# Patient Record
Sex: FEMALE | Race: WHITE | NOT HISPANIC OR LATINO | Employment: OTHER | URBAN - NONMETROPOLITAN AREA
[De-identification: names, ages, dates, MRNs, and addresses within clinical notes are randomized per-mention and may not be internally consistent; named-entity substitution may affect disease eponyms.]

---

## 2017-01-17 DIAGNOSIS — I10 ESSENTIAL HYPERTENSION: ICD-10-CM

## 2017-01-18 RX ORDER — HYDROCHLOROTHIAZIDE 12.5 MG/1
TABLET ORAL
Qty: 30 TAB | Refills: 6 | Status: SHIPPED | OUTPATIENT
Start: 2017-01-18 | End: 2017-07-21 | Stop reason: SDUPTHER

## 2017-01-25 ENCOUNTER — OFFICE VISIT (OUTPATIENT)
Dept: MEDICAL GROUP | Facility: PHYSICIAN GROUP | Age: 62
End: 2017-01-25
Payer: COMMERCIAL

## 2017-01-25 VITALS
SYSTOLIC BLOOD PRESSURE: 116 MMHG | HEIGHT: 67 IN | WEIGHT: 168 LBS | OXYGEN SATURATION: 94 % | BODY MASS INDEX: 26.37 KG/M2 | TEMPERATURE: 97.9 F | HEART RATE: 64 BPM | RESPIRATION RATE: 16 BRPM | DIASTOLIC BLOOD PRESSURE: 64 MMHG

## 2017-01-25 DIAGNOSIS — M54.9 BACK PAIN WITH SCIATICA: ICD-10-CM

## 2017-01-25 DIAGNOSIS — M54.30 BACK PAIN WITH SCIATICA: ICD-10-CM

## 2017-01-25 DIAGNOSIS — M51.36 DEGENERATIVE DISC DISEASE, LUMBAR: ICD-10-CM

## 2017-01-25 PROBLEM — M51.369 DEGENERATIVE DISC DISEASE, LUMBAR: Status: ACTIVE | Noted: 2017-01-25

## 2017-01-25 PROCEDURE — 99214 OFFICE O/P EST MOD 30 MIN: CPT | Performed by: INTERNAL MEDICINE

## 2017-01-25 RX ORDER — DICLOFENAC SODIUM 75 MG/1
75 TABLET, DELAYED RELEASE ORAL
Qty: 60 TAB | Refills: 11 | Status: ON HOLD | OUTPATIENT
Start: 2017-01-25 | End: 2017-05-26

## 2017-01-25 NOTE — MR AVS SNAPSHOT
"        Chinyere Priest   2017 3:20 PM   Office Visit   MRN: [id number]    Department:  [facility]   Dept Phone:  [phone]    Description:  Female : 1955   Provider:  Jessie Wood M.D.           Reason for Visit     Back Pain LBP-discuss pain management       Allergies as of 2017     Allergen Noted Reactions    Erythromycin 2014       Abdominal pain    Latex 2009       Tape 2009       Cloth tape      You were diagnosed with     Degenerative disc disease, lumbar   [607350]       Back pain with sciatica   [8712599]         Vital Signs     Blood Pressure Pulse Temperature Respirations Height Weight    116/64 mmHg 64 36.6 °C (97.9 °F) 16 1.702 m (5' 7.01\") 76.204 kg (168 lb)    Body Mass Index Oxygen Saturation Smoking Status             26.31 kg/m2 94% Former Smoker         Basic Information     Date Of Birth Sex Race Ethnicity Preferred Language    1955 Female White Non- English      Your appointments     2017  9:40 AM   FOLLOW UP with SAL Cortes   [facility]    [address] [address]   [phone]            Mar 06, 2017  8:30 AM   Established Lung Cancer Screening 30 min with DANIELLE Kraus   [facility] (--)    [address]  [facility] [place] [facility] [address]   [phone]            Mar 06, 2017  1:10 PM   MA SCRN10 with [facility] MG 3   [facility] ([address])    [address] [place] [address] [address]  [facility] [address]   [phone]           No deodorant, powder, perfume or lotion under the arm or breast area.              Problem List              ICD-10-CM Priority Class Noted - Resolved    Inflamed seborrheic keratosis L82.0   6/3/2010 - Present    Chronic back pain (Chronic) M54.9, G89.29   6/3/2010 - Present    Pure hypercholesterolemia E78.00 High  6/3/2010 - Present    Plantar fascia syndrome (Chronic) M72.2   2010 - " Present    Vitamin D deficiency (Chronic) E55.9   7/1/2010 - Present    DDD (degenerative disc disease), cervical M50.30   1/19/2012 - Present    Trigger point of thoracic region M54.6   1/19/2012 - Present    Abnormal MRI, cervical spine R93.7   1/19/2012 - Present    Pruritic dermatitis L29.9   6/21/2013 - Present    Spider veins of limb I78.1   6/21/2013 - Present    Cystocele N81.10   9/13/2013 - Present    Asthma in adult without complication J45.909   1/13/2014 - Present    Enlarged ovary N83.8   1/13/2014 - Present    Palpitations R00.2 Medium  12/10/2014 - Present    Osteoarthritis of both hips M16.0   12/10/2014 - Present    Skin lesion of right arm L98.9   4/28/2015 - Present    Undiagnosed cardiac murmurs R01.1 Medium  5/15/2015 - Present    Abnormal CT scan, kidney R93.429   5/15/2015 - Present    Aortic calcification (CMS-HCC) I70.0 Medium  5/15/2015 - Present    Diverticulosis K57.90   5/15/2015 - Present    Pulmonary hypertension (CMS-[facility]) I27.2 High  8/28/2015 - Present    Essential hypertension I10 High  8/28/2015 - Present    Need for pneumococcal vaccination Z23   10/16/2015 - Present    Epigastric abdominal pain R10.13   10/17/2015 - Present    Decreased right ventricular systolic function I51.9   1/14/2016 - Present    Asthma exacerbation J45.901   4/11/2016 - Present    Family history of ovarian cancer Z80.41   11/21/2016 - Present    Family history of breast cancer Z80.3   11/21/2016 - Present    Tobacco use disorder, mild, in sustained remission, abuse Z72.0   11/21/2016 - Present    Degenerative disc disease, lumbar M51.36   1/25/2017 - Present    Back pain with sciatica M54.30, M54.9   1/25/2017 - Present      Health Maintenance        Date Due Completion Dates    IMM DTaP/Tdap/Td Vaccine (1 - Tdap) 7/30/1973 ---    IMM ZOSTER VACCINE 7/30/2014 ---    MAMMOGRAM 5/21/2016 5/21/2015, 1/20/2014, 11/16/2012    COLONOSCOPY 12/8/2021 12/8/2011 (Prv Comp)    Override on 12/8/2011: Previously  completed (GIC, diverticulosis, repeat in 10 years)            Current Immunizations     13-VALENT PCV PREVNAR 10/16/2015 10:10 AM    Influenza TIV (IM) 10/1/2016    Pneumococcal polysaccharide vaccine (PPSV-23) 11/21/2016      Below and/or attached are the medications your provider expects you to take. Review all of your home medications and newly ordered medications with your provider and/or pharmacist. Follow medication instructions as directed by your provider and/or pharmacist. Please keep your medication list with you and share with your provider. Update the information when medications are discontinued, doses are changed, or new medications (including over-the-counter products) are added; and carry medication information at all times in the event of emergency situations     Allergies:  ERYTHROMYCIN - (reactions not documented)     LATEX - (reactions not documented)     TAPE - (reactions not documented)               Medications  Valid as of: January 25, 2017 -  3:49 PM    Generic Name Brand Name Tablet Size Instructions for use    Albuterol Sulfate (Aero Soln) albuterol 108 (90 BASE) MCG/ACT Inhale 2 Puffs by mouth every four hours as needed.        AmLODIPine Besylate (Tab) NORVASC 5 MG Take 1 Tab by mouth every day.        Aspirin (Tab) aspirin 81 MG Take 81 mg by mouth every day.        Cholecalciferol (Tab) cholecalciferol 1000 UNIT Take 2 Tabs by mouth every day.        Diclofenac Sodium (Gel) Diclofenac Sodium 1 % Apply 2gm up to 4 times per day if needed for knee pain        Diclofenac Sodium (Tablet Delayed Response) VOLTAREN 75 MG Take 1 Tab by mouth 2 times daily with meals as needed.        Doxycycline Hyclate (Tab) VIBRAMYCIN 100 MG Take 1 Tab by mouth 2 times a day.        HydroCHLOROthiazide (Tab) HYDRODIURIL 12.5 MG TAKE 1 TAB BY MOUTH EVERY DAY.        Ibuprofen (Tab) MOTRIN 800 MG Take 1 Tab by mouth every 8 hours as needed for Moderate Pain.        MethylPREDNISolone (Tablet Therapy Pack)  MEDROL DOSEPAK 4 MG Take 1 Tab by mouth See Admin Instructions.        .                 Medicines prescribed today were sent to:     [id number] - [place], [place] - [address]    [address] [place] [address]    Phone: [phone] Fax: [phone]    Open 24 Hours?: No      Medication refill instructions:       If your prescription bottle indicates you have medication refills left, it is not necessary to call your provider’s office. Please contact your pharmacy and they will refill your medication.    If your prescription bottle indicates you do not have any refills left, you may request refills at any time through one of the following ways: The online [employer] system (except Urgent Care), by calling your provider’s office, or by asking your pharmacy to contact your provider’s office with a refill request. Medication refills are processed only during regular business hours and may not be available until the next business day. Your provider may request additional information or to have a follow-up visit with you prior to refilling your medication.   *Please Note: Medication refills are assigned a new Rx number when refilled electronically. Your pharmacy may indicate that no refills were authorized even though a new prescription for the same medication is available at the pharmacy. Please request the medicine by name with the pharmacy before contacting your provider for a refill.        Referral     A referral request has been sent to our patient care coordination department. Please allow 3-5 business days for us to process this request and contact you either by phone or mail. If you do not hear from us by the 5th business day, please call us at [phone].        Instructions    1. Referred to Dr. Clay.    2. Try diclofenac gel and/or pills.       Other Notes About Your Plan     CARDS:add on HCTZ 12.5 and Norvasc 5 mg. Recheck lipids. Recheck 6 months 1/2017    CARDIOLOGY NOTES: 8/2015  Trial of  amlodipine for her systemic hypertension with followup in the nurse practice clinic in 2-3 weeks and up titration of her amlodipine dose as tolerated with plans to repeat an echocardiogram in the future to remeasure her right ventricular systolic pressure. She may require a CT and a right heart catheter before we are done with this workup. She is well versed in the use of the emergency medical system for any symptoms. Continue primary follow up with Afia Currie.           [facility] Status: Patient Declined

## 2017-01-26 NOTE — PROGRESS NOTES
Chief Complaint   Patient presents with   • Back Pain     LBP-discuss pain management        HISTORY OF PRESENT ILLNESS: Patient is a 61 y.o. female established patient who presents today to be seen for acute on chronic issue.    Degenerative disc disease, lumbar  Patient is a 61-year-old female with a long history of chronic back pain. She had surgery by Dr. Clay in 2009. Since then she has followed at [facility] and has had a physiatry's there. She notes that in the past few years she's had worsening back pain with extension of nerve pain down into her legs bilaterally. She is now undergone 5 different sets of epidurals injections but has not had improvement in her pain. She uses anti-inflammatories and does not take narcotics. She recently address this with her current physiatrist and he offered injections again. Patient is frustrated with continued option. She would like to seek out a second opinion. She had an MRI in December 2015. We discussed today her options. I think it is certainly reasonable to do second opinion when she is not having any improvement and continues to receive injections from the same place. We discussed sending her back to Dr. Clay as he did her previous surgery. She's not had weakness in her legs. No problems with bowel or bladder incontinence. Pain does occur on a daily basis. She uses diclofenac for symptoms orally and also in gel form.      Patient Active Problem List    Diagnosis Date Noted   • Pulmonary hypertension (CMS-[facility]) 08/28/2015     Priority: High   • Essential hypertension 08/28/2015     Priority: High   • Pure hypercholesterolemia 06/03/2010     Priority: High   • Undiagnosed cardiac murmurs 05/15/2015     Priority: Medium   • Aortic calcification (CMS-[facility]) 05/15/2015     Priority: Medium   • Palpitations 12/10/2014     Priority: Medium   • Degenerative disc disease, lumbar 01/25/2017   • Back pain with sciatica 01/25/2017   • Family history of ovarian cancer  11/21/2016   • Family history of breast cancer 11/21/2016   • Tobacco use disorder, mild, in sustained remission, abuse 11/21/2016   • Asthma exacerbation 04/11/2016   • Decreased right ventricular systolic function 01/14/2016   • Epigastric abdominal pain 10/17/2015   • Need for pneumococcal vaccination 10/16/2015   • Abnormal CT scan, kidney 05/15/2015   • Diverticulosis 05/15/2015   • Skin lesion of right arm 04/28/2015   • Osteoarthritis of both hips 12/10/2014   • Asthma in adult without complication 01/13/2014   • Enlarged ovary 01/13/2014   • Cystocele 09/13/2013   • Pruritic dermatitis 06/21/2013   • Spider veins of limb 06/21/2013   • DDD (degenerative disc disease), cervical 01/19/2012   • Trigger point of thoracic region 01/19/2012   • Abnormal MRI, cervical spine 01/19/2012   • Plantar fascia syndrome 07/01/2010   • Vitamin D deficiency 07/01/2010   • Inflamed seborrheic keratosis 06/03/2010   • Chronic back pain 06/03/2010       Allergies:Erythromycin; Latex; and Tape    Current Outpatient Prescriptions Ordered in [facility]   Medication Sig Dispense Refill   • Diclofenac Sodium 1 % Gel Apply 2gm up to 4 times per day if needed for knee pain 1 Tube 3   • diclofenac EC (VOLTAREN) 75 MG Tablet Delayed Response Take 1 Tab by mouth 2 times daily with meals as needed. 60 Tab 11   • hydrochlorothiazide (HYDRODIURIL) 12.5 MG tablet TAKE 1 TAB BY MOUTH EVERY DAY. 30 Tab 6   • ibuprofen (MOTRIN) 800 MG Tab Take 1 Tab by mouth every 8 hours as needed for Moderate Pain. 90 Tab 3   • albuterol 108 (90 BASE) MCG/ACT Aero Soln inhalation aerosol Inhale 2 Puffs by mouth every four hours as needed. 1 Inhaler 0   • MethylPREDNISolone (MEDROL DOSEPAK) 4 MG Tablet Therapy Pack Take 1 Tab by mouth See Admin Instructions. 21 Tab 0   • amlodipine (NORVASC) 5 MG Tab Take 1 Tab by mouth every day. 90 Tab 3   • vitamin D (CHOLECALCIFEROL) 1000 UNIT TABS Take 2 Tabs by mouth every day. 60 Each 11   • aspirin 81 MG tablet Take 81 mg by  "mouth every day.     • doxycycline (VIBRAMYCIN) 100 MG Tab Take 1 Tab by mouth 2 times a day. 20 Tab 0     No current Epic-ordered facility-administered medications on file.       Past Medical History   Diagnosis Date   • Pain    • Asthma in adult          • Essential hypertension, malignant     • Tobacco use disorder     • Inflamed seborrheic keratosis     • Chronic back pain     • Pure hypercholesterolemia     • Fatigue     • Plantar fascia syndrome     • Unspecified vitamin D deficiency     • Pruritic dermatitis       Ongoing for several months but worse at night on her lower extremities   • Spider veins of limb     • Plantar wart of right foot     • Palpitations     • Osteoarthritis of both hips     • Abnormal CT scan, kidney       \"Incidentally noted retroaortic left renal vein is identified. Some calcified plaque is noted in the aorta and its branch vessels.\"  CT scan done for diverticulosis     • Aortic calcification (CMS-HCC)       Noted incidentally on abdominal CT scan.   • Diverticulosis     • Pulmonary hypertension (CMS-HCC) 2016     Echocardiogram with normal LV size, mild concentric LVH, LVEF 65%. Grade I diastolic dysfunction. Trace MR, trace TR, RVSP 40mmHg.       Social History   Substance Use Topics   • Smoking status: Former Smoker -- 1.00 packs/day for 40 years     Types: Cigarettes     Quit date: 2010   • Smokeless tobacco: Never Used   • Alcohol Use: No       Family Status   Relation Status Death Age   • Mother       CAD   • Maternal Aunt     • Daughter Alive    • Maternal Grandmother     • Maternal Grandfather     • Maternal Aunt  56   • Father Other      history unknown     Family History   Problem Relation Age of Onset   • Heart Disease Mother      1st MI @ 59 yo   • Hypertension Mother    • Heart Disease Maternal Aunt    • Cancer Daughter 35     breast   • Heart Disease Maternal Grandmother    • Heart Disease Maternal Grandfather    • " "Heart Disease Maternal Aunt 56       ROS:  Review of Systems   Constitutional: Negative for fever and malaise/fatigue.   HENT: Negative for congestion  Respiratory: Negative for cough  Cardiovascular: Negative for chest pain  Gastrointestinal: Negative for nausea, vomiting and abdominal pain.  Musculoskeletal positive for back pain  All other systems reviewed and are negative except as in HPI.      Exam:  Blood pressure 116/64, pulse 64, temperature 36.6 °C (97.9 °F), resp. rate 16, height 1.702 m (5' 7.01\"), weight 76.204 kg (168 lb), SpO2 94 %.  General:  Well nourished, well developed female in NAD  Head is grossly normal.  Neck: Supple without JVD   Pulmonary: Clear to ausculation and percussion.  Normal effort. No rales, ronchi, or wheezing.  Cardiovascular: Regular rate and rhythm without murmur. Carotid and radial pulses are intact and equal bilaterally.  Extremities: no clubbing, cyanosis, or edema.  MSK: spasm of the lateral lumbar spinal muscles bilaterally  Neuro: strength 5/5 in lower legs bilaterally        Assessment/Plan:  1. Degenerative disc disease, lumbar  REFERRAL TO NEUROSURGERY    Diclofenac Sodium 1 % Gel    diclofenac EC (VOLTAREN) 75 MG Tablet Delayed Response    Uncontrolled, referred to spine [place]   2. Back pain with sciatica  Diclofenac Sodium 1 % Gel    diclofenac EC (VOLTAREN) 75 MG Tablet Delayed Response     Please note that this dictation was created using voice recognition software. I have made every reasonable attempt to correct obvious errors, but I expect that there are errors of grammar and possibly content that I did not discover before finalizing the note.         "

## 2017-01-26 NOTE — ASSESSMENT & PLAN NOTE
Patient is a 61-year-old female with a long history of chronic back pain. She had surgery by Dr. Clay in 2009. Since then she has followed at [facility] and has had a physiatry's there. She notes that in the past few years she's had worsening back pain with extension of nerve pain down into her legs bilaterally. She is now undergone 5 different sets of epidurals injections but has not had improvement in her pain. She uses anti-inflammatories and does not take narcotics. She recently address this with her current physiatrist and he offered injections again. Patient is frustrated with continued option. She would like to seek out a second opinion. She had an MRI in December 2015. We discussed today her options. I think it is certainly reasonable to do second opinion when she is not having any improvement and continues to receive injections from the same place. We discussed sending her back to Dr. Clay as he did her previous surgery. She's not had weakness in her legs. No problems with bowel or bladder incontinence. Pain does occur on a daily basis. She uses diclofenac for symptoms orally and also in gel form.

## 2017-02-03 ENCOUNTER — HOSPITAL ENCOUNTER (OUTPATIENT)
Dept: RADIOLOGY | Facility: MEDICAL CENTER | Age: 62
End: 2017-02-03

## 2017-03-06 ENCOUNTER — APPOINTMENT (OUTPATIENT)
Dept: HEMATOLOGY ONCOLOGY | Facility: MEDICAL CENTER | Age: 62
End: 2017-03-06
Payer: COMMERCIAL

## 2017-03-09 ENCOUNTER — OFFICE VISIT (OUTPATIENT)
Dept: CARDIOLOGY | Facility: PHYSICIAN GROUP | Age: 62
End: 2017-03-09
Payer: COMMERCIAL

## 2017-03-09 VITALS
DIASTOLIC BLOOD PRESSURE: 70 MMHG | HEART RATE: 86 BPM | WEIGHT: 175 LBS | HEIGHT: 67 IN | SYSTOLIC BLOOD PRESSURE: 124 MMHG | BODY MASS INDEX: 27.47 KG/M2 | OXYGEN SATURATION: 97 %

## 2017-03-09 DIAGNOSIS — I27.20 PULMONARY HYPERTENSION (HCC): ICD-10-CM

## 2017-03-09 DIAGNOSIS — M54.50 CHRONIC BILATERAL LOW BACK PAIN WITHOUT SCIATICA: Chronic | ICD-10-CM

## 2017-03-09 DIAGNOSIS — I10 ESSENTIAL HYPERTENSION: ICD-10-CM

## 2017-03-09 DIAGNOSIS — E78.00 PURE HYPERCHOLESTEROLEMIA: ICD-10-CM

## 2017-03-09 DIAGNOSIS — G89.29 CHRONIC BILATERAL LOW BACK PAIN WITHOUT SCIATICA: Chronic | ICD-10-CM

## 2017-03-09 PROCEDURE — 99214 OFFICE O/P EST MOD 30 MIN: CPT | Performed by: NURSE PRACTITIONER

## 2017-03-09 ASSESSMENT — ENCOUNTER SYMPTOMS
PALPITATIONS: 0
MYALGIAS: 0
ORTHOPNEA: 0
DIZZINESS: 0
BRUISES/BLEEDS EASILY: 0
HEADACHES: 0
FEVER: 0
PND: 0
ABDOMINAL PAIN: 0
CHILLS: 0
LOSS OF CONSCIOUSNESS: 0
SHORTNESS OF BREATH: 0

## 2017-03-09 NOTE — Clinical Note
"     [facility] [address]  Phone: [phone]  Fax: [phone]              Chinyere Priest  1955    Encounter Date: 3/9/2017    SAL Cortes          PROGRESS NOTE:  Subjective:   Chinyere Priest is a 61 y.o. female who presents today for six month FU for hypertension and hyperlipidemia.    Chinyere is a 61 year old female with history of hypertension, hyperlipidemia, palpitations and mild-moderate pulmonary hypertension on echo in January 2016 .    She is here today for follow-up. BP is running 110-130 systolic at home. She feels good. Some mild lower extremity edema, but she stands a lot during the day. No chest pain, pressure or discomfort; no symptomatic palpitations. No shortness of breath, orthopnea or PND. No dizziness, lightheadedness or syncope.    Past Medical History   Diagnosis Date   • Pain    • Asthma in adult          • Essential hypertension, malignant     • Tobacco use disorder     • Inflamed seborrheic keratosis     • Chronic back pain     • Pure hypercholesterolemia     • Fatigue     • Plantar fascia syndrome     • Unspecified vitamin D deficiency     • Pruritic dermatitis       Ongoing for several months but worse at night on her lower extremities   • Spider veins of limb     • Plantar wart of right foot     • Palpitations     • Osteoarthritis of both hips     • Abnormal CT scan, kidney       \"Incidentally noted retroaortic left renal vein is identified. Some calcified plaque is noted in the aorta and its branch vessels.\"  CT scan done for diverticulosis     • Aortic calcification (CMS-HCC)       Noted incidentally on abdominal CT scan.   • Diverticulosis     • Pulmonary hypertension (CMS-HCC) January 2016     Echocardiogram with normal LV size, mild concentric LVH, LVEF 65%. Grade I diastolic dysfunction. Trace MR, trace TR, RVSP 40mmHg.     Past Surgical History   Procedure Laterality Date  "   • Gyn surgery        x2   • Hysterectomy laparoscopy     • Lumbar laminectomy diskectomy  2009     Performed by SUDHIR VERMA at SURGERY [facility] ORS     Family History   Problem Relation Age of Onset   • Heart Disease Mother      1st MI @ 59 yo   • Hypertension Mother    • Heart Disease Maternal Aunt    • Cancer Daughter 35     breast   • Heart Disease Maternal Grandmother    • Heart Disease Maternal Grandfather    • Heart Disease Maternal Aunt 56     History   Smoking status   • Former Smoker -- 1.00 packs/day for 40 years   • Types: Cigarettes   • Quit date: 2010   Smokeless tobacco   • Never Used     Allergies   Allergen Reactions   • Erythromycin      Abdominal pain   • Latex    • Tape      Cloth tape     Outpatient Encounter Prescriptions as of 3/9/2017   Medication Sig Dispense Refill   • Diclofenac Sodium 1 % Gel Apply 2gm up to 4 times per day if needed for knee pain 1 Tube 3   • diclofenac EC (VOLTAREN) 75 MG Tablet Delayed Response Take 1 Tab by mouth 2 times daily with meals as needed. 60 Tab 11   • hydrochlorothiazide (HYDRODIURIL) 12.5 MG tablet TAKE 1 TAB BY MOUTH EVERY DAY. 30 Tab 6   • ibuprofen (MOTRIN) 800 MG Tab Take 1 Tab by mouth every 8 hours as needed for Moderate Pain. 90 Tab 3   • doxycycline (VIBRAMYCIN) 100 MG Tab Take 1 Tab by mouth 2 times a day. 20 Tab 0   • albuterol 108 (90 BASE) MCG/ACT Aero Soln inhalation aerosol Inhale 2 Puffs by mouth every four hours as needed. 1 Inhaler 0   • MethylPREDNISolone (MEDROL DOSEPAK) 4 MG Tablet Therapy Pack Take 1 Tab by mouth See Admin Instructions. 21 Tab 0   • amlodipine (NORVASC) 5 MG Tab Take 1 Tab by mouth every day. 90 Tab 3   • vitamin D (CHOLECALCIFEROL) 1000 UNIT TABS Take 2 Tabs by mouth every day. 60 Each 11   • aspirin 81 MG tablet Take 81 mg by mouth every day.       No facility-administered encounter medications on file as of 3/9/2017.     Review of Systems   Constitutional: Negative for fever and chills.  "  Respiratory: Negative for shortness of breath.    Cardiovascular: Positive for leg swelling. Negative for chest pain, palpitations, orthopnea and PND.        Very mild.   Gastrointestinal: Negative for abdominal pain.   Musculoskeletal: Negative for myalgias.   Neurological: Negative for dizziness, loss of consciousness and headaches.   Endo/Heme/Allergies: Does not bruise/bleed easily.        Objective:   /70 mmHg  Pulse 86  Ht 1.689 m (5' 6.5\")  Wt 79.379 kg (175 lb)  BMI 27.83 kg/m2  SpO2 97%    Physical Exam   Constitutional: She is oriented to person, place, and time. She appears well-developed and well-nourished. No distress.   HENT:   Head: Normocephalic.   Eyes: Conjunctivae and EOM are normal.   Neck: Normal range of motion. Neck supple. No JVD present.   Cardiovascular: Normal rate, regular rhythm, normal heart sounds and intact distal pulses.  Exam reveals no gallop and no friction rub.    No murmur heard.  Pulmonary/Chest: Effort normal and breath sounds normal.   Abdominal: Soft. Bowel sounds are normal.   Musculoskeletal: Normal range of motion. She exhibits edema.   Trace edema to the ankles bilaterally.   Neurological: She is alert and oriented to person, place, and time.   Skin: Skin is warm and dry. No rash noted. No erythema.   Psychiatric: She has a normal mood and affect. Her behavior is normal.     Lab Results   Component Value Date/Time    CHOLESTEROL, 11/15/2016 09:26 AM    * 11/15/2016 09:26 AM    HDL 63 11/15/2016 09:26 AM    TRIGLYCERIDES 74 11/15/2016 09:26 AM       Lab Results   Component Value Date/Time    SODIUM 141 11/15/2016 09:26 AM    POTASSIUM 4.2 11/15/2016 09:26 AM    CHLORIDE 107 11/15/2016 09:26 AM    CO2 28 11/15/2016 09:26 AM    GLUCOSE 87 11/15/2016 09:26 AM    BUN 17 11/15/2016 09:26 AM    CREATININE 0.67 11/15/2016 09:26 AM     Lab Results   Component Value Date/Time    ALKALINE PHOSPHATASE 56 11/15/2016 09:26 AM    AST(SGOT) 18 11/15/2016 09:26 " AM    ALT(SGPT) 17 11/15/2016 09:26 AM    TOTAL BILIRUBIN 0.6 11/15/2016 09:26 AM          Assessment:     1. Essential hypertension     2. Pure hypercholesterolemia     3. Pulmonary hypertension (CMS-HCC)     4. Chronic bilateral low back pain without sciatica         Medical Decision Making:  Today's Assessment / Status / Plan:     1. Hypertension, treated with combination therapy, stable. BP is very good today.    2. Hyperlipidemia, not currently on any therapy. Recent lipid panel was good. HDL is very good.    3. Pulmonary hypertension, mild-moderate on echo in January 2016, stable.    4. Chronic low back pain, followed by PCP.    Same medications for now. FU in 1 year; FU sooner if clinical condition changes.    Collaborating MD: EUSEBIO Moreno      No Recipients

## 2017-03-09 NOTE — MR AVS SNAPSHOT
"        Chinyere Cem   3/9/2017 1:20 PM   Office Visit   MRN: [id number]    Department:  [facility]   Dept Phone:  [phone]    Description:  Female : 1955   Provider:  SAL Cortes           Reason for Visit     Follow-Up           Allergies as of 3/9/2017     Allergen Noted Reactions    Erythromycin 2014       Abdominal pain    Latex 2009       Tape 2009       Cloth tape      You were diagnosed with     Essential hypertension   [6080344]       Pure hypercholesterolemia   [272.0.ICD-9-CM]       Pulmonary hypertension (CMS-HCC)   [809939]       Chronic bilateral low back pain without sciatica   [2802910]         Vital Signs     Blood Pressure Pulse Height Weight Body Mass Index Oxygen Saturation    124/70 mmHg 86 1.689 m (5' 6.5\") 79.379 kg (175 lb) 27.83 kg/m2 97%    Smoking Status                   Former Smoker           Basic Information     Date Of Birth Sex Race Ethnicity Preferred Language    1955 Female White Non- English      Your appointments     2017  1:20 PM   MA SCRN10 with RBHC MG 3   [facility] ([address])    [address]  [facility] [address]   [phone]           No deodorant, powder, perfume or lotion under the arm or breast area.              Problem List              ICD-10-CM Priority Class Noted - Resolved    Chronic back pain (Chronic) M54.9, G89.29   6/3/2010 - Present    Pure hypercholesterolemia E78.00 High  6/3/2010 - Present    Plantar fascia syndrome (Chronic) M72.2   2010 - Present    Vitamin D deficiency (Chronic) E55.9   2010 - Present    DDD (degenerative disc disease), cervical M50.30   2012 - Present    Trigger point of thoracic region M54.6   2012 - Present    Spider veins of limb I78.1   2013 - Present    Cystocele N81.10   2013 - Present    Asthma in adult without complication J45.909   2014 - Present    Enlarged ovary N83.8   2014 - " Present    Palpitations R00.2 Medium  12/10/2014 - Present    Osteoarthritis of both hips M16.0   12/10/2014 - Present    Undiagnosed cardiac murmurs R01.1 Medium  5/15/2015 - Present    Abnormal CT scan, kidney R93.429   5/15/2015 - Present    Aortic calcification (CMS-HCC) I70.0 Medium  5/15/2015 - Present    Diverticulosis K57.90   5/15/2015 - Present    Pulmonary hypertension (CMS-[facility]) I27.2 High  8/28/2015 - Present    Essential hypertension I10 High  8/28/2015 - Present    Need for pneumococcal vaccination Z23   10/16/2015 - Present    Decreased right ventricular systolic function I51.9   1/14/2016 - Present    Family history of ovarian cancer Z80.41   11/21/2016 - Present    Family history of breast cancer Z80.3   11/21/2016 - Present    Tobacco use disorder, mild, in sustained remission, abuse Z72.0   11/21/2016 - Present    Degenerative disc disease, lumbar M51.36   1/25/2017 - Present    Back pain with sciatica M54.30, M54.9   1/25/2017 - Present      Health Maintenance        Date Due Completion Dates    IMM DTaP/Tdap/Td Vaccine (1 - Tdap) 7/30/1973 ---    IMM ZOSTER VACCINE 7/30/2014 ---    MAMMOGRAM 5/21/2016 5/21/2015, 1/20/2014, 11/16/2012    COLONOSCOPY 12/8/2021 12/8/2011 (Prv Comp)    Override on 12/8/2011: Previously completed (GIC, diverticulosis, repeat in 10 years)            Current Immunizations     13-VALENT PCV PREVNAR 10/16/2015 10:10 AM    Influenza TIV (IM) 10/1/2016    Pneumococcal polysaccharide vaccine (PPSV-23) 11/21/2016      Below and/or attached are the medications your provider expects you to take. Review all of your home medications and newly ordered medications with your provider and/or pharmacist. Follow medication instructions as directed by your provider and/or pharmacist. Please keep your medication list with you and share with your provider. Update the information when medications are discontinued, doses are changed, or new medications (including over-the-counter products)  are added; and carry medication information at all times in the event of emergency situations     Allergies:  ERYTHROMYCIN - (reactions not documented)     LATEX - (reactions not documented)     TAPE - (reactions not documented)               Medications  Valid as of: March 13, 2017 -  9:18 AM    Generic Name Brand Name Tablet Size Instructions for use    Albuterol Sulfate (Aero Soln) albuterol 108 (90 BASE) MCG/ACT Inhale 2 Puffs by mouth every four hours as needed.        AmLODIPine Besylate (Tab) NORVASC 5 MG Take 1 Tab by mouth every day.        Aspirin (Tab) aspirin 81 MG Take 81 mg by mouth every day.        Cholecalciferol (Tab) cholecalciferol 1000 UNIT Take 2 Tabs by mouth every day.        Diclofenac Sodium (Gel) Diclofenac Sodium 1 % Apply 2gm up to 4 times per day if needed for knee pain        Diclofenac Sodium (Tablet Delayed Response) VOLTAREN 75 MG Take 1 Tab by mouth 2 times daily with meals as needed.        Doxycycline Hyclate (Tab) VIBRAMYCIN 100 MG Take 1 Tab by mouth 2 times a day.        HydroCHLOROthiazide (Tab) HYDRODIURIL 12.5 MG TAKE 1 TAB BY MOUTH EVERY DAY.        Ibuprofen (Tab) MOTRIN 800 MG Take 1 Tab by mouth every 8 hours as needed for Moderate Pain.        MethylPREDNISolone (Tablet Therapy Pack) MEDROL DOSEPAK 4 MG Take 1 Tab by mouth See Admin Instructions.        .                 Medicines prescribed today were sent to:     [id number] - [place], [place] - [address]    [facility] [address] [facility] [address]    Phone: [phone] Fax: [phone]    Open 24 Hours?: No      Medication refill instructions:       If your prescription bottle indicates you have medication refills left, it is not necessary to call your provider’s office. Please contact your pharmacy and they will refill your medication.    If your prescription bottle indicates you do not have any refills left, you may request refills at any time through one of the following ways: The online [employer] system  (except Urgent Care), by calling your provider’s office, or by asking your pharmacy to contact your provider’s office with a refill request. Medication refills are processed only during regular business hours and may not be available until the next business day. Your provider may request additional information or to have a follow-up visit with you prior to refilling your medication.   *Please Note: Medication refills are assigned a new Rx number when refilled electronically. Your pharmacy may indicate that no refills were authorized even though a new prescription for the same medication is available at the pharmacy. Please request the medicine by name with the pharmacy before contacting your provider for a refill.        Other Notes About Your Plan     .CARDS NOTE 2017  Medical Decision Making:  Today's Assessment / Status / Plan:      1. Hypertension, treated with combination therapy, stable. BP is very good today.    2. Hyperlipidemia, not currently on any therapy. Recent lipid panel was good. HDL is very good.    3. Pulmonary hypertension, mild-moderate on echo in January 2016, stable.    4. Chronic low back pain, followed by PCP.    Same medications for now. FU in 1 year; FU sooner if clinical condition changes.                   MyChart Status: Patient Declined

## 2017-03-09 NOTE — PROGRESS NOTES
"Subjective:   Chinyere Priest is a 61 y.o. female who presents today for six month FU for hypertension and hyperlipidemia.    Chinyere is a 61 year old female with history of hypertension, hyperlipidemia, palpitations and mild-moderate pulmonary hypertension on echo in 2016 .    She is here today for follow-up. BP is running 110-130 systolic at home. She feels good. Some mild lower extremity edema, but she stands a lot during the day. No chest pain, pressure or discomfort; no symptomatic palpitations. No shortness of breath, orthopnea or PND. No dizziness, lightheadedness or syncope.    Past Medical History   Diagnosis Date   • Pain    • Asthma in adult          • Essential hypertension, malignant     • Tobacco use disorder     • Inflamed seborrheic keratosis     • Chronic back pain     • Pure hypercholesterolemia     • Fatigue     • Plantar fascia syndrome     • Unspecified vitamin D deficiency     • Pruritic dermatitis       Ongoing for several months but worse at night on her lower extremities   • Spider veins of limb     • Plantar wart of right foot     • Palpitations     • Osteoarthritis of both hips     • Abnormal CT scan, kidney       \"Incidentally noted retroaortic left renal vein is identified. Some calcified plaque is noted in the aorta and its branch vessels.\"  CT scan done for diverticulosis     • Aortic calcification (CMS-HCC)       Noted incidentally on abdominal CT scan.   • Diverticulosis     • Pulmonary hypertension (CMS-HCC) 2016     Echocardiogram with normal LV size, mild concentric LVH, LVEF 65%. Grade I diastolic dysfunction. Trace MR, trace TR, RVSP 40mmHg.     Past Surgical History   Procedure Laterality Date   • Gyn surgery        x2   • Hysterectomy laparoscopy     • Lumbar laminectomy diskectomy  2009     Performed by SUDHIR VERMA at [facility]     Family History   Problem Relation Age of Onset   • Heart Disease Mother      1st MI @ 61 yo   • " Hypertension Mother    • Heart Disease Maternal Aunt    • Cancer Daughter 35     breast   • Heart Disease Maternal Grandmother    • Heart Disease Maternal Grandfather    • Heart Disease Maternal Aunt 56     History   Smoking status   • Former Smoker -- 1.00 packs/day for 40 years   • Types: Cigarettes   • Quit date: 03/01/2010   Smokeless tobacco   • Never Used     Allergies   Allergen Reactions   • Erythromycin      Abdominal pain   • Latex    • Tape      Cloth tape     Outpatient Encounter Prescriptions as of 3/9/2017   Medication Sig Dispense Refill   • Diclofenac Sodium 1 % Gel Apply 2gm up to 4 times per day if needed for knee pain 1 Tube 3   • diclofenac EC (VOLTAREN) 75 MG Tablet Delayed Response Take 1 Tab by mouth 2 times daily with meals as needed. 60 Tab 11   • hydrochlorothiazide (HYDRODIURIL) 12.5 MG tablet TAKE 1 TAB BY MOUTH EVERY DAY. 30 Tab 6   • ibuprofen (MOTRIN) 800 MG Tab Take 1 Tab by mouth every 8 hours as needed for Moderate Pain. 90 Tab 3   • doxycycline (VIBRAMYCIN) 100 MG Tab Take 1 Tab by mouth 2 times a day. 20 Tab 0   • albuterol 108 (90 BASE) MCG/ACT Aero Soln inhalation aerosol Inhale 2 Puffs by mouth every four hours as needed. 1 Inhaler 0   • MethylPREDNISolone (MEDROL DOSEPAK) 4 MG Tablet Therapy Pack Take 1 Tab by mouth See Admin Instructions. 21 Tab 0   • amlodipine (NORVASC) 5 MG Tab Take 1 Tab by mouth every day. 90 Tab 3   • vitamin D (CHOLECALCIFEROL) 1000 UNIT TABS Take 2 Tabs by mouth every day. 60 Each 11   • aspirin 81 MG tablet Take 81 mg by mouth every day.       No facility-administered encounter medications on file as of 3/9/2017.     Review of Systems   Constitutional: Negative for fever and chills.   Respiratory: Negative for shortness of breath.    Cardiovascular: Positive for leg swelling. Negative for chest pain, palpitations, orthopnea and PND.        Very mild.   Gastrointestinal: Negative for abdominal pain.   Musculoskeletal: Negative for myalgias.  "  Neurological: Negative for dizziness, loss of consciousness and headaches.   Endo/Heme/Allergies: Does not bruise/bleed easily.        Objective:   /70 mmHg  Pulse 86  Ht 1.689 m (5' 6.5\")  Wt 79.379 kg (175 lb)  BMI 27.83 kg/m2  SpO2 97%    Physical Exam   Constitutional: She is oriented to person, place, and time. She appears well-developed and well-nourished. No distress.   HENT:   Head: Normocephalic.   Eyes: Conjunctivae and EOM are normal.   Neck: Normal range of motion. Neck supple. No JVD present.   Cardiovascular: Normal rate, regular rhythm, normal heart sounds and intact distal pulses.  Exam reveals no gallop and no friction rub.    No murmur heard.  Pulmonary/Chest: Effort normal and breath sounds normal.   Abdominal: Soft. Bowel sounds are normal.   Musculoskeletal: Normal range of motion. She exhibits edema.   Trace edema to the ankles bilaterally.   Neurological: She is alert and oriented to person, place, and time.   Skin: Skin is warm and dry. No rash noted. No erythema.   Psychiatric: She has a normal mood and affect. Her behavior is normal.     Lab Results   Component Value Date/Time    CHOLESTEROL, 11/15/2016 09:26 AM    * 11/15/2016 09:26 AM    HDL 63 11/15/2016 09:26 AM    TRIGLYCERIDES 74 11/15/2016 09:26 AM       Lab Results   Component Value Date/Time    SODIUM 141 11/15/2016 09:26 AM    POTASSIUM 4.2 11/15/2016 09:26 AM    CHLORIDE 107 11/15/2016 09:26 AM    CO2 28 11/15/2016 09:26 AM    GLUCOSE 87 11/15/2016 09:26 AM    BUN 17 11/15/2016 09:26 AM    CREATININE 0.67 11/15/2016 09:26 AM     Lab Results   Component Value Date/Time    ALKALINE PHOSPHATASE 56 11/15/2016 09:26 AM    AST(SGOT) 18 11/15/2016 09:26 AM    ALT(SGPT) 17 11/15/2016 09:26 AM    TOTAL BILIRUBIN 0.6 11/15/2016 09:26 AM          Assessment:     1. Essential hypertension     2. Pure hypercholesterolemia     3. Pulmonary hypertension (CMS-HCC)     4. Chronic bilateral low back pain without sciatica "         Medical Decision Making:  Today's Assessment / Status / Plan:     1. Hypertension, treated with combination therapy, stable. BP is very good today.    2. Hyperlipidemia, not currently on any therapy. Recent lipid panel was good. HDL is very good.    3. Pulmonary hypertension, mild-moderate on echo in January 2016, stable.    4. Chronic low back pain, followed by PCP.    Same medications for now. FU in 1 year; FU sooner if clinical condition changes.    Collaborating MD: EUSEBIO Moreno

## 2017-03-16 ENCOUNTER — OFFICE VISIT (OUTPATIENT)
Dept: URGENT CARE | Facility: CLINIC | Age: 62
End: 2017-03-16
Payer: COMMERCIAL

## 2017-03-16 VITALS
DIASTOLIC BLOOD PRESSURE: 84 MMHG | TEMPERATURE: 96.7 F | SYSTOLIC BLOOD PRESSURE: 128 MMHG | OXYGEN SATURATION: 97 % | BODY MASS INDEX: 26.87 KG/M2 | RESPIRATION RATE: 16 BRPM | HEART RATE: 96 BPM | WEIGHT: 169 LBS

## 2017-03-16 DIAGNOSIS — K52.9 ACUTE GASTROENTERITIS: ICD-10-CM

## 2017-03-16 PROCEDURE — 99214 OFFICE O/P EST MOD 30 MIN: CPT | Performed by: FAMILY MEDICINE

## 2017-03-16 RX ORDER — CIPROFLOXACIN 500 MG/1
TABLET, FILM COATED ORAL
Qty: 10 TAB | Refills: 0 | Status: ON HOLD | OUTPATIENT
Start: 2017-03-16 | End: 2017-05-26

## 2017-03-16 RX ORDER — ONDANSETRON 4 MG/1
TABLET, ORALLY DISINTEGRATING ORAL
Qty: 15 TAB | Refills: 0 | Status: ON HOLD | OUTPATIENT
Start: 2017-03-16 | End: 2017-05-26

## 2017-03-16 NOTE — Clinical Note
March 16, 2017         Patient: Chinyere Priest   YOB: 1955   Date of Visit: 3/16/2017           To Whom it May Concern:    Chinyere Priest was seen in my clinic on 3/16/2017.     Please excuse from work for 3/17 and 3/18/17 due to medical condition.    Will return to work on 3/19/17.    If you have any questions or concerns, please don't hesitate to call.        Sincerely,           Dilip Houston M.D.  Electronically Signed

## 2017-03-16 NOTE — MR AVS SNAPSHOT
Chinyere Priest   3/16/2017 4:45 PM   Office Visit   MRN: [id number]    Department:  [facility]   Dept Phone:  [phone]    Description:  Female : 1955   Provider:  Dilip Houston M.D.           Reason for Visit     Emesis           Allergies as of 3/16/2017     Allergen Noted Reactions    Erythromycin 2014       Abdominal pain    Latex 2009       Tape 2009       Cloth tape      You were diagnosed with     Acute gastroenteritis   [755030]         Vital Signs     Blood Pressure Pulse Temperature Respirations Weight Oxygen Saturation    128/84 mmHg 96 35.9 °C (96.7 °F) 16 76.658 kg (169 lb) 97%    Smoking Status                   Former Smoker           Basic Information     Date Of Birth Sex Race Ethnicity Preferred Language    1955 Female White Non- English      Your appointments     2017  1:20 PM   MA SCRN10 with RBHC MG 3   [facility] ([address])    [address] [place] [address]  [facility] [id number] [phone]           No deodorant, powder, perfume or lotion under the arm or breast area.              Problem List              ICD-10-CM Priority Class Noted - Resolved    Chronic back pain (Chronic) M54.9, G89.29   6/3/2010 - Present    Pure hypercholesterolemia E78.00 High  6/3/2010 - Present    Plantar fascia syndrome (Chronic) M72.2   2010 - Present    Vitamin D deficiency (Chronic) E55.9   2010 - Present    DDD (degenerative disc disease), cervical M50.30   2012 - Present    Trigger point of thoracic region M54.6   2012 - Present    Spider veins of limb I78.1   2013 - Present    Cystocele N81.10   2013 - Present    Asthma in adult without complication J45.909   2014 - Present    Enlarged ovary N83.8   2014 - Present    Palpitations R00.2 Medium  12/10/2014 - Present    Osteoarthritis of both hips M16.0   12/10/2014 - Present    Undiagnosed cardiac murmurs R01.1 Medium  5/15/2015 -  Present    Abnormal CT scan, kidney R93.429   5/15/2015 - Present    Aortic calcification (CMS-HCC) I70.0 Medium  5/15/2015 - Present    Diverticulosis K57.90   5/15/2015 - Present    Pulmonary hypertension (CMS-HCC) I27.2 High  8/28/2015 - Present    Essential hypertension I10 High  8/28/2015 - Present    Need for pneumococcal vaccination Z23   10/16/2015 - Present    Decreased right ventricular systolic function I51.9   1/14/2016 - Present    Family history of ovarian cancer Z80.41   11/21/2016 - Present    Family history of breast cancer Z80.3   11/21/2016 - Present    Tobacco use disorder, mild, in sustained remission, abuse Z72.0   11/21/2016 - Present    Degenerative disc disease, lumbar M51.36   1/25/2017 - Present    Back pain with sciatica M54.30, M54.9   1/25/2017 - Present      Health Maintenance        Date Due Completion Dates    IMM DTaP/Tdap/Td Vaccine (1 - Tdap) 7/30/1973 ---    IMM ZOSTER VACCINE 7/30/2014 ---    MAMMOGRAM 5/21/2016 5/21/2015, 1/20/2014, 11/16/2012    COLONOSCOPY 12/8/2021 12/8/2011 (Prv Comp)    Override on 12/8/2011: Previously completed (GIC, diverticulosis, repeat in 10 years)            Current Immunizations     13-VALENT PCV PREVNAR 10/16/2015 10:10 AM    Influenza TIV (IM) 10/1/2016    Pneumococcal polysaccharide vaccine (PPSV-23) 11/21/2016      Below and/or attached are the medications your provider expects you to take. Review all of your home medications and newly ordered medications with your provider and/or pharmacist. Follow medication instructions as directed by your provider and/or pharmacist. Please keep your medication list with you and share with your provider. Update the information when medications are discontinued, doses are changed, or new medications (including over-the-counter products) are added; and carry medication information at all times in the event of emergency situations     Allergies:  ERYTHROMYCIN - (reactions not documented)     LATEX - (reactions  not documented)     TAPE - (reactions not documented)               Medications  Valid as of: March 16, 2017 -  4:52 PM    Generic Name Brand Name Tablet Size Instructions for use    Albuterol Sulfate (Aero Soln) albuterol 108 (90 BASE) MCG/ACT Inhale 2 Puffs by mouth every four hours as needed.        AmLODIPine Besylate (Tab) NORVASC 5 MG Take 1 Tab by mouth every day.        Aspirin (Tab) aspirin 81 MG Take 81 mg by mouth every day.        Cholecalciferol (Tab) cholecalciferol 1000 UNIT Take 2 Tabs by mouth every day.        Ciprofloxacin HCl (Tab) CIPRO 500 MG 1 TAB TWICE A DAY X 5 DAYS.        Diclofenac Sodium (Gel) Diclofenac Sodium 1 % Apply 2gm up to 4 times per day if needed for knee pain        Diclofenac Sodium (Tablet Delayed Response) VOLTAREN 75 MG Take 1 Tab by mouth 2 times daily with meals as needed.        HydroCHLOROthiazide (Tab) HYDRODIURIL 12.5 MG TAKE 1 TAB BY MOUTH EVERY DAY.        Ibuprofen (Tab) MOTRIN 800 MG Take 1 Tab by mouth every 8 hours as needed for Moderate Pain.        Ondansetron (TABLET DISPERSIBLE) ZOFRAN ODT 4 MG 1 TAB, ALLOW TO DISINTEGRATE IN MOUTH, EVERY 8 HOURS ONLY IF NEEDED FOR NAUSEA OR VOMITING.        .                 Medicines prescribed today were sent to:     [facility]/PHARMACY #[id number] - [place], [place] - [address]    [facility] [address] [place] [address]    Phone: [phone] Fax: [phone]    Open 24 Hours?: No      Medication refill instructions:       If your prescription bottle indicates you have medication refills left, it is not necessary to call your provider’s office. Please contact your pharmacy and they will refill your medication.    If your prescription bottle indicates you do not have any refills left, you may request refills at any time through one of the following ways: The online [employer] system (except Urgent Care), by calling your provider’s office, or by asking your pharmacy to contact your provider’s office with a refill request. Medication  refills are processed only during regular business hours and may not be available until the next business day. Your provider may request additional information or to have a follow-up visit with you prior to refilling your medication.   *Please Note: Medication refills are assigned a new Rx number when refilled electronically. Your pharmacy may indicate that no refills were authorized even though a new prescription for the same medication is available at the pharmacy. Please request the medicine by name with the pharmacy before contacting your provider for a refill.        Other Notes About Your Plan     .CARDS NOTE 2017  Medical Decision Making:  Today's Assessment / Status / Plan:      1. Hypertension, treated with combination therapy, stable. BP is very good today.    2. Hyperlipidemia, not currently on any therapy. Recent lipid panel was good. HDL is very good.    3. Pulmonary hypertension, mild-moderate on echo in January 2016, stable.    4. Chronic low back pain, followed by PCP.    Same medications for now. FU in 1 year; FU sooner if clinical condition changes.                   Leatt Access Code: [email or web address]  Expires: 4/15/2017  9:27 AM    Leatt  A secure, online tool to manage your health information     [employer]’s Leatt® is a secure, online tool that connects you to your personalized health information from the privacy of your home -- day or night - making it very easy for you to manage your healthcare. Once the activation process is completed, you can even access your medical information using the Leatt leah, which is available for free in the Apple Leah store or Google Play store.     Leatt provides the following levels of access (as shown below):   My Chart Features   [facility] Doctor [facility]  Specialists [facility]  Urgent  Care Non-[facility]  Primary Care  Doctor   Email your healthcare team securely and privately 24/7 X X X    Manage appointments: schedule your next appointment;  view details of past/upcoming appointments X      Request prescription refills. X      View recent personal medical records, including lab and immunizations X X X X   View health record, including health history, allergies, medications X X X X   Read reports about your outpatient visits, procedures, consult and ER notes X X X X   See your discharge summary, which is a recap of your hospital and/or ER visit that includes your diagnosis, lab results, and care plan. X X       How to register for [employer]:  1. Go to  [email or web address].  2. Click on the Sign Up Now box, which takes you to the New Member Sign Up page. You will need to provide the following information:  a. Enter your [employer] Access Code exactly as it appears at the top of this page. (You will not need to use this code after you’ve completed the sign-up process. If you do not sign up before the expiration date, you must request a new code.)   b. Enter your date of birth.   c. Enter your home email address.   d. Click Submit, and follow the next screen’s instructions.  3. Create a [employer] ID. This will be your [employer] login ID and cannot be changed, so think of one that is secure and easy to remember.  4. Create a [employer] password. You can change your password at any time.  5. Enter your Password Reset Question and Answer. This can be used at a later time if you forget your password.   6. Enter your e-mail address. This allows you to receive e-mail notifications when new information is available in [employer].  7. Click Sign Up. You can now view your health information.    For assistance activating your [employer] account, call [phone]

## 2017-03-20 RX ORDER — IBUPROFEN 800 MG/1
TABLET ORAL
Qty: 90 TAB | Refills: 0 | Status: SHIPPED | OUTPATIENT
Start: 2017-03-20 | End: 2017-04-18 | Stop reason: SDUPTHER

## 2017-03-27 ENCOUNTER — TELEPHONE (OUTPATIENT)
Dept: HEMATOLOGY ONCOLOGY | Facility: MEDICAL CENTER | Age: 62
End: 2017-03-27

## 2017-03-27 NOTE — TELEPHONE ENCOUNTER
called the patient regarding a appointment that we had to rescheduled for her but she had to cancelled again so I recalled her to get rescheduled but she doesn't want to counties with appointment for the lung cancer screening program . See enc. Elizabeth BALLARD MA

## 2017-03-27 NOTE — TELEPHONE ENCOUNTER
I called the patient regarding a appointment that we had to rescheduled for but she had to cancelled again so I recalled her to get rescheduled but she doesntwant to counties with appointment for the lung cancer screening program

## 2017-04-18 RX ORDER — IBUPROFEN 800 MG/1
TABLET ORAL
Qty: 90 TAB | Refills: 0 | Status: SHIPPED | OUTPATIENT
Start: 2017-04-18 | End: 2017-06-19

## 2017-05-23 ENCOUNTER — TELEPHONE (OUTPATIENT)
Dept: MEDICAL GROUP | Facility: PHYSICIAN GROUP | Age: 62
End: 2017-05-23

## 2017-05-23 NOTE — TELEPHONE ENCOUNTER
Pt informed, she is having surgery at the [facility] with Dr. Chad Clay, will fax to office.

## 2017-05-23 NOTE — TELEPHONE ENCOUNTER
Chest xray from [facility] is in chart. Stable findings. No acute process.  This is for preoperative clearance. Inform patient. Not sure who to send the results too.    Afia Currie

## 2017-05-24 ENCOUNTER — TELEPHONE (OUTPATIENT)
Dept: CARDIOLOGY | Facility: MEDICAL CENTER | Age: 62
End: 2017-05-24

## 2017-05-24 DIAGNOSIS — I27.20 PULMONARY HYPERTENSION (HCC): ICD-10-CM

## 2017-05-24 NOTE — TELEPHONE ENCOUNTER
Per Dr. Hernandez:  Patient should come to the office in [place] right after her echo.    I called her and she will comply.

## 2017-05-24 NOTE — TELEPHONE ENCOUNTER
----- Message from Kavya Briscoe sent at 5/24/2017  2:30 PM PDT -----    Patient called to let you know she is scheduled for her echo tomorrow at [facility] at 8:30 am. She can be reached at [phone]

## 2017-05-25 ENCOUNTER — OFFICE VISIT (OUTPATIENT)
Dept: CARDIOLOGY | Facility: PHYSICIAN GROUP | Age: 62
End: 2017-05-25
Payer: COMMERCIAL

## 2017-05-25 ENCOUNTER — TELEPHONE (OUTPATIENT)
Dept: CARDIOLOGY | Facility: MEDICAL CENTER | Age: 62
End: 2017-05-25

## 2017-05-25 VITALS
HEART RATE: 81 BPM | HEIGHT: 67 IN | OXYGEN SATURATION: 95 % | DIASTOLIC BLOOD PRESSURE: 80 MMHG | WEIGHT: 169 LBS | BODY MASS INDEX: 26.53 KG/M2 | SYSTOLIC BLOOD PRESSURE: 120 MMHG

## 2017-05-25 DIAGNOSIS — I48.19 PERSISTENT ATRIAL FIBRILLATION (HCC): ICD-10-CM

## 2017-05-25 DIAGNOSIS — I10 ESSENTIAL HYPERTENSION: ICD-10-CM

## 2017-05-25 DIAGNOSIS — R07.89 OTHER CHEST PAIN: ICD-10-CM

## 2017-05-25 DIAGNOSIS — I10 ESSENTIAL HYPERTENSION, BENIGN: ICD-10-CM

## 2017-05-25 DIAGNOSIS — I51.89 DECREASED RIGHT VENTRICULAR SYSTOLIC FUNCTION: ICD-10-CM

## 2017-05-25 DIAGNOSIS — I27.20 PULMONARY HYPERTENSION (HCC): ICD-10-CM

## 2017-05-25 DIAGNOSIS — E78.00 PURE HYPERCHOLESTEROLEMIA: ICD-10-CM

## 2017-05-25 DIAGNOSIS — I34.0 NON-RHEUMATIC MITRAL REGURGITATION: ICD-10-CM

## 2017-05-25 PROBLEM — R07.9 CHEST PAIN: Status: ACTIVE | Noted: 2017-05-25

## 2017-05-25 PROCEDURE — 99214 OFFICE O/P EST MOD 30 MIN: CPT | Performed by: INTERNAL MEDICINE

## 2017-05-25 ASSESSMENT — ENCOUNTER SYMPTOMS
SEIZURES: 0
FLANK PAIN: 0
BLOOD IN STOOL: 0
PND: 0
NERVOUS/ANXIOUS: 0
PALPITATIONS: 0
LOSS OF CONSCIOUSNESS: 0
DEPRESSION: 0
ABDOMINAL PAIN: 0
FEVER: 0
FALLS: 0
POLYDIPSIA: 0
CHILLS: 0
BRUISES/BLEEDS EASILY: 0
HEADACHES: 0
MYALGIAS: 0
ORTHOPNEA: 0
CLAUDICATION: 0

## 2017-05-25 ASSESSMENT — LIFESTYLE VARIABLES: SUBSTANCE_ABUSE: 0

## 2017-05-25 NOTE — Clinical Note
"     [facility] [address]  Phone: [phone]  Fax: [phone]              Chinyere Priest  1955    Encounter Date: 5/25/2017    Miki Hernandez M.D.          PROGRESS NOTE:  Subjective:   Chinyere Priest is a 61 y.o. female who presents today for preoperative assessment. I had not seen her in a year and a half and she has had several months of increasing back symptoms for which surgical correction was planned. For that reason she was referred for surgical clearance. A preoperative electrocardiogram was read as atrial fibrillation but a copy is difficult to view and therefore that was repeated today and is described below. The striking thing about all this is that she has been asymptomatic from the arrhythmia and has not had any effort dyspnea nor edema or syncope nor near syncope but on close questioning she does say that about a month ago or possibly as recently as 2 weeks ago she had one episode of retrosternal chest discomfort which lasted several minutes in which she thought was indigestion. It has not recurred.    Past Medical History   Diagnosis Date   • Pain    • Asthma in adult          • Essential hypertension, malignant     • Tobacco use disorder     • Inflamed seborrheic keratosis     • Chronic back pain     • Pure hypercholesterolemia     • Fatigue     • Plantar fascia syndrome     • Unspecified vitamin D deficiency     • Pruritic dermatitis       Ongoing for several months but worse at night on her lower extremities   • Spider veins of limb     • Plantar wart of right foot     • Palpitations     • Osteoarthritis of both hips     • Abnormal CT scan, kidney       \"Incidentally noted retroaortic left renal vein is identified. Some calcified plaque is noted in the aorta and its branch vessels.\"  CT scan done for diverticulosis     • Aortic calcification (CMS-HCC)       Noted incidentally on abdominal CT " scan.   • Diverticulosis     • Pulmonary hypertension (CMS-HCC) 2016     Echocardiogram with normal LV size, mild concentric LVH, LVEF 65%. Grade I diastolic dysfunction. Trace MR, trace TR, RVSP 40mmHg.     Past Surgical History   Procedure Laterality Date   • Gyn surgery        x2   • Hysterectomy laparoscopy     • Lumbar laminectomy diskectomy  2009     Performed by SUDHIR VERMA at [facility]     Family History   Problem Relation Age of Onset   • Heart Disease Mother      1st MI @ 61 yo   • Hypertension Mother    • Heart Disease Maternal Aunt    • Cancer Daughter 35     breast   • Heart Disease Maternal Grandmother    • Heart Disease Maternal Grandfather    • Heart Disease Maternal Aunt 56     History   Smoking status   • Former Smoker -- 1.00 packs/day for 40 years   • Types: Cigarettes   • Quit date: 2010   Smokeless tobacco   • Never Used     Allergies   Allergen Reactions   • Erythromycin      Abdominal pain   • Latex    • Tape      Cloth tape     Outpatient Encounter Prescriptions as of 2017   Medication Sig Dispense Refill   • ibuprofen (MOTRIN) 800 MG Tab TAKE 1 TAB BY MOUTH EVERY 8 HOURS AS NEEDED FOR MODERATE PAIN. 90 Tab 0   • ciprofloxacin (CIPRO) 500 MG Tab 1 TAB TWICE A DAY X 5 DAYS. 10 Tab 0   • ondansetron (ZOFRAN ODT) 4 MG TABLET DISPERSIBLE 1 TAB, ALLOW TO DISINTEGRATE IN MOUTH, EVERY 8 HOURS ONLY IF NEEDED FOR NAUSEA OR VOMITING. 15 Tab 0   • Diclofenac Sodium 1 % Gel Apply 2gm up to 4 times per day if needed for knee pain 1 Tube 3   • diclofenac EC (VOLTAREN) 75 MG Tablet Delayed Response Take 1 Tab by mouth 2 times daily with meals as needed. 60 Tab 11   • hydrochlorothiazide (HYDRODIURIL) 12.5 MG tablet TAKE 1 TAB BY MOUTH EVERY DAY. 30 Tab 6   • albuterol 108 (90 BASE) MCG/ACT Aero Soln inhalation aerosol Inhale 2 Puffs by mouth every four hours as needed. 1 Inhaler 0   • amlodipine (NORVASC) 5 MG Tab Take 1 Tab by mouth every day. 90 Tab 3   •  vitamin D (CHOLECALCIFEROL) 1000 UNIT TABS Take 2 Tabs by mouth every day. 60 Each 11   • aspirin 81 MG tablet Take 81 mg by mouth every day.       No facility-administered encounter medications on file as of 5/25/2017.     Review of Systems   Constitutional: Negative for fever and chills.   HENT: Negative for nosebleeds.    Cardiovascular: Negative for palpitations, orthopnea, claudication, leg swelling and PND.   Gastrointestinal: Negative for abdominal pain, blood in stool and melena.   Genitourinary: Negative for hematuria and flank pain.   Musculoskeletal: Negative for myalgias and falls.   Skin: Negative for rash.   Neurological: Negative for seizures, loss of consciousness and headaches.   Endo/Heme/Allergies: Negative for polydipsia. Does not bruise/bleed easily.   Psychiatric/Behavioral: Negative for depression and substance abuse. The patient is not nervous/anxious.         Objective:   There were no vitals taken for this visit.    Physical Exam   Constitutional: She is oriented to person, place, and time. She appears well-developed and well-nourished. No distress.   HENT:   Mouth/Throat: Oropharynx is clear and moist.   Eyes: Conjunctivae are normal. No scleral icterus.   Neck: No JVD present.   Cardiovascular: Normal rate, regular rhythm, S1 normal and S2 normal.  Exam reveals no gallop and no friction rub.    Murmur (2/6 holosystolic murmur at the apex) heard.  Pulmonary/Chest: Effort normal and breath sounds normal.   Musculoskeletal: She exhibits no edema.   Neurological: She is alert and oriented to person, place, and time.   Skin: Skin is warm and dry. She is not diaphoretic.   Psychiatric: She has a normal mood and affect. Thought content normal.       Assessment:     1. Non-rheumatic mitral regurgitation  [facility] EKG (Clinic Performed)   2. Pure hypercholesterolemia  LIPID PROFILE    COMP METABOLIC PANEL    TSH   3. Essential hypertension  CBC WITH DIFFERENTIAL   4. Pulmonary hypertension (CMS-HCC)       5. Decreased right ventricular systolic function     6. Essential hypertension, benign     7. Persistent atrial fibrillation (CMS-HCC)       The echo shows that her mitral regurgitation is unequivocally worse. Pulmonary hypertension persists. The other finding is the new presence of atrial fibrillation.  She needs to be anticoagulated that we need to get a cardiac catheter first to assess the severity of her pulmonary hypertension and potentially pulmonary venous hypertension because of the worsening mitral regurgitation. She will probably require a transesophageal echocardiogram after that. This is going to require postponement of her back surgery.    Medical Decision Making:  Today's Assessment / Status / Plan:     I reviewed with her the procedure of right and left cardiac catheterization and angiography and conscious sedation as well as the indications, alternatives, relative risks and complications and consent was obtained.  This will be scheduled as soon as possible to facilitate anticoagulation after that.  And then a decision regarding further management or proceeding to back surgery before further medical therapy.  The situation is much more complex than initially anticipated. She has had the above lab and I am going to request it.      No Recipients

## 2017-05-25 NOTE — TELEPHONE ENCOUNTER
----- Message from Griselda Kasper Med Ass't sent at 5/25/2017 10:20 AM PDT -----  Regarding: RE: R/L heart cath asap  Yes. Also, pts number is [phone]. He would like this done tomorrow   ----- Message -----     From: Choco Zaidi     Sent: 5/25/2017  10:17 AM       To: Griselda Kasper Med Ass't  Subject: RE: R/L heart cath asap                          Can you have him sign angio orders and fax them to me at [phone]  ----- Message -----     From: Griselda Kasper Med Ass't     Sent: 5/25/2017  10:07 AM       To: Choco Zaidi  Subject: R/L heart cath asap                              JW would like a R and L heart cath on this pt asap. We are at [phone] if you need us

## 2017-05-25 NOTE — TELEPHONE ENCOUNTER
Patient is scheduled on 5-26-17 for a R&L Hrt Cath with Dr. Reyes at [facility]. No meds to stop. Patient was told to check in at 7:00 am for a 9:00 procedure.

## 2017-05-25 NOTE — PROGRESS NOTES
"Subjective:   Chinyere Priest is a 61 y.o. female who presents today for preoperative assessment. I had not seen her in a year and a half and she has had several months of increasing back symptoms for which surgical correction was planned. For that reason she was referred for surgical clearance. A preoperative electrocardiogram was read as atrial fibrillation but a copy is difficult to view and therefore that was repeated today and is described below. The striking thing about all this is that she has been asymptomatic from the arrhythmia and has not had any effort dyspnea nor edema or syncope nor near syncope but on close questioning she does say that about a month ago or possibly as recently as 2 weeks ago she had one episode of retrosternal chest discomfort which lasted several minutes in which she thought was indigestion. It has not recurred.    Past Medical History   Diagnosis Date   • Pain    • Asthma in adult          • Essential hypertension, malignant     • Tobacco use disorder     • Inflamed seborrheic keratosis     • Chronic back pain     • Pure hypercholesterolemia     • Fatigue     • Plantar fascia syndrome     • Unspecified vitamin D deficiency     • Pruritic dermatitis       Ongoing for several months but worse at night on her lower extremities   • Spider veins of limb     • Plantar wart of right foot     • Palpitations     • Osteoarthritis of both hips     • Abnormal CT scan, kidney       \"Incidentally noted retroaortic left renal vein is identified. Some calcified plaque is noted in the aorta and its branch vessels.\"  CT scan done for diverticulosis     • Aortic calcification (CMS-HCC)       Noted incidentally on abdominal CT scan.   • Diverticulosis     • Pulmonary hypertension (CMS-HCC) January 2016     Echocardiogram with normal LV size, mild concentric LVH, LVEF 65%. Grade I diastolic dysfunction. Trace MR, trace TR, RVSP 40mmHg.   • Non-rheumatic mitral regurgitation 5/25/2017     May 2017, " MR is now moderately severe.  2016: Echocardiogram with normal LV size, mild concentric LVH, LVEF 65%. Grade I diastolic dysfunction. Trace MR, trace TR, RVSP 40mmHg.  May 2015: Echocardiogram with normal LV size, mild concentric LVH, LVEF 60%. Mild RVH, RVSP 55mmHg. Mild MR, trace TR.    • S/P cardiac catheterization 2017     1.  Normal right heart pressures. 3.  Essentially normal coronary arteries. 2.  Left ventricular ejection fraction 65%.      Past Surgical History   Procedure Laterality Date   • Gyn surgery        x2   • Hysterectomy laparoscopy     • Lumbar laminectomy diskectomy  2009     Performed by SUDHIR VERMA at [facility]     Family History   Problem Relation Age of Onset   • Heart Disease Mother      1st MI @ 59 yo   • Hypertension Mother    • Heart Disease Maternal Aunt    • Cancer Daughter 35     breast   • Heart Disease Maternal Grandmother    • Heart Disease Maternal Grandfather    • Heart Disease Maternal Aunt 56     History   Smoking status   • Former Smoker -- 1.00 packs/day for 40 years   • Types: Cigarettes   • Quit date: 2010   Smokeless tobacco   • Never Used     Allergies   Allergen Reactions   • Erythromycin      Abdominal pain   • Latex    • Tape      Cloth tape     Outpatient Encounter Prescriptions as of 2017   Medication Sig Dispense Refill   • apixaban (ELIQUIS) 5mg Tab Take 1 Tab by mouth 2 Times a Day. 60 Tab 3   • ibuprofen (MOTRIN) 800 MG Tab TAKE 1 TAB BY MOUTH EVERY 8 HOURS AS NEEDED FOR MODERATE PAIN. 90 Tab 0   • hydrochlorothiazide (HYDRODIURIL) 12.5 MG tablet TAKE 1 TAB BY MOUTH EVERY DAY. 30 Tab 6   • albuterol 108 (90 BASE) MCG/ACT Aero Soln inhalation aerosol Inhale 2 Puffs by mouth every four hours as needed. 1 Inhaler 0   • amlodipine (NORVASC) 5 MG Tab Take 1 Tab by mouth every day. 90 Tab 3   • vitamin D (CHOLECALCIFEROL) 1000 UNIT TABS Take 2 Tabs by mouth every day. 60 Each 11   • aspirin 81 MG tablet Take 81 mg  "by mouth every day.     • [DISCONTINUED] ciprofloxacin (CIPRO) 500 MG Tab 1 TAB TWICE A DAY X 5 DAYS. 10 Tab 0   • [DISCONTINUED] ondansetron (ZOFRAN ODT) 4 MG TABLET DISPERSIBLE 1 TAB, ALLOW TO DISINTEGRATE IN MOUTH, EVERY 8 HOURS ONLY IF NEEDED FOR NAUSEA OR VOMITING. 15 Tab 0   • [DISCONTINUED] Diclofenac Sodium 1 % Gel Apply 2gm up to 4 times per day if needed for knee pain 1 Tube 3   • [DISCONTINUED] diclofenac EC (VOLTAREN) 75 MG Tablet Delayed Response Take 1 Tab by mouth 2 times daily with meals as needed. 60 Tab 11     No facility-administered encounter medications on file as of 5/25/2017.     Review of Systems   Constitutional: Negative for fever and chills.   HENT: Negative for nosebleeds.    Cardiovascular: Negative for palpitations, orthopnea, claudication, leg swelling and PND.   Gastrointestinal: Negative for abdominal pain, blood in stool and melena.   Genitourinary: Negative for hematuria and flank pain.   Musculoskeletal: Negative for myalgias and falls.   Skin: Negative for rash.   Neurological: Negative for seizures, loss of consciousness and headaches.   Endo/Heme/Allergies: Negative for polydipsia. Does not bruise/bleed easily.   Psychiatric/Behavioral: Negative for depression and substance abuse. The patient is not nervous/anxious.         Objective:   /80 mmHg  Pulse 81  Ht 1.702 m (5' 7\")  Wt 76.658 kg (169 lb)  BMI 26.46 kg/m2  SpO2 95%    Physical Exam   Constitutional: She is oriented to person, place, and time. She appears well-developed and well-nourished. No distress.   HENT:   Mouth/Throat: Oropharynx is clear and moist.   Eyes: Conjunctivae are normal. No scleral icterus.   Neck: No JVD present.   Cardiovascular: Normal rate, S1 normal, S2 normal and intact distal pulses.  An irregularly irregular rhythm present. Exam reveals no gallop and no friction rub.    Murmur (2/6 holosystolic murmur at the apex) heard.  Pulmonary/Chest: Effort normal and breath sounds normal. "   Musculoskeletal: She exhibits no edema.   Neurological: She is alert and oriented to person, place, and time.   Skin: Skin is warm and dry. She is not diaphoretic.   Psychiatric: She has a normal mood and affect. Thought content normal.       Assessment:     1. Non-rheumatic mitral regurgitation  [facility] EKG (Clinic Performed)   2. Pure hypercholesterolemia  LIPID PROFILE    COMP METABOLIC PANEL    TSH   3. Essential hypertension  CBC WITH DIFFERENTIAL   4. Pulmonary hypertension (CMS-HCC)     5. Decreased right ventricular systolic function     6. Essential hypertension, benign     7. Persistent atrial fibrillation (CMS-HCC)  apixaban (ELIQUIS) 5mg Tab   8. Other chest pain       The echo shows that her mitral regurgitation is unequivocally worse. Pulmonary hypertension persists. The other findings are the new presence of atrial fibrillation and the history of chest pain.  She needs to be anticoagulated but we need to get a cardiac cath first to assess the severity of her pulmonary hypertension and potentially pulmonary venous hypertension because of the worsening mitral regurgitation as well as possible CAD. She will probably require a transesophageal echocardiogram after that with consideration for LEEANNA guided cardioversion as well. This is going to require postponement of her back surgery.    Medical Decision Making:  Today's Assessment / Status / Plan:     I reviewed with her the procedure of right and left cardiac catheterization and angiography and conscious sedation as well as the indications, alternatives, relative risks and complications and consent was obtained.  This will be scheduled as soon as possible to facilitate anticoagulation after that.  And then a decision regarding further management or proceeding to back surgery before further medical therapy.  The situation is much more complex than initially anticipated. She has had the above lab and I am going to request it.    Addendum: Cath 5/27/2017:    Normal right heart pressures! Essentially normal coronary arteries.  Left ventricular ejection fraction 65%. No significant MR!  She was in AF during cath.  Back procedure cannot be done until July.  Therefore we need to anticoagulate and see in f/u in clinic.  This is hard to reconcile with the echo which I reviewed and attended and was of good technical quality.  In any case, no further intervention is justified except attention to the new AF of uncertain duration.

## 2017-05-26 ENCOUNTER — HOSPITAL ENCOUNTER (OUTPATIENT)
Facility: MEDICAL CENTER | Age: 62
End: 2017-05-26
Attending: INTERNAL MEDICINE | Admitting: INTERNAL MEDICINE
Payer: COMMERCIAL

## 2017-05-26 VITALS
HEIGHT: 67 IN | TEMPERATURE: 98 F | OXYGEN SATURATION: 97 % | HEART RATE: 71 BPM | SYSTOLIC BLOOD PRESSURE: 128 MMHG | WEIGHT: 169.53 LBS | RESPIRATION RATE: 16 BRPM | DIASTOLIC BLOOD PRESSURE: 63 MMHG | BODY MASS INDEX: 26.61 KG/M2

## 2017-05-26 DIAGNOSIS — I27.20 PULMONARY HYPERTENSION (HCC): ICD-10-CM

## 2017-05-26 LAB
ANION GAP SERPL CALC-SCNC: 8 MMOL/L (ref 0–11.9)
BUN SERPL-MCNC: 16 MG/DL (ref 8–22)
CALCIUM SERPL-MCNC: 9.3 MG/DL (ref 8.5–10.5)
CHLORIDE SERPL-SCNC: 107 MMOL/L (ref 96–112)
CO2 SERPL-SCNC: 26 MMOL/L (ref 20–33)
CREAT SERPL-MCNC: 0.76 MG/DL (ref 0.5–1.4)
EKG IMPRESSION: NORMAL
ERYTHROCYTE [DISTWIDTH] IN BLOOD BY AUTOMATED COUNT: 44.8 FL (ref 35.9–50)
GFR SERPL CREATININE-BSD FRML MDRD: >60 ML/MIN/1.73 M 2
GLUCOSE SERPL-MCNC: 87 MG/DL (ref 65–99)
HCT VFR BLD AUTO: 39.5 % (ref 37–47)
HGB BLD-MCNC: 13.4 G/DL (ref 12–16)
INR PPP: 0.93 (ref 0.87–1.13)
MCH RBC QN AUTO: 30.5 PG (ref 27–33)
MCHC RBC AUTO-ENTMCNC: 33.9 G/DL (ref 33.6–35)
MCV RBC AUTO: 90 FL (ref 81.4–97.8)
MORPHOLOGY BLD-IMP: NORMAL
PLATELET # BLD AUTO: 219 K/UL (ref 164–446)
PMV BLD AUTO: NORMAL FL (ref 9–12.9)
POTASSIUM SERPL-SCNC: 3.5 MMOL/L (ref 3.6–5.5)
PROTHROMBIN TIME: 12.8 SEC (ref 12–14.6)
RBC # BLD AUTO: 4.39 M/UL (ref 4.2–5.4)
SAO2 % BLD: 78 %
SAO2 % BLD: 97.1 %
SODIUM SERPL-SCNC: 141 MMOL/L (ref 135–145)
SPECIMEN DRAWN FROM PATIENT: NORMAL SOURCE
SPECIMEN DRAWN FROM PATIENT: NORMAL SOURCE
WBC # BLD AUTO: 6.5 K/UL (ref 4.8–10.8)

## 2017-05-26 PROCEDURE — 99153 MOD SED SAME PHYS/QHP EA: CPT

## 2017-05-26 PROCEDURE — 700101 HCHG RX REV CODE 250

## 2017-05-26 PROCEDURE — 93010 ELECTROCARDIOGRAM REPORT: CPT | Performed by: INTERNAL MEDICINE

## 2017-05-26 PROCEDURE — C1769 GUIDE WIRE: HCPCS

## 2017-05-26 PROCEDURE — 99152 MOD SED SAME PHYS/QHP 5/>YRS: CPT

## 2017-05-26 PROCEDURE — 700111 HCHG RX REV CODE 636 W/ 250 OVERRIDE (IP)

## 2017-05-26 PROCEDURE — 82810 BLOOD GASES O2 SAT ONLY: CPT | Mod: 91

## 2017-05-26 PROCEDURE — 93005 ELECTROCARDIOGRAM TRACING: CPT | Performed by: INTERNAL MEDICINE

## 2017-05-26 PROCEDURE — 93460 R&L HRT ART/VENTRICLE ANGIO: CPT

## 2017-05-26 PROCEDURE — 80048 BASIC METABOLIC PNL TOTAL CA: CPT

## 2017-05-26 PROCEDURE — C1892 INTRO/SHEATH,FIXED,PEEL-AWAY: HCPCS

## 2017-05-26 PROCEDURE — 85610 PROTHROMBIN TIME: CPT

## 2017-05-26 PROCEDURE — 307093 HCHG TR BAND RADIAL

## 2017-05-26 PROCEDURE — 361014 HCHG 6FR ARROW SWAN/THERMO

## 2017-05-26 PROCEDURE — C1894 INTRO/SHEATH, NON-LASER: HCPCS

## 2017-05-26 PROCEDURE — 360979 HCHG DIAGNOSTIC CATH

## 2017-05-26 PROCEDURE — 85027 COMPLETE CBC AUTOMATED: CPT

## 2017-05-26 RX ORDER — HEPARIN SODIUM,PORCINE 1000/ML
VIAL (ML) INJECTION
Status: COMPLETED
Start: 2017-05-26 | End: 2017-05-26

## 2017-05-26 RX ORDER — LIDOCAINE HYDROCHLORIDE 20 MG/ML
INJECTION, SOLUTION INFILTRATION; PERINEURAL
Status: COMPLETED
Start: 2017-05-26 | End: 2017-05-26

## 2017-05-26 RX ORDER — MIDAZOLAM HYDROCHLORIDE 1 MG/ML
INJECTION INTRAMUSCULAR; INTRAVENOUS
Status: COMPLETED
Start: 2017-05-26 | End: 2017-05-26

## 2017-05-26 RX ORDER — VERAPAMIL HYDROCHLORIDE 2.5 MG/ML
INJECTION, SOLUTION INTRAVENOUS
Status: COMPLETED
Start: 2017-05-26 | End: 2017-05-26

## 2017-05-26 RX ORDER — SODIUM CHLORIDE 9 MG/ML
INJECTION, SOLUTION INTRAVENOUS CONTINUOUS
Status: DISCONTINUED | OUTPATIENT
Start: 2017-05-26 | End: 2017-05-26 | Stop reason: HOSPADM

## 2017-05-26 RX ORDER — SODIUM CHLORIDE 9 MG/ML
INJECTION, SOLUTION INTRAVENOUS
Status: DISCONTINUED | OUTPATIENT
Start: 2017-05-26 | End: 2017-05-26

## 2017-05-26 RX ADMIN — LIDOCAINE HYDROCHLORIDE: 20 INJECTION, SOLUTION INFILTRATION; PERINEURAL at 09:12

## 2017-05-26 RX ADMIN — HEPARIN SODIUM 2000 UNITS: 200 INJECTION, SOLUTION INTRAVENOUS at 09:12

## 2017-05-26 RX ADMIN — FENTANYL CITRATE 100 MCG: 50 INJECTION, SOLUTION INTRAMUSCULAR; INTRAVENOUS at 10:01

## 2017-05-26 RX ADMIN — SODIUM CHLORIDE: 9 INJECTION, SOLUTION INTRAVENOUS at 07:30

## 2017-05-26 RX ADMIN — HEPARIN SODIUM: 1000 INJECTION, SOLUTION INTRAVENOUS; SUBCUTANEOUS at 09:12

## 2017-05-26 RX ADMIN — VERAPAMIL HYDROCHLORIDE 5 MG: 2.5 INJECTION, SOLUTION INTRAVENOUS at 09:12

## 2017-05-26 RX ADMIN — MIDAZOLAM 2 MG: 1 INJECTION INTRAMUSCULAR; INTRAVENOUS at 10:01

## 2017-05-26 RX ADMIN — NITROGLYCERIN 10 ML: 20 INJECTION INTRAVENOUS at 09:12

## 2017-05-26 ASSESSMENT — PAIN SCALES - GENERAL
PAINLEVEL_OUTOF10: 0
PAINLEVEL_OUTOF10: 0

## 2017-05-26 NOTE — IP AVS SNAPSHOT
5/26/2017    Chinyere Priest  [address] [place] [id number]    Dear Chinyere:    [facility] wants to ensure your discharge home is safe and you or your loved ones have had all of your questions answered regarding your care after you leave the hospital.    Below is a list of resources and contact information should you have any questions regarding your hospital stay, follow-up instructions, or active medical symptoms.    Questions or Concerns Regarding… Contact   Medical Questions Related to Your Discharge  (7 days a week, 8am-5pm) Contact a Nurse Care Coordinator   [phone]   Medical Questions Not Related to Your Discharge  (24 hours a day / 7 days a week)  Contact the Nurse Health Line   [phone]    Medications or Discharge Instructions Refer to your discharge packet   or contact your [facility] Primary Care Provider   [phone]   Follow-up Appointment(s) Schedule your appointment via [employer]   or contact Scheduling [phone]   Billing Review your statement via [employer]  or contact Billing [phone]   Medical Records Review your records via [employer]   or contact Medical Records [phone]     You may receive a telephone call within two days of discharge. This call is to make certain you understand your discharge instructions and have the opportunity to have any questions answered. You can also easily access your medical information, test results and upcoming appointments via the [employer] free online health management tool. You can learn more and sign up at [email or web address]. For assistance setting up your [employer] account, please call [phone].    Once again, we want to ensure your discharge home is safe and that you have a clear understanding of any next steps in your care. If you have any questions or concerns, please do not hesitate to contact us, we are here for you. Thank you for choosing [facility] for your healthcare needs.    Sincerely,    Your [facility] Healthcare Team

## 2017-05-26 NOTE — DISCHARGE INSTRUCTIONS
ACTIVITY: Rest and take it easy for the first 24 hours.  A responsible adult is recommended to remain with you during that time.  It is normal to feel sleepy.  We encourage you to not do anything that requires balance, judgment or coordination.    MILD FLU-LIKE SYMPTOMS ARE NORMAL. YOU MAY EXPERIENCE GENERALIZED MUSCLE ACHES, THROAT IRRITATION, HEADACHE AND/OR SOME NAUSEA.    FOR 24 HOURS DO NOT:  Drive, operate machinery or run household appliances.  Drink beer or alcoholic beverages.   Make important decisions or sign legal documents.    SPECIAL INSTRUCTIONS:     DIET: To avoid nausea, slowly advance diet as tolerated, avoiding spicy or greasy foods for the first day.  Add more substantial food to your diet according to your physician's instructions.  Babies can be fed formula or breast milk as soon as they are hungry.  INCREASE FLUIDS AND FIBER TO AVOID CONSTIPATION.    SURGICAL DRESSING/BATHING: May remove dressing in 24 hours. May shower in 24 hours. Do not submerge in water for 7 days.     FOLLOW-UP APPOINTMENT:  A follow-up appointment should be arranged with your doctor; call to schedule.    You should CALL YOUR PHYSICIAN if you develop:  Fever greater than 101 degrees F.  Pain not relieved by medication, or persistent nausea or vomiting.  Excessive bleeding (blood soaking through dressing) or unexpected drainage from the wound.  Extreme redness or swelling around the incision site, drainage of pus or foul smelling drainage.  Inability to urinate or empty your bladder within 8 hours.  Problems with breathing or chest pain.    You should call 911 if you develop problems with breathing or chest pain.  If you are unable to contact your doctor or surgical center, you should go to the nearest emergency room or urgent care center.  Physician's telephone #: Dr. Roy [phone]    If any questions arise, call your doctor.  If your doctor is not available, please feel free to call the Surgical Center at  [phone].  The Center is open Monday through Friday from 7AM to 7PM.  You can also call the HEALTH HOTLINE open 24 hours/day, 7 days/week and speak to a nurse at [phone], or toll free at [phone].    A registered nurse may call you a few days after your surgery to see how you are doing after your procedure.    MEDICATIONS: Resume taking daily medication.  Take prescribed pain medication with food.  If no medication is prescribed, you may take non-aspirin pain medication if needed.  PAIN MEDICATION CAN BE VERY CONSTIPATING.  Take a stool softener or laxative such as senokot, pericolace, or milk of magnesia if needed.      If your physician has prescribed pain medication that includes Acetaminophen (Tylenol), do not take additional Acetaminophen (Tylenol) while taking the prescribed medication.    Depression / Suicide Risk    As you are discharged from this [facility] facility, it is important to learn how to keep safe from harming yourself.    Recognize the warning signs:  · Abrupt changes in personality, positive or negative- including increase in energy   · Giving away possessions  · Change in eating patterns- significant weight changes-  positive or negative  · Change in sleeping patterns- unable to sleep or sleeping all the time   · Unwillingness or inability to communicate  · Depression  · Unusual sadness, discouragement and loneliness  · Talk of wanting to die  · Neglect of personal appearance   · Rebelliousness- reckless behavior  · Withdrawal from people/activities they love  · Confusion- inability to concentrate     If you or a loved one observes any of these behaviors or has concerns about self-harm, here's what you can do:  · Talk about it- your feelings and reasons for harming yourself  · Remove any means that you might use to hurt yourself (examples: pills, rope, extension cords, firearm)  · Get professional help from the community (Mental Health, Substance Abuse, psychological  counseling)  · Do not be alone:Call your Safe Contact- someone whom you trust who will be there for you.  · Call your local CRISIS HOTLINE 210-5579 or 243-031-3775  · Call your local Children's Mobile Crisis Response Team [place] (411) 458-2227 or www.[employer]  · Call the toll free National Suicide Prevention Hotlines   · National Suicide Prevention Lifeline 141-266-BZFK (0138)  [place] [employer] Line Network 800-SUICIDE (081-3736)    Radial Catherization Discharge Instructions      · Do not subject hand/arm to any forceful movements for 24 hours    i.e. supporting weight when rising from the chair or bed.   · Do not drive a car for 24 hours  · You may remove the dressing tomorrow  · You may shower on the day following your procedure.  Do not take a tub bath or submerge the puncture site in water for 3 days following the procedure.  · No Lifting more than 3-5 pounds with affected wrist for 5 days  · Follow up with Dr. Roy  2-4 weeks.  · Increase fluids for 2 days post procedure.  · Continue all previous medications unless otherwise instructed.    If bleeding should occur following discharge:  · Sit down and apply firm pressure to site with your fingers for 10 minutes  · If the bleeding stops, continue to sit quietly, keeping your wrist straight for 2 hours.  Notify physician as soon as possible ( [phone])  · If bleeding does not stop after 10 minutes, or if there is a large amount of bleeding or spurting, call 911 immediately.  Do not drive yourself to the hospital.

## 2017-05-26 NOTE — OR NURSING
Pt transferred to PPU 4 at this time; denies any pain or discomfort. Updated on POC; has no questions or concerns. Family at bedside. R radial TR band in place; no bleeding or hematoma noted. R IJ dressing CDI.     R radial TR band removed; no bleeding or hematoma noted. Gauze and tegaderm placed over site.     D/C instructions rev'd with pt and . Denies any questions or concerns. IV d/c.   Pt taken out via wheelchair and RN

## 2017-05-26 NOTE — CATH LAB
Immediate Post-Operative Note      PreOp Diagnosis: exertional SOB. Atrial fibrillation.    PostOp Diagnosis: see below    Procedure(s) :  Coronary Angiography, Left Heart Catheterization, Left Ventriculography, RHC    Surgeon(s):  Jonas Reyes M.D.    Type of Anesthesia: Moderate Sedation    Specimen: None    Findings:   EF 65%  Widely patent coronary arteries.  Normal right heart pressures    Complications: none.      Jonas Reyes M.D.  5/26/2017 10:03 AM

## 2017-05-26 NOTE — IP AVS SNAPSHOT
" Home Care Instructions                                                                                                                Name:Chinyere Priest  Medical Record Number:[id number]  CSN: [id number]    YOB: 1955   Age: 61 y.o.  Sex: female  HT:1.702 m (5' 7\") WT: 76.9 kg (169 lb 8.5 oz)          Admit Date: 5/26/2017     Discharge Date:   Today's Date: 5/26/2017  Attending Doctor:  Jonas Reyes M.D.                  Allergies:  Erythromycin; Latex; and Tape              Follow-up Information     1. Follow up with Jonas Reyes M.D. In 1 week.    Specialty:  Cardiology    Contact information    [address]  [address]  [facility] [address] [phone]          Discharge Instructions         ACTIVITY: Rest and take it easy for the first 24 hours.  A responsible adult is recommended to remain with you during that time.  It is normal to feel sleepy.  We encourage you to not do anything that requires balance, judgment or coordination.    MILD FLU-LIKE SYMPTOMS ARE NORMAL. YOU MAY EXPERIENCE GENERALIZED MUSCLE ACHES, THROAT IRRITATION, HEADACHE AND/OR SOME NAUSEA.    FOR 24 HOURS DO NOT:  Drive, operate machinery or run household appliances.  Drink beer or alcoholic beverages.   Make important decisions or sign legal documents.    SPECIAL INSTRUCTIONS:     DIET: To avoid nausea, slowly advance diet as tolerated, avoiding spicy or greasy foods for the first day.  Add more substantial food to your diet according to your physician's instructions.  Babies can be fed formula or breast milk as soon as they are hungry.  INCREASE FLUIDS AND FIBER TO AVOID CONSTIPATION.    SURGICAL DRESSING/BATHING: May remove dressing in 24 hours. May shower in 24 hours. Do not submerge in water for 7 days.     FOLLOW-UP APPOINTMENT:  A follow-up appointment should be arranged with your doctor; call to schedule.    You should CALL YOUR PHYSICIAN if you develop:  Fever greater than 101 degrees F.  Pain not " relieved by medication, or persistent nausea or vomiting.  Excessive bleeding (blood soaking through dressing) or unexpected drainage from the wound.  Extreme redness or swelling around the incision site, drainage of pus or foul smelling drainage.  Inability to urinate or empty your bladder within 8 hours.  Problems with breathing or chest pain.    You should call 911 if you develop problems with breathing or chest pain.  If you are unable to contact your doctor or surgical center, you should go to the nearest emergency room or urgent care center.  Physician's telephone #: Dr. Roy [phone]    If any questions arise, call your doctor.  If your doctor is not available, please feel free to call the Surgical Center at [phone].  The Center is open Monday through Friday from 7AM to 7PM.  You can also call the [employer] HOTLINE open 24 hours/day, 7 days/week and speak to a nurse at [phone], or toll free at [phone].    A registered nurse may call you a few days after your surgery to see how you are doing after your procedure.    MEDICATIONS: Resume taking daily medication.  Take prescribed pain medication with food.  If no medication is prescribed, you may take non-aspirin pain medication if needed.  PAIN MEDICATION CAN BE VERY CONSTIPATING.  Take a stool softener or laxative such as senokot, pericolace, or milk of magnesia if needed.      If your physician has prescribed pain medication that includes Acetaminophen (Tylenol), do not take additional Acetaminophen (Tylenol) while taking the prescribed medication.    Depression / Suicide Risk    As you are discharged from this [facility] facility, it is important to learn how to keep safe from harming yourself.    Recognize the warning signs:  · Abrupt changes in personality, positive or negative- including increase in energy   · Giving away possessions  · Change in eating patterns- significant weight changes-  positive or negative  · Change in  sleeping patterns- unable to sleep or sleeping all the time   · Unwillingness or inability to communicate  · Depression  · Unusual sadness, discouragement and loneliness  · Talk of wanting to die  · Neglect of personal appearance   · Rebelliousness- reckless behavior  · Withdrawal from people/activities they love  · Confusion- inability to concentrate     If you or a loved one observes any of these behaviors or has concerns about self-harm, here's what you can do:  · Talk about it- your feelings and reasons for harming yourself  · Remove any means that you might use to hurt yourself (examples: pills, rope, extension cords, firearm)  · Get professional help from the community (Mental Health, Substance Abuse, psychological counseling)  · Do not be alone:Call your Safe Contact- someone whom you trust who will be there for you.  · Call your local CRISIS HOTLINE 383-7135 or 698-127-1724  · Call your local Children's Mobile Crisis Response Team [place] (479) 918-6739 or www.[employer]  · Call the toll free National Suicide Prevention Hotlines   · National Suicide Prevention Lifeline 170-419-RBOJ (5272)  [facility] Line Network 800-SUICIDE (389-9810)    Radial Catherization Discharge Instructions      · Do not subject hand/arm to any forceful movements for 24 hours    i.e. supporting weight when rising from the chair or bed.   · Do not drive a car for 24 hours  · You may remove the dressing tomorrow  · You may shower on the day following your procedure.  Do not take a tub bath or submerge the puncture site in water for 3 days following the procedure.  · No Lifting more than 3-5 pounds with affected wrist for 5 days  · Follow up with Dr. Roy  2-4 weeks.  · Increase fluids for 2 days post procedure.  · Continue all previous medications unless otherwise instructed.    If bleeding should occur following discharge:  · Sit down and apply firm pressure to site with your fingers for 10 minutes  · If the  bleeding stops, continue to sit quietly, keeping your wrist straight for 2 hours.  Notify physician as soon as possible ( [phone])  · If bleeding does not stop after 10 minutes, or if there is a large amount of bleeding or spurting, call 911 immediately.  Do not drive yourself to the hospital.       Medication List      ASK your doctor about these medications        Instructions    Morning Afternoon Evening Bedtime    albuterol 108 (90 BASE) MCG/ACT Aers inhalation aerosol        Doctor's comments:  With a spacer device   Inhale 2 Puffs by mouth every four hours as needed.   Dose:  2 Puff                        amlodipine 5 MG Tabs   Commonly known as:  NORVASC        Take 1 Tab by mouth every day.   Dose:  5 mg                        aspirin 81 MG tablet        Take 81 mg by mouth every day.   Dose:  81 mg                        hydrochlorothiazide 12.5 MG tablet   Commonly known as:  HYDRODIURIL        TAKE 1 TAB BY MOUTH EVERY DAY.                        ibuprofen 800 MG Tabs   Commonly known as:  MOTRIN        TAKE 1 TAB BY MOUTH EVERY 8 HOURS AS NEEDED FOR MODERATE PAIN.                        vitamin D 1000 UNIT Tabs   Commonly known as:  cholecalciferol        Take 2 Tabs by mouth every day.   Dose:  2000 Units                                Medication Information     Above and/or attached are the medications your physician expects you to take upon discharge. Review all of your home medications and newly ordered medications with your doctor and/or pharmacist. Follow medication instructions as directed by your doctor and/or pharmacist. Please keep your medication list with you and share with your physician. Update the information when medications are discontinued, doses are changed, or new medications (including over-the-counter products) are added; and carry medication information at all times in the event of emergency situations.        Resources     Quit Smoking / Tobacco Use:    I understand the use  of any tobacco products increases my chance of suffering from future heart disease or stroke and could cause other illnesses which may shorten my life. Quitting the use of tobacco products is the single most important thing I can do to improve my health. For further information on smoking / tobacco cessation call a Toll Free Quit Line at 1-424.123.8318 (*National Cancer [place]) or 1-930.932.7251 (American Lung Association) or you can access the web based program at www.lungusa.org.    [place] Tobacco Users Help Line:  (543) 467-2963       Toll Free: 1-599.139.6671  Quit Tobacco Program [facility] (021)207-2375    Crisis Hotline:    [place] Crisis Hotline:  4-269-WCKQOSN or 1-184.401.9584    [place] Crisis Hotline:    1-500.517.3193 or 452-225-0539    Discharge Survey:   Thank you for choosing [facility]. We hope we did everything we could to make your hospital stay a pleasant one. You may be receiving a survey and we would appreciate your time and participation in answering the questions. Your input is very valuable to us in our efforts to improve our service to our patients and their families.            Signatures     My signature on this form indicates that:    1. I acknowledge receipt and understanding of these Home Care Instruction.  2. My questions regarding this information have been answered to my satisfaction.  3. I have formulated a plan with my discharge nurse to obtain my prescribed medications for home.    __________________________________      __________________________________                   Patient Signature                                 Guardian/Responsible Adult Signature      __________________________________                 __________       ________                       Nurse Signature                                               Date                 Time

## 2017-05-26 NOTE — PROCEDURES
DATE OF PROCEDURE:  05/26/2017    PROCEDURE:  Cardiac catheterization.  A.  Left heart catheterization.  B.  Left ventriculography.  C.  Selective coronary angiography.  D.  Right heart catheterization.  E.  Right jugular vein approach.  F.  Right radial artery approach.    PREPROCEDURE DIAGNOSES:  1.  Retrosternal chest discomfort.  2.  Exertional shortness of breath.  3.  Mitral regurgitation.  4.  Pulmonary hypertension with decreased right ventricular systolic function.  4.  Hypertension.  5.  Hyperlipidemia.    POSTPROCEDURE DIAGNOSES:  1.  Normal right heart pressures.  3.  Essentially normal coronary arteries.  2.  Left ventricular ejection fraction 65%.    PHYSICIAN:  Jonas Reyes MD    REFERRING PHYSICIAN:  Miki Hernandez MD    COMPLICATIONS:  None.    MEDICATIONS:  1.  Versed 2 mg IV.  2.  Fentanyl 100 mcg IV.  3.  Lidocaine 2% subcutaneous.  4.  Heparin 3000 units IA.  5.  Nitroglycerin 100 mcg IA.  6.  Verapamil 2.5 mg IA.    INDICATIONS: The patient is a 61-year-old female recently evaluated for preoperative   cardiac evaluation prior to undergoing back surgery who gave a history of   retrosternal chest discomfort and reported worsening mitral regurgitation    by an echocardiogram on 05/25/2017 showing moderately severe mitral   regurgitation, ejection fraction of 65% and right ventricular systolic pressure of  50 mmHg. A previous echocardiogram on 01/5/2016 showed an ejection   fraction of 50-65%, mild right ventricular hypertrophy with moderately   decreased right ventricular systolic function and right ventricular pressure   of 40 mmHg, and at that time, trace mitral regurgitation.  The patient is   undergoing a right and left heart cardiac catheterization.    DESCRIPTION OF PROCEDURE:  After informed consent was obtained, the patient   was brought to the cardiac catheterization laboratory.    The patient was prepped and draped and anesthetized in usual manner.    Using ultrasound  guidance and a modified Seldinger technique, a 6-Zimbabwean x10   cm introducer sheath was inserted in the right internal jugular vein.    Next, using ultrasound guidance and a modified Seldinger technique, a 6-Zimbabwean x   10 cm introducer sheath was inserted in the right radial artery.  Heparin,   verapamil, and nitroglycerin were given via the side port.    Next, a 6-Zimbabwean balloon tip Staples-Diana catheter was inserted into the   pulmonary artery and wedge position.  Right heart pressures and thermodilution   cardiac outputs were obtained.  Systemic arterial and pulmonary artery   oximetry measurements were obtained.    Next, Staples-Diana catheter was removed.    Next, a 4-Zimbabwean JL 3.5 left coronary catheter was inserted in the ostium of   the left coronary artery and left coronary angiograms were obtained in various   projections.    Next, a 4-Zimbabwean JR 4.0 right coronary catheter was inserted in the ostium of   the right coronary artery and coronary angiograms were obtained in various   projections.    Next, a 4-Zimbabwean pigtail catheter was inserted in the left ventricle under   fluoroscopic guidance.  A single plane BURR left ventricular angiogram was   performed.  Pre and post angiogram LVEDP, LV and aortic pressures were   obtained.    At the end the procedure, catheters were removed, hemostasis was achieved with   manual compression of the right internal jugular vein site and a wrist band   device at the right radial artery site.  The patient tolerated the procedure   well.    FINDINGS:  HEMODYNAMICS:    RIGHT HEART PRESSURES:  1.  Mean right atrial pressure 5 mmHg.  2.  Right ventricular pressure 22/5.  3.  Pulmonary artery pressure 25/11, mean of 17 mmHg.  4.  Pulmonary capillary wedge pressure 7 mmHg.    LEFT HEART PRESSURES:  1.  LVEDP of 15 mmHg.  2.  Left ventricular systolic pressure 126 mmHg.  3.  Central aortic pressure systolic 121, diastolic 66, mean of 89 mmHg.    OXIMETRY MEASUREMENTS:  1.  Systemic  arterial 97.1%.  2.  Pulmonary artery 78 mmHg.    CARDIAC OUTPUT:  1.  Thermodilution method 3.0 liters per minute, cardiac index 1.6 liters per   minute per meter squared.  2.  Basim method cardiac output 5.2 liters per minute, cardiac index 2.7 liters   per minute per meter squared.    Pulmonary vascular resistance of 1.9 Wood units.    LEFT VENTRICULOGRAPHY:  Left ventricular chamber size, wall motion and   systolic function are normal.  Calculated ejection fraction is 65%.  No   identifiable mitral regurgitation or LV thrombus present.    Rhythm, atrial fibrillation.    CORONARY ANGIOGRAPHY:  1.  LEFT MAIN ARTERY:  Left main vessel is a large caliber vessel,   angiographically normal, bifurcates into the left anterior descending artery and   circumflex artery.  2.  LEFT ANTERIOR DESCENDING ARTERY:  The LAD gives rise to a first diagonal   branch and a normal complement of septal branches and extends around the apex.    The LAD is widely patent with the exception of a very mild eccentric smooth   focal proximal atheroma otherwise the LAD appears angiographically normal.  3.  CIRCUMFLEX ARTERY:  The circumflex gives rise to 2 major long marginal   branches and a small caliber AV groove branch.  Angiographically, the   circumflex artery appears normal.  4.  RIGHT CORONARY ARTERY:  The RCA is moderate size caliber vessel and gives   rise to an acute marginal branch, posterior descending artery and a   posterolateral branch.  Angiographically, the right coronary artery is normal.    PLAN:  Medical therapy.       ____________________________________     MD JENARO DUKES / MISSY    DD:  05/26/2017 12:51:46  DT:  05/26/2017 13:27:27    D#:  [id number]  Job#:  [id number]

## 2017-05-30 ENCOUNTER — TELEPHONE (OUTPATIENT)
Dept: CARDIOLOGY | Facility: MEDICAL CENTER | Age: 62
End: 2017-05-30

## 2017-05-30 NOTE — TELEPHONE ENCOUNTER
L/M for pt. to call back. We need name of surgeon & what she's having done. Dr. Hernandez said he'll just see her after her surgery. Cleared, moderate risk.

## 2017-05-30 NOTE — TELEPHONE ENCOUNTER
----- Message from Miki Hernandez M.D. sent at 5/30/2017  2:42 PM PDT -----  Regarding: RE: IS SHE CLEARED FOR SURGERY?  Yes.  She did everything.  ----- Message -----     From: Gail Fuentes R.N.     Sent: 5/30/2017   2:15 PM       To: Gail Fuentes R.N., Miki Hernandez M.D.  Subject: IS SHE CLEARED FOR SURGERY?                      forwarded to   ----- Message -----     From: Kavya Briscoe     Sent: 5/30/2017  12:32 PM       To: aGil Fuentes R.N.  Subject: patient calling about surgical clearance         MIGUEL/Clover      Patient had cath done last week and Dr. Reyes said she was cleared for surgery. She wants to know if she can schedule her surgery or if Dr. Hernandez needs to see her first. She can be reached at [phone].

## 2017-05-30 NOTE — Clinical Note
PROCEDURE/SURGERY CLEARANCE FORM      Encounter Date: 5/30/2017    Patient: Chinyere Priest  YOB: 1955    CARDIOLOGIST:  Dr. Hernandez   REFERRING DOCTOR:  Dr. Clay/ Julieta Meehan            Fax: [phone] Attn: Yuko        The above patient is cleared to have the following procedure/surgery:     Back surgery                                           Additional comments:     Considered moderate risk from a cardiovascular standpoint.  Recent office note attached.  Pt. has another appt. with Dr. Hernandez on 6/19 to discuss holding Eliquis prior to surgery. Eliquis was just started 5/31.                   MD Signature   Miki Hernandez MD

## 2017-05-30 NOTE — TELEPHONE ENCOUNTER
Dr. Hernandez, I s/w Yuko, surgery scheduler for Dr. Clay at [facility] [place] (fax [phone]) They will not be scheduling her until July. Let me know how you want to proceed re: anticoagulation. I did not send the clearance letter yet.

## 2017-05-30 NOTE — TELEPHONE ENCOUNTER
----- Message from Maggie Foley sent at 5/30/2017  3:13 PM PDT -----  Regarding: Patient returning call  MIGUEL/Clover    Patient is returning your call from today, 5/30 and said that the name of her surgeon is Dr Chad Clay at [facility]. She does have some questions and wants a call back at [phone].

## 2017-05-31 PROBLEM — Z98.890 S/P CARDIAC CATHETERIZATION: Status: ACTIVE | Noted: 2017-05-26

## 2017-05-31 NOTE — TELEPHONE ENCOUNTER
Per Dr. Hernandez: need to start her on Eliquis 5 mg BID since it's going to be 4-5 weeks before surgery. Pt. notified Rx was sent to [facility] Dr. Hernandez wants to see her in June, prior to spinal surgery to discuss holding med, etc. [facility] scheduled for 6/19. Clearance letter faxed to Dr. Clay's office.

## 2017-06-19 ENCOUNTER — OFFICE VISIT (OUTPATIENT)
Dept: CARDIOLOGY | Facility: PHYSICIAN GROUP | Age: 62
End: 2017-06-19
Payer: COMMERCIAL

## 2017-06-19 VITALS
SYSTOLIC BLOOD PRESSURE: 110 MMHG | DIASTOLIC BLOOD PRESSURE: 60 MMHG | HEIGHT: 67 IN | BODY MASS INDEX: 26.53 KG/M2 | HEART RATE: 80 BPM | WEIGHT: 169 LBS

## 2017-06-19 DIAGNOSIS — I10 ESSENTIAL HYPERTENSION, BENIGN: ICD-10-CM

## 2017-06-19 DIAGNOSIS — I48.19 PERSISTENT ATRIAL FIBRILLATION (HCC): ICD-10-CM

## 2017-06-19 DIAGNOSIS — E78.00 HYPERCHOLESTEROLEMIA: ICD-10-CM

## 2017-06-19 DIAGNOSIS — I27.20 PULMONARY HYPERTENSION (HCC): ICD-10-CM

## 2017-06-19 DIAGNOSIS — I34.0 NON-RHEUMATIC MITRAL REGURGITATION: ICD-10-CM

## 2017-06-19 PROCEDURE — 99213 OFFICE O/P EST LOW 20 MIN: CPT | Performed by: INTERNAL MEDICINE

## 2017-06-19 RX ORDER — DICLOFENAC SODIUM 75 MG/1
TABLET, DELAYED RELEASE ORAL
COMMUNITY
Start: 2017-04-18 | End: 2017-08-23

## 2017-06-19 ASSESSMENT — ENCOUNTER SYMPTOMS
FALLS: 0
PND: 0
BRUISES/BLEEDS EASILY: 0
MYALGIAS: 0
COUGH: 0
ORTHOPNEA: 0
WHEEZING: 0

## 2017-06-19 ASSESSMENT — LIFESTYLE VARIABLES: SUBSTANCE_ABUSE: 0

## 2017-06-19 NOTE — PROGRESS NOTES
"Subjective:   Chinyere Priest is a 61 y.o. female who presents today for follow-up of the cardiac catheterization which remarkably did not show any of the concerns suggested by her echocardiogram. Therefore she is now set up for her back surgery. Please see the previous note. She has had no cardiac symptoms at all and no complications of anticoagulation.    Past Medical History   Diagnosis Date   • Pain    • Asthma in adult          • Essential hypertension, malignant     • Tobacco use disorder     • Inflamed seborrheic keratosis     • Chronic back pain     • Pure hypercholesterolemia     • Fatigue     • Plantar fascia syndrome     • Unspecified vitamin D deficiency     • Pruritic dermatitis       Ongoing for several months but worse at night on her lower extremities   • Spider veins of limb     • Plantar wart of right foot     • Palpitations     • Osteoarthritis of both hips     • Abnormal CT scan, kidney       \"Incidentally noted retroaortic left renal vein is identified. Some calcified plaque is noted in the aorta and its branch vessels.\"  CT scan done for diverticulosis     • Aortic calcification (CMS-HCC)       Noted incidentally on abdominal CT scan.   • Diverticulosis     • Pulmonary hypertension (CMS-HCC) January 2016     Echocardiogram with normal LV size, mild concentric LVH, LVEF 65%. Grade I diastolic dysfunction. Trace MR, trace TR, RVSP 40mmHg.   • Non-rheumatic mitral regurgitation 5/25/2017     May 2017, MR is now moderately severe.  January 2016: Echocardiogram with normal LV size, mild concentric LVH, LVEF 65%. Grade I diastolic dysfunction. Trace MR, trace TR, RVSP 40mmHg.  May 2015: Echocardiogram with normal LV size, mild concentric LVH, LVEF 60%. Mild RVH, RVSP 55mmHg. Mild MR, trace TR.    • S/P cardiac catheterization 5/26/2017     1.  Normal right heart pressures. 3.  Essentially normal coronary arteries. 2.  Left ventricular ejection fraction 65%.      Past Surgical History "   Procedure Laterality Date   • Gyn surgery        x2   • Hysterectomy laparoscopy     • Lumbar laminectomy diskectomy  2009     Performed by SUDHIR VERMA at SURGERY [facility]S     Family History   Problem Relation Age of Onset   • Heart Disease Mother      1st MI @ 59 yo   • Hypertension Mother    • Heart Disease Maternal Aunt    • Cancer Daughter 35     breast   • Heart Disease Maternal Grandmother    • Heart Disease Maternal Grandfather    • Heart Disease Maternal Aunt 56     History   Smoking status   • Former Smoker -- 1.00 packs/day for 40 years   • Types: Cigarettes   • Quit date: 2010   Smokeless tobacco   • Never Used     Allergies   Allergen Reactions   • Erythromycin      Abdominal pain   • Latex    • Tape      Cloth tape     Outpatient Encounter Prescriptions as of 2017   Medication Sig Dispense Refill   • apixaban (ELIQUIS) 5mg Tab Take 1 Tab by mouth 2 Times a Day. 60 Tab 3   • hydrochlorothiazide (HYDRODIURIL) 12.5 MG tablet TAKE 1 TAB BY MOUTH EVERY DAY. 30 Tab 6   • albuterol 108 (90 BASE) MCG/ACT Aero Soln inhalation aerosol Inhale 2 Puffs by mouth every four hours as needed. 1 Inhaler 0   • amlodipine (NORVASC) 5 MG Tab Take 1 Tab by mouth every day. 90 Tab 3   • diclofenac EC (VOLTAREN) 75 MG Tablet Delayed Response      • [DISCONTINUED] ibuprofen (MOTRIN) 800 MG Tab TAKE 1 TAB BY MOUTH EVERY 8 HOURS AS NEEDED FOR MODERATE PAIN. 90 Tab 0   • vitamin D (CHOLECALCIFEROL) 1000 UNIT TABS Take 2 Tabs by mouth every day. 60 Each 11   • aspirin 81 MG tablet Take 81 mg by mouth every day.       No facility-administered encounter medications on file as of 2017.     Review of Systems   HENT: Negative for nosebleeds.    Respiratory: Negative for cough and wheezing.    Cardiovascular: Negative for orthopnea and PND.   Musculoskeletal: Negative for myalgias and falls.   Endo/Heme/Allergies: Does not bruise/bleed easily.   Psychiatric/Behavioral: Negative for substance  "abuse.        Objective:   /60 mmHg  Pulse 80  Ht 1.702 m (5' 7\")  Wt 76.658 kg (169 lb)  BMI 26.46 kg/m2    Physical Exam   Constitutional: She is oriented to person, place, and time. She appears well-developed and well-nourished. No distress.   HENT:   Mouth/Throat: Oropharynx is clear and moist.   Eyes: Conjunctivae are normal. No scleral icterus.   Neck: No JVD present.   Cardiovascular: Normal rate, S1 normal, S2 normal and intact distal pulses.  An irregularly irregular rhythm present. Exam reveals no gallop and no friction rub.    Murmur (2/6 holosystolic murmur at the apex) heard.  Pulmonary/Chest: Effort normal and breath sounds normal.   Musculoskeletal: She exhibits no edema.   Neurological: She is alert and oriented to person, place, and time.   Skin: Skin is warm and dry. She is not diaphoretic.   Psychiatric: She has a normal mood and affect. Thought content normal.       Assessment:     1. Non-rheumatic mitral regurgitation     2. Essential hypertension, benign     3. Persistent atrial fibrillation (CMS-HCC)     4. Pulmonary hypertension (CMS-HCC)     5. Hypercholesterolemia       She is remarkably stable. The striking discrepancy between the findings predicted by her echo and the cardiac catheterization remain unexplained but she remains asymptomatic except for being disabled by her back.    Medical Decision Making:  Today's Assessment / Status / Plan:     There are no cardiac contraindications to the back surgery at this point I want to see her soon after it. She will stop the apixaban 72 hours before surgery.   "

## 2017-06-19 NOTE — Clinical Note
"     [facility] [address]  Phone: [phone]  Fax: [phone]              Chinyere Priest  1955    Encounter Date: 6/19/2017    Miki Hernandez M.D.          PROGRESS NOTE:  Subjective:   Chinyere Priest is a 61 y.o. female who presents today for follow-up of the cardiac catheterization which remarkably did not show any of the concerns suggested by her echocardiogram. Therefore she is now set up for her back surgery. Please see the previous note. She has had no cardiac symptoms at all and no complications of anticoagulation.    Past Medical History   Diagnosis Date   • Pain    • Asthma in adult          • Essential hypertension, malignant     • Tobacco use disorder     • Inflamed seborrheic keratosis     • Chronic back pain     • Pure hypercholesterolemia     • Fatigue     • Plantar fascia syndrome     • Unspecified vitamin D deficiency     • Pruritic dermatitis       Ongoing for several months but worse at night on her lower extremities   • Spider veins of limb     • Plantar wart of right foot     • Palpitations     • Osteoarthritis of both hips     • Abnormal CT scan, kidney       \"Incidentally noted retroaortic left renal vein is identified. Some calcified plaque is noted in the aorta and its branch vessels.\"  CT scan done for diverticulosis     • Aortic calcification (CMS-HCC)       Noted incidentally on abdominal CT scan.   • Diverticulosis     • Pulmonary hypertension (CMS-HCC) January 2016     Echocardiogram with normal LV size, mild concentric LVH, LVEF 65%. Grade I diastolic dysfunction. Trace MR, trace TR, RVSP 40mmHg.   • Non-rheumatic mitral regurgitation 5/25/2017     May 2017, MR is now moderately severe.  January 2016: Echocardiogram with normal LV size, mild concentric LVH, LVEF 65%. Grade I diastolic dysfunction. Trace MR, trace TR, RVSP 40mmHg.  May 2015: Echocardiogram with normal LV " size, mild concentric LVH, LVEF 60%. Mild RVH, RVSP 55mmHg. Mild MR, trace TR.    • S/P cardiac catheterization 2017     1.  Normal right heart pressures. 3.  Essentially normal coronary arteries. 2.  Left ventricular ejection fraction 65%.      Past Surgical History   Procedure Laterality Date   • Gyn surgery        x2   • Hysterectomy laparoscopy     • Lumbar laminectomy diskectomy  2009     Performed by SUDHIR VERMA at [facility]ERY [facility]     Family History   Problem Relation Age of Onset   • Heart Disease Mother      1st MI @ 61 yo   • Hypertension Mother    • Heart Disease Maternal Aunt    • Cancer Daughter 35     breast   • Heart Disease Maternal Grandmother    • Heart Disease Maternal Grandfather    • Heart Disease Maternal Aunt 56     History   Smoking status   • Former Smoker -- 1.00 packs/day for 40 years   • Types: Cigarettes   • Quit date: 2010   Smokeless tobacco   • Never Used     Allergies   Allergen Reactions   • Erythromycin      Abdominal pain   • Latex    • Tape      Cloth tape     Outpatient Encounter Prescriptions as of 2017   Medication Sig Dispense Refill   • apixaban (ELIQUIS) 5mg Tab Take 1 Tab by mouth 2 Times a Day. 60 Tab 3   • hydrochlorothiazide (HYDRODIURIL) 12.5 MG tablet TAKE 1 TAB BY MOUTH EVERY DAY. 30 Tab 6   • albuterol 108 (90 BASE) MCG/ACT Aero Soln inhalation aerosol Inhale 2 Puffs by mouth every four hours as needed. 1 Inhaler 0   • amlodipine (NORVASC) 5 MG Tab Take 1 Tab by mouth every day. 90 Tab 3   • diclofenac EC (VOLTAREN) 75 MG Tablet Delayed Response      • [DISCONTINUED] ibuprofen (MOTRIN) 800 MG Tab TAKE 1 TAB BY MOUTH EVERY 8 HOURS AS NEEDED FOR MODERATE PAIN. 90 Tab 0   • vitamin D (CHOLECALCIFEROL) 1000 UNIT TABS Take 2 Tabs by mouth every day. 60 Each 11   • aspirin 81 MG tablet Take 81 mg by mouth every day.       No facility-administered encounter medications on file as of 2017.     Review of Systems   HENT: Negative  "for nosebleeds.    Respiratory: Negative for cough and wheezing.    Cardiovascular: Negative for orthopnea and PND.   Musculoskeletal: Negative for myalgias and falls.   Endo/Heme/Allergies: Does not bruise/bleed easily.   Psychiatric/Behavioral: Negative for substance abuse.        Objective:   /60 mmHg  Pulse 80  Ht 1.702 m (5' 7\")  Wt 76.658 kg (169 lb)  BMI 26.46 kg/m2    Physical Exam   Constitutional: She is oriented to person, place, and time. She appears well-developed and well-nourished. No distress.   HENT:   Mouth/Throat: Oropharynx is clear and moist.   Eyes: Conjunctivae are normal. No scleral icterus.   Neck: No JVD present.   Cardiovascular: Normal rate, S1 normal, S2 normal and intact distal pulses.  An irregularly irregular rhythm present. Exam reveals no gallop and no friction rub.    Murmur (2/6 holosystolic murmur at the apex) heard.  Pulmonary/Chest: Effort normal and breath sounds normal.   Musculoskeletal: She exhibits no edema.   Neurological: She is alert and oriented to person, place, and time.   Skin: Skin is warm and dry. She is not diaphoretic.   Psychiatric: She has a normal mood and affect. Thought content normal.       Assessment:     1. Non-rheumatic mitral regurgitation     2. Essential hypertension, benign     3. Persistent atrial fibrillation (CMS-HCC)     4. Pulmonary hypertension (CMS-HCC)     5. Hypercholesterolemia       She is remarkably stable. The striking discrepancy between the findings predicted by her echo and the cardiac catheterization remain unexplained but she remains asymptomatic except for being disabled by her back.    Medical Decision Making:  Today's Assessment / Status / Plan:     There are no cardiac contraindications to the back surgery at this point I want to see her soon after it. She will stop the apixaban 72 hours before surgery.       No Recipients                "

## 2017-06-19 NOTE — Clinical Note
"     [facility] [address]  Phone: [phone]  Fax: [phone]              Chinyere Priest  1955    Encounter Date: 6/19/2017    Miki Hernandez M.D.          PROGRESS NOTE:  Subjective:   Chinyere Priest is a 61 y.o. female who presents today for follow-up of the cardiac catheterization which remarkably did not show any of the concerns suggested by her echocardiogram. Therefore she is now set up for her back surgery. Please see the previous note. She has had no cardiac symptoms at all and no complications of anticoagulation.    Past Medical History   Diagnosis Date   • Pain    • Asthma in adult          • Essential hypertension, malignant     • Tobacco use disorder     • Inflamed seborrheic keratosis     • Chronic back pain     • Pure hypercholesterolemia     • Fatigue     • Plantar fascia syndrome     • Unspecified vitamin D deficiency     • Pruritic dermatitis       Ongoing for several months but worse at night on her lower extremities   • Spider veins of limb     • Plantar wart of right foot     • Palpitations     • Osteoarthritis of both hips     • Abnormal CT scan, kidney       \"Incidentally noted retroaortic left renal vein is identified. Some calcified plaque is noted in the aorta and its branch vessels.\"  CT scan done for diverticulosis     • Aortic calcification (CMS-HCC)       Noted incidentally on abdominal CT scan.   • Diverticulosis     • Pulmonary hypertension (CMS-HCC) January 2016     Echocardiogram with normal LV size, mild concentric LVH, LVEF 65%. Grade I diastolic dysfunction. Trace MR, trace TR, RVSP 40mmHg.   • Non-rheumatic mitral regurgitation 5/25/2017     May 2017, MR is now moderately severe.  January 2016: Echocardiogram with normal LV size, mild concentric LVH, LVEF 65%. Grade I diastolic dysfunction. Trace MR, trace TR, RVSP 40mmHg.  May 2015: Echocardiogram with normal LV " size, mild concentric LVH, LVEF 60%. Mild RVH, RVSP 55mmHg. Mild MR, trace TR.    • S/P cardiac catheterization 2017     1.  Normal right heart pressures. 3.  Essentially normal coronary arteries. 2.  Left ventricular ejection fraction 65%.      Past Surgical History   Procedure Laterality Date   • Gyn surgery        x2   • Hysterectomy laparoscopy     • Lumbar laminectomy diskectomy  2009     Performed by SUDHIR VERMA at [facility]     Family History   Problem Relation Age of Onset   • Heart Disease Mother      1st MI @ 59 yo   • Hypertension Mother    • Heart Disease Maternal Aunt    • Cancer Daughter 35     breast   • Heart Disease Maternal Grandmother    • Heart Disease Maternal Grandfather    • Heart Disease Maternal Aunt 56     History   Smoking status   • Former Smoker -- 1.00 packs/day for 40 years   • Types: Cigarettes   • Quit date: 2010   Smokeless tobacco   • Never Used     Allergies   Allergen Reactions   • Erythromycin      Abdominal pain   • Latex    • Tape      Cloth tape     Outpatient Encounter Prescriptions as of 2017   Medication Sig Dispense Refill   • apixaban (ELIQUIS) 5mg Tab Take 1 Tab by mouth 2 Times a Day. 60 Tab 3   • hydrochlorothiazide (HYDRODIURIL) 12.5 MG tablet TAKE 1 TAB BY MOUTH EVERY DAY. 30 Tab 6   • albuterol 108 (90 BASE) MCG/ACT Aero Soln inhalation aerosol Inhale 2 Puffs by mouth every four hours as needed. 1 Inhaler 0   • amlodipine (NORVASC) 5 MG Tab Take 1 Tab by mouth every day. 90 Tab 3   • diclofenac EC (VOLTAREN) 75 MG Tablet Delayed Response      • [DISCONTINUED] ibuprofen (MOTRIN) 800 MG Tab TAKE 1 TAB BY MOUTH EVERY 8 HOURS AS NEEDED FOR MODERATE PAIN. 90 Tab 0   • vitamin D (CHOLECALCIFEROL) 1000 UNIT TABS Take 2 Tabs by mouth every day. 60 Each 11   • aspirin 81 MG tablet Take 81 mg by mouth every day.       No facility-administered encounter medications on file as of 2017.     Review of Systems   HENT: Negative  "for nosebleeds.    Respiratory: Negative for cough and wheezing.    Cardiovascular: Negative for orthopnea and PND.   Musculoskeletal: Negative for myalgias and falls.   Endo/Heme/Allergies: Does not bruise/bleed easily.   Psychiatric/Behavioral: Negative for substance abuse.        Objective:   /60 mmHg  Pulse 80  Ht 1.702 m (5' 7\")  Wt 76.658 kg (169 lb)  BMI 26.46 kg/m2    Physical Exam   Constitutional: She is oriented to person, place, and time. She appears well-developed and well-nourished. No distress.   HENT:   Mouth/Throat: Oropharynx is clear and moist.   Eyes: Conjunctivae are normal. No scleral icterus.   Neck: No JVD present.   Cardiovascular: Normal rate, S1 normal, S2 normal and intact distal pulses.  An irregularly irregular rhythm present. Exam reveals no gallop and no friction rub.    Murmur (2/6 holosystolic murmur at the apex) heard.  Pulmonary/Chest: Effort normal and breath sounds normal.   Musculoskeletal: She exhibits no edema.   Neurological: She is alert and oriented to person, place, and time.   Skin: Skin is warm and dry. She is not diaphoretic.   Psychiatric: She has a normal mood and affect. Thought content normal.       Assessment:     1. Non-rheumatic mitral regurgitation     2. Essential hypertension, benign     3. Persistent atrial fibrillation (CMS-HCC)     4. Pulmonary hypertension (CMS-HCC)     5. Hypercholesterolemia       She is remarkably stable. The striking discrepancy between the findings predicted by her echo and the cardiac catheterization remain unexplained but she remains asymptomatic except for being disabled by her back.    Medical Decision Making:  Today's Assessment / Status / Plan:     There are no cardiac contraindications to the back surgery at this point I want to see her soon after it. She will stop the apixaban 72 hours before surgery.       No Recipients                "

## 2017-06-19 NOTE — MR AVS SNAPSHOT
"        Chinyere Priest   2017 11:40 AM   Office Visit   MRN: [id number]    Department:  [facility]   Dept Phone:  [phone]    Description:  Female : 1955   Provider:  Miki Hernandez M.D.           Reason for Visit     Follow-Up           Allergies as of 2017     Allergen Noted Reactions    Erythromycin 2014       Abdominal pain    Latex 2009       Tape 2009       Cloth tape      You were diagnosed with     Non-rheumatic mitral regurgitation   [400795]       Essential hypertension, benign   [401.1.ICD-9-CM]       Persistent atrial fibrillation (CMS-HCC)   [512600]       Pulmonary hypertension (CMS-HCC)   [477844]       Hypercholesterolemia   [233406]         Vital Signs     Blood Pressure Pulse Height Weight Body Mass Index Smoking Status    110/60 mmHg 80 1.702 m (5' 7\") 76.658 kg (169 lb) 26.46 kg/m2 Former Smoker      Basic Information     Date Of Birth Sex Race Ethnicity Preferred Language    1955 Female White Non- English      Your appointments     Aug 23, 2017 10:20 AM   FOLLOW UP with Miki Hernandez M.D.   [facility] ([facility]    [address] [place] [id number] [phone]              Problem List              ICD-10-CM Priority Class Noted - Resolved    Chronic back pain (Chronic) M54.9, G89.29   6/3/2010 - Present    Plantar fascia syndrome (Chronic) M72.2   2010 - Present    Vitamin D deficiency (Chronic) E55.9   2010 - Present    DDD (degenerative disc disease), cervical M50.30   2012 - Present    Trigger point of thoracic region M54.6   2012 - Present    Spider veins of limb I78.1   2013 - Present    Cystocele N81.10   2013 - Present    Asthma in adult without complication J45.909   2014 - Present    Enlarged ovary N83.8   2014 - Present    Palpitations R00.2 Medium  12/10/2014 - Present    Osteoarthritis of both hips M16.0   12/10/2014 - " Present    Abnormal CT scan, kidney R93.429   5/15/2015 - Present    Aortic calcification (CMS-HCC) I70.0 Medium  5/15/2015 - Present    Diverticulosis K57.90   5/15/2015 - Present    Pulmonary hypertension (CMS-HCC) I27.2 High  8/28/2015 - Present    Need for pneumococcal vaccination Z23   10/16/2015 - Present    Decreased right ventricular systolic function I51.9   1/14/2016 - Present    Family history of ovarian cancer Z80.41   11/21/2016 - Present    Family history of breast cancer Z80.3   11/21/2016 - Present    Tobacco use disorder, mild, in sustained remission, abuse Z72.0   11/21/2016 - Present    Degenerative disc disease, lumbar M51.36   1/25/2017 - Present    Back pain with sciatica M54.30, M54.9   1/25/2017 - Present    Non-rheumatic mitral regurgitation I34.0   5/25/2017 - Present    Essential hypertension, benign I10   5/25/2017 - Present    Persistent atrial fibrillation (CMS-HCC) I48.1   5/25/2017 - Present    Chest pain R07.9   5/25/2017 - Present    S/P cardiac catheterization Z98.890   5/26/2017 - Present    Hypercholesterolemia E78.00   6/19/2017 - Present      Health Maintenance        Date Due Completion Dates    IMM DTaP/Tdap/Td Vaccine (1 - Tdap) 7/30/1973 ---    IMM ZOSTER VACCINE 7/30/2014 ---    MAMMOGRAM 5/21/2016 5/21/2015, 1/20/2014, 11/16/2012    COLONOSCOPY 12/8/2021 12/8/2011 (Prv Comp)    Override on 12/8/2011: Previously completed (GIC, diverticulosis, repeat in 10 years)            Current Immunizations     13-VALENT PCV PREVNAR 10/16/2015 10:10 AM    Influenza TIV (IM) 10/1/2016    Pneumococcal polysaccharide vaccine (PPSV-23) 11/21/2016      Below and/or attached are the medications your provider expects you to take. Review all of your home medications and newly ordered medications with your provider and/or pharmacist. Follow medication instructions as directed by your provider and/or pharmacist. Please keep your medication list with you and share with your provider. Update the  information when medications are discontinued, doses are changed, or new medications (including over-the-counter products) are added; and carry medication information at all times in the event of emergency situations     Allergies:  ERYTHROMYCIN - (reactions not documented)     LATEX - (reactions not documented)     TAPE - (reactions not documented)               Medications  Valid as of: June 20, 2017 -  7:46 AM    Generic Name Brand Name Tablet Size Instructions for use    Albuterol Sulfate (Aero Soln) albuterol 108 (90 BASE) MCG/ACT Inhale 2 Puffs by mouth every four hours as needed.        AmLODIPine Besylate (Tab) NORVASC 5 MG Take 1 Tab by mouth every day.        Apixaban (Tab) ELIQUIS 5mg Take 1 Tab by mouth 2 Times a Day.        Aspirin (Tab) aspirin 81 MG Take 81 mg by mouth every day.        Cholecalciferol (Tab) cholecalciferol 1000 UNIT Take 2 Tabs by mouth every day.        Diclofenac Sodium (Tablet Delayed Response) VOLTAREN 75 MG         HydroCHLOROthiazide (Tab) HYDRODIURIL 12.5 MG TAKE 1 TAB BY MOUTH EVERY DAY.        .                 Medicines prescribed today were sent to:     [facility] #[id number] - [place], [place] - [address]    [facility] [address] [facility] [address]    Phone: [phone] Fax: [phone]    Open 24 Hours?: No      Medication refill instructions:       If your prescription bottle indicates you have medication refills left, it is not necessary to call your provider’s office. Please contact your pharmacy and they will refill your medication.    If your prescription bottle indicates you do not have any refills left, you may request refills at any time through one of the following ways: The online [employer] system (except Urgent Care), by calling your provider’s office, or by asking your pharmacy to contact your provider’s office with a refill request. Medication refills are processed only during regular business hours and may not be available until the next business day. Your  provider may request additional information or to have a follow-up visit with you prior to refilling your medication.   *Please Note: Medication refills are assigned a new Rx number when refilled electronically. Your pharmacy may indicate that no refills were authorized even though a new prescription for the same medication is available at the pharmacy. Please request the medicine by name with the pharmacy before contacting your provider for a refill.        Other Notes About Your Plan     Patient cleared for back surgery via [employer].               [employer] Access Code: [email or web address]  Expires: 7/7/2017  4:21 AM    [employer]pring  A secure, online tool to manage your health information     [employer]’s Everspring® is a secure, online tool that connects you to your personalized health information from the privacy of your home -- day or night - making it very easy for you to manage your healthcare. Once the activation process is completed, you can even access your medical information using the Everspring leah, which is available for free in the Apple Leah store or Google Play store.     [employer]ring provides the following levels of access (as shown below):   My Chart Features   [facility] Doctor [facility]  Specialists [facility]  Urgent  Care Non-[facility]  Primary Care  Doctor   Email your healthcare team securely and privately 24/7 X X X    Manage appointments: schedule your next appointment; view details of past/upcoming appointments X      Request prescription refills. X      View recent personal medical records, including lab and immunizations X X X X   View health record, including health history, allergies, medications X X X X   Read reports about your outpatient visits, procedures, consult and ER notes X X X X   See your discharge summary, which is a recap of your hospital and/or ER visit that includes your diagnosis, lab results, and care plan. X X       How to register for [employer]pring:  1. Go to   [email or web address].  2. Click on the Sign Up Now box, which takes you to the New Member Sign Up page. You will need to provide the following information:  a. Enter your [employer] Access Code exactly as it appears at the top of this page. (You will not need to use this code after you’ve completed the sign-up process. If you do not sign up before the expiration date, you must request a new code.)   b. Enter your date of birth.   c. Enter your home email address.   d. Click Submit, and follow the next screen’s instructions.  3. Create a Ayannah ID. This will be your Ayannah login ID and cannot be changed, so think of one that is secure and easy to remember.  4. Create a [place] password. You can change your password at any time.  5. Enter your Password Reset Question and Answer. This can be used at a later time if you forget your password.   6. Enter your e-mail address. This allows you to receive e-mail notifications when new information is available in Ayannah.  7. Click Sign Up. You can now view your health information.    For assistance activating your [employer]annah account, call [phone]

## 2017-07-21 DIAGNOSIS — I10 ESSENTIAL HYPERTENSION: ICD-10-CM

## 2017-07-21 RX ORDER — HYDROCHLOROTHIAZIDE 12.5 MG/1
12.5 TABLET ORAL
Qty: 90 TAB | Refills: 3 | Status: CANCELLED | OUTPATIENT
Start: 2017-07-21

## 2017-07-21 RX ORDER — HYDROCHLOROTHIAZIDE 12.5 MG/1
12.5 TABLET ORAL
Qty: 90 TAB | Refills: 2 | OUTPATIENT
Start: 2017-07-21 | End: 2018-03-19

## 2017-07-31 ENCOUNTER — OFFICE VISIT (OUTPATIENT)
Dept: URGENT CARE | Facility: CLINIC | Age: 62
End: 2017-07-31
Payer: COMMERCIAL

## 2017-07-31 ENCOUNTER — APPOINTMENT (OUTPATIENT)
Dept: URGENT CARE | Facility: PHYSICIAN GROUP | Age: 62
End: 2017-07-31
Payer: COMMERCIAL

## 2017-07-31 VITALS
RESPIRATION RATE: 16 BRPM | TEMPERATURE: 98.1 F | HEIGHT: 67 IN | SYSTOLIC BLOOD PRESSURE: 106 MMHG | BODY MASS INDEX: 26.71 KG/M2 | OXYGEN SATURATION: 98 % | WEIGHT: 170.2 LBS | DIASTOLIC BLOOD PRESSURE: 78 MMHG | HEART RATE: 91 BPM

## 2017-07-31 DIAGNOSIS — J40 BRONCHITIS: ICD-10-CM

## 2017-07-31 PROCEDURE — 99214 OFFICE O/P EST MOD 30 MIN: CPT | Performed by: NURSE PRACTITIONER

## 2017-07-31 RX ORDER — DOXYCYCLINE HYCLATE 100 MG
100 TABLET ORAL 2 TIMES DAILY
Qty: 14 TAB | Refills: 0 | Status: SHIPPED | OUTPATIENT
Start: 2017-07-31 | End: 2017-08-07

## 2017-07-31 RX ORDER — PREDNISONE 20 MG/1
TABLET ORAL
Qty: 10 TAB | Refills: 0 | Status: SHIPPED | OUTPATIENT
Start: 2017-07-31 | End: 2017-08-23

## 2017-07-31 RX ORDER — OXYCODONE HYDROCHLORIDE 5 MG/1
TABLET ORAL
Refills: 0 | COMMUNITY
Start: 2017-07-25 | End: 2017-08-23

## 2017-07-31 RX ORDER — METHOCARBAMOL 750 MG/1
TABLET, FILM COATED ORAL
COMMUNITY
Start: 2017-07-20 | End: 2017-08-23

## 2017-07-31 ASSESSMENT — ENCOUNTER SYMPTOMS
SORE THROAT: 0
COUGH: 1
SHORTNESS OF BREATH: 0
SPUTUM PRODUCTION: 0
WHEEZING: 0
CHILLS: 0
FEVER: 0

## 2017-07-31 NOTE — PROGRESS NOTES
"Subjective:      Chinyere Priest is a 62 y.o. female who presents with Cough            Cough  This is a new problem. Episode onset: Pt reports that her cough started 3 days ago. She also has some associated sinus congestion and drainage. Denies a fever. The problem has been unchanged. The cough is non-productive. Associated symptoms include nasal congestion and postnasal drip. Pertinent negatives include no chills, fever, sore throat, shortness of breath or wheezing. Associated symptoms comments: Reports that she had back surgery one month ago. It is very painful to have to cough this hard and she worries the cough may get even worse. Nothing aggravates the symptoms. She has tried a beta-agonist inhaler and rest for the symptoms. The treatment provided no relief. Her past medical history is significant for bronchitis. There is no history of pneumonia.       Review of Systems   Constitutional: Positive for malaise/fatigue. Negative for fever and chills.   HENT: Positive for congestion and postnasal drip. Negative for sore throat.    Respiratory: Positive for cough. Negative for sputum production, shortness of breath and wheezing.    All other systems reviewed and are negative.    Past Medical History   Diagnosis Date   • Pain    • Asthma in adult          • Essential hypertension, malignant     • Tobacco use disorder     • Inflamed seborrheic keratosis     • Chronic back pain     • Pure hypercholesterolemia     • Fatigue     • Plantar fascia syndrome     • Unspecified vitamin D deficiency     • Pruritic dermatitis       Ongoing for several months but worse at night on her lower extremities   • Spider veins of limb     • Plantar wart of right foot     • Palpitations     • Osteoarthritis of both hips     • Abnormal CT scan, kidney       \"Incidentally noted retroaortic left renal vein is identified. Some calcified plaque is noted in the aorta and its branch vessels.\"  CT scan done for diverticulosis     • " "Aortic calcification (CMS-HCC)       Noted incidentally on abdominal CT scan.   • Diverticulosis     • Pulmonary hypertension (CMS-HCC) 2016     Echocardiogram with normal LV size, mild concentric LVH, LVEF 65%. Grade I diastolic dysfunction. Trace MR, trace TR, RVSP 40mmHg.   • Non-rheumatic mitral regurgitation 2017     May 2017, MR is now moderately severe.  2016: Echocardiogram with normal LV size, mild concentric LVH, LVEF 65%. Grade I diastolic dysfunction. Trace MR, trace TR, RVSP 40mmHg.  May 2015: Echocardiogram with normal LV size, mild concentric LVH, LVEF 60%. Mild RVH, RVSP 55mmHg. Mild MR, trace TR.    • S/P cardiac catheterization 2017     1.  Normal right heart pressures. 3.  Essentially normal coronary arteries. 2.  Left ventricular ejection fraction 65%.       Past Surgical History   Procedure Laterality Date   • Gyn surgery        x2   • Hysterectomy laparoscopy     • Lumbar laminectomy diskectomy  2009     Performed by SUDHIR VERMA at [facility]      Social History     Social History   • Marital Status:      Spouse Name: N/A   • Number of Children: N/A   • Years of Education: N/A     Occupational History   • Not on file.     Social History Main Topics   • Smoking status: Former Smoker -- 1.00 packs/day for 40 years     Types: Cigarettes     Quit date: 2010   • Smokeless tobacco: Never Used   • Alcohol Use: No   • Drug Use: No   • Sexual Activity:     Partners: Male     Birth Control/ Protection: Post-Menopausal      Comment: , works at [employer]     Other Topics Concern   • Exercise No     Social History Narrative          Objective:     /78 mmHg  Pulse 91  Temp(Src) 36.7 °C (98.1 °F)  Resp 16  Ht 1.702 m (5' 7\")  Wt 77.202 kg (170 lb 3.2 oz)  BMI 26.65 kg/m2  SpO2 98%     Physical Exam   Constitutional: She is oriented to person, place, and time. Vital signs are normal. She appears well-developed and well-nourished. "   HENT:   Head: Normocephalic and atraumatic.   Right Ear: Tympanic membrane and external ear normal.   Left Ear: Tympanic membrane and external ear normal.   Mouth/Throat: Oropharynx is clear and moist.   Eyes: EOM are normal. Pupils are equal, round, and reactive to light.   Neck: Normal range of motion.   Cardiovascular: Normal rate and regular rhythm.    Pulmonary/Chest: Effort normal. She has wheezes (slight, clears with cough) in the right lower field and the left lower field. She has rhonchi in the right upper field and the left upper field.   Musculoskeletal: Normal range of motion.   Lymphadenopathy:        Head (right side): Submandibular adenopathy present.        Head (left side): Submandibular adenopathy present.   Neurological: She is alert and oriented to person, place, and time.   Skin: Skin is warm.   Psychiatric: She has a normal mood and affect. Her speech is normal and behavior is normal. Thought content normal.   Vitals reviewed.              Assessment/Plan:     1. Bronchitis  - predniSONE (DELTASONE) 20 MG Tab; Take 2 tabs PO every day for five days  Dispense: 10 Tab; Refill: 0  - doxycycline (VIBRAMYCIN) 100 MG Tab; Take 1 Tab by mouth 2 times a day for 7 days.  Dispense: 14 Tab; Refill: 0    She still has an albuterol inhaler at home that she has been using  OTC cough syrup  Increase water intake  Contingent antibiotic prescription given to patient to fill upon meeting criteria of guidelines discussed.   Splinting techniques discussed for pain control with coughing  Supportive care, differential diagnoses, and indications for immediate follow-up discussed with patient.    Pathogenesis of diagnosis discussed including typical length and natural progression.      Instructed to return to  or nearest emergency department if symptoms fail to improve, for any change in condition, further concerns, or new concerning symptoms.  Patient states understanding of the plan of care and discharge  instructions.

## 2017-07-31 NOTE — MR AVS SNAPSHOT
"        Chinyere Priest   2017 10:30 AM   Office Visit   MRN: [id number]    Department:  [facility]   Dept Phone:  [phone]    Description:  Female : 1955   Provider:  DANIELLE Etienne           Reason for Visit     Cough cough/ chest congestion since Friday       Allergies as of 2017     Allergen Noted Reactions    Erythromycin 2014       Abdominal pain    Latex 2009       Tape 2009       Cloth tape      You were diagnosed with     Bronchitis   [[id number]]         Vital Signs     Blood Pressure Pulse Temperature Respirations Height Weight    106/78 mmHg 91 36.7 °C (98.1 °F) 16 1.702 m (5' 7\") 77.202 kg (170 lb 3.2 oz)    Body Mass Index Oxygen Saturation Smoking Status             26.65 kg/m2 98% Former Smoker         Basic Information     Date Of Birth Sex Race Ethnicity Preferred Language    1955 Female White Non- English      Your appointments     Aug 23, 2017 10:20 AM   FOLLOW UP with Miki Hernandez M.D.   [facility] (--[facility]    [address] [place] [id number] [phone]              Problem List              ICD-10-CM Priority Class Noted - Resolved    Chronic back pain (Chronic) M54.9, G89.29   6/3/2010 - Present    Plantar fascia syndrome (Chronic) M72.2   2010 - Present    Vitamin D deficiency (Chronic) E55.9   2010 - Present    DDD (degenerative disc disease), cervical M50.30   2012 - Present    Trigger point of thoracic region M54.6   2012 - Present    Spider veins of limb I78.1   2013 - Present    Cystocele N81.10   2013 - Present    Asthma in adult without complication J45.909   2014 - Present    Enlarged ovary N83.8   2014 - Present    Palpitations R00.2 Medium  12/10/2014 - Present    Osteoarthritis of both hips M16.0   12/10/2014 - Present    Abnormal CT scan, kidney R93.429   5/15/2015 - Present    Aortic calcification " (CMS-HCC) I70.0 Medium  5/15/2015 - Present    Diverticulosis K57.90   5/15/2015 - Present    Pulmonary hypertension (CMS-HCC) I27.2 High  8/28/2015 - Present    Need for pneumococcal vaccination Z23   10/16/2015 - Present    Decreased right ventricular systolic function I51.9   1/14/2016 - Present    Family history of ovarian cancer Z80.41   11/21/2016 - Present    Family history of breast cancer Z80.3   11/21/2016 - Present    Tobacco use disorder, mild, in sustained remission, abuse Z72.0   11/21/2016 - Present    Degenerative disc disease, lumbar M51.36   1/25/2017 - Present    Back pain with sciatica M54.30, M54.9   1/25/2017 - Present    Non-rheumatic mitral regurgitation I34.0   5/25/2017 - Present    Essential hypertension, benign I10   5/25/2017 - Present    Persistent atrial fibrillation (CMS-HCC) I48.1   5/25/2017 - Present    Chest pain R07.9   5/25/2017 - Present    S/P cardiac catheterization Z98.890   5/26/2017 - Present    Hypercholesterolemia E78.00   6/19/2017 - Present      Health Maintenance        Date Due Completion Dates    IMM DTaP/Tdap/Td Vaccine (1 - Tdap) 7/30/1973 ---    IMM ZOSTER VACCINE 7/30/2014 ---    MAMMOGRAM 5/21/2016 5/21/2015, 1/20/2014, 11/16/2012    IMM INFLUENZA (1) 9/1/2017 10/1/2016    COLONOSCOPY 12/8/2021 12/8/2011 (Prv Comp)    Override on 12/8/2011: Previously completed (GIC, diverticulosis, repeat in 10 years)            Current Immunizations     13-VALENT PCV PREVNAR 10/16/2015 10:10 AM    Influenza TIV (IM) 10/1/2016    Pneumococcal polysaccharide vaccine (PPSV-23) 11/21/2016      Below and/or attached are the medications your provider expects you to take. Review all of your home medications and newly ordered medications with your provider and/or pharmacist. Follow medication instructions as directed by your provider and/or pharmacist. Please keep your medication list with you and share with your provider. Update the information when medications are discontinued,  doses are changed, or new medications (including over-the-counter products) are added; and carry medication information at all times in the event of emergency situations     Allergies:  ERYTHROMYCIN - (reactions not documented)     LATEX - (reactions not documented)     TAPE - (reactions not documented)               Medications  Valid as of: July 31, 2017 - 10:44 AM    Generic Name Brand Name Tablet Size Instructions for use    Albuterol Sulfate (Aero Soln) albuterol 108 (90 BASE) MCG/ACT Inhale 2 Puffs by mouth every four hours as needed.        AmLODIPine Besylate (Tab) NORVASC 5 MG Take 1 Tab by mouth every day.        Apixaban (Tab) ELIQUIS 5mg Take 1 Tab by mouth 2 Times a Day.        Aspirin (Tab) aspirin 81 MG Take 81 mg by mouth every day.        Cholecalciferol (Tab) cholecalciferol 1000 UNIT Take 2 Tabs by mouth every day.        Diclofenac Sodium (Tablet Delayed Response) VOLTAREN 75 MG         Doxycycline Hyclate (Tab) VIBRAMYCIN 100 MG Take 1 Tab by mouth 2 times a day for 7 days.        HydroCHLOROthiazide (Tab) HYDRODIURIL 12.5 MG Take 1 Tab by mouth every day.        Methocarbamol (Tab) ROBAXIN 750 MG         OxyCODONE HCl (Tab) ROXICODONE 5 MG TAKE 1-2 TABS BY MOUTH EVERY 4-6 HOURS AS NEEDED FOR PAIN        PredniSONE (Tab) DELTASONE 20 MG Take 2 tabs PO every day for five days        .                 Medicines prescribed today were sent to:     [facility]/PHARMACY #[id number] - [place], [place] - [address] [address]    Phone: [phone] Fax: [phone]    Open 24 Hours?: No      Medication refill instructions:       If your prescription bottle indicates you have medication refills left, it is not necessary to call your provider’s office. Please contact your pharmacy and they will refill your medication.    If your prescription bottle indicates you do not have any refills left, you may request refills at any time through one of the following ways: The online [employer] system  (except Urgent Care), by calling your provider’s office, or by asking your pharmacy to contact your provider’s office with a refill request. Medication refills are processed only during regular business hours and may not be available until the next business day. Your provider may request additional information or to have a follow-up visit with you prior to refilling your medication.   *Please Note: Medication refills are assigned a new Rx number when refilled electronically. Your pharmacy may indicate that no refills were authorized even though a new prescription for the same medication is available at the pharmacy. Please request the medicine by name with the pharmacy before contacting your provider for a refill.        Other Notes About Your Plan     Patient cleared for back surgery via Cars.               [employer] Access Code: [email or web address]  Expires: 8/5/2017  4:09 AM    [employer]  A secure, online tool to manage your health information     [employer]® is a secure, online tool that connects you to your personalized health information from the privacy of your home -- day or night - making it very easy for you to manage your healthcare. Once the activation process is completed, you can even access your medical information using the [employer] leah, which is available for free in the Apple Leah store or Google Play store.     [employer] provides the following levels of access (as shown below):   My Chart Features   [facility] Primary [facility] Doctor [facility]  Specialists [facility]  Urgent  Care Non-[facility]  Primary Care  Doctor   Email your healthcare team securely and privately 24/7 X X X    Manage appointments: schedule your next appointment; view details of past/upcoming appointments X      Request prescription refills. X      View recent personal medical records, including lab and immunizations X X X X   View health record, including health history, allergies, medications X X X X   Read reports about your outpatient  visits, procedures, consult and ER notes X X X X   See your discharge summary, which is a recap of your hospital and/or ER visit that includes your diagnosis, lab results, and care plan. X X       How to register for [employer]:  1. Go to  [email or web address].  2. Click on the Sign Up Now box, which takes you to the New Member Sign Up page. You will need to provide the following information:  a. Enter your [employer] Access Code exactly as it appears at the top of this page. (You will not need to use this code after you’ve completed the sign-up process. If you do not sign up before the expiration date, you must request a new code.)   b. Enter your date of birth.   c. Enter your home email address.   d. Click Submit, and follow the next screen’s instructions.  3. Create a [employer] ID. This will be your [employer] login ID and cannot be changed, so think of one that is secure and easy to remember.  4. Create a [employer] password. You can change your password at any time.  5. Enter your Password Reset Question and Answer. This can be used at a later time if you forget your password.   6. Enter your e-mail address. This allows you to receive e-mail notifications when new information is available in [employer].  7. Click Sign Up. You can now view your health information.    For assistance activating your [employer] account, call [phone]

## 2017-08-22 ENCOUNTER — TELEPHONE (OUTPATIENT)
Dept: MEDICAL GROUP | Facility: CLINIC | Age: 62
End: 2017-08-22

## 2017-08-23 ENCOUNTER — OFFICE VISIT (OUTPATIENT)
Dept: CARDIOLOGY | Facility: PHYSICIAN GROUP | Age: 62
End: 2017-08-23
Payer: COMMERCIAL

## 2017-08-23 VITALS
SYSTOLIC BLOOD PRESSURE: 104 MMHG | DIASTOLIC BLOOD PRESSURE: 60 MMHG | BODY MASS INDEX: 26.37 KG/M2 | WEIGHT: 168 LBS | HEART RATE: 82 BPM | HEIGHT: 67 IN | OXYGEN SATURATION: 97 %

## 2017-08-23 DIAGNOSIS — I48.19 PERSISTENT ATRIAL FIBRILLATION (HCC): ICD-10-CM

## 2017-08-23 DIAGNOSIS — I27.20 PULMONARY HYPERTENSION (HCC): ICD-10-CM

## 2017-08-23 DIAGNOSIS — I34.0 NON-RHEUMATIC MITRAL REGURGITATION: ICD-10-CM

## 2017-08-23 DIAGNOSIS — E78.00 HYPERCHOLESTEROLEMIA: ICD-10-CM

## 2017-08-23 PROCEDURE — 99213 OFFICE O/P EST LOW 20 MIN: CPT | Performed by: INTERNAL MEDICINE

## 2017-08-23 ASSESSMENT — ENCOUNTER SYMPTOMS
WHEEZING: 0
COUGH: 0
MYALGIAS: 0
FALLS: 0
ORTHOPNEA: 0
PND: 0

## 2017-08-23 ASSESSMENT — LIFESTYLE VARIABLES: SUBSTANCE_ABUSE: 0

## 2017-08-23 NOTE — PROGRESS NOTES
"Subjective:   Chinyere Priest is a 62 y.o. female who presents today for follow-up of her perplexing disparity between the echocardiographic findings and her cath. Back surgery was accomplished without any difficulty whatsoever and she is doing very well and has had no cardiac symptoms at all.     Past Medical History   Diagnosis Date   • Asthma in adult          • Essential hypertension, malignant     • Inflamed seborrheic keratosis     • Chronic back pain     • Pure hypercholesterolemia     • Plantar fascia syndrome     • Unspecified vitamin D deficiency     • Pruritic dermatitis       Ongoing for several months but worse at night on her lower extremities   • Spider veins of limb     • Plantar wart of right foot     • Palpitations     • Osteoarthritis of both hips     • Abnormal CT scan, kidney       \"Incidentally noted retroaortic left renal vein is identified. Some calcified plaque is noted in the aorta and its branch vessels.\"  CT scan done for diverticulosis     • Aortic calcification (CMS-HCC)       Noted incidentally on abdominal CT scan.   • Diverticulosis     • Pulmonary hypertension (CMS-HCC) 2016     Echocardiogram with normal LV size, mild concentric LVH, LVEF 65%. Grade I diastolic dysfunction. Trace MR, trace TR, RVSP 40mmHg.   • Non-rheumatic mitral regurgitation 2017     May 2017, MR moderately severe, PAH worse but cath showed normal pressures and no significant MR.  2016: Echo normal LV size, mild concentric LVH, LVEF 65%. Grade I diastolic dysfunction. Trace MR, trace TR, RVSP 40mmHg.  May 2015: Echo normal LV size, mild concentric LVH, LVEF 60%. Mild RVH, RVSP 55mmHg. Mild MR, trace TR.   • S/P cardiac catheterization 2017     1.  Normal right heart pressures. 3.  Essentially normal coronary arteries. 2.  Left ventricular ejection fraction 65%.      Past Surgical History   Procedure Laterality Date   • Gyn surgery        x2   • Hysterectomy laparoscopy "     • Lumbar laminectomy diskectomy  9/2/2009     [facility]     Family History   Problem Relation Age of Onset   • Heart Disease Mother      1st MI @ 59 yo   • Hypertension Mother    • Heart Disease Maternal Aunt    • Cancer Daughter 35     breast   • Heart Disease Maternal Grandmother    • Heart Disease Maternal Grandfather    • Heart Disease Maternal Aunt 56     History   Smoking status   • Former Smoker -- 1.00 packs/day for 40 years   • Types: Cigarettes   • Quit date: 03/01/2010   Smokeless tobacco   • Never Used     Allergies   Allergen Reactions   • Erythromycin      Abdominal pain   • Latex    • Tape      Cloth tape     Outpatient Encounter Prescriptions as of 8/23/2017   Medication Sig Dispense Refill   • hydrochlorothiazide (HYDRODIURIL) 12.5 MG tablet Take 1 Tab by mouth every day. 90 Tab 2   • apixaban (ELIQUIS) 5mg Tab Take 1 Tab by mouth 2 Times a Day. 60 Tab 3   • albuterol 108 (90 BASE) MCG/ACT Aero Soln inhalation aerosol Inhale 2 Puffs by mouth every four hours as needed. 1 Inhaler 0   • amlodipine (NORVASC) 5 MG Tab Take 1 Tab by mouth every day. 90 Tab 3   • vitamin D (CHOLECALCIFEROL) 1000 UNIT TABS Take 2 Tabs by mouth every day. 60 Each 11   • aspirin 81 MG tablet Take 81 mg by mouth every day.     • [DISCONTINUED] oxycodone immediate-release (ROXICODONE) 5 MG Tab TAKE 1-2 TABS BY MOUTH EVERY 4-6 HOURS AS NEEDED FOR PAIN  0   • [DISCONTINUED] methocarbamol (ROBAXIN) 750 MG Tab      • [DISCONTINUED] predniSONE (DELTASONE) 20 MG Tab Take 2 tabs PO every day for five days 10 Tab 0   • [DISCONTINUED] diclofenac EC (VOLTAREN) 75 MG Tablet Delayed Response        No facility-administered encounter medications on file as of 8/23/2017.     Review of Systems   Respiratory: Negative for cough and wheezing.    Cardiovascular: Negative for orthopnea and PND.   Musculoskeletal: Negative for myalgias and falls.   Psychiatric/Behavioral: Negative for substance  "abuse.        Objective:   /60 mmHg  Pulse 82  Ht 1.689 m (5' 6.5\")  Wt 76.204 kg (168 lb)  BMI 26.71 kg/m2  SpO2 97%    Physical Exam   Constitutional: She is oriented to person, place, and time. She appears well-developed and well-nourished. No distress.   Eyes: Conjunctivae are normal. No scleral icterus.   Neck: No JVD present.   Cardiovascular: Normal rate, S1 normal, S2 normal and intact distal pulses.  An irregularly irregular rhythm present. Exam reveals no gallop and no friction rub.    Murmur (2/6 holosystolic murmur at the apex) heard.  Pulmonary/Chest: Effort normal and breath sounds normal.   Musculoskeletal: She exhibits no edema.   Neurological: She is alert and oriented to person, place, and time.   Skin: Skin is warm and dry. She is not diaphoretic.   Psychiatric: She has a normal mood and affect. Thought content normal.       Assessment:     1. Non-rheumatic mitral regurgitation     2. Persistent atrial fibrillation (CMS-HCC)     3. Hypercholesterolemia     4. Pulmonary hypertension (CMS-HCC)       The above assessed cardiovascular problems are clinically stable.  The question is whether or not her blood pressure is over controlled.  He has been aggressively managed because of her mitral regurgitation and pulmonary hypertension both of which were minimal on the cardiac catheterization.  Medical Decision Making:  Today's Assessment / Status / Plan:     Continue the current cardiovascular regimen.  Continue primary follow up with  Afia Currie.   Cardiology follow up in 3 months and  sooner if needed for any change.   Lab before next visit  (see scanned requisition).  Use of the emergency medical system reviewed.     "

## 2017-08-23 NOTE — MR AVS SNAPSHOT
"        Chinyere Priest   2017 10:20 AM   Office Visit   MRN: [id number]    Department:  [facility]   Dept Phone:  [phone]    Description:  Female : 1955   Provider:  Miki Hernandez M.D.           Reason for Visit     Follow-Up           Allergies as of 2017     Allergen Noted Reactions    Erythromycin 2014       Abdominal pain    Latex 2009       Tape 2009       Cloth tape      You were diagnosed with     Non-rheumatic mitral regurgitation   [818604]       Persistent atrial fibrillation (CMS-[facility])   [332577]       Hypercholesterolemia   [662297]       Pulmonary hypertension (CMS-[facility])   [351816]         Vital Signs     Blood Pressure Pulse Height Weight Body Mass Index Oxygen Saturation    104/60 mmHg 82 1.689 m (5' 6.5\") 76.204 kg (168 lb) 26.71 kg/m2 97%    Smoking Status                   Former Smoker           Basic Information     Date Of Birth Sex Race Ethnicity Preferred Language    1955 Female White Non- English      Your appointments     Oct 30, 2017  9:20 AM   FOLLOW UP with Miki Hernandez M.D.   [facility] ([facility]    [address] [place] [id number] [phone]              Problem List              ICD-10-CM Priority Class Noted - Resolved    Chronic back pain (Chronic) M54.9, G89.29   6/3/2010 - Present    Plantar fascia syndrome (Chronic) M72.2   2010 - Present    Vitamin D deficiency (Chronic) E55.9   2010 - Present    DDD (degenerative disc disease), cervical M50.30   2012 - Present    Trigger point of thoracic region M54.6   2012 - Present    Spider veins of limb I78.1   2013 - Present    Cystocele N81.10   2013 - Present    Asthma in adult without complication J45.909   2014 - Present    Enlarged ovary N83.8   2014 - Present    Palpitations R00.2 Medium  12/10/2014 - Present    Osteoarthritis of both hips M16.0   12/10/2014 - " Present    Abnormal CT scan, kidney R93.429   5/15/2015 - Present    Aortic calcification (CMS-HCC) I70.0 Medium  5/15/2015 - Present    Diverticulosis K57.90   5/15/2015 - Present    Pulmonary hypertension (CMS-HCC) I27.2 High  8/28/2015 - Present    Need for pneumococcal vaccination Z23   10/16/2015 - Present    Decreased right ventricular systolic function I51.9   1/14/2016 - Present    Family history of ovarian cancer Z80.41   11/21/2016 - Present    Family history of breast cancer Z80.3   11/21/2016 - Present    Tobacco use disorder, mild, in sustained remission, abuse Z72.0   11/21/2016 - Present    Degenerative disc disease, lumbar M51.36   1/25/2017 - Present    Back pain with sciatica M54.30, M54.9   1/25/2017 - Present    Non-rheumatic mitral regurgitation I34.0   5/25/2017 - Present    Essential hypertension, benign I10   5/25/2017 - Present    Persistent atrial fibrillation (CMS-HCC) I48.1   5/25/2017 - Present    Chest pain R07.9   5/25/2017 - Present    S/P cardiac catheterization Z98.890   5/26/2017 - Present    Hypercholesterolemia E78.00   6/19/2017 - Present      Health Maintenance        Date Due Completion Dates    IMM DTaP/Tdap/Td Vaccine (1 - Tdap) 7/30/1973 ---    IMM ZOSTER VACCINE 7/30/2014 ---    MAMMOGRAM 5/21/2016 5/21/2015, 1/20/2014, 11/16/2012    IMM INFLUENZA (1) 9/1/2017 10/1/2016    COLONOSCOPY 12/8/2021 12/8/2011 (Prv Comp)    Override on 12/8/2011: Previously completed (GIC, diverticulosis, repeat in 10 years)            Current Immunizations     13-VALENT PCV PREVNAR 10/16/2015 10:10 AM    Influenza TIV (IM) 10/1/2016    Pneumococcal polysaccharide vaccine (PPSV-23) 11/21/2016      Below and/or attached are the medications your provider expects you to take. Review all of your home medications and newly ordered medications with your provider and/or pharmacist. Follow medication instructions as directed by your provider and/or pharmacist. Please keep your medication list with you  and share with your provider. Update the information when medications are discontinued, doses are changed, or new medications (including over-the-counter products) are added; and carry medication information at all times in the event of emergency situations     Allergies:  ERYTHROMYCIN - (reactions not documented)     LATEX - (reactions not documented)     TAPE - (reactions not documented)               Medications  Valid as of: August 24, 2017 -  7:37 AM    Generic Name Brand Name Tablet Size Instructions for use    Albuterol Sulfate (Aero Soln) albuterol 108 (90 Base) MCG/ACT Inhale 2 Puffs by mouth every four hours as needed.        AmLODIPine Besylate (Tab) NORVASC 5 MG Take 1 Tab by mouth every day.        Apixaban (Tab) ELIQUIS 5mg Take 1 Tab by mouth 2 Times a Day.        Aspirin (Tab) aspirin 81 MG Take 81 mg by mouth every day.        Cholecalciferol (Tab) cholecalciferol 1000 UNIT Take 2 Tabs by mouth every day.        HydroCHLOROthiazide (Tab) HYDRODIURIL 12.5 MG Take 1 Tab by mouth every day.        .                 Medicines prescribed today were sent to:     [facility]/PHARMACY #[id number] - [place], [place] - [address] [address]    Phone: [phone] Fax: [phone]    Open 24 Hours?: No      Medication refill instructions:       If your prescription bottle indicates you have medication refills left, it is not necessary to call your provider’s office. Please contact your pharmacy and they will refill your medication.    If your prescription bottle indicates you do not have any refills left, you may request refills at any time through one of the following ways: The online [employer] system (except Urgent Care), by calling your provider’s office, or by asking your pharmacy to contact your provider’s office with a refill request. Medication refills are processed only during regular business hours and may not be available until the next business day. Your provider may request additional  information or to have a follow-up visit with you prior to refilling your medication.   *Please Note: Medication refills are assigned a new Rx number when refilled electronically. Your pharmacy may indicate that no refills were authorized even though a new prescription for the same medication is available at the pharmacy. Please request the medicine by name with the pharmacy before contacting your provider for a refill.        Other Notes About Your Plan     Minimally invasive surgery on back spine [place]               MyChart Access Code: Activation code not generated  Current MyChart Status: Active

## 2017-10-05 ENCOUNTER — HOSPITAL ENCOUNTER (OUTPATIENT)
Dept: LAB | Facility: MEDICAL CENTER | Age: 62
End: 2017-10-05
Attending: INTERNAL MEDICINE
Payer: COMMERCIAL

## 2017-10-05 DIAGNOSIS — I10 ESSENTIAL HYPERTENSION: ICD-10-CM

## 2017-10-05 DIAGNOSIS — E78.00 PURE HYPERCHOLESTEROLEMIA: ICD-10-CM

## 2017-10-05 LAB
ALBUMIN SERPL BCP-MCNC: 4.2 G/DL (ref 3.2–4.9)
ALBUMIN/GLOB SERPL: 1.4 G/DL
ALP SERPL-CCNC: 57 U/L (ref 30–99)
ALT SERPL-CCNC: 19 U/L (ref 2–50)
ANION GAP SERPL CALC-SCNC: 6 MMOL/L (ref 0–11.9)
AST SERPL-CCNC: 24 U/L (ref 12–45)
BASOPHILS # BLD AUTO: 0.7 % (ref 0–1.8)
BASOPHILS # BLD: 0.05 K/UL (ref 0–0.12)
BILIRUB SERPL-MCNC: 0.6 MG/DL (ref 0.1–1.5)
BUN SERPL-MCNC: 17 MG/DL (ref 8–22)
CALCIUM SERPL-MCNC: 9.6 MG/DL (ref 8.5–10.5)
CHLORIDE SERPL-SCNC: 107 MMOL/L (ref 96–112)
CHOLEST SERPL-MCNC: 182 MG/DL (ref 100–199)
CO2 SERPL-SCNC: 28 MMOL/L (ref 20–33)
CREAT SERPL-MCNC: 0.87 MG/DL (ref 0.5–1.4)
EOSINOPHIL # BLD AUTO: 0.18 K/UL (ref 0–0.51)
EOSINOPHIL NFR BLD: 2.5 % (ref 0–6.9)
ERYTHROCYTE [DISTWIDTH] IN BLOOD BY AUTOMATED COUNT: 47.9 FL (ref 35.9–50)
GFR SERPL CREATININE-BSD FRML MDRD: >60 ML/MIN/1.73 M 2
GLOBULIN SER CALC-MCNC: 2.9 G/DL (ref 1.9–3.5)
GLUCOSE SERPL-MCNC: 90 MG/DL (ref 65–99)
HCT VFR BLD AUTO: 40.5 % (ref 37–47)
HDLC SERPL-MCNC: 60 MG/DL
HGB BLD-MCNC: 13.4 G/DL (ref 12–16)
IMM GRANULOCYTES # BLD AUTO: 0.03 K/UL (ref 0–0.11)
IMM GRANULOCYTES NFR BLD AUTO: 0.4 % (ref 0–0.9)
LDLC SERPL CALC-MCNC: 109 MG/DL
LYMPHOCYTES # BLD AUTO: 1.73 K/UL (ref 1–4.8)
LYMPHOCYTES NFR BLD: 23.6 % (ref 22–41)
MCH RBC QN AUTO: 31.7 PG (ref 27–33)
MCHC RBC AUTO-ENTMCNC: 33.1 G/DL (ref 33.6–35)
MCV RBC AUTO: 95.7 FL (ref 81.4–97.8)
MONOCYTES # BLD AUTO: 0.58 K/UL (ref 0–0.85)
MONOCYTES NFR BLD AUTO: 7.9 % (ref 0–13.4)
NEUTROPHILS # BLD AUTO: 4.76 K/UL (ref 2–7.15)
NEUTROPHILS NFR BLD: 64.9 % (ref 44–72)
NRBC # BLD AUTO: 0 K/UL
NRBC BLD AUTO-RTO: 0 /100 WBC
PLATELET # BLD AUTO: 238 K/UL (ref 164–446)
PMV BLD AUTO: 11.4 FL (ref 9–12.9)
POTASSIUM SERPL-SCNC: 4.5 MMOL/L (ref 3.6–5.5)
PROT SERPL-MCNC: 7.1 G/DL (ref 6–8.2)
RBC # BLD AUTO: 4.23 M/UL (ref 4.2–5.4)
SODIUM SERPL-SCNC: 141 MMOL/L (ref 135–145)
TRIGL SERPL-MCNC: 63 MG/DL (ref 0–149)
TSH SERPL DL<=0.005 MIU/L-ACNC: 1.01 UIU/ML (ref 0.3–3.7)
WBC # BLD AUTO: 7.3 K/UL (ref 4.8–10.8)

## 2017-10-05 PROCEDURE — 36415 COLL VENOUS BLD VENIPUNCTURE: CPT

## 2017-10-05 PROCEDURE — 84443 ASSAY THYROID STIM HORMONE: CPT

## 2017-10-05 PROCEDURE — 80053 COMPREHEN METABOLIC PANEL: CPT

## 2017-10-05 PROCEDURE — 85025 COMPLETE CBC W/AUTO DIFF WBC: CPT

## 2017-10-05 PROCEDURE — 80061 LIPID PANEL: CPT

## 2017-10-30 ENCOUNTER — OFFICE VISIT (OUTPATIENT)
Dept: CARDIOLOGY | Facility: PHYSICIAN GROUP | Age: 62
End: 2017-10-30
Payer: COMMERCIAL

## 2017-10-30 VITALS
WEIGHT: 171 LBS | HEIGHT: 66 IN | SYSTOLIC BLOOD PRESSURE: 130 MMHG | HEART RATE: 60 BPM | BODY MASS INDEX: 27.48 KG/M2 | OXYGEN SATURATION: 100 % | DIASTOLIC BLOOD PRESSURE: 70 MMHG

## 2017-10-30 DIAGNOSIS — I51.89 DECREASED RIGHT VENTRICULAR SYSTOLIC FUNCTION: ICD-10-CM

## 2017-10-30 DIAGNOSIS — I27.20 PULMONARY HYPERTENSION (HCC): ICD-10-CM

## 2017-10-30 DIAGNOSIS — J40 BRONCHITIS: ICD-10-CM

## 2017-10-30 DIAGNOSIS — I48.19 PERSISTENT ATRIAL FIBRILLATION (HCC): ICD-10-CM

## 2017-10-30 DIAGNOSIS — I10 ESSENTIAL HYPERTENSION, BENIGN: ICD-10-CM

## 2017-10-30 DIAGNOSIS — Z79.01 CHRONIC ANTICOAGULATION: ICD-10-CM

## 2017-10-30 PROCEDURE — 99213 OFFICE O/P EST LOW 20 MIN: CPT | Performed by: INTERNAL MEDICINE

## 2017-10-30 RX ORDER — INFLUENZA VIRUS VACCINE 15; 15; 15; 15 UG/.5ML; UG/.5ML; UG/.5ML; UG/.5ML
SUSPENSION INTRAMUSCULAR
Refills: 0 | COMMUNITY
Start: 2017-09-20 | End: 2017-12-11

## 2017-10-30 RX ORDER — DOXYCYCLINE HYCLATE 100 MG/1
100 CAPSULE ORAL 2 TIMES DAILY
Qty: 20 CAP | Refills: 0 | Status: SHIPPED | OUTPATIENT
Start: 2017-10-30 | End: 2017-12-11

## 2017-10-30 ASSESSMENT — ENCOUNTER SYMPTOMS
PND: 0
HEMOPTYSIS: 0
CHILLS: 0
COUGH: 1
FEVER: 0
ORTHOPNEA: 0
WHEEZING: 0
SPUTUM PRODUCTION: 1

## 2017-10-30 NOTE — LETTER
"     [facility] [address]  Phone: [phone]  Fax: [phone]              Chinyere Priest  1955    Encounter Date: 10/30/2017    Miki Hernandez M.D.          PROGRESS NOTE:  Subjective:   Chinyere Priest is a 62 y.o. female who presents today for BP follow up.  Cardiac status has been clinically stable since last clinic visit.  No chest pain, palpitations, orthopnea, edema, syncope, or near-syncope.  Exertional capacity has been stable.     Past Medical History:   Diagnosis Date   • Abnormal CT scan, kidney      \"Incidentally noted retroaortic left renal vein is identified. Some calcified plaque is noted in the aorta and its branch vessels.\"  CT scan done for diverticulosis     • Aortic calcification (CMS-HCC)      Noted incidentally on abdominal CT scan.   • Asthma in adult         • Chronic back pain     • Diverticulosis     • Essential hypertension, malignant     • Inflamed seborrheic keratosis     • Non-rheumatic mitral regurgitation 5/25/2017    May 2017, MR moderately severe, PAH worse but cath showed normal pressures and no significant MR.  January 2016: Echo normal LV size, mild concentric LVH, LVEF 65%. Grade I diastolic dysfunction. Trace MR, trace TR, RVSP 40mmHg.  May 2015: Echo normal LV size, mild concentric LVH, LVEF 60%. Mild RVH, RVSP 55mmHg. Mild MR, trace TR.   • Osteoarthritis of both hips     • Palpitations     • Plantar fascia syndrome     • Plantar wart of right foot     • Pruritic dermatitis      Ongoing for several months but worse at night on her lower extremities   • Pulmonary hypertension January 2016    Echocardiogram with normal LV size, mild concentric LVH, LVEF 65%. Grade I diastolic dysfunction. Trace MR, trace TR, RVSP 40mmHg.   • Pure hypercholesterolemia     • S/P cardiac catheterization 5/26/2017    1.  Normal right heart pressures. 3.  Essentially normal coronary " arteries. 2.  Left ventricular ejection fraction 65%.    • Spider veins of limb     • Unspecified vitamin D deficiency       Past Surgical History:   Procedure Laterality Date   • LUMBAR LAMINECTOMY DISKECTOMY  2009    Performed by SUDHIR VERMA at SURGERY [facility] ORS   • GYN SURGERY       x2   • HYSTERECTOMY LAPAROSCOPY       Family History   Problem Relation Age of Onset   • Heart Disease Mother      1st MI @ 59 yo   • Hypertension Mother    • Heart Disease Maternal Aunt    • Cancer Daughter 35     breast   • Heart Disease Maternal Grandmother    • Heart Disease Maternal Grandfather    • Heart Disease Maternal Aunt 56     History   Smoking Status   • Former Smoker   • Packs/day: 1.00   • Years: 40.00   • Types: Cigarettes   • Quit date: 3/1/2010   Smokeless Tobacco   • Never Used     Allergies   Allergen Reactions   • Erythromycin      Abdominal pain   • Latex    • Tape      Cloth tape     Outpatient Encounter Prescriptions as of 10/30/2017   Medication Sig Dispense Refill   • doxycycline (VIBRAMYCIN) 100 MG Cap Take 1 Cap by mouth 2 times a day. 20 Cap 0   • amlodipine (NORVASC) 5 MG Tab TAKE 1 TAB BY MOUTH EVERY DAY. 90 Tab 3   • hydrochlorothiazide (HYDRODIURIL) 12.5 MG tablet Take 1 Tab by mouth every day. 90 Tab 2   • apixaban (ELIQUIS) 5mg Tab Take 1 Tab by mouth 2 Times a Day. 60 Tab 3   • albuterol 108 (90 BASE) MCG/ACT Aero Soln inhalation aerosol Inhale 2 Puffs by mouth every four hours as needed. 1 Inhaler 0   • vitamin D (CHOLECALCIFEROL) 1000 UNIT TABS Take 2 Tabs by mouth every day. 60 Each 11   • aspirin 81 MG tablet Take 81 mg by mouth every day.     • FLUARIX QUADRIVALENT 0.5 ML Suspension Prefilled Syringe injection TO BE ADMINISTERED BY PHARMACIST FOR IMMUNIZATION  0     No facility-administered encounter medications on file as of 10/30/2017.      Review of Systems   Constitutional: Negative for chills and fever.   Respiratory: Positive for cough (She gets bronchitis every year  "at this time and Dr. Wood has always given her doxycycline as soon as it breaks out which has been successful) and sputum production. Negative for hemoptysis and wheezing.    Cardiovascular: Negative for orthopnea and PND.        Objective:   /70   Pulse 60   Ht 1.676 m (5' 6\")   Wt 77.6 kg (171 lb)   SpO2 100%   BMI 27.60 kg/m²      Physical Exam   Constitutional: She is oriented to person, place, and time. She appears well-developed and well-nourished. No distress.   Eyes: Conjunctivae are normal. No scleral icterus.   Neck: No JVD present.   Cardiovascular: Normal rate, S1 normal, S2 normal and intact distal pulses.  An irregularly irregular rhythm present. Exam reveals no gallop and no friction rub.    Murmur (2/6 holosystolic murmur at the apex) heard.  20bpm pulse deficit   Pulmonary/Chest: Effort normal and breath sounds normal.   Musculoskeletal: She exhibits no edema.   Neurological: She is alert and oriented to person, place, and time.   Skin: Skin is warm and dry. She is not diaphoretic.   Psychiatric: She has a normal mood and affect. Thought content normal.       Assessment:     1. Essential hypertension, benign     2. Pulmonary hypertension     3. Decreased right ventricular systolic function     4. Persistent atrial fibrillation (CMS-HCC)     5. Chronic anticoagulation     6. Bronchitis  doxycycline (VIBRAMYCIN) 100 MG Cap     I will prescribe the doxycycline so there is no delay and she will follow this up with Afia Currie.  Blood pressure and rate control are currently satisfactory. Other issues are as previously described.  Medical Decision Making:  Today's Assessment / Status / Plan:   Problem follow-up with Afia Currie. I made no cardiac changes today and her next clinic visit will be in 4 months but immediately if she has any cardiovascular symptoms.      No Recipients              "

## 2017-10-30 NOTE — PROGRESS NOTES
"Subjective:   Chinyere Priest is a 62 y.o. female who presents today for BP follow up.  Cardiac status has been clinically stable since last clinic visit.  No chest pain, palpitations, orthopnea, edema, syncope, or near-syncope.  Exertional capacity has been stable.     Past Medical History:   Diagnosis Date   • Abnormal CT scan, kidney      \"Incidentally noted retroaortic left renal vein is identified. Some calcified plaque is noted in the aorta and its branch vessels.\"  CT scan done for diverticulosis     • Aortic calcification (CMS-HCC)      Noted incidentally on abdominal CT scan.   • Asthma in adult         • Chronic back pain     • Diverticulosis     • Essential hypertension, malignant     • Inflamed seborrheic keratosis     • Non-rheumatic mitral regurgitation 5/25/2017    May 2017, MR moderately severe, PAH worse but cath showed normal pressures and no significant MR.  January 2016: Echo normal LV size, mild concentric LVH, LVEF 65%. Grade I diastolic dysfunction. Trace MR, trace TR, RVSP 40mmHg.  May 2015: Echo normal LV size, mild concentric LVH, LVEF 60%. Mild RVH, RVSP 55mmHg. Mild MR, trace TR.   • Osteoarthritis of both hips     • Palpitations     • Plantar fascia syndrome     • Plantar wart of right foot     • Pruritic dermatitis      Ongoing for several months but worse at night on her lower extremities   • Pulmonary hypertension January 2016    Echocardiogram with normal LV size, mild concentric LVH, LVEF 65%. Grade I diastolic dysfunction. Trace MR, trace TR, RVSP 40mmHg.   • Pure hypercholesterolemia     • S/P cardiac catheterization 5/26/2017    1.  Normal right heart pressures. 3.  Essentially normal coronary arteries. 2.  Left ventricular ejection fraction 65%.    • Spider veins of limb     • Unspecified vitamin D deficiency       Past Surgical History:   Procedure Laterality Date   • LUMBAR LAMINECTOMY DISKECTOMY  9/2/2009    Performed by SUDHIR VERMA at SURGERY [facility] ORS   • " GYN SURGERY       x2   • HYSTERECTOMY LAPAROSCOPY       Family History   Problem Relation Age of Onset   • Heart Disease Mother      1st MI @ 61 yo   • Hypertension Mother    • Heart Disease Maternal Aunt    • Cancer Daughter 35     breast   • Heart Disease Maternal Grandmother    • Heart Disease Maternal Grandfather    • Heart Disease Maternal Aunt 56     History   Smoking Status   • Former Smoker   • Packs/day: 1.00   • Years: 40.00   • Types: Cigarettes   • Quit date: 3/1/2010   Smokeless Tobacco   • Never Used     Allergies   Allergen Reactions   • Erythromycin      Abdominal pain   • Latex    • Tape      Cloth tape     Outpatient Encounter Prescriptions as of 10/30/2017   Medication Sig Dispense Refill   • doxycycline (VIBRAMYCIN) 100 MG Cap Take 1 Cap by mouth 2 times a day. 20 Cap 0   • amlodipine (NORVASC) 5 MG Tab TAKE 1 TAB BY MOUTH EVERY DAY. 90 Tab 3   • hydrochlorothiazide (HYDRODIURIL) 12.5 MG tablet Take 1 Tab by mouth every day. 90 Tab 2   • apixaban (ELIQUIS) 5mg Tab Take 1 Tab by mouth 2 Times a Day. 60 Tab 3   • albuterol 108 (90 BASE) MCG/ACT Aero Soln inhalation aerosol Inhale 2 Puffs by mouth every four hours as needed. 1 Inhaler 0   • vitamin D (CHOLECALCIFEROL) 1000 UNIT TABS Take 2 Tabs by mouth every day. 60 Each 11   • aspirin 81 MG tablet Take 81 mg by mouth every day.     • FLUARIX QUADRIVALENT 0.5 ML Suspension Prefilled Syringe injection TO BE ADMINISTERED BY PHARMACIST FOR IMMUNIZATION  0     No facility-administered encounter medications on file as of 10/30/2017.      Review of Systems   Constitutional: Negative for chills and fever.   Respiratory: Positive for cough (She gets bronchitis every year at this time and Dr. Wood has always given her doxycycline as soon as it breaks out which has been successful) and sputum production. Negative for hemoptysis and wheezing.    Cardiovascular: Negative for orthopnea and PND.        Objective:   /70   Pulse 60   Ht 1.676 m  "(5' 6\")   Wt 77.6 kg (171 lb)   SpO2 100%   BMI 27.60 kg/m²     Physical Exam   Constitutional: She is oriented to person, place, and time. She appears well-developed and well-nourished. No distress.   Eyes: Conjunctivae are normal. No scleral icterus.   Neck: No JVD present.   Cardiovascular: Normal rate, S1 normal, S2 normal and intact distal pulses.  An irregularly irregular rhythm present. Exam reveals no gallop and no friction rub.    Murmur (2/6 holosystolic murmur at the apex) heard.  20bpm pulse deficit   Pulmonary/Chest: Effort normal and breath sounds normal.   Musculoskeletal: She exhibits no edema.   Neurological: She is alert and oriented to person, place, and time.   Skin: Skin is warm and dry. She is not diaphoretic.   Psychiatric: She has a normal mood and affect. Thought content normal.       Assessment:     1. Essential hypertension, benign     2. Pulmonary hypertension     3. Decreased right ventricular systolic function     4. Persistent atrial fibrillation (CMS-HCC)     5. Chronic anticoagulation     6. Bronchitis  doxycycline (VIBRAMYCIN) 100 MG Cap     I will prescribe the doxycycline so there is no delay and she will follow this up with Afia Currie.  Blood pressure and rate control are currently satisfactory. Other issues are as previously described.  Medical Decision Making:  Today's Assessment / Status / Plan:   Problem follow-up with Afia Currie. I made no cardiac changes today and her next clinic visit will be in 4 months but immediately if she has any cardiovascular symptoms.  "

## 2017-12-11 ENCOUNTER — OFFICE VISIT (OUTPATIENT)
Dept: MEDICAL GROUP | Facility: MEDICAL CENTER | Age: 62
End: 2017-12-11
Payer: COMMERCIAL

## 2017-12-11 VITALS
BODY MASS INDEX: 27.6 KG/M2 | SYSTOLIC BLOOD PRESSURE: 130 MMHG | OXYGEN SATURATION: 99 % | TEMPERATURE: 96.9 F | RESPIRATION RATE: 12 BRPM | HEIGHT: 66 IN | HEART RATE: 92 BPM | WEIGHT: 171.74 LBS | DIASTOLIC BLOOD PRESSURE: 78 MMHG

## 2017-12-11 DIAGNOSIS — J45.20 MILD INTERMITTENT ASTHMA IN ADULT WITHOUT COMPLICATION: ICD-10-CM

## 2017-12-11 DIAGNOSIS — I48.19 PERSISTENT ATRIAL FIBRILLATION (HCC): ICD-10-CM

## 2017-12-11 DIAGNOSIS — I10 ESSENTIAL HYPERTENSION, BENIGN: ICD-10-CM

## 2017-12-11 DIAGNOSIS — I27.20 PULMONARY HYPERTENSION (HCC): ICD-10-CM

## 2017-12-11 DIAGNOSIS — Z12.31 SCREENING MAMMOGRAM, ENCOUNTER FOR: ICD-10-CM

## 2017-12-11 DIAGNOSIS — Z79.01 CHRONIC ANTICOAGULATION: ICD-10-CM

## 2017-12-11 DIAGNOSIS — E78.00 HYPERCHOLESTEROLEMIA: ICD-10-CM

## 2017-12-11 DIAGNOSIS — F17.201 TOBACCO USE DISORDER, MILD, IN SUSTAINED REMISSION, ABUSE: ICD-10-CM

## 2017-12-11 DIAGNOSIS — I70.0 AORTIC CALCIFICATION (HCC): ICD-10-CM

## 2017-12-11 PROBLEM — R07.9 CHEST PAIN: Status: RESOLVED | Noted: 2017-05-25 | Resolved: 2017-12-11

## 2017-12-11 PROCEDURE — 99396 PREV VISIT EST AGE 40-64: CPT | Performed by: FAMILY MEDICINE

## 2017-12-11 NOTE — ASSESSMENT & PLAN NOTE
This is a chronic health problem that is well controlled with current medications and lifestyle measures.  Her rate is controlled with current meds

## 2017-12-11 NOTE — ASSESSMENT & PLAN NOTE
This is a chronic health problem that followed by cardiology. Patient is well aware that Dr. Hernandez will be retiring this year and she will follow with Dr. Landa when he starts going to [place].

## 2017-12-11 NOTE — ASSESSMENT & PLAN NOTE
This is a chronic health condition that's well-controlled with current medications. We will continue those long-term.

## 2017-12-11 NOTE — ASSESSMENT & PLAN NOTE
This is a chronic health problems for this patient because she has persistent atrial fib. She is on Eliquis and is doing well

## 2017-12-11 NOTE — ASSESSMENT & PLAN NOTE
This is a chronic health problem for this patient with her most recent blood work being done in October. At that time her HDL was excellent at 60 but her LDL was slightly elevated at 109. Her goal is to get it less than 100. We will continue to follow.

## 2017-12-11 NOTE — ASSESSMENT & PLAN NOTE
This is a chronic health problem for this patient if adequately controlled with current medications. Her blood pressure is 130/78. She continues to follow with cardiology regularly.

## 2017-12-11 NOTE — PROGRESS NOTES
CC: Transfer care from [facility] in [place] and discuss chronic health problems    HISTORY OF THE PRESENT ILLNESS: Patient is a 62 y.o. female. This pleasant patient is here today to transfer care to my practice. Her previous primary care employed by [employer] is getting ready to retire. I was able to review her records in [facility].    Health Maintenance: Completed      Aortic calcification  This is a chronic health problem that followed by cardiology.    Asthma in adult without complication  This is a chronic health condition that's well-controlled with current medications. We will continue those long-term.    Chronic anticoagulation  This is a chronic health problems for this patient because she has persistent atrial fib. She is on Eliquis and is doing well    Essential hypertension, benign  This is a chronic health problem for this patient if adequately controlled with current medications. Her blood pressure is 130/78. She continues to follow with cardiology regularly.    Hypercholesterolemia  This is a chronic health problem for this patient with her most recent blood work being done in October. At that time her HDL was excellent at 60 but her LDL was slightly elevated at 109. Her goal is to get it less than 100. We will continue to follow.    Persistent atrial fibrillation (CMS-HCC)  This is a chronic health problem that is well controlled with current medications and lifestyle measures.  Her rate is controlled with current meds    Tobacco use disorder, mild, in sustained remission, abuse  Quit in 2008 and not planning to restart.    Pulmonary hypertension  This is a chronic health problem that followed by cardiology. Patient is well aware that Dr. Hernandez will be retiring this year and she will follow with Dr. Landa when he starts going to [place].      Allergies: Erythromycin; Latex; and Tape    Current Outpatient Prescriptions Ordered in [facility]   Medication Sig Dispense Refill   • amlodipine (NORVASC) 5 MG  "Tab TAKE 1 TAB BY MOUTH EVERY DAY. 90 Tab 3   • hydrochlorothiazide (HYDRODIURIL) 12.5 MG tablet Take 1 Tab by mouth every day. 90 Tab 2   • albuterol 108 (90 BASE) MCG/ACT Aero Soln inhalation aerosol Inhale 2 Puffs by mouth every four hours as needed. 1 Inhaler 0   • vitamin D (CHOLECALCIFEROL) 1000 UNIT TABS Take 2 Tabs by mouth every day. 60 Each 11   • aspirin 81 MG tablet Take 81 mg by mouth every day.     • apixaban (ELIQUIS) 5mg Tab Take 1 Tab by mouth 2 Times a Day. 60 Tab 3     No current Epic-ordered facility-administered medications on file.        Past Medical History:   Diagnosis Date   • Abnormal CT scan, kidney      \"Incidentally noted retroaortic left renal vein is identified. Some calcified plaque is noted in the aorta and its branch vessels.\"  CT scan done for diverticulosis     • Aortic calcification (CMS-HCC)      Noted incidentally on abdominal CT scan.   • Asthma in adult         • Chronic back pain     • Diverticulosis     • Essential hypertension, malignant     • Inflamed seborrheic keratosis     • Non-rheumatic mitral regurgitation 5/25/2017    May 2017, MR moderately severe, PAH worse but cath showed normal pressures and no significant MR.  January 2016: Echo normal LV size, mild concentric LVH, LVEF 65%. Grade I diastolic dysfunction. Trace MR, trace TR, RVSP 40mmHg.  May 2015: Echo normal LV size, mild concentric LVH, LVEF 60%. Mild RVH, RVSP 55mmHg. Mild MR, trace TR.   • Osteoarthritis of both hips     • Palpitations     • Plantar fascia syndrome     • Plantar wart of right foot     • Pruritic dermatitis      Ongoing for several months but worse at night on her lower extremities   • Pulmonary hypertension January 2016    Echocardiogram with normal LV size, mild concentric LVH, LVEF 65%. Grade I diastolic dysfunction. Trace MR, trace TR, RVSP 40mmHg.   • Pure hypercholesterolemia     • S/P cardiac catheterization 5/26/2017    1.  Normal right heart pressures. 3.  Essentially normal " coronary arteries. 2.  Left ventricular ejection fraction 65%.    • Spider veins of limb     • Unspecified vitamin D deficiency         Past Surgical History:   Procedure Laterality Date   • LUMBAR LAMINECTOMY DISKECTOMY  2009    Performed by SUDHIR VERMA at SURGERY [facility] ORS   • GYN SURGERY       x2   • HYSTERECTOMY LAPAROSCOPY         Social History   Substance Use Topics   • Smoking status: Former Smoker     Packs/day: 1.00     Years: 40.00     Types: Cigarettes     Quit date: 3/1/2010   • Smokeless tobacco: Never Used   • Alcohol use No       Social History     Social History Narrative   • No narrative on file       Family History   Problem Relation Age of Onset   • Heart Disease Mother      1st MI @ 59 yo   • Hypertension Mother    • Heart Disease Maternal Aunt    • Cancer Daughter 35     breast   • Heart Disease Maternal Grandmother    • Heart Disease Maternal Grandfather    • Heart Disease Maternal Aunt 56       ROS:     - Constitutional: Negative for fever, chills, unexpected weight change, and fatigue/generalized weakness.     - HEENT: Negative for headaches, vision changes, hearing changes, ear pain, ear discharge, rhinorrhea, sinus congestion, sore throat, and neck pain.      - Respiratory: Negative for cough, sputum production, chest congestion, dyspnea, wheezing, and crackles.      - Cardiovascular: Negative for chest pain, palpitations, orthopnea, and bilateral lower extremity edema.     - Gastrointestinal: Negative for heartburn, nausea, vomiting, abdominal pain, hematochezia, melena, diarrhea, constipation, and greasy/foul-smelling stools.     - Genitourinary: Negative for dysuria, polyuria, hematuria, pyuria, urinary urgency, and urinary incontinence.    - Musculoskeletal: Negative for myalgias, back pain, and joint pain.     - Skin: Negative for rash, itching, cyanotic skin color change.     - Neurological: Negative for dizziness, tingling, tremors, focal sensory deficit, focal  "weakness and headaches.     - Endo/Heme/Allergies: Does not bruise/bleed easily.     - Psychiatric/Behavioral: Negative for depression, suicidal/homicidal ideation and memory loss.        - NOTE: All other systems reviewed and are negative, except as in HPI.      .      Exam: Blood pressure 130/78, pulse 92, temperature 36.1 °C (96.9 °F), resp. rate 12, height 1.676 m (5' 6\"), weight 77.9 kg (171 lb 11.8 oz), SpO2 99 %. Body mass index is 27.72 kg/m².    General: Normal appearing. No distress.  HEENT: Normocephalic. Eyes conjunctiva clear lids without ptosis, pupils equal and reactive to light accommodation, ears normal shape and contour, canals are clear bilaterally, tympanic membranes are benign, nasal mucosa benign, oropharynx is without erythema, edema or exudates.   Neck: Supple without JVD or bruit. Thyroid is not enlarged.  Pulmonary: Clear to ausculation.  Normal effort. No rales, ronchi, or wheezing.  Cardiovascular: Irregularly irregular rate and rhythm with 2/6 holosystolic murmur. Carotid and radial pulses are intact and equal bilaterally.  Abdomen: Soft, nontender, nondistended. Normal bowel sounds. Liver and spleen are not palpable  Neurologic: Grossly nonfocal  Lymph: No cervical, supraclavicular or axillary lymph nodes are palpable  Skin: Warm and dry.  No obvious lesions.  Musculoskeletal: Normal gait. No extremity cyanosis, clubbing, or edema.  Psych: Normal mood and affect. Alert and oriented x3. Judgment and insight is normal.    Please note that this dictation was created using voice recognition software. I have made every reasonable attempt to correct obvious errors, but I expect that there are errors of grammar and possibly content that I did not discover before finalizing the note.      Assessment/Plan  1. Screening mammogram, encounter for  Patient will get her mammogram done, put in the order and gave her a copy so she could call for the appointment  - MA-SCREEN MAMMO W/CAD-BILAT; " Future    2. Pulmonary hypertension  Controlled, continue with current meds and lifestyle.      3. Aortic calcification (CMS-HCC)  Stable, followed by cardiology    4. Hypercholesterolemia  Controlled, continue with current meds and lifestyle.      5. Essential hypertension, benign  Controlled, continue with current meds and lifestyle.      6. Mild intermittent asthma in adult without complication  Controlled, continue with current meds and lifestyle.      7. Chronic anticoagulation  Controlled, continue with current meds and lifestyle.      8. Persistent atrial fibrillation (CMS-HCC)  Controlled, continue with current meds and lifestyle.      9. Tobacco use disorder, mild, in sustained remission, abuse  Patient continues to be off of tobacco

## 2018-02-16 ENCOUNTER — TELEPHONE (OUTPATIENT)
Dept: MEDICAL GROUP | Facility: MEDICAL CENTER | Age: 63
End: 2018-02-16

## 2018-02-16 NOTE — TELEPHONE ENCOUNTER
----- Message from Shelley Villarreal sent at 2/16/2018 11:31 AM PST -----  Pt came in with bill for office visit 12/11. Patient states that office visit for this date was just an annual but got coded as something else; therefor she is being charged $30.00 and would like  to re code it to an annual. Thank you

## 2018-03-12 DIAGNOSIS — I48.19 PERSISTENT ATRIAL FIBRILLATION (HCC): ICD-10-CM

## 2018-03-19 ENCOUNTER — OFFICE VISIT (OUTPATIENT)
Dept: CARDIOLOGY | Facility: MEDICAL CENTER | Age: 63
End: 2018-03-19
Payer: COMMERCIAL

## 2018-03-19 VITALS
HEIGHT: 66 IN | OXYGEN SATURATION: 94 % | HEART RATE: 96 BPM | SYSTOLIC BLOOD PRESSURE: 140 MMHG | BODY MASS INDEX: 28.25 KG/M2 | WEIGHT: 175.8 LBS | DIASTOLIC BLOOD PRESSURE: 88 MMHG

## 2018-03-19 DIAGNOSIS — I34.0 NON-RHEUMATIC MITRAL REGURGITATION: ICD-10-CM

## 2018-03-19 DIAGNOSIS — I48.19 PERSISTENT ATRIAL FIBRILLATION (HCC): ICD-10-CM

## 2018-03-19 DIAGNOSIS — I27.20 PULMONARY HYPERTENSION (HCC): ICD-10-CM

## 2018-03-19 DIAGNOSIS — I10 ESSENTIAL HYPERTENSION, BENIGN: ICD-10-CM

## 2018-03-19 DIAGNOSIS — I10 ESSENTIAL HYPERTENSION: ICD-10-CM

## 2018-03-19 DIAGNOSIS — I70.0 AORTIC CALCIFICATION (HCC): ICD-10-CM

## 2018-03-19 LAB — EKG IMPRESSION: NORMAL

## 2018-03-19 PROCEDURE — 93000 ELECTROCARDIOGRAM COMPLETE: CPT | Performed by: INTERNAL MEDICINE

## 2018-03-19 PROCEDURE — 99214 OFFICE O/P EST MOD 30 MIN: CPT | Performed by: INTERNAL MEDICINE

## 2018-03-19 RX ORDER — HYDROCHLOROTHIAZIDE 25 MG/1
25 TABLET ORAL DAILY
Qty: 90 TAB | Refills: 3 | Status: SHIPPED | OUTPATIENT
Start: 2018-03-19 | End: 2018-04-16 | Stop reason: SDUPTHER

## 2018-03-19 RX ORDER — ATORVASTATIN CALCIUM 10 MG/1
10 TABLET, FILM COATED ORAL DAILY
Qty: 90 TAB | Refills: 3 | Status: SHIPPED | OUTPATIENT
Start: 2018-03-19 | End: 2019-03-08 | Stop reason: SDUPTHER

## 2018-03-19 NOTE — PROGRESS NOTES
"    Cardiology Follow-up Consultation Note    Date of note:    3/19/2018    Primary Care Provider: Keila Mccrary M.D.  Referring Provider: Mary    Patient Name: Chinyere Priest   YOB: 1955  MRN:              [id number]    Chief Complaint: atrial fibrillation    History of Present Illness: Chinyere Priest is a 62 y.o. female whose current medical problems include hypertension, atrial fibrillation, aortic calcification, moderate mitral regurgitation, mild pulmonary hypertension by echo, and dyslipidemia who is here for follow-up.    Last seen by Dr. Hernandez on 10/30/2017.    Interim Events:  Does have occasional fatigue and leg swelling after work.     No bleeding on the eliquis.  Stopped aspirin.     BP at work in the 140s to 110s.    Patient denies chest pain, palpitations, dyspnea on exertion, pre-syncope, syncope, lower extremity swelling, orthopnea, PND or recent weight gain.     ROS  Constitution: Negative for chills, fever and night sweats.   HENT: Negative for nosebleeds.    Eyes: Negative for vision loss in left eye and vision loss in right eye.   Respiratory: Negative for hemoptysis.    Gastrointestinal: Negative for hematemesis, hematochezia and melena.   Genitourinary: Negative for hematuria.   Neurological: Negative for focal weakness, numbness and paresthesias.         Past Medical History:   Diagnosis Date   • Abnormal CT scan, kidney      \"Incidentally noted retroaortic left renal vein is identified. Some calcified plaque is noted in the aorta and its branch vessels.\"  CT scan done for diverticulosis     • Aortic calcification (CMS-HCC)      Noted incidentally on abdominal CT scan.   • Asthma in adult         • Chronic back pain     • Diverticulosis     • Essential hypertension, malignant     • Inflamed seborrheic keratosis     • Non-rheumatic mitral regurgitation 5/25/2017    May 2017, MR moderately severe, PAH worse but cath showed normal pressures and no " significant MR.  2016: Echo normal LV size, mild concentric LVH, LVEF 65%. Grade I diastolic dysfunction. Trace MR, trace TR, RVSP 40mmHg.  May 2015: Echo normal LV size, mild concentric LVH, LVEF 60%. Mild RVH, RVSP 55mmHg. Mild MR, trace TR.   • Osteoarthritis of both hips     • Palpitations     • Plantar fascia syndrome     • Plantar wart of right foot     • Pruritic dermatitis      Ongoing for several months but worse at night on her lower extremities   • Pulmonary hypertension 2016    Echocardiogram with normal LV size, mild concentric LVH, LVEF 65%. Grade I diastolic dysfunction. Trace MR, trace TR, RVSP 40mmHg.   • Pure hypercholesterolemia     • S/P cardiac catheterization 2017    1.  Normal right heart pressures. 3.  Essentially normal coronary arteries. 2.  Left ventricular ejection fraction 65%.    • Spider veins of limb     • Unspecified vitamin D deficiency           Past Surgical History:   Procedure Laterality Date   • LUMBAR LAMINECTOMY DISKECTOMY  2009    Performed by SUDHIR VERMA at SURGERY [facility] ORS   • GYN SURGERY       x2   • HYSTERECTOMY LAPAROSCOPY           Current Outpatient Prescriptions   Medication Sig Dispense Refill   • apixaban (ELIQUIS) 5mg Tab Take 1 Tab by mouth 2 Times a Day. 60 Tab 3   • amlodipine (NORVASC) 5 MG Tab TAKE 1 TAB BY MOUTH EVERY DAY. 90 Tab 3   • hydrochlorothiazide (HYDRODIURIL) 12.5 MG tablet Take 1 Tab by mouth every day. 90 Tab 2   • vitamin D (CHOLECALCIFEROL) 1000 UNIT TABS Take 2 Tabs by mouth every day. 60 Each 11   • albuterol 108 (90 BASE) MCG/ACT Aero Soln inhalation aerosol Inhale 2 Puffs by mouth every four hours as needed. 1 Inhaler 0   • aspirin 81 MG tablet Take 81 mg by mouth every day.       No current facility-administered medications for this visit.          Allergies   Allergen Reactions   • Erythromycin      Abdominal pain   • Latex    • Tape      Cloth tape         Family History   Problem Relation Age  "of Onset   • Heart Disease Mother      1st MI @ 61 yo   • Hypertension Mother    • Heart Disease Maternal Aunt    • Cancer Daughter 35     breast   • Heart Disease Maternal Grandmother    • Heart Disease Maternal Grandfather    • Heart Disease Maternal Aunt 56         Social History     Social History   • Marital status:      Spouse name: N/A   • Number of children: N/A   • Years of education: N/A     Occupational History   • Not on file.     Social History Main Topics   • Smoking status: Former Smoker     Packs/day: 1.00     Years: 40.00     Types: Cigarettes     Quit date: 3/1/2010   • Smokeless tobacco: Never Used   • Alcohol use No   • Drug use: No   • Sexual activity: Yes     Partners: Male     Birth control/ protection: Post-Menopausal      Comment: , works at [employer]     Other Topics Concern   • Exercise No     Social History Narrative   • No narrative on file         Physical Exam:  Ambulatory Vitals  Blood pressure 140/88, pulse 96, height 1.676 m (5' 6\"), weight 79.7 kg (175 lb 12.8 oz), SpO2 94 %.   Oxygen Therapy:  Pulse Oximetry: 94 %  BP Readings from Last 4 Encounters:   03/19/18 140/88   12/11/17 130/78   10/30/17 130/70   08/23/17 104/60       Weight/BMI: Body mass index is 28.37 kg/m².  Wt Readings from Last 4 Encounters:   03/19/18 79.7 kg (175 lb 12.8 oz)   12/11/17 77.9 kg (171 lb 11.8 oz)   10/30/17 77.6 kg (171 lb)   08/23/17 76.2 kg (168 lb)       General: Well appearing and in no apparent distress  Eyes: nl conjunctiva  ENT: OP clear, normal external appearance of nose and ears  Neck: JVP 6-7 cm H2O, no carotid bruits  Lungs: normal respiratory effort, CTAB  Heart: irregular, no murmurs, no rubs or gallops, trace edema bilateral lower extremities. No LV/RV heave on cardiac palpatation. 2+ bilateral radial pulses.  2+ bilateral dp pulses.   Abdomen: soft, non tender, non distended, no masses, normal bowel sounds.  No HSM.  Extremities/MSK: no clubbing, no cyanosis  Neurological: No " focal sensory deficits  Psychiatric: Appropriate affect, A/O x 3, intact judgement and insight  Skin: Warm extremities    Lab Data Review:  Lab Results   Component Value Date/Time    CHOLSTRLTOT 182 10/05/2017 10:22 AM     (H) 10/05/2017 10:22 AM    HDL 60 10/05/2017 10:22 AM    TRIGLYCERIDE 63 10/05/2017 10:22 AM       Lab Results   Component Value Date/Time    SODIUM 141 10/05/2017 10:22 AM    POTASSIUM 4.5 10/05/2017 10:22 AM    CHLORIDE 107 10/05/2017 10:22 AM    CO2 28 10/05/2017 10:22 AM    GLUCOSE 90 10/05/2017 10:22 AM    BUN 17 10/05/2017 10:22 AM    CREATININE 0.87 10/05/2017 10:22 AM     Lab Results   Component Value Date/Time    ALKPHOSPHAT 57 10/05/2017 10:22 AM    ASTSGOT 24 10/05/2017 10:22 AM    ALTSGPT 19 10/05/2017 10:22 AM    TBILIRUBIN 0.6 10/05/2017 10:22 AM      Lab Results   Component Value Date/Time    WBC 7.3 10/05/2017 10:22 AM     No components found for: HBGA1C  No components found for: TROPONIN  No components found for: BNP      Cardiac Imaging and Procedures Review:    EKG dated 3/19/2018 : My personal interpretation is atrial fibrillation, borderline t wave abnormalities.      Echo dated 5/2017:   Moderate Mr.  PASP 50, EF 65%, borderline enlarged Rv, ? Rv dyskinesis, mild LAURO, estimated RAP 8mmHg.      LHC/RHC (5/2017):   RIGHT HEART PRESSURES:  1.  Mean right atrial pressure 5 mmHg.  2.  Right ventricular pressure 22/5.  3.  Pulmonary artery pressure 25/11, mean of 70 mmHg.  4.  Pulmonary capillary wedge pressure 7 mmHg.     LEFT HEART PRESSURES:  1.  LVEDP of 15 mmHg.  2.  Left ventricular systolic pressure 126 mmHg.  3.  Central aortic pressure systolic 121, diastolic 66, mean of 89 mmHg.     OXIMETRY MEASUREMENTS:  1.  Systemic arterial 97.1%.  2.  Pulmonary artery 78 mmHg.     CARDIAC OUTPUT:  1.  Thermodilution method 3.0 liters per minute, cardiac index 1.6 liters per   minute per meter squared.  2.  Basim method cardiac output 5.2 liters per minute, cardiac index 2.7  liters   per minute per meter squared.     Pulmonary vascular resistance of 1.9 Wood units.     LEFT VENTRICULOGRAPHY:  Left ventricular chamber size, wall motion and   systolic function are normal.  Calculated ejection fraction is 65%.  No   identifiable mitral regurgitation or LV thrombus present.     Rhythm, atrial fibrillation.     CORONARY ANGIOGRAPHY:  1.  LEFT MAIN ARTERY:  Left main vessel is a large caliber vessel, angiographically normal, bifurcates into the left anterior descending artery and circumflex artery.  2.  LEFT ANTERIOR DESCENDING ARTERY:  The LAD gives rise to a first diagonal branch and a normal complement of septal branches and extends around the apex.    The LAD is widely patent with the exception of a very mild eccentric smooth focal proximal atheroma otherwise the LAD appears angiographically normal.  3.  CIRCUMFLEX ARTERY:  The circumflex gives rise to 2 major long marginal branches and a small caliber AV groove branch.  Angiographically, the circumflex artery appears normal.  4.  RIGHT CORONARY ARTERY:  The RCA is moderate size caliber vessel and gives rise to an acute marginal branch, posterior descending artery and a posterolateral branch.  Angiographically, the right coronary artery is normal.      Medical Decision Makin. Pulmonary hypertension  Mild, no elevated pulmonary pressures by RHC, only by previous echocardiogram.  -CTM    2. Aortic calcification (CMS-HCC)  -on apixaban, will not use aspirin  -start lipitor 10mg PO daily    3. Non-rheumatic mitral regurgitation  Moderate 2017, will recheck 2019 or earlier if symptoms.    4. Essential hypertension, benign  Poorly controlled  -increase hctz to 25mg PO daily, continue norvasc 5mg PO daily    5. Persistent atrial fibrillation (CMS-HCC)  Continue apixaban      Return in about 6 months (around 2018).      Moshe Landa MD  [facility]  [place] [facility], [facility]  [address] [place] [address]  [place] [address]  Phone: [phone]  Fax: [phone]

## 2018-03-19 NOTE — PATIENT INSTRUCTIONS
Please walk 30 minutes 4-5 times a week.    Please start lipitor (also called atorvastatin) 10mg daily for cholesterol.    Please increase your hydrochlorothiazide to 25mg daily.

## 2018-04-16 DIAGNOSIS — I10 ESSENTIAL HYPERTENSION: ICD-10-CM

## 2018-04-19 RX ORDER — HYDROCHLOROTHIAZIDE 25 MG/1
25 TABLET ORAL DAILY
Qty: 90 TAB | Refills: 3 | Status: SHIPPED | OUTPATIENT
Start: 2018-04-19 | End: 2019-06-12 | Stop reason: SDUPTHER

## 2018-07-10 DIAGNOSIS — I48.19 PERSISTENT ATRIAL FIBRILLATION (HCC): ICD-10-CM

## 2018-07-10 RX ORDER — APIXABAN 5 MG/1
TABLET, FILM COATED ORAL
Qty: 60 TAB | Refills: 6 | Status: SHIPPED | OUTPATIENT
Start: 2018-07-10 | End: 2019-02-14 | Stop reason: SDUPTHER

## 2018-09-10 ENCOUNTER — APPOINTMENT (OUTPATIENT)
Dept: RADIOLOGY | Facility: MEDICAL CENTER | Age: 63
End: 2018-09-10
Attending: FAMILY MEDICINE
Payer: COMMERCIAL

## 2018-09-11 ENCOUNTER — HOSPITAL ENCOUNTER (OUTPATIENT)
Dept: RADIOLOGY | Facility: MEDICAL CENTER | Age: 63
End: 2018-09-11
Attending: FAMILY MEDICINE
Payer: COMMERCIAL

## 2018-09-11 DIAGNOSIS — Z12.39 SCREENING BREAST EXAMINATION: ICD-10-CM

## 2018-09-11 PROCEDURE — 77067 SCR MAMMO BI INCL CAD: CPT

## 2018-09-28 ENCOUNTER — OFFICE VISIT (OUTPATIENT)
Dept: CARDIOLOGY | Facility: PHYSICIAN GROUP | Age: 63
End: 2018-09-28
Payer: COMMERCIAL

## 2018-09-28 VITALS
HEART RATE: 106 BPM | BODY MASS INDEX: 26.37 KG/M2 | OXYGEN SATURATION: 97 % | DIASTOLIC BLOOD PRESSURE: 96 MMHG | WEIGHT: 168 LBS | HEIGHT: 67 IN | SYSTOLIC BLOOD PRESSURE: 152 MMHG

## 2018-09-28 DIAGNOSIS — I70.0 AORTIC CALCIFICATION (HCC): ICD-10-CM

## 2018-09-28 DIAGNOSIS — E78.00 HYPERCHOLESTEROLEMIA: ICD-10-CM

## 2018-09-28 DIAGNOSIS — I10 ESSENTIAL HYPERTENSION, BENIGN: ICD-10-CM

## 2018-09-28 DIAGNOSIS — I48.19 PERSISTENT ATRIAL FIBRILLATION (HCC): ICD-10-CM

## 2018-09-28 DIAGNOSIS — Z79.01 CHRONIC ANTICOAGULATION: ICD-10-CM

## 2018-09-28 PROCEDURE — 99215 OFFICE O/P EST HI 40 MIN: CPT | Performed by: INTERNAL MEDICINE

## 2018-09-28 RX ORDER — POTASSIUM CHLORIDE 750 MG/1
10 TABLET, FILM COATED, EXTENDED RELEASE ORAL DAILY
Qty: 90 TAB | Refills: 3 | Status: SHIPPED | OUTPATIENT
Start: 2018-09-28 | End: 2019-09-09 | Stop reason: SDUPTHER

## 2018-09-28 RX ORDER — CARVEDILOL 6.25 MG/1
6.25 TABLET ORAL 2 TIMES DAILY WITH MEALS
Qty: 180 TAB | Refills: 3 | Status: ON HOLD | OUTPATIENT
Start: 2018-09-28 | End: 2019-06-20

## 2018-09-28 NOTE — PATIENT INSTRUCTIONS
Please stop norvasc.    Please start coreg (also called carvedilol) 6.25mg twice a day for blood pressure and to control your heart rate.    Please get non-fasting blood tests in 2 weeks.    Please start potassium 10mEq daily.

## 2018-09-28 NOTE — PROGRESS NOTES
"    Cardiology Follow-up Consultation Note    Date of note:    9/28/2018    Primary Care Provider: Keila Mccrary M.D.  Referring Provider: Mary    Patient Name: Chinyere Priest   YOB: 1955  MRN:              [id number]    Chief Complaint: atrial fibrillation    History of Present Illness: Chinyere Priest is a 63 y.o. female whose current medical problems include hypertension, atrial fibrillation, aortic calcification, moderate mitral regurgitation, mild pulmonary hypertension by echo, and dyslipidemia who is here for follow-up.    At our visit, 3/19/2018:  Does have occasional fatigue and leg swelling after work.     No bleeding on the eliquis.  Stopped aspirin.     BP at work in the 140s to 110s.    Patient denies chest pain, palpitations, dyspnea on exertion, pre-syncope, syncope, lower extremity swelling, orthopnea, PND or recent weight gain.       Interim Events:  In terms of hypertension, at home 110s/70s.  Did have symptoms when she was down to the 90s systolics.      2-3 weeks ago, she was at work and had acute onset of sharp LUQ pain which occurred after running around at work, + palpitations and SOB. Last exercised 1 week ago and did have more SOB than usual, but no chest pain.     In terms of atrial fibrillation, does have occasional palpitations.       ROS   + headaches, occasional sharp back pain. + leg cramps.   Constitution: Negative for chills, fever and night sweats.   HENT: Negative for nosebleeds.    Eyes: Negative for vision loss in left eye and vision loss in right eye.   Respiratory: Negative for hemoptysis.    Gastrointestinal: Negative for hematemesis, hematochezia and melena.   Genitourinary: Negative for hematuria.   Neurological: Negative for focal weakness, numbness and paresthesias.         Past Medical History:   Diagnosis Date   • Abnormal CT scan, kidney      \"Incidentally noted retroaortic left renal vein is identified. Some calcified plaque " "is noted in the aorta and its branch vessels.\"  CT scan done for diverticulosis     • Aortic calcification (HCC)      Noted incidentally on abdominal CT scan.   • Asthma in adult         • Chronic back pain     • Diverticulosis     • Essential hypertension, malignant     • Inflamed seborrheic keratosis     • Non-rheumatic mitral regurgitation 2017    May 2017, MR moderately, PAH worse but cath showed normal pressures and no significant MR.  2016: Echo normal LV size, mild concentric LVH, LVEF 65%. Grade I diastolic dysfunction. Trace MR, trace TR, RVSP 40mmHg.  May 2015: Echo normal LV size, mild concentric LVH, LVEF 60%. Mild RVH, RVSP 55mmHg. Mild MR, trace TR.   • Osteoarthritis of both hips     • Plantar fascia syndrome     • Plantar wart of right foot     • Pruritic dermatitis      Ongoing for several months but worse at night on her lower extremities   • Pulmonary hypertension (HCC) 2016    Echocardiogram with normal LV size, mild concentric LVH, LVEF 65%. Grade I diastolic dysfunction. Trace MR, trace TR, RVSP 40mmHg.   • Pure hypercholesterolemia     • S/P cardiac catheterization 2017    1.  Normal right heart pressures. 3.  Essentially normal coronary arteries. 2.  Left ventricular ejection fraction 65%.    • Spider veins of limb     • Unspecified vitamin D deficiency           Past Surgical History:   Procedure Laterality Date   • LUMBAR LAMINECTOMY DISKECTOMY  2009    Performed by SUDHIR VERMA at SURGERY [facility] ORS   • GYN SURGERY       x2   • HYSTERECTOMY LAPAROSCOPY           Current Outpatient Prescriptions   Medication Sig Dispense Refill   • ELIQUIS 5 MG Tab TAKE 1 TABLET BY MOUTH TWICE A DAY 60 Tab 6   • hydroCHLOROthiazide (HYDRODIURIL) 25 MG Tab Take 1 Tab by mouth every day. 90 Tab 3   • atorvastatin (LIPITOR) 10 MG Tab Take 1 Tab by mouth every day. 90 Tab 3   • amlodipine (NORVASC) 5 MG Tab TAKE 1 TAB BY MOUTH EVERY DAY. 90 Tab 3   • vitamin D " "(CHOLECALCIFEROL) 1000 UNIT TABS Take 2 Tabs by mouth every day. 60 Each 11   • albuterol 108 (90 BASE) MCG/ACT Aero Soln inhalation aerosol Inhale 2 Puffs by mouth every four hours as needed. 1 Inhaler 0     No current facility-administered medications for this visit.          Allergies   Allergen Reactions   • Erythromycin      Abdominal pain   • Latex    • Tape      Cloth tape         Family History   Problem Relation Age of Onset   • Heart Disease Mother         1st MI @ 59 yo   • Hypertension Mother    • Heart Attack Mother 60   • Heart Disease Maternal Aunt    • Cancer Daughter 35        breast   • Heart Disease Maternal Grandmother    • Heart Disease Maternal Grandfather    • Heart Disease Maternal Aunt 56         Social History     Social History   • Marital status:      Spouse name: N/A   • Number of children: N/A   • Years of education: N/A     Occupational History   • Not on file.     Social History Main Topics   • Smoking status: Former Smoker     Packs/day: 1.00     Years: 40.00     Types: Cigarettes     Quit date: 3/1/2010   • Smokeless tobacco: Never Used   • Alcohol use No   • Drug use: No      Comment: tried marijuana once.    • Sexual activity: Yes     Partners: Male     Birth control/ protection: Post-Menopausal      Comment: , works at [employer]     Other Topics Concern   • Exercise No     Social History Narrative    Works at [employer]         Physical Exam:  Ambulatory Vitals  Blood pressure 152/96, pulse (!) 106, height 1.689 m (5' 6.5\"), weight 76.2 kg (168 lb), SpO2 97 %.   Oxygen Therapy:  Pulse Oximetry: 97 %  BP Readings from Last 4 Encounters:   09/28/18 152/96   03/19/18 140/88   12/11/17 130/78   10/30/17 130/70       Weight/BMI: Body mass index is 26.71 kg/m².  Wt Readings from Last 4 Encounters:   09/28/18 76.2 kg (168 lb)   03/19/18 79.7 kg (175 lb 12.8 oz)   12/11/17 77.9 kg (171 lb 11.8 oz)   10/30/17 77.6 kg (171 lb)       General: Well appearing and in no apparent " distress  Eyes: nl conjunctiva  ENT: OP clear, normal external appearance of nose and ears  Neck: JVP 4-5 cm H2O, no carotid bruits  Lungs: normal respiratory effort, CTAB  Heart: irregular, tachycardic, no murmurs, no rubs or gallops, trace edema bilateral lower extremities. No LV/RV heave on cardiac palpatation. 2+ bilateral radial pulses.  2+ bilateral dp pulses.   Abdomen: soft, non tender, non distended, no masses, normal bowel sounds.  No HSM.  Extremities/MSK: no clubbing, no cyanosis. Prominent veins in lower extremities.   Neurological: No focal sensory deficits  Psychiatric: Appropriate affect, A/O x 3, intact judgement and insight  Skin: Warm extremities    Exam repeated in full and unchanged except for as noted above.      Lab Data Review:  Lab Results   Component Value Date/Time    CHOLSTRLTOT 182 10/05/2017 10:22 AM     (H) 10/05/2017 10:22 AM    HDL 60 10/05/2017 10:22 AM    TRIGLYCERIDE 63 10/05/2017 10:22 AM       Lab Results   Component Value Date/Time    SODIUM 141 10/05/2017 10:22 AM    POTASSIUM 4.5 10/05/2017 10:22 AM    CHLORIDE 107 10/05/2017 10:22 AM    CO2 28 10/05/2017 10:22 AM    GLUCOSE 90 10/05/2017 10:22 AM    BUN 17 10/05/2017 10:22 AM    CREATININE 0.87 10/05/2017 10:22 AM     Lab Results   Component Value Date/Time    ALKPHOSPHAT 57 10/05/2017 10:22 AM    ASTSGOT 24 10/05/2017 10:22 AM    ALTSGPT 19 10/05/2017 10:22 AM    TBILIRUBIN 0.6 10/05/2017 10:22 AM      Lab Results   Component Value Date/Time    WBC 7.3 10/05/2017 10:22 AM     No components found for: HBGA1C  No components found for: TROPONIN  No components found for: BNP      Cardiac Imaging and Procedures Review:    EKG dated 3/19/2018 : My personal interpretation is atrial fibrillation, borderline t wave abnormalities.      EKG dated 9/28/2018:  Atrial  fibrillation, , likely lvh, diffuse borderline TWA    Echo dated 5/2017:   Moderate Mr.  PASP 50, EF 65%, borderline enlarged Rv, ? Rv dyskinesis, mild LAURO,  estimated RAP 8mmHg.      C/RHC (5/2017):   RIGHT HEART PRESSURES:  1.  Mean right atrial pressure 5 mmHg.  2.  Right ventricular pressure 22/5.  3.  Pulmonary artery pressure 25/11, mean of 70 mmHg.  4.  Pulmonary capillary wedge pressure 7 mmHg.     LEFT HEART PRESSURES:  1.  LVEDP of 15 mmHg.  2.  Left ventricular systolic pressure 126 mmHg.  3.  Central aortic pressure systolic 121, diastolic 66, mean of 89 mmHg.     OXIMETRY MEASUREMENTS:  1.  Systemic arterial 97.1%.  2.  Pulmonary artery 78 mmHg.     CARDIAC OUTPUT:  1.  Thermodilution method 3.0 liters per minute, cardiac index 1.6 liters per   minute per meter squared.  2.  Basim method cardiac output 5.2 liters per minute, cardiac index 2.7 liters   per minute per meter squared.     Pulmonary vascular resistance of 1.9 Wood units.     LEFT VENTRICULOGRAPHY:  Left ventricular chamber size, wall motion and   systolic function are normal.  Calculated ejection fraction is 65%.  No   identifiable mitral regurgitation or LV thrombus present.     Rhythm, atrial fibrillation.     CORONARY ANGIOGRAPHY:  1.  LEFT MAIN ARTERY:  Left main vessel is a large caliber vessel, angiographically normal, bifurcates into the left anterior descending artery and circumflex artery.  2.  LEFT ANTERIOR DESCENDING ARTERY:  The LAD gives rise to a first diagonal branch and a normal complement of septal branches and extends around the apex.    The LAD is widely patent with the exception of a very mild eccentric smooth focal proximal atheroma otherwise the LAD appears angiographically normal.  3.  CIRCUMFLEX ARTERY:  The circumflex gives rise to 2 major long marginal branches and a small caliber AV groove branch.  Angiographically, the circumflex artery appears normal.  4.  RIGHT CORONARY ARTERY:  The RCA is moderate size caliber vessel and gives rise to an acute marginal branch, posterior descending artery and a posterolateral branch.  Angiographically, the right coronary artery is  normal.      Medical Decision Makin. Pulmonary hypertension  Mild, no elevated pulmonary pressures by RHC, only by previous echocardiogram.  -CTM    2. Aortic calcification (CMS-HCC)  -on apixaban, will not use aspirin  -continue lipitor 10mg PO daily    3. Non-rheumatic mitral regurgitation  Moderate 2017, will recheck 2019 or earlier if symptoms.    4. Essential hypertension, benign  Poorly controlled in the office, but well controlled at home.   -continue hctz 25mg PO daily  -start potassium 10mEq daily, likely leg cramps due to low K.  -check labs in 2 weeks  -coreg    5. Persistent atrial fibrillation (CMS-HCC)  Continue apixaban  -switch norvasc to coreg 6.25mg PO bid for rate control    6. Atypical chest pain - likely gastric.  Had recent LHC with no significant disease.  -CTM    Return in about 6 months (around 3/28/2019).      Moshe Landa MD  [facility]  [place] [facility], [address]  [address] [address]  Phone: [phone]  Fax: [phone]

## 2018-12-03 ENCOUNTER — HOSPITAL ENCOUNTER (OUTPATIENT)
Dept: LAB | Facility: MEDICAL CENTER | Age: 63
End: 2018-12-03
Attending: INTERNAL MEDICINE
Payer: COMMERCIAL

## 2018-12-03 DIAGNOSIS — Z79.01 CHRONIC ANTICOAGULATION: ICD-10-CM

## 2018-12-03 DIAGNOSIS — I48.19 PERSISTENT ATRIAL FIBRILLATION (HCC): ICD-10-CM

## 2018-12-03 DIAGNOSIS — I10 ESSENTIAL HYPERTENSION, BENIGN: ICD-10-CM

## 2018-12-03 PROCEDURE — 36415 COLL VENOUS BLD VENIPUNCTURE: CPT

## 2018-12-03 PROCEDURE — 83735 ASSAY OF MAGNESIUM: CPT

## 2018-12-03 PROCEDURE — 80053 COMPREHEN METABOLIC PANEL: CPT

## 2018-12-04 LAB
ALBUMIN SERPL BCP-MCNC: 4.1 G/DL (ref 3.2–4.9)
ALBUMIN/GLOB SERPL: 1.5 G/DL
ALP SERPL-CCNC: 60 U/L (ref 30–99)
ALT SERPL-CCNC: 22 U/L (ref 2–50)
ANION GAP SERPL CALC-SCNC: 8 MMOL/L (ref 0–11.9)
AST SERPL-CCNC: 25 U/L (ref 12–45)
BILIRUB SERPL-MCNC: 0.7 MG/DL (ref 0.1–1.5)
BUN SERPL-MCNC: 18 MG/DL (ref 8–22)
CALCIUM SERPL-MCNC: 9.2 MG/DL (ref 8.5–10.5)
CHLORIDE SERPL-SCNC: 106 MMOL/L (ref 96–112)
CO2 SERPL-SCNC: 29 MMOL/L (ref 20–33)
CREAT SERPL-MCNC: 0.83 MG/DL (ref 0.5–1.4)
FASTING STATUS PATIENT QL REPORTED: NORMAL
GLOBULIN SER CALC-MCNC: 2.7 G/DL (ref 1.9–3.5)
GLUCOSE SERPL-MCNC: 90 MG/DL (ref 65–99)
MAGNESIUM SERPL-MCNC: 1.9 MG/DL (ref 1.5–2.5)
POTASSIUM SERPL-SCNC: 3.9 MMOL/L (ref 3.6–5.5)
PROT SERPL-MCNC: 6.8 G/DL (ref 6–8.2)
SODIUM SERPL-SCNC: 143 MMOL/L (ref 135–145)

## 2018-12-15 ENCOUNTER — OFFICE VISIT (OUTPATIENT)
Dept: URGENT CARE | Facility: PHYSICIAN GROUP | Age: 63
End: 2018-12-15
Payer: COMMERCIAL

## 2018-12-15 VITALS
RESPIRATION RATE: 14 BRPM | DIASTOLIC BLOOD PRESSURE: 74 MMHG | HEART RATE: 104 BPM | TEMPERATURE: 97.3 F | BODY MASS INDEX: 26.68 KG/M2 | OXYGEN SATURATION: 99 % | WEIGHT: 170 LBS | SYSTOLIC BLOOD PRESSURE: 118 MMHG | HEIGHT: 67 IN

## 2018-12-15 DIAGNOSIS — J40 BRONCHITIS: Primary | ICD-10-CM

## 2018-12-15 PROCEDURE — 99214 OFFICE O/P EST MOD 30 MIN: CPT | Performed by: PHYSICIAN ASSISTANT

## 2018-12-15 RX ORDER — DOXYCYCLINE HYCLATE 100 MG
100 TABLET ORAL 2 TIMES DAILY
Qty: 20 TAB | Refills: 0 | Status: SHIPPED | OUTPATIENT
Start: 2018-12-15 | End: 2018-12-25

## 2018-12-15 RX ORDER — METHYLPREDNISOLONE 4 MG/1
4 TABLET ORAL SEE ADMIN INSTRUCTIONS
Qty: 21 TAB | Refills: 0 | Status: SHIPPED | OUTPATIENT
Start: 2018-12-15 | End: 2019-04-22

## 2018-12-15 NOTE — PROGRESS NOTES
Chief Complaint   Patient presents with   • Cough       HISTORY OF PRESENT ILLNESS: Patient is a 63 y.o. female who presents today because she has a 3-day history of worsening cough, green phlegm production.  She has been using some leftover doxycycline that she had from a previous episode of this, but she only had 3 doses.  She has not taken anything else    Patient Active Problem List    Diagnosis Date Noted   • Pulmonary hypertension (HCC) 08/28/2015     Priority: High   • Aortic calcification (HCC) 05/15/2015     Priority: Medium   • Palpitations 12/10/2014     Priority: Medium   • Chronic anticoagulation 10/30/2017   • Hypercholesterolemia 06/19/2017   • S/P cardiac catheterization 05/26/2017   • Non-rheumatic mitral regurgitation 05/25/2017   • Essential hypertension, benign 05/25/2017   • Persistent atrial fibrillation (HCC) 05/25/2017   • Degenerative disc disease, lumbar 01/25/2017   • Back pain with sciatica 01/25/2017   • Family history of ovarian cancer 11/21/2016   • Family history of breast cancer 11/21/2016   • Tobacco use disorder, mild, in sustained remission, abuse 11/21/2016   • Decreased right ventricular systolic function 01/14/2016   • Abnormal CT scan, kidney 05/15/2015   • Diverticulosis 05/15/2015   • Osteoarthritis of both hips 12/10/2014   • Asthma in adult without complication 01/13/2014   • Enlarged ovary 01/13/2014   • Cystocele 09/13/2013   • Spider veins of limb 06/21/2013   • DDD (degenerative disc disease), cervical 01/19/2012   • Trigger point of thoracic region 01/19/2012   • Plantar fascia syndrome 07/01/2010   • Vitamin D deficiency 07/01/2010   • Chronic back pain 06/03/2010       Allergies:Erythromycin; Latex; and Tape    Current Outpatient Prescriptions Ordered in [facility]   Medication Sig Dispense Refill   • doxycycline (VIBRAMYCIN) 100 MG Tab Take 1 Tab by mouth 2 times a day for 10 days. 20 Tab 0   • MethylPREDNISolone (MEDROL DOSEPAK) 4 MG Tablet Therapy Pack Take 1 Tab by  "mouth See Admin Instructions. 21 Tab 0   • carvedilol (COREG) 6.25 MG Tab Take 1 Tab by mouth 2 times a day, with meals. 180 Tab 3   • potassium chloride ER (KLOR-CON) 10 MEQ tablet Take 1 Tab by mouth every day. 90 Tab 3   • ELIQUIS 5 MG Tab TAKE 1 TABLET BY MOUTH TWICE A DAY 60 Tab 6   • hydroCHLOROthiazide (HYDRODIURIL) 25 MG Tab Take 1 Tab by mouth every day. 90 Tab 3   • atorvastatin (LIPITOR) 10 MG Tab Take 1 Tab by mouth every day. 90 Tab 3   • albuterol 108 (90 BASE) MCG/ACT Aero Soln inhalation aerosol Inhale 2 Puffs by mouth every four hours as needed. 1 Inhaler 0   • vitamin D (CHOLECALCIFEROL) 1000 UNIT TABS Take 2 Tabs by mouth every day. 60 Each 11     No current [facility]-ordered facility-administered medications on file.        Past Medical History:   Diagnosis Date   • Abnormal CT scan, kidney      \"Incidentally noted retroaortic left renal vein is identified. Some calcified plaque is noted in the aorta and its branch vessels.\"  CT scan done for diverticulosis     • Aortic calcification (HCC)      Noted incidentally on abdominal CT scan.   • Asthma in adult         • Chronic back pain     • Diverticulosis     • Essential hypertension, malignant     • Inflamed seborrheic keratosis     • Non-rheumatic mitral regurgitation 05/25/2017    May 2017, MR moderately, PAH worse but cath showed normal pressures and no significant MR.  January 2016: Echo normal LV size, mild concentric LVH, LVEF 65%. Grade I diastolic dysfunction. Trace MR, trace TR, RVSP 40mmHg.  May 2015: Echo normal LV size, mild concentric LVH, LVEF 60%. Mild RVH, RVSP 55mmHg. Mild MR, trace TR.   • Osteoarthritis of both hips     • Plantar fascia syndrome     • Plantar wart of right foot     • Pruritic dermatitis      Ongoing for several months but worse at night on her lower extremities   • Pulmonary hypertension (HCC) 01/2016    Echocardiogram with normal LV size, mild concentric LVH, LVEF 65%. Grade I diastolic dysfunction. Trace MR, trace " "TR, RVSP 40mmHg.   • Pure hypercholesterolemia     • S/P cardiac catheterization 2017    1.  Normal right heart pressures. 3.  Essentially normal coronary arteries. 2.  Left ventricular ejection fraction 65%.    • Spider veins of limb     • Unspecified vitamin D deficiency         Social History   Substance Use Topics   • Smoking status: Former Smoker     Packs/day: 1.00     Years: 40.00     Types: Cigarettes     Quit date: 3/1/2010   • Smokeless tobacco: Never Used   • Alcohol use No       Family Status   Relation Status   • Mo         CAD   • MAunt    • Riley Alive   • MGMo    • MGFa    • MAunt  at age 56   • Fa Other        history unknown     Family History   Problem Relation Age of Onset   • Heart Disease Mother         1st MI @ 61 yo   • Hypertension Mother    • Heart Attack Mother 60   • Heart Disease Maternal Aunt    • Cancer Daughter 35        breast   • Heart Disease Maternal Grandmother    • Heart Disease Maternal Grandfather    • Heart Disease Maternal Aunt 56       ROS:  Review of Systems   Constitutional: Negative for fever, chills, weight loss and malaise/fatigue.   HENT: Negative for ear pain, nosebleeds, congestion, sore throat and neck pain.    Eyes: Negative for blurred vision.   Respiratory: Positive for worsening cough, sputum production, shortness of breath and has not noticed wheezing.    Cardiovascular: Negative for chest pain, palpitations, orthopnea and leg swelling.   Gastrointestinal: Negative for heartburn, nausea, vomiting and abdominal pain.   Genitourinary: Negative for dysuria, urgency and frequency.     Exam:  Blood pressure 118/74, pulse (!) 104, temperature 36.3 °C (97.3 °F), temperature source Temporal, resp. rate 14, height 1.689 m (5' 6.5\"), weight 77.1 kg (170 lb), SpO2 99 %.  General:  Well nourished, well developed female in NAD, frequent harsh deep cough  Head:Normocephalic, atraumatic  Eyes: PERRLA, EOM within normal limits, no " conjunctival injection, no scleral icterus, visual fields and acuity grossly intact.  Ears: Normal shape and symmetry, no tenderness, no discharge. External canals are without any significant edema or erythema. Tympanic membranes are without any inflammation, no effusion. Gross auditory acuity is intact  Nose: Symmetrical without tenderness, no discharge.  Mouth: reasonable hygiene, no erythema exudates or tonsillar enlargement.  Neck: no masses, range of motion within normal limits, no tracheal deviation. No obvious thyroid enlargement.  Pulmonary: chest is symmetrical with respiration, no wheezes or rales, rhonchi bilaterally, not clearing with cough   cardiovascular: regular rate and rhythm without murmurs, rubs, or gallops.  Extremities: no clubbing, cyanosis, or edema.    Please note that this dictation was created using voice recognition software. I have made every reasonable attempt to correct obvious errors, but I expect that there are errors of grammar and possibly content that I did not discover before finalizing the note.    Assessment/Plan:  1. Bronchitis  doxycycline (VIBRAMYCIN) 100 MG Tab    MethylPREDNISolone (MEDROL DOSEPAK) 4 MG Tablet Therapy Pack       Followup with primary care in the next 7-10 days if not significantly improving, return to the urgent care or go to the emergency room sooner for any worsening of symptoms.

## 2019-02-14 DIAGNOSIS — I48.19 PERSISTENT ATRIAL FIBRILLATION (HCC): ICD-10-CM

## 2019-03-08 DIAGNOSIS — I70.0 AORTIC CALCIFICATION (HCC): ICD-10-CM

## 2019-03-08 RX ORDER — ATORVASTATIN CALCIUM 10 MG/1
TABLET, FILM COATED ORAL
Qty: 90 TAB | Refills: 2 | Status: SHIPPED | OUTPATIENT
Start: 2019-03-08 | End: 2019-12-11 | Stop reason: SDUPTHER

## 2019-03-11 ENCOUNTER — TELEPHONE (OUTPATIENT)
Dept: CARDIOLOGY | Facility: MEDICAL CENTER | Age: 64
End: 2019-03-11

## 2019-03-11 NOTE — TELEPHONE ENCOUNTER
Received clearance request from [facility]-Dr Raffy Ma for pt to be off Eliquis x 5 days prior to upcoming Left L3-4, L4-5 Transforaminal Epidural Steroid Injection scheduled on 3/15/19.     Discussed Clearance w/ Dr Li-ADD, Dr Landa unavailable, per Dr Li, pt clear to be off Eliquis x 2 days (48hrs) prior to procedure.     Clearance form faxed back to [facility], F#[phone]    Attempted to call pt, no answer, left vm to call back    Copy of clearance to scanning

## 2019-04-22 ENCOUNTER — OFFICE VISIT (OUTPATIENT)
Dept: MEDICAL GROUP | Facility: MEDICAL CENTER | Age: 64
End: 2019-04-22
Payer: COMMERCIAL

## 2019-04-22 VITALS
HEART RATE: 86 BPM | HEIGHT: 66 IN | SYSTOLIC BLOOD PRESSURE: 116 MMHG | DIASTOLIC BLOOD PRESSURE: 68 MMHG | TEMPERATURE: 98.8 F | RESPIRATION RATE: 14 BRPM | OXYGEN SATURATION: 96 % | BODY MASS INDEX: 28.61 KG/M2 | WEIGHT: 178 LBS

## 2019-04-22 DIAGNOSIS — J34.89 RHINORRHEA: ICD-10-CM

## 2019-04-22 DIAGNOSIS — J06.9 ACUTE URI: ICD-10-CM

## 2019-04-22 PROCEDURE — 99214 OFFICE O/P EST MOD 30 MIN: CPT | Performed by: PHYSICIAN ASSISTANT

## 2019-04-22 RX ORDER — FLUTICASONE PROPIONATE 50 MCG
1 SPRAY, SUSPENSION (ML) NASAL 2 TIMES DAILY
Qty: 1 BOTTLE | Refills: 11 | Status: SHIPPED | OUTPATIENT
Start: 2019-04-22 | End: 2020-01-13

## 2019-04-22 RX ORDER — FEXOFENADINE HCL 180 MG/1
180 TABLET ORAL DAILY
Qty: 90 TAB | Refills: 3 | Status: SHIPPED | OUTPATIENT
Start: 2019-04-22 | End: 2020-02-18 | Stop reason: CLARIF

## 2019-04-22 ASSESSMENT — PATIENT HEALTH QUESTIONNAIRE - PHQ9: CLINICAL INTERPRETATION OF PHQ2 SCORE: 0

## 2019-04-22 NOTE — LETTER
April 22, 2019         Patient: Chinyere Priest   YOB: 1955   Date of Visit: 4/22/2019           To Whom it May Concern:    Chinyere Priest was seen in my clinic on 4/22/2019. She may return to work on 4/24/19.    If you have any questions or concerns, please don't hesitate to call.        Sincerely,           Raffy Robertson P.A.-C.  Electronically Signed

## 2019-04-22 NOTE — ASSESSMENT & PLAN NOTE
Complains of a chronic history of developing runny nose.  Just a runny nose but sometimes she will develop sinus congestion as well as frontal sinus pain.  As mentioned above she will take fexofenadine which is effective sometimes but other times not.  She wonders if she has allergies.

## 2019-04-22 NOTE — PROGRESS NOTES
Subjective:   Chinyere Priest is a 63 y.o. female here today for sinus congestion drainage and coughing for 3 days as well as a chronic history of intermittent sinus pain and pressure.    Acute URI  This is a 63-year-old female complains of a 3-day history of sinus congestion drainage and coughing.  Bilateral ear discomfort.  Coughing up yellow mucus today.  She works at [employer].  Last Wednesday 1 of the customers was coughing right in her face and she could feel a droplets.  He had a cold.  2 days later she developed symptoms.  She denies any fever, shortness of breath or chest pain.  She is concerned about bronchitis.  Is not been taking any medications except for fexofenadine which she is taking in the past for possible allergies.  She is from [place].  Her  is currently seeing their PCP.    Rhinorrhea  Complains of a chronic history of developing runny nose.  Just a runny nose but sometimes she will develop sinus congestion as well as frontal sinus pain.  As mentioned above she will take fexofenadine which is effective sometimes but other times not.  She wonders if she has allergies.       Current medicines (including changes today)  Current Outpatient Prescriptions   Medication Sig Dispense Refill   • fexofenadine (ALLEGRA) 180 MG tablet Take 1 Tab by mouth every day. 90 Tab 3   • fluticasone (FLONASE) 50 MCG/ACT nasal spray Spray 1 Spray in nose 2 times a day. 1 Bottle 11   • atorvastatin (LIPITOR) 10 MG Tab TAKE 1 TABLET BY MOUTH EVERY DAY 90 Tab 2   • apixaban (ELIQUIS) 5mg Tab Take 1 Tab by mouth 2 Times a Day. 60 Tab 6   • carvedilol (COREG) 6.25 MG Tab Take 1 Tab by mouth 2 times a day, with meals. 180 Tab 3   • potassium chloride ER (KLOR-CON) 10 MEQ tablet Take 1 Tab by mouth every day. 90 Tab 3   • hydroCHLOROthiazide (HYDRODIURIL) 25 MG Tab Take 1 Tab by mouth every day. 90 Tab 3   • albuterol 108 (90 BASE) MCG/ACT Aero Soln inhalation aerosol Inhale 2 Puffs by mouth every four hours as  "needed. 1 Inhaler 0   • vitamin D (CHOLECALCIFEROL) 1000 UNIT TABS Take 2 Tabs by mouth every day. 60 Each 11     No current facility-administered medications for this visit.      She  has a past medical history of Abnormal CT scan, kidney ( ); Aortic calcification (HCC) ( ); Asthma in adult; Chronic back pain ( ); Diverticulosis ( ); Essential hypertension, malignant ( ); Inflamed seborrheic keratosis ( ); Non-rheumatic mitral regurgitation (05/25/2017); Osteoarthritis of both hips ( ); Plantar fascia syndrome ( ); Plantar wart of right foot ( ); Pruritic dermatitis ( ); Pulmonary hypertension (HCC) (01/2016); Pure hypercholesterolemia ( ); S/P cardiac catheterization (5/26/2017); Spider veins of limb ( ); and Unspecified vitamin D deficiency ( ). She also has no past medical history of Diabetic neuropathy ([facility]).    Social History and Family History were reviewed and updated.    ROS   No chest pain, no shortness of breath, no abdominal pain and all other systems were reviewed and are negative.       Objective:     /68 (BP Location: Right arm, Patient Position: Sitting, BP Cuff Size: Adult)   Pulse 86   Temp 37.1 °C (98.8 °F) (Temporal)   Resp 14   Ht 1.676 m (5' 6\")   Wt 80.7 kg (178 lb)   SpO2 96%  Body mass index is 28.73 kg/m².   Physical Exam:  Constitutional: Alert, no distress.  Skin: Warm, dry, good turgor, no rashes in visible areas.  Eye: Equal, round and reactive, conjunctiva clear, lids normal.  ENMT: Lips without lesions, good dentition, oropharynx clear.  TMs bilaterally are clear.  Neck: Trachea midline, no masses.   Lymph: No cervical or supraclavicular lymphadenopathy  Respiratory: Unlabored respiratory effort, lungs clear to auscultation, no wheezes, no ronchi.  Cardiovascular: Normal S1, S2, no murmur, no edema.  Psych: Alert and oriented x3, normal affect and mood.        Assessment and Plan:   The following treatment plan was discussed    1. Rhinorrhea  New condition noted but " chronic.  Could be secondary to allergic rhinitis.  Provided a prescription for Flonase.  Discussed how to use medication as well as side effects.  Follow-up if Flonase not effective may need Astelin.  Also written a prescription for Allegra as directed.  Advised when she has symptoms of a sinus infection she may take the antihistamine, nasal decongestant as well as Tylenol.  Would take Sudafed PE which is phenylephrine.  - fexofenadine (ALLEGRA) 180 MG tablet; Take 1 Tab by mouth every day.  Dispense: 90 Tab; Refill: 3  - fluticasone (FLONASE) 50 MCG/ACT nasal spray; Spray 1 Spray in nose 2 times a day.  Dispense: 1 Bottle; Refill: 11    2. Acute URI  Acute, new onset condition.  Lungs are clear today.  She just has an acute upper respiratory infection.  Advised symptoms may last for another week or 2.  Push fluids.  Consider over-the-counter cold medications such as DayQuil and NyQuil.  Provided other medications for her chronic history of rhinorrhea.  Advised to follow-up with any worsening symptoms such as fever, shortness of breath or chest pain.  - fexofenadine (ALLEGRA) 180 MG tablet; Take 1 Tab by mouth every day.  Dispense: 90 Tab; Refill: 3  - fluticasone (FLONASE) 50 MCG/ACT nasal spray; Spray 1 Spray in nose 2 times a day.  Dispense: 1 Bottle; Refill: 11      Followup: Return if symptoms worsen or fail to improve.    Please note that this dictation was created using voice recognition software. I have made every reasonable attempt to correct obvious errors, but I expect that there are errors of grammar and possibly content that I did not discover before finalizing the note.

## 2019-04-22 NOTE — ASSESSMENT & PLAN NOTE
This is a 63-year-old female complains of a 3-day history of sinus congestion drainage and coughing.  Bilateral ear discomfort.  Coughing up yellow mucus today.  She works at [employer].  Last Wednesday 1 of the customers was coughing right in her face and she could feel a droplets.  He had a cold.  2 days later she developed symptoms.  She denies any fever, shortness of breath or chest pain.  She is concerned about bronchitis.  Is not been taking any medications except for fexofenadine which she is taking in the past for possible allergies.  She is from [place].  Her  is currently seeing their PCP.

## 2019-04-25 ENCOUNTER — TELEPHONE (OUTPATIENT)
Dept: MEDICAL GROUP | Facility: MEDICAL CENTER | Age: 64
End: 2019-04-25

## 2019-05-01 ENCOUNTER — OFFICE VISIT (OUTPATIENT)
Dept: CARDIOLOGY | Facility: PHYSICIAN GROUP | Age: 64
End: 2019-05-01
Payer: COMMERCIAL

## 2019-05-01 VITALS
HEART RATE: 92 BPM | DIASTOLIC BLOOD PRESSURE: 78 MMHG | WEIGHT: 178 LBS | OXYGEN SATURATION: 97 % | SYSTOLIC BLOOD PRESSURE: 104 MMHG | BODY MASS INDEX: 28.61 KG/M2 | HEIGHT: 66 IN

## 2019-05-01 DIAGNOSIS — Z51.81 ENCOUNTER FOR MONITORING DIGOXIN THERAPY: ICD-10-CM

## 2019-05-01 DIAGNOSIS — I10 ESSENTIAL HYPERTENSION, BENIGN: ICD-10-CM

## 2019-05-01 DIAGNOSIS — I27.20 PULMONARY HYPERTENSION (HCC): ICD-10-CM

## 2019-05-01 DIAGNOSIS — I51.89 DECREASED RIGHT VENTRICULAR SYSTOLIC FUNCTION: ICD-10-CM

## 2019-05-01 DIAGNOSIS — I70.0 AORTIC CALCIFICATION (HCC): ICD-10-CM

## 2019-05-01 DIAGNOSIS — I34.0 NON-RHEUMATIC MITRAL REGURGITATION: ICD-10-CM

## 2019-05-01 DIAGNOSIS — I48.19 PERSISTENT ATRIAL FIBRILLATION (HCC): ICD-10-CM

## 2019-05-01 DIAGNOSIS — Z79.899 ENCOUNTER FOR MONITORING DIGOXIN THERAPY: ICD-10-CM

## 2019-05-01 PROCEDURE — 99215 OFFICE O/P EST HI 40 MIN: CPT | Performed by: INTERNAL MEDICINE

## 2019-05-01 RX ORDER — DIGOXIN 125 MCG
125 TABLET ORAL DAILY
Qty: 90 TAB | Refills: 3 | Status: ON HOLD | OUTPATIENT
Start: 2019-05-01 | End: 2019-06-20

## 2019-05-01 NOTE — PROGRESS NOTES
"    Cardiology Follow-up Consultation Note    Date of note:    5/1/2019  Primary Care Provider: Keila Mccrary M.D.  Referring Provider: Mary    Patient Name: Chinyere Priest   YOB: 1955  MRN:              [id number]    Chief Complaint: atrial fibrillation    History of Present Illness: Chinyere Priest is a 63 y.o. female whose current medical problems include hypertension, atrial fibrillation, aortic calcification, moderate mitral regurgitation, mild pulmonary hypertension by echo, and dyslipidemia who is here for follow-up.    At our visit, 3/19/2018:  Does have occasional fatigue and leg swelling after work.     No bleeding on the eliquis.  Stopped aspirin.     At our visit, 9/28/2018:  2-3 weeks ago, she was at work and had acute onset of sharp LUQ pain which occurred after running around at work, + palpitations and SOB. Last exercised 1 week ago and did have more SOB than usual, but no chest pain.     In terms of atrial fibrillation, does have occasional palpitations.     Interim Events:    In terms of atrial fibrillation, checked her pulse recently, rate in the 120s, lowest at 89. Gets mild to moderate severiity palpitations when walking that mostly resolve with rest. Mild chest pressure only with the palpitations. + leg swelling, weight up 10 pounds.  + LOWERY.     Noticing fatigue and depression.     In terms of hypertension, well controlled.     ROS   Constitution: Negative for chills, fever   HENT: Negative for nosebleeds.    Eyes: Negative for vision loss in left eye and vision loss in right eye.   Respiratory: Negative for hemoptysis.    Gastrointestinal: Negative for hematemesis, hematochezia and melena.   Genitourinary: Negative for hematuria.   Neurological: Negative for focal weakness, numbness and paresthesias.       Past Medical History:   Diagnosis Date   • Abnormal CT scan, kidney      \"Incidentally noted retroaortic left renal vein is identified. Some " "calcified plaque is noted in the aorta and its branch vessels.\"  CT scan done for diverticulosis     • Aortic calcification (HCC)      Noted incidentally on abdominal CT scan.   • Asthma in adult         • Chronic back pain     • Diverticulosis     • Essential hypertension, malignant     • Inflamed seborrheic keratosis     • Non-rheumatic mitral regurgitation 2017    May 2017, MR moderately, PAH worse but cath showed normal pressures and no significant MR.  2016: Echo normal LV size, mild concentric LVH, LVEF 65%. Grade I diastolic dysfunction. Trace MR, trace TR, RVSP 40mmHg.  May 2015: Echo normal LV size, mild concentric LVH, LVEF 60%. Mild RVH, RVSP 55mmHg. Mild MR, trace TR.   • Osteoarthritis of both hips     • Plantar fascia syndrome     • Plantar wart of right foot     • Pruritic dermatitis      Ongoing for several months but worse at night on her lower extremities   • Pulmonary hypertension (HCC) 2016    Echocardiogram with normal LV size, mild concentric LVH, LVEF 65%. Grade I diastolic dysfunction. Trace MR, trace TR, RVSP 40mmHg.   • Pure hypercholesterolemia     • S/P cardiac catheterization 2017    1.  Normal right heart pressures. 3.  Essentially normal coronary arteries. 2.  Left ventricular ejection fraction 65%.    • Spider veins of limb     • Unspecified vitamin D deficiency           Past Surgical History:   Procedure Laterality Date   • LUMBAR LAMINECTOMY DISKECTOMY  2009    Performed by SUDHIR VERMA at SURGERY [facility] ORS   • GYN SURGERY       x2   • HYSTERECTOMY LAPAROSCOPY           Current Outpatient Prescriptions   Medication Sig Dispense Refill   • fexofenadine (ALLEGRA) 180 MG tablet Take 1 Tab by mouth every day. 90 Tab 3   • fluticasone (FLONASE) 50 MCG/ACT nasal spray Spray 1 Spray in nose 2 times a day. 1 Bottle 11   • atorvastatin (LIPITOR) 10 MG Tab TAKE 1 TABLET BY MOUTH EVERY DAY 90 Tab 2   • apixaban (ELIQUIS) 5mg Tab Take 1 Tab by mouth 2 " "Times a Day. 60 Tab 6   • carvedilol (COREG) 6.25 MG Tab Take 1 Tab by mouth 2 times a day, with meals. 180 Tab 3   • potassium chloride ER (KLOR-CON) 10 MEQ tablet Take 1 Tab by mouth every day. 90 Tab 3   • hydroCHLOROthiazide (HYDRODIURIL) 25 MG Tab Take 1 Tab by mouth every day. 90 Tab 3   • albuterol 108 (90 BASE) MCG/ACT Aero Soln inhalation aerosol Inhale 2 Puffs by mouth every four hours as needed. 1 Inhaler 0   • vitamin D (CHOLECALCIFEROL) 1000 UNIT TABS Take 2 Tabs by mouth every day. 60 Each 11     No current facility-administered medications for this visit.          Allergies   Allergen Reactions   • Erythromycin      Abdominal pain   • Latex    • Tape      Cloth tape         Family History   Problem Relation Age of Onset   • Heart Disease Mother         1st MI @ 61 yo   • Hypertension Mother    • Heart Attack Mother 60   • Heart Disease Maternal Aunt    • Cancer Daughter 35        breast   • Heart Disease Maternal Grandmother    • Heart Disease Maternal Grandfather    • Heart Disease Maternal Aunt 56         Social History     Social History   • Marital status:      Spouse name: N/A   • Number of children: N/A   • Years of education: N/A     Occupational History   • Not on file.     Social History Main Topics   • Smoking status: Former Smoker     Packs/day: 1.00     Years: 40.00     Types: Cigarettes     Quit date: 3/1/2010   • Smokeless tobacco: Never Used   • Alcohol use No   • Drug use: No      Comment: tried marijuana once.    • Sexual activity: Yes     Partners: Male     Birth control/ protection: Post-Menopausal      Comment: , works at [employer]     Other Topics Concern   • Exercise No     Social History Narrative    Works at [employer]         Physical Exam:  Ambulatory Vitals  /78 (BP Location: Left arm, Patient Position: Sitting, BP Cuff Size: Adult)   Pulse 92   Ht 1.676 m (5' 6\")   Wt 80.7 kg (178 lb)   SpO2 97%    Oxygen Therapy:  Pulse Oximetry: 97 %  BP Readings from Last 4 " Encounters:   05/01/19 104/78   04/22/19 116/68   12/15/18 118/74   09/28/18 152/96       Weight/BMI: Body mass index is 28.73 kg/m².  Wt Readings from Last 4 Encounters:   05/01/19 80.7 kg (178 lb)   04/22/19 80.7 kg (178 lb)   12/15/18 77.1 kg (170 lb)   09/28/18 76.2 kg (168 lb)       General: Well appearing and in no apparent distress  Eyes: nl conjunctiva  ENT: OP clear, normal external appearance of nose and ears  Neck: JVP 4-5 cm H2O, no carotid bruits  Lungs: normal respiratory effort, CTAB  Heart: irregular, tachycardic, no murmurs, no rubs or gallops, 1-2+ edema bilateral lower extremities. No LV/RV heave on cardiac palpatation. 2+ bilateral radial pulses.   Abdomen: soft, non tender, non distended, no masses, normal bowel sounds.  No HSM.  Extremities/MSK: no clubbing, no cyanosis. Prominent veins in lower extremities.   Neurological: No focal sensory deficits  Psychiatric: Appropriate affect, A/O x 3, intact judgement and insight  Skin: Warm extremities    Exam repeated in full and unchanged except for as noted above.    Lab Data Review:  Lab Results   Component Value Date/Time    CHOLSTRLTOT 182 10/05/2017 10:22 AM     (H) 10/05/2017 10:22 AM    HDL 60 10/05/2017 10:22 AM    TRIGLYCERIDE 63 10/05/2017 10:22 AM       Lab Results   Component Value Date/Time    SODIUM 143 12/03/2018 11:07 AM    POTASSIUM 3.9 12/03/2018 11:07 AM    CHLORIDE 106 12/03/2018 11:07 AM    CO2 29 12/03/2018 11:07 AM    GLUCOSE 90 12/03/2018 11:07 AM    BUN 18 12/03/2018 11:07 AM    CREATININE 0.83 12/03/2018 11:07 AM     Lab Results   Component Value Date/Time    ALKPHOSPHAT 60 12/03/2018 11:07 AM    ASTSGOT 25 12/03/2018 11:07 AM    ALTSGPT 22 12/03/2018 11:07 AM    TBILIRUBIN 0.7 12/03/2018 11:07 AM      Lab Results   Component Value Date/Time    WBC 7.3 10/05/2017 10:22 AM     No components found for: HBGA1C  No components found for: TROPONIN  No components found for: BNP      Cardiac Imaging and Procedures Review:       EKG dated 3/19/2018 : My personal interpretation is atrial fibrillation, borderline t wave abnormalities.      EKG dated 9/28/2018:  Atrial  fibrillation, , likely lvh, diffuse borderline TWA    Echo dated 5/2017:   Moderate Mr.  PASP 50, EF 65%, borderline enlarged Rv, ? Rv dyskinesis, mild LAURO, estimated RAP 8mmHg.      LHC/RHC (5/2017):   RIGHT HEART PRESSURES:  1.  Mean right atrial pressure 5 mmHg.  2.  Right ventricular pressure 22/5.  3.  Pulmonary artery pressure 25/11, mean of 70 mmHg.  4.  Pulmonary capillary wedge pressure 7 mmHg.     LEFT HEART PRESSURES:  1.  LVEDP of 15 mmHg.  2.  Left ventricular systolic pressure 126 mmHg.  3.  Central aortic pressure systolic 121, diastolic 66, mean of 89 mmHg.     OXIMETRY MEASUREMENTS:  1.  Systemic arterial 97.1%.  2.  Pulmonary artery 78 mmHg.     CARDIAC OUTPUT:  1.  Thermodilution method 3.0 liters per minute, cardiac index 1.6 liters per   minute per meter squared.  2.  Basim method cardiac output 5.2 liters per minute, cardiac index 2.7 liters   per minute per meter squared.     Pulmonary vascular resistance of 1.9 Wood units.     LEFT VENTRICULOGRAPHY:  Left ventricular chamber size, wall motion and   systolic function are normal.  Calculated ejection fraction is 65%.  No   identifiable mitral regurgitation or LV thrombus present.     Rhythm, atrial fibrillation.     CORONARY ANGIOGRAPHY:  1.  LEFT MAIN ARTERY:  Left main vessel is a large caliber vessel, angiographically normal, bifurcates into the left anterior descending artery and circumflex artery.  2.  LEFT ANTERIOR DESCENDING ARTERY:  The LAD gives rise to a first diagonal branch and a normal complement of septal branches and extends around the apex.    The LAD is widely patent with the exception of a very mild eccentric smooth focal proximal atheroma otherwise the LAD appears angiographically normal.  3.  CIRCUMFLEX ARTERY:  The circumflex gives rise to 2 major long marginal branches and a small  caliber AV groove branch.  Angiographically, the circumflex artery appears normal.  4.  RIGHT CORONARY ARTERY:  The RCA is moderate size caliber vessel and gives rise to an acute marginal branch, posterior descending artery and a posterolateral branch.  Angiographically, the right coronary artery is normal.      Medical Decision Makin. Pulmonary hypertension  Mild, no elevated pulmonary pressures by RHC, only by previous echocardiogram.  -CTM    2. Aortic calcification (CMS-HCC)  -on apixaban, will not use aspirin  -continue lipitor 10mg PO daily    3. Non-rheumatic mitral regurgitation  Moderate 2017, will recheck now given worsening SOB.     4. Essential hypertension, benign  Poorly controlled in the office, but well controlled at home.   -continue hctz 25mg PO daily, kcl 10mEq daily  -check labs in 1 week.   -coreg    5. Persistent atrial fibrillation (CMS-HCC)  Still with inadequate rate control, likely cause of symptoms  -Continue apixaban.   -continue coreg for rate control  -start digoxin 125mcg PO daily, check dig level in 1 week  -discussed CV vs starting anti-arrhythmic vs evaluation for ablation and she prefers ablation and I agree she may be best suited by this given her worsening symptoms. Will refer to EP.     6. Atypical chest pain - likely gastric. Now resolved.   Had recent LHC with no significant disease.  -CTM    Return in about 6 months (around 2019).      Moshe Landa MD  [facility]  [facility], [address]  [address] [address]  Phone: [phone]  Fax: [phone]

## 2019-05-01 NOTE — PATIENT INSTRUCTIONS
Please start digoxin 125mcg once a day.  Please take two pills on the first day.     Please get non-fasting blood tests in 1 week after you start your digoxin.     Please get an echocardiogram.     You will be called with an appointment in [place] to see the electrophysiologist.

## 2019-05-08 ENCOUNTER — HOSPITAL ENCOUNTER (OUTPATIENT)
Dept: LAB | Facility: MEDICAL CENTER | Age: 64
End: 2019-05-08
Attending: INTERNAL MEDICINE
Payer: COMMERCIAL

## 2019-05-08 DIAGNOSIS — I51.89 DECREASED RIGHT VENTRICULAR SYSTOLIC FUNCTION: ICD-10-CM

## 2019-05-08 DIAGNOSIS — I10 ESSENTIAL HYPERTENSION, BENIGN: ICD-10-CM

## 2019-05-08 DIAGNOSIS — I70.0 AORTIC CALCIFICATION (HCC): ICD-10-CM

## 2019-05-08 DIAGNOSIS — Z51.81 ENCOUNTER FOR MONITORING DIGOXIN THERAPY: ICD-10-CM

## 2019-05-08 DIAGNOSIS — I48.19 PERSISTENT ATRIAL FIBRILLATION (HCC): ICD-10-CM

## 2019-05-08 DIAGNOSIS — Z79.899 ENCOUNTER FOR MONITORING DIGOXIN THERAPY: ICD-10-CM

## 2019-05-08 LAB
ANION GAP SERPL CALC-SCNC: 8 MMOL/L (ref 0–11.9)
BUN SERPL-MCNC: 19 MG/DL (ref 8–22)
CALCIUM SERPL-MCNC: 9.5 MG/DL (ref 8.5–10.5)
CHLORIDE SERPL-SCNC: 106 MMOL/L (ref 96–112)
CO2 SERPL-SCNC: 30 MMOL/L (ref 20–33)
CREAT SERPL-MCNC: 1.05 MG/DL (ref 0.5–1.4)
DIGOXIN SERPL-MCNC: 0.6 NG/ML (ref 0.8–2)
GLUCOSE SERPL-MCNC: 103 MG/DL (ref 65–99)
POTASSIUM SERPL-SCNC: 5.3 MMOL/L (ref 3.6–5.5)
SODIUM SERPL-SCNC: 144 MMOL/L (ref 135–145)

## 2019-05-08 PROCEDURE — 80162 ASSAY OF DIGOXIN TOTAL: CPT

## 2019-05-08 PROCEDURE — 36415 COLL VENOUS BLD VENIPUNCTURE: CPT

## 2019-05-08 PROCEDURE — 80048 BASIC METABOLIC PNL TOTAL CA: CPT

## 2019-05-15 ENCOUNTER — TELEPHONE (OUTPATIENT)
Dept: CARDIOLOGY | Facility: MEDICAL CENTER | Age: 64
End: 2019-05-15

## 2019-05-15 NOTE — TELEPHONE ENCOUNTER
Kait Vaughn R.N.   Phone Number: [phone]             FORD/meet     Pt calling to report she missed her echo yesterday and to discuss another test she states she needs ordered.  Please call Chinyere   [phone].        Called pt, pt just wants to let us know that she missed her Echo appt yesterday, she rescheduled it for tomorrow [facility]. She also would like to know how much an ablation would cause, she is under the impression that ablation will be done on 5/22/19, explained to pt that it is only a follow up visit and not procedure appointment, pt is appreciative of call and verbalizes understanding.

## 2019-05-21 DIAGNOSIS — I51.89 DECREASED RIGHT VENTRICULAR SYSTOLIC FUNCTION: ICD-10-CM

## 2019-05-21 DIAGNOSIS — I48.19 PERSISTENT ATRIAL FIBRILLATION (HCC): ICD-10-CM

## 2019-05-21 DIAGNOSIS — I10 ESSENTIAL HYPERTENSION, BENIGN: ICD-10-CM

## 2019-05-21 DIAGNOSIS — Z79.899 ENCOUNTER FOR MONITORING DIGOXIN THERAPY: ICD-10-CM

## 2019-05-21 DIAGNOSIS — Z51.81 ENCOUNTER FOR MONITORING DIGOXIN THERAPY: ICD-10-CM

## 2019-05-21 DIAGNOSIS — I70.0 AORTIC CALCIFICATION (HCC): ICD-10-CM

## 2019-05-22 ENCOUNTER — TELEPHONE (OUTPATIENT)
Dept: CARDIOLOGY | Facility: MEDICAL CENTER | Age: 64
End: 2019-05-22

## 2019-05-22 ENCOUNTER — OFFICE VISIT (OUTPATIENT)
Dept: CARDIOLOGY | Facility: MEDICAL CENTER | Age: 64
End: 2019-05-22
Payer: COMMERCIAL

## 2019-05-22 VITALS
HEIGHT: 67 IN | BODY MASS INDEX: 27.47 KG/M2 | SYSTOLIC BLOOD PRESSURE: 118 MMHG | DIASTOLIC BLOOD PRESSURE: 76 MMHG | OXYGEN SATURATION: 96 % | HEART RATE: 79 BPM | WEIGHT: 175 LBS

## 2019-05-22 DIAGNOSIS — Z79.01 CHRONIC ANTICOAGULATION: ICD-10-CM

## 2019-05-22 DIAGNOSIS — Z98.890 S/P CARDIAC CATHETERIZATION: ICD-10-CM

## 2019-05-22 DIAGNOSIS — I48.19 PERSISTENT ATRIAL FIBRILLATION (HCC): ICD-10-CM

## 2019-05-22 PROBLEM — J06.9 ACUTE URI: Status: RESOLVED | Noted: 2019-04-22 | Resolved: 2019-05-22

## 2019-05-22 LAB — EKG IMPRESSION: NORMAL

## 2019-05-22 PROCEDURE — 99204 OFFICE O/P NEW MOD 45 MIN: CPT | Performed by: INTERNAL MEDICINE

## 2019-05-22 PROCEDURE — 93000 ELECTROCARDIOGRAM COMPLETE: CPT | Performed by: INTERNAL MEDICINE

## 2019-05-22 NOTE — PROGRESS NOTES
"Chief Complaint   Patient presents with   • Atrial Fibrillation     PP DX:PERSISTENT AFIB       Subjective:   Chinyere Priest is a 63 y.o. female who presents today being sent over by Dr. Landa in consult secondary to persistent atrial fibrillation for 2 years without cardioversion.  Initially was seen by Dr. Hernandez.  Patient works at [employer].  She is  has children.  There is family history of CVA.  She does not smoke denies alcohol use.  She has had some palpitations and some significant fatigue possibly related to atrial fibrillation normal coronaries in the past on cath.  Had somewhat bradycardic when she was in sinus rhythm.  Has been in A. fib since 2017 when he was picked up on a preop EKG for back surgery.    Past Medical History:   Diagnosis Date   • Abnormal CT scan, kidney      \"Incidentally noted retroaortic left renal vein is identified. Some calcified plaque is noted in the aorta and its branch vessels.\"  CT scan done for diverticulosis     • Aortic calcification (HCC)      Noted incidentally on abdominal CT scan.   • Asthma in adult         • Chronic back pain     • Diverticulosis     • Essential hypertension, malignant     • Inflamed seborrheic keratosis     • Non-rheumatic mitral regurgitation 05/25/2017    May 2017, MR moderately, PAH worse but cath showed normal pressures and no significant MR.  January 2016: Echo normal LV size, mild concentric LVH, LVEF 65%. Grade I diastolic dysfunction. Trace MR, trace TR, RVSP 40mmHg.  May 2015: Echo normal LV size, mild concentric LVH, LVEF 60%. Mild RVH, RVSP 55mmHg. Mild MR, trace TR.   • Osteoarthritis of both hips     • Plantar fascia syndrome     • Plantar wart of right foot     • Pruritic dermatitis      Ongoing for several months but worse at night on her lower extremities   • Pulmonary hypertension (HCC) 01/2016    Echocardiogram with normal LV size, mild concentric LVH, LVEF 65%. Grade I diastolic dysfunction. Trace MR, trace TR, " RVSP 40mmHg.   • Pure hypercholesterolemia     • S/P cardiac catheterization 2017    1.  Normal right heart pressures. 3.  Essentially normal coronary arteries. 2.  Left ventricular ejection fraction 65%.    • Spider veins of limb     • Unspecified vitamin D deficiency       Past Surgical History:   Procedure Laterality Date   • LUMBAR LAMINECTOMY DISKECTOMY  2009    Performed by SUDHIR VERMA at SURGERY [facility] ORS   • GYN SURGERY       x2   • HYSTERECTOMY LAPAROSCOPY       Family History   Problem Relation Age of Onset   • Heart Disease Mother         1st MI @ 61 yo   • Hypertension Mother    • Heart Attack Mother 60   • Heart Disease Maternal Aunt    • Cancer Daughter 35        breast   • Heart Disease Maternal Grandmother    • Heart Disease Maternal Grandfather    • Heart Disease Maternal Aunt 56     Social History     Social History   • Marital status:      Spouse name: N/A   • Number of children: N/A   • Years of education: N/A     Occupational History   • Not on file.     Social History Main Topics   • Smoking status: Former Smoker     Packs/day: 1.00     Years: 40.00     Types: Cigarettes     Quit date: 3/1/2010   • Smokeless tobacco: Never Used   • Alcohol use No   • Drug use: No      Comment: tried marijuana once.    • Sexual activity: Yes     Partners: Male     Birth control/ protection: Post-Menopausal      Comment: , works at [email or web address]     Other Topics Concern   • Exercise No     Social History Narrative    Works at [employer]     Allergies   Allergen Reactions   • Erythromycin      Abdominal pain   • Latex    • Tape      Cloth tape     Outpatient Encounter Prescriptions as of 2019   Medication Sig Dispense Refill   • digoxin (LANOXIN) 125 MCG Tab Take 1 Tab by mouth every day. 90 Tab 3   • atorvastatin (LIPITOR) 10 MG Tab TAKE 1 TABLET BY MOUTH EVERY DAY 90 Tab 2   • apixaban (ELIQUIS) 5mg Tab Take 1 Tab by mouth 2 Times a Day. 60 Tab 6   • carvedilol (COREG) 6.25 MG Tab  "Take 1 Tab by mouth 2 times a day, with meals. 180 Tab 3   • potassium chloride ER (KLOR-CON) 10 MEQ tablet Take 1 Tab by mouth every day. 90 Tab 3   • hydroCHLOROthiazide (HYDRODIURIL) 25 MG Tab Take 1 Tab by mouth every day. 90 Tab 3   • vitamin D (CHOLECALCIFEROL) 1000 UNIT TABS Take 2 Tabs by mouth every day. 60 Each 11   • fexofenadine (ALLEGRA) 180 MG tablet Take 1 Tab by mouth every day. 90 Tab 3   • fluticasone (FLONASE) 50 MCG/ACT nasal spray Spray 1 Spray in nose 2 times a day. 1 Bottle 11   • albuterol 108 (90 BASE) MCG/ACT Aero Soln inhalation aerosol Inhale 2 Puffs by mouth every four hours as needed. 1 Inhaler 0     No facility-administered encounter medications on file as of 5/22/2019.      ROS     Objective:   /76 (BP Location: Left arm, Patient Position: Sitting, BP Cuff Size: Adult)   Pulse 79   Ht 1.689 m (5' 6.5\")   Wt 79.4 kg (175 lb)   SpO2 96%   BMI 27.82 kg/m²     Physical Exam   Constitutional: She is oriented to person, place, and time. She appears well-developed and well-nourished.   HENT:   Head: Normocephalic and atraumatic.   Eyes: Pupils are equal, round, and reactive to light. EOM are normal.   Neck: Normal range of motion. Neck supple.   Cardiovascular: Normal rate, normal heart sounds and intact distal pulses.  An irregularly irregular rhythm present. Exam reveals no gallop and no friction rub.    No murmur heard.  Pulmonary/Chest: Effort normal and breath sounds normal.   Abdominal: Soft. Bowel sounds are normal.   Musculoskeletal: Normal range of motion. She exhibits no edema.   Neurological: She is alert and oriented to person, place, and time.   Skin: Skin is warm.   Psychiatric: She has a normal mood and affect. Her behavior is normal. Judgment and thought content normal.       Assessment:     1. Persistent atrial fibrillation (HCC)  EKG   2. Chronic anticoagulation     3. S/P cardiac catheterization         Medical Decision Making:  Today's Assessment / Status / " Plan:   1.  Persistent atrial fibrillation.  Currently on rate control but having symptoms.  Is interested in rhythm control options would include initiation of antiarrhythmic medications plus cardioversion versus pulmonary vein isolation plus antiarrhythmic such as dofetilide.  She does have an underlying bradycardia when she is in sinus rhythm and may require pacemaker following conversion to sinus rhythm.  She is currently think about her options and will consider.  2.  Anticoagulation continue.  3.  Sleep apnea has not been tested not interested.

## 2019-05-22 NOTE — TELEPHONE ENCOUNTER
Patient to call me once she has decided if she would like to schedule a cardioversion or ablation. I gave her information about both procedures. She also needed to know cost of both. I gave her the information for admitting and CPT codes to get an estimated out of pocket expense for the procedures.    If patient decides on ablation. Afib ablation w/LEEANNA, Carto, anesthesia, no medications to hold. Tikosyn admit to follow. Patient also advised to check on cost of Tikosyn through her RX coverage.

## 2019-05-22 NOTE — LETTER
"     [facility]   [address], [address]  [place] [address]  Phone: [phone]  Fax: [phone]              Chinyere Priest  1955    Encounter Date: 5/22/2019    Augusto Steele M.D.          PROGRESS NOTE:  Chief Complaint   Patient presents with   • Atrial Fibrillation     PP DX:PERSISTENT AFIB       Subjective:   Chinyere Priest is a 63 y.o. female who presents today being sent over by Dr. Landa in consult secondary to persistent atrial for ablation for 2 years without cardioversion.  Initially was seen by Dr. Hernandez.  Patient works at [email or web address].  She is  has children.  There is family history of CVA.  She does not smoke denies alcohol use.  She has had some palpitations and some significant fatigue possibly related to atrial fibrillation normal coronaries in the past on cath.  Had somewhat bradycardic when she was in sinus rhythm.  Has been in A. fib since 2017 when he was picked up on a preop EKG for back surgery.    Past Medical History:   Diagnosis Date   • Abnormal CT scan, kidney      \"Incidentally noted retroaortic left renal vein is identified. Some calcified plaque is noted in the aorta and its branch vessels.\"  CT scan done for diverticulosis     • Aortic calcification (HCC)      Noted incidentally on abdominal CT scan.   • Asthma in adult         • Chronic back pain     • Diverticulosis     • Essential hypertension, malignant     • Inflamed seborrheic keratosis     • Non-rheumatic mitral regurgitation 05/25/2017    May 2017, MR moderately, PAH worse but cath showed normal pressures and no significant MR.  January 2016: Echo normal LV size, mild concentric LVH, LVEF 65%. Grade I diastolic dysfunction. Trace MR, trace TR, RVSP 40mmHg.  May 2015: Echo normal LV size, mild concentric LVH, LVEF 60%. Mild RVH, RVSP 55mmHg. Mild MR, trace TR.   • Osteoarthritis of both hips     • Plantar fascia syndrome     • Plantar wart of right " foot     • Pruritic dermatitis      Ongoing for several months but worse at night on her lower extremities   • Pulmonary hypertension (HCC) 2016    Echocardiogram with normal LV size, mild concentric LVH, LVEF 65%. Grade I diastolic dysfunction. Trace MR, trace TR, RVSP 40mmHg.   • Pure hypercholesterolemia     • S/P cardiac catheterization 2017    1.  Normal right heart pressures. 3.  Essentially normal coronary arteries. 2.  Left ventricular ejection fraction 65%.    • Spider veins of limb     • Unspecified vitamin D deficiency       Past Surgical History:   Procedure Laterality Date   • LUMBAR LAMINECTOMY DISKECTOMY  2009    Performed by SUDHIR VERMA at SURGERY [facility] ORS   • GYN SURGERY       x2   • HYSTERECTOMY LAPAROSCOPY       Family History   Problem Relation Age of Onset   • Heart Disease Mother         1st MI @ 61 yo   • Hypertension Mother    • Heart Attack Mother 60   • Heart Disease Maternal Aunt    • Cancer Daughter 35        breast   • Heart Disease Maternal Grandmother    • Heart Disease Maternal Grandfather    • Heart Disease Maternal Aunt 56     Social History     Social History   • Marital status:      Spouse name: N/A   • Number of children: N/A   • Years of education: N/A     Occupational History   • Not on file.     Social History Main Topics   • Smoking status: Former Smoker     Packs/day: 1.00     Years: 40.00     Types: Cigarettes     Quit date: 3/1/2010   • Smokeless tobacco: Never Used   • Alcohol use No   • Drug use: No      Comment: tried marijuana once.    • Sexual activity: Yes     Partners: Male     Birth control/ protection: Post-Menopausal      Comment: , works at [employer]     Other Topics Concern   • Exercise No     Social History Narrative    Works at [employer]     Allergies   Allergen Reactions   • Erythromycin      Abdominal pain   • Latex    • Tape      Cloth tape     Outpatient Encounter Prescriptions as of 2019   Medication Sig Dispense  "Refill   • digoxin (LANOXIN) 125 MCG Tab Take 1 Tab by mouth every day. 90 Tab 3   • atorvastatin (LIPITOR) 10 MG Tab TAKE 1 TABLET BY MOUTH EVERY DAY 90 Tab 2   • apixaban (ELIQUIS) 5mg Tab Take 1 Tab by mouth 2 Times a Day. 60 Tab 6   • carvedilol (COREG) 6.25 MG Tab Take 1 Tab by mouth 2 times a day, with meals. 180 Tab 3   • potassium chloride ER (KLOR-CON) 10 MEQ tablet Take 1 Tab by mouth every day. 90 Tab 3   • hydroCHLOROthiazide (HYDRODIURIL) 25 MG Tab Take 1 Tab by mouth every day. 90 Tab 3   • vitamin D (CHOLECALCIFEROL) 1000 UNIT TABS Take 2 Tabs by mouth every day. 60 Each 11   • fexofenadine (ALLEGRA) 180 MG tablet Take 1 Tab by mouth every day. 90 Tab 3   • fluticasone (FLONASE) 50 MCG/ACT nasal spray Spray 1 Spray in nose 2 times a day. 1 Bottle 11   • albuterol 108 (90 BASE) MCG/ACT Aero Soln inhalation aerosol Inhale 2 Puffs by mouth every four hours as needed. 1 Inhaler 0     No facility-administered encounter medications on file as of 5/22/2019.      ROS     Objective:   /76 (BP Location: Left arm, Patient Position: Sitting, BP Cuff Size: Adult)   Pulse 79   Ht 1.689 m (5' 6.5\")   Wt 79.4 kg (175 lb)   SpO2 96%   BMI 27.82 kg/m²      Physical Exam   Constitutional: She is oriented to person, place, and time. She appears well-developed and well-nourished.   HENT:   Head: Normocephalic and atraumatic.   Eyes: Pupils are equal, round, and reactive to light. EOM are normal.   Neck: Normal range of motion. Neck supple.   Cardiovascular: Normal rate, normal heart sounds and intact distal pulses.  An irregularly irregular rhythm present. Exam reveals no gallop and no friction rub.    No murmur heard.  Pulmonary/Chest: Effort normal and breath sounds normal.   Abdominal: Soft. Bowel sounds are normal.   Musculoskeletal: Normal range of motion. She exhibits no edema.   Neurological: She is alert and oriented to person, place, and time.   Skin: Skin is warm.   Psychiatric: She has a normal mood " and affect. Her behavior is normal. Judgment and thought content normal.       Assessment:     1. Persistent atrial fibrillation (HCC)  EKG   2. Chronic anticoagulation     3. S/P cardiac catheterization         Medical Decision Making:  Today's Assessment / Status / Plan:   1.  Persistent atrial fibrillation.  Currently on rate control but having symptoms.  Is interested in rhythm control options would include initiation of antiarrhythmic medications plus cardioversion versus pulmonary vein isolation plus antiarrhythmic such as dofetilide.  She does have an underlying bradycardia when she is in sinus rhythm and may require pacemaker following conversion to sinus rhythm.  She is currently think about her options and will consider.  2.  Anticoagulation continue.  3.  Sleep apnea has not been tested not interested.      Keila Mccrary M.D.  [id number]  [address]  [facility] [address]  VIA In Basket

## 2019-05-23 ENCOUNTER — TELEPHONE (OUTPATIENT)
Dept: CARDIOLOGY | Facility: MEDICAL CENTER | Age: 64
End: 2019-05-23

## 2019-05-23 NOTE — TELEPHONE ENCOUNTER
"Reina Carrillo R.N.   Phone Number: [phone]             FORD/Nancy     Pt wants to discuss her visit with DS yesterday and which procedure should she go with. She would please like a call back at [phone]      Called pt to discuss her questions. Pt verbalizes understanding of the risks and benefits of both the ablation and the cardioversion that were reviewed with her yesterday with Dr. Steele, but she reports that she is \"stuck\" in making a decision. Pt would like Dr. Landa's opinion. Encouraged pt that this is ultimately her decision, but pt would be more comfortable running it by Dr. Landa before deciding.    To Dr. Landa  "

## 2019-05-23 NOTE — TELEPHONE ENCOUNTER
Discussed with her risks and benefits of options outlined by Dr. Steele again.  She prefers to proceed with Ablation. ROD Steele.  Mickie can we schedule her for an atrial fibrillation ablation with Dr. Steele if he agrees?    Thanks and let me know if you have any questions.     -Moshe

## 2019-05-28 NOTE — TELEPHONE ENCOUNTER
Patient scheduled for afib ablation w/LEEANNA on 6-17-19 at [facility] with Dr. Steele. [facility] admit will follow.

## 2019-05-29 DIAGNOSIS — I48.0 PAROXYSMAL ATRIAL FIBRILLATION (HCC): ICD-10-CM

## 2019-06-12 DIAGNOSIS — I10 ESSENTIAL HYPERTENSION: ICD-10-CM

## 2019-06-12 RX ORDER — HYDROCHLOROTHIAZIDE 25 MG/1
25 TABLET ORAL DAILY
Qty: 90 TAB | Refills: 3 | Status: ON HOLD | OUTPATIENT
Start: 2019-06-12 | End: 2019-06-20

## 2019-06-13 ENCOUNTER — APPOINTMENT (OUTPATIENT)
Dept: ADMISSIONS | Facility: MEDICAL CENTER | Age: 64
DRG: 274 | End: 2019-06-13
Attending: INTERNAL MEDICINE
Payer: COMMERCIAL

## 2019-06-17 ENCOUNTER — HOSPITAL ENCOUNTER (INPATIENT)
Facility: MEDICAL CENTER | Age: 64
LOS: 3 days | DRG: 274 | End: 2019-06-20
Attending: INTERNAL MEDICINE | Admitting: INTERNAL MEDICINE
Payer: COMMERCIAL

## 2019-06-17 ENCOUNTER — APPOINTMENT (OUTPATIENT)
Dept: CARDIOLOGY | Facility: MEDICAL CENTER | Age: 64
DRG: 274 | End: 2019-06-17
Attending: INTERNAL MEDICINE
Payer: COMMERCIAL

## 2019-06-17 ENCOUNTER — ANESTHESIA EVENT (OUTPATIENT)
Dept: CARDIOLOGY | Facility: MEDICAL CENTER | Age: 64
DRG: 274 | End: 2019-06-17
Payer: COMMERCIAL

## 2019-06-17 ENCOUNTER — ANESTHESIA (OUTPATIENT)
Dept: CARDIOLOGY | Facility: MEDICAL CENTER | Age: 64
DRG: 274 | End: 2019-06-17
Payer: COMMERCIAL

## 2019-06-17 DIAGNOSIS — I48.19 PERSISTENT ATRIAL FIBRILLATION (HCC): ICD-10-CM

## 2019-06-17 DIAGNOSIS — I48.0 PAROXYSMAL ATRIAL FIBRILLATION (HCC): ICD-10-CM

## 2019-06-17 DIAGNOSIS — Z98.890 S/P ABLATION OF ATRIAL FIBRILLATION: ICD-10-CM

## 2019-06-17 DIAGNOSIS — I10 ESSENTIAL HYPERTENSION, BENIGN: ICD-10-CM

## 2019-06-17 DIAGNOSIS — Z86.79 S/P ABLATION OF ATRIAL FIBRILLATION: ICD-10-CM

## 2019-06-17 LAB
ACT BLD: 219 SEC (ref 74–137)
ACT BLD: 252 SEC (ref 74–137)
ACT BLD: 274 SEC (ref 74–137)
ALBUMIN SERPL BCP-MCNC: 3.8 G/DL (ref 3.2–4.9)
ALBUMIN SERPL BCP-MCNC: 4.1 G/DL (ref 3.2–4.9)
ALBUMIN/GLOB SERPL: 1.5 G/DL
ALBUMIN/GLOB SERPL: 1.6 G/DL
ALP SERPL-CCNC: 55 U/L (ref 30–99)
ALP SERPL-CCNC: 58 U/L (ref 30–99)
ALT SERPL-CCNC: 21 U/L (ref 2–50)
ALT SERPL-CCNC: 24 U/L (ref 2–50)
ANION GAP SERPL CALC-SCNC: 12 MMOL/L (ref 0–11.9)
ANION GAP SERPL CALC-SCNC: 8 MMOL/L (ref 0–11.9)
AST SERPL-CCNC: 29 U/L (ref 12–45)
AST SERPL-CCNC: 42 U/L (ref 12–45)
BASOPHILS # BLD AUTO: 0.3 % (ref 0–1.8)
BASOPHILS # BLD AUTO: 0.7 % (ref 0–1.8)
BASOPHILS # BLD: 0.04 K/UL (ref 0–0.12)
BASOPHILS # BLD: 0.05 K/UL (ref 0–0.12)
BILIRUB SERPL-MCNC: 0.7 MG/DL (ref 0.1–1.5)
BILIRUB SERPL-MCNC: 1 MG/DL (ref 0.1–1.5)
BUN SERPL-MCNC: 12 MG/DL (ref 8–22)
BUN SERPL-MCNC: 13 MG/DL (ref 8–22)
CALCIUM SERPL-MCNC: 8.9 MG/DL (ref 8.5–10.5)
CALCIUM SERPL-MCNC: 9.8 MG/DL (ref 8.5–10.5)
CHLORIDE SERPL-SCNC: 105 MMOL/L (ref 96–112)
CHLORIDE SERPL-SCNC: 106 MMOL/L (ref 96–112)
CO2 SERPL-SCNC: 22 MMOL/L (ref 20–33)
CO2 SERPL-SCNC: 29 MMOL/L (ref 20–33)
CREAT SERPL-MCNC: 0.64 MG/DL (ref 0.5–1.4)
CREAT SERPL-MCNC: 0.85 MG/DL (ref 0.5–1.4)
EKG IMPRESSION: NORMAL
EOSINOPHIL # BLD AUTO: 0.01 K/UL (ref 0–0.51)
EOSINOPHIL # BLD AUTO: 0.27 K/UL (ref 0–0.51)
EOSINOPHIL NFR BLD: 0.1 % (ref 0–6.9)
EOSINOPHIL NFR BLD: 3.6 % (ref 0–6.9)
ERYTHROCYTE [DISTWIDTH] IN BLOOD BY AUTOMATED COUNT: 44.4 FL (ref 35.9–50)
ERYTHROCYTE [DISTWIDTH] IN BLOOD BY AUTOMATED COUNT: 45.5 FL (ref 35.9–50)
GLOBULIN SER CALC-MCNC: 2.5 G/DL (ref 1.9–3.5)
GLOBULIN SER CALC-MCNC: 2.6 G/DL (ref 1.9–3.5)
GLUCOSE SERPL-MCNC: 123 MG/DL (ref 65–99)
GLUCOSE SERPL-MCNC: 148 MG/DL (ref 65–99)
HCT VFR BLD AUTO: 37.9 % (ref 37–47)
HCT VFR BLD AUTO: 38.9 % (ref 37–47)
HGB BLD-MCNC: 12.1 G/DL (ref 12–16)
HGB BLD-MCNC: 12.7 G/DL (ref 12–16)
IMM GRANULOCYTES # BLD AUTO: 0.02 K/UL (ref 0–0.11)
IMM GRANULOCYTES # BLD AUTO: 0.05 K/UL (ref 0–0.11)
IMM GRANULOCYTES NFR BLD AUTO: 0.3 % (ref 0–0.9)
IMM GRANULOCYTES NFR BLD AUTO: 0.4 % (ref 0–0.9)
INR PPP: 1.21 (ref 0.87–1.13)
LYMPHOCYTES # BLD AUTO: 0.87 K/UL (ref 1–4.8)
LYMPHOCYTES # BLD AUTO: 1.51 K/UL (ref 1–4.8)
LYMPHOCYTES NFR BLD: 19.9 % (ref 22–41)
LYMPHOCYTES NFR BLD: 6.9 % (ref 22–41)
MAGNESIUM SERPL-MCNC: 1.7 MG/DL (ref 1.5–2.5)
MCH RBC QN AUTO: 31 PG (ref 27–33)
MCH RBC QN AUTO: 31 PG (ref 27–33)
MCHC RBC AUTO-ENTMCNC: 31.9 G/DL (ref 33.6–35)
MCHC RBC AUTO-ENTMCNC: 32.6 G/DL (ref 33.6–35)
MCV RBC AUTO: 94.9 FL (ref 81.4–97.8)
MCV RBC AUTO: 97.2 FL (ref 81.4–97.8)
MONOCYTES # BLD AUTO: 0.8 K/UL (ref 0–0.85)
MONOCYTES # BLD AUTO: 0.86 K/UL (ref 0–0.85)
MONOCYTES NFR BLD AUTO: 11.3 % (ref 0–13.4)
MONOCYTES NFR BLD AUTO: 6.4 % (ref 0–13.4)
NEUTROPHILS # BLD AUTO: 10.8 K/UL (ref 2–7.15)
NEUTROPHILS # BLD AUTO: 4.89 K/UL (ref 2–7.15)
NEUTROPHILS NFR BLD: 64.2 % (ref 44–72)
NEUTROPHILS NFR BLD: 85.9 % (ref 44–72)
NRBC # BLD AUTO: 0 K/UL
NRBC # BLD AUTO: 0 K/UL
NRBC BLD-RTO: 0 /100 WBC
NRBC BLD-RTO: 0 /100 WBC
PLATELET # BLD AUTO: 167 K/UL (ref 164–446)
PLATELET # BLD AUTO: 182 K/UL (ref 164–446)
PMV BLD AUTO: 11.3 FL (ref 9–12.9)
PMV BLD AUTO: 11.5 FL (ref 9–12.9)
POTASSIUM SERPL-SCNC: 3.7 MMOL/L (ref 3.6–5.5)
POTASSIUM SERPL-SCNC: 4.1 MMOL/L (ref 3.6–5.5)
PROT SERPL-MCNC: 6.3 G/DL (ref 6–8.2)
PROT SERPL-MCNC: 6.7 G/DL (ref 6–8.2)
PROTHROMBIN TIME: 15.6 SEC (ref 12–14.6)
RBC # BLD AUTO: 3.9 M/UL (ref 4.2–5.4)
RBC # BLD AUTO: 4.1 M/UL (ref 4.2–5.4)
SODIUM SERPL-SCNC: 140 MMOL/L (ref 135–145)
SODIUM SERPL-SCNC: 142 MMOL/L (ref 135–145)
TSH SERPL DL<=0.005 MIU/L-ACNC: 1.44 UIU/ML (ref 0.38–5.33)
WBC # BLD AUTO: 12.6 K/UL (ref 4.8–10.8)
WBC # BLD AUTO: 7.6 K/UL (ref 4.8–10.8)

## 2019-06-17 PROCEDURE — 85025 COMPLETE CBC W/AUTO DIFF WBC: CPT

## 2019-06-17 PROCEDURE — 700111 HCHG RX REV CODE 636 W/ 250 OVERRIDE (IP): Performed by: INTERNAL MEDICINE

## 2019-06-17 PROCEDURE — 770022 HCHG ROOM/CARE - ICU (200)

## 2019-06-17 PROCEDURE — 93308 TTE F-UP OR LMTD: CPT

## 2019-06-17 PROCEDURE — 93662 INTRACARDIAC ECG (ICE): CPT | Mod: 26 | Performed by: INTERNAL MEDICINE

## 2019-06-17 PROCEDURE — 93005 ELECTROCARDIOGRAM TRACING: CPT | Performed by: INTERNAL MEDICINE

## 2019-06-17 PROCEDURE — 93308 TTE F-UP OR LMTD: CPT | Mod: 26,76 | Performed by: INTERNAL MEDICINE

## 2019-06-17 PROCEDURE — 80053 COMPREHEN METABOLIC PANEL: CPT

## 2019-06-17 PROCEDURE — 93613 INTRACARDIAC EPHYS 3D MAPG: CPT | Performed by: INTERNAL MEDICINE

## 2019-06-17 PROCEDURE — 83735 ASSAY OF MAGNESIUM: CPT

## 2019-06-17 PROCEDURE — 84443 ASSAY THYROID STIM HORMONE: CPT

## 2019-06-17 PROCEDURE — 700105 HCHG RX REV CODE 258: Performed by: ANESTHESIOLOGY

## 2019-06-17 PROCEDURE — 76930 PR SONO GUIDE PERICARD TAP: CPT | Mod: 26 | Performed by: INTERNAL MEDICINE

## 2019-06-17 PROCEDURE — 700101 HCHG RX REV CODE 250

## 2019-06-17 PROCEDURE — A9270 NON-COVERED ITEM OR SERVICE: HCPCS | Performed by: INTERNAL MEDICINE

## 2019-06-17 PROCEDURE — 93312 ECHO TRANSESOPHAGEAL: CPT | Mod: 26 | Performed by: INTERNAL MEDICINE

## 2019-06-17 PROCEDURE — 93325 DOPPLER ECHO COLOR FLOW MAPG: CPT

## 2019-06-17 PROCEDURE — C9132 KCENTRA, PER I.U.: HCPCS | Performed by: INTERNAL MEDICINE

## 2019-06-17 PROCEDURE — 85610 PROTHROMBIN TIME: CPT

## 2019-06-17 PROCEDURE — 160002 HCHG RECOVERY MINUTES (STAT)

## 2019-06-17 PROCEDURE — 99153 MOD SED SAME PHYS/QHP EA: CPT

## 2019-06-17 PROCEDURE — 700105 HCHG RX REV CODE 258: Performed by: INTERNAL MEDICINE

## 2019-06-17 PROCEDURE — 33015 PR INCIS HEART SAC TUBE: CPT | Mod: 51 | Performed by: INTERNAL MEDICINE

## 2019-06-17 PROCEDURE — 93321 DOPPLER ECHO F-UP/LMTD STD: CPT | Mod: 26 | Performed by: INTERNAL MEDICINE

## 2019-06-17 PROCEDURE — 700111 HCHG RX REV CODE 636 W/ 250 OVERRIDE (IP): Performed by: ANESTHESIOLOGY

## 2019-06-17 PROCEDURE — 93308 TTE F-UP OR LMTD: CPT | Mod: 26 | Performed by: INTERNAL MEDICINE

## 2019-06-17 PROCEDURE — 02583ZZ DESTRUCTION OF CONDUCTION MECHANISM, PERCUTANEOUS APPROACH: ICD-10-PCS | Performed by: INTERNAL MEDICINE

## 2019-06-17 PROCEDURE — 85347 COAGULATION TIME ACTIVATED: CPT | Mod: 91

## 2019-06-17 PROCEDURE — 93656 COMPRE EP EVAL ABLTJ ATR FIB: CPT | Performed by: INTERNAL MEDICINE

## 2019-06-17 PROCEDURE — 700101 HCHG RX REV CODE 250: Performed by: ANESTHESIOLOGY

## 2019-06-17 PROCEDURE — 0W9D30Z DRAINAGE OF PERICARDIAL CAVITY WITH DRAINAGE DEVICE, PERCUTANEOUS APPROACH: ICD-10-PCS | Performed by: INTERNAL MEDICINE

## 2019-06-17 PROCEDURE — 700102 HCHG RX REV CODE 250 W/ 637 OVERRIDE(OP): Performed by: INTERNAL MEDICINE

## 2019-06-17 PROCEDURE — 02K83ZZ MAP CONDUCTION MECHANISM, PERCUTANEOUS APPROACH: ICD-10-PCS | Performed by: INTERNAL MEDICINE

## 2019-06-17 PROCEDURE — 5A2204Z RESTORATION OF CARDIAC RHYTHM, SINGLE: ICD-10-PCS | Performed by: INTERNAL MEDICINE

## 2019-06-17 PROCEDURE — B246ZZ4 ULTRASONOGRAPHY OF RIGHT AND LEFT HEART, TRANSESOPHAGEAL: ICD-10-PCS | Performed by: INTERNAL MEDICINE

## 2019-06-17 PROCEDURE — 700111 HCHG RX REV CODE 636 W/ 250 OVERRIDE (IP)

## 2019-06-17 PROCEDURE — 93010 ELECTROCARDIOGRAM REPORT: CPT | Performed by: INTERNAL MEDICINE

## 2019-06-17 RX ORDER — ZOLPIDEM TARTRATE 5 MG/1
5 TABLET ORAL
Status: DISCONTINUED | OUTPATIENT
Start: 2019-06-17 | End: 2019-06-20 | Stop reason: HOSPADM

## 2019-06-17 RX ORDER — SODIUM CHLORIDE, SODIUM LACTATE, POTASSIUM CHLORIDE, CALCIUM CHLORIDE 600; 310; 30; 20 MG/100ML; MG/100ML; MG/100ML; MG/100ML
INJECTION, SOLUTION INTRAVENOUS CONTINUOUS
Status: DISCONTINUED | OUTPATIENT
Start: 2019-06-17 | End: 2019-06-17

## 2019-06-17 RX ORDER — HALOPERIDOL 5 MG/ML
1 INJECTION INTRAMUSCULAR
Status: DISCONTINUED | OUTPATIENT
Start: 2019-06-17 | End: 2019-06-17 | Stop reason: HOSPADM

## 2019-06-17 RX ORDER — MIDAZOLAM HYDROCHLORIDE 1 MG/ML
INJECTION INTRAMUSCULAR; INTRAVENOUS
Status: COMPLETED
Start: 2019-06-17 | End: 2019-06-17

## 2019-06-17 RX ORDER — OXYCODONE HCL 5 MG/5 ML
5 SOLUTION, ORAL ORAL
Status: DISCONTINUED | OUTPATIENT
Start: 2019-06-17 | End: 2019-06-17 | Stop reason: HOSPADM

## 2019-06-17 RX ORDER — ONDANSETRON 2 MG/ML
INJECTION INTRAMUSCULAR; INTRAVENOUS PRN
Status: DISCONTINUED | OUTPATIENT
Start: 2019-06-17 | End: 2019-06-17 | Stop reason: SURG

## 2019-06-17 RX ORDER — ATORVASTATIN CALCIUM 10 MG/1
10 TABLET, FILM COATED ORAL
Status: DISCONTINUED | OUTPATIENT
Start: 2019-06-17 | End: 2019-06-20 | Stop reason: HOSPADM

## 2019-06-17 RX ORDER — CARVEDILOL 6.25 MG/1
6.25 TABLET ORAL 2 TIMES DAILY WITH MEALS
Status: DISCONTINUED | OUTPATIENT
Start: 2019-06-17 | End: 2019-06-18

## 2019-06-17 RX ORDER — POTASSIUM CHLORIDE 750 MG/1
10 TABLET, FILM COATED, EXTENDED RELEASE ORAL DAILY
Status: DISCONTINUED | OUTPATIENT
Start: 2019-06-17 | End: 2019-06-17

## 2019-06-17 RX ORDER — OMEPRAZOLE 20 MG/1
20 CAPSULE, DELAYED RELEASE ORAL
Status: DISCONTINUED | OUTPATIENT
Start: 2019-06-17 | End: 2019-06-20 | Stop reason: HOSPADM

## 2019-06-17 RX ORDER — MORPHINE SULFATE 4 MG/ML
2 INJECTION, SOLUTION INTRAMUSCULAR; INTRAVENOUS
Status: DISCONTINUED | OUTPATIENT
Start: 2019-06-17 | End: 2019-06-20 | Stop reason: HOSPADM

## 2019-06-17 RX ORDER — ACETAMINOPHEN 325 MG/1
650 TABLET ORAL EVERY 4 HOURS PRN
Status: DISCONTINUED | OUTPATIENT
Start: 2019-06-17 | End: 2019-06-20 | Stop reason: HOSPADM

## 2019-06-17 RX ORDER — PROTAMINE SULFATE 10 MG/ML
INJECTION, SOLUTION INTRAVENOUS PRN
Status: DISCONTINUED | OUTPATIENT
Start: 2019-06-17 | End: 2019-06-17 | Stop reason: SURG

## 2019-06-17 RX ORDER — LIDOCAINE HYDROCHLORIDE 20 MG/ML
INJECTION, SOLUTION INFILTRATION; PERINEURAL
Status: COMPLETED
Start: 2019-06-17 | End: 2019-06-17

## 2019-06-17 RX ORDER — HEPARIN SODIUM,PORCINE 1000/ML
VIAL (ML) INJECTION
Status: COMPLETED
Start: 2019-06-17 | End: 2019-06-17

## 2019-06-17 RX ORDER — MAGNESIUM SULFATE HEPTAHYDRATE 40 MG/ML
2 INJECTION, SOLUTION INTRAVENOUS ONCE
Status: COMPLETED | OUTPATIENT
Start: 2019-06-17 | End: 2019-06-17

## 2019-06-17 RX ORDER — ALBUTEROL SULFATE 90 UG/1
2 AEROSOL, METERED RESPIRATORY (INHALATION)
Status: DISCONTINUED | OUTPATIENT
Start: 2019-06-17 | End: 2019-06-20 | Stop reason: HOSPADM

## 2019-06-17 RX ORDER — DIPHENHYDRAMINE HYDROCHLORIDE 50 MG/ML
12.5 INJECTION INTRAMUSCULAR; INTRAVENOUS
Status: DISCONTINUED | OUTPATIENT
Start: 2019-06-17 | End: 2019-06-17 | Stop reason: HOSPADM

## 2019-06-17 RX ORDER — CEFAZOLIN SODIUM 1 G/3ML
INJECTION, POWDER, FOR SOLUTION INTRAMUSCULAR; INTRAVENOUS PRN
Status: DISCONTINUED | OUTPATIENT
Start: 2019-06-17 | End: 2019-06-17 | Stop reason: SURG

## 2019-06-17 RX ORDER — CEFAZOLIN SODIUM 2 G/100ML
2 INJECTION, SOLUTION INTRAVENOUS ONCE
Status: COMPLETED | OUTPATIENT
Start: 2019-06-17 | End: 2019-06-17

## 2019-06-17 RX ORDER — PROTAMINE SULFATE 10 MG/ML
INJECTION, SOLUTION INTRAVENOUS
Status: COMPLETED
Start: 2019-06-17 | End: 2019-06-17

## 2019-06-17 RX ORDER — LIDOCAINE HYDROCHLORIDE 40 MG/ML
SOLUTION TOPICAL
Status: DISPENSED
Start: 2019-06-17 | End: 2019-06-17

## 2019-06-17 RX ORDER — MIDAZOLAM HYDROCHLORIDE 1 MG/ML
INJECTION INTRAMUSCULAR; INTRAVENOUS
Status: DISPENSED
Start: 2019-06-17 | End: 2019-06-17

## 2019-06-17 RX ORDER — OXYCODONE HCL 5 MG/5 ML
10 SOLUTION, ORAL ORAL
Status: DISCONTINUED | OUTPATIENT
Start: 2019-06-17 | End: 2019-06-17 | Stop reason: HOSPADM

## 2019-06-17 RX ORDER — FLUTICASONE PROPIONATE 50 MCG
1 SPRAY, SUSPENSION (ML) NASAL 2 TIMES DAILY
Status: DISCONTINUED | OUTPATIENT
Start: 2019-06-17 | End: 2019-06-17

## 2019-06-17 RX ORDER — FEXOFENADINE HCL 60 MG/1
180 TABLET, FILM COATED ORAL DAILY
Status: DISCONTINUED | OUTPATIENT
Start: 2019-06-18 | End: 2019-06-20 | Stop reason: HOSPADM

## 2019-06-17 RX ORDER — ONDANSETRON 2 MG/ML
4 INJECTION INTRAMUSCULAR; INTRAVENOUS
Status: DISCONTINUED | OUTPATIENT
Start: 2019-06-17 | End: 2019-06-17 | Stop reason: HOSPADM

## 2019-06-17 RX ORDER — SODIUM CHLORIDE, SODIUM LACTATE, POTASSIUM CHLORIDE, CALCIUM CHLORIDE 600; 310; 30; 20 MG/100ML; MG/100ML; MG/100ML; MG/100ML
INJECTION, SOLUTION INTRAVENOUS CONTINUOUS
Status: DISCONTINUED | OUTPATIENT
Start: 2019-06-17 | End: 2019-06-18

## 2019-06-17 RX ORDER — POTASSIUM CHLORIDE 20 MEQ/1
10 TABLET, EXTENDED RELEASE ORAL DAILY
Status: DISCONTINUED | OUTPATIENT
Start: 2019-06-18 | End: 2019-06-20 | Stop reason: HOSPADM

## 2019-06-17 RX ORDER — DOFETILIDE 0.5 MG/1
500 CAPSULE ORAL EVERY 12 HOURS
Status: DISCONTINUED | OUTPATIENT
Start: 2019-06-17 | End: 2019-06-18

## 2019-06-17 RX ORDER — MEPERIDINE HYDROCHLORIDE 25 MG/ML
6.25 INJECTION INTRAMUSCULAR; INTRAVENOUS; SUBCUTANEOUS
Status: DISCONTINUED | OUTPATIENT
Start: 2019-06-17 | End: 2019-06-17 | Stop reason: HOSPADM

## 2019-06-17 RX ADMIN — PROTHROMBIN, COAGULATION FACTOR VII HUMAN, COAGULATION FACTOR IX HUMAN, COAGULATION FACTOR X HUMAN, PROTEIN C, PROTEIN S HUMAN, AND WATER 4044 UNITS: KIT at 15:00

## 2019-06-17 RX ADMIN — MAGNESIUM SULFATE IN WATER 2 G: 40 INJECTION, SOLUTION INTRAVENOUS at 19:51

## 2019-06-17 RX ADMIN — SODIUM CHLORIDE, POTASSIUM CHLORIDE, SODIUM LACTATE AND CALCIUM CHLORIDE: 600; 310; 30; 20 INJECTION, SOLUTION INTRAVENOUS at 08:40

## 2019-06-17 RX ADMIN — ROCURONIUM BROMIDE 5 MG: 10 INJECTION, SOLUTION INTRAVENOUS at 10:20

## 2019-06-17 RX ADMIN — SUGAMMADEX 100 MG: 100 INJECTION, SOLUTION INTRAVENOUS at 11:30

## 2019-06-17 RX ADMIN — DOFETILIDE 500 MCG: 0.5 CAPSULE ORAL at 21:52

## 2019-06-17 RX ADMIN — HEPARIN SODIUM: 200 INJECTION, SOLUTION INTRAVENOUS at 14:00

## 2019-06-17 RX ADMIN — ROCURONIUM BROMIDE 35 MG: 10 INJECTION, SOLUTION INTRAVENOUS at 08:48

## 2019-06-17 RX ADMIN — HEPARIN SODIUM: 1000 INJECTION, SOLUTION INTRAVENOUS; SUBCUTANEOUS at 10:53

## 2019-06-17 RX ADMIN — HEPARIN SODIUM: 1000 INJECTION, SOLUTION INTRAVENOUS; SUBCUTANEOUS at 10:52

## 2019-06-17 RX ADMIN — ATORVASTATIN CALCIUM 10 MG: 10 TABLET, FILM COATED ORAL at 18:17

## 2019-06-17 RX ADMIN — HEPARIN SODIUM 6000 UNITS: 1000 INJECTION, SOLUTION INTRAVENOUS; SUBCUTANEOUS at 09:44

## 2019-06-17 RX ADMIN — EPHEDRINE SULFATE 20 MG: 50 INJECTION INTRAMUSCULAR; INTRAVENOUS; SUBCUTANEOUS at 11:10

## 2019-06-17 RX ADMIN — MORPHINE SULFATE 2 MG: 4 INJECTION INTRAVENOUS at 20:32

## 2019-06-17 RX ADMIN — CARVEDILOL 6.25 MG: 6.25 TABLET, FILM COATED ORAL at 18:17

## 2019-06-17 RX ADMIN — EPHEDRINE SULFATE 20 MG: 50 INJECTION INTRAMUSCULAR; INTRAVENOUS; SUBCUTANEOUS at 09:35

## 2019-06-17 RX ADMIN — SODIUM CHLORIDE, POTASSIUM CHLORIDE, SODIUM LACTATE AND CALCIUM CHLORIDE: 600; 310; 30; 20 INJECTION, SOLUTION INTRAVENOUS at 12:45

## 2019-06-17 RX ADMIN — MIDAZOLAM HYDROCHLORIDE 2 MG: 1 INJECTION, SOLUTION INTRAMUSCULAR; INTRAVENOUS at 08:35

## 2019-06-17 RX ADMIN — LIDOCAINE HYDROCHLORIDE: 20 INJECTION, SOLUTION INFILTRATION; PERINEURAL at 09:43

## 2019-06-17 RX ADMIN — ONDANSETRON 4 MG: 2 INJECTION INTRAMUSCULAR; INTRAVENOUS at 10:40

## 2019-06-17 RX ADMIN — SODIUM CHLORIDE, POTASSIUM CHLORIDE, SODIUM LACTATE AND CALCIUM CHLORIDE: 600; 310; 30; 20 INJECTION, SOLUTION INTRAVENOUS at 10:44

## 2019-06-17 RX ADMIN — FENTANYL CITRATE 50 MCG: 50 INJECTION, SOLUTION INTRAMUSCULAR; INTRAVENOUS at 10:25

## 2019-06-17 RX ADMIN — OMEPRAZOLE 20 MG: 20 CAPSULE, DELAYED RELEASE ORAL at 18:17

## 2019-06-17 RX ADMIN — CEFAZOLIN SODIUM 2 G: 2 INJECTION, SOLUTION INTRAVENOUS at 18:17

## 2019-06-17 RX ADMIN — SODIUM CHLORIDE, POTASSIUM CHLORIDE, SODIUM LACTATE AND CALCIUM CHLORIDE: 600; 310; 30; 20 INJECTION, SOLUTION INTRAVENOUS at 08:07

## 2019-06-17 RX ADMIN — EPHEDRINE SULFATE 20 MG: 50 INJECTION INTRAVENOUS at 13:23

## 2019-06-17 RX ADMIN — MORPHINE SULFATE 2 MG: 4 INJECTION INTRAVENOUS at 17:05

## 2019-06-17 RX ADMIN — PROTAMINE SULFATE 40 MG: 10 INJECTION, SOLUTION INTRAVENOUS at 11:34

## 2019-06-17 RX ADMIN — LIDOCAINE HYDROCHLORIDE 40 MG: 20 INJECTION, SOLUTION INFILTRATION; PERINEURAL at 08:48

## 2019-06-17 RX ADMIN — PROPOFOL 150 MG: 10 INJECTION, EMULSION INTRAVENOUS at 08:48

## 2019-06-17 RX ADMIN — LIDOCAINE HYDROCHLORIDE: 20 INJECTION, SOLUTION INFILTRATION; PERINEURAL at 15:04

## 2019-06-17 RX ADMIN — FENTANYL CITRATE 50 MCG: 50 INJECTION, SOLUTION INTRAMUSCULAR; INTRAVENOUS at 08:48

## 2019-06-17 RX ADMIN — CEFAZOLIN 2 G: 330 INJECTION, POWDER, FOR SOLUTION INTRAMUSCULAR; INTRAVENOUS at 08:34

## 2019-06-17 ASSESSMENT — PAIN SCALES - GENERAL: PAIN_LEVEL: 2

## 2019-06-17 ASSESSMENT — COGNITIVE AND FUNCTIONAL STATUS - GENERAL
MOBILITY SCORE: 24
SUGGESTED CMS G CODE MODIFIER MOBILITY: CH
DAILY ACTIVITIY SCORE: 24
SUGGESTED CMS G CODE MODIFIER DAILY ACTIVITY: CH

## 2019-06-17 ASSESSMENT — LIFESTYLE VARIABLES
EVER_SMOKED: UNABLE TO EVALUATE AT THIS TIME - NEEDS ASSESSMENT PRIOR TO DISCHARGE
ALCOHOL_USE: NO

## 2019-06-17 ASSESSMENT — PATIENT HEALTH QUESTIONNAIRE - PHQ9
2. FEELING DOWN, DEPRESSED, IRRITABLE, OR HOPELESS: NOT AT ALL
1. LITTLE INTEREST OR PLEASURE IN DOING THINGS: NOT AT ALL
SUM OF ALL RESPONSES TO PHQ9 QUESTIONS 1 AND 2: 0

## 2019-06-17 NOTE — PROGRESS NOTES
Patient with an episode of hypotension in recovery.  Responded to 1 dose of pressor.  Echocardiogram shows moderate pericardial effusion.  On intracardiac echocardiogram directly after the procedure showed minimal pericardial effusion.  Plan is to hold NOAC.  Plan is for pericardiocentesis in the Cath Lab.  Discussed with the family and the patient.

## 2019-06-17 NOTE — ANESTHESIA TIME REPORT
Anesthesia Start and Stop Event Times     Date Time Event    6/17/2019 0840 Anesthesia Start     1221 Anesthesia Stop        Responsible Staff  06/17/19    Name Role Begin End    Oj Silver M.D. Anesth 0913 1221        Preop Diagnosis (Free Text):  Pre-op Diagnosis             Preop Diagnosis (Codes):    Post op Diagnosis  Atrial fibrillation (HCC)      Premium Reason  Non-Premium    Comments:

## 2019-06-17 NOTE — H&P
Physician H&P    Patient ID:  Chinyere Priest  [id number]  63 y.o. female  1955    History:  Primary Diagnosis: Persistent atrial fibrillation    HPI:  For catheter ablation persistent atrial for ablation since 2017 with fatigue and some dyspnea on exertion.  Here for pulmonary vein isolation and dofetilide.  On Eliquis.  No constitutional symptoms.    Past Medical History:  has a past medical history of Abnormal CT scan, kidney ( ); Aortic calcification (HCC) ( ); Arrhythmia; Asthma in adult; Chronic back pain ( ); Dental disorder; Diverticulosis ( ); Essential hypertension, malignant ( ); Inflamed seborrheic keratosis ( ); Non-rheumatic mitral regurgitation (05/25/2017); Osteoarthritis of both hips ( ); Plantar fascia syndrome ( ); Plantar wart of right foot ( ); Pruritic dermatitis ( ); Pulmonary hypertension (HCC) (01/2016); Pure hypercholesterolemia ( ); S/P cardiac catheterization (5/26/2017); Spider veins of limb ( ); and Unspecified vitamin D deficiency ( ). She also has no past medical history of Diabetic neuropathy ([facility]).  Past Surgical History:  has a past surgical history that includes lumbar laminectomy diskectomy (9/2/2009); gyn surgery; and hysterectomy laparoscopy.  Past Social History:  reports that she quit smoking about 9 years ago. Her smoking use included Cigarettes. She has a 40.00 pack-year smoking history. She has never used smokeless tobacco. She reports that she does not drink alcohol or use drugs.  Past Family History:   Family History   Problem Relation Age of Onset   • Heart Disease Mother         1st MI @ 59 yo   • Hypertension Mother    • Heart Attack Mother 60   • Heart Disease Maternal Aunt    • Cancer Daughter 35        breast   • Heart Disease Maternal Grandmother    • Heart Disease Maternal Grandfather    • Heart Disease Maternal Aunt 56     Allergies: Erythromycin; Latex; and Tape    Current Medications:  Prior to Admission medications    Medication Sig Start Date End  "Date Taking? Authorizing Provider   hydroCHLOROthiazide (HYDRODIURIL) 25 MG Tab Take 1 Tab by mouth every day. 6/12/19   Augusto Steele M.D.   digoxin (LANOXIN) 125 MCG Tab Take 1 Tab by mouth every day. 5/1/19   Moshe Landa M.D.   fexofenadine (ALLEGRA) 180 MG tablet Take 1 Tab by mouth every day. 4/22/19   TARA MoreiraCAfua   fluticasone (FLONASE) 50 MCG/ACT nasal spray Spray 1 Spray in nose 2 times a day. 4/22/19   DAMIEN Moreira.FRANCIA   atorvastatin (LIPITOR) 10 MG Tab TAKE 1 TABLET BY MOUTH EVERY DAY 3/8/19   Moshe Landa M.D.   apixaban (ELIQUIS) 5mg Tab Take 1 Tab by mouth 2 Times a Day. 2/14/19   Moshe Landa M.D.   carvedilol (COREG) 6.25 MG Tab Take 1 Tab by mouth 2 times a day, with meals. 9/28/18   Moshe Landa M.D.   potassium chloride ER (KLOR-CON) 10 MEQ tablet Take 1 Tab by mouth every day. 9/28/18   Moshe Landa M.D.   albuterol 108 (90 BASE) MCG/ACT Aero Soln inhalation aerosol Inhale 2 Puffs by mouth every four hours as needed. 11/9/16   TARA NeilCAfua   vitamin D (CHOLECALCIFEROL) 1000 UNIT TABS Take 2 Tabs by mouth every day. 7/1/10   Keila Mccrary M.D.       Review of Systems:  ROS  /56   Pulse (!) 53   Temp 36.8 °C (98.3 °F) (Temporal)   Resp 18   Ht 1.702 m (5' 7\")   Wt 78.2 kg (172 lb 6.4 oz)   SpO2 99%     Physical Examination:  Physical Exam   Constitutional: She is oriented to person, place, and time. She appears well-developed. No distress.   HENT:   Mouth/Throat: Mucous membranes are normal.   Eyes: Conjunctivae and EOM are normal.   Neck: No JVD present. No tracheal deviation present. No thyroid mass and no thyromegaly present.   Cardiovascular: Normal rate and intact distal pulses.  An irregularly irregular rhythm present.   No murmur heard.  Pulmonary/Chest: Effort normal and breath sounds normal. No respiratory distress. She exhibits no tenderness.   Abdominal: Soft. There is no tenderness.   Musculoskeletal: Normal range of " motion. She exhibits edema.   Neurological: She is alert and oriented to person, place, and time. She has normal strength. She displays no tremor.   Skin: Skin is warm and dry. She is not diaphoretic.   Psychiatric: She has a normal mood and affect. Her behavior is normal.   Vitals reviewed.      Impression:  1.  Persistent atrial fibrillation for pulmonary vein isolation and probable dofetilide titration.  The risks, benefits,and alternatives to atrial fibrillation ablation with general anesthesia were discussed in great detail. Specific risks mentioned including bleeding, infection, arteriovenous fistula/pseudoaneurysm related to sheath placement, cardiac perforation with possible tamponade requiring pericardiocentesis or possible open heart surgery were discussed. In addition,we discussed atrial fibrillation ablation specific complications including pulmonary vein stenosis, left atrial perforation (2-4%), and nerve damage involving the recurrent laryngeal nerve, the vagus nerve with resulting gastroparesis, and the phrenic nerve.  Also mentioned was a 1/400 (0.25%) occurrence of atrial esophageal fistula, which is an abnormal communication between the esophagus and the left atrium; this complication is often fatal. Lastly the risks of death, myocardial infarction, stroke, DVT and PE were discussed. The patient verbalized understanding of these potential complications and wishes to proceed with this procedure.  The risks, benefits, and alternatives to transesophageal echocardiogram with IV sedation were discussed with the patient in specific detail, including oropharyngeal and esophageal traumas including hoarseness and dysphagia after the procedure. Rare cases demonstrating serious or fatal complications associated with transesophageal echocardiogram have been reported in the adult population, including cardiac, pulmonary and bleeding complications in less than 1% of people. Patients with an identified  intracardiac thrombus are at increased risk for embolic events and this appears to be reduced with anticoagulant therapy. The patient verbalized understandings about these  possible complications and wishes to proceed with this procedure          Pre Procedure Assessment:  Pre-Procedure Assessment - Applicable for patients receiving sedation by non-anesthesia practitioner.  Previous drug history, other anesthetic experiences, potential anesthetic problems and choice of anesthesia assessed, discussed with patient.     No prior complications.  Results of relevant diagnostic studies reviewed.    Plan of Sedation:  Patient assessed immediately prior to sedationPatient deemed appropriate candidate for conscious sedation options and plans discussed with patient / family    ASA 3 - Patient with severe systemic disease

## 2019-06-17 NOTE — CATH LAB
Electrophysiology Procedure Note  [facility]    Indication: Symptomatic persistent atrial fibrillation    Procedure Performed: 1. Atrial fibrillation ablation 2. Advanced mapping using CARTO system  3. Transesophageal echocardiography 4. Intracardiac echocardiography 5. Esophageal temperature monitoring 6. Intraarterial blood pressure monitoring 7. Frequent ACT's     Physician(s): PHYLICIA Steele M.D.     Resident/Assistant(s): None     Anesthesia: General endotracheal anesthetic.    Anaesthesiologist: Dr. Oj Silver    Specimen(s) Removed: None     Estimated Blood Loss:  30cc     Complications:  None    Pre-procedure ECG: Atrial fibrillation    Post Procedure ECG: Sinus rhythm    Description of Procedure:   After informed written consent, the patient was brought to the EP lab in the fasting, non-sedated state. The patient was prepped and draped in the usual sterile fashion.  LEEANNA showed no evidence of LA clot. Arterial line placed for monitoring. Esophageal temperature probe placed and monitored.  Femoral venous access was obtained using the modified Seldinger technique. In the left femoral vein, 3 sheaths (6,8,8 Fr) were inserted over 0.35” guidewires. In the right femoral vein, 1 sheaths (10.5 Fr) were inserted over 0.35” guidewires. A deflectable decapolar catheter was advanced to the CS position. An intracardiac echo (ICE) catheter was advanced to the right atrium. ICE was used to identify the atrial septum, left atrial appendage, and pulmonary veins and marcelle the anatomic structures to superimpose on our 3D electroanatomic map using CARTOSound. During the procedure, ICE was utilized to localize the ablation catheter, monitor for thrombus formation, and to exclude pericardial effusion. Intravenous heparin was administered to maintain -350 seconds. Double transseptal left heart catheterization was performed under intracardiac echo and hemodynamic guidance. A [place] needle was inserted into  the two 8 Fr sheaths (SL1) for transseptal puncture and placement of sheaths in the left atrium. Advanced mapping of the pulmonary veins and left atrium was performed using CARTO3 FAM ST/SF D/F and a deflectable multipolar mapping catheter (BiosSymcircle PentaRay) with the CARTO system. Wide antral circumferential ablation was performed using an 3.5 mm deflectable irrigated force contact catheter (BiosSymcircle ST/SF) at primarily 25 W (posteriorly) to 35 W ( anteriorly)  power starting from the L sided veins and then proceeding along to the RSPV and then finally the RIPV.  Patient received DC cardioversion. bidirectional pulmonary vein antrum isolation was verified at the end of the procedure by pacing inside the vein and confirming exit block along with absence of local PV signals on the PentaRay. At the end of the procedure, heparin was reversed with protamine, the catheter and sheaths were removed, and hemostasis was achieved by manual compression. Following recovery from anesthesia, the patient was transferred to the PPU.     Total ablation time: 2468 seconds    Fluoroscopy time: 7.7 minutes     Electrophysiologic Findings:    1. Baseline: Atrial fibrillation 777 ms,  ms, HV 51 Ms.   2. Final  SR 1065 ms    Impressions:    1.  Persistent atrial fibrillation.    2. Successful RF ablation pulmonary vein isolation procedure.       Recommendations:  1. Resume anticoagulation.  2. Admit to monitored bedrest.  3.  Start dofetilide.

## 2019-06-17 NOTE — CATH LAB
Pre-Operative Diagnosis: Status post atrial fibrillation ablation, late cardiac tamponade     Post-Operative Diagnosis: Successful pericardiocentesis with resolution of hypotension and pericardial fluid     Indications: Cardiac tamponade post catheter ablation     Procedure(s) Performed: Pericardiocentesis    Physician(s): PHYLICIA Steele M.D.     Anesthesia: Local anesthesia    Specimen(s) Removed: None     Estimated Blood Loss:  10 cc    Pre-procedure ECG: Sinus rhythm    Post-procedure ECG: Sinus rhythm    Complications:  None     Procedure:     After informed written consent, the patient was brought to the Cath Lab in the fasting, non-sedated state. The patient was prepped and draped in the usual sterile fashion. Lidocaine was given substernally.  Pericardiocentesis was performed in the standard method, substernally with a needle directed towards the left shoulder.  This was performed under fluoroscopic and echocardiographic guidance.  Once the pericardial space was entered.  Echocardiogram and fluoroscopy showed evidence of the guidewire in the proper location.  Contrast was given which confirmed the guidewire was in the pericardial space.  The pigtail catheter was placed into the pericardial space.  Drainage was performed with 500 cc of bright red blood removed with cessation of drainage.  Continued echo monitoring showed resolution of pericardial fluid.  Blood pressure stabilized in the 150s from 90 systolic  Once the pigtail stopped draining.  It was placed to suction.  The pigtail catheter was sewn down had a dressing was applied.     Fluroscopy time: Less than 2 minutes     Impressions:    1.  Late cardiac tamponade post ablation.  2.  Successful pericardiocentesis.    Plan:   1.  Admit to CCU  2.  Reversal of Eliquis per pharmacy.  3.  Check labs.  4.  Repeat echocardiogram in the morning.  5.  Hold Eliquis.  6.  1 dose of empiric antibiotics.

## 2019-06-17 NOTE — ANESTHESIA POSTPROCEDURE EVALUATION
Patient: Chinyere Priest    Procedure Summary     Date:  06/17/19 Room / Location:  [facility] IMAGING - CATH LAB [facility]    Anesthesia Start:  0840 Anesthesia Stop:  1221    Procedure:  CL-EP ABLATION ATRIAL FIBRILLATION Diagnosis:       Paroxysmal atrial fibrillation (HCC)      (See Associated Dx)    Scheduled Providers:  Augusto Steele M.D. Responsible Provider:  Oj Silver M.D.    Anesthesia Type:  general ASA Status:  3          Final Anesthesia Type: general  Last vitals  BP   Blood Pressure: 123/56    Temp   36.8 °C (98.3 °F)    Pulse   Pulse: (!) 53   Resp   18    SpO2   99 %      Anesthesia Post Evaluation    Patient location during evaluation: PACU  Patient participation: complete - patient participated  Level of consciousness: awake and alert  Pain score: 2    Airway patency: patent  Anesthetic complications: no  Cardiovascular status: hemodynamically stable  Respiratory status: acceptable  Hydration status: euvolemic    PONV: none

## 2019-06-17 NOTE — PROGRESS NOTES
Pt brought up from Cath Lab RN. Bedside report given, pt resting comfortably at this time.    Right and Left Groin Cath site CDI, intact. Covered with guaze and tegaderm

## 2019-06-17 NOTE — ANESTHESIA PROCEDURE NOTES
Airway  Date/Time: 6/17/2019 8:50 AM  Performed by: DEBORAH GOMEZ  Authorized by: DEBORAH GOMEZ     Location:  OR  Urgency:  Elective  Indications for Airway Management:  Anesthesia  Spontaneous Ventilation: absent    Sedation Level:  Deep  Preoxygenated: Yes    Patient Position:  Sniffing  Mask Difficulty Assessment:  1 - vent by mask  Final Airway Type:  Endotracheal airway  Final Endotracheal Airway:  ETT  Cuffed: Yes    Technique Used for Successful ETT Placement:  Direct laryngoscopy  Insertion Site:  Oral  Blade Type:  Rishabh  Measured from:  Teeth  ETT to Teeth (cm):  22  Placement Verified by: auscultation and capnometry    Number of Attempts at Approach:  1

## 2019-06-17 NOTE — OR NURSING
"1217 Patient arrived to unit, arouses to voice.  Bilateral groins clean, dry and soft.  Patient denies pain at this time.  Updated on plan of care, verbalized understanding.   1240 Family to bedside, updated on plan of care.  1310 BP 85/46, patient groin sites clean, dry and soft, denies any back pain.  1315 Dr. Silver at bedside, updated on patient status.  Orders received for IV ephedrine.  1330 Dr. Steele at bedside, updated on patient status by Dr. Silver and RN.  Patient c/o \"heaviness\" in her chest.  1350 Dr. Steele at bedside, tech performing STAT echocardiogram.  Patient to return to cath lab per Dr. Steele.  1405 Patient transported to cath lab via zoll with RN and cath lab staff.  "

## 2019-06-17 NOTE — ANESTHESIA PREPROCEDURE EVALUATION
Relevant Problems   (+) Asthma in adult without complication   (+) Non-rheumatic mitral regurgitation   (+) Persistent atrial fibrillation (HCC)       Physical Exam    Airway   Mallampati: II  TM distance: >3 FB  Neck ROM: full       Cardiovascular - normal exam  Rhythm: regular  Rate: normal  (-) murmur     Dental - normal exam  (+) upper dentures, lower dentures         Pulmonary - normal exam  Breath sounds clear to auscultation     Abdominal    Neurological - normal exam                 Anesthesia Plan    ASA 3   ASA physical status 3 criteria: other (comment)    Plan - general       Airway plan will be ETT        Induction: intravenous          Informed Consent:    Anesthetic plan and risks discussed with patient.

## 2019-06-17 NOTE — ANESTHESIA PROCEDURE NOTES
Arterial Line  Performed by: DEBORAH GOMEZ  Authorized by: DEBORAH GOMEZ     End Time:  6/17/2019 9:17 AM  Localization: ultrasound guidance and surface landmarks  Image captured, interpreted and electronically stored.  Patient Location:  OR  Indication: continuous blood pressure monitoring    Catheter Size:  22 G  Seldinger Technique?: Yes    Laterality:  Right  Site:  Radial artery  Other::  Unable to access radial artery, groin arterial cath by Dr Steele   Unable to access Right/radial Artery, Dr Steele Place ?Right ?Groin Arterial Cath

## 2019-06-18 ENCOUNTER — APPOINTMENT (OUTPATIENT)
Dept: CARDIOLOGY | Facility: MEDICAL CENTER | Age: 64
DRG: 274 | End: 2019-06-18
Attending: INTERNAL MEDICINE
Payer: COMMERCIAL

## 2019-06-18 LAB
ALBUMIN SERPL BCP-MCNC: 3.4 G/DL (ref 3.2–4.9)
ALBUMIN/GLOB SERPL: 1.5 G/DL
ALP SERPL-CCNC: 42 U/L (ref 30–99)
ALT SERPL-CCNC: 17 U/L (ref 2–50)
ANION GAP SERPL CALC-SCNC: 8 MMOL/L (ref 0–11.9)
AST SERPL-CCNC: 35 U/L (ref 12–45)
BASOPHILS # BLD AUTO: 0.2 % (ref 0–1.8)
BASOPHILS # BLD: 0.03 K/UL (ref 0–0.12)
BILIRUB SERPL-MCNC: 1 MG/DL (ref 0.1–1.5)
BUN SERPL-MCNC: 11 MG/DL (ref 8–22)
CALCIUM SERPL-MCNC: 8.4 MG/DL (ref 8.5–10.5)
CHLORIDE SERPL-SCNC: 102 MMOL/L (ref 96–112)
CO2 SERPL-SCNC: 28 MMOL/L (ref 20–33)
CREAT SERPL-MCNC: 0.8 MG/DL (ref 0.5–1.4)
EKG IMPRESSION: NORMAL
EOSINOPHIL # BLD AUTO: 0 K/UL (ref 0–0.51)
EOSINOPHIL NFR BLD: 0 % (ref 0–6.9)
ERYTHROCYTE [DISTWIDTH] IN BLOOD BY AUTOMATED COUNT: 46.4 FL (ref 35.9–50)
GLOBULIN SER CALC-MCNC: 2.2 G/DL (ref 1.9–3.5)
GLUCOSE SERPL-MCNC: 140 MG/DL (ref 65–99)
HCT VFR BLD AUTO: 36.4 % (ref 37–47)
HGB BLD-MCNC: 11.3 G/DL (ref 12–16)
IMM GRANULOCYTES # BLD AUTO: 0.05 K/UL (ref 0–0.11)
IMM GRANULOCYTES NFR BLD AUTO: 0.4 % (ref 0–0.9)
INR PPP: 1.02 (ref 0.87–1.13)
LV EJECT FRACT  99904: 70
LYMPHOCYTES # BLD AUTO: 1.32 K/UL (ref 1–4.8)
LYMPHOCYTES NFR BLD: 9.3 % (ref 22–41)
MAGNESIUM SERPL-MCNC: 2.2 MG/DL (ref 1.5–2.5)
MCH RBC QN AUTO: 30.3 PG (ref 27–33)
MCHC RBC AUTO-ENTMCNC: 31 G/DL (ref 33.6–35)
MCV RBC AUTO: 97.6 FL (ref 81.4–97.8)
MONOCYTES # BLD AUTO: 1.41 K/UL (ref 0–0.85)
MONOCYTES NFR BLD AUTO: 10 % (ref 0–13.4)
NEUTROPHILS # BLD AUTO: 11.32 K/UL (ref 2–7.15)
NEUTROPHILS NFR BLD: 80.1 % (ref 44–72)
NRBC # BLD AUTO: 0 K/UL
NRBC BLD-RTO: 0 /100 WBC
PLATELET # BLD AUTO: 169 K/UL (ref 164–446)
PMV BLD AUTO: 12 FL (ref 9–12.9)
POTASSIUM SERPL-SCNC: 3.6 MMOL/L (ref 3.6–5.5)
PROT SERPL-MCNC: 5.6 G/DL (ref 6–8.2)
PROTHROMBIN TIME: 13.6 SEC (ref 12–14.6)
RBC # BLD AUTO: 3.73 M/UL (ref 4.2–5.4)
SODIUM SERPL-SCNC: 138 MMOL/L (ref 135–145)
WBC # BLD AUTO: 14.1 K/UL (ref 4.8–10.8)

## 2019-06-18 PROCEDURE — 93308 TTE F-UP OR LMTD: CPT | Mod: 26 | Performed by: INTERNAL MEDICINE

## 2019-06-18 PROCEDURE — 85610 PROTHROMBIN TIME: CPT

## 2019-06-18 PROCEDURE — A9270 NON-COVERED ITEM OR SERVICE: HCPCS | Performed by: NURSE PRACTITIONER

## 2019-06-18 PROCEDURE — 93010 ELECTROCARDIOGRAM REPORT: CPT | Mod: 76 | Performed by: INTERNAL MEDICINE

## 2019-06-18 PROCEDURE — 770022 HCHG ROOM/CARE - ICU (200)

## 2019-06-18 PROCEDURE — 93005 ELECTROCARDIOGRAM TRACING: CPT | Performed by: INTERNAL MEDICINE

## 2019-06-18 PROCEDURE — 93005 ELECTROCARDIOGRAM TRACING: CPT | Performed by: NURSE PRACTITIONER

## 2019-06-18 PROCEDURE — A9270 NON-COVERED ITEM OR SERVICE: HCPCS | Performed by: INTERNAL MEDICINE

## 2019-06-18 PROCEDURE — 700102 HCHG RX REV CODE 250 W/ 637 OVERRIDE(OP): Performed by: NURSE PRACTITIONER

## 2019-06-18 PROCEDURE — 93325 DOPPLER ECHO COLOR FLOW MAPG: CPT

## 2019-06-18 PROCEDURE — 99024 POSTOP FOLLOW-UP VISIT: CPT | Performed by: INTERNAL MEDICINE

## 2019-06-18 PROCEDURE — 83735 ASSAY OF MAGNESIUM: CPT

## 2019-06-18 PROCEDURE — 700102 HCHG RX REV CODE 250 W/ 637 OVERRIDE(OP): Performed by: INTERNAL MEDICINE

## 2019-06-18 PROCEDURE — 700111 HCHG RX REV CODE 636 W/ 250 OVERRIDE (IP): Performed by: INTERNAL MEDICINE

## 2019-06-18 PROCEDURE — 85025 COMPLETE CBC W/AUTO DIFF WBC: CPT

## 2019-06-18 PROCEDURE — 93010 ELECTROCARDIOGRAM REPORT: CPT | Mod: 77 | Performed by: INTERNAL MEDICINE

## 2019-06-18 PROCEDURE — 700105 HCHG RX REV CODE 258: Performed by: ANESTHESIOLOGY

## 2019-06-18 PROCEDURE — 93010 ELECTROCARDIOGRAM REPORT: CPT | Performed by: INTERNAL MEDICINE

## 2019-06-18 PROCEDURE — 80053 COMPREHEN METABOLIC PANEL: CPT

## 2019-06-18 RX ORDER — KETOROLAC TROMETHAMINE 30 MG/ML
15 INJECTION, SOLUTION INTRAMUSCULAR; INTRAVENOUS ONCE
Status: CANCELLED | OUTPATIENT
Start: 2019-06-18 | End: 2019-06-19

## 2019-06-18 RX ORDER — IBUPROFEN 600 MG/1
600 TABLET ORAL ONCE
Status: COMPLETED | OUTPATIENT
Start: 2019-06-18 | End: 2019-06-18

## 2019-06-18 RX ORDER — FLECAINIDE ACETATE 50 MG/1
50 TABLET ORAL TWICE DAILY
Status: DISCONTINUED | OUTPATIENT
Start: 2019-06-18 | End: 2019-06-18

## 2019-06-18 RX ORDER — CARVEDILOL 3.12 MG/1
3.12 TABLET ORAL 2 TIMES DAILY WITH MEALS
Status: DISCONTINUED | OUTPATIENT
Start: 2019-06-18 | End: 2019-06-20 | Stop reason: HOSPADM

## 2019-06-18 RX ORDER — POTASSIUM CHLORIDE 20 MEQ/1
20 TABLET, EXTENDED RELEASE ORAL ONCE
Status: COMPLETED | OUTPATIENT
Start: 2019-06-18 | End: 2019-06-18

## 2019-06-18 RX ORDER — FLECAINIDE ACETATE 150 MG/1
75 TABLET ORAL TWICE DAILY
Status: DISCONTINUED | OUTPATIENT
Start: 2019-06-18 | End: 2019-06-20 | Stop reason: HOSPADM

## 2019-06-18 RX ORDER — CARVEDILOL 3.12 MG/1
3.12 TABLET ORAL 2 TIMES DAILY WITH MEALS
Status: DISCONTINUED | OUTPATIENT
Start: 2019-06-18 | End: 2019-06-18

## 2019-06-18 RX ORDER — COLCHICINE 0.6 MG/1
0.6 TABLET ORAL 2 TIMES DAILY
Status: CANCELLED | OUTPATIENT
Start: 2019-06-18

## 2019-06-18 RX ADMIN — OMEPRAZOLE 20 MG: 20 CAPSULE, DELAYED RELEASE ORAL at 05:09

## 2019-06-18 RX ADMIN — POTASSIUM CHLORIDE 10 MEQ: 1500 TABLET, EXTENDED RELEASE ORAL at 05:09

## 2019-06-18 RX ADMIN — SODIUM CHLORIDE, POTASSIUM CHLORIDE, SODIUM LACTATE AND CALCIUM CHLORIDE: 600; 310; 30; 20 INJECTION, SOLUTION INTRAVENOUS at 05:12

## 2019-06-18 RX ADMIN — POTASSIUM CHLORIDE 20 MEQ: 1500 TABLET, EXTENDED RELEASE ORAL at 10:50

## 2019-06-18 RX ADMIN — ATORVASTATIN CALCIUM 10 MG: 10 TABLET, FILM COATED ORAL at 20:58

## 2019-06-18 RX ADMIN — MORPHINE SULFATE 2 MG: 4 INJECTION INTRAVENOUS at 10:40

## 2019-06-18 RX ADMIN — IBUPROFEN 600 MG: 600 TABLET ORAL at 12:43

## 2019-06-18 RX ADMIN — FLECAINIDE ACETATE 75 MG: 150 TABLET ORAL at 20:59

## 2019-06-18 RX ADMIN — MORPHINE SULFATE 2 MG: 4 INJECTION INTRAVENOUS at 00:27

## 2019-06-18 RX ADMIN — CARVEDILOL 3.12 MG: 3.12 TABLET, FILM COATED ORAL at 20:58

## 2019-06-18 ASSESSMENT — ENCOUNTER SYMPTOMS
STRIDOR: 0
CHILLS: 0
NUMBNESS: 0
MUSCULOSKELETAL NEGATIVE: 1
FATIGUE: 1
PALPITATIONS: 0
TROUBLE SWALLOWING: 0
LIGHT-HEADEDNESS: 0
CHEST TIGHTNESS: 0
NAUSEA: 0
FEVER: 0
PSYCHIATRIC NEGATIVE: 1
EYES NEGATIVE: 1
WEAKNESS: 0
WHEEZING: 0
HEMATOLOGIC/LYMPHATIC NEGATIVE: 1
VOMITING: 0
BLOOD IN STOOL: 0
ABDOMINAL PAIN: 0
CHOKING: 0
ENDOCRINE NEGATIVE: 1
SHORTNESS OF BREATH: 0
DIZZINESS: 0
SPEECH DIFFICULTY: 0
COUGH: 0

## 2019-06-18 ASSESSMENT — PATIENT HEALTH QUESTIONNAIRE - PHQ9
SUM OF ALL RESPONSES TO PHQ9 QUESTIONS 1 AND 2: 0
1. LITTLE INTEREST OR PLEASURE IN DOING THINGS: NOT AT ALL

## 2019-06-18 NOTE — PROGRESS NOTES
Patient having occasional runs of bradycardia with HR in the 30s that sustains for 20 seconds or so. NP Ambers updated, meds are being revised and no further orders made at this time

## 2019-06-18 NOTE — PROGRESS NOTES
GILBERTO Friend decided to monitor the DAJUAN drain for some more time before removing as it was putting 30mls out per hr at the time of removal. Patient's heart rhythm has now changed again from sinus raymundo to runs of afib with a rate of 130 that sustain for less than 10 seconds. Strip printed in chart. Phuc aware, RN will update if this sustains

## 2019-06-18 NOTE — PROGRESS NOTES
Pts QTC increased from 433-519 post tikosyn 500mcg.  Dr. Velez notified, stating no current interventions and MD will reassess in am.

## 2019-06-18 NOTE — CARE PLAN
Problem: Safety  Goal: Will remain free from falls    Intervention: Implement fall precautions  Bed locked and in low position with call light and belongings within reach. Bed alarm on. Call light and belongings within reach      Problem: Pain Management  Goal: Pain level will decrease to patient's comfort goal    Intervention: Follow pain managment plan developed in collaboration with patient and Interdisciplinary Team  Morphine IV PRN         01-Nov-2018

## 2019-06-18 NOTE — PROGRESS NOTES
Cardiology Follow Up Progress Note    Date of Service  6/18/2019    Attending Physician  Augusto Steele M.D.    Chief Complaint   Elective admission for Atrial Fibrillation Ablation and dofetilide loading.     HPI  Chinyere Priest is a 63 y.o. female admitted 6/17/2019 for elective catheter ablation for persistent atrial fibrillation since 2017 with fatigue and some dyspnea on exertion.  Here for pulmonary vein isolation and dofetilide.  On Eliquis.  No constitutional symptoms.  Past medical history also significant for hypertension and pulmonary hypertension.      Interim Events  Seen in EP rounds with Dr. Steele.     Procedural Conclusions per Dr. Steele's Op Note:   Indication: Symptomatic persistent atrial fibrillation  Procedure Performed: 1. Atrial fibrillation ablation 2. Advanced mapping using CARTO system  3. Transesophageal echocardiography 4. Intracardiac echocardiography 5. Esophageal temperature monitoring 6. Intraarterial blood pressure monitoring 7. Frequent ACT's   Total ablation time: 2468 seconds  Electrophysiologic Findings:    1. Baseline: Atrial fibrillation 777 ms,  ms, HV 51 Ms.   2. Final  SR 1065 ms  Impressions:    1.  Persistent atrial fibrillation.    2. Successful RF ablation pulmonary vein isolation procedure.      Monitored rhythm: Sinus.  Occasional PACs and non sustained raymundo to 40s noted.  Overnight no arrhythmia events.   Denies dyspnea this AM.  Reported mild pleuritic type pain.   Labs: K 3.6, Mg 2.1, Bun/Cr nl.  INR 1.02. N/N 113./36.4.   EKG: QTc last night >500 msec.        Review of Systems  Review of Systems   Constitutional: Positive for fatigue. Negative for chills and fever.   HENT: Negative for congestion, nosebleeds, tinnitus and trouble swallowing.    Eyes: Negative.    Respiratory: Negative for cough, choking, chest tightness, shortness of breath, wheezing and stridor.    Cardiovascular: Positive for chest pain (Reports mild pain, pelurtic in nature ).  Negative for palpitations and leg swelling.   Gastrointestinal: Negative for abdominal pain, blood in stool, nausea and vomiting.   Endocrine: Negative.    Genitourinary: Negative for hematuria.   Musculoskeletal: Negative.    Skin: Negative.    Neurological: Negative for dizziness, syncope, speech difficulty, weakness, light-headedness and numbness.   Hematological: Negative.    Psychiatric/Behavioral: Negative.        Vital signs in last 24 hours  Temp:  [35.8 °C (96.5 °F)-37.1 °C (98.8 °F)] 36.1 °C (97 °F)  Pulse:  [46-92] 63  Resp:  [10-27] 16  BP: (123)/(56) 123/56  SpO2:  [98 %-100 %] 100 %    Physical Exam  Physical Exam   Constitutional: She is oriented to person, place, and time. She appears well-developed and well-nourished.   HENT:   Head: Normocephalic and atraumatic.   Eyes: Pupils are equal, round, and reactive to light. Conjunctivae and EOM are normal.   Neck: Normal range of motion. Neck supple. No JVD present. No tracheal deviation present.   Cardiovascular: Normal rate, regular rhythm, normal heart sounds and intact distal pulses.  Exam reveals no gallop and no friction rub.    No murmur heard.  Bilateral femoral access sites are clean and dry.  No hematoma noted. No erythema.    Pulmonary/Chest: Effort normal and breath sounds normal. No stridor. No respiratory distress. She has no wheezes. She has no rales. She exhibits no tenderness.   Abdominal: Soft. Bowel sounds are normal. She exhibits no distension. There is no tenderness.   Musculoskeletal: Normal range of motion. She exhibits no edema.   Neurological: She is alert and oriented to person, place, and time.   Skin: Skin is warm and dry. No erythema.   Psychiatric: She has a normal mood and affect. Her behavior is normal. Judgment and thought content normal.       Lab Review  Lab Results   Component Value Date/Time    WBC 14.1 (H) 06/18/2019 05:21 AM    RBC 3.73 (L) 06/18/2019 05:21 AM    HEMOGLOBIN 11.3 (L) 06/18/2019 05:21 AM    HEMATOCRIT  36.4 (L) 06/18/2019 05:21 AM    MCV 97.6 06/18/2019 05:21 AM    MCH 30.3 06/18/2019 05:21 AM    MCHC 31.0 (L) 06/18/2019 05:21 AM    MPV 12.0 06/18/2019 05:21 AM      Lab Results   Component Value Date/Time    SODIUM 138 06/18/2019 05:21 AM    POTASSIUM 3.6 06/18/2019 05:21 AM    CHLORIDE 102 06/18/2019 05:21 AM    CO2 28 06/18/2019 05:21 AM    GLUCOSE 140 (H) 06/18/2019 05:21 AM    BUN 11 06/18/2019 05:21 AM    CREATININE 0.80 06/18/2019 05:21 AM      Lab Results   Component Value Date/Time    ASTSGOT 35 06/18/2019 05:21 AM    ALTSGPT 17 06/18/2019 05:21 AM     Lab Results   Component Value Date/Time    CHOLSTRLTOT 182 10/05/2017 10:22 AM     (H) 10/05/2017 10:22 AM    HDL 60 10/05/2017 10:22 AM    TRIGLYCERIDE 63 10/05/2017 10:22 AM             Cardiac Imaging and Procedures Review  EKG:  My personal interpretation of the EKG dated 6/18/19 is Sinus Bradycardia, rate 49. Prolonged QT interval.     Echocardiogram (LEEANNA):  6/17/19  CONCLUSIONS  Normal left ventricular size and systolic function.  No left atrial thrombus.  No thrombus detected in the left atrial appendage.    Cardiac Catheterization:  5/26/17  1.  LEFT MAIN ARTERY:  Left main vessel is a large caliber vessel, angiographically normal, bifurcates into the left anterior descending artery and circumflex artery.  2.  LEFT ANTERIOR DESCENDING ARTERY:  The LAD gives rise to a first diagonal branch and a normal complement of septal branches and extends around the apex.    The LAD is widely patent with the exception of a very mild eccentric smooth focal proximal atheroma otherwise the LAD appears angiographically normal.  3.  CIRCUMFLEX ARTERY:  The circumflex gives rise to 2 major long marginal branches and a small caliber AV groove branch.  Angiographically, the circumflex artery appears normal.  4.  RIGHT CORONARY ARTERY:  The RCA is moderate size caliber vessel and gives rise to an acute marginal branch, posterior descending artery and a  posterolateral branch.  Angiographically, the right coronary artery is normal.    Assessment/Plan  1. Persistent Atrial Fibrillation:  - S/P ablation of atrial fibrillation.  Patient developed late moderate PEF S/P pericardiocentesis and placement of pericardial drain.   - LTD Echo is pending this AM for evaluation of effusion.  40 ml out of drain overnight. If no effusion will DC drain.   - Maintaining sinus this AM.  Nonsustained asymptomatic bradycardia noted.  Coreg decreased and will hold dose this AM.  Plan to resume this PM.   - QTc prolonged > 500 ms after first dose of dofetilide, medication has been discontinued.  Recheck EKG at noon.       - Plan to start low dose Flecainide tonight, 50 mg PO BID.  - Will start Eliquis tonight if echo with resolution of effusion.  - Keep in [facility] today.   - Discussed with patient and family at bedside.    ADDENDUM:  - LTD echo with very small effusion.  Drain with serous outpt, about 105 ml total this AM.  Will monitor drain output next few hours and resassess this afternoon.     Case review/plan in collaboration with Dr. Steele.      Please contact me with any questions.    DAISY Rivera.   [facility]  [phone]

## 2019-06-18 NOTE — CARE PLAN
Problem: Safety  Goal: Will remain free from injury    Intervention: Provide assistance with mobility  Patient calls appropriately to use bathroom      Problem: Pain Management  Goal: Pain level will decrease to patient's comfort goal    Intervention: Follow pain managment plan developed in collaboration with patient and Interdisciplinary Team  Patient using morphine for pain

## 2019-06-19 ENCOUNTER — APPOINTMENT (OUTPATIENT)
Dept: CARDIOLOGY | Facility: MEDICAL CENTER | Age: 64
DRG: 274 | End: 2019-06-19
Attending: NURSE PRACTITIONER
Payer: COMMERCIAL

## 2019-06-19 ENCOUNTER — APPOINTMENT (OUTPATIENT)
Dept: RADIOLOGY | Facility: MEDICAL CENTER | Age: 64
DRG: 274 | End: 2019-06-19
Attending: INTERNAL MEDICINE
Payer: COMMERCIAL

## 2019-06-19 LAB
ANION GAP SERPL CALC-SCNC: 5 MMOL/L (ref 0–11.9)
BASOPHILS # BLD AUTO: 0.2 % (ref 0–1.8)
BASOPHILS # BLD: 0.02 K/UL (ref 0–0.12)
BUN SERPL-MCNC: 13 MG/DL (ref 8–22)
CALCIUM SERPL-MCNC: 8.3 MG/DL (ref 8.5–10.5)
CHLORIDE SERPL-SCNC: 103 MMOL/L (ref 96–112)
CO2 SERPL-SCNC: 29 MMOL/L (ref 20–33)
CREAT SERPL-MCNC: 0.69 MG/DL (ref 0.5–1.4)
EKG IMPRESSION: NORMAL
EOSINOPHIL # BLD AUTO: 0.04 K/UL (ref 0–0.51)
EOSINOPHIL NFR BLD: 0.4 % (ref 0–6.9)
ERYTHROCYTE [DISTWIDTH] IN BLOOD BY AUTOMATED COUNT: 46.1 FL (ref 35.9–50)
GLUCOSE SERPL-MCNC: 112 MG/DL (ref 65–99)
HCT VFR BLD AUTO: 31 % (ref 37–47)
HGB BLD-MCNC: 10.2 G/DL (ref 12–16)
IMM GRANULOCYTES # BLD AUTO: 0.02 K/UL (ref 0–0.11)
IMM GRANULOCYTES NFR BLD AUTO: 0.2 % (ref 0–0.9)
LYMPHOCYTES # BLD AUTO: 1.18 K/UL (ref 1–4.8)
LYMPHOCYTES NFR BLD: 12.1 % (ref 22–41)
MAGNESIUM SERPL-MCNC: 2 MG/DL (ref 1.5–2.5)
MCH RBC QN AUTO: 31.7 PG (ref 27–33)
MCHC RBC AUTO-ENTMCNC: 32.9 G/DL (ref 33.6–35)
MCV RBC AUTO: 96.3 FL (ref 81.4–97.8)
MONOCYTES # BLD AUTO: 1.14 K/UL (ref 0–0.85)
MONOCYTES NFR BLD AUTO: 11.7 % (ref 0–13.4)
NEUTROPHILS # BLD AUTO: 7.32 K/UL (ref 2–7.15)
NEUTROPHILS NFR BLD: 75.4 % (ref 44–72)
NRBC # BLD AUTO: 0 K/UL
NRBC BLD-RTO: 0 /100 WBC
PLATELET # BLD AUTO: 133 K/UL (ref 164–446)
PMV BLD AUTO: 11.8 FL (ref 9–12.9)
POTASSIUM SERPL-SCNC: 3.5 MMOL/L (ref 3.6–5.5)
RBC # BLD AUTO: 3.22 M/UL (ref 4.2–5.4)
SODIUM SERPL-SCNC: 137 MMOL/L (ref 135–145)
WBC # BLD AUTO: 9.7 K/UL (ref 4.8–10.8)

## 2019-06-19 PROCEDURE — A9270 NON-COVERED ITEM OR SERVICE: HCPCS | Performed by: INTERNAL MEDICINE

## 2019-06-19 PROCEDURE — 93005 ELECTROCARDIOGRAM TRACING: CPT | Performed by: INTERNAL MEDICINE

## 2019-06-19 PROCEDURE — 700102 HCHG RX REV CODE 250 W/ 637 OVERRIDE(OP): Performed by: NURSE PRACTITIONER

## 2019-06-19 PROCEDURE — 770020 HCHG ROOM/CARE - TELE (206)

## 2019-06-19 PROCEDURE — 85025 COMPLETE CBC W/AUTO DIFF WBC: CPT

## 2019-06-19 PROCEDURE — A9270 NON-COVERED ITEM OR SERVICE: HCPCS | Performed by: NURSE PRACTITIONER

## 2019-06-19 PROCEDURE — 80048 BASIC METABOLIC PNL TOTAL CA: CPT

## 2019-06-19 PROCEDURE — 93308 TTE F-UP OR LMTD: CPT | Mod: 26 | Performed by: INTERNAL MEDICINE

## 2019-06-19 PROCEDURE — 71046 X-RAY EXAM CHEST 2 VIEWS: CPT

## 2019-06-19 PROCEDURE — 700111 HCHG RX REV CODE 636 W/ 250 OVERRIDE (IP): Performed by: INTERNAL MEDICINE

## 2019-06-19 PROCEDURE — 99233 SBSQ HOSP IP/OBS HIGH 50: CPT | Performed by: INTERNAL MEDICINE

## 2019-06-19 PROCEDURE — 93308 TTE F-UP OR LMTD: CPT

## 2019-06-19 PROCEDURE — 83735 ASSAY OF MAGNESIUM: CPT

## 2019-06-19 PROCEDURE — 93010 ELECTROCARDIOGRAM REPORT: CPT | Performed by: INTERNAL MEDICINE

## 2019-06-19 PROCEDURE — 700102 HCHG RX REV CODE 250 W/ 637 OVERRIDE(OP): Performed by: INTERNAL MEDICINE

## 2019-06-19 RX ORDER — POTASSIUM CHLORIDE 20 MEQ/1
40 TABLET, EXTENDED RELEASE ORAL ONCE
Status: COMPLETED | OUTPATIENT
Start: 2019-06-19 | End: 2019-06-19

## 2019-06-19 RX ORDER — POTASSIUM CHLORIDE 20 MEQ/1
60 TABLET, EXTENDED RELEASE ORAL ONCE
Status: COMPLETED | OUTPATIENT
Start: 2019-06-19 | End: 2019-06-19

## 2019-06-19 RX ORDER — SODIUM CHLORIDE 9 MG/ML
INJECTION, SOLUTION INTRAVENOUS
Status: ACTIVE
Start: 2019-06-19 | End: 2019-06-20

## 2019-06-19 RX ORDER — FUROSEMIDE 10 MG/ML
20 INJECTION INTRAMUSCULAR; INTRAVENOUS ONCE
Status: COMPLETED | OUTPATIENT
Start: 2019-06-19 | End: 2019-06-19

## 2019-06-19 RX ADMIN — ACETAMINOPHEN 650 MG: 325 TABLET, FILM COATED ORAL at 11:55

## 2019-06-19 RX ADMIN — FLECAINIDE ACETATE 75 MG: 150 TABLET ORAL at 18:04

## 2019-06-19 RX ADMIN — CARVEDILOL 3.12 MG: 3.12 TABLET, FILM COATED ORAL at 17:57

## 2019-06-19 RX ADMIN — APIXABAN 5 MG: 5 TABLET, FILM COATED ORAL at 06:10

## 2019-06-19 RX ADMIN — OMEPRAZOLE 20 MG: 20 CAPSULE, DELAYED RELEASE ORAL at 06:10

## 2019-06-19 RX ADMIN — FUROSEMIDE 20 MG: 10 INJECTION, SOLUTION INTRAMUSCULAR; INTRAVENOUS at 18:01

## 2019-06-19 RX ADMIN — CARVEDILOL 3.12 MG: 3.12 TABLET, FILM COATED ORAL at 07:41

## 2019-06-19 RX ADMIN — POTASSIUM CHLORIDE 60 MEQ: 20 TABLET, EXTENDED RELEASE ORAL at 11:51

## 2019-06-19 RX ADMIN — APIXABAN 5 MG: 5 TABLET, FILM COATED ORAL at 17:57

## 2019-06-19 RX ADMIN — POTASSIUM CHLORIDE 40 MEQ: 1500 TABLET, EXTENDED RELEASE ORAL at 17:57

## 2019-06-19 RX ADMIN — ACETAMINOPHEN 650 MG: 325 TABLET, FILM COATED ORAL at 18:11

## 2019-06-19 RX ADMIN — POTASSIUM CHLORIDE 10 MEQ: 1500 TABLET, EXTENDED RELEASE ORAL at 06:10

## 2019-06-19 RX ADMIN — FLECAINIDE ACETATE 75 MG: 150 TABLET ORAL at 06:10

## 2019-06-19 RX ADMIN — ATORVASTATIN CALCIUM 10 MG: 10 TABLET, FILM COATED ORAL at 21:52

## 2019-06-19 ASSESSMENT — CHA2DS2 SCORE
CHA2DS2 VASC SCORE: 3
DIABETES: NO
HYPERTENSION: YES
PRIOR STROKE OR TIA OR THROMBOEMBOLISM: NO
VASCULAR DISEASE: YES
CHF OR LEFT VENTRICULAR DYSFUNCTION: NO
AGE 65 TO 74: NO
SEX: FEMALE
AGE 75 OR GREATER: NO

## 2019-06-19 ASSESSMENT — ENCOUNTER SYMPTOMS
FATIGUE: 1
ABDOMINAL PAIN: 0
DIAPHORESIS: 0
STRIDOR: 0
CHILLS: 0
COLOR CHANGE: 0
PALPITATIONS: 0
FEVER: 0
LIGHT-HEADEDNESS: 0
SHORTNESS OF BREATH: 0
CHEST TIGHTNESS: 0
COUGH: 0
DIZZINESS: 0
WHEEZING: 0

## 2019-06-19 NOTE — PROGRESS NOTES
Cardiology Follow Up Progress Note    Date of Service  6/19/2019    Attending Physician  Augusto Steele M.D.    Chief Complaint   Elective admission for Atrial Fibrillation Ablation and dofetilide loading.     HPI  Chinyere Priest is a 63 y.o. female admitted 6/17/2019 for elective AF ablation for Persistent Atrial Fibrillation with dofetilide loading. Per Dr. Steele's H&P: For catheter ablation persistent atrial for ablation since 2017 with fatigue and some dyspnea on exertion.  Here for pulmonary vein isolation and dofetilide.  On Eliquis.  No constitutional symptoms.     Past medical history significant for Persistent Atrial Fibrillation, on chronic anticoagulation with Eliquis, hypertension, and pulmonary hypertension.     Interim Events  06/19/2019: Seen in EP Rounds  Patient feels well, denies chest pain, dyspnea, palpitations, dizziness/lightheadedness, numbness/tingling in extremities, or Edema. Denies Cardiac complaints at this time.  Bilateral femoral access sites are clean and dry.  No hematoma noted. No erythema.  Mild bruising present on right groin site, soft. No hematoma noted.   Mid-sternal site, CDI, open to air, mild bruising. No drainage, edema, erythema, or hematoma noted.    Vital Signs/Labs: Were reviewed. Afebrile, no leukocytosis, BP stable. K+ 3.5, Mg+ 2.0- electrolyte replacement per pharmacy, Renal function stable.  Telemetry monitor: sinus rhythm, occasional PVCs. No significant events or rhythm issues overnight.   EKG today shows sinus rhythm, 1st degree AVB, no acute findings.    Review of Systems  Review of Systems   Constitutional: Positive for fatigue. Negative for chills, diaphoresis and fever.   Respiratory: Negative for cough, chest tightness, shortness of breath, wheezing and stridor.    Cardiovascular: Negative for chest pain, palpitations and leg swelling.   Gastrointestinal: Negative for abdominal pain.   Genitourinary: Negative for difficulty urinating and hematuria.    Skin: Negative for color change.   Neurological: Negative for dizziness and light-headedness.     Vital signs in last 24 hours  Temp:  [36.2 °C (97.2 °F)-37.5 °C (99.5 °F)] 37.3 °C (99.2 °F)  Pulse:  [63-77] 66  Resp:  [16-25] 20  SpO2:  [90 %-99 %] 95 %    Physical Exam  Physical Exam   Constitutional: She is oriented to person, place, and time. She appears well-developed and well-nourished. No distress.   Neck: No JVD present. No thyromegaly present.   Cardiovascular: Normal rate and regular rhythm.    No murmur heard.  Pulses:       Radial pulses are 2+ on the right side, and 2+ on the left side.        Dorsalis pedis pulses are 2+ on the right side, and 2+ on the left side.   Pulmonary/Chest: Effort normal and breath sounds normal. No respiratory distress. She has no wheezes. She has no rales. She exhibits no tenderness.   Abdominal: Soft. There is no tenderness.   Musculoskeletal: She exhibits no edema.   Neurological: She is alert and oriented to person, place, and time.   Skin: Skin is warm and dry. She is not diaphoretic. No erythema.   Bilateral femoral access sites are clean and dry.  No hematoma noted. No erythema.  Mild bruising present on right groin site, soft. No hematoma noted.   Mid-sternal site, CDI, open to air, mild bruising. No drainage, edema, erythema, or hematoma noted.    Psychiatric: She has a normal mood and affect. Her behavior is normal. Judgment and thought content normal.   Nursing note reviewed.    Lab Review  Lab Results   Component Value Date/Time    WBC 9.7 06/19/2019 04:34 AM    RBC 3.22 (L) 06/19/2019 04:34 AM    HEMOGLOBIN 10.2 (L) 06/19/2019 04:34 AM    HEMATOCRIT 31.0 (L) 06/19/2019 04:34 AM    MCV 96.3 06/19/2019 04:34 AM    MCH 31.7 06/19/2019 04:34 AM    MCHC 32.9 (L) 06/19/2019 04:34 AM    MPV 11.8 06/19/2019 04:34 AM      Lab Results   Component Value Date/Time    SODIUM 137 06/19/2019 04:34 AM    POTASSIUM 3.5 (L) 06/19/2019 04:34 AM    CHLORIDE 103 06/19/2019 04:34 AM     CO2 29 06/19/2019 04:34 AM    GLUCOSE 112 (H) 06/19/2019 04:34 AM    BUN 13 06/19/2019 04:34 AM    CREATININE 0.69 06/19/2019 04:34 AM      Lab Results   Component Value Date/Time    ASTSGOT 35 06/18/2019 05:21 AM    ALTSGPT 17 06/18/2019 05:21 AM     Lab Results   Component Value Date/Time    CHOLSTRLTOT 182 10/05/2017 10:22 AM     (H) 10/05/2017 10:22 AM    HDL 60 10/05/2017 10:22 AM    TRIGLYCERIDE 63 10/05/2017 10:22 AM           Cardiac Imaging and Procedures Review  EKG 06/19/2019:  Sinus rhythm, 1st degree AVB    Limited Echocardiogram 06/19/2019:    Limited echo to evaluate for pericardial effusion s/p drain removal.   Prior echo on 6/18/19. Compared to the report of the study done - there   has been no significant change.   Normal left ventricular size and systolic function.  Normal pericardium without effusion.  Pleural effusion noted.    Echocardiogram (LEEANNA):  6/17/19  CONCLUSIONS  Normal left ventricular size and systolic function.  No left atrial thrombus.  No thrombus detected in the left atrial appendage.     Procedural conclusions per Dr. Steele's Op Note (06/17/2019):  Electrophysiology Procedure Note [facility]  Indication: Symptomatic persistent atrial fibrillation  Procedure Performed: 1. Atrial fibrillation ablation 2. Advanced mapping using CARTO system  3. Transesophageal echocardiography 4. Intracardiac echocardiography 5. Esophageal temperature monitoring 6. Intraarterial blood pressure monitoring 7. Frequent ACT's   Physician(s): PHYLICIA Steele M.D.  Resident/Assistant(s): None  Anesthesia: General endotracheal anesthetic.  Anaesthesiologist: Dr. Oj Silver  Specimen(s) Removed: None  Pre-procedure ECG: Atrial fibrillation  Post Procedure ECG: Sinus rhythm  Total ablation time: 2468 seconds  Fluoroscopy time: 7.7 minutes  Electrophysiologic Findings:    1. Baseline: Atrial fibrillation 777 ms,  ms, HV 51 Ms.   2. Final  SR 1065 ms  Impressions:    1.   Persistent atrial fibrillation.    2. Successful RF ablation pulmonary vein isolation procedure.       Pre-Operative Diagnosis: Status post atrial fibrillation ablation, late cardiac tamponade   Post-Operative Diagnosis: Successful pericardiocentesis with resolution of hypotension and pericardial fluid  Indications: Cardiac tamponade post catheter ablation  Procedure(s) Performed: Pericardiocentesis  Physician(s): PHYLICIA Steele M.D.  Anesthesia: Local anesthesia  Pre-procedure ECG: Sinus rhythm  Post-procedure ECG: Sinus rhythm  Complications:  None  Fluroscopy time: Less than 2 minutes  Impressions:    1.  Late cardiac tamponade post ablation.  2.  Successful pericardiocentesis.    Cardiac Catheterization:  5/26/17  1.  LEFT MAIN ARTERY:  Left main vessel is a large caliber vessel, angiographically normal, bifurcates into the left anterior descending artery and circumflex artery.  2.  LEFT ANTERIOR DESCENDING ARTERY:  The LAD gives rise to a first diagonal branch and a normal complement of septal branches and extends around the apex.    The LAD is widely patent with the exception of a very mild eccentric smooth focal proximal atheroma otherwise the LAD appears angiographically normal.  3.  CIRCUMFLEX ARTERY:  The circumflex gives rise to 2 major long marginal branches and a small caliber AV groove branch.  Angiographically, the circumflex artery appears normal.  4.  RIGHT CORONARY ARTERY:  The RCA is moderate size caliber vessel and gives rise to an acute marginal branch, posterior descending artery and a posterolateral branch.  Angiographically, the right coronary artery is normal.    Imaging  Chest X-Ray 06/19/2019:  pending    Assessment/Plan  1. Persistent Atrial Fibrillation S/P AF Ablation  - S/P successful RF ablation pulmonary vein isolation procedure by Dr. Steele on 06/17/2019 with development of late moderate pericardial effusion s/p pericardiocentesis and placement of pericardial drain. Drain removed  06/18/19.   - LTD echocardiogram today showed minimal pericardial fluid, no significant change from prior.   - Maintaining sinus rhythm, 1st degree AVB, no arrhythmias or significant events overnight.   - Intolerant to Tikosyn secondary to QT prolongation after initial dose, now on flecainide.   - On Flecainide 75 mg BID and coreg 3.125 mg BID, tolerating well, continue.   - On NOAC with Eliquis, continue.   - On esophageal protection with PPI, continue x 1 month post ablation.   - Chest xray pending.   - Reviewed with Dr. Steele, will transfer patient to telemetry and continue to monitor overnight.  - Discussed plan to patient and family at bedside. All questions/concerns were answered.  Patient verbalized understanding and agreeable with plan.   - Will repeat Limited echo and Labs in the morning.    Thank you for allowing me to participate in the care of this patient.  I will continue to follow this patient    Please contact me with any questions.    DAISY Lam.   [facility]  [phone]

## 2019-06-19 NOTE — PROGRESS NOTES
Pericardial drain was removed by Dr Steele, the patient tolerated well, no changes in vitals, patient reports less pain after removal.  Patient continues to have runs of afib mixed in with normal sinus rhythm

## 2019-06-19 NOTE — CARE PLAN
Problem: Safety  Goal: Will remain free from injury  Safety precautions in place. Hourly rounding, stand by assist. Call light within reach. Threaded socks on.     Problem: Respiratory:  Goal: Respiratory status will improve  Respiratory system assessment completed. Pt currently on RA. Educated on breathing exercises and energy conservation. Home eval to be completed.

## 2019-06-19 NOTE — DISCHARGE SUMMARY
HOSPITAL DISCHARGE INSTRUCTIONS    CHIEF COMPLAINT ON ADMISSION  Elective AF ablation for Persistent Atrial Fibrillation and dofetilide loading.     CODE STATUS  Full Code    HPI & HOSPITAL COURSE  This is a 63 y.o. year old female here for elective AF ablation for Persistent Atrial Fibrillation. Per Dr. Steele's H&P: For catheter ablation persistent atrial for ablation since 2017 with fatigue and some dyspnea on exertion.  Here for pulmonary vein isolation and dofetilide.  On Eliquis.  No constitutional symptoms.    Past medical history significant for Persistent Atrial Fibrillation, on chronic anticoagulation with Eliquis.     Procedural conclusions per Dr. Steele's Op Note (06/17/2019):  Electrophysiology Procedure Note [facility]  Indication: Symptomatic persistent atrial fibrillation  Procedure Performed: 1. Atrial fibrillation ablation 2. Advanced mapping using CARTO system  3. Transesophageal echocardiography 4. Intracardiac echocardiography 5. Esophageal temperature monitoring 6. Intraarterial blood pressure monitoring 7. Frequent ACT's   Physician(s): PHYLICIA Steele M.D.  Resident/Assistant(s): None  Anesthesia: General endotracheal anesthetic.  Anaesthesiologist: Dr. Oj Silver  Specimen(s) Removed: None  Pre-procedure ECG: Atrial fibrillation  Post Procedure ECG: Sinus rhythm  Total ablation time: 2468 seconds  Fluoroscopy time: 7.7 minutes  Electrophysiologic Findings:    1. Baseline: Atrial fibrillation 777 ms,  ms, HV 51 Ms.   2. Final  SR 1065 ms  Impressions:    1.  Persistent atrial fibrillation.    2. Successful RF ablation pulmonary vein isolation procedure.      Pre-Operative Diagnosis: Status post atrial fibrillation ablation, late cardiac tamponade   Post-Operative Diagnosis: Successful pericardiocentesis with resolution of hypotension and pericardial fluid  Indications: Cardiac tamponade post catheter ablation  Procedure(s) Performed: Pericardiocentesis  Physician(s): PHYLICIA  SUSANNA Steele.  Anesthesia: Local anesthesia  Pre-procedure ECG: Sinus rhythm  Post-procedure ECG: Sinus rhythm  Complications:  None  Fluroscopy time: Less than 2 minutes  Impressions:    1.  Late cardiac tamponade post ablation.  2.  Successful pericardiocentesis.    Status post catheter ablation with late pericardial effusion with pericardiocentesis. No reaccumulation of fluid seen on echocardiogram.  Mild pleural effusions seen on chest x-ray.  The patient has been seen and examined in EP rounds this AM.  Her monitored rhythm is sinus rhythm, 1st degree AVB presently.  Telemetry history, EKGs, labs, vital signs, and imaging have been reviewed and are stable, no arrhthymias or abnormalities.  The patient denies any chest pain, dyspnea, dizziness, paraesthesias, or other complaints.  Her physical exam is without acute findings and CMS fully intact; specifically her right and left femoral access sites which are clean, dry, intact with tegaderm/gauze. No edema, erythema, or hematoma. Mild bruising around right groin site, soft and non-tender. She has been out of bed and ambulated without difficulty. Discussed with mireille Yusuf to discharge home.     Therefore, she is discharged in good and stable condition with close outpatient follow-up. She was unable to tolerate Tikosyn secondary to QT prolongation >500 ms after initial dose.  Therefore she was switched to low dose Tambocor 75 mg twice daily and maintaining sinus rhythm. On supplemental K+ 10 Meq daily, will recheck CMP in 1 week. Repeat limited echocardiogram in 1 week and office EKG check next week.     DISCHARGE PROBLEM LIST  1. Persistent Atrial Fibrillation   2. S/P successful AF ablation with pulmonary vein isolation procedure by Dr. Steele on 06/17/2019  3. Status post catheter ablation with pericardial effusion.  4. Chronic Anticoagulation with Eliquis  5. High Risk Medication with Flecainide  6. Failure to tolerate Tikosyn secondary to QT prolongation      Chinyere Priest   Home Medication Instructions GARDENIA:[id number]    Printed on:06/20/19 7010   Medication Information                      albuterol 108 (90 BASE) MCG/ACT Aero Soln inhalation aerosol  Inhale 2 Puffs by mouth every four hours as needed.             apixaban (ELIQUIS) 5mg Tab  Take 1 Tab by mouth 2 Times a Day.             atorvastatin (LIPITOR) 10 MG Tab  TAKE 1 TABLET BY MOUTH EVERY DAY             carvedilol (COREG) 3.125 MG Tab  Take 1 Tab by mouth 2 times a day, with meals.             fexofenadine (ALLEGRA) 180 MG tablet  Take 1 Tab by mouth every day.             flecainide (TAMBOCOR) 150 MG Tab  Take 0.5 Tabs by mouth 2 Times a Day.             fluticasone (FLONASE) 50 MCG/ACT nasal spray  Spray 1 Spray in nose 2 times a day.             omeprazole (PRILOSEC) 20 MG delayed-release capsule  Take 1 Cap by mouth every morning before breakfast.             potassium chloride ER (KLOR-CON) 10 MEQ tablet  Take 1 Tab by mouth every day.             vitamin D (CHOLECALCIFEROL) 1000 UNIT TABS  Take 2 Tabs by mouth every day.               DIET: CARDIAC DIET    LABORATORY  Lab Results   Component Value Date/Time    SODIUM 137 06/19/2019 04:34 AM    POTASSIUM 3.5 (L) 06/19/2019 04:34 AM    CHLORIDE 103 06/19/2019 04:34 AM    CO2 29 06/19/2019 04:34 AM    GLUCOSE 112 (H) 06/19/2019 04:34 AM    BUN 13 06/19/2019 04:34 AM    CREATININE 0.69 06/19/2019 04:34 AM      Lab Results   Component Value Date/Time    WBC 9.7 06/19/2019 04:34 AM    HEMOGLOBIN 10.2 (L) 06/19/2019 04:34 AM    HEMATOCRIT 31.0 (L) 06/19/2019 04:34 AM    PLATELETCT 133 (L) 06/19/2019 04:34 AM      ACTIVITY/SPECIFIC OUTPATIENT DISCHARGE INSTRUCTIONS  Post Ablation Patient Instructions:  No lifting > 10 lbs x 1 week.  No baths or hot tubs x 1 week.  May shower on Thursday 06/20/2019 and take off groin dressings.  Continue to monitor sites daily for warmth, redness, discolored drainage.     Please take the esophageal protection  medication that is prescribed to you for one month post procedure without missed doses.  Please do not miss any doses of your blood thinner Eliquis.       Please walk and take deep breaths after discharge.  After discharge, if  experiences chest pain, shortness of breath, hemoptysis, neurological changes, high fever, lightheadedness/dizziness, trouble with catheter sites needs to be seen in the emergency department.    FOLLOW UP  Patient will follow up with EP in outpatient in 1-2 weeks as arranged for close followup or sooner if needed. Patient instructed to complete outpatient labs in 1 week, repeat limited echocardiogram in 1 week, and office EKG check next week as scheduled. Patient instructed to call the office with any questions/concerns. Work note provided to patient. The above plan and medications were reviewed in detail with the patient at time of discharge.  All patients questions/concerns were answered and patient verbalized understanding and is in agreement.    Future Appointments  Date Time Provider Department Center   6/25/2019 10:45 AM EKG[facility] None   7/1/2019 12:40 PM DANIELLE Lam [facility] None     Thank you for allowing me to participate in this patients care.  Please contact me with any questions or concerns.     DAISY Lam.   [facility]  [phone]

## 2019-06-19 NOTE — PROGRESS NOTES
Status post catheter ablation with pericardial effusion.  Pericardiocentesis with placement of pigtail yesterday.  Repeat echocardiogram this morning minimal pericardial fluid.  Some serous fluid removed this morning.  Recheck drain and no drainage with direct suction.  Pericardial catheter removed.  Plan  Limited echo in the morning  Labs in the morning  Restart Eliquis in the morning  Flecainide 75 twice daily  Discussed in detail with the patient and the patient's family.

## 2019-06-20 ENCOUNTER — HOSPITAL ENCOUNTER (INPATIENT)
Dept: CARDIOLOGY | Facility: MEDICAL CENTER | Age: 64
DRG: 274 | End: 2019-06-20
Attending: NURSE PRACTITIONER | Admitting: INTERNAL MEDICINE
Payer: COMMERCIAL

## 2019-06-20 VITALS
SYSTOLIC BLOOD PRESSURE: 130 MMHG | DIASTOLIC BLOOD PRESSURE: 48 MMHG | WEIGHT: 182.32 LBS | BODY MASS INDEX: 28.62 KG/M2 | TEMPERATURE: 99.2 F | HEART RATE: 63 BPM | OXYGEN SATURATION: 95 % | HEIGHT: 67 IN | RESPIRATION RATE: 19 BRPM

## 2019-06-20 PROBLEM — Z98.890 S/P ABLATION OF ATRIAL FIBRILLATION: Status: ACTIVE | Noted: 2019-06-20

## 2019-06-20 PROBLEM — Z86.79 S/P ABLATION OF ATRIAL FIBRILLATION: Status: ACTIVE | Noted: 2019-06-20

## 2019-06-20 LAB
ANION GAP SERPL CALC-SCNC: 4 MMOL/L (ref 0–11.9)
BASOPHILS # BLD AUTO: 0.3 % (ref 0–1.8)
BASOPHILS # BLD: 0.03 K/UL (ref 0–0.12)
BUN SERPL-MCNC: 15 MG/DL (ref 8–22)
CALCIUM SERPL-MCNC: 8.7 MG/DL (ref 8.5–10.5)
CHLORIDE SERPL-SCNC: 104 MMOL/L (ref 96–112)
CO2 SERPL-SCNC: 29 MMOL/L (ref 20–33)
CREAT SERPL-MCNC: 0.74 MG/DL (ref 0.5–1.4)
EKG IMPRESSION: NORMAL
EOSINOPHIL # BLD AUTO: 0.11 K/UL (ref 0–0.51)
EOSINOPHIL NFR BLD: 1.2 % (ref 0–6.9)
ERYTHROCYTE [DISTWIDTH] IN BLOOD BY AUTOMATED COUNT: 46.8 FL (ref 35.9–50)
GLUCOSE SERPL-MCNC: 110 MG/DL (ref 65–99)
HCT VFR BLD AUTO: 33.1 % (ref 37–47)
HGB BLD-MCNC: 10.4 G/DL (ref 12–16)
IMM GRANULOCYTES # BLD AUTO: 0.03 K/UL (ref 0–0.11)
IMM GRANULOCYTES NFR BLD AUTO: 0.3 % (ref 0–0.9)
LYMPHOCYTES # BLD AUTO: 1.12 K/UL (ref 1–4.8)
LYMPHOCYTES NFR BLD: 12.4 % (ref 22–41)
MAGNESIUM SERPL-MCNC: 2 MG/DL (ref 1.5–2.5)
MCH RBC QN AUTO: 30.6 PG (ref 27–33)
MCHC RBC AUTO-ENTMCNC: 31.4 G/DL (ref 33.6–35)
MCV RBC AUTO: 97.4 FL (ref 81.4–97.8)
MONOCYTES # BLD AUTO: 0.97 K/UL (ref 0–0.85)
MONOCYTES NFR BLD AUTO: 10.8 % (ref 0–13.4)
NEUTROPHILS # BLD AUTO: 6.75 K/UL (ref 2–7.15)
NEUTROPHILS NFR BLD: 75 % (ref 44–72)
NRBC # BLD AUTO: 0 K/UL
NRBC BLD-RTO: 0 /100 WBC
PLATELET # BLD AUTO: 152 K/UL (ref 164–446)
PMV BLD AUTO: 11.5 FL (ref 9–12.9)
POTASSIUM SERPL-SCNC: 4.1 MMOL/L (ref 3.6–5.5)
RBC # BLD AUTO: 3.4 M/UL (ref 4.2–5.4)
SODIUM SERPL-SCNC: 137 MMOL/L (ref 135–145)
WBC # BLD AUTO: 9 K/UL (ref 4.8–10.8)

## 2019-06-20 PROCEDURE — 85025 COMPLETE CBC W/AUTO DIFF WBC: CPT

## 2019-06-20 PROCEDURE — A9270 NON-COVERED ITEM OR SERVICE: HCPCS | Performed by: INTERNAL MEDICINE

## 2019-06-20 PROCEDURE — 93010 ELECTROCARDIOGRAM REPORT: CPT | Performed by: INTERNAL MEDICINE

## 2019-06-20 PROCEDURE — 80048 BASIC METABOLIC PNL TOTAL CA: CPT

## 2019-06-20 PROCEDURE — 700102 HCHG RX REV CODE 250 W/ 637 OVERRIDE(OP): Performed by: NURSE PRACTITIONER

## 2019-06-20 PROCEDURE — 83735 ASSAY OF MAGNESIUM: CPT

## 2019-06-20 PROCEDURE — 99238 HOSP IP/OBS DSCHRG MGMT 30/<: CPT | Performed by: NURSE PRACTITIONER

## 2019-06-20 PROCEDURE — 700102 HCHG RX REV CODE 250 W/ 637 OVERRIDE(OP): Performed by: INTERNAL MEDICINE

## 2019-06-20 PROCEDURE — 93005 ELECTROCARDIOGRAM TRACING: CPT | Performed by: INTERNAL MEDICINE

## 2019-06-20 PROCEDURE — A9270 NON-COVERED ITEM OR SERVICE: HCPCS | Performed by: NURSE PRACTITIONER

## 2019-06-20 RX ORDER — CARVEDILOL 3.12 MG/1
3.12 TABLET ORAL 2 TIMES DAILY WITH MEALS
Qty: 60 TAB | Refills: 3 | Status: SHIPPED | OUTPATIENT
Start: 2019-06-20 | End: 2019-09-10 | Stop reason: SDUPTHER

## 2019-06-20 RX ORDER — OMEPRAZOLE 20 MG/1
20 CAPSULE, DELAYED RELEASE ORAL
Qty: 30 CAP | Refills: 0 | Status: SHIPPED | OUTPATIENT
Start: 2019-06-21 | End: 2019-08-06

## 2019-06-20 RX ORDER — FLECAINIDE ACETATE 150 MG/1
75 TABLET ORAL 2 TIMES DAILY
Qty: 60 TAB | Refills: 3 | Status: SHIPPED | OUTPATIENT
Start: 2019-06-20 | End: 2019-07-01 | Stop reason: SDUPTHER

## 2019-06-20 RX ADMIN — POTASSIUM CHLORIDE 10 MEQ: 1500 TABLET, EXTENDED RELEASE ORAL at 05:34

## 2019-06-20 RX ADMIN — FLECAINIDE ACETATE 75 MG: 150 TABLET ORAL at 05:34

## 2019-06-20 RX ADMIN — CARVEDILOL 3.12 MG: 3.12 TABLET, FILM COATED ORAL at 07:36

## 2019-06-20 RX ADMIN — OMEPRAZOLE 20 MG: 20 CAPSULE, DELAYED RELEASE ORAL at 05:34

## 2019-06-20 RX ADMIN — APIXABAN 5 MG: 5 TABLET, FILM COATED ORAL at 05:34

## 2019-06-20 NOTE — DISCHARGE INSTRUCTIONS
ACTIVITY/SPECIFIC OUTPATIENT DISCHARGE INSTRUCTIONS   Post Ablation Patient Instructions:   No lifting > 10 lbs x 1 week.  No baths or hot tubs x 1 week.  May shower on Thursday 06/20/2019 and take off groin dressings.  Continue to monitor sites daily for warmth, redness, discolored drainage.      Please take the esophageal protection medication that is prescribed to you for one month post procedure without missed doses.  Please do not miss any doses of your blood thinner Eliquis.       Please walk and take deep breaths after discharge.  After discharge, if  experiences chest pain, shortness of breath, hemoptysis, neurological changes, high fever, lightheadedness/dizziness, trouble with catheter sites needs to be seen in the emergency department.     FOLLOW UP   Patient will follow up with EP in outpatient in 1-2 weeks as arranged for close followup or sooner if needed.  Patient instructed to call the office with any questions/concerns. Work note provided to patient.      Discharge Instructions    Discharged to home by car with relative. Discharged via wheelchair, hospital escort: Yes.  Special equipment needed: Not Applicable    Be sure to schedule a follow-up appointment with your primary care doctor or any specialists as instructed.     Discharge Plan:   Diet Plan: Discussed  Activity Level: Discussed  Confirmed Follow up Appointment: Appointment Scheduled  Confirmed Symptoms Management: Discussed  Medication Reconciliation Updated: Yes  Influenza Vaccine Indication: Not indicated: Previously immunized this influenza season and > 8 years of age    I understand that a diet low in cholesterol, fat, and sodium is recommended for good health. Unless I have been given specific instructions below for another diet, I accept this instruction as my diet prescription.   Other diet: Low fat, low salt    Special Instructions:     · Is patient discharged on Warfarin / Coumadin?   No     Depression / Suicide Risk    As you are  discharged from this [facility] facility, it is important to learn how to keep safe from harming yourself.    Recognize the warning signs:  · Abrupt changes in personality, positive or negative- including increase in energy   · Giving away possessions  · Change in eating patterns- significant weight changes-  positive or negative  · Change in sleeping patterns- unable to sleep or sleeping all the time   · Unwillingness or inability to communicate  · Depression  · Unusual sadness, discouragement and loneliness  · Talk of wanting to die  · Neglect of personal appearance   · Rebelliousness- reckless behavior  · Withdrawal from people/activities they love  · Confusion- inability to concentrate     If you or a loved one observes any of these behaviors or has concerns about self-harm, here's what you can do:  · Talk about it- your feelings and reasons for harming yourself  · Remove any means that you might use to hurt yourself (examples: pills, rope, extension cords, firearm)  · Get professional help from the community (Mental Health, Substance Abuse, psychological counseling)  · Do not be alone:Call your Safe Contact- someone whom you trust who will be there for you.  · Call your local CRISIS HOTLINE 705-0302 or 574-223-1432  · Call your local Children's Mobile Crisis Response Team [place] (861) 148-9742 or www.Healthiest You  · Call the toll free National Suicide Prevention Hotlines   · National Suicide Prevention Lifeline 199-386-ILEF (3528)  · National Hope Line Network 800-SUICIDE (184-5322)

## 2019-06-20 NOTE — CARE PLAN
Problem: Communication  Goal: The ability to communicate needs accurately and effectively will improve  Outcome: MET Date Met: 06/20/19      Problem: Safety  Goal: Will remain free from injury  Outcome: MET Date Met: 06/20/19    Goal: Will remain free from falls  Outcome: MET Date Met: 06/20/19      Problem: Infection  Goal: Will remain free from infection  Outcome: MET Date Met: 06/20/19      Problem: Venous Thromboembolism (VTW)/Deep Vein Thrombosis (DVT) Prevention:  Goal: Patient will participate in Venous Thrombosis (VTE)/Deep Vein Thrombosis (DVT)Prevention Measures  Outcome: MET Date Met: 06/20/19      Problem: Bowel/Gastric:  Goal: Normal bowel function is maintained or improved  Outcome: MET Date Met: 06/20/19    Goal: Will not experience complications related to bowel motility  Outcome: MET Date Met: 06/20/19      Problem: Knowledge Deficit  Goal: Knowledge of disease process/condition, treatment plan, diagnostic tests, and medications will improve  Outcome: MET Date Met: 06/20/19    Goal: Knowledge of the prescribed therapeutic regimen will improve  Outcome: MET Date Met: 06/20/19      Problem: Discharge Barriers/Planning  Goal: Patient's continuum of care needs will be met  Outcome: MET Date Met: 06/20/19      Problem: Fluid Volume:  Goal: Will maintain balanced intake and output  Outcome: MET Date Met: 06/20/19      Problem: Pain Management  Goal: Pain level will decrease to patient's comfort goal  Outcome: MET Date Met: 06/20/19      Problem: Respiratory:  Goal: Respiratory status will improve  Outcome: MET Date Met: 06/20/19

## 2019-06-25 ENCOUNTER — NON-PROVIDER VISIT (OUTPATIENT)
Dept: CARDIOLOGY | Facility: MEDICAL CENTER | Age: 64
End: 2019-06-25
Payer: COMMERCIAL

## 2019-06-25 ENCOUNTER — TELEPHONE (OUTPATIENT)
Dept: CARDIOLOGY | Facility: MEDICAL CENTER | Age: 64
End: 2019-06-25

## 2019-06-25 DIAGNOSIS — I48.19 PERSISTENT ATRIAL FIBRILLATION (HCC): ICD-10-CM

## 2019-06-25 PROCEDURE — 93000 ELECTROCARDIOGRAM COMPLETE: CPT | Performed by: INTERNAL MEDICINE

## 2019-06-25 NOTE — LETTER
[place] [facility]  [address] [facility] [address]     June 25, 2019    Patient: Chinyere Priest   YOB: 1955   Date of Visit: 6/25/2019       To Whom It May Concern:    Chinyere Priest was seen and treated in our department on 6/25/2019. She will be reevaluated on 7-1-19 for an appt. SHE NEEDS TO REMAIN OFF OF WORK DURING THIS TIME. SHE WILL BE REVALUATED ON 7-1-19 rETURN TO WORK WILL BE DETERMINED AT THAT TIME.     Sincerely,     ERROL MACIAS

## 2019-06-25 NOTE — TELEPHONE ENCOUNTER
Pt here for ekg. C/o new onset groin knots bilat. Starting last night. Rt groin about 1 in long by 1/4 in wide, left groin knot dime size. No active bleeding. Groins appear to have dissipating bruising. Denies pain at rest or ambulation in either groin. She states she was told by ED to notify us if she had the knots as she did not have them on discharge. ED paged. DISCUSSED FINDINGS.  Advised pt to watch for enlarging of bruising on either side, advised can draw with briceie to watch, no bruits heard or bleeding  at site. Pt advised to call with any changes before eval at 7/1. Pt states she is to return to work on 6/26 but will be doing a lot of standing an walking. Pt ? If can go back to work on 7/2 after evaluated on 7/1. Spoke with ABELARDO rajan and ok given. Letter written.

## 2019-06-26 ENCOUNTER — HOSPITAL ENCOUNTER (OUTPATIENT)
Dept: LAB | Facility: MEDICAL CENTER | Age: 64
End: 2019-06-26
Attending: NURSE PRACTITIONER
Payer: COMMERCIAL

## 2019-06-26 DIAGNOSIS — Z98.890 S/P ABLATION OF ATRIAL FIBRILLATION: ICD-10-CM

## 2019-06-26 DIAGNOSIS — I10 ESSENTIAL HYPERTENSION, BENIGN: ICD-10-CM

## 2019-06-26 DIAGNOSIS — Z86.79 S/P ABLATION OF ATRIAL FIBRILLATION: ICD-10-CM

## 2019-06-26 DIAGNOSIS — I48.19 PERSISTENT ATRIAL FIBRILLATION (HCC): ICD-10-CM

## 2019-06-26 LAB
ALBUMIN SERPL BCP-MCNC: 4 G/DL (ref 3.2–4.9)
ALBUMIN/GLOB SERPL: 1.4 G/DL
ALP SERPL-CCNC: 63 U/L (ref 30–99)
ALT SERPL-CCNC: 19 U/L (ref 2–50)
ANION GAP SERPL CALC-SCNC: 11 MMOL/L (ref 0–11.9)
AST SERPL-CCNC: 21 U/L (ref 12–45)
BILIRUB SERPL-MCNC: 0.8 MG/DL (ref 0.1–1.5)
BUN SERPL-MCNC: 11 MG/DL (ref 8–22)
CALCIUM SERPL-MCNC: 9.3 MG/DL (ref 8.5–10.5)
CHLORIDE SERPL-SCNC: 105 MMOL/L (ref 96–112)
CO2 SERPL-SCNC: 23 MMOL/L (ref 20–33)
CREAT SERPL-MCNC: 0.82 MG/DL (ref 0.5–1.4)
EKG IMPRESSION: NORMAL
GLOBULIN SER CALC-MCNC: 2.9 G/DL (ref 1.9–3.5)
GLUCOSE SERPL-MCNC: 104 MG/DL (ref 65–99)
POTASSIUM SERPL-SCNC: 4 MMOL/L (ref 3.6–5.5)
PROT SERPL-MCNC: 6.9 G/DL (ref 6–8.2)
SODIUM SERPL-SCNC: 139 MMOL/L (ref 135–145)

## 2019-06-26 PROCEDURE — 80053 COMPREHEN METABOLIC PANEL: CPT

## 2019-06-26 PROCEDURE — 36415 COLL VENOUS BLD VENIPUNCTURE: CPT

## 2019-07-01 ENCOUNTER — OFFICE VISIT (OUTPATIENT)
Dept: CARDIOLOGY | Facility: MEDICAL CENTER | Age: 64
End: 2019-07-01
Payer: COMMERCIAL

## 2019-07-01 VITALS
HEIGHT: 66 IN | DIASTOLIC BLOOD PRESSURE: 72 MMHG | OXYGEN SATURATION: 97 % | BODY MASS INDEX: 27.8 KG/M2 | HEART RATE: 66 BPM | SYSTOLIC BLOOD PRESSURE: 142 MMHG | WEIGHT: 173 LBS

## 2019-07-01 DIAGNOSIS — Z79.899 HIGH RISK MEDICATION USE: ICD-10-CM

## 2019-07-01 DIAGNOSIS — Z79.01 CHRONIC ANTICOAGULATION: ICD-10-CM

## 2019-07-01 DIAGNOSIS — I48.19 PERSISTENT ATRIAL FIBRILLATION (HCC): ICD-10-CM

## 2019-07-01 DIAGNOSIS — Z98.890 S/P ABLATION OF ATRIAL FIBRILLATION: ICD-10-CM

## 2019-07-01 DIAGNOSIS — Z86.79 S/P ABLATION OF ATRIAL FIBRILLATION: ICD-10-CM

## 2019-07-01 PROCEDURE — 99214 OFFICE O/P EST MOD 30 MIN: CPT | Performed by: NURSE PRACTITIONER

## 2019-07-01 PROCEDURE — 93000 ELECTROCARDIOGRAM COMPLETE: CPT | Performed by: INTERNAL MEDICINE

## 2019-07-01 RX ORDER — FLECAINIDE ACETATE 50 MG/1
TABLET ORAL
Refills: 3 | COMMUNITY
Start: 2019-06-20 | End: 2019-07-01

## 2019-07-01 RX ORDER — FLECAINIDE ACETATE 150 MG/1
75 TABLET ORAL 2 TIMES DAILY
Qty: 60 TAB | Refills: 3 | Status: SHIPPED | OUTPATIENT
Start: 2019-07-01 | End: 2019-12-12

## 2019-07-01 ASSESSMENT — ENCOUNTER SYMPTOMS
PND: 0
LOSS OF CONSCIOUSNESS: 0
COUGH: 0
PALPITATIONS: 1
CHILLS: 0
DIAPHORESIS: 0
CLAUDICATION: 0
SHORTNESS OF BREATH: 0
BLOOD IN STOOL: 0
FEVER: 0
ABDOMINAL PAIN: 0
ORTHOPNEA: 0
DIZZINESS: 0
WHEEZING: 0

## 2019-07-01 NOTE — PROGRESS NOTES
Cardiology/Electrophysiology Follow-up Note    Subjective:   Chief Complaint:   Chief Complaint   Patient presents with   • Atrial Fibrillation     HFU DX:PERSISTENT AFIB     Chinyere Priest is a 63 y.o. female who presents today for follow up for Persistent Atrial Fibrillation s/p successful AF ablation by Dr. Steele on 06/17/19 on Flecainide.     She is followed by Dr. Landa and Dr. Steele.  Last seen by Dr. Steele on 05/22/19 for initial consult referred by Dr. Landa secondary to persistent AF and had been in AF since 2017. She was interested in antiarrhythmic medications plus DCCV vs PVI.  She decided to proceed with RFA with Tikosyn initiation post procedure.      She underwent successful AF ablation with pulmonary vein isolation procedure by Dr. Steele on 06/17/19 with late pericardial effusion with pericardiocentesis and Tikosyn initiation post procedure. She was unable to tolerate Tikosyn secondary to QT prolongation >500 ms after initial dose. She was therefore switched to low dose Tambocor 75 mg twice daily and was maintaining sinus rhythm, and discharged home. Her follow up office EKG 06/25/19 showed sinus bradycardia, 1st degree AVB, with no [place] or QRS abnormalities, and was stable from prior.     Medical history significant for Persistent Atrial Fibrillation,  s/p successful AF ablation by Dr. Steele on 06/17/19, on chronic anticoagulation with Eliquis, hypertension, aortic calcification, moderate mitral regurgitation, and mild pulmonary hypertension. Intolerant to Tikosyn secondary to QT prolongation.    Today in follow up, she states she is feeling well.  She reports rare palpitations and dyspnea, lasting a few seconds that resolves without interventions since ablation.  She denies chest pain, dizziness, pre syncope or syncope, dyspnea, PND, orthopnea, or lower extremity edema.  Denies and signs or symptoms of bleeding or bleeding issues. Patient endorses medication compliance. She request a work  "letter to return to work.     Past Medical History:   Diagnosis Date   • Abnormal CT scan, kidney      \"Incidentally noted retroaortic left renal vein is identified. Some calcified plaque is noted in the aorta and its branch vessels.\"  CT scan done for diverticulosis     • Aortic calcification (HCC)      Noted incidentally on abdominal CT scan.   • Arrhythmia     A-fib   • Asthma in adult         • Chronic back pain     • Dental disorder     Full upper/lower dentures.   • Diverticulosis     • Essential hypertension, malignant     • Inflamed seborrheic keratosis     • Non-rheumatic mitral regurgitation 2017    May 2017, MR moderately, PAH worse but cath showed normal pressures and no significant MR.  2016: Echo normal LV size, mild concentric LVH, LVEF 65%. Grade I diastolic dysfunction. Trace MR, trace TR, RVSP 40mmHg.  May 2015: Echo normal LV size, mild concentric LVH, LVEF 60%. Mild RVH, RVSP 55mmHg. Mild MR, trace TR.   • Osteoarthritis of both hips     • Plantar fascia syndrome     • Plantar wart of right foot     • Pruritic dermatitis      Ongoing for several months but worse at night on her lower extremities   • Pulmonary hypertension (HCC) 2016    Echocardiogram with normal LV size, mild concentric LVH, LVEF 65%. Grade I diastolic dysfunction. Trace MR, trace TR, RVSP 40mmHg.   • Pure hypercholesterolemia     • S/P cardiac catheterization 2017    1.  Normal right heart pressures. 3.  Essentially normal coronary arteries. 2.  Left ventricular ejection fraction 65%.    • Spider veins of limb     • Unspecified vitamin D deficiency       Past Surgical History:   Procedure Laterality Date   • LUMBAR LAMINECTOMY DISKECTOMY  2009    Performed by SUDHIR VERMA at SURGERY [facility] ORS   • GYN SURGERY       x2   • HYSTERECTOMY LAPAROSCOPY       Family History   Problem Relation Age of Onset   • Heart Disease Mother         1st MI @ 59 yo   • Hypertension Mother    • Heart Attack " Mother 60   • Heart Disease Maternal Aunt    • Cancer Daughter 35        breast   • Heart Disease Maternal Grandmother    • Heart Disease Maternal Grandfather    • Heart Disease Maternal Aunt 56     Social History     Social History   • Marital status:      Spouse name: N/A   • Number of children: N/A   • Years of education: N/A     Occupational History   • Not on file.     Social History Main Topics   • Smoking status: Former Smoker     Packs/day: 1.00     Years: 40.00     Types: Cigarettes     Quit date: 3/1/2010   • Smokeless tobacco: Never Used   • Alcohol use No   • Drug use: No      Comment: tried marijuana once.    • Sexual activity: Yes     Partners: Male     Birth control/ protection: Post-Menopausal      Comment: , works at [employer]     Other Topics Concern   • Exercise No     Social History Narrative    Works at [employer]     Allergies   Allergen Reactions   • Erythromycin      Abdominal pain   • Latex    • Tape      Cloth tape  Paper tape okay       Current Outpatient Prescriptions   Medication Sig Dispense Refill   • flecainide (TAMBOCOR) 150 MG Tab Take 0.5 Tabs by mouth 2 Times a Day. 60 Tab 3   • carvedilol (COREG) 3.125 MG Tab Take 1 Tab by mouth 2 times a day, with meals. 60 Tab 3   • omeprazole (PRILOSEC) 20 MG delayed-release capsule Take 1 Cap by mouth every morning before breakfast. 30 Cap 0   • fluticasone (FLONASE) 50 MCG/ACT nasal spray Spray 1 Spray in nose 2 times a day. 1 Bottle 11   • atorvastatin (LIPITOR) 10 MG Tab TAKE 1 TABLET BY MOUTH EVERY DAY 90 Tab 2   • apixaban (ELIQUIS) 5mg Tab Take 1 Tab by mouth 2 Times a Day. 60 Tab 6   • potassium chloride ER (KLOR-CON) 10 MEQ tablet Take 1 Tab by mouth every day. 90 Tab 3   • vitamin D (CHOLECALCIFEROL) 1000 UNIT TABS Take 2 Tabs by mouth every day. 60 Each 11   • fexofenadine (ALLEGRA) 180 MG tablet Take 1 Tab by mouth every day. 90 Tab 3   • albuterol 108 (90 BASE) MCG/ACT Aero Soln inhalation aerosol Inhale 2 Puffs by mouth every  "four hours as needed. 1 Inhaler 0     No current facility-administered medications for this visit.      Review of Systems   Constitutional: Negative for chills, diaphoresis and fever.   Respiratory: Negative for cough, shortness of breath and wheezing.    Cardiovascular: Positive for palpitations (rare). Negative for chest pain, orthopnea, claudication, leg swelling and PND.   Gastrointestinal: Negative for abdominal pain and blood in stool.   Genitourinary: Negative for hematuria.   Neurological: Negative for dizziness and loss of consciousness.     All others systems reviewed and negative.   Objective:     /72 (BP Location: Left arm, Patient Position: Sitting, BP Cuff Size: Adult)   Pulse 66   Ht 1.676 m (5' 6\")   Wt 78.5 kg (173 lb)   SpO2 97%  Body mass index is 27.92 kg/m².    Physical Exam   Constitutional: She is oriented to person, place, and time. No distress.   HENT:   Head: Normocephalic.   Neck: Normal range of motion. No JVD present.   Cardiovascular: Normal rate and regular rhythm.    No murmur heard.  Pulses:       Radial pulses are 2+ on the right side, and 2+ on the left side.        Dorsalis pedis pulses are 2+ on the right side, and 2+ on the left side.   Pulmonary/Chest: Effort normal and breath sounds normal. No respiratory distress. She has no wheezes. She has no rales. She exhibits no tenderness.   Abdominal: Soft. Bowel sounds are normal.   Musculoskeletal: She exhibits edema (1+ bilateral lower extermity edema. ).   Neurological: She is alert and oriented to person, place, and time.   Skin: Skin is warm and dry. She is not diaphoretic. No erythema.   Bilateral groin sites CDI, LUCI. Mild diffuse bruising, resolving.  No edema, erythema, or hematoma noted.     Psychiatric: Mood, memory, affect and judgment normal.   Nursing note and vitals reviewed.    Cardiac Imaging and Procedures Review:    EKG 07/01/2019: Sinus rhythm, 1st degree AVB    Limited Echocardiogram 06/19/2019:  "   Limited echo to evaluate for pericardial effusion s/p drain removal.   Prior echo on 6/18/19. Compared to the report of the study done - there   has been no significant change.   Normal left ventricular size and systolic function.  Normal pericardium without effusion.  Pleural effusion noted.     Echocardiogram (LEEANNA):  6/17/19  CONCLUSIONS  Normal left ventricular size and systolic function.  No left atrial thrombus.  No thrombus detected in the left atrial appendage.     Procedural conclusions per Dr. Steele's Op Note (06/17/2019):  Electrophysiology Procedure Note [facility]  Indication: Symptomatic persistent atrial fibrillation  Procedure Performed: 1. Atrial fibrillation ablation 2. Advanced mapping using CARTO system  3. Transesophageal echocardiography 4. Intracardiac echocardiography 5. Esophageal temperature monitoring 6. Intraarterial blood pressure monitoring 7. Frequent ACT's   Physician(s): PHYLICIA Steele M.D.  Resident/Assistant(s): None  Anesthesia: General endotracheal anesthetic.  Anaesthesiologist: Dr. Oj Silver  Specimen(s) Removed: None  Pre-procedure ECG: Atrial fibrillation  Post Procedure ECG: Sinus rhythm  Total ablation time: 2468 seconds  Fluoroscopy time: 7.7 minutes  Electrophysiologic Findings:    1. Baseline: Atrial fibrillation 777 ms,  ms, HV 51 Ms.   2. Final  SR 1065 ms  Impressions:    1.  Persistent atrial fibrillation.    2. Successful RF ablation pulmonary vein isolation procedure.       Pre-Operative Diagnosis: Status post atrial fibrillation ablation, late cardiac tamponade   Post-Operative Diagnosis: Successful pericardiocentesis with resolution of hypotension and pericardial fluid  Indications: Cardiac tamponade post catheter ablation  Procedure(s) Performed: Pericardiocentesis  Physician(s): PHYLICIA Steele M.D.  Anesthesia: Local anesthesia  Pre-procedure ECG: Sinus rhythm  Post-procedure ECG: Sinus rhythm  Complications:  None  Fluroscopy time: Less than  2 minutes  Impressions:    1.  Late cardiac tamponade post ablation.  2.  Successful pericardiocentesis.     Cardiac Catheterization:  05/26/2017  1.  LEFT MAIN ARTERY:  Left main vessel is a large caliber vessel, angiographically normal, bifurcates into the left anterior descending artery and circumflex artery.  2.  LEFT ANTERIOR DESCENDING ARTERY:  The LAD gives rise to a first diagonal branch and a normal complement of septal branches and extends around the apex.    The LAD is widely patent with the exception of a very mild eccentric smooth focal proximal atheroma otherwise the LAD appears angiographically normal.  3.  CIRCUMFLEX ARTERY:  The circumflex gives rise to 2 major long marginal branches and a small caliber AV groove branch.  Angiographically, the circumflex artery appears normal.  4.  RIGHT CORONARY ARTERY:  The RCA is moderate size caliber vessel and gives rise to an acute marginal branch, posterior descending artery and a posterolateral branch.  Angiographically, the right coronary artery is normal.    Labs (personally reviewed and notable for):   Lab Results   Component Value Date/Time    SODIUM 139 06/26/2019 07:18 AM    POTASSIUM 4.0 06/26/2019 07:18 AM    CHLORIDE 105 06/26/2019 07:18 AM    CO2 23 06/26/2019 07:18 AM    GLUCOSE 104 (H) 06/26/2019 07:18 AM    BUN 11 06/26/2019 07:18 AM    CREATININE 0.82 06/26/2019 07:18 AM      Lab Results   Component Value Date/Time    WBC 9.0 06/20/2019 04:30 AM    RBC 3.40 (L) 06/20/2019 04:30 AM    HEMOGLOBIN 10.4 (L) 06/20/2019 04:30 AM    HEMATOCRIT 33.1 (L) 06/20/2019 04:30 AM    MCV 97.4 06/20/2019 04:30 AM    MCH 30.6 06/20/2019 04:30 AM    MCHC 31.4 (L) 06/20/2019 04:30 AM    MPV 11.5 06/20/2019 04:30 AM    NEUTSPOLYS 75.00 (H) 06/20/2019 04:30 AM    LYMPHOCYTES 12.40 (L) 06/20/2019 04:30 AM    MONOCYTES 10.80 06/20/2019 04:30 AM    EOSINOPHILS 1.20 06/20/2019 04:30 AM    BASOPHILS 0.30 06/20/2019 04:30 AM      PT/INR:   Lab Results   Component Value  Date/Time    PROTHROMBTM 13.6 06/18/2019 05:21 AM    INR 1.02 06/18/2019 05:21 AM   ]  Assessment:     1. Persistent atrial fibrillation (HCC)  EKG   2. S/P ablation of atrial fibrillation  EKG   3. Chronic anticoagulation  EKG    Eliquis   4. High risk medication use  EKG    Felcainide     Medical Decision Making:  Today's Assessment / Status / Plan:   1. Persistent Atrial Fibrillation  - S/P successful AF ablation with pulmonary vein isolation procedure by Dr. Steele on 06/17/19 with late pericardial effusion with pericardiocentesis, now resolved. On Flecainide.  - Intolerant to Tikosyn secondary to QT prolongation.   - Relatively asymptomatic, rare palpitations and SOB lasting a few seconds, resolves without interventions.   - Recent labs reviewed and are relatively unremarkable. K+ 4.0, TSH WNL (06/17/19). Weight stable.   - Bilateral groin sites CDI, uncomplicated.  - EKG today shows sinus rhythm, 1st AVB, no acute abnormalities. NJ or QRS stable from prior.   - On PPI, continue x 1 month post ablation.   - On OAC with Eliquis, continue.  - Continue Flecainide 75 mg BID and coreg 3.125 mg daily, tolerating well, continue.  - Work letter without restrictions provided to patient.     2. High Risk Medication: Flecainide  - Flecainide 75 mg BID, remains in SR, continue per recommendations above.  Will continue to monitor for side effects of patient's high risk medication(s) including liver, renal function and electrolytes.     3. Chronic Anticoagulation: Eliquis  - No signs/symptoms of bleeding.   - Continue OAC with Eliquis 5 mg BID.     4. Hypertension  - Mild elevated in office.    - Per pt, BP 120s/70s mmHg at home.   - Continue current medication management.   - Encouraged to continue to monitor and follow up with PCP if elevated.     5. Hyperlipidemia  -  (10/05/17), cannot find more recent lipid panel. Patient doesn't know when last cholesterol panel was completed.   - Recommend repeat cholesterol  panel  - PCP to follow, patient instructed to follow up.     Plan reviewed in detail with the patient and she verbalizes understanding and is in agreement. RTC 6-8 weeks, sooner if clinical condition changes.     Thank you for allowing me to participate in this patients care.  Please contact me with any questions or concerns.     DANIELLE Lam   [facility]  [phone] - [phone]    Collaborating MD/ADD: Dr. David MD.

## 2019-07-01 NOTE — LETTER
[facility]   [address], [address]  [place] [address]  Phone: [phone]  Fax: [phone]              Chinyere Priest  1955    Encounter Date: 7/1/2019    DANIELLE Lam          PROGRESS NOTE:  Cardiology/Electrophysiology Follow-up Note    Subjective:   Chief Complaint:   Chief Complaint   Patient presents with   • Atrial Fibrillation     HFU DX:PERSISTENT AFIB     Chinyere Priest is a 63 y.o. female who presents today for follow up for Persistent Atrial Fibrillation s/p successful AF ablation by Dr. Steele on 06/17/19 on Flecainide.     She is followed by Dr. Landa and Dr. Steele.  Last seen by Dr. Steele on 05/22/19 for initial consult referred by Dr. Landa secondary to persistent AF and had been in AF since 2017. She was interested in antiarrhythmic medications plus DCCV vs PVI.  She decided to proceed with RFA with Tikosyn initiation post procedure.      She underwent successful AF ablation with pulmonary vein isolation procedure by Dr. Steele on 06/17/19 with late pericardial effusion with pericardiocentesis and Tikosyn initiation post procedure. She was unable to tolerate Tikosyn secondary to QT prolongation >500 ms after initial dose. She was therefore switched to low dose Tambocor 75 mg twice daily and was maintaining sinus rhythm, and discharged home. Her follow up office EKG 06/25/19 showed sinus bradycardia, 1st degree AVB, with no MO or QRS abnormalities, and was stable from prior.     Medical history significant for Persistent Atrial Fibrillation,  s/p successful AF ablation by Dr. Steele on 06/17/19, on chronic anticoagulation with Eliquis, hypertension, aortic calcification, moderate mitral regurgitation, and mild pulmonary hypertension. Intolerant to Tikosyn secondary to QT prolongation.    Today in follow up, she states she is feeling well.  She reports rare palpitations and dyspnea, lasting a few seconds that  "resolves without interventions since ablation.  She denies chest pain, dizziness, pre syncope or syncope, dyspnea, PND, orthopnea, or lower extremity edema.  Denies and signs or symptoms of bleeding or bleeding issues. Patient endorses medication compliance. She request a work letter to return to work.     Past Medical History:   Diagnosis Date   • Abnormal CT scan, kidney      \"Incidentally noted retroaortic left renal vein is identified. Some calcified plaque is noted in the aorta and its branch vessels.\"  CT scan done for diverticulosis     • Aortic calcification (HCC)      Noted incidentally on abdominal CT scan.   • Arrhythmia     A-fib   • Asthma in adult         • Chronic back pain     • Dental disorder     Full upper/lower dentures.   • Diverticulosis     • Essential hypertension, malignant     • Inflamed seborrheic keratosis     • Non-rheumatic mitral regurgitation 05/25/2017    May 2017, MR moderately, PAH worse but cath showed normal pressures and no significant MR.  January 2016: Echo normal LV size, mild concentric LVH, LVEF 65%. Grade I diastolic dysfunction. Trace MR, trace TR, RVSP 40mmHg.  May 2015: Echo normal LV size, mild concentric LVH, LVEF 60%. Mild RVH, RVSP 55mmHg. Mild MR, trace TR.   • Osteoarthritis of both hips     • Plantar fascia syndrome     • Plantar wart of right foot     • Pruritic dermatitis      Ongoing for several months but worse at night on her lower extremities   • Pulmonary hypertension (HCC) 01/2016    Echocardiogram with normal LV size, mild concentric LVH, LVEF 65%. Grade I diastolic dysfunction. Trace MR, trace TR, RVSP 40mmHg.   • Pure hypercholesterolemia     • S/P cardiac catheterization 5/26/2017    1.  Normal right heart pressures. 3.  Essentially normal coronary arteries. 2.  Left ventricular ejection fraction 65%.    • Spider veins of limb     • Unspecified vitamin D deficiency       Past Surgical History:   Procedure Laterality Date   • LUMBAR LAMINECTOMY " DISKECTOMY  2009    Performed by SUDHIR VERMA at SURGERY [facility] ORS   • GYN SURGERY       x2   • HYSTERECTOMY LAPAROSCOPY       Family History   Problem Relation Age of Onset   • Heart Disease Mother         1st MI @ 61 yo   • Hypertension Mother    • Heart Attack Mother 60   • Heart Disease Maternal Aunt    • Cancer Daughter 35        breast   • Heart Disease Maternal Grandmother    • Heart Disease Maternal Grandfather    • Heart Disease Maternal Aunt 56     Social History     Social History   • Marital status:      Spouse name: N/A   • Number of children: N/A   • Years of education: N/A     Occupational History   • Not on file.     Social History Main Topics   • Smoking status: Former Smoker     Packs/day: 1.00     Years: 40.00     Types: Cigarettes     Quit date: 3/1/2010   • Smokeless tobacco: Never Used   • Alcohol use No   • Drug use: No      Comment: tried marijuana once.    • Sexual activity: Yes     Partners: Male     Birth control/ protection: Post-Menopausal      Comment: , works at [employer] formerly [employer]     Other Topics Concern   • Exercise No     Social History Narrative    Works at [employer] formerly [employer]     Allergies   Allergen Reactions   • Erythromycin      Abdominal pain   • Latex    • Tape      Cloth tape  Paper tape okay       Current Outpatient Prescriptions   Medication Sig Dispense Refill   • flecainide (TAMBOCOR) 150 MG Tab Take 0.5 Tabs by mouth 2 Times a Day. 60 Tab 3   • carvedilol (COREG) 3.125 MG Tab Take 1 Tab by mouth 2 times a day, with meals. 60 Tab 3   • omeprazole (PRILOSEC) 20 MG delayed-release capsule Take 1 Cap by mouth every morning before breakfast. 30 Cap 0   • fluticasone (FLONASE) 50 MCG/ACT nasal spray Spray 1 Spray in nose 2 times a day. 1 Bottle 11   • atorvastatin (LIPITOR) 10 MG Tab TAKE 1 TABLET BY MOUTH EVERY DAY 90 Tab 2   • apixaban (ELIQUIS) 5mg Tab Take 1 Tab by mouth 2 Times a Day. 60 Tab 6   • potassium chloride ER (KLOR-CON) 10 MEQ tablet Take 1 Tab by mouth  "every day. 90 Tab 3   • vitamin D (CHOLECALCIFEROL) 1000 UNIT TABS Take 2 Tabs by mouth every day. 60 Each 11   • fexofenadine (ALLEGRA) 180 MG tablet Take 1 Tab by mouth every day. 90 Tab 3   • albuterol 108 (90 BASE) MCG/ACT Aero Soln inhalation aerosol Inhale 2 Puffs by mouth every four hours as needed. 1 Inhaler 0     No current facility-administered medications for this visit.      Review of Systems   Constitutional: Negative for chills, diaphoresis and fever.   Respiratory: Negative for cough, shortness of breath and wheezing.    Cardiovascular: Positive for palpitations (rare). Negative for chest pain, orthopnea, claudication, leg swelling and PND.   Gastrointestinal: Negative for abdominal pain and blood in stool.   Genitourinary: Negative for hematuria.   Neurological: Negative for dizziness and loss of consciousness.     All others systems reviewed and negative.   Objective:     /72 (BP Location: Left arm, Patient Position: Sitting, BP Cuff Size: Adult)   Pulse 66   Ht 1.676 m (5' 6\")   Wt 78.5 kg (173 lb)   SpO2 97%  Body mass index is 27.92 kg/m².    Physical Exam   Constitutional: She is oriented to person, place, and time. No distress.   HENT:   Head: Normocephalic.   Neck: Normal range of motion. No JVD present.   Cardiovascular: Normal rate and regular rhythm.    No murmur heard.  Pulses:       Radial pulses are 2+ on the right side, and 2+ on the left side.        Dorsalis pedis pulses are 2+ on the right side, and 2+ on the left side.   Pulmonary/Chest: Effort normal and breath sounds normal. No respiratory distress. She has no wheezes. She has no rales. She exhibits no tenderness.   Abdominal: Soft. Bowel sounds are normal.   Musculoskeletal: She exhibits edema (1+ bilateral lower extermity edema. ).   Neurological: She is alert and oriented to person, place, and time.   Skin: Skin is warm and dry. She is not diaphoretic. No erythema.   Bilateral groin sites CDI, LUCI. Mild diffuse " bruising, resolving.  No edema, erythema, or hematoma noted.     Psychiatric: Mood, memory, affect and judgment normal.   Nursing note and vitals reviewed.    Cardiac Imaging and Procedures Review:    EKG 07/01/2019: Sinus rhythm, 1st degree AVB    Limited Echocardiogram 06/19/2019:    Limited echo to evaluate for pericardial effusion s/p drain removal.   Prior echo on 6/18/19. Compared to the report of the study done - there   has been no significant change.   Normal left ventricular size and systolic function.  Normal pericardium without effusion.  Pleural effusion noted.     Echocardiogram (LEEANNA):  6/17/19  CONCLUSIONS  Normal left ventricular size and systolic function.  No left atrial thrombus.  No thrombus detected in the left atrial appendage.     Procedural conclusions per Dr. Steele's Op Note (06/17/2019):  Electrophysiology Procedure Note [facility]  Indication: Symptomatic persistent atrial fibrillation  Procedure Performed: 1. Atrial fibrillation ablation 2. Advanced mapping using CARTO system  3. Transesophageal echocardiography 4. Intracardiac echocardiography 5. Esophageal temperature monitoring 6. Intraarterial blood pressure monitoring 7. Frequent ACT's   Physician(s): PHYLICIA Steele M.D.  Resident/Assistant(s): None  Anesthesia: General endotracheal anesthetic.  Anaesthesiologist: Dr. Oj Silver  Specimen(s) Removed: None  Pre-procedure ECG: Atrial fibrillation  Post Procedure ECG: Sinus rhythm  Total ablation time: 2468 seconds  Fluoroscopy time: 7.7 minutes  Electrophysiologic Findings:    1. Baseline: Atrial fibrillation 777 ms,  ms, HV 51 Ms.   2. Final  SR 1065 ms  Impressions:    1.  Persistent atrial fibrillation.    2. Successful RF ablation pulmonary vein isolation procedure.       Pre-Operative Diagnosis: Status post atrial fibrillation ablation, late cardiac tamponade   Post-Operative Diagnosis: Successful pericardiocentesis with resolution of hypotension and  pericardial fluid  Indications: Cardiac tamponade post catheter ablation  Procedure(s) Performed: Pericardiocentesis  Physician(s): PHYLICIA Steele M.D.  Anesthesia: Local anesthesia  Pre-procedure ECG: Sinus rhythm  Post-procedure ECG: Sinus rhythm  Complications:  None  Fluroscopy time: Less than 2 minutes  Impressions:    1.  Late cardiac tamponade post ablation.  2.  Successful pericardiocentesis.     Cardiac Catheterization:  05/26/2017  1.  LEFT MAIN ARTERY:  Left main vessel is a large caliber vessel, angiographically normal, bifurcates into the left anterior descending artery and circumflex artery.  2.  LEFT ANTERIOR DESCENDING ARTERY:  The LAD gives rise to a first diagonal branch and a normal complement of septal branches and extends around the apex.    The LAD is widely patent with the exception of a very mild eccentric smooth focal proximal atheroma otherwise the LAD appears angiographically normal.  3.  CIRCUMFLEX ARTERY:  The circumflex gives rise to 2 major long marginal branches and a small caliber AV groove branch.  Angiographically, the circumflex artery appears normal.  4.  RIGHT CORONARY ARTERY:  The RCA is moderate size caliber vessel and gives rise to an acute marginal branch, posterior descending artery and a posterolateral branch.  Angiographically, the right coronary artery is normal.    Labs (personally reviewed and notable for):   Lab Results   Component Value Date/Time    SODIUM 139 06/26/2019 07:18 AM    POTASSIUM 4.0 06/26/2019 07:18 AM    CHLORIDE 105 06/26/2019 07:18 AM    CO2 23 06/26/2019 07:18 AM    GLUCOSE 104 (H) 06/26/2019 07:18 AM    BUN 11 06/26/2019 07:18 AM    CREATININE 0.82 06/26/2019 07:18 AM      Lab Results   Component Value Date/Time    WBC 9.0 06/20/2019 04:30 AM    RBC 3.40 (L) 06/20/2019 04:30 AM    HEMOGLOBIN 10.4 (L) 06/20/2019 04:30 AM    HEMATOCRIT 33.1 (L) 06/20/2019 04:30 AM    MCV 97.4 06/20/2019 04:30 AM    MCH 30.6 06/20/2019 04:30 AM    MCHC 31.4 (L) 06/20/2019  04:30 AM    MPV 11.5 06/20/2019 04:30 AM    NEUTSPOLYS 75.00 (H) 06/20/2019 04:30 AM    LYMPHOCYTES 12.40 (L) 06/20/2019 04:30 AM    MONOCYTES 10.80 06/20/2019 04:30 AM    EOSINOPHILS 1.20 06/20/2019 04:30 AM    BASOPHILS 0.30 06/20/2019 04:30 AM      PT/INR:   Lab Results   Component Value Date/Time    PROTHROMBTM 13.6 06/18/2019 05:21 AM    INR 1.02 06/18/2019 05:21 AM   ]  Assessment:     1. Persistent atrial fibrillation (HCC)  EKG   2. S/P ablation of atrial fibrillation  EKG   3. Chronic anticoagulation  EKG    Eliquis   4. High risk medication use  EKG    Felcainide     Medical Decision Making:  Today's Assessment / Status / Plan:   1. Persistent Atrial Fibrillation  - S/P successful AF ablation with pulmonary vein isolation procedure by Dr. Steele on 06/17/19 with late pericardial effusion with pericardiocentesis, now resolved. On Flecainide.  - Intolerant to Tikosyn secondary to QT prolongation.   - Relatively asymptomatic, rare palpitations and SOB lasting a few seconds, resolves without interventions.   - Recent labs reviewed and are relatively unremarkable. K+ 4.0, TSH WNL (06/17/19). Weight stable.   - Bilateral groin sites CDI, uncomplicated.  - EKG today shows sinus rhythm, 1st AVB, no acute abnormalities. CT or QRS stable from prior.   - On PPI, continue x 1 month post ablation.   - On OAC with Eliquis, continue.  - Continue Flecainide 75 mg BID and coreg 3.125 mg daily, tolerating well, continue.  - Work letter without restrictions provided to patient.     2. High Risk Medication: Flecainide  - Flecainide 75 mg BID, remains in SR, continue per recommendations above.  Will continue to monitor for side effects of patient's high risk medication(s) including liver, renal function and electrolytes.     3. Chronic Anticoagulation: Eliquis  - No signs/symptoms of bleeding.   - Continue OAC with Eliquis 5 mg BID.     4. Hypertension  - Mild elevated in office.    - Per pt, BP 120s/70s mmHg at home.   -  Continue current medication management.   - Encouraged to continue to monitor and follow up with PCP if elevated.     5. Hyperlipidemia  -  (10/05/17), cannot find more recent lipid panel. Patient doesn't know when last cholesterol panel was completed.   - Recommend repeat cholesterol panel  - PCP to follow, patient instructed to follow up.     Plan reviewed in detail with the patient and she verbalizes understanding and is in agreement. RTC 6-8 weeks, sooner if clinical condition changes.     Thank you for allowing me to participate in this patients care.  Please contact me with any questions or concerns.     DAISY Lam.   [facility]  [phone]    Collaborating MD/ADD: Dr. David MD.      Keila Mccrary M.D.  [address]  [address]  [place] [address]  VIA In Basket

## 2019-07-01 NOTE — PATIENT INSTRUCTIONS
- Recommend repeat cholesterol panel, PCP to follow, patient instructed to follow up with PCP.   - Encouraged to continue to monitor blood pressures and call office with questions or concerns.

## 2019-07-02 ENCOUNTER — TELEPHONE (OUTPATIENT)
Dept: CARDIOLOGY | Facility: MEDICAL CENTER | Age: 64
End: 2019-07-02

## 2019-07-02 DIAGNOSIS — I10 ESSENTIAL HYPERTENSION, BENIGN: ICD-10-CM

## 2019-07-02 LAB — EKG IMPRESSION: NORMAL

## 2019-07-02 RX ORDER — LOSARTAN POTASSIUM 25 MG/1
25 TABLET ORAL DAILY
Qty: 30 TAB | Refills: 2 | Status: SHIPPED | OUTPATIENT
Start: 2019-07-02 | End: 2019-07-03

## 2019-07-02 NOTE — TELEPHONE ENCOUNTER
Stacey Camejo, Med Ass't  Jyothi Arroyo, R.N.   Phone Number: [phone]             ED pt calling re: high BP.   Ph# [phone]      Spoke with patient regarding above. This morning BP was 142/80 and this afternoon 148/88, advised to FV with PCP regarding this per your OV note on 7/1/19. Patient argued numerous times stating ED stated to call if still elevated. Advised again per note it says to follow up with PCP. She then stated that JM is that one that started her on carvedilol.     To ED---please advise or refer pt back to JM?

## 2019-07-02 NOTE — PROGRESS NOTES
Patient called regarding her home BP monitoring I requested post office appointment.  BP remains elevated 140s/80s mmHg.      Kidney function stable. Will start losartan 25 mg daily. Encouraged patient to continue to monitor home BP and to follow up with her PCP regarding her blood pressure at her appointment 7/9/19. Will need CMP in 4-6 weeks.     Thanks,     DANIELLE Lam   [facility] for [facility]  [phone]

## 2019-07-02 NOTE — DOCUMENTATION QUERY
"                                                                         [facility]                                                                       Query Response Note      PATIENT:               DEANA PATTERSON  ACCT #:                  [id number]  MRN:                     [id number]  :                      1955  ADMIT DATE:       2019 6:39 AM  DISCH DATE:          RESPONDING  PROVIDER #:        [id number]           QUERY TEXT:    \"Mild Congestive Heart Failure\" is documented in the Medical Record. Please document the type and acuity (includes probable or suspected).     NOTE:  If an appropriate response is not listed below, please respond with a new note.    NOTE:  If an appropriate response is not listed below, please respond with a new note.    The patient's Clinical Indicators include:  Echo :  CONCLUSIONS  Compared to the images of the prior study done -  2019,   pericardial effusion is smaller.  Limited to evaluate pericardial effusion only.  Very small pericardial effusion without evidence of hemodynamic   compromise.  Pleural effusion noted.  LV EF   70%.    20mg IV lasix given     Query created by: Jenifer Miller on 2019 9:16 PM    RESPONSE TEXT:    Unable to determine          Electronically signed by:  RAMA BROWN MD 2019 8:06 AM              "

## 2019-07-03 ENCOUNTER — TELEPHONE (OUTPATIENT)
Dept: CARDIOLOGY | Facility: MEDICAL CENTER | Age: 64
End: 2019-07-03

## 2019-07-03 DIAGNOSIS — Z86.79 S/P ABLATION OF ATRIAL FIBRILLATION: ICD-10-CM

## 2019-07-03 DIAGNOSIS — I48.19 PERSISTENT ATRIAL FIBRILLATION (HCC): ICD-10-CM

## 2019-07-03 DIAGNOSIS — Z98.890 S/P ABLATION OF ATRIAL FIBRILLATION: ICD-10-CM

## 2019-07-03 RX ORDER — LOSARTAN POTASSIUM 25 MG/1
25 TABLET ORAL DAILY
Qty: 90 TAB | Refills: 3 | Status: SHIPPED | OUTPATIENT
Start: 2019-07-03 | End: 2019-07-03

## 2019-07-03 NOTE — TELEPHONE ENCOUNTER
DANIELLE Lam RPADMINI   Caller: Unspecified (Yesterday,  4:33 PM)             Mikhail Zee,     Please let patient know per below.     Patient called regarding her home BP monitoring I requested post office appointment.  BP remains elevated 140s/80s mmHg.       Kidney function stable. Will start losartan 25 mg daily. Encouraged patient to continue to monitor home BP and to follow up with her PCP regarding her blood pressure at her appointment 7/9/19. Will need CMP in 4-6 weeks.     Thanks,     DAISY Lam.   [facility]   [phone]      Call out to pt no answer. LVM to call back to discuss. Rx sent in and lab ordered mailed to patient.

## 2019-07-03 NOTE — TELEPHONE ENCOUNTER
Reina Arroyo, R.N.   Phone Number: [phone]             ED/Saadia     Pt is returning your call, she can be reached at [phone].      Spoke with pt regarding ED recommendations of Losartan, pt refused. Asked if Coreg dose can be increased back to normal/regular dose. Per EA note on 6/18/19 it was reduced in hospital due bradycardia and starting flecainide. Per ED refer back to PCP or gen card. BP today systolic in the 150's.     To  JM-- please advise.

## 2019-07-05 RX ORDER — AMLODIPINE BESYLATE 5 MG/1
5 TABLET ORAL DAILY
Qty: 30 TAB | Refills: 3 | Status: SHIPPED | OUTPATIENT
Start: 2019-07-05 | End: 2019-07-09

## 2019-07-05 NOTE — TELEPHONE ENCOUNTER
Called pt, discussed Dr Landa recommendations, pt reports she does not like Losartan because of the recall she heard on the news, she would rather take Amlodipine, education regarding Amlodipine provided to pt including possible side effects, instructed her also to give us a call if her BP remains consistently >130/85 in 1-2 weeks after starting Amlodipine, pt verbalizes understanding    New Rx for Amlodipine 5mg PO daily sent to [facility] per pt's request

## 2019-07-05 NOTE — TELEPHONE ENCOUNTER
Agree with Maddy's plan. In the setting of flecainide and bradycardia, would not increase coreg dose.     I would start losartan 25mg PO daily and get BMP 1 week after or if she doesn't want to start losartan for some reason, we can start norvasc 5mg PO daily. Thanks!

## 2019-07-09 ENCOUNTER — OFFICE VISIT (OUTPATIENT)
Dept: MEDICAL GROUP | Facility: MEDICAL CENTER | Age: 64
End: 2019-07-09
Payer: COMMERCIAL

## 2019-07-09 VITALS
RESPIRATION RATE: 14 BRPM | DIASTOLIC BLOOD PRESSURE: 62 MMHG | SYSTOLIC BLOOD PRESSURE: 124 MMHG | WEIGHT: 173.59 LBS | TEMPERATURE: 97.3 F | HEIGHT: 66 IN | HEART RATE: 62 BPM | BODY MASS INDEX: 27.9 KG/M2 | OXYGEN SATURATION: 98 %

## 2019-07-09 DIAGNOSIS — I48.19 PERSISTENT ATRIAL FIBRILLATION (HCC): ICD-10-CM

## 2019-07-09 DIAGNOSIS — R73.9 HYPERGLYCEMIA: ICD-10-CM

## 2019-07-09 DIAGNOSIS — E78.00 HYPERCHOLESTEROLEMIA: ICD-10-CM

## 2019-07-09 DIAGNOSIS — I10 ESSENTIAL HYPERTENSION, BENIGN: ICD-10-CM

## 2019-07-09 PROCEDURE — 99214 OFFICE O/P EST MOD 30 MIN: CPT | Performed by: FAMILY MEDICINE

## 2019-07-09 RX ORDER — LOSARTAN POTASSIUM 25 MG/1
25 TABLET ORAL DAILY
Qty: 90 TAB | Refills: 3
Start: 2019-07-09 | End: 2020-02-26

## 2019-07-09 NOTE — ASSESSMENT & PLAN NOTE
This is a chronic health problem for this patient for which she is on atorvastatin 10 mg daily.  Its time to do blood testing and to discuss whether or not her dosing is adequate.  I will be happy to get that done for her.

## 2019-07-09 NOTE — ASSESSMENT & PLAN NOTE
Patient had blood work done in September prior to having her atrial fib ablation.  Her blood sugar was slightly elevated at that time at 104.  Typically her blood sugars always been normal and there is no family history of diabetes.  I would like to recheck this with the next set of labs and then we will see her back within 6 weeks to go over results.

## 2019-07-09 NOTE — ASSESSMENT & PLAN NOTE
This was a chronic problem for this patient that she went ahead and did an elective ablation on 6/17/2019.  Despite having a complication postop of a pericardial effusion which had to be drained she is done well since that time.  She continues in normal sinus rhythm and we will resolve this issue.

## 2019-07-09 NOTE — ASSESSMENT & PLAN NOTE
This is a chronic health problem for this patient that on 6/17/2019 she had an elective atrial fib ablation.  In the post op time while she was in recovery she developed a moderately large pericardial effusion, dropped her blood pressure and had to be taken back to the Cath Lab.  In the Cath Lab they were able to drain 500 cc of bloody fluid from around the heart and then her blood pressure did recover.  She is continuing to be in normal sinus rhythm, not in atrial fib but she is having some fluctuation of her blood pressure.  She currently is on amlodipine 5 mg daily, flecainide 75 mg twice a day and carvedilol 3.125 mg twice a day.  Her blood pressure is excellent 124/62, but she has the side effect from the amlodipine of the ankle swelling.  She even has compression socks on and still has a moderate amount of ankle swelling.  I recommended to her that she switch over to the losartan that originally been offered to her.  We talked about the fact that the losartan that is on the market now has no contaminants and is safe to utilize.  She is going to be on 25 mg once a day and she will continue to monitor her blood pressure at home.  Her goal is to keep her consistently less than 130 on the top less than 80 on the bottom.

## 2019-07-15 NOTE — PROGRESS NOTES
CC:Diagnoses of Essential hypertension, benign, Hypercholesterolemia, Persistent atrial fibrillation (HCC), and Hyperglycemia were pertinent to this visit.    HISTORY OF PRESENT ILLNESS: Patient is a 63 y.o. female established patient who presents today to talk about her chronic health problems as outlined below.  Patient wanted to bring me up-to-date on her elective ablation for atrial fib on 6/17/2019.  She had a pericardial effusion as a complication after that procedure but she has done well since that complication was managed.  Patient is due to get some blood work done and will follow up with me in the coming 6 weeks.    Health Maintenance: Completed    Essential hypertension, benign  This is a chronic health problem for this patient that on 6/17/2019 she had an elective atrial fib ablation.  In the post op time while she was in recovery she developed a moderately large pericardial effusion, dropped her blood pressure and had to be taken back to the Cath Lab.  In the Cath Lab they were able to drain 500 cc of bloody fluid from around the heart and then her blood pressure did recover.  She is continuing to be in normal sinus rhythm, not in atrial fib but she is having some fluctuation of her blood pressure.  She currently is on amlodipine 5 mg daily, flecainide 75 mg twice a day and carvedilol 3.125 mg twice a day.  Her blood pressure is excellent 124/62, but she has the side effect from the amlodipine of the ankle swelling.  She even has compression socks on and still has a moderate amount of ankle swelling.  I recommended to her that she switch over to the losartan that originally been offered to her.  We talked about the fact that the losartan that is on the market now has no contaminants and is safe to utilize.  She is going to be on 25 mg once a day and she will continue to monitor her blood pressure at home.  Her goal is to keep her consistently less than 130 on the top less than 80 on the  bottom.    Hypercholesterolemia  This is a chronic health problem for this patient for which she is on atorvastatin 10 mg daily.  Its time to do blood testing and to discuss whether or not her dosing is adequate.  I will be happy to get that done for her.    Persistent atrial fibrillation (HCC)  This was a chronic problem for this patient that she went ahead and did an elective ablation on 6/17/2019.  Despite having a complication postop of a pericardial effusion which had to be drained she is done well since that time.  She continues in normal sinus rhythm and we will resolve this issue.    Hyperglycemia  Patient had blood work done in September prior to having her atrial fib ablation.  Her blood sugar was slightly elevated at that time at 104.  Typically her blood sugars always been normal and there is no family history of diabetes.  I would like to recheck this with the next set of labs and then we will see her back within 6 weeks to go over results.      Patient Active Problem List    Diagnosis Date Noted   • Pulmonary hypertension (HCC) 08/28/2015     Priority: High   • Aortic calcification (HCC) 05/15/2015     Priority: Medium   • Hyperglycemia 07/09/2019   • S/P ablation of atrial fibrillation 06/20/2019   • Rhinorrhea 04/22/2019   • Chronic anticoagulation 10/30/2017   • Hypercholesterolemia 06/19/2017   • S/P cardiac catheterization 05/26/2017   • Non-rheumatic mitral regurgitation 05/25/2017   • Essential hypertension, benign 05/25/2017   • Degenerative disc disease, lumbar 01/25/2017   • Back pain with sciatica 01/25/2017   • Family history of ovarian cancer 11/21/2016   • Family history of breast cancer 11/21/2016   • Decreased right ventricular systolic function 01/14/2016   • Abnormal CT scan, kidney 05/15/2015   • Diverticulosis 05/15/2015   • Osteoarthritis of both hips 12/10/2014   • Asthma in adult without complication 01/13/2014   • Enlarged ovary 01/13/2014   • Cystocele 09/13/2013   • Spider  veins of limb 06/21/2013   • DDD (degenerative disc disease), cervical 01/19/2012   • Trigger point of thoracic region 01/19/2012   • Plantar fascia syndrome 07/01/2010   • Vitamin D deficiency 07/01/2010   • Chronic back pain 06/03/2010      Allergies:Erythromycin; Latex; and Tape    Current Outpatient Prescriptions   Medication Sig Dispense Refill   • losartan (COZAAR) 25 MG Tab Take 1 Tab by mouth every day. 90 Tab 3   • flecainide (TAMBOCOR) 150 MG Tab Take 0.5 Tabs by mouth 2 Times a Day. 60 Tab 3   • carvedilol (COREG) 3.125 MG Tab Take 1 Tab by mouth 2 times a day, with meals. 60 Tab 3   • omeprazole (PRILOSEC) 20 MG delayed-release capsule Take 1 Cap by mouth every morning before breakfast. 30 Cap 0   • fexofenadine (ALLEGRA) 180 MG tablet Take 1 Tab by mouth every day. 90 Tab 3   • fluticasone (FLONASE) 50 MCG/ACT nasal spray Spray 1 Spray in nose 2 times a day. 1 Bottle 11   • atorvastatin (LIPITOR) 10 MG Tab TAKE 1 TABLET BY MOUTH EVERY DAY 90 Tab 2   • apixaban (ELIQUIS) 5mg Tab Take 1 Tab by mouth 2 Times a Day. 60 Tab 6   • potassium chloride ER (KLOR-CON) 10 MEQ tablet Take 1 Tab by mouth every day. 90 Tab 3   • albuterol 108 (90 BASE) MCG/ACT Aero Soln inhalation aerosol Inhale 2 Puffs by mouth every four hours as needed. 1 Inhaler 0   • vitamin D (CHOLECALCIFEROL) 1000 UNIT TABS Take 2 Tabs by mouth every day. 60 Each 11     No current facility-administered medications for this visit.        Social History   Substance Use Topics   • Smoking status: Former Smoker     Packs/day: 1.00     Years: 40.00     Types: Cigarettes     Quit date: 3/1/2010   • Smokeless tobacco: Never Used   • Alcohol use No     Social History     Social History Narrative    Works at [employer]       Family History   Problem Relation Age of Onset   • Heart Disease Mother         1st MI @ 59 yo   • Hypertension Mother    • Heart Attack Mother 60   • Heart Disease Maternal Aunt    • Cancer Daughter 35        breast   • Heart Disease  "Maternal Grandmother    • Heart Disease Maternal Grandfather    • Heart Disease Maternal Aunt 56        ROS:     - Constitutional:  Negative for fever, chills, unexpected weight change, and fatigue/generalized weakness.    - HEENT:  Negative for headaches, vision changes, hearing changes, ear pain, ear discharge, rhinorrhea, sinus congestion, sore throat, and neck pain.      - Respiratory: Negative for cough, sputum production, chest congestion, dyspnea, wheezing, and crackles.      - Cardiovascular: Negative for chest pain, palpitations, orthopnea, and bilateral lower extremity edema.     - Gastrointestinal: Negative for heartburn, nausea, vomiting, abdominal pain, hematochezia, melena, diarrhea, constipation, and greasy/foul-smelling stools.     - Genitourinary: Negative for dysuria, polyuria, hematuria, pyuria, urinary urgency, and urinary incontinence.     - Musculoskeletal: Negative for myalgias, back pain, and joint pain.     - Skin: Negative for rash, itching, cyanotic skin color change.     - Neurological: Negative for dizziness, tingling, tremors, focal sensory deficit, focal weakness and headaches.     - Endo/Heme/Allergies: Does not bruise/bleed easily.     - Psychiatric/Behavioral: Negative for depression, suicidal/homicidal ideation and memory loss.          - NOTE: All other systems reviewed and are negative, except as in HPI.      Exam:    /62 (BP Location: Left arm, Patient Position: Sitting, BP Cuff Size: Adult)   Pulse 62   Temp 36.3 °C (97.3 °F) (Temporal)   Resp 14   Ht 1.676 m (5' 6\")   Wt 78.7 kg (173 lb 9.4 oz)   SpO2 98%  Body mass index is 28.02 kg/m².    General:  Well nourished, well developed female in NAD  Head is grossly normal.  Neck: Supple without JVD or bruit. Thyroid is not enlarged.  Pulmonary: Clear to ausculation and percussion.  Normal effort. No rales, ronchi, or wheezing.  Cardiovascular: Regular rate and rhythm without murmur. Carotid and radial pulses are intact " and equal bilaterally.  Extremities: no clubbing, cyanosis, or edema.    Please note that this dictation was created using voice recognition software. I have made every reasonable attempt to correct obvious errors, but I expect that there are errors of grammar and possibly content that I did not discover before finalizing the note.    Assessment/Plan:  1. Essential hypertension, benign  Adequately controlled with current meds.  We will check blood work and see her back in 6 weeks.    2. Hypercholesterolemia  Unknown control, get blood work and see her back in 6 weeks.  - Comp Metabolic Panel; Future  - Lipid Profile; Future  - CBC WITH DIFFERENTIAL; Future    3. Persistent atrial fibrillation (HCC)  Now resolved since her ablation.    4. Hyperglycemia  Uncontrolled, we will repeat blood work and see her back.  - HEMOGLOBIN A1C; Future

## 2019-07-17 ENCOUNTER — HOSPITAL ENCOUNTER (OUTPATIENT)
Dept: LAB | Facility: MEDICAL CENTER | Age: 64
End: 2019-07-17
Attending: FAMILY MEDICINE
Payer: COMMERCIAL

## 2019-07-17 ENCOUNTER — HOSPITAL ENCOUNTER (OUTPATIENT)
Dept: LAB | Facility: MEDICAL CENTER | Age: 64
End: 2019-07-17
Attending: NURSE PRACTITIONER
Payer: COMMERCIAL

## 2019-07-17 DIAGNOSIS — I10 ESSENTIAL HYPERTENSION, BENIGN: ICD-10-CM

## 2019-07-17 DIAGNOSIS — R73.9 HYPERGLYCEMIA: ICD-10-CM

## 2019-07-17 DIAGNOSIS — E78.00 HYPERCHOLESTEROLEMIA: ICD-10-CM

## 2019-07-17 LAB
ALBUMIN SERPL BCP-MCNC: 3.9 G/DL (ref 3.2–4.9)
ALBUMIN SERPL BCP-MCNC: 4.1 G/DL (ref 3.2–4.9)
ALBUMIN/GLOB SERPL: 1.3 G/DL
ALBUMIN/GLOB SERPL: 1.5 G/DL
ALP SERPL-CCNC: 57 U/L (ref 30–99)
ALP SERPL-CCNC: 58 U/L (ref 30–99)
ALT SERPL-CCNC: 13 U/L (ref 2–50)
ALT SERPL-CCNC: 13 U/L (ref 2–50)
ANION GAP SERPL CALC-SCNC: 5 MMOL/L (ref 0–11.9)
ANION GAP SERPL CALC-SCNC: 6 MMOL/L (ref 0–11.9)
AST SERPL-CCNC: 19 U/L (ref 12–45)
AST SERPL-CCNC: 19 U/L (ref 12–45)
BASOPHILS # BLD AUTO: 1.1 % (ref 0–1.8)
BASOPHILS # BLD: 0.08 K/UL (ref 0–0.12)
BILIRUB SERPL-MCNC: 0.6 MG/DL (ref 0.1–1.5)
BILIRUB SERPL-MCNC: 0.6 MG/DL (ref 0.1–1.5)
BUN SERPL-MCNC: 16 MG/DL (ref 8–22)
BUN SERPL-MCNC: 16 MG/DL (ref 8–22)
CALCIUM SERPL-MCNC: 9 MG/DL (ref 8.5–10.5)
CALCIUM SERPL-MCNC: 9.2 MG/DL (ref 8.5–10.5)
CHLORIDE SERPL-SCNC: 106 MMOL/L (ref 96–112)
CHLORIDE SERPL-SCNC: 106 MMOL/L (ref 96–112)
CHOLEST SERPL-MCNC: 141 MG/DL (ref 100–199)
CO2 SERPL-SCNC: 29 MMOL/L (ref 20–33)
CO2 SERPL-SCNC: 29 MMOL/L (ref 20–33)
CREAT SERPL-MCNC: 0.91 MG/DL (ref 0.5–1.4)
CREAT SERPL-MCNC: 0.93 MG/DL (ref 0.5–1.4)
EOSINOPHIL # BLD AUTO: 0.52 K/UL (ref 0–0.51)
EOSINOPHIL NFR BLD: 6.9 % (ref 0–6.9)
ERYTHROCYTE [DISTWIDTH] IN BLOOD BY AUTOMATED COUNT: 46.3 FL (ref 35.9–50)
EST. AVERAGE GLUCOSE BLD GHB EST-MCNC: 120 MG/DL
FASTING STATUS PATIENT QL REPORTED: NORMAL
FASTING STATUS PATIENT QL REPORTED: NORMAL
GLOBULIN SER CALC-MCNC: 2.8 G/DL (ref 1.9–3.5)
GLOBULIN SER CALC-MCNC: 2.9 G/DL (ref 1.9–3.5)
GLUCOSE SERPL-MCNC: 92 MG/DL (ref 65–99)
GLUCOSE SERPL-MCNC: 94 MG/DL (ref 65–99)
HBA1C MFR BLD: 5.8 % (ref 0–5.6)
HCT VFR BLD AUTO: 38 % (ref 37–47)
HDLC SERPL-MCNC: 50 MG/DL
HGB BLD-MCNC: 11.8 G/DL (ref 12–16)
IMM GRANULOCYTES # BLD AUTO: 0.02 K/UL (ref 0–0.11)
IMM GRANULOCYTES NFR BLD AUTO: 0.3 % (ref 0–0.9)
LDLC SERPL CALC-MCNC: 72 MG/DL
LYMPHOCYTES # BLD AUTO: 1.21 K/UL (ref 1–4.8)
LYMPHOCYTES NFR BLD: 16 % (ref 22–41)
MCH RBC QN AUTO: 29.7 PG (ref 27–33)
MCHC RBC AUTO-ENTMCNC: 31.1 G/DL (ref 33.6–35)
MCV RBC AUTO: 95.7 FL (ref 81.4–97.8)
MONOCYTES # BLD AUTO: 0.67 K/UL (ref 0–0.85)
MONOCYTES NFR BLD AUTO: 8.9 % (ref 0–13.4)
NEUTROPHILS # BLD AUTO: 5.06 K/UL (ref 2–7.15)
NEUTROPHILS NFR BLD: 66.8 % (ref 44–72)
NRBC # BLD AUTO: 0 K/UL
NRBC BLD-RTO: 0 /100 WBC
PLATELET # BLD AUTO: 216 K/UL (ref 164–446)
PMV BLD AUTO: 11.3 FL (ref 9–12.9)
POTASSIUM SERPL-SCNC: 3.8 MMOL/L (ref 3.6–5.5)
POTASSIUM SERPL-SCNC: 3.9 MMOL/L (ref 3.6–5.5)
PROT SERPL-MCNC: 6.8 G/DL (ref 6–8.2)
PROT SERPL-MCNC: 6.9 G/DL (ref 6–8.2)
RBC # BLD AUTO: 3.97 M/UL (ref 4.2–5.4)
SODIUM SERPL-SCNC: 140 MMOL/L (ref 135–145)
SODIUM SERPL-SCNC: 141 MMOL/L (ref 135–145)
TRIGL SERPL-MCNC: 96 MG/DL (ref 0–149)
WBC # BLD AUTO: 7.6 K/UL (ref 4.8–10.8)

## 2019-07-17 PROCEDURE — 85025 COMPLETE CBC W/AUTO DIFF WBC: CPT

## 2019-07-17 PROCEDURE — 83036 HEMOGLOBIN GLYCOSYLATED A1C: CPT

## 2019-07-17 PROCEDURE — 80053 COMPREHEN METABOLIC PANEL: CPT | Mod: 91

## 2019-07-17 PROCEDURE — 80053 COMPREHEN METABOLIC PANEL: CPT

## 2019-07-17 PROCEDURE — 36415 COLL VENOUS BLD VENIPUNCTURE: CPT

## 2019-07-17 PROCEDURE — 80061 LIPID PANEL: CPT

## 2019-07-19 ENCOUNTER — TELEPHONE (OUTPATIENT)
Dept: CARDIOLOGY | Facility: MEDICAL CENTER | Age: 64
End: 2019-07-19

## 2019-07-19 NOTE — TELEPHONE ENCOUNTER
DAISY Lam.  Jyothi Arroyo, R.N.             The patient's labs appear normal.  Please call her with the results and confirm they have no further questions.  If they have other results pending, let them know we will contact them when they are available.     Thank you!     DAISY Lam.   [facility]   [phone]      [employer] message sent to pt.

## 2019-07-30 ENCOUNTER — OFFICE VISIT (OUTPATIENT)
Dept: MEDICAL GROUP | Facility: MEDICAL CENTER | Age: 64
End: 2019-07-30
Payer: COMMERCIAL

## 2019-07-30 VITALS
WEIGHT: 177 LBS | DIASTOLIC BLOOD PRESSURE: 72 MMHG | SYSTOLIC BLOOD PRESSURE: 122 MMHG | HEART RATE: 59 BPM | OXYGEN SATURATION: 97 % | BODY MASS INDEX: 28.45 KG/M2 | TEMPERATURE: 97.1 F | RESPIRATION RATE: 12 BRPM | HEIGHT: 66 IN

## 2019-07-30 DIAGNOSIS — I27.20 PULMONARY HYPERTENSION (HCC): ICD-10-CM

## 2019-07-30 DIAGNOSIS — E78.00 HYPERCHOLESTEROLEMIA: ICD-10-CM

## 2019-07-30 DIAGNOSIS — R73.9 HYPERGLYCEMIA: ICD-10-CM

## 2019-07-30 DIAGNOSIS — Z11.59 ENCOUNTER FOR HEPATITIS C SCREENING TEST FOR LOW RISK PATIENT: ICD-10-CM

## 2019-07-30 DIAGNOSIS — I10 ESSENTIAL HYPERTENSION, BENIGN: ICD-10-CM

## 2019-07-30 PROCEDURE — 99214 OFFICE O/P EST MOD 30 MIN: CPT | Performed by: FAMILY MEDICINE

## 2019-07-30 NOTE — PROGRESS NOTES
CC:Diagnoses of Essential hypertension, benign, Hypercholesterolemia, Hyperglycemia, Pulmonary hypertension (HCC), and Encounter for hepatitis C screening test for low risk patient were pertinent to this visit.    HISTORY OF PRESENT ILLNESS: Patient is a 64 y.o. female established patient who presents today to talk about her blood work and chronic health problems.  Patient has really done well since she had her cardioversion and had her pericardial tamponade on removed.  Patient feels well.  She did her blood work for today and her numbers are excellent.  Patient was concerned because she did have some problems at the time that she had her cardioversion with chronic kidney disease.  That seems to have completely resolved.  We will continue to monitor.    Health Maintenance: Completed    Essential hypertension, benign  This is a chronic health problem for this patient that is excellent.  Today 122/72. The patient denies chest pain, shortness of breath or dyspnea on exertion.      Hypercholesterolemia  This is a chronic health problem for this patient that is well controlled with current medications.  Her blood total cholesterol is down to 141, triglycerides excellent at 96, HDL good at 50 and her LDL is good at 72.  She will continue with atorvastatin 10 mg daily.    Hyperglycemia  This is been a problem for the patient in the past but on her most recent blood work her blood sugar was completely normal at 94 and we checked an A1c and it to is normal at 5.8.  Patient has been under a great deal of stress because she was diagnosed with atrial fib and had to go through a cardioversion, so her elevated glucose may have been related to that stressor.  She is now doing well and in normal sinus rhythm and we are going to retire this health issue.    Pulmonary hypertension  This is a chronic health problem that is well controlled with current medications and lifestyle measures.      Patient Active Problem List    Diagnosis  Date Noted   • Pulmonary hypertension (HCC) 08/28/2015     Priority: High   • Aortic calcification (HCC) 05/15/2015     Priority: Medium   • S/P ablation of atrial fibrillation 06/20/2019   • Rhinorrhea 04/22/2019   • Chronic anticoagulation 10/30/2017   • Hypercholesterolemia 06/19/2017   • S/P cardiac catheterization 05/26/2017   • Non-rheumatic mitral regurgitation 05/25/2017   • Essential hypertension, benign 05/25/2017   • Degenerative disc disease, lumbar 01/25/2017   • Back pain with sciatica 01/25/2017   • Family history of ovarian cancer 11/21/2016   • Family history of breast cancer 11/21/2016   • Decreased right ventricular systolic function 01/14/2016   • Abnormal CT scan, kidney 05/15/2015   • Diverticulosis 05/15/2015   • Osteoarthritis of both hips 12/10/2014   • Asthma in adult without complication 01/13/2014   • Enlarged ovary 01/13/2014   • Cystocele 09/13/2013   • Spider veins of limb 06/21/2013   • DDD (degenerative disc disease), cervical 01/19/2012   • Trigger point of thoracic region 01/19/2012   • Plantar fascia syndrome 07/01/2010   • Vitamin D deficiency 07/01/2010   • Chronic back pain 06/03/2010      Allergies:Erythromycin; Latex; and Tape    Current Outpatient Prescriptions   Medication Sig Dispense Refill   • losartan (COZAAR) 25 MG Tab Take 1 Tab by mouth every day. 90 Tab 3   • flecainide (TAMBOCOR) 150 MG Tab Take 0.5 Tabs by mouth 2 Times a Day. 60 Tab 3   • carvedilol (COREG) 3.125 MG Tab Take 1 Tab by mouth 2 times a day, with meals. 60 Tab 3   • omeprazole (PRILOSEC) 20 MG delayed-release capsule Take 1 Cap by mouth every morning before breakfast. 30 Cap 0   • fexofenadine (ALLEGRA) 180 MG tablet Take 1 Tab by mouth every day. 90 Tab 3   • fluticasone (FLONASE) 50 MCG/ACT nasal spray Spray 1 Spray in nose 2 times a day. 1 Bottle 11   • atorvastatin (LIPITOR) 10 MG Tab TAKE 1 TABLET BY MOUTH EVERY DAY 90 Tab 2   • apixaban (ELIQUIS) 5mg Tab Take 1 Tab by mouth 2 Times a Day. 60  Tab 6   • potassium chloride ER (KLOR-CON) 10 MEQ tablet Take 1 Tab by mouth every day. 90 Tab 3   • albuterol 108 (90 BASE) MCG/ACT Aero Soln inhalation aerosol Inhale 2 Puffs by mouth every four hours as needed. 1 Inhaler 0   • vitamin D (CHOLECALCIFEROL) 1000 UNIT TABS Take 2 Tabs by mouth every day. 60 Each 11     No current facility-administered medications for this visit.        Social History   Substance Use Topics   • Smoking status: Former Smoker     Packs/day: 1.00     Years: 40.00     Types: Cigarettes     Quit date: 3/1/2010   • Smokeless tobacco: Never Used   • Alcohol use No     Social History     Social History Narrative    Works at [employer]       Family History   Problem Relation Age of Onset   • Heart Disease Mother         1st MI @ 61 yo   • Hypertension Mother    • Heart Attack Mother 60   • Heart Disease Maternal Aunt    • Cancer Daughter 35        breast   • Heart Disease Maternal Grandmother    • Heart Disease Maternal Grandfather    • Heart Disease Maternal Aunt 56        ROS:     - Constitutional:  Negative for fever, chills, unexpected weight change, and fatigue/generalized weakness.    - HEENT:  Negative for headaches, vision changes, hearing changes, ear pain, ear discharge, rhinorrhea, sinus congestion, sore throat, and neck pain.      - Respiratory: Negative for cough, sputum production, chest congestion, dyspnea, wheezing, and crackles.      - Cardiovascular: Negative for chest pain, palpitations, orthopnea, and bilateral lower extremity edema.     - Gastrointestinal: Negative for heartburn, nausea, vomiting, abdominal pain, hematochezia, melena, diarrhea, constipation, and greasy/foul-smelling stools.     - Genitourinary: Negative for dysuria, polyuria, hematuria, pyuria, urinary urgency, and urinary incontinence.     - Musculoskeletal: Negative for myalgias, back pain, and joint pain.     - Skin: Negative for rash, itching, cyanotic skin color change.     - Neurological: Negative for  "dizziness, tingling, tremors, focal sensory deficit, focal weakness and headaches.     - Endo/Heme/Allergies: Does not bruise/bleed easily.     - Psychiatric/Behavioral: Negative for depression, suicidal/homicidal ideation and memory loss.          - NOTE: All other systems reviewed and are negative, except as in HPI.      Exam:    /72 (BP Location: Left arm, Patient Position: Sitting, BP Cuff Size: Adult)   Pulse (!) 59   Temp 36.2 °C (97.1 °F) (Temporal)   Resp 12   Ht 1.676 m (5' 6\")   Wt 80.3 kg (177 lb)   SpO2 97%  Body mass index is 28.57 kg/m².    General:  Well nourished, well developed female in NAD  Head is grossly normal.    LABS:7/17/19: Results reviewed and discussed with the patient, questions answered.    Please note that this dictation was created using voice recognition software. I have made every reasonable attempt to correct obvious errors, but I expect that there are errors of grammar and possibly content that I did not discover before finalizing the note.    Assessment/Plan:  1. Essential hypertension, benign  Controlled, continue with current meds and lifestyle.      2. Hypercholesterolemia  Controlled, continue with current meds and lifestyle.      3. Hyperglycemia  This problem has now resolved.  There is no signs of hyperglycemia on the patient's blood work and we will resolve the issue long-term.    4. Pulmonary hypertension (HCC)  Controlled, continue with current meds and lifestyle.      5. Encounter for hepatitis C screening test for low risk patient  Patient agrees to hepatitis C testing but is very low risk.  I will notify her via [facility] of this result upon its return  - HEP C VIRUS ANTIBODY; Future         "

## 2019-07-30 NOTE — ASSESSMENT & PLAN NOTE
This is a chronic health problem for this patient that is well controlled with current medications.  Her blood total cholesterol is down to 141, triglycerides excellent at 96, HDL good at 50 and her LDL is good at 72.  She will continue with atorvastatin 10 mg daily.

## 2019-07-30 NOTE — ASSESSMENT & PLAN NOTE
This is a chronic health problem for this patient that is excellent.  Today 122/72. The patient denies chest pain, shortness of breath or dyspnea on exertion.

## 2019-07-30 NOTE — ASSESSMENT & PLAN NOTE
This is been a problem for the patient in the past but on her most recent blood work her blood sugar was completely normal at 94 and we checked an A1c and it to is normal at 5.8.  Patient has been under a great deal of stress because she was diagnosed with atrial fib and had to go through a cardioversion, so her elevated glucose may have been related to that stressor.  She is now doing well and in normal sinus rhythm and we are going to retire this health issue.

## 2019-08-06 ENCOUNTER — OFFICE VISIT (OUTPATIENT)
Dept: CARDIOLOGY | Facility: MEDICAL CENTER | Age: 64
End: 2019-08-06
Payer: COMMERCIAL

## 2019-08-06 VITALS
BODY MASS INDEX: 27.78 KG/M2 | WEIGHT: 177 LBS | HEIGHT: 67 IN | OXYGEN SATURATION: 96 % | HEART RATE: 58 BPM | SYSTOLIC BLOOD PRESSURE: 130 MMHG | DIASTOLIC BLOOD PRESSURE: 74 MMHG

## 2019-08-06 DIAGNOSIS — Z98.890 S/P ABLATION OF ATRIAL FIBRILLATION: ICD-10-CM

## 2019-08-06 DIAGNOSIS — Z79.899 HIGH RISK MEDICATION USE: ICD-10-CM

## 2019-08-06 DIAGNOSIS — I48.19 PERSISTENT ATRIAL FIBRILLATION (HCC): ICD-10-CM

## 2019-08-06 DIAGNOSIS — Z86.79 S/P ABLATION OF ATRIAL FIBRILLATION: ICD-10-CM

## 2019-08-06 DIAGNOSIS — I10 ESSENTIAL HYPERTENSION, BENIGN: ICD-10-CM

## 2019-08-06 DIAGNOSIS — Z79.01 CHRONIC ANTICOAGULATION: ICD-10-CM

## 2019-08-06 LAB — EKG IMPRESSION: NORMAL

## 2019-08-06 PROCEDURE — 99214 OFFICE O/P EST MOD 30 MIN: CPT | Performed by: NURSE PRACTITIONER

## 2019-08-06 PROCEDURE — 93000 ELECTROCARDIOGRAM COMPLETE: CPT | Performed by: INTERNAL MEDICINE

## 2019-08-06 ASSESSMENT — ENCOUNTER SYMPTOMS
ABDOMINAL PAIN: 0
PND: 0
BLOOD IN STOOL: 0
FEVER: 0
SHORTNESS OF BREATH: 0
LOSS OF CONSCIOUSNESS: 0
CLAUDICATION: 0
DIAPHORESIS: 0
CHILLS: 0
PALPITATIONS: 1
COUGH: 0
ORTHOPNEA: 0
WHEEZING: 0
DIZZINESS: 0

## 2019-08-06 NOTE — PROGRESS NOTES
"Cardiology/Electrophysiology Follow-up Note    Subjective:   Chief Complaint:   Chief Complaint   Patient presents with   • Atrial Fibrillation     F/V Dx: Persistent A-Fib     Chinyere Priest is a 63 y.o. female who presents today for follow up for Persistent Atrial Fibrillation s/p AF ablation by Dr. Steele on 06/17/19 on Flecainide.     She is followed by Dr. Landa and Dr. Steele.  Last seen by myself on 07/01/2019 for persistent AF s/p AF ablation by Dr. Steele on 06/17/19.  She was doing well at that time with intermittent palpitations on Flecainide.     She underwent successful AF ablation with pulmonary vein isolation procedure by Dr. Steele on 06/17/19 with late pericardial effusion with pericardiocentesis and Tikosyn initiation post procedure. She was unable to tolerate Tikosyn secondary to QT prolongation >500 ms after initial dose. She was therefore switched to low dose Tambocor 75 mg twice daily and was maintaining sinus rhythm, and discharged home.     Medical history significant for Persistent Atrial Fibrillation,  s/p successful AF ablation by Dr. Steele on 06/17/19, on chronic anticoagulation with Eliquis, hypertension, aortic calcification, moderate mitral regurgitation, and mild pulmonary hypertension. Intolerant to Tikosyn secondary to QT prolongation.    Today in follow up, she states she is feeling well. She reports rare palpitations.  She denies chest pain, dizziness, pre syncope or syncope, dyspnea, PND, orthopnea, or lower extremity edema.  Denies any signs or symptoms of bleeding or bleeding issues. Patient endorses medication compliance. She has been seeing her PCP for her blood pressure and walking for physical activity, tolerating well. She has been wearing compression stocking.     Past Medical History:   Diagnosis Date   • Abnormal CT scan, kidney      \"Incidentally noted retroaortic left renal vein is identified. Some calcified plaque is noted in the aorta and its branch vessels.\" "  CT scan done for diverticulosis     • Aortic calcification (HCC)      Noted incidentally on abdominal CT scan.   • Arrhythmia     A-fib   • Asthma in adult         • Chronic back pain     • Dental disorder     Full upper/lower dentures.   • Diverticulosis     • Essential hypertension, malignant     • Inflamed seborrheic keratosis     • Non-rheumatic mitral regurgitation 2017    May 2017, MR moderately, PAH worse but cath showed normal pressures and no significant MR.  2016: Echo normal LV size, mild concentric LVH, LVEF 65%. Grade I diastolic dysfunction. Trace MR, trace TR, RVSP 40mmHg.  May 2015: Echo normal LV size, mild concentric LVH, LVEF 60%. Mild RVH, RVSP 55mmHg. Mild MR, trace TR.   • Osteoarthritis of both hips     • Plantar fascia syndrome     • Plantar wart of right foot     • Pruritic dermatitis      Ongoing for several months but worse at night on her lower extremities   • Pulmonary hypertension (HCC) 2016    Echocardiogram with normal LV size, mild concentric LVH, LVEF 65%. Grade I diastolic dysfunction. Trace MR, trace TR, RVSP 40mmHg.   • Pure hypercholesterolemia     • S/P cardiac catheterization 2017    1.  Normal right heart pressures. 3.  Essentially normal coronary arteries. 2.  Left ventricular ejection fraction 65%.    • Spider veins of limb     • Unspecified vitamin D deficiency       Past Surgical History:   Procedure Laterality Date   • LUMBAR LAMINECTOMY DISKECTOMY  2009    Performed by SUDHIR VERMA at SURGERY [facility]S   • GYN SURGERY       x2   • HYSTERECTOMY LAPAROSCOPY       Family History   Problem Relation Age of Onset   • Heart Disease Mother         1st MI @ 59 yo   • Hypertension Mother    • Heart Attack Mother 60   • Heart Disease Maternal Aunt    • Cancer Daughter 35        breast   • Heart Disease Maternal Grandmother    • Heart Disease Maternal Grandfather    • Heart Disease Maternal Aunt 56     Social History     Socioeconomic  History   • Marital status:      Spouse name: Not on file   • Number of children: Not on file   • Years of education: Not on file   • Highest education level: Not on file   Occupational History   • Not on file   Social Needs   • Financial resource strain: Not on file   • Food insecurity:     Worry: Not on file     Inability: Not on file   • Transportation needs:     Medical: Not on file     Non-medical: Not on file   Tobacco Use   • Smoking status: Former Smoker     Packs/day: 1.00     Years: 40.00     Pack years: 40.00     Types: Cigarettes     Last attempt to quit: 3/1/2010     Years since quittin.4   • Smokeless tobacco: Never Used   Substance and Sexual Activity   • Alcohol use: No     Alcohol/week: 0.0 oz   • Drug use: No     Comment: tried marijuana once.    • Sexual activity: Yes     Partners: Male     Birth control/protection: Post-Menopausal     Comment: , works at [employer]   Lifestyle   • Physical activity:     Days per week: Not on file     Minutes per session: Not on file   • Stress: Not on file   Relationships   • Social connections:     Talks on phone: Not on file     Gets together: Not on file     Attends Je[employer]'s witness service: Not on file     Active member of club or organization: Not on file     Attends meetings of clubs or organizations: Not on file     Relationship status: Not on file   • Intimate partner violence:     Fear of current or ex partner: Not on file     Emotionally abused: Not on file     Physically abused: Not on file     Forced sexual activity: Not on file   Other Topics Concern   •  Service Not Asked   • Blood Transfusions Not Asked   • Caffeine Concern Not Asked   • Occupational Exposure Not Asked   • Hobby Hazards Not Asked   • Sleep Concern Not Asked   • Stress Concern Not Asked   • Weight Concern Not Asked   • Special Diet Not Asked   • Back Care Not Asked   • Exercise No   • Bike Helmet Not Asked   • Seat Belt Not Asked   • Self-Exams Not Asked   Social History  "Narrative    Works at [employer]     Allergies   Allergen Reactions   • Erythromycin      Abdominal pain   • Latex    • Tape      Cloth tape  Paper tape okay       Current Outpatient Medications   Medication Sig Dispense Refill   • losartan (COZAAR) 25 MG Tab Take 1 Tab by mouth every day. 90 Tab 3   • flecainide (TAMBOCOR) 150 MG Tab Take 0.5 Tabs by mouth 2 Times a Day. 60 Tab 3   • carvedilol (COREG) 3.125 MG Tab Take 1 Tab by mouth 2 times a day, with meals. 60 Tab 3   • fexofenadine (ALLEGRA) 180 MG tablet Take 1 Tab by mouth every day. 90 Tab 3   • fluticasone (FLONASE) 50 MCG/ACT nasal spray Spray 1 Spray in nose 2 times a day. 1 Bottle 11   • atorvastatin (LIPITOR) 10 MG Tab TAKE 1 TABLET BY MOUTH EVERY DAY 90 Tab 2   • apixaban (ELIQUIS) 5mg Tab Take 1 Tab by mouth 2 Times a Day. 60 Tab 6   • potassium chloride ER (KLOR-CON) 10 MEQ tablet Take 1 Tab by mouth every day. 90 Tab 3   • albuterol 108 (90 BASE) MCG/ACT Aero Soln inhalation aerosol Inhale 2 Puffs by mouth every four hours as needed. 1 Inhaler 0   • vitamin D (CHOLECALCIFEROL) 1000 UNIT TABS Take 2 Tabs by mouth every day. 60 Each 11     No current facility-administered medications for this visit.      Review of Systems   Constitutional: Negative for chills, diaphoresis and fever.   Respiratory: Negative for cough, shortness of breath and wheezing.    Cardiovascular: Positive for palpitations (2 episodes, brief). Negative for chest pain, orthopnea, claudication, leg swelling and PND.   Gastrointestinal: Negative for abdominal pain and blood in stool.   Genitourinary: Negative for hematuria.   Neurological: Negative for dizziness and loss of consciousness.     All others systems reviewed and negative.   Objective:     /74 (BP Location: Left arm, Patient Position: Sitting, BP Cuff Size: Adult)   Pulse (!) 58   Ht 1.702 m (5' 7\")   Wt 80.3 kg (177 lb)   SpO2 96%  Body mass index is 27.72 kg/m².    Physical Exam   Constitutional: She is oriented to " person, place, and time. No distress.   HENT:   Head: Normocephalic.   Eyes: Pupils are equal, round, and reactive to light.   Neck: Normal range of motion. No JVD present.   Cardiovascular: Normal rate and regular rhythm.   No murmur heard.  Pulses:       Radial pulses are 2+ on the right side, and 2+ on the left side.        Dorsalis pedis pulses are 2+ on the right side, and 2+ on the left side.   Pulmonary/Chest: Effort normal. No respiratory distress. She has decreased breath sounds in the left lower field. She has no wheezes. She has no rales. She exhibits no tenderness.   Abdominal: Soft. Bowel sounds are normal.   Musculoskeletal: She exhibits edema (1+ Bilateral lower extremity, compression stocking. ).   Neurological: She is alert and oriented to person, place, and time.   Skin: Skin is warm and dry. She is not diaphoretic. No erythema.   Psychiatric: Mood, memory, affect and judgment normal.   Nursing note and vitals reviewed.    Cardiac Imaging and Procedures Review:    EKG 08/06/2019: sinus bradycardia, 1st degree AVB    Limited Echocardiogram 06/19/2019:    Limited echo to evaluate for pericardial effusion s/p drain removal.   Prior echo on 6/18/19. Compared to the report of the study done - there   has been no significant change.   Normal left ventricular size and systolic function.  Normal pericardium without effusion.  Pleural effusion noted.     Echocardiogram ([facility]NA):  6/17/19  CONCLUSIONS  Normal left ventricular size and systolic function.  No left atrial thrombus.  No thrombus detected in the left atrial appendage.     Procedural conclusions per Dr. Steele's Op Note (06/17/2019):  Electrophysiology Procedure Note [facility]  Indication: Symptomatic persistent atrial fibrillation  Procedure Performed: 1. Atrial fibrillation ablation 2. Advanced mapping using CARTO system  3. Transesophageal echocardiography 4. Intracardiac echocardiography 5. Esophageal temperature monitoring 6.  Intraarterial blood pressure monitoring 7. Frequent ACT's   Physician(s): PHYLICIA Steele M.D.  Resident/Assistant(s): None  Anesthesia: General endotracheal anesthetic.  Anaesthesiologist: Dr. Oj Silver  Specimen(s) Removed: None  Pre-procedure ECG: Atrial fibrillation  Post Procedure ECG: Sinus rhythm  Total ablation time: 2468 seconds  Fluoroscopy time: 7.7 minutes  Electrophysiologic Findings:    1. Baseline: Atrial fibrillation 777 ms,  ms, HV 51 Ms.   2. Final  SR 1065 ms  Impressions:    1.  Persistent atrial fibrillation.    2. Successful RF ablation pulmonary vein isolation procedure.       Pre-Operative Diagnosis: Status post atrial fibrillation ablation, late cardiac tamponade   Post-Operative Diagnosis: Successful pericardiocentesis with resolution of hypotension and pericardial fluid  Indications: Cardiac tamponade post catheter ablation  Procedure(s) Performed: Pericardiocentesis  Physician(s): PHYLICIA Steele M.D.  Anesthesia: Local anesthesia  Pre-procedure ECG: Sinus rhythm  Post-procedure ECG: Sinus rhythm  Complications:  None  Fluroscopy time: Less than 2 minutes  Impressions:    1.  Late cardiac tamponade post ablation.  2.  Successful pericardiocentesis.     Cardiac Catheterization:  05/26/2017  1.  LEFT MAIN ARTERY:  Left main vessel is a large caliber vessel, angiographically normal, bifurcates into the left anterior descending artery and circumflex artery.  2.  LEFT ANTERIOR DESCENDING ARTERY:  The LAD gives rise to a first diagonal branch and a normal complement of septal branches and extends around the apex.    The LAD is widely patent with the exception of a very mild eccentric smooth focal proximal atheroma otherwise the LAD appears angiographically normal.  3.  CIRCUMFLEX ARTERY:  The circumflex gives rise to 2 major long marginal branches and a small caliber AV groove branch.  Angiographically, the circumflex artery appears normal.  4.  RIGHT CORONARY ARTERY:  The RCA is moderate size  caliber vessel and gives rise to an acute marginal branch, posterior descending artery and a posterolateral branch.  Angiographically, the right coronary artery is normal.    Labs (personally reviewed and notable for):   Lab Results   Component Value Date/Time    SODIUM 141 07/17/2019 07:14 AM    POTASSIUM 3.9 07/17/2019 07:14 AM    CHLORIDE 106 07/17/2019 07:14 AM    CO2 29 07/17/2019 07:14 AM    GLUCOSE 94 07/17/2019 07:14 AM    BUN 16 07/17/2019 07:14 AM    CREATININE 0.91 07/17/2019 07:14 AM      Lab Results   Component Value Date/Time    WBC 7.6 07/17/2019 07:13 AM    RBC 3.97 (L) 07/17/2019 07:13 AM    HEMOGLOBIN 11.8 (L) 07/17/2019 07:13 AM    HEMATOCRIT 38.0 07/17/2019 07:13 AM    MCV 95.7 07/17/2019 07:13 AM    MCH 29.7 07/17/2019 07:13 AM    MCHC 31.1 (L) 07/17/2019 07:13 AM    MPV 11.3 07/17/2019 07:13 AM    NEUTSPOLYS 66.80 07/17/2019 07:13 AM    LYMPHOCYTES 16.00 (L) 07/17/2019 07:13 AM    MONOCYTES 8.90 07/17/2019 07:13 AM    EOSINOPHILS 6.90 07/17/2019 07:13 AM    BASOPHILS 1.10 07/17/2019 07:13 AM      PT/INR:   Lab Results   Component Value Date/Time    PROTHROMBTM 13.6 06/18/2019 05:21 AM    INR 1.02 06/18/2019 05:21 AM   ]  Assessment:     1. Persistent atrial fibrillation (HCC)     2. S/P ablation of atrial fibrillation  EKG   3. Essential hypertension, benign     4. High risk medication use      Flecainide   5. Chronic anticoagulation      Eliquis     Medical Decision Making:  Today's Assessment / Status / Plan:   1. Persistent Atrial Fibrillation  - S/P successful AF ablation with PVI by Dr. Steele on 06/17/19 with late pericardial effusion with pericardiocentesis, now resolved.  - Hx intolerance to Tikosyn secondary to QT prolongation.   - Relatively asymptomatic, rare palpitations.  - EKG today shows sinus bradycardia, 1st AVB, no acute abnormalities. .9 and QRS 95, stable from prior.   - PPI complete.  - On OAC with Eliquis, continue.  - On Flecainide 75 mg BID and coreg 3.125 mg daily,  tolerating well, continue.    2. High Risk Medication: Flecainide  - Flecainide 75 mg BID, remains in SR, continue per recommendations above.  - Will continue to monitor for side effects of patient's high risk medication(s) including liver, renal function and electrolytes.     3. Chronic Anticoagulation: Eliquis  - No signs/symptoms of bleeding.   - Continue OAC with Eliquis 5 mg BID.     4. Hypertension  - BP mildly elevated/stable.   - On losartan. Labs reviewed and are unremarkable.   - Continue current medication management.   - Encouraged to continue to monitor.  - Followed by PCP.     5. Hyperlipidemia  - Well controlled on statin, continue.   - LDL 72 (07/17/19)  - Followed by PCP.     Plan reviewed in detail with the patient and she verbalizes understanding and is in agreement. RTC 3 months and with Dr. Landa, sooner if clinical condition changes.     Thank you for allowing me to participate in this patients care.  Please contact me with any questions or concerns.     DAISY Lam.   [facility]  [phone]    Collaborating MD/ADD: MD Gustavo.

## 2019-08-06 NOTE — LETTER
[facility]   [address], [facility] [address]  [place] [address]  Phone: [phone]  Fax: [phone]              Chinyere Priest  1955    Encounter Date: 8/6/2019    DANIELLE Lam          PROGRESS NOTE:  Cardiology/Electrophysiology Follow-up Note    Subjective:   Chief Complaint:   Chief Complaint   Patient presents with   • Atrial Fibrillation     F/V Dx: Persistent A-Fib     Chinyere Priest is a 63 y.o. female who presents today for follow up for Persistent Atrial Fibrillation s/p AF ablation by Dr. Steele on 06/17/19 on Flecainide.     She is followed by Dr. Landa and Dr. Steele.  Last seen by myself on 07/01/2019 for persistent AF s/p AF ablation by Dr. Steele on 06/17/19.  She was doing well at that time with intermittent palpitations on Flecainide.     She underwent successful AF ablation with pulmonary vein isolation procedure by Dr. Steele on 06/17/19 with late pericardial effusion with pericardiocentesis and Tikosyn initiation post procedure. She was unable to tolerate Tikosyn secondary to QT prolongation >500 ms after initial dose. She was therefore switched to low dose Tambocor 75 mg twice daily and was maintaining sinus rhythm, and discharged home.     Medical history significant for Persistent Atrial Fibrillation,  s/p successful AF ablation by Dr. Steele on 06/17/19, on chronic anticoagulation with Eliquis, hypertension, aortic calcification, moderate mitral regurgitation, and mild pulmonary hypertension. Intolerant to Tikosyn secondary to QT prolongation.    Today in follow up, she states she is feeling well. She reports rare palpitations.  She denies chest pain, dizziness, pre syncope or syncope, dyspnea, PND, orthopnea, or lower extremity edema.  Denies any signs or symptoms of bleeding or bleeding issues. Patient endorses medication compliance. She has been seeing her PCP for her blood pressure and walking for physical  "activity, tolerating well. She has been wearing compression stocking.     Past Medical History:   Diagnosis Date   • Abnormal CT scan, kidney      \"Incidentally noted retroaortic left renal vein is identified. Some calcified plaque is noted in the aorta and its branch vessels.\"  CT scan done for diverticulosis     • Aortic calcification (HCC)      Noted incidentally on abdominal CT scan.   • Arrhythmia     A-fib   • Asthma in adult         • Chronic back pain     • Dental disorder     Full upper/lower dentures.   • Diverticulosis     • Essential hypertension, malignant     • Inflamed seborrheic keratosis     • Non-rheumatic mitral regurgitation 2017    May 2017, MR moderately, PAH worse but cath showed normal pressures and no significant MR.  2016: Echo normal LV size, mild concentric LVH, LVEF 65%. Grade I diastolic dysfunction. Trace MR, trace TR, RVSP 40mmHg.  May 2015: Echo normal LV size, mild concentric LVH, LVEF 60%. Mild RVH, RVSP 55mmHg. Mild MR, trace TR.   • Osteoarthritis of both hips     • Plantar fascia syndrome     • Plantar wart of right foot     • Pruritic dermatitis      Ongoing for several months but worse at night on her lower extremities   • Pulmonary hypertension (HCC) 2016    Echocardiogram with normal LV size, mild concentric LVH, LVEF 65%. Grade I diastolic dysfunction. Trace MR, trace TR, RVSP 40mmHg.   • Pure hypercholesterolemia     • S/P cardiac catheterization 2017    1.  Normal right heart pressures. 3.  Essentially normal coronary arteries. 2.  Left ventricular ejection fraction 65%.    • Spider veins of limb     • Unspecified vitamin D deficiency       Past Surgical History:   Procedure Laterality Date   • LUMBAR LAMINECTOMY DISKECTOMY  2009    Performed by SUDHIR VERMA at SURGERY [facility] ORS   • GYN SURGERY       x2   • HYSTERECTOMY LAPAROSCOPY       Family History   Problem Relation Age of Onset   • Heart Disease Mother         1st MI @ 60 " yo   • Hypertension Mother    • Heart Attack Mother 60   • Heart Disease Maternal Aunt    • Cancer Daughter 35        breast   • Heart Disease Maternal Grandmother    • Heart Disease Maternal Grandfather    • Heart Disease Maternal Aunt 56     Social History     Socioeconomic History   • Marital status:      Spouse name: Not on file   • Number of children: Not on file   • Years of education: Not on file   • Highest education level: Not on file   Occupational History   • Not on file   Social Needs   • Financial resource strain: Not on file   • Food insecurity:     Worry: Not on file     Inability: Not on file   • Transportation needs:     Medical: Not on file     Non-medical: Not on file   Tobacco Use   • Smoking status: Former Smoker     Packs/day: 1.00     Years: 40.00     Pack years: 40.00     Types: Cigarettes     Last attempt to quit: 3/1/2010     Years since quittin.4   • Smokeless tobacco: Never Used   Substance and Sexual Activity   • Alcohol use: No     Alcohol/week: 0.0 oz   • Drug use: No     Comment: tried marijuana once.    • Sexual activity: Yes     Partners: Male     Birth control/protection: Post-Menopausal     Comment: , works at [employer]   Lifestyle   • Physical activity:     Days per week: Not on file     Minutes per session: Not on file   • Stress: Not on file   Relationships   • Social connections:     Talks on phone: Not on file     Gets together: Not on file     Attends Druze service: Not on file     Active member of club or organization: Not on file     Attends meetings of clubs or organizations: Not on file     Relationship status: Not on file   • Intimate partner violence:     Fear of current or ex partner: Not on file     Emotionally abused: Not on file     Physically abused: Not on file     Forced sexual activity: Not on file   Other Topics Concern   •  Service Not Asked   • Blood Transfusions Not Asked   • Caffeine Concern Not Asked   • Occupational Exposure Not  Asked   • Hobby Hazards Not Asked   • Sleep Concern Not Asked   • Stress Concern Not Asked   • Weight Concern Not Asked   • Special Diet Not Asked   • Back Care Not Asked   • Exercise No   • Bike Helmet Not Asked   • Seat Belt Not Asked   • Self-Exams Not Asked   Social History Narrative    Works at [employer]     Allergies   Allergen Reactions   • Erythromycin      Abdominal pain   • Latex    • Tape      Cloth tape  Paper tape okay       Current Outpatient Medications   Medication Sig Dispense Refill   • losartan (COZAAR) 25 MG Tab Take 1 Tab by mouth every day. 90 Tab 3   • flecainide (TAMBOCOR) 150 MG Tab Take 0.5 Tabs by mouth 2 Times a Day. 60 Tab 3   • carvedilol (COREG) 3.125 MG Tab Take 1 Tab by mouth 2 times a day, with meals. 60 Tab 3   • fexofenadine (ALLEGRA) 180 MG tablet Take 1 Tab by mouth every day. 90 Tab 3   • fluticasone (FLONASE) 50 MCG/ACT nasal spray Spray 1 Spray in nose 2 times a day. 1 Bottle 11   • atorvastatin (LIPITOR) 10 MG Tab TAKE 1 TABLET BY MOUTH EVERY DAY 90 Tab 2   • apixaban (ELIQUIS) 5mg Tab Take 1 Tab by mouth 2 Times a Day. 60 Tab 6   • potassium chloride ER (KLOR-CON) 10 MEQ tablet Take 1 Tab by mouth every day. 90 Tab 3   • albuterol 108 (90 BASE) MCG/ACT Aero Soln inhalation aerosol Inhale 2 Puffs by mouth every four hours as needed. 1 Inhaler 0   • vitamin D (CHOLECALCIFEROL) 1000 UNIT TABS Take 2 Tabs by mouth every day. 60 Each 11     No current facility-administered medications for this visit.      Review of Systems   Constitutional: Negative for chills, diaphoresis and fever.   Respiratory: Negative for cough, shortness of breath and wheezing.    Cardiovascular: Positive for palpitations (2 episodes, brief). Negative for chest pain, orthopnea, claudication, leg swelling and PND.   Gastrointestinal: Negative for abdominal pain and blood in stool.   Genitourinary: Negative for hematuria.   Neurological: Negative for dizziness and loss of consciousness.     All others systems  "reviewed and negative.   Objective:     /74 (BP Location: Left arm, Patient Position: Sitting, BP Cuff Size: Adult)   Pulse (!) 58   Ht 1.702 m (5' 7\")   Wt 80.3 kg (177 lb)   SpO2 96%  Body mass index is 27.72 kg/m².    Physical Exam   Constitutional: She is oriented to person, place, and time. No distress.   HENT:   Head: Normocephalic.   Eyes: Pupils are equal, round, and reactive to light.   Neck: Normal range of motion. No JVD present.   Cardiovascular: Normal rate and regular rhythm.   No murmur heard.  Pulses:       Radial pulses are 2+ on the right side, and 2+ on the left side.        Dorsalis pedis pulses are 2+ on the right side, and 2+ on the left side.   Pulmonary/Chest: Effort normal. No respiratory distress. She has decreased breath sounds in the left lower field. She has no wheezes. She has no rales. She exhibits no tenderness.   Abdominal: Soft. Bowel sounds are normal.   Musculoskeletal: She exhibits edema (1+ Bilateral lower extremity, compression stocking. ).   Neurological: She is alert and oriented to person, place, and time.   Skin: Skin is warm and dry. She is not diaphoretic. No erythema.   Psychiatric: Mood, memory, affect and judgment normal.   Nursing note and vitals reviewed.    Cardiac Imaging and Procedures Review:    EKG 08/06/2019: sinus bradycardia, 1st degree AVB    Limited Echocardiogram 06/19/2019:    Limited echo to evaluate for pericardial effusion s/p drain removal.   Prior echo on 6/18/19. Compared to the report of the study done - there   has been no significant change.   Normal left ventricular size and systolic function.  Normal pericardium without effusion.  Pleural effusion noted.     Echocardiogram ([facility]):  6/17/19  CONCLUSIONS  Normal left ventricular size and systolic function.  No left atrial thrombus.  No thrombus detected in the left atrial appendage.     Procedural conclusions per Dr. Steele's Op Note (06/17/2019):  Electrophysiology Procedure Note [facility]  Indication: Symptomatic persistent atrial fibrillation  Procedure Performed: 1. Atrial fibrillation ablation 2. Advanced mapping using CARTO system  3. Transesophageal echocardiography 4. Intracardiac echocardiography 5. Esophageal temperature monitoring 6. Intraarterial blood pressure monitoring 7. Frequent ACT's   Physician(s): PHYLICIA Steele M.D.  Resident/Assistant(s): None  Anesthesia: General endotracheal anesthetic.  Anaesthesiologist: Dr. Oj Silver  Specimen(s) Removed: None  Pre-procedure ECG: Atrial fibrillation  Post Procedure ECG: Sinus rhythm  Total ablation time: 2468 seconds  Fluoroscopy time: 7.7 minutes  Electrophysiologic Findings:    1. Baseline: Atrial fibrillation 777 ms,  ms, HV 51 Ms.   2. Final  SR 1065 ms  Impressions:    1.  Persistent atrial fibrillation.    2. Successful RF ablation pulmonary vein isolation procedure.       Pre-Operative Diagnosis: Status post atrial fibrillation ablation, late cardiac tamponade   Post-Operative Diagnosis: Successful pericardiocentesis with resolution of hypotension and pericardial fluid  Indications: Cardiac tamponade post catheter ablation  Procedure(s) Performed: Pericardiocentesis  Physician(s): PHYLICIA Steele M.D.  Anesthesia: Local anesthesia  Pre-procedure ECG: Sinus rhythm  Post-procedure ECG: Sinus rhythm  Complications:  None  Fluroscopy time: Less than 2 minutes  Impressions:    1.  Late cardiac tamponade post ablation.  2.  Successful pericardiocentesis.     Cardiac Catheterization:  05/26/2017  1.  LEFT MAIN ARTERY:  Left main vessel is a large caliber vessel, angiographically normal, bifurcates into the left anterior descending artery and circumflex artery.  2.  LEFT ANTERIOR DESCENDING ARTERY:  The LAD gives rise to a first diagonal branch and a normal complement of septal branches and extends around the apex.    The LAD is widely patent with the exception of a very mild eccentric smooth focal proximal atheroma  otherwise the LAD appears angiographically normal.  3.  CIRCUMFLEX ARTERY:  The circumflex gives rise to 2 major long marginal branches and a small caliber AV groove branch.  Angiographically, the circumflex artery appears normal.  4.  RIGHT CORONARY ARTERY:  The RCA is moderate size caliber vessel and gives rise to an acute marginal branch, posterior descending artery and a posterolateral branch.  Angiographically, the right coronary artery is normal.    Labs (personally reviewed and notable for):   Lab Results   Component Value Date/Time    SODIUM 141 07/17/2019 07:14 AM    POTASSIUM 3.9 07/17/2019 07:14 AM    CHLORIDE 106 07/17/2019 07:14 AM    CO2 29 07/17/2019 07:14 AM    GLUCOSE 94 07/17/2019 07:14 AM    BUN 16 07/17/2019 07:14 AM    CREATININE 0.91 07/17/2019 07:14 AM      Lab Results   Component Value Date/Time    WBC 7.6 07/17/2019 07:13 AM    RBC 3.97 (L) 07/17/2019 07:13 AM    HEMOGLOBIN 11.8 (L) 07/17/2019 07:13 AM    HEMATOCRIT 38.0 07/17/2019 07:13 AM    MCV 95.7 07/17/2019 07:13 AM    MCH 29.7 07/17/2019 07:13 AM    MCHC 31.1 (L) 07/17/2019 07:13 AM    MPV 11.3 07/17/2019 07:13 AM    NEUTSPOLYS 66.80 07/17/2019 07:13 AM    LYMPHOCYTES 16.00 (L) 07/17/2019 07:13 AM    MONOCYTES 8.90 07/17/2019 07:13 AM    EOSINOPHILS 6.90 07/17/2019 07:13 AM    BASOPHILS 1.10 07/17/2019 07:13 AM      PT/INR:   Lab Results   Component Value Date/Time    PROTHROMBTM 13.6 06/18/2019 05:21 AM    INR 1.02 06/18/2019 05:21 AM   ]  Assessment:     1. Persistent atrial fibrillation (HCC)     2. S/P ablation of atrial fibrillation  EKG   3. Essential hypertension, benign     4. High risk medication use      Flecainide   5. Chronic anticoagulation      Eliquis     Medical Decision Making:  Today's Assessment / Status / Plan:   1. Persistent Atrial Fibrillation  - S/P successful AF ablation with PVI by Dr. Steele on 06/17/19 with late pericardial effusion with pericardiocentesis, now resolved.  - Hx intolerance to Tikosyn  secondary to QT prolongation.   - Relatively asymptomatic, rare palpitations.  - EKG today shows sinus bradycardia, 1st AVB, no acute abnormalities. .9 and QRS 95, stable from prior.   - PPI complete.  - On OAC with Eliquis, continue.  - On Flecainide 75 mg BID and coreg 3.125 mg daily, tolerating well, continue.    2. High Risk Medication: Flecainide  - Flecainide 75 mg BID, remains in SR, continue per recommendations above.  - Will continue to monitor for side effects of patient's high risk medication(s) including liver, renal function and electrolytes.     3. Chronic Anticoagulation: Eliquis  - No signs/symptoms of bleeding.   - Continue OAC with Eliquis 5 mg BID.     4. Hypertension  - BP mildly elevated/stable.   - On losartan. Labs reviewed and are unremarkable.   - Continue current medication management.   - Encouraged to continue to monitor.  - Followed by PCP.     5. Hyperlipidemia  - Well controlled on statin, continue.   - LDL 72 (07/17/19)  - Followed by PCP.     Plan reviewed in detail with the patient and she verbalizes understanding and is in agreement. RTC 3 months and with Dr. Landa, sooner if clinical condition changes.     Thank you for allowing me to participate in this patients care.  Please contact me with any questions or concerns.     DAISY Lam.   [facility]  [phone]    Collaborating MD/ADD: MD Gustavo.      Keila Mccrary M.D.  [address]  [address]  [place] [address]  VIA In Basket

## 2019-09-09 ENCOUNTER — TELEPHONE (OUTPATIENT)
Dept: CARDIOLOGY | Facility: MEDICAL CENTER | Age: 64
End: 2019-09-09

## 2019-09-09 ENCOUNTER — OFFICE VISIT (OUTPATIENT)
Dept: CARDIOLOGY | Facility: MEDICAL CENTER | Age: 64
End: 2019-09-09
Payer: COMMERCIAL

## 2019-09-09 VITALS
HEIGHT: 67 IN | WEIGHT: 180 LBS | OXYGEN SATURATION: 97 % | SYSTOLIC BLOOD PRESSURE: 122 MMHG | DIASTOLIC BLOOD PRESSURE: 82 MMHG | HEART RATE: 60 BPM | BODY MASS INDEX: 28.25 KG/M2

## 2019-09-09 DIAGNOSIS — R60.0 LOCALIZED EDEMA: ICD-10-CM

## 2019-09-09 DIAGNOSIS — Z86.79 S/P ABLATION OF ATRIAL FIBRILLATION: ICD-10-CM

## 2019-09-09 DIAGNOSIS — I10 ESSENTIAL HYPERTENSION, BENIGN: ICD-10-CM

## 2019-09-09 DIAGNOSIS — E78.00 HYPERCHOLESTEROLEMIA: ICD-10-CM

## 2019-09-09 DIAGNOSIS — Z79.01 CHRONIC ANTICOAGULATION: ICD-10-CM

## 2019-09-09 DIAGNOSIS — Z98.890 S/P ABLATION OF ATRIAL FIBRILLATION: ICD-10-CM

## 2019-09-09 DIAGNOSIS — I34.0 NON-RHEUMATIC MITRAL REGURGITATION: ICD-10-CM

## 2019-09-09 PROBLEM — J34.89 RHINORRHEA: Status: RESOLVED | Noted: 2019-04-22 | Resolved: 2019-09-09

## 2019-09-09 PROCEDURE — 99214 OFFICE O/P EST MOD 30 MIN: CPT | Performed by: NURSE PRACTITIONER

## 2019-09-09 RX ORDER — FUROSEMIDE 20 MG/1
20 TABLET ORAL DAILY
Qty: 30 TAB | Refills: 1 | Status: SHIPPED | OUTPATIENT
Start: 2019-09-09 | End: 2019-10-01 | Stop reason: SDUPTHER

## 2019-09-09 RX ORDER — POTASSIUM CHLORIDE 750 MG/1
10 TABLET, FILM COATED, EXTENDED RELEASE ORAL 2 TIMES DAILY
Qty: 180 TAB | Refills: 1 | Status: SHIPPED | OUTPATIENT
Start: 2019-09-09 | End: 2019-11-07

## 2019-09-09 ASSESSMENT — ENCOUNTER SYMPTOMS
BRUISES/BLEEDS EASILY: 0
CHILLS: 0
PALPITATIONS: 0
ORTHOPNEA: 0
NAUSEA: 0
HEADACHES: 0
INSOMNIA: 0
ABDOMINAL PAIN: 0
FEVER: 0
PND: 0
COUGH: 0
SHORTNESS OF BREATH: 0
LOSS OF CONSCIOUSNESS: 0
DIZZINESS: 0
MYALGIAS: 0

## 2019-09-09 NOTE — TELEPHONE ENCOUNTER
"Pt c/o left leg swelling constant for 1 week. Swelling no longer goes down at night. Wt up 3#. No diuretic but on potassium. Has tried support hose but swells above the hose. \"a little SOB\". Pt in [place] today. Appt made AB  "

## 2019-09-09 NOTE — PROGRESS NOTES
"Chief Complaint   Patient presents with   • Follow-Up   • Edema   • Atrial Fibrillation   • Anticoagulation   • HTN (Controlled)   • Hyperlipidemia       Subjective:   Chinyere Priest is a 64 y.o. female who presents today for follow-up of LE edema.    Chinyere is a 64 year old female with history of atrial fibrillation, status post ablation in June 2019 on Flecainide and Coreg, anticoagulation, hypertension, hyperlipidemia, and moderate MR, normally followed by Dr. Landa and Dr. Steele, and last seen by CHANTEL Lam in August 2019. At that time, she was feeling very good.    Since then, in the last couple of weeks, she has noticed increasing LE edema. She has not changed her diet any recently. No chest pain, pressure or discomfort; no symptomatic palpitations. Some mild shortness of breath, but no orthopnea or PND; no dizziness, lightheadedness or syncope. BP has been stable.     Past Medical History:   Diagnosis Date   • Abnormal CT scan, kidney      \"Incidentally noted retroaortic left renal vein is identified. Some calcified plaque is noted in the aorta and its branch vessels.\"  CT scan done for diverticulosis     • Aortic calcification (HCC)      Noted incidentally on abdominal CT scan.   • Asthma in adult         • Atrial fibrillation (HCC) 06/2019    Status post ablation by Dr. Steele.   • Chronic anticoagulation    • Chronic back pain     • Dental disorder     Full upper/lower dentures.   • Diverticulosis     • Essential hypertension, malignant     • Inflamed seborrheic keratosis     • Non-rheumatic mitral regurgitation 05/2019    Echocardiogram with normal LV size, LVEF 65%. Moderate MR, mild-moderate TR, unchanged from previous.   • Osteoarthritis of both hips     • Plantar fascia syndrome     • Plantar wart of right foot     • Pruritic dermatitis      Ongoing for several months but worse at night on her lower extremities   • Pulmonary hypertension (HCC)    • Pure hypercholesterolemia     • " S/P cardiac catheterization 2017    1.  Normal right heart pressures. 3.  Essentially normal coronary arteries. 2.  Left ventricular ejection fraction 65%.    • Spider veins of limb     • Unspecified vitamin D deficiency       Past Surgical History:   Procedure Laterality Date   • LUMBAR LAMINECTOMY DISKECTOMY  2009    Performed by SUDHIR VERMA at SURGERY [facility] ORS   • GYN SURGERY       x2   • HYSTERECTOMY LAPAROSCOPY       Family History   Problem Relation Age of Onset   • Heart Disease Mother         1st MI @ 59 yo   • Hypertension Mother    • Heart Attack Mother 60   • Heart Disease Maternal Aunt    • Cancer Daughter 35        breast   • Heart Disease Maternal Grandmother    • Heart Disease Maternal Grandfather    • Heart Disease Maternal Aunt 56     Social History     Socioeconomic History   • Marital status:      Spouse name: Not on file   • Number of children: Not on file   • Years of education: Not on file   • Highest education level: Not on file   Occupational History   • Not on file   Social Needs   • Financial resource strain: Not on file   • Food insecurity:     Worry: Not on file     Inability: Not on file   • Transportation needs:     Medical: Not on file     Non-medical: Not on file   Tobacco Use   • Smoking status: Former Smoker     Packs/day: 1.00     Years: 40.00     Pack years: 40.00     Types: Cigarettes     Last attempt to quit: 3/1/2010     Years since quittin.5   • Smokeless tobacco: Never Used   Substance and Sexual Activity   • Alcohol use: No     Alcohol/week: 0.0 oz   • Drug use: No     Comment: tried marijuana once.    • Sexual activity: Yes     Partners: Male     Birth control/protection: Post-Menopausal     Comment: , works at [employer]   Lifestyle   • Physical activity:     Days per week: Not on file     Minutes per session: Not on file   • Stress: Not on file   Relationships   • Social connections:     Talks on phone: Not on file     Gets together:  Not on file     Attends Anabaptism service: Not on file     Active member of club or organization: Not on file     Attends meetings of clubs or organizations: Not on file     Relationship status: Not on file   • Intimate partner violence:     Fear of current or ex partner: Not on file     Emotionally abused: Not on file     Physically abused: Not on file     Forced sexual activity: Not on file   Other Topics Concern   •  Service Not Asked   • Blood Transfusions Not Asked   • Caffeine Concern Not Asked   • Occupational Exposure Not Asked   • Hobby Hazards Not Asked   • Sleep Concern Not Asked   • Stress Concern Not Asked   • Weight Concern Not Asked   • Special Diet Not Asked   • Back Care Not Asked   • Exercise No   • Bike Helmet Not Asked   • Seat Belt Not Asked   • Self-Exams Not Asked   Social History Narrative    Works at [employer]     Allergies   Allergen Reactions   • Erythromycin      Abdominal pain   • Latex    • Tape      Cloth tape  Paper tape taryn     Outpatient Encounter Medications as of 9/9/2019   Medication Sig Dispense Refill   • furosemide (LASIX) 20 MG Tab Take 1 Tab by mouth every day. 30 Tab 1   • potassium chloride ER (KLOR-CON) 10 MEQ tablet Take 1 Tab by mouth 2 times a day. 180 Tab 1   • losartan (COZAAR) 25 MG Tab Take 1 Tab by mouth every day. 90 Tab 3   • flecainide (TAMBOCOR) 150 MG Tab Take 0.5 Tabs by mouth 2 Times a Day. 60 Tab 3   • carvedilol (COREG) 3.125 MG Tab Take 1 Tab by mouth 2 times a day, with meals. 60 Tab 3   • fexofenadine (ALLEGRA) 180 MG tablet Take 1 Tab by mouth every day. 90 Tab 3   • fluticasone (FLONASE) 50 MCG/ACT nasal spray Spray 1 Spray in nose 2 times a day. 1 Bottle 11   • atorvastatin (LIPITOR) 10 MG Tab TAKE 1 TABLET BY MOUTH EVERY DAY 90 Tab 2   • apixaban (ELIQUIS) 5mg Tab Take 1 Tab by mouth 2 Times a Day. 60 Tab 6   • albuterol 108 (90 BASE) MCG/ACT Aero Soln inhalation aerosol Inhale 2 Puffs by mouth every four hours as needed. 1 Inhaler 0   • vitamin  "D (CHOLECALCIFEROL) 1000 UNIT TABS Take 2 Tabs by mouth every day. 60 Each 11   • [DISCONTINUED] potassium chloride ER (KLOR-CON) 10 MEQ tablet Take 1 Tab by mouth every day. 90 Tab 3     No facility-administered encounter medications on file as of 9/9/2019.      Review of Systems   Constitutional: Negative for chills and fever.   HENT: Negative for congestion.    Respiratory: Negative for cough and shortness of breath.    Cardiovascular: Positive for leg swelling. Negative for chest pain, palpitations, orthopnea and PND.   Gastrointestinal: Negative for abdominal pain and nausea.   Musculoskeletal: Negative for myalgias.   Skin: Negative for rash.   Neurological: Negative for dizziness, loss of consciousness and headaches.   Endo/Heme/Allergies: Does not bruise/bleed easily.   Psychiatric/Behavioral: The patient does not have insomnia.         Objective:   /82 (BP Location: Left arm, Patient Position: Sitting, BP Cuff Size: Adult)   Pulse 60   Ht 1.702 m (5' 7\")   Wt 81.6 kg (180 lb)   SpO2 97%   BMI 28.19 kg/m²     Physical Exam   Constitutional: She is oriented to person, place, and time. She appears well-developed and well-nourished.   HENT:   Head: Normocephalic.   Eyes: EOM are normal.   Neck: Normal range of motion. Neck supple. No JVD present.   Cardiovascular: Normal rate, regular rhythm and normal heart sounds.   Pulmonary/Chest: Effort normal and breath sounds normal. No respiratory distress. She has no wheezes. She has no rales.   Abdominal: Soft. Bowel sounds are normal. She exhibits no distension. There is no tenderness.   Musculoskeletal: Normal range of motion. She exhibits edema.   1-2+ edema to the shins, L>R   Neurological: She is alert and oriented to person, place, and time.   Skin: Skin is warm and dry. No rash noted.   Psychiatric: She has a normal mood and affect.     RESULTS OF ECHOCARDIOGRAM OF MAY 2019:  Normal LV size  LVEF 65%  Moderate MR  Mild-moderate TR    Component      " Latest Ref Rng & Units 7/17/2019 7/17/2019 7/17/2019           7:13 AM  7:13 AM  7:14 AM   WBC      4.8 - 10.8 K/uL 7.6     RBC      4.20 - 5.40 M/uL 3.97 (L)     Hemoglobin      12.0 - 16.0 g/dL 11.8 (L)     Hematocrit      37.0 - 47.0 % 38.0     MCV      81.4 - 97.8 fL 95.7     MCH      27.0 - 33.0 pg 29.7     MCHC      33.6 - 35.0 g/dL 31.1 (L)     RDW      35.9 - 50.0 fL 46.3     Platelet Count      164 - 446 K/uL 216     MPV      9.0 - 12.9 fL 11.3     Neutrophils-Polys      44.00 - 72.00 % 66.80     Lymphocytes      22.00 - 41.00 % 16.00 (L)     Monocytes      0.00 - 13.40 % 8.90     Eosinophils      0.00 - 6.90 % 6.90     Basophils      0.00 - 1.80 % 1.10     Immature Granulocytes      0.00 - 0.90 % 0.30     Nucleated RBC      /100 WBC 0.00     Neutrophils (Absolute)      2.00 - 7.15 K/uL 5.06     Lymphs (Absolute)      1.00 - 4.80 K/uL 1.21     Monos (Absolute)      0.00 - 0.85 K/uL 0.67     Eos (Absolute)      0.00 - 0.51 K/uL 0.52 (H)     Baso (Absolute)      0.00 - 0.12 K/uL 0.08     Immature Granulocytes (abs)      0.00 - 0.11 K/uL 0.02     NRBC (Absolute)      K/uL 0.00     Sodium      135 - 145 mmol/L   141   Potassium      3.6 - 5.5 mmol/L   3.9   Chloride      96 - 112 mmol/L   106   Co2      20 - 33 mmol/L   29   Anion Gap      0.0 - 11.9   6.0   Glucose      65 - 99 mg/dL   94   Bun      8 - 22 mg/dL   16   Creatinine      0.50 - 1.40 mg/dL   0.91   Calcium      8.5 - 10.5 mg/dL   9.0   AST(SGOT)      12 - 45 U/L   19   ALT(SGPT)      2 - 50 U/L   13   Alkaline Phosphatase      30 - 99 U/L   57   Total Bilirubin      0.1 - 1.5 mg/dL   0.6   Albumin      3.2 - 4.9 g/dL   3.9   Total Protein      6.0 - 8.2 g/dL   6.8   Globulin      1.9 - 3.5 g/dL   2.9   A-G Ratio      g/dL   1.3     Component      Latest Ref Rng & Units 7/17/2019           7:13 AM   Cholesterol,Tot      100 - 199 mg/dL 141   Triglycerides      0 - 149 mg/dL 96   HDL      >=40 mg/dL 50   LDL      <100 mg/dL 72       Assessment:      1. Localized edema  furosemide (LASIX) 20 MG Tab    Basic Metabolic Panel   2. S/P ablation of atrial fibrillation     3. Chronic anticoagulation  potassium chloride ER (KLOR-CON) 10 MEQ tablet   4. Essential hypertension, benign  potassium chloride ER (KLOR-CON) 10 MEQ tablet   5. Hypercholesterolemia     6. Non-rheumatic mitral regurgitation         Medical Decision Making:  Today's Assessment / Status / Plan:     1. LE edema, progressive. To start Lasix 20mg once daily x 1 week, and take extra KCL 10mEq along with it; then use Lasix and extra KCl PRN. BMP in 7-10 days.    2. History of atrial fibrillation, status post ablation, in June 2019,  In sinus rhythm on Flecainide and Coreg.    3. Chronic anticoagulation with Eliquis. No bleeding problems.    4. Hypertension, treated with Losartan and Coreg, stable. BP is good today.    5. Hyperlipidemia, treated with Lipitor.     6. Moderate MR, on echo in May 2019, stable. Current regular surveillance.    Plan as above. To call us if LE edema persists or worsens. To keep November 2019 FU as scheduled; FU sooner if clinical condition changes.    Collaborating MD: Josh

## 2019-09-10 RX ORDER — POTASSIUM CHLORIDE 750 MG/1
10 TABLET, FILM COATED, EXTENDED RELEASE ORAL DAILY
Qty: 90 TAB | Refills: 3 | OUTPATIENT
Start: 2019-09-10

## 2019-09-10 RX ORDER — CARVEDILOL 3.12 MG/1
3.12 TABLET ORAL 2 TIMES DAILY WITH MEALS
Qty: 180 TAB | Refills: 3 | Status: SHIPPED | OUTPATIENT
Start: 2019-09-10 | End: 2020-09-24 | Stop reason: SDUPTHER

## 2019-09-10 RX ORDER — CARVEDILOL 3.12 MG/1
3.12 TABLET ORAL 2 TIMES DAILY WITH MEALS
Qty: 180 TAB | Refills: 3 | OUTPATIENT
Start: 2019-09-10

## 2019-09-11 ENCOUNTER — TELEPHONE (OUTPATIENT)
Dept: CARDIOLOGY | Facility: MEDICAL CENTER | Age: 64
End: 2019-09-11

## 2019-09-11 NOTE — TELEPHONE ENCOUNTER
She just started the K/Lasix on Monday and BMP was due in 7-10 days.  She is going to double up and get lab work next Wed. or Thurs.

## 2019-09-11 NOTE — TELEPHONE ENCOUNTER
AB      Patient saw Yolanda on 09/09 and was told to call back today if the edema didn't improve. She said it's still the same, even with the medication. She can be reached at [phone].

## 2019-09-11 NOTE — TELEPHONE ENCOUNTER
Please have her double up on Lasix and KCl.  Did she get her BMP done? Please make sure she gets this done.  Thanks, AB

## 2019-09-17 ENCOUNTER — HOSPITAL ENCOUNTER (OUTPATIENT)
Dept: LAB | Facility: MEDICAL CENTER | Age: 64
End: 2019-09-17
Attending: FAMILY MEDICINE
Payer: COMMERCIAL

## 2019-09-17 ENCOUNTER — HOSPITAL ENCOUNTER (OUTPATIENT)
Dept: LAB | Facility: MEDICAL CENTER | Age: 64
End: 2019-09-17
Attending: NURSE PRACTITIONER
Payer: COMMERCIAL

## 2019-09-17 DIAGNOSIS — Z11.59 ENCOUNTER FOR HEPATITIS C SCREENING TEST FOR LOW RISK PATIENT: ICD-10-CM

## 2019-09-17 DIAGNOSIS — R60.0 LOCALIZED EDEMA: ICD-10-CM

## 2019-09-17 LAB
ANION GAP SERPL CALC-SCNC: 5 MMOL/L (ref 0–11.9)
BUN SERPL-MCNC: 18 MG/DL (ref 8–22)
CALCIUM SERPL-MCNC: 9.2 MG/DL (ref 8.5–10.5)
CHLORIDE SERPL-SCNC: 105 MMOL/L (ref 96–112)
CO2 SERPL-SCNC: 29 MMOL/L (ref 20–33)
CREAT SERPL-MCNC: 1.02 MG/DL (ref 0.5–1.4)
GLUCOSE SERPL-MCNC: 91 MG/DL (ref 65–99)
HCV AB SER QL: NEGATIVE
POTASSIUM SERPL-SCNC: 4.4 MMOL/L (ref 3.6–5.5)
SODIUM SERPL-SCNC: 139 MMOL/L (ref 135–145)

## 2019-09-17 PROCEDURE — 80048 BASIC METABOLIC PNL TOTAL CA: CPT

## 2019-09-17 PROCEDURE — 36415 COLL VENOUS BLD VENIPUNCTURE: CPT

## 2019-09-17 PROCEDURE — 86803 HEPATITIS C AB TEST: CPT

## 2019-09-18 ENCOUNTER — TELEPHONE (OUTPATIENT)
Dept: CARDIOLOGY | Facility: MEDICAL CENTER | Age: 64
End: 2019-09-18

## 2019-09-18 NOTE — TELEPHONE ENCOUNTER
Result Notes for Basic Metabolic Panel     Notes recorded by SAL Samaniego on 9/18/2019 at 9:32 AM PDT  BMP totally fine/normal. How is edema?  Keep FU with ED. Thanks, AB       Called pt, discussed BMP result per AB. Pt states that she is continuing Lasix PRN as prescribed and has noticed improvement in LE swelling. Confirmed FV with ED on 11/5/19. Pt verbalized understanding and appreciative of call.

## 2019-09-26 DIAGNOSIS — I48.19 PERSISTENT ATRIAL FIBRILLATION (HCC): ICD-10-CM

## 2019-09-26 RX ORDER — APIXABAN 5 MG/1
TABLET, FILM COATED ORAL
Qty: 180 TAB | Refills: 3 | Status: SHIPPED | OUTPATIENT
Start: 2019-09-26 | End: 2020-09-28 | Stop reason: SDUPTHER

## 2019-10-01 ENCOUNTER — TELEPHONE (OUTPATIENT)
Dept: CARDIOLOGY | Facility: MEDICAL CENTER | Age: 64
End: 2019-10-01

## 2019-10-01 DIAGNOSIS — R60.0 LOCALIZED EDEMA: ICD-10-CM

## 2019-10-01 NOTE — TELEPHONE ENCOUNTER
AB    Patient said she was advised to join a gym because she needs to be more active. She said she joined a gym, but because she is on Lasix, they are requesting a clearance for her to exercise. Pt. #[phone].

## 2019-10-01 NOTE — TELEPHONE ENCOUNTER
Pt called back. Pt states that her gym is requiring a clearance letter stating she is ok to workout because she is on Lasix. No other specific restrictions or information. Pt does not know the fax number to her gym and requests letter to be faxed to her at her work at [phone].    To Yolanda - ok to write letter for pt?

## 2019-10-02 RX ORDER — FUROSEMIDE 20 MG/1
20 TABLET ORAL
Qty: 30 TAB | Refills: 6 | Status: SHIPPED | OUTPATIENT
Start: 2019-10-02 | End: 2019-11-07

## 2019-10-24 ENCOUNTER — TELEPHONE (OUTPATIENT)
Dept: CARDIOLOGY | Facility: PHYSICIAN GROUP | Age: 64
End: 2019-10-24

## 2019-10-24 ENCOUNTER — TELEPHONE (OUTPATIENT)
Dept: CARDIOLOGY | Facility: MEDICAL CENTER | Age: 64
End: 2019-10-24

## 2019-10-24 NOTE — TELEPHONE ENCOUNTER
Yolanda-    I faxed a surgical clearance form to [employer] just now.  Can you deny or approve?  They want to do it on Monday ([facility]).  [facility] can fax it to them once you sign.    Thanks-  Saadia

## 2019-11-01 ASSESSMENT — ENCOUNTER SYMPTOMS
DIZZINESS: 0
COUGH: 0
BLOOD IN STOOL: 0
ORTHOPNEA: 0
FEVER: 0
DIAPHORESIS: 0
PALPITATIONS: 1
CHILLS: 0
ABDOMINAL PAIN: 0
PND: 0
LOSS OF CONSCIOUSNESS: 0
SHORTNESS OF BREATH: 0
WHEEZING: 0
CLAUDICATION: 0

## 2019-11-01 NOTE — PROGRESS NOTES
"Cardiology/Electrophysiology Follow-up Note    Subjective:   Chief Complaint:   Chief Complaint   Patient presents with   • Pulmonary Hypertension     S/P ablation of atrial fibrillation   • HTN (Controlled)     Chinyere Priest is a 63 y.o. female who presents today for follow up for Persistent AF s/p RFA by Dr. Steele on 06/17/19, on Flecainide.     She is followed by Dr. Landa and Dr. Steele.  Last seen by Yolanda GOLDEN on 09/09/19 for LLE/swelling which was treated with lasix.     She underwent successful AF ablation with pulmonary vein isolation procedure by Dr. Steele on 06/17/19 with late pericardial effusion with pericardiocentesis and Tikosyn initiation post procedure. She was unable to tolerate Tikosyn secondary to QT prolongation >500 ms after initial dose. She was therefore switched to low dose Tambocor 75 mg twice daily and has been maintaining sinus rhythm.    Medical history significant for Persistent Atrial Fibrillation,  s/p successful RFA by Dr. Steele on 06/17/19, on chronic anticoagulation with Eliquis, hypertension, aortic calcification, moderate mitral regurgitation, and mild pulmonary hypertension. Intolerant to Tikosyn secondary to QT prolongation.    Today in follow up, she reports rare palpitations and some dyspnea with extreme exertion, and intermittent lower extremity edema, on lasix PRN.  She also states her headaches have been increasing, encouraged to follow up with PCP regarding.  She denies chest pain, dizziness, pre syncope or syncope. Denies any signs or symptoms of bleeding or bleeding issues. Patient endorses medication compliance. She has been biking/walking at the gym for exercise, tolerating well.     Past Medical History:   Diagnosis Date   • Abnormal CT scan, kidney      \"Incidentally noted retroaortic left renal vein is identified. Some calcified plaque is noted in the aorta and its branch vessels.\"  CT scan done for diverticulosis     • Aortic calcification (HCC)   "    Noted incidentally on abdominal CT scan.   • Asthma in adult         • Atrial fibrillation (HCC) 2019    Status post ablation by Dr. Steele.   • Chronic anticoagulation    • Chronic back pain     • Dental disorder     Full upper/lower dentures.   • Diverticulosis     • Essential hypertension, malignant     • Inflamed seborrheic keratosis     • Non-rheumatic mitral regurgitation 2019    Echocardiogram with normal LV size, LVEF 65%. Moderate MR, mild-moderate TR, unchanged from previous.   • Osteoarthritis of both hips     • Plantar fascia syndrome     • Plantar wart of right foot     • Pruritic dermatitis      Ongoing for several months but worse at night on her lower extremities   • Pulmonary hypertension (HCC)    • Pure hypercholesterolemia     • S/P cardiac catheterization 2017    1.  Normal right heart pressures. 3.  Essentially normal coronary arteries. 2.  Left ventricular ejection fraction 65%.    • Spider veins of limb     • Unspecified vitamin D deficiency       Past Surgical History:   Procedure Laterality Date   • LUMBAR LAMINECTOMY DISKECTOMY  2009    Performed by SUDHIR VERMA at SURGERY [facility] ORS   • GYN SURGERY       x2   • HYSTERECTOMY LAPAROSCOPY       Family History   Problem Relation Age of Onset   • Heart Disease Mother         1st MI @ 61 yo   • Hypertension Mother    • Heart Attack Mother 60   • Heart Disease Maternal Aunt    • Cancer Daughter 35        breast   • Heart Disease Maternal Grandmother    • Heart Disease Maternal Grandfather    • Heart Disease Maternal Aunt 56     Social History     Socioeconomic History   • Marital status:      Spouse name: Not on file   • Number of children: Not on file   • Years of education: Not on file   • Highest education level: Not on file   Occupational History   • Not on file   Social Needs   • Financial resource strain: Not on file   • Food insecurity:     Worry: Not on file     Inability: Not on file   •  Transportation needs:     Medical: Not on file     Non-medical: Not on file   Tobacco Use   • Smoking status: Former Smoker     Packs/day: 1.00     Years: 40.00     Pack years: 40.00     Types: Cigarettes     Last attempt to quit: 3/1/2010     Years since quittin.6   • Smokeless tobacco: Never Used   Substance and Sexual Activity   • Alcohol use: No     Alcohol/week: 0.0 oz   • Drug use: No     Comment: tried marijuana once.    • Sexual activity: Yes     Partners: Male     Birth control/protection: Post-Menopausal     Comment: , works at [employer]   Lifestyle   • Physical activity:     Days per week: Not on file     Minutes per session: Not on file   • Stress: Not on file   Relationships   • Social connections:     Talks on phone: Not on file     Gets together: Not on file     Attends [employer] service: Not on file     Active member of club or organization: Not on file     Attends meetings of clubs or organizations: Not on file     Relationship status: Not on file   • Intimate partner violence:     Fear of current or ex partner: Not on file     Emotionally abused: Not on file     Physically abused: Not on file     Forced sexual activity: Not on file   Other Topics Concern   •  Service Not Asked   • Blood Transfusions Not Asked   • Caffeine Concern Not Asked   • Occupational Exposure Not Asked   • Hobby Hazards Not Asked   • Sleep Concern Not Asked   • Stress Concern Not Asked   • Weight Concern Not Asked   • Special Diet Not Asked   • Back Care Not Asked   • Exercise No   • Bike Helmet Not Asked   • Seat Belt Not Asked   • Self-Exams Not Asked   Social History Narrative    Works at [employer]     Allergies   Allergen Reactions   • Erythromycin      Abdominal pain   • Latex    • Tape      Cloth tape  Paper tape okay       Current Outpatient Medications   Medication Sig Dispense Refill   • furosemide (LASIX) 20 MG Tab Take 1 Tab by mouth 1 time daily as needed. 30 Tab 6   • ELIQUIS 5 MG Tab TAKE 1 TABLET BY  "MOUTH TWICE A  Tab 3   • carvedilol (COREG) 3.125 MG Tab Take 1 Tab by mouth 2 times a day, with meals. 180 Tab 3   • potassium chloride ER (KLOR-CON) 10 MEQ tablet Take 1 Tab by mouth 2 times a day. 180 Tab 1   • losartan (COZAAR) 25 MG Tab Take 1 Tab by mouth every day. 90 Tab 3   • flecainide (TAMBOCOR) 150 MG Tab Take 0.5 Tabs by mouth 2 Times a Day. 60 Tab 3   • fexofenadine (ALLEGRA) 180 MG tablet Take 1 Tab by mouth every day. 90 Tab 3   • fluticasone (FLONASE) 50 MCG/ACT nasal spray Spray 1 Spray in nose 2 times a day. 1 Bottle 11   • atorvastatin (LIPITOR) 10 MG Tab TAKE 1 TABLET BY MOUTH EVERY DAY 90 Tab 2   • albuterol 108 (90 BASE) MCG/ACT Aero Soln inhalation aerosol Inhale 2 Puffs by mouth every four hours as needed. 1 Inhaler 0   • vitamin D (CHOLECALCIFEROL) 1000 UNIT TABS Take 2 Tabs by mouth every day. 60 Each 11     No current facility-administered medications for this visit.      Review of Systems   Constitutional: Negative for chills, diaphoresis and fever.   Respiratory: Negative for cough, shortness of breath and wheezing.    Cardiovascular: Positive for palpitations and leg swelling (intermittent). Negative for chest pain, orthopnea, claudication and PND.   Gastrointestinal: Negative for abdominal pain and blood in stool.   Genitourinary: Negative for hematuria.   Neurological: Positive for headaches. Negative for dizziness and loss of consciousness.     All others systems reviewed and negative.   Objective:     /78 (BP Location: Left arm, Patient Position: Sitting, BP Cuff Size: Adult)   Pulse 100   Ht 1.702 m (5' 7\")   Wt 80 kg (176 lb 6.4 oz)   SpO2 96%  Body mass index is 27.63 kg/m².    Physical Exam   Constitutional: She is oriented to person, place, and time. No distress.   Eyes: Pupils are equal, round, and reactive to light.   Neck: No JVD present.   Cardiovascular: Normal rate. An irregular rhythm present.   No murmur heard.  Pulses:       Radial pulses are 2+ on " the right side and 2+ on the left side.   Pulmonary/Chest: Effort normal. No respiratory distress. She has decreased breath sounds in the left lower field. She has no wheezes. She has no rales. She exhibits no tenderness.   Musculoskeletal:         General: Edema (1+ Bilateral lower extremities) present.   Neurological: She is alert and oriented to person, place, and time.   Skin: Skin is warm and dry. She is not diaphoretic. No erythema.   Psychiatric: Mood, memory, affect and judgment normal.   Nursing note and vitals reviewed.    Cardiac Imaging and Procedures Review:    EKG 11/05/2019: Atrial Flutter    Limited Echocardiogram 06/19/2019:    Limited echo to evaluate for pericardial effusion s/p drain removal.   Prior echo on 6/18/19. Compared to the report of the study done - there   has been no significant change.   Normal left ventricular size and systolic function.  Normal pericardium without effusion.  Pleural effusion noted.     Echocardiogram (LEEANNA):  6/17/19  Normal left ventricular size and systolic function.  No left atrial thrombus.  No thrombus detected in the left atrial appendage.     Procedural conclusions per Dr. Steele's Op Note (06/17/2019):  Electrophysiology Procedure Note [facility]  Indication: Symptomatic persistent atrial fibrillation  Procedure Performed: 1. Atrial fibrillation ablation 2. Advanced mapping using CARTO system  3. Transesophageal echocardiography 4. Intracardiac echocardiography 5. Esophageal temperature monitoring 6. Intraarterial blood pressure monitoring 7. Frequent ACT's   Physician(s): PHYLICIA Steele M.D.  Resident/Assistant(s): None  Anesthesia: General endotracheal anesthetic.  Anaesthesiologist: Dr. Oj Silver  Specimen(s) Removed: None  Pre-procedure ECG: Atrial fibrillation  Post Procedure ECG: Sinus rhythm  Total ablation time: 2468 seconds  Fluoroscopy time: 7.7 minutes  Electrophysiologic Findings:    1. Baseline: Atrial fibrillation 777 ms, AH  140 ms, HV 51 Ms.   2. Final  SR 1065 ms  Impressions:    1.  Persistent atrial fibrillation.    2. Successful RF ablation pulmonary vein isolation procedure.       Pre-Operative Diagnosis: Status post atrial fibrillation ablation, late cardiac tamponade   Post-Operative Diagnosis: Successful pericardiocentesis with resolution of hypotension and pericardial fluid  Indications: Cardiac tamponade post catheter ablation  Procedure(s) Performed: Pericardiocentesis  Physician(s): PHYLICIA Steele M.D.  Anesthesia: Local anesthesia  Pre-procedure ECG: Sinus rhythm  Post-procedure ECG: Sinus rhythm  Complications:  None  Fluroscopy time: Less than 2 minutes  Impressions:    1.  Late cardiac tamponade post ablation.  2.  Successful pericardiocentesis.     Cardiac Catheterization:  05/26/2017  1.  LEFT MAIN ARTERY:  Left main vessel is a large caliber vessel, angiographically normal, bifurcates into the left anterior descending artery and circumflex artery.  2.  LEFT ANTERIOR DESCENDING ARTERY:  The LAD gives rise to a first diagonal branch and a normal complement of septal branches and extends around the apex.    The LAD is widely patent with the exception of a very mild eccentric smooth focal proximal atheroma otherwise the LAD appears angiographically normal.  3.  CIRCUMFLEX ARTERY:  The circumflex gives rise to 2 major long marginal branches and a small caliber AV groove branch.  Angiographically, the circumflex artery appears normal.  4.  RIGHT CORONARY ARTERY:  The RCA is moderate size caliber vessel and gives rise to an acute marginal branch, posterior descending artery and a posterolateral branch.  Angiographically, the right coronary artery is normal.    Labs (personally reviewed and notable for):   Lab Results   Component Value Date/Time    SODIUM 139 09/17/2019 09:00 AM    POTASSIUM 4.4 09/17/2019 09:00 AM    CHLORIDE 105 09/17/2019 09:00 AM    CO2 29 09/17/2019 09:00 AM    GLUCOSE 91 09/17/2019 09:00 AM    BUN 18  09/17/2019 09:00 AM    CREATININE 1.02 09/17/2019 09:00 AM      Lab Results   Component Value Date/Time    WBC 7.6 07/17/2019 07:13 AM    RBC 3.97 (L) 07/17/2019 07:13 AM    HEMOGLOBIN 11.8 (L) 07/17/2019 07:13 AM    HEMATOCRIT 38.0 07/17/2019 07:13 AM    MCV 95.7 07/17/2019 07:13 AM    MCH 29.7 07/17/2019 07:13 AM    MCHC 31.1 (L) 07/17/2019 07:13 AM    MPV 11.3 07/17/2019 07:13 AM    NEUTSPOLYS 66.80 07/17/2019 07:13 AM    LYMPHOCYTES 16.00 (L) 07/17/2019 07:13 AM    MONOCYTES 8.90 07/17/2019 07:13 AM    EOSINOPHILS 6.90 07/17/2019 07:13 AM    BASOPHILS 1.10 07/17/2019 07:13 AM      PT/INR:   Lab Results   Component Value Date/Time    PROTHROMBTM 13.6 06/18/2019 05:21 AM    INR 1.02 06/18/2019 05:21 AM   ]  Assessment:     1. Persistent atrial fibrillation  EKG    CL-EP ABLATION AFIB-AFLUTTER   2. S/P ablation of atrial fibrillation  EKG    CL-EP ABLATION AFIB-AFLUTTER   3. High risk medication use      Flecainide   4. Chronic anticoagulation      Eliquis   5. Non-rheumatic mitral regurgitation     6. Typical atrial flutter (HCC)  CL-EP ABLATION ATRIAL FLUTTER    CL-EP ABLATION AFIB-AFLUTTER    EKG     Medical Decision Making:  Today's Assessment / Status / Plan:   1. Persistent Atrial Fibrillation  - S/P RFA with PVI by Dr. Steele on 06/17/19.  - Hx intolerance to Tikosyn secondary to QT prolongation.   - EKG today shows AFL. Relatively asymptomatic, rare palpitations/dyspnea with extreme exertion. BP stable. Weight stable. No JVD.   - On OAC with Eliquis, no s/sx bleeding, continue.  - On Flecainide 75 mg BID and coreg 3.125 mg daily, will continue.   - Repeat EKG Thursday 11/07/19 at Dr. Landa's appt.   - Discussed options with patient including medication management vs DCCV vs ablation.  All pt questions/concerns were answered. Patient wishes to proceed with ablation.  Order placed.  to schedule. Reviewed with Dr. Steele, will hold OAC night prior to RFA, continue Flecainide/coreg. Will  schedule AF/AFL ablation incase appears to be atypical flutter.  - Continue current medication management.   - Encouraged to monitor daily weights, BP, and HR, call office if abnormal.  - On Lasix/Kcl PRN swelling.    2. High Risk Medication: Flecainide  - CrCL: > 60 mL/min ( based on 09/17/19 labs)  - Will continue to monitor for side effects of patient's high risk medication(s) including liver, renal function and electrolytes.     3. Chronic Anticoagulation: Eliquis    Plan reviewed in detail with the patient and she verbalizes understanding and is in agreement. RTC 2 months with Dr. Steele and with Dr. Landa as previously scheduled, sooner if clinical condition changes.     Thank you for allowing me to participate in this patients care.  Please contact me with any questions or concerns.     DAISY Lam.   [facility]  [phone]    Collaborating MD/ADD: Dr. Ryland MD.

## 2019-11-05 ENCOUNTER — TELEPHONE (OUTPATIENT)
Dept: CARDIOLOGY | Facility: MEDICAL CENTER | Age: 64
End: 2019-11-05

## 2019-11-05 ENCOUNTER — OFFICE VISIT (OUTPATIENT)
Dept: CARDIOLOGY | Facility: MEDICAL CENTER | Age: 64
End: 2019-11-05
Payer: COMMERCIAL

## 2019-11-05 VITALS
WEIGHT: 176.4 LBS | HEART RATE: 100 BPM | DIASTOLIC BLOOD PRESSURE: 78 MMHG | HEIGHT: 67 IN | OXYGEN SATURATION: 96 % | SYSTOLIC BLOOD PRESSURE: 134 MMHG | BODY MASS INDEX: 27.69 KG/M2

## 2019-11-05 DIAGNOSIS — Z86.79 S/P ABLATION OF ATRIAL FIBRILLATION: ICD-10-CM

## 2019-11-05 DIAGNOSIS — Z79.899 HIGH RISK MEDICATION USE: ICD-10-CM

## 2019-11-05 DIAGNOSIS — Z98.890 S/P ABLATION OF ATRIAL FIBRILLATION: ICD-10-CM

## 2019-11-05 DIAGNOSIS — I34.0 NON-RHEUMATIC MITRAL REGURGITATION: ICD-10-CM

## 2019-11-05 DIAGNOSIS — I48.19 PERSISTENT ATRIAL FIBRILLATION (HCC): ICD-10-CM

## 2019-11-05 DIAGNOSIS — Z79.01 CHRONIC ANTICOAGULATION: ICD-10-CM

## 2019-11-05 DIAGNOSIS — I48.3 TYPICAL ATRIAL FLUTTER (HCC): ICD-10-CM

## 2019-11-05 LAB — EKG IMPRESSION: NORMAL

## 2019-11-05 PROCEDURE — 93000 ELECTROCARDIOGRAM COMPLETE: CPT | Performed by: INTERNAL MEDICINE

## 2019-11-05 PROCEDURE — 99214 OFFICE O/P EST MOD 30 MIN: CPT | Performed by: NURSE PRACTITIONER

## 2019-11-05 RX ORDER — POTASSIUM CHLORIDE 750 MG/1
TABLET, FILM COATED, EXTENDED RELEASE ORAL
Refills: 1 | COMMUNITY
Start: 2019-09-09 | End: 2019-11-05

## 2019-11-05 RX ORDER — INFLUENZA A VIRUS A/MICHIGAN/45/2015 X-275 (H1N1) ANTIGEN (FORMALDEHYDE INACTIVATED), INFLUENZA A VIRUS A/SINGAPORE/INFIMH-16-0019/2016 IVR-186 (H3N2) ANTIGEN (FORMALDEHYDE INACTIVATED), AND INFLUENZA B VIRUS B/MARYLAND/15/2016 BX-69A (A B/COLORADO/6/2017-LIKE VIRUS) ANTIGEN (FORMALDEHYDE INACTIVATED) 60; 60; 60 UG/.5ML; UG/.5ML; UG/.5ML
INJECTION, SUSPENSION INTRAMUSCULAR
Refills: 0 | COMMUNITY
Start: 2019-10-02 | End: 2019-11-05

## 2019-11-05 ASSESSMENT — ENCOUNTER SYMPTOMS: HEADACHES: 1

## 2019-11-05 NOTE — LETTER
[facility]   [address], [address]  [place] [address]  Phone: [phone]  Fax: [phone]              Chinyere Priest  1955    Encounter Date: 11/5/2019    DANIELLE Lam          PROGRESS NOTE:  Cardiology/Electrophysiology Follow-up Note    Subjective:   Chief Complaint:   Chief Complaint   Patient presents with   • Pulmonary Hypertension     S/P ablation of atrial fibrillation   • HTN (Controlled)     Chinyere Priest is a 63 y.o. female who presents today for follow up for Persistent AF s/p RFA by Dr. Steele on 06/17/19, on Flecainide.     She is followed by Dr. Landa and Dr. Steele.  Last seen by Yolanda GOLDEN on 09/09/19 for LLE/swelling which was treated with lasix.     She underwent successful AF ablation with pulmonary vein isolation procedure by Dr. Steele on 06/17/19 with late pericardial effusion with pericardiocentesis and Tikosyn initiation post procedure. She was unable to tolerate Tikosyn secondary to QT prolongation >500 ms after initial dose. She was therefore switched to low dose Tambocor 75 mg twice daily and is maintaining sinus rhythm.    Medical history significant for Persistent Atrial Fibrillation,  s/p successful  RFA by Dr. Steele on 06/17/19, on chronic anticoagulation with Eliquis, hypertension, aortic calcification, moderate mitral regurgitation, and mild pulmonary hypertension. Intolerant to Tikosyn secondary to QT prolongation.    Today in follow up, she reports rare*** palpitations.  She ***denies chest pain, dizziness, pre syncope or syncope, dyspnea, or lower extremity edema.  Denies any signs or symptoms of bleeding or bleeding issues.*** Patient endorses medication compliance.     ***She has been seeing her PCP for her blood pressure and walking for physical activity, tolerating well. She has been wearing compression stocking.     Past Medical History:   Diagnosis Date   • Abnormal CT scan, kidney  "     \"Incidentally noted retroaortic left renal vein is identified. Some calcified plaque is noted in the aorta and its branch vessels.\"  CT scan done for diverticulosis     • Aortic calcification (HCC)      Noted incidentally on abdominal CT scan.   • Asthma in adult         • Atrial fibrillation (HCC) 2019    Status post ablation by Dr. Steele.   • Chronic anticoagulation    • Chronic back pain     • Dental disorder     Full upper/lower dentures.   • Diverticulosis     • Essential hypertension, malignant     • Inflamed seborrheic keratosis     • Non-rheumatic mitral regurgitation 2019    Echocardiogram with normal LV size, LVEF 65%. Moderate MR, mild-moderate TR, unchanged from previous.   • Osteoarthritis of both hips     • Plantar fascia syndrome     • Plantar wart of right foot     • Pruritic dermatitis      Ongoing for several months but worse at night on her lower extremities   • Pulmonary hypertension (HCC)    • Pure hypercholesterolemia     • S/P cardiac catheterization 2017    1.  Normal right heart pressures. 3.  Essentially normal coronary arteries. 2.  Left ventricular ejection fraction 65%.    • Spider veins of limb     • Unspecified vitamin D deficiency       Past Surgical History:   Procedure Laterality Date   • LUMBAR LAMINECTOMY DISKECTOMY  2009    Performed by SUDHIR VERMA at SURGERY [facility]S   • GYN SURGERY       x2   • HYSTERECTOMY LAPAROSCOPY       Family History   Problem Relation Age of Onset   • Heart Disease Mother         1st MI @ 61 yo   • Hypertension Mother    • Heart Attack Mother 60   • Heart Disease Maternal Aunt    • Cancer Daughter 35        breast   • Heart Disease Maternal Grandmother    • Heart Disease Maternal Grandfather    • Heart Disease Maternal Aunt 56     Social History     Socioeconomic History   • Marital status:      Spouse name: Not on file   • Number of children: Not on file   • Years of education: Not on file   • Highest " education level: Not on file   Occupational History   • Not on file   Social Needs   • Financial resource strain: Not on file   • Food insecurity:     Worry: Not on file     Inability: Not on file   • Transportation needs:     Medical: Not on file     Non-medical: Not on file   Tobacco Use   • Smoking status: Former Smoker     Packs/day: 1.00     Years: 40.00     Pack years: 40.00     Types: Cigarettes     Last attempt to quit: 3/1/2010     Years since quittin.6   • Smokeless tobacco: Never Used   Substance and Sexual Activity   • Alcohol use: No     Alcohol/week: 0.0 oz   • Drug use: No     Comment: tried marijuana once.    • Sexual activity: Yes     Partners: Male     Birth control/protection: Post-Menopausal     Comment: , works at [employer]   Lifestyle   • Physical activity:     Days per week: Not on file     Minutes per session: Not on file   • Stress: Not on file   Relationships   • Social connections:     Talks on phone: Not on file     Gets together: Not on file     Attends Evangelical service: Not on file     Active member of club or organization: Not on file     Attends meetings of clubs or organizations: Not on file     Relationship status: Not on file   • Intimate partner violence:     Fear of current or ex partner: Not on file     Emotionally abused: Not on file     Physically abused: Not on file     Forced sexual activity: Not on file   Other Topics Concern   •  Service Not Asked   • Blood Transfusions Not Asked   • Caffeine Concern Not Asked   • Occupational Exposure Not Asked   • Hobby Hazards Not Asked   • Sleep Concern Not Asked   • Stress Concern Not Asked   • Weight Concern Not Asked   • Special Diet Not Asked   • Back Care Not Asked   • Exercise No   • Bike Helmet Not Asked   • Seat Belt Not Asked   • Self-Exams Not Asked   Social History Narrative    Works at [employer]     Allergies   Allergen Reactions   • Erythromycin      Abdominal pain   • Latex    • Tape      Cloth tape  Paper tape  taryn       Current Outpatient Medications   Medication Sig Dispense Refill   • furosemide (LASIX) 20 MG Tab Take 1 Tab by mouth 1 time daily as needed. 30 Tab 6   • ELIQUIS 5 MG Tab TAKE 1 TABLET BY MOUTH TWICE A  Tab 3   • carvedilol (COREG) 3.125 MG Tab Take 1 Tab by mouth 2 times a day, with meals. 180 Tab 3   • potassium chloride ER (KLOR-CON) 10 MEQ tablet Take 1 Tab by mouth 2 times a day. 180 Tab 1   • losartan (COZAAR) 25 MG Tab Take 1 Tab by mouth every day. 90 Tab 3   • flecainide (TAMBOCOR) 150 MG Tab Take 0.5 Tabs by mouth 2 Times a Day. 60 Tab 3   • fexofenadine (ALLEGRA) 180 MG tablet Take 1 Tab by mouth every day. 90 Tab 3   • fluticasone (FLONASE) 50 MCG/ACT nasal spray Spray 1 Spray in nose 2 times a day. 1 Bottle 11   • atorvastatin (LIPITOR) 10 MG Tab TAKE 1 TABLET BY MOUTH EVERY DAY 90 Tab 2   • albuterol 108 (90 BASE) MCG/ACT Aero Soln inhalation aerosol Inhale 2 Puffs by mouth every four hours as needed. 1 Inhaler 0   • vitamin D (CHOLECALCIFEROL) 1000 UNIT TABS Take 2 Tabs by mouth every day. 60 Each 11   • FLUZONE HIGH-DOSE 0.5 ML Suspension Prefilled Syringe injection TO BE ADMINISTERED BY PHARMACIST FOR IMMUNIZATION  0   • KLOR-CON 10 10 MEQ tablet TAKE 1 TABLET BY MOUTH TWICE A DAY  1     No current facility-administered medications for this visit.      Review of Systems   Constitutional: Negative for chills, diaphoresis and fever.   Respiratory: Negative for cough, shortness of breath and wheezing.    Cardiovascular: Positive for palpitations. Negative for chest pain, orthopnea, claudication, leg swelling and PND.   Gastrointestinal: Negative for abdominal pain and blood in stool.   Genitourinary: Negative for hematuria.   Neurological: Negative for dizziness and loss of consciousness.     All others systems reviewed and negative.   Objective:     Wt 80 kg (176 lb 6.4 oz)  Body mass index is 27.63 kg/m².    Physical Exam   Constitutional: She is oriented to person, place, and time.  No distress.   HENT:   Head: Normocephalic.   Eyes: Pupils are equal, round, and reactive to light.   Neck: Normal range of motion. No JVD present.   Cardiovascular: Normal rate and regular rhythm.   No murmur heard.  Pulses:       Radial pulses are 2+ on the right side, and 2+ on the left side.        Dorsalis pedis pulses are 2+ on the right side, and 2+ on the left side.   Pulmonary/Chest: Effort normal. No respiratory distress. She has decreased breath sounds in the left lower field. She has no wheezes. She has no rales. She exhibits no tenderness.   Abdominal: Soft. Bowel sounds are normal.   Musculoskeletal: She exhibits edema (1+ Bilateral lower extremity, compression stocking. ).   Neurological: She is alert and oriented to person, place, and time.   Skin: Skin is warm and dry. She is not diaphoretic. No erythema.   Psychiatric: Mood, memory, affect and judgment normal.   Nursing note and vitals reviewed.    Cardiac Imaging and Procedures Review:    EKG 11/05/2019: ***      EKG ***08/06/2019: sinus bradycardia, 1st degree AVB    Limited Echocardiogram 06/19/2019:    Limited echo to evaluate for pericardial effusion s/p drain removal.   Prior echo on 6/18/19. Compared to the report of the study done - there   has been no significant change.   Normal left ventricular size and systolic function.  Normal pericardium without effusion.  Pleural effusion noted.     Echocardiogram ([facility]):  6/17/19  Normal left ventricular size and systolic function.  No left atrial thrombus.  No thrombus detected in the left atrial appendage.     Procedural conclusions per Dr. Steele's Op Note (06/17/2019):  Electrophysiology Procedure Note [facility]  Indication: Symptomatic persistent atrial fibrillation  Procedure Performed: 1. Atrial fibrillation ablation 2. Advanced mapping using CARTO system  3. Transesophageal echocardiography 4. Intracardiac echocardiography 5. Esophageal temperature monitoring 6.  Intraarterial blood pressure monitoring 7. Frequent ACT's   Physician(s): PHYLICIA Steele M.D.  Resident/Assistant(s): None  Anesthesia: General endotracheal anesthetic.  Anaesthesiologist: Dr. Oj Silver  Specimen(s) Removed: None  Pre-procedure ECG: Atrial fibrillation  Post Procedure ECG: Sinus rhythm  Total ablation time: 2468 seconds  Fluoroscopy time: 7.7 minutes  Electrophysiologic Findings:    1. Baseline: Atrial fibrillation 777 ms,  ms, HV 51 Ms.   2. Final  SR 1065 ms  Impressions:    1.  Persistent atrial fibrillation.    2. Successful RF ablation pulmonary vein isolation procedure.       Pre-Operative Diagnosis: Status post atrial fibrillation ablation, late cardiac tamponade   Post-Operative Diagnosis: Successful pericardiocentesis with resolution of hypotension and pericardial fluid  Indications: Cardiac tamponade post catheter ablation  Procedure(s) Performed: Pericardiocentesis  Physician(s): PHYLICIA Steele M.D.  Anesthesia: Local anesthesia  Pre-procedure ECG: Sinus rhythm  Post-procedure ECG: Sinus rhythm  Complications:  None  Fluroscopy time: Less than 2 minutes  Impressions:    1.  Late cardiac tamponade post ablation.  2.  Successful pericardiocentesis.     Cardiac Catheterization:  05/26/2017  1.  LEFT MAIN ARTERY:  Left main vessel is a large caliber vessel, angiographically normal, bifurcates into the left anterior descending artery and circumflex artery.  2.  LEFT ANTERIOR DESCENDING ARTERY:  The LAD gives rise to a first diagonal branch and a normal complement of septal branches and extends around the apex.    The LAD is widely patent with the exception of a very mild eccentric smooth focal proximal atheroma otherwise the LAD appears angiographically normal.  3.  CIRCUMFLEX ARTERY:  The circumflex gives rise to 2 major long marginal branches and a small caliber AV groove branch.  Angiographically, the circumflex artery appears normal.  4.  RIGHT CORONARY ARTERY:  The RCA is moderate size  caliber vessel and gives rise to an acute marginal branch, posterior descending artery and a posterolateral branch.  Angiographically, the right coronary artery is normal.    Labs (personally reviewed and notable for):   Lab Results   Component Value Date/Time    SODIUM 139 09/17/2019 09:00 AM    POTASSIUM 4.4 09/17/2019 09:00 AM    CHLORIDE 105 09/17/2019 09:00 AM    CO2 29 09/17/2019 09:00 AM    GLUCOSE 91 09/17/2019 09:00 AM    BUN 18 09/17/2019 09:00 AM    CREATININE 1.02 09/17/2019 09:00 AM      Lab Results   Component Value Date/Time    WBC 7.6 07/17/2019 07:13 AM    RBC 3.97 (L) 07/17/2019 07:13 AM    HEMOGLOBIN 11.8 (L) 07/17/2019 07:13 AM    HEMATOCRIT 38.0 07/17/2019 07:13 AM    MCV 95.7 07/17/2019 07:13 AM    MCH 29.7 07/17/2019 07:13 AM    MCHC 31.1 (L) 07/17/2019 07:13 AM    MPV 11.3 07/17/2019 07:13 AM    NEUTSPOLYS 66.80 07/17/2019 07:13 AM    LYMPHOCYTES 16.00 (L) 07/17/2019 07:13 AM    MONOCYTES 8.90 07/17/2019 07:13 AM    EOSINOPHILS 6.90 07/17/2019 07:13 AM    BASOPHILS 1.10 07/17/2019 07:13 AM      PT/INR:   Lab Results   Component Value Date/Time    PROTHROMBTM 13.6 06/18/2019 05:21 AM    INR 1.02 06/18/2019 05:21 AM   ]  Assessment:     1. Persistent atrial fibrillation  EKG   2. S/P ablation of atrial fibrillation  EKG   3. High risk medication use      Flecainide   4. Chronic anticoagulation      Eliquis   5. Non-rheumatic mitral regurgitation       Medical Decision Making:  Today's Assessment / Status / Plan:   1. Persistent Atrial Fibrillation  - S/P  RFA with PVI by Dr. Steele on 06/17/19.  - Hx intolerance to Tikosyn secondary to QT prolongation.   - Relatively ***asymptomatic, rare palpitations. BP stable.***  - EKG today shows ***, 1st AVB, no acute abnormalities. [place] ***202.9 and QRS 95, stable from prior.   - On OAC with Eliquis, no s/sx bleeding, continue.  - On Flecainide 75 mg BID and coreg 3.125 mg daily, stable from prior, continue.  - Continue current medication management.   -  Encouraged to monitor BP and HR, call office if abnormal.  - On Lasix/Kcl PRN swelling.***    2. High Risk Medication: Flecainide  - CrCL: > 60 mL/min ( based on 09/17/19 labs)***  - Will continue to monitor for side effects of patient's high risk medication(s) including liver, renal function and electrolytes.     3. Chronic Anticoagulation: Eliquis    Plan reviewed in detail with the patient and she verbalizes understanding and is in agreement. RTC 3 months*** and with Dr. Landa as previously scheduled, sooner if clinical condition changes.     Thank you for allowing me to participate in this patients care.  Please contact me with any questions or concerns.     DAISY Lam.   [facility]  [phone]    Collaborating MD/ADD:  ***, MD.         Moshe Landa M.D.  [address]  [facility] [address]  [place] [address]  VIA In Basket

## 2019-11-05 NOTE — LETTER
[facility]   [address], [address]  [place] [address]  Phone: [phone]  Fax: [phone]              Chinyere Priest  1955    Encounter Date: 11/5/2019    DANIELLE Lam          PROGRESS NOTE:  Cardiology/Electrophysiology Follow-up Note    Subjective:   Chief Complaint:   Chief Complaint   Patient presents with   • Pulmonary Hypertension     S/P ablation of atrial fibrillation   • HTN (Controlled)     Chinyere Priest is a 63 y.o. female who presents today for follow up for Persistent AF s/p RFA by Dr. Steele on 06/17/19, on Flecainide.     She is followed by Dr. Landa and Dr. Steele.  Last seen by Yolanda GOLDEN on 09/09/19 for LLE/swelling which was treated with lasix.     She underwent successful AF ablation with pulmonary vein isolation procedure by Dr. Steele on 06/17/19 with late pericardial effusion with pericardiocentesis and Tikosyn initiation post procedure. She was unable to tolerate Tikosyn secondary to QT prolongation >500 ms after initial dose. She was therefore switched to low dose Tambocor 75 mg twice daily and has been maintaining sinus rhythm.    Medical history significant for Persistent Atrial Fibrillation,  s/p successful  RFA by Dr. Steele on 06/17/19, on chronic anticoagulation with Eliquis, hypertension, aortic calcification, moderate mitral regurgitation, and mild pulmonary hypertension. Intolerant to Tikosyn secondary to QT prolongation.    Today in follow up, she reports rare palpitations and some dyspnea with extreme exertion, and intermittent lower extremity edema, on lasix PRN.  She also states her headaches have been increasing, encouraged to follow up with PCP regarding.  She denies chest pain, dizziness, pre syncope or syncope. Denies any signs or symptoms of bleeding or bleeding issues. Patient endorses medication compliance. She has been biking/walking at the gym for exercise, tolerating well.      "    Past Medical History:   Diagnosis Date   • Abnormal CT scan, kidney      \"Incidentally noted retroaortic left renal vein is identified. Some calcified plaque is noted in the aorta and its branch vessels.\"  CT scan done for diverticulosis     • Aortic calcification (HCC)      Noted incidentally on abdominal CT scan.   • Asthma in adult         • Atrial fibrillation (HCC) 2019    Status post ablation by Dr. Steele.   • Chronic anticoagulation    • Chronic back pain     • Dental disorder     Full upper/lower dentures.   • Diverticulosis     • Essential hypertension, malignant     • Inflamed seborrheic keratosis     • Non-rheumatic mitral regurgitation 2019    Echocardiogram with normal LV size, LVEF 65%. Moderate MR, mild-moderate TR, unchanged from previous.   • Osteoarthritis of both hips     • Plantar fascia syndrome     • Plantar wart of right foot     • Pruritic dermatitis      Ongoing for several months but worse at night on her lower extremities   • Pulmonary hypertension (HCC)    • Pure hypercholesterolemia     • S/P cardiac catheterization 2017    1.  Normal right heart pressures. 3.  Essentially normal coronary arteries. 2.  Left ventricular ejection fraction 65%.    • Spider veins of limb     • Unspecified vitamin D deficiency       Past Surgical History:   Procedure Laterality Date   • LUMBAR LAMINECTOMY DISKECTOMY  2009    Performed by SUDHIR VERMA at SURGERY [facility]   • GYN SURGERY       x2   • HYSTERECTOMY LAPAROSCOPY       Family History   Problem Relation Age of Onset   • Heart Disease Mother         1st MI @ 59 yo   • Hypertension Mother    • Heart Attack Mother 60   • Heart Disease Maternal Aunt    • Cancer Daughter 35        breast   • Heart Disease Maternal Grandmother    • Heart Disease Maternal Grandfather    • Heart Disease Maternal Aunt 56     Social History     Socioeconomic History   • Marital status:      Spouse name: Not on file   • Number of " children: Not on file   • Years of education: Not on file   • Highest education level: Not on file   Occupational History   • Not on file   Social Needs   • Financial resource strain: Not on file   • Food insecurity:     Worry: Not on file     Inability: Not on file   • Transportation needs:     Medical: Not on file     Non-medical: Not on file   Tobacco Use   • Smoking status: Former Smoker     Packs/day: 1.00     Years: 40.00     Pack years: 40.00     Types: Cigarettes     Last attempt to quit: 3/1/2010     Years since quittin.6   • Smokeless tobacco: Never Used   Substance and Sexual Activity   • Alcohol use: No     Alcohol/week: 0.0 oz   • Drug use: No     Comment: tried marijuana once.    • Sexual activity: Yes     Partners: Male     Birth control/protection: Post-Menopausal     Comment: , works at [employer]   Lifestyle   • Physical activity:     Days per week: Not on file     Minutes per session: Not on file   • Stress: Not on file   Relationships   • Social connections:     Talks on phone: Not on file     Gets together: Not on file     Attends Bahai service: Not on file     Active member of club or organization: Not on file     Attends meetings of clubs or organizations: Not on file     Relationship status: Not on file   • Intimate partner violence:     Fear of current or ex partner: Not on file     Emotionally abused: Not on file     Physically abused: Not on file     Forced sexual activity: Not on file   Other Topics Concern   •  Service Not Asked   • Blood Transfusions Not Asked   • Caffeine Concern Not Asked   • Occupational Exposure Not Asked   • Hobby Hazards Not Asked   • Sleep Concern Not Asked   • Stress Concern Not Asked   • Weight Concern Not Asked   • Special Diet Not Asked   • Back Care Not Asked   • Exercise No   • Bike Helmet Not Asked   • Seat Belt Not Asked   • Self-Exams Not Asked   Social History Narrative    Works at [employer]     Allergies   Allergen Reactions   • Erythromycin   "    Abdominal pain   • Latex    • Tape      Cloth tape  Paper tape okay       Current Outpatient Medications   Medication Sig Dispense Refill   • furosemide (LASIX) 20 MG Tab Take 1 Tab by mouth 1 time daily as needed. 30 Tab 6   • ELIQUIS 5 MG Tab TAKE 1 TABLET BY MOUTH TWICE A  Tab 3   • carvedilol (COREG) 3.125 MG Tab Take 1 Tab by mouth 2 times a day, with meals. 180 Tab 3   • potassium chloride ER (KLOR-CON) 10 MEQ tablet Take 1 Tab by mouth 2 times a day. 180 Tab 1   • losartan (COZAAR) 25 MG Tab Take 1 Tab by mouth every day. 90 Tab 3   • flecainide (TAMBOCOR) 150 MG Tab Take 0.5 Tabs by mouth 2 Times a Day. 60 Tab 3   • fexofenadine (ALLEGRA) 180 MG tablet Take 1 Tab by mouth every day. 90 Tab 3   • fluticasone (FLONASE) 50 MCG/ACT nasal spray Spray 1 Spray in nose 2 times a day. 1 Bottle 11   • atorvastatin (LIPITOR) 10 MG Tab TAKE 1 TABLET BY MOUTH EVERY DAY 90 Tab 2   • albuterol 108 (90 BASE) MCG/ACT Aero Soln inhalation aerosol Inhale 2 Puffs by mouth every four hours as needed. 1 Inhaler 0   • vitamin D (CHOLECALCIFEROL) 1000 UNIT TABS Take 2 Tabs by mouth every day. 60 Each 11     No current facility-administered medications for this visit.      Review of Systems   Constitutional: Negative for chills, diaphoresis and fever.   Respiratory: Negative for cough, shortness of breath and wheezing.    Cardiovascular: Positive for palpitations and leg swelling (intermittent). Negative for chest pain, orthopnea, claudication and PND.   Gastrointestinal: Negative for abdominal pain and blood in stool.   Genitourinary: Negative for hematuria.   Neurological: Positive for headaches. Negative for dizziness and loss of consciousness.     All others systems reviewed and negative.   Objective:     /78 (BP Location: Left arm, Patient Position: Sitting, BP Cuff Size: Adult)   Pulse 100   Ht 1.702 m (5' 7\")   Wt 80 kg (176 lb 6.4 oz)   SpO2 96%  Body mass index is 27.63 kg/m².    Physical Exam   "   Constitutional: She is oriented to person, place, and time. No distress.   Eyes: Pupils are equal, round, and reactive to light.   Neck: No JVD present.   Cardiovascular: Normal rate. An irregular rhythm present.   No murmur heard.  Pulses:       Radial pulses are 2+ on the right side and 2+ on the left side.   Pulmonary/Chest: Effort normal. No respiratory distress. She has decreased breath sounds in the left lower field. She has no wheezes. She has no rales. She exhibits no tenderness.   Musculoskeletal:         General: Edema (1+ Bilateral lower extremities) present.   Neurological: She is alert and oriented to person, place, and time.   Skin: Skin is warm and dry. She is not diaphoretic. No erythema.   Psychiatric: Mood, memory, affect and judgment normal.   Nursing note and vitals reviewed.    Cardiac Imaging and Procedures Review:    EKG 11/05/2019: Atrial Flutter    Limited Echocardiogram 06/19/2019:    Limited echo to evaluate for pericardial effusion s/p drain removal.   Prior echo on 6/18/19. Compared to the report of the study done - there   has been no significant change.   Normal left ventricular size and systolic function.  Normal pericardium without effusion.  Pleural effusion noted.     Echocardiogram (LEEANNA):  6/17/19  Normal left ventricular size and systolic function.  No left atrial thrombus.  No thrombus detected in the left atrial appendage.     Procedural conclusions per Dr. Steele's Op Note (06/17/2019):  Electrophysiology Procedure Note [facility]  Indication: Symptomatic persistent atrial fibrillation  Procedure Performed: 1. Atrial fibrillation ablation 2. Advanced mapping using CARTO system  3. Transesophageal echocardiography 4. Intracardiac echocardiography 5. Esophageal temperature monitoring 6. Intraarterial blood pressure monitoring 7. Frequent ACT's   Physician(s): PHYLICIA Steele M.D.  Resident/Assistant(s): None  Anesthesia: General endotracheal  anesthetic.  Anaesthesiologist: Dr. Oj Silver  Specimen(s) Removed: None  Pre-procedure ECG: Atrial fibrillation  Post Procedure ECG: Sinus rhythm  Total ablation time: 2468 seconds  Fluoroscopy time: 7.7 minutes  Electrophysiologic Findings:    1. Baseline: Atrial fibrillation 777 ms,  ms, HV 51 Ms.   2. Final  SR 1065 ms  Impressions:    1.  Persistent atrial fibrillation.    2. Successful RF ablation pulmonary vein isolation procedure.       Pre-Operative Diagnosis: Status post atrial fibrillation ablation, late cardiac tamponade   Post-Operative Diagnosis: Successful pericardiocentesis with resolution of hypotension and pericardial fluid  Indications: Cardiac tamponade post catheter ablation  Procedure(s) Performed: Pericardiocentesis  Physician(s): PHYLICIA Steele M.D.  Anesthesia: Local anesthesia  Pre-procedure ECG: Sinus rhythm  Post-procedure ECG: Sinus rhythm  Complications:  None  Fluroscopy time: Less than 2 minutes  Impressions:    1.  Late cardiac tamponade post ablation.  2.  Successful pericardiocentesis.     Cardiac Catheterization:  05/26/2017  1.  LEFT MAIN ARTERY:  Left main vessel is a large caliber vessel, angiographically normal, bifurcates into the left anterior descending artery and circumflex artery.  2.  LEFT ANTERIOR DESCENDING ARTERY:  The LAD gives rise to a first diagonal branch and a normal complement of septal branches and extends around the apex.    The LAD is widely patent with the exception of a very mild eccentric smooth focal proximal atheroma otherwise the LAD appears angiographically normal.  3.  CIRCUMFLEX ARTERY:  The circumflex gives rise to 2 major long marginal branches and a small caliber AV groove branch.  Angiographically, the circumflex artery appears normal.  4.  RIGHT CORONARY ARTERY:  The RCA is moderate size caliber vessel and gives rise to an acute marginal branch, posterior descending artery and a posterolateral branch.  Angiographically, the right coronary  artery is normal.    Labs (personally reviewed and notable for):   Lab Results   Component Value Date/Time    SODIUM 139 09/17/2019 09:00 AM    POTASSIUM 4.4 09/17/2019 09:00 AM    CHLORIDE 105 09/17/2019 09:00 AM    CO2 29 09/17/2019 09:00 AM    GLUCOSE 91 09/17/2019 09:00 AM    BUN 18 09/17/2019 09:00 AM    CREATININE 1.02 09/17/2019 09:00 AM      Lab Results   Component Value Date/Time    WBC 7.6 07/17/2019 07:13 AM    RBC 3.97 (L) 07/17/2019 07:13 AM    HEMOGLOBIN 11.8 (L) 07/17/2019 07:13 AM    HEMATOCRIT 38.0 07/17/2019 07:13 AM    MCV 95.7 07/17/2019 07:13 AM    MCH 29.7 07/17/2019 07:13 AM    MCHC 31.1 (L) 07/17/2019 07:13 AM    MPV 11.3 07/17/2019 07:13 AM    NEUTSPOLYS 66.80 07/17/2019 07:13 AM    LYMPHOCYTES 16.00 (L) 07/17/2019 07:13 AM    MONOCYTES 8.90 07/17/2019 07:13 AM    EOSINOPHILS 6.90 07/17/2019 07:13 AM    BASOPHILS 1.10 07/17/2019 07:13 AM      PT/INR:   Lab Results   Component Value Date/Time    PROTHROMBTM 13.6 06/18/2019 05:21 AM    INR 1.02 06/18/2019 05:21 AM   ]  Assessment:     1. Persistent atrial fibrillation  EKG    CL-EP ABLATION AFIB-AFLUTTER   2. S/P ablation of atrial fibrillation  EKG    CL-EP ABLATION AFIB-AFLUTTER   3. High risk medication use      Flecainide   4. Chronic anticoagulation      Eliquis   5. Non-rheumatic mitral regurgitation     6. Typical atrial flutter (HCC)  CL-EP ABLATION ATRIAL FLUTTER    CL-EP ABLATION AFIB-AFLUTTER    EKG     Medical Decision Making:  Today's Assessment / Status / Plan:   1. Persistent Atrial Fibrillation  - S/P  RFA with PVI by Dr. Steele on 06/17/19.  - Hx intolerance to Tikosyn secondary to QT prolongation.   - EKG today shows AFL. Relatively asymptomatic, rare palpitations/dyspnea with extreme exertion. BP stable. Weight stable. No JVD.   - On OAC with Eliquis, no s/sx bleeding, continue.  - On Flecainide 75 mg BID and coreg 3.125 mg daily, will continue.   - Repeat EKG Thursday 11/07/19 at Dr. Landa's appt.   - Discussed options with  patient including medication management vs DCCV vs ablation.  All pt questions/concerns were answered. Patient wishes to proceed with ablation.  Order placed.  to schedule. Reviewed with Dr. Steele, will hold OAC night prior to RFA, continue Flecainide/coreg. Will schedule AF/AFL ablation incase appears to be atypical flutter.  - Continue current medication management.   - Encouraged to monitor daily weights, BP, and HR, call office if abnormal.  - On Lasix/Kcl PRN swelling.    2. High Risk Medication: Flecainide  - CrCL: > 60 mL/min ( based on 09/17/19 labs)  - Will continue to monitor for side effects of patient's high risk medication(s) including liver, renal function and electrolytes.     3. Chronic Anticoagulation: Eliquis    Plan reviewed in detail with the patient and she verbalizes understanding and is in agreement. RTC 2 months with Dr. Steele and with Dr. Landa as previously scheduled, sooner if clinical condition changes.     Thank you for allowing me to participate in this patients care.  Please contact me with any questions or concerns.     DAISY Lam.   [facility]  [phone]    Collaborating MD/ADD: Dr. Ryland MD.      Moshe Landa M.D.  [address] [place] [id number]  VIA In Basket

## 2019-11-05 NOTE — TELEPHONE ENCOUNTER
Patient scheduled for afib/flutter ablation w/LEEANNA w/anesthesia on 12-10-19 at [facility] with Dr. Steele.

## 2019-11-05 NOTE — TELEPHONE ENCOUNTER
Pt is seen in the office with APRN.  Per APRN recommendations, pt agnes Kyle may be off of work during the time of procedure 12/10/19-12/11/2019.     Letter drafted with APRN recommendations and given to APRN to sign.

## 2019-11-05 NOTE — LETTER
[facility]   [address]  [place] [address]  Phone: [phone]  Fax: [phone]              Chinyere Priest  1955    Encounter Date: 11/5/2019    DANIELLE Lam          PROGRESS NOTE:  Cardiology/Electrophysiology Follow-up Note    Subjective:   Chief Complaint:   Chief Complaint   Patient presents with   • Pulmonary Hypertension     S/P ablation of atrial fibrillation   • HTN (Controlled)     Chinyere Priest is a 63 y.o. female who presents today for follow up for Persistent AF s/p RFA by Dr. Steele on 06/17/19, on Flecainide.     She is followed by Dr. Landa and Dr. Steele.  Last seen by Yolanda GOLDEN on 09/09/19 for LLE/swelling which was treated with lasix.     She underwent successful AF ablation with pulmonary vein isolation procedure by Dr. Steele on 06/17/19 with late pericardial effusion with pericardiocentesis and Tikosyn initiation post procedure. She was unable to tolerate Tikosyn secondary to QT prolongation >500 ms after initial dose. She was therefore switched to low dose Tambocor 75 mg twice daily and has been maintaining sinus rhythm.    Medical history significant for Persistent Atrial Fibrillation,  s/p successful  RFA by Dr. Steele on 06/17/19, on chronic anticoagulation with Eliquis, hypertension, aortic calcification, moderate mitral regurgitation, and mild pulmonary hypertension. Intolerant to Tikosyn secondary to QT prolongation.    Today in follow up, she reports rare palpitations and some dyspnea with extreme exertion, and intermittent lower extremity edema, on lasix PRN.  She also states her headaches have been increasing, encouraged to follow up with PCP regarding.  She denies chest pain, dizziness, pre syncope or syncope. Denies any signs or symptoms of bleeding or bleeding issues. Patient endorses medication compliance. She has been biking/walking at the gym for exercise, tolerating well.      "    Past Medical History:   Diagnosis Date   • Abnormal CT scan, kidney      \"Incidentally noted retroaortic left renal vein is identified. Some calcified plaque is noted in the aorta and its branch vessels.\"  CT scan done for diverticulosis     • Aortic calcification (HCC)      Noted incidentally on abdominal CT scan.   • Asthma in adult         • Atrial fibrillation (HCC) 2019    Status post ablation by Dr. Steele.   • Chronic anticoagulation    • Chronic back pain     • Dental disorder     Full upper/lower dentures.   • Diverticulosis     • Essential hypertension, malignant     • Inflamed seborrheic keratosis     • Non-rheumatic mitral regurgitation 2019    Echocardiogram with normal LV size, LVEF 65%. Moderate MR, mild-moderate TR, unchanged from previous.   • Osteoarthritis of both hips     • Plantar fascia syndrome     • Plantar wart of right foot     • Pruritic dermatitis      Ongoing for several months but worse at night on her lower extremities   • Pulmonary hypertension (HCC)    • Pure hypercholesterolemia     • S/P cardiac catheterization 2017    1.  Normal right heart pressures. 3.  Essentially normal coronary arteries. 2.  Left ventricular ejection fraction 65%.    • Spider veins of limb     • Unspecified vitamin D deficiency       Past Surgical History:   Procedure Laterality Date   • LUMBAR LAMINECTOMY DISKECTOMY  2009    Performed by SUDHIR VERMA at SURGERY [facility] ORS   • GYN SURGERY       x2   • HYSTERECTOMY LAPAROSCOPY       Family History   Problem Relation Age of Onset   • Heart Disease Mother         1st MI @ 59 yo   • Hypertension Mother    • Heart Attack Mother 60   • Heart Disease Maternal Aunt    • Cancer Daughter 35        breast   • Heart Disease Maternal Grandmother    • Heart Disease Maternal Grandfather    • Heart Disease Maternal Aunt 56     Social History     Socioeconomic History   • Marital status:      Spouse name: Not on file   • Number of " children: Not on file   • Years of education: Not on file   • Highest education level: Not on file   Occupational History   • Not on file   Social Needs   • Financial resource strain: Not on file   • Food insecurity:     Worry: Not on file     Inability: Not on file   • Transportation needs:     Medical: Not on file     Non-medical: Not on file   Tobacco Use   • Smoking status: Former Smoker     Packs/day: 1.00     Years: 40.00     Pack years: 40.00     Types: Cigarettes     Last attempt to quit: 3/1/2010     Years since quittin.6   • Smokeless tobacco: Never Used   Substance and Sexual Activity   • Alcohol use: No     Alcohol/week: 0.0 oz   • Drug use: No     Comment: tried marijuana once.    • Sexual activity: Yes     Partners: Male     Birth control/protection: Post-Menopausal     Comment: , works at [employer]   Lifestyle   • Physical activity:     Days per week: Not on file     Minutes per session: Not on file   • Stress: Not on file   Relationships   • Social connections:     Talks on phone: Not on file     Gets together: Not on file     Attends Yarsani service: Not on file     Active member of club or organization: Not on file     Attends meetings of clubs or organizations: Not on file     Relationship status: Not on file   • Intimate partner violence:     Fear of current or ex partner: Not on file     Emotionally abused: Not on file     Physically abused: Not on file     Forced sexual activity: Not on file   Other Topics Concern   •  Service Not Asked   • Blood Transfusions Not Asked   • Caffeine Concern Not Asked   • Occupational Exposure Not Asked   • Hobby Hazards Not Asked   • Sleep Concern Not Asked   • Stress Concern Not Asked   • Weight Concern Not Asked   • Special Diet Not Asked   • Back Care Not Asked   • Exercise No   • Bike Helmet Not Asked   • Seat Belt Not Asked   • Self-Exams Not Asked   Social History Narrative    Works at [employer]     Allergies   Allergen Reactions   • Erythromycin   "    Abdominal pain   • Latex    • Tape      Cloth tape  Paper tape okay       Current Outpatient Medications   Medication Sig Dispense Refill   • furosemide (LASIX) 20 MG Tab Take 1 Tab by mouth 1 time daily as needed. 30 Tab 6   • ELIQUIS 5 MG Tab TAKE 1 TABLET BY MOUTH TWICE A  Tab 3   • carvedilol (COREG) 3.125 MG Tab Take 1 Tab by mouth 2 times a day, with meals. 180 Tab 3   • potassium chloride ER (KLOR-CON) 10 MEQ tablet Take 1 Tab by mouth 2 times a day. 180 Tab 1   • losartan (COZAAR) 25 MG Tab Take 1 Tab by mouth every day. 90 Tab 3   • flecainide (TAMBOCOR) 150 MG Tab Take 0.5 Tabs by mouth 2 Times a Day. 60 Tab 3   • fexofenadine (ALLEGRA) 180 MG tablet Take 1 Tab by mouth every day. 90 Tab 3   • fluticasone (FLONASE) 50 MCG/ACT nasal spray Spray 1 Spray in nose 2 times a day. 1 Bottle 11   • atorvastatin (LIPITOR) 10 MG Tab TAKE 1 TABLET BY MOUTH EVERY DAY 90 Tab 2   • albuterol 108 (90 BASE) MCG/ACT Aero Soln inhalation aerosol Inhale 2 Puffs by mouth every four hours as needed. 1 Inhaler 0   • vitamin D (CHOLECALCIFEROL) 1000 UNIT TABS Take 2 Tabs by mouth every day. 60 Each 11     No current facility-administered medications for this visit.      Review of Systems   Constitutional: Negative for chills, diaphoresis and fever.   Respiratory: Negative for cough, shortness of breath and wheezing.    Cardiovascular: Positive for palpitations and leg swelling (intermittent). Negative for chest pain, orthopnea, claudication and PND.   Gastrointestinal: Negative for abdominal pain and blood in stool.   Genitourinary: Negative for hematuria.   Neurological: Positive for headaches. Negative for dizziness and loss of consciousness.     All others systems reviewed and negative.   Objective:     /78 (BP Location: Left arm, Patient Position: Sitting, BP Cuff Size: Adult)   Pulse 100   Ht 1.702 m (5' 7\")   Wt 80 kg (176 lb 6.4 oz)   SpO2 96%  Body mass index is 27.63 kg/m².    Physical Exam   "   Constitutional: She is oriented to person, place, and time. No distress.   Eyes: Pupils are equal, round, and reactive to light.   Neck: No JVD present.   Cardiovascular: Normal rate. An irregular rhythm present.   No murmur heard.  Pulses:       Radial pulses are 2+ on the right side and 2+ on the left side.   Pulmonary/Chest: Effort normal. No respiratory distress. She has decreased breath sounds in the left lower field. She has no wheezes. She has no rales. She exhibits no tenderness.   Musculoskeletal:         General: Edema (1+ Bilateral lower extremities) present.   Neurological: She is alert and oriented to person, place, and time.   Skin: Skin is warm and dry. She is not diaphoretic. No erythema.   Psychiatric: Mood, memory, affect and judgment normal.   Nursing note and vitals reviewed.    Cardiac Imaging and Procedures Review:    EKG 11/05/2019: Atrial Flutter    Limited Echocardiogram 06/19/2019:    Limited echo to evaluate for pericardial effusion s/p drain removal.   Prior echo on 6/18/19. Compared to the report of the study done - there   has been no significant change.   Normal left ventricular size and systolic function.  Normal pericardium without effusion.  Pleural effusion noted.     Echocardiogram (LEEANNA):  6/17/19  Normal left ventricular size and systolic function.  No left atrial thrombus.  No thrombus detected in the left atrial appendage.     Procedural conclusions per Dr. Steele's Op Note (06/17/2019):  Electrophysiology Procedure Note [facility]  Indication: Symptomatic persistent atrial fibrillation  Procedure Performed: 1. Atrial fibrillation ablation 2. Advanced mapping using CARTO system  3. Transesophageal echocardiography 4. Intracardiac echocardiography 5. Esophageal temperature monitoring 6. Intraarterial blood pressure monitoring 7. Frequent ACT's   Physician(s): PHYLICIA Steele M.D.  Resident/Assistant(s): None  Anesthesia: General endotracheal  anesthetic.  Anaesthesiologist: Dr. Oj Silver  Specimen(s) Removed: None  Pre-procedure ECG: Atrial fibrillation  Post Procedure ECG: Sinus rhythm  Total ablation time: 2468 seconds  Fluoroscopy time: 7.7 minutes  Electrophysiologic Findings:    1. Baseline: Atrial fibrillation 777 ms,  ms, HV 51 Ms.   2. Final  SR 1065 ms  Impressions:    1.  Persistent atrial fibrillation.    2. Successful RF ablation pulmonary vein isolation procedure.       Pre-Operative Diagnosis: Status post atrial fibrillation ablation, late cardiac tamponade   Post-Operative Diagnosis: Successful pericardiocentesis with resolution of hypotension and pericardial fluid  Indications: Cardiac tamponade post catheter ablation  Procedure(s) Performed: Pericardiocentesis  Physician(s): PHYLICIA Steele M.D.  Anesthesia: Local anesthesia  Pre-procedure ECG: Sinus rhythm  Post-procedure ECG: Sinus rhythm  Complications:  None  Fluroscopy time: Less than 2 minutes  Impressions:    1.  Late cardiac tamponade post ablation.  2.  Successful pericardiocentesis.     Cardiac Catheterization:  05/26/2017  1.  LEFT MAIN ARTERY:  Left main vessel is a large caliber vessel, angiographically normal, bifurcates into the left anterior descending artery and circumflex artery.  2.  LEFT ANTERIOR DESCENDING ARTERY:  The LAD gives rise to a first diagonal branch and a normal complement of septal branches and extends around the apex.    The LAD is widely patent with the exception of a very mild eccentric smooth focal proximal atheroma otherwise the LAD appears angiographically normal.  3.  CIRCUMFLEX ARTERY:  The circumflex gives rise to 2 major long marginal branches and a small caliber AV groove branch.  Angiographically, the circumflex artery appears normal.  4.  RIGHT CORONARY ARTERY:  The RCA is moderate size caliber vessel and gives rise to an acute marginal branch, posterior descending artery and a posterolateral branch.  Angiographically, the right coronary  artery is normal.    Labs (personally reviewed and notable for):   Lab Results   Component Value Date/Time    SODIUM 139 09/17/2019 09:00 AM    POTASSIUM 4.4 09/17/2019 09:00 AM    CHLORIDE 105 09/17/2019 09:00 AM    CO2 29 09/17/2019 09:00 AM    GLUCOSE 91 09/17/2019 09:00 AM    BUN 18 09/17/2019 09:00 AM    CREATININE 1.02 09/17/2019 09:00 AM      Lab Results   Component Value Date/Time    WBC 7.6 07/17/2019 07:13 AM    RBC 3.97 (L) 07/17/2019 07:13 AM    HEMOGLOBIN 11.8 (L) 07/17/2019 07:13 AM    HEMATOCRIT 38.0 07/17/2019 07:13 AM    MCV 95.7 07/17/2019 07:13 AM    MCH 29.7 07/17/2019 07:13 AM    MCHC 31.1 (L) 07/17/2019 07:13 AM    MPV 11.3 07/17/2019 07:13 AM    NEUTSPOLYS 66.80 07/17/2019 07:13 AM    LYMPHOCYTES 16.00 (L) 07/17/2019 07:13 AM    MONOCYTES 8.90 07/17/2019 07:13 AM    EOSINOPHILS 6.90 07/17/2019 07:13 AM    BASOPHILS 1.10 07/17/2019 07:13 AM      PT/INR:   Lab Results   Component Value Date/Time    PROTHROMBTM 13.6 06/18/2019 05:21 AM    INR 1.02 06/18/2019 05:21 AM   ]  Assessment:     1. Persistent atrial fibrillation  EKG    CL-EP ABLATION AFIB-AFLUTTER   2. S/P ablation of atrial fibrillation  EKG    CL-EP ABLATION AFIB-AFLUTTER   3. High risk medication use      Flecainide   4. Chronic anticoagulation      Eliquis   5. Non-rheumatic mitral regurgitation     6. Typical atrial flutter (HCC)  CL-EP ABLATION ATRIAL FLUTTER    CL-EP ABLATION AFIB-AFLUTTER    EKG     Medical Decision Making:  Today's Assessment / Status / Plan:   1. Persistent Atrial Fibrillation  - S/P  RFA with PVI by Dr. Steele on 06/17/19.  - Hx intolerance to Tikosyn secondary to QT prolongation.   - EKG today shows AFL. Relatively asymptomatic, rare palpitations/dyspnea with extreme exertion. BP stable. Weight stable. No JVD.   - On OAC with Eliquis, no s/sx bleeding, continue.  - On Flecainide 75 mg BID and coreg 3.125 mg daily, will continue.   - Repeat EKG Thursday 11/07/19 at Dr. Landa's appt.   - Discussed options with  patient including medication management vs DCCV vs ablation.  All pt questions/concerns were answered. Patient wishes to proceed with ablation.  Order placed.  to schedule. Reviewed with Dr. Steele, will hold OAC night prior to RFA, continue Flecainide/coreg. Will schedule AF/AFL ablation incase appears to be atypical flutter.  - Continue current medication management.   - Encouraged to monitor daily weights, BP, and HR, call office if abnormal.  - On Lasix/Kcl PRN swelling.    2. High Risk Medication: Flecainide  - CrCL: > 60 mL/min ( based on 09/17/19 labs)  - Will continue to monitor for side effects of patient's high risk medication(s) including liver, renal function and electrolytes.     3. Chronic Anticoagulation: Eliquis    Plan reviewed in detail with the patient and she verbalizes understanding and is in agreement. RTC 2 months with Dr. Steele and with Dr. Landa as previously scheduled, sooner if clinical condition changes.     Thank you for allowing me to participate in this patients care.  Please contact me with any questions or concerns.     DAISY Lam.   [facility]  [phone]    Collaborating MD/ADD: Dr. Ryland MD.      Augusto Steele M.D.  [address]  [address]  [place] [address]  VIA In Basket

## 2019-11-06 DIAGNOSIS — I48.19 PERSISTENT ATRIAL FIBRILLATION (HCC): ICD-10-CM

## 2019-11-06 DIAGNOSIS — Z98.890 S/P ABLATION OF ATRIAL FIBRILLATION: ICD-10-CM

## 2019-11-06 DIAGNOSIS — Z86.79 S/P ABLATION OF ATRIAL FIBRILLATION: ICD-10-CM

## 2019-11-07 ENCOUNTER — OFFICE VISIT (OUTPATIENT)
Dept: CARDIOLOGY | Facility: PHYSICIAN GROUP | Age: 64
End: 2019-11-07
Payer: COMMERCIAL

## 2019-11-07 VITALS
BODY MASS INDEX: 27.88 KG/M2 | OXYGEN SATURATION: 96 % | SYSTOLIC BLOOD PRESSURE: 124 MMHG | WEIGHT: 177.6 LBS | HEART RATE: 58 BPM | DIASTOLIC BLOOD PRESSURE: 80 MMHG | HEIGHT: 67 IN

## 2019-11-07 DIAGNOSIS — R60.0 LOCALIZED EDEMA: ICD-10-CM

## 2019-11-07 DIAGNOSIS — I50.32 CHRONIC DIASTOLIC CONGESTIVE HEART FAILURE (HCC): ICD-10-CM

## 2019-11-07 DIAGNOSIS — Z98.890 S/P CARDIAC CATHETERIZATION: ICD-10-CM

## 2019-11-07 DIAGNOSIS — E78.00 HYPERCHOLESTEROLEMIA: ICD-10-CM

## 2019-11-07 DIAGNOSIS — I10 ESSENTIAL HYPERTENSION, BENIGN: ICD-10-CM

## 2019-11-07 DIAGNOSIS — Z79.01 CHRONIC ANTICOAGULATION: ICD-10-CM

## 2019-11-07 DIAGNOSIS — I51.89 DECREASED RIGHT VENTRICULAR SYSTOLIC FUNCTION: ICD-10-CM

## 2019-11-07 DIAGNOSIS — Z98.890 S/P ABLATION OF ATRIAL FIBRILLATION: ICD-10-CM

## 2019-11-07 DIAGNOSIS — I34.0 NON-RHEUMATIC MITRAL REGURGITATION: ICD-10-CM

## 2019-11-07 DIAGNOSIS — I48.19 PERSISTENT ATRIAL FIBRILLATION (HCC): ICD-10-CM

## 2019-11-07 DIAGNOSIS — Z86.79 S/P ABLATION OF ATRIAL FIBRILLATION: ICD-10-CM

## 2019-11-07 DIAGNOSIS — I27.20 PULMONARY HYPERTENSION (HCC): ICD-10-CM

## 2019-11-07 PROCEDURE — 99215 OFFICE O/P EST HI 40 MIN: CPT | Performed by: INTERNAL MEDICINE

## 2019-11-07 RX ORDER — POTASSIUM CHLORIDE 750 MG/1
20 TABLET, FILM COATED, EXTENDED RELEASE ORAL
COMMUNITY
Start: 2019-11-07 | End: 2020-06-08

## 2019-11-07 RX ORDER — FUROSEMIDE 20 MG/1
40 TABLET ORAL
Qty: 30 TAB | Refills: 6 | Status: SHIPPED | OUTPATIENT
Start: 2019-11-07 | End: 2020-06-08

## 2019-11-07 ASSESSMENT — ENCOUNTER SYMPTOMS
SHORTNESS OF BREATH: 1
BACK PAIN: 1
HEADACHES: 1

## 2019-11-07 NOTE — PROGRESS NOTES
Cardiology Follow-up Consultation Note    Date of note:    11/7/2019  Primary Care Provider: Keila Mccrary M.D.  Referring Provider: Mary    Patient Name: Chinyere Priest   YOB: 1955  MRN:              [id number]    Chief Complaint: atrial fibrillation    History of Present Illness: Chinyere Priest is a 64 y.o. female whose current medical problems include hypertension, atrial fibrillation s/p ablation 6/7/2019 complicated by post-ablation paroxysmal atrial flutter, aortic calcification, moderate mitral regurgitation, mild pulmonary hypertension by echo, and dyslipidemia who is here for follow-up.    At our visit, 3/19/2018:  Does have occasional fatigue and leg swelling after work.     No bleeding on the eliquis.  Stopped aspirin.     At our visit, 9/28/2018:  2-3 weeks ago, she was at work and had acute onset of sharp LUQ pain which occurred after running around at work, + palpitations and SOB. Last exercised 1 week ago and did have more SOB than usual, but no chest pain.     In terms of atrial fibrillation, does have occasional palpitations.     At our visit, 5/1/2019:    In terms of atrial fibrillation, checked her pulse recently, rate in the 120s, lowest at 89. Gets mild to moderate severiity palpitations when walking that mostly resolve with rest. Mild chest pressure only with the palpitations. + leg swelling, weight up 10 pounds.  + LOWERY.     Noticing fatigue and depression.     In terms of hypertension, well controlled.     Interim Events:  Had a fib ablation 6/17/2019. Failed tikosyn secondary to QTc prolongation. Started on flecainide. Currently rare palpitations, once a week, mild severity and lasts seconds like a PVC/PAC. Also worse with laying down flat.     Had some edema in September and was started on lasix prn. She takes 40mg around once a week. SOB improved. Weight down to 176, had gotten up to 179.     Seen in the office 11/5/2019 by Maddy Fraser,  "noted to have atypical atrial flutter. Scheduled for ablation in December. EKG personally reviewed from that visit and showed atypical atrial flutter.     Repeat EKG today shows NSR at a rate of 55 bpm.     Joined a gym, up to 40 minutes at 2.8 but with minimal SOB.        Review of Systems   HENT: Positive for tinnitus.    Cardiovascular: Positive for leg swelling.   Respiratory: Positive for shortness of breath.    Skin: Positive for itching.   Musculoskeletal: Positive for back pain.   Neurological: Positive for headaches.   Allergic/Immunologic: Positive for environmental allergies.      All other systems reviewed and discussed using a comprehensive questionnaire and are negative.       Past Medical History:   Diagnosis Date   • Abnormal CT scan, kidney      \"Incidentally noted retroaortic left renal vein is identified. Some calcified plaque is noted in the aorta and its branch vessels.\"  CT scan done for diverticulosis     • Aortic calcification (HCC)      Noted incidentally on abdominal CT scan.   • Asthma in adult         • Atrial fibrillation (HCC) 06/2019    Status post ablation by Dr. Steele.   • Chronic anticoagulation    • Chronic back pain     • Dental disorder     Full upper/lower dentures.   • Diverticulosis     • Essential hypertension, malignant     • Inflamed seborrheic keratosis     • Non-rheumatic mitral regurgitation 05/2019    Echocardiogram with normal LV size, LVEF 65%. Moderate MR, mild-moderate TR, unchanged from previous.   • Osteoarthritis of both hips     • Plantar fascia syndrome     • Plantar wart of right foot     • Pruritic dermatitis      Ongoing for several months but worse at night on her lower extremities   • Pulmonary hypertension (HCC)    • Pure hypercholesterolemia     • S/P cardiac catheterization 5/26/2017    1.  Normal right heart pressures. 3.  Essentially normal coronary arteries. 2.  Left ventricular ejection fraction 65%.    • Spider veins of limb     • Unspecified " vitamin D deficiency           Past Surgical History:   Procedure Laterality Date   • LUMBAR LAMINECTOMY DISKECTOMY  2009    Performed by SUDHIR VERMA at SURGERY [facility] ORS   • GYN SURGERY       x2   • HYSTERECTOMY LAPAROSCOPY           Current Outpatient Medications   Medication Sig Dispense Refill   • furosemide (LASIX) 20 MG Tab Take 1 Tab by mouth 1 time daily as needed. 30 Tab 6   • ELIQUIS 5 MG Tab TAKE 1 TABLET BY MOUTH TWICE A  Tab 3   • carvedilol (COREG) 3.125 MG Tab Take 1 Tab by mouth 2 times a day, with meals. 180 Tab 3   • potassium chloride ER (KLOR-CON) 10 MEQ tablet Take 1 Tab by mouth 2 times a day. 180 Tab 1   • losartan (COZAAR) 25 MG Tab Take 1 Tab by mouth every day. 90 Tab 3   • flecainide (TAMBOCOR) 150 MG Tab Take 0.5 Tabs by mouth 2 Times a Day. 60 Tab 3   • fexofenadine (ALLEGRA) 180 MG tablet Take 1 Tab by mouth every day. 90 Tab 3   • fluticasone (FLONASE) 50 MCG/ACT nasal spray Spray 1 Spray in nose 2 times a day. 1 Bottle 11   • atorvastatin (LIPITOR) 10 MG Tab TAKE 1 TABLET BY MOUTH EVERY DAY 90 Tab 2   • albuterol 108 (90 BASE) MCG/ACT Aero Soln inhalation aerosol Inhale 2 Puffs by mouth every four hours as needed. 1 Inhaler 0   • vitamin D (CHOLECALCIFEROL) 1000 UNIT TABS Take 2 Tabs by mouth every day. 60 Each 11     No current facility-administered medications for this visit.          Allergies   Allergen Reactions   • Erythromycin      Abdominal pain   • Latex    • Tape      Cloth tape  Paper tape okay         Family History   Problem Relation Age of Onset   • Heart Disease Mother         1st MI @ 59 yo   • Hypertension Mother    • Heart Attack Mother 60   • Heart Disease Maternal Aunt    • Cancer Daughter 35        breast   • Heart Disease Maternal Grandmother    • Heart Disease Maternal Grandfather    • Heart Disease Maternal Aunt 56         Social History     Socioeconomic History   • Marital status:      Spouse name: Not on file   • Number of  children: Not on file   • Years of education: Not on file   • Highest education level: Not on file   Occupational History   • Not on file   Social Needs   • Financial resource strain: Not on file   • Food insecurity:     Worry: Not on file     Inability: Not on file   • Transportation needs:     Medical: Not on file     Non-medical: Not on file   Tobacco Use   • Smoking status: Former Smoker     Packs/day: 1.00     Years: 40.00     Pack years: 40.00     Types: Cigarettes     Last attempt to quit: 3/1/2010     Years since quittin.6   • Smokeless tobacco: Never Used   Substance and Sexual Activity   • Alcohol use: No     Alcohol/week: 0.0 oz   • Drug use: No     Comment: tried marijuana once.    • Sexual activity: Yes     Partners: Male     Birth control/protection: Post-Menopausal     Comment: , works at [employer]   Lifestyle   • Physical activity:     Days per week: Not on file     Minutes per session: Not on file   • Stress: Not on file   Relationships   • Social connections:     Talks on phone: Not on file     Gets together: Not on file     Attends [employer] service: Not on file     Active member of club or organization: Not on file     Attends meetings of clubs or organizations: Not on file     Relationship status: Not on file   • Intimate partner violence:     Fear of current or ex partner: Not on file     Emotionally abused: Not on file     Physically abused: Not on file     Forced sexual activity: Not on file   Other Topics Concern   •  Service Not Asked   • Blood Transfusions Not Asked   • Caffeine Concern Not Asked   • Occupational Exposure Not Asked   • Hobby Hazards Not Asked   • Sleep Concern Not Asked   • Stress Concern Not Asked   • Weight Concern Not Asked   • Special Diet Not Asked   • Back Care Not Asked   • Exercise No   • Bike Helmet Not Asked   • Seat Belt Not Asked   • Self-Exams Not Asked   Social History Narrative    Works at [employer]         Physical Exam:  Ambulatory Vitals  /80  "(BP Location: Left arm, Patient Position: Sitting, BP Cuff Size: Adult)   Pulse (!) 58   Ht 1.702 m (5' 7\")   Wt 80.6 kg (177 lb 9.6 oz)   SpO2 96%    Oxygen Therapy:  Pulse Oximetry: 96 %  BP Readings from Last 4 Encounters:   11/07/19 124/80   11/05/19 134/78   09/09/19 122/82   08/06/19 130/74       Weight/BMI: Body mass index is 27.82 kg/m².  Wt Readings from Last 4 Encounters:   11/07/19 80.6 kg (177 lb 9.6 oz)   11/05/19 80 kg (176 lb 6.4 oz)   09/09/19 81.6 kg (180 lb)   08/06/19 80.3 kg (177 lb)       General: No apparent distress  Eyes: nl conjunctiva  ENT: OP clear, normal external appearance of nose and ears  Neck: JVP 4-5 cm H2O, no carotid bruits  Lungs: normal respiratory effort, CTAB  Heart: RRR,  2/6 systolic murmur at LLSB, no rubs or gallops, trace edema bilateral lower extremities. No LV/RV heave on cardiac palpatation. 2+ bilateral radial pulses.   Abdomen: soft, non tender, non distended, no masses, normal bowel sounds.  No HSM.  Extremities/MSK: no clubbing, no cyanosis. Prominent veins in lower extremities.   Neurological: No focal sensory deficits  Psychiatric: Appropriate affect, A/O x 3, intact judgement and insight  Skin: Warm extremities    Exam repeated in full and unchanged except for as noted above.    Lab Data Review:  Lab Results   Component Value Date/Time    CHOLSTRLTOT 141 07/17/2019 07:13 AM    LDL 72 07/17/2019 07:13 AM    HDL 50 07/17/2019 07:13 AM    TRIGLYCERIDE 96 07/17/2019 07:13 AM       Lab Results   Component Value Date/Time    SODIUM 139 09/17/2019 09:00 AM    POTASSIUM 4.4 09/17/2019 09:00 AM    CHLORIDE 105 09/17/2019 09:00 AM    CO2 29 09/17/2019 09:00 AM    GLUCOSE 91 09/17/2019 09:00 AM    BUN 18 09/17/2019 09:00 AM    CREATININE 1.02 09/17/2019 09:00 AM     Lab Results   Component Value Date/Time    ALKPHOSPHAT 57 07/17/2019 07:14 AM    ASTSGOT 19 07/17/2019 07:14 AM    ALTSGPT 13 07/17/2019 07:14 AM    TBILIRUBIN 0.6 07/17/2019 07:14 AM      Lab Results "   Component Value Date/Time    WBC 7.6 07/17/2019 07:13 AM     No components found for: HBGA1C  No components found for: TROPONIN  No components found for: BNP      Cardiac Imaging and Procedures Review:    EKG dated 3/19/2018 : My personal interpretation is atrial fibrillation, borderline t wave abnormalities.      Echo dated 7/2/2019:        EKG dated 9/28/2018:  Atrial  fibrillation, , likely lvh, diffuse borderline TWA    Echo dated 5/2017:   Moderate Mr.  PASP 50, EF 65%, borderline enlarged Rv, ? Rv dyskinesis, mild LAURO, estimated RAP 8mmHg.      LHC/RHC (5/2017):   RIGHT HEART PRESSURES:  1.  Mean right atrial pressure 5 mmHg.  2.  Right ventricular pressure 22/5.  3.  Pulmonary artery pressure 25/11, mean of 70 mmHg.  4.  Pulmonary capillary wedge pressure 7 mmHg.     LEFT HEART PRESSURES:  1.  LVEDP of 15 mmHg.  2.  Left ventricular systolic pressure 126 mmHg.  3.  Central aortic pressure systolic 121, diastolic 66, mean of 89 mmHg.     OXIMETRY MEASUREMENTS:  1.  Systemic arterial 97.1%.  2.  Pulmonary artery 78 mmHg.     CARDIAC OUTPUT:  1.  Thermodilution method 3.0 liters per minute, cardiac index 1.6 liters per   minute per meter squared.  2.  Basim method cardiac output 5.2 liters per minute, cardiac index 2.7 liters   per minute per meter squared.     Pulmonary vascular resistance of 1.9 Wood units.     LEFT VENTRICULOGRAPHY:  Left ventricular chamber size, wall motion and   systolic function are normal.  Calculated ejection fraction is 65%.  No   identifiable mitral regurgitation or LV thrombus present.     Rhythm, atrial fibrillation.     CORONARY ANGIOGRAPHY:  1.  LEFT MAIN ARTERY:  Left main vessel is a large caliber vessel, angiographically normal, bifurcates into the left anterior descending artery and circumflex artery.  2.  LEFT ANTERIOR DESCENDING ARTERY:  The LAD gives rise to a first diagonal branch and a normal complement of septal branches and extends around the apex.    The LAD is widely  patent with the exception of a very mild eccentric smooth focal proximal atheroma otherwise the LAD appears angiographically normal.  3.  CIRCUMFLEX ARTERY:  The circumflex gives rise to 2 major long marginal branches and a small caliber AV groove branch.  Angiographically, the circumflex artery appears normal.  4.  RIGHT CORONARY ARTERY:  The RCA is moderate size caliber vessel and gives rise to an acute marginal branch, posterior descending artery and a posterolateral branch.  Angiographically, the right coronary artery is normal.      Medical Decision Makin. Pulmonary hypertension  Mild, no elevated pulmonary pressures by RHC, only by previous echocardiogram.  -CTM    2. Aortic calcification (CMS-HCC)  -on apixaban, will not use aspirin  -continue lipitor 10mg PO daily    3. Non-rheumatic mitral regurgitation  Moderate 2019, repeat yearly, next 2020.     4. Essential hypertension, benign  Well controlled.   -continue losartan 25mg PO daily  -continue coreg 3.125mg PO bid  -continue lasix prn as per below.   -labs with next ablation then every 6 months.     5. Persistent atrial fibrillation (CMS-HCC)  S/p ablation, unfortunately now with occasional atypical atrial flutter  -proceed with ablation 2019 with Dr. Steele, I think she would benefit from repeat ablation to eventually be able to get off anti-arrhythmics even though she is back in NSR. Will discuss with Dr. Steele.   -Continue apixaban.   -continue coreg for rate control  -continue flecainide 75mg PO bid, EKG reviewed today.     6. Atypical chest pain - likely gastric. Now resolved.   Had recent LHC with no significant disease.  -CTM    7. Chronic diastolic heart failure - NYHA class II, stage C. With moderate MR and TR. Appears euvolemic today  -continue lasix 40mg PO daily prn weight gain or LE swelling. Dry weight 176 at home.   -switch potassium to 20meEq whenever she takes 40mg of lasix.  -consider spironolactone in the future.   -check labs  with next ablation.  -f/u echo next year as per above.       Return in about 3 months (around 2/7/2020).      Moshe Landa MD  [facility], [facility].  [address] [place] [id number]  Phone: [phone]  Fax: [phone]

## 2019-11-07 NOTE — Clinical Note
She is back in NSR, but completely agree she was previously in flutter on Tuesday. I think ablation should proceed so that eventually she can possibly get off flec, but let me know if you disagree!-Moshe

## 2019-12-10 ENCOUNTER — ANESTHESIA (OUTPATIENT)
Dept: CARDIOLOGY | Facility: MEDICAL CENTER | Age: 64
End: 2019-12-10
Payer: COMMERCIAL

## 2019-12-10 ENCOUNTER — APPOINTMENT (OUTPATIENT)
Dept: CARDIOLOGY | Facility: MEDICAL CENTER | Age: 64
End: 2019-12-10
Attending: INTERNAL MEDICINE
Payer: COMMERCIAL

## 2019-12-10 ENCOUNTER — ANESTHESIA EVENT (OUTPATIENT)
Dept: CARDIOLOGY | Facility: MEDICAL CENTER | Age: 64
End: 2019-12-10
Payer: COMMERCIAL

## 2019-12-10 ENCOUNTER — APPOINTMENT (OUTPATIENT)
Dept: CARDIOLOGY | Facility: MEDICAL CENTER | Age: 64
End: 2019-12-10
Attending: NURSE PRACTITIONER
Payer: COMMERCIAL

## 2019-12-10 ENCOUNTER — HOSPITAL ENCOUNTER (OUTPATIENT)
Facility: MEDICAL CENTER | Age: 64
End: 2019-12-11
Attending: INTERNAL MEDICINE | Admitting: INTERNAL MEDICINE
Payer: COMMERCIAL

## 2019-12-10 DIAGNOSIS — Z79.01 CHRONIC ANTICOAGULATION: ICD-10-CM

## 2019-12-10 DIAGNOSIS — I48.19 PERSISTENT ATRIAL FIBRILLATION (HCC): ICD-10-CM

## 2019-12-10 DIAGNOSIS — Z86.79 S/P ABLATION OF ATRIAL FIBRILLATION: ICD-10-CM

## 2019-12-10 DIAGNOSIS — Z98.890 S/P ABLATION OF ATRIAL FIBRILLATION: ICD-10-CM

## 2019-12-10 DIAGNOSIS — I48.3 TYPICAL ATRIAL FLUTTER (HCC): ICD-10-CM

## 2019-12-10 LAB
ACT BLD: 241 SEC (ref 74–137)
ACT BLD: 252 SEC (ref 74–137)
ACT BLD: 279 SEC (ref 74–137)
ACT BLD: 290 SEC (ref 74–137)
ALBUMIN SERPL BCP-MCNC: 4.4 G/DL (ref 3.2–4.9)
ALBUMIN/GLOB SERPL: 1.6 G/DL
ALP SERPL-CCNC: 69 U/L (ref 30–99)
ALT SERPL-CCNC: 21 U/L (ref 2–50)
ANION GAP SERPL CALC-SCNC: 8 MMOL/L (ref 0–11.9)
AST SERPL-CCNC: 23 U/L (ref 12–45)
BASOPHILS # BLD AUTO: 0.7 % (ref 0–1.8)
BASOPHILS # BLD: 0.05 K/UL (ref 0–0.12)
BILIRUB SERPL-MCNC: 0.7 MG/DL (ref 0.1–1.5)
BUN SERPL-MCNC: 17 MG/DL (ref 8–22)
CALCIUM SERPL-MCNC: 9.1 MG/DL (ref 8.5–10.5)
CHLORIDE SERPL-SCNC: 105 MMOL/L (ref 96–112)
CO2 SERPL-SCNC: 28 MMOL/L (ref 20–33)
CREAT SERPL-MCNC: 0.87 MG/DL (ref 0.5–1.4)
EKG IMPRESSION: NORMAL
EOSINOPHIL # BLD AUTO: 0.16 K/UL (ref 0–0.51)
EOSINOPHIL NFR BLD: 2.1 % (ref 0–6.9)
ERYTHROCYTE [DISTWIDTH] IN BLOOD BY AUTOMATED COUNT: 49.4 FL (ref 35.9–50)
GLOBULIN SER CALC-MCNC: 2.7 G/DL (ref 1.9–3.5)
GLUCOSE SERPL-MCNC: 87 MG/DL (ref 65–99)
HCT VFR BLD AUTO: 42.2 % (ref 37–47)
HGB BLD-MCNC: 13.7 G/DL (ref 12–16)
IMM GRANULOCYTES # BLD AUTO: 0.02 K/UL (ref 0–0.11)
IMM GRANULOCYTES NFR BLD AUTO: 0.3 % (ref 0–0.9)
INR PPP: 1.01 (ref 0.87–1.13)
LYMPHOCYTES # BLD AUTO: 1.3 K/UL (ref 1–4.8)
LYMPHOCYTES NFR BLD: 17.2 % (ref 22–41)
MCH RBC QN AUTO: 31 PG (ref 27–33)
MCHC RBC AUTO-ENTMCNC: 32.5 G/DL (ref 33.6–35)
MCV RBC AUTO: 95.5 FL (ref 81.4–97.8)
MONOCYTES # BLD AUTO: 0.59 K/UL (ref 0–0.85)
MONOCYTES NFR BLD AUTO: 7.8 % (ref 0–13.4)
NEUTROPHILS # BLD AUTO: 5.43 K/UL (ref 2–7.15)
NEUTROPHILS NFR BLD: 71.9 % (ref 44–72)
NRBC # BLD AUTO: 0 K/UL
NRBC BLD-RTO: 0 /100 WBC
PLATELET # BLD AUTO: 223 K/UL (ref 164–446)
PMV BLD AUTO: 10.8 FL (ref 9–12.9)
POTASSIUM SERPL-SCNC: 3.9 MMOL/L (ref 3.6–5.5)
PROT SERPL-MCNC: 7.1 G/DL (ref 6–8.2)
PROTHROMBIN TIME: 13.5 SEC (ref 12–14.6)
RBC # BLD AUTO: 4.42 M/UL (ref 4.2–5.4)
SODIUM SERPL-SCNC: 141 MMOL/L (ref 135–145)
WBC # BLD AUTO: 7.6 K/UL (ref 4.8–10.8)

## 2019-12-10 PROCEDURE — 93325 DOPPLER ECHO COLOR FLOW MAPG: CPT

## 2019-12-10 PROCEDURE — 93613 INTRACARDIAC EPHYS 3D MAPG: CPT | Performed by: INTERNAL MEDICINE

## 2019-12-10 PROCEDURE — 93662 INTRACARDIAC ECG (ICE): CPT

## 2019-12-10 PROCEDURE — 85347 COAGULATION TIME ACTIVATED: CPT | Mod: 91

## 2019-12-10 PROCEDURE — 700102 HCHG RX REV CODE 250 W/ 637 OVERRIDE(OP): Performed by: INTERNAL MEDICINE

## 2019-12-10 PROCEDURE — 93010 ELECTROCARDIOGRAM REPORT: CPT | Performed by: INTERNAL MEDICINE

## 2019-12-10 PROCEDURE — 93662 INTRACARDIAC ECG (ICE): CPT | Mod: 26 | Performed by: INTERNAL MEDICINE

## 2019-12-10 PROCEDURE — 160002 HCHG RECOVERY MINUTES (STAT)

## 2019-12-10 PROCEDURE — 85610 PROTHROMBIN TIME: CPT

## 2019-12-10 PROCEDURE — 700101 HCHG RX REV CODE 250: Performed by: ANESTHESIOLOGY

## 2019-12-10 PROCEDURE — G0378 HOSPITAL OBSERVATION PER HR: HCPCS

## 2019-12-10 PROCEDURE — 700101 HCHG RX REV CODE 250

## 2019-12-10 PROCEDURE — 700111 HCHG RX REV CODE 636 W/ 250 OVERRIDE (IP)

## 2019-12-10 PROCEDURE — 93655 ICAR CATH ABLTJ DSCRT ARRHYT: CPT | Performed by: INTERNAL MEDICINE

## 2019-12-10 PROCEDURE — 700111 HCHG RX REV CODE 636 W/ 250 OVERRIDE (IP): Performed by: ANESTHESIOLOGY

## 2019-12-10 PROCEDURE — 700105 HCHG RX REV CODE 258: Performed by: ANESTHESIOLOGY

## 2019-12-10 PROCEDURE — 85025 COMPLETE CBC W/AUTO DIFF WBC: CPT

## 2019-12-10 PROCEDURE — 93656 COMPRE EP EVAL ABLTJ ATR FIB: CPT | Performed by: INTERNAL MEDICINE

## 2019-12-10 PROCEDURE — A9270 NON-COVERED ITEM OR SERVICE: HCPCS | Performed by: INTERNAL MEDICINE

## 2019-12-10 PROCEDURE — 93657 TX L/R ATRIAL FIB ADDL: CPT | Performed by: INTERNAL MEDICINE

## 2019-12-10 PROCEDURE — 93005 ELECTROCARDIOGRAM TRACING: CPT | Performed by: INTERNAL MEDICINE

## 2019-12-10 PROCEDURE — 80053 COMPREHEN METABOLIC PANEL: CPT

## 2019-12-10 RX ORDER — SODIUM CHLORIDE, SODIUM LACTATE, POTASSIUM CHLORIDE, CALCIUM CHLORIDE 600; 310; 30; 20 MG/100ML; MG/100ML; MG/100ML; MG/100ML
INJECTION, SOLUTION INTRAVENOUS
Status: DISCONTINUED | OUTPATIENT
Start: 2019-12-10 | End: 2019-12-10 | Stop reason: SURG

## 2019-12-10 RX ORDER — LIDOCAINE HYDROCHLORIDE 20 MG/ML
INJECTION, SOLUTION INFILTRATION; PERINEURAL
Status: COMPLETED
Start: 2019-12-10 | End: 2019-12-10

## 2019-12-10 RX ORDER — ALBUTEROL SULFATE 90 UG/1
2 AEROSOL, METERED RESPIRATORY (INHALATION) EVERY 4 HOURS PRN
Status: DISCONTINUED | OUTPATIENT
Start: 2019-12-10 | End: 2019-12-11 | Stop reason: HOSPADM

## 2019-12-10 RX ORDER — OMEPRAZOLE 20 MG/1
20 CAPSULE, DELAYED RELEASE ORAL
Status: DISCONTINUED | OUTPATIENT
Start: 2019-12-10 | End: 2019-12-11 | Stop reason: HOSPADM

## 2019-12-10 RX ORDER — PROTAMINE SULFATE 10 MG/ML
INJECTION, SOLUTION INTRAVENOUS
Status: COMPLETED
Start: 2019-12-10 | End: 2019-12-10

## 2019-12-10 RX ORDER — TRAMADOL HYDROCHLORIDE 50 MG/1
50 TABLET ORAL EVERY 6 HOURS PRN
Status: DISCONTINUED | OUTPATIENT
Start: 2019-12-10 | End: 2019-12-11 | Stop reason: HOSPADM

## 2019-12-10 RX ORDER — SODIUM CHLORIDE, SODIUM LACTATE, POTASSIUM CHLORIDE, CALCIUM CHLORIDE 600; 310; 30; 20 MG/100ML; MG/100ML; MG/100ML; MG/100ML
INJECTION, SOLUTION INTRAVENOUS CONTINUOUS
Status: DISCONTINUED | OUTPATIENT
Start: 2019-12-10 | End: 2019-12-11

## 2019-12-10 RX ORDER — HEPARIN SODIUM 200 [USP'U]/100ML
INJECTION, SOLUTION INTRAVENOUS
Status: COMPLETED
Start: 2019-12-10 | End: 2019-12-10

## 2019-12-10 RX ORDER — ACETAMINOPHEN 325 MG/1
650 TABLET ORAL EVERY 4 HOURS PRN
Status: DISCONTINUED | OUTPATIENT
Start: 2019-12-10 | End: 2019-12-11 | Stop reason: HOSPADM

## 2019-12-10 RX ORDER — PROTAMINE SULFATE 10 MG/ML
INJECTION, SOLUTION INTRAVENOUS PRN
Status: DISCONTINUED | OUTPATIENT
Start: 2019-12-10 | End: 2019-12-10 | Stop reason: SURG

## 2019-12-10 RX ORDER — ONDANSETRON 2 MG/ML
INJECTION INTRAMUSCULAR; INTRAVENOUS PRN
Status: DISCONTINUED | OUTPATIENT
Start: 2019-12-10 | End: 2019-12-10 | Stop reason: SURG

## 2019-12-10 RX ORDER — MEPERIDINE HYDROCHLORIDE 25 MG/ML
12.5 INJECTION INTRAMUSCULAR; INTRAVENOUS; SUBCUTANEOUS
Status: DISCONTINUED | OUTPATIENT
Start: 2019-12-10 | End: 2019-12-10 | Stop reason: HOSPADM

## 2019-12-10 RX ORDER — OXYCODONE HCL 5 MG/5 ML
5 SOLUTION, ORAL ORAL
Status: DISCONTINUED | OUTPATIENT
Start: 2019-12-10 | End: 2019-12-10 | Stop reason: HOSPADM

## 2019-12-10 RX ORDER — DIPHENHYDRAMINE HYDROCHLORIDE 50 MG/ML
12.5 INJECTION INTRAMUSCULAR; INTRAVENOUS
Status: DISCONTINUED | OUTPATIENT
Start: 2019-12-10 | End: 2019-12-10 | Stop reason: HOSPADM

## 2019-12-10 RX ORDER — FUROSEMIDE 40 MG/1
40 TABLET ORAL
Status: DISCONTINUED | OUTPATIENT
Start: 2019-12-10 | End: 2019-12-11 | Stop reason: HOSPADM

## 2019-12-10 RX ORDER — HEPARIN SODIUM,PORCINE 1000/ML
VIAL (ML) INJECTION
Status: COMPLETED
Start: 2019-12-10 | End: 2019-12-10

## 2019-12-10 RX ORDER — LOSARTAN POTASSIUM 25 MG/1
25 TABLET ORAL DAILY
Status: DISCONTINUED | OUTPATIENT
Start: 2019-12-10 | End: 2019-12-11 | Stop reason: HOSPADM

## 2019-12-10 RX ORDER — OXYCODONE HCL 5 MG/5 ML
10 SOLUTION, ORAL ORAL
Status: DISCONTINUED | OUTPATIENT
Start: 2019-12-10 | End: 2019-12-10 | Stop reason: HOSPADM

## 2019-12-10 RX ORDER — ATORVASTATIN CALCIUM 10 MG/1
10 TABLET, FILM COATED ORAL DAILY
Status: DISCONTINUED | OUTPATIENT
Start: 2019-12-11 | End: 2019-12-11 | Stop reason: HOSPADM

## 2019-12-10 RX ORDER — CARVEDILOL 3.12 MG/1
3.12 TABLET ORAL 2 TIMES DAILY WITH MEALS
Status: DISCONTINUED | OUTPATIENT
Start: 2019-12-10 | End: 2019-12-11 | Stop reason: HOSPADM

## 2019-12-10 RX ORDER — DEXAMETHASONE SODIUM PHOSPHATE 4 MG/ML
INJECTION, SOLUTION INTRA-ARTICULAR; INTRALESIONAL; INTRAMUSCULAR; INTRAVENOUS; SOFT TISSUE PRN
Status: DISCONTINUED | OUTPATIENT
Start: 2019-12-10 | End: 2019-12-10 | Stop reason: HOSPADM

## 2019-12-10 RX ORDER — FLECAINIDE ACETATE 150 MG/1
75 TABLET ORAL 2 TIMES DAILY
Status: DISCONTINUED | OUTPATIENT
Start: 2019-12-10 | End: 2019-12-11 | Stop reason: HOSPADM

## 2019-12-10 RX ORDER — HALOPERIDOL 5 MG/ML
1 INJECTION INTRAMUSCULAR
Status: DISCONTINUED | OUTPATIENT
Start: 2019-12-10 | End: 2019-12-10 | Stop reason: HOSPADM

## 2019-12-10 RX ORDER — ONDANSETRON 2 MG/ML
4 INJECTION INTRAMUSCULAR; INTRAVENOUS
Status: DISCONTINUED | OUTPATIENT
Start: 2019-12-10 | End: 2019-12-10 | Stop reason: HOSPADM

## 2019-12-10 RX ORDER — HEPARIN SODIUM,PORCINE 1000/ML
VIAL (ML) INJECTION PRN
Status: DISCONTINUED | OUTPATIENT
Start: 2019-12-10 | End: 2019-12-10 | Stop reason: SURG

## 2019-12-10 RX ADMIN — FENTANYL CITRATE 50 MCG: 50 INJECTION, SOLUTION INTRAMUSCULAR; INTRAVENOUS at 12:33

## 2019-12-10 RX ADMIN — HEPARIN SODIUM: 200 INJECTION, SOLUTION INTRAVENOUS at 12:15

## 2019-12-10 RX ADMIN — FLECAINIDE ACETATE 75 MG: 150 TABLET ORAL at 21:29

## 2019-12-10 RX ADMIN — FENTANYL CITRATE 50 MCG: 50 INJECTION, SOLUTION INTRAMUSCULAR; INTRAVENOUS at 14:56

## 2019-12-10 RX ADMIN — HEPARIN SODIUM 3000 UNITS: 1000 INJECTION, SOLUTION INTRAVENOUS; SUBCUTANEOUS at 15:49

## 2019-12-10 RX ADMIN — SUCCINYLCHOLINE CHLORIDE 100 MG: 20 INJECTION, SOLUTION INTRAMUSCULAR; INTRAVENOUS at 12:41

## 2019-12-10 RX ADMIN — CARVEDILOL 3.12 MG: 3.12 TABLET, FILM COATED ORAL at 21:29

## 2019-12-10 RX ADMIN — PROTAMINE SULFATE 50 MG: 10 INJECTION, SOLUTION INTRAVENOUS at 16:19

## 2019-12-10 RX ADMIN — OMEPRAZOLE 20 MG: 20 CAPSULE, DELAYED RELEASE ORAL at 21:30

## 2019-12-10 RX ADMIN — SUGAMMADEX 200 MG: 100 INJECTION, SOLUTION INTRAVENOUS at 16:27

## 2019-12-10 RX ADMIN — ONDANSETRON 4 MG: 2 INJECTION INTRAMUSCULAR; INTRAVENOUS at 13:10

## 2019-12-10 RX ADMIN — LIDOCAINE HYDROCHLORIDE: 20 INJECTION, SOLUTION INFILTRATION; PERINEURAL at 12:15

## 2019-12-10 RX ADMIN — FENTANYL CITRATE 100 MCG: 50 INJECTION, SOLUTION INTRAMUSCULAR; INTRAVENOUS at 12:40

## 2019-12-10 RX ADMIN — APIXABAN 5 MG: 5 TABLET, FILM COATED ORAL at 21:29

## 2019-12-10 RX ADMIN — FENTANYL CITRATE 50 MCG: 50 INJECTION, SOLUTION INTRAMUSCULAR; INTRAVENOUS at 16:19

## 2019-12-10 RX ADMIN — HEPARIN SODIUM 4000 UNITS: 1000 INJECTION, SOLUTION INTRAVENOUS; SUBCUTANEOUS at 13:01

## 2019-12-10 RX ADMIN — HEPARIN SODIUM 5000 UNITS: 1000 INJECTION, SOLUTION INTRAVENOUS; SUBCUTANEOUS at 13:18

## 2019-12-10 RX ADMIN — ROCURONIUM BROMIDE 50 MG: 10 INJECTION, SOLUTION INTRAVENOUS at 12:50

## 2019-12-10 RX ADMIN — HEPARIN SODIUM 1000 UNITS: 1000 INJECTION, SOLUTION INTRAVENOUS; SUBCUTANEOUS at 15:18

## 2019-12-10 RX ADMIN — ROCURONIUM BROMIDE 20 MG: 10 INJECTION, SOLUTION INTRAVENOUS at 13:59

## 2019-12-10 RX ADMIN — DEXAMETHASONE SODIUM PHOSPHATE 4 MG: 4 INJECTION, SOLUTION INTRA-ARTICULAR; INTRALESIONAL; INTRAMUSCULAR; INTRAVENOUS; SOFT TISSUE at 13:10

## 2019-12-10 RX ADMIN — SODIUM CHLORIDE, POTASSIUM CHLORIDE, SODIUM LACTATE AND CALCIUM CHLORIDE: 600; 310; 30; 20 INJECTION, SOLUTION INTRAVENOUS at 12:32

## 2019-12-10 RX ADMIN — PROPOFOL 150 MG: 10 INJECTION, EMULSION INTRAVENOUS at 12:40

## 2019-12-10 ASSESSMENT — PAIN SCALES - GENERAL: PAIN_LEVEL: 0

## 2019-12-10 NOTE — ANESTHESIA PREPROCEDURE EVALUATION
Relevant Problems   PULMONARY   (+) Asthma in adult without complication      NEURO   (+) S/P ablation of atrial fibrillation      CARDIAC   (+) Aortic calcification (HCC)   (+) Essential hypertension, benign   (+) Non-rheumatic mitral regurgitation   (+) Pulmonary hypertension (HCC)   (+) Spider veins of limb       Physical Exam    Airway   Mallampati: II  TM distance: >3 FB  Neck ROM: full       Cardiovascular - normal exam  Rhythm: regular  Rate: normal  (+) murmur     Dental - normal exam         Pulmonary - normal exam  Breath sounds clear to auscultation     Abdominal    Neurological - normal exam                 Anesthesia Plan    ASA 3   ASA physical status 3 criteria: other (comment)    Plan - general       Airway plan will be ETT        Induction: intravenous    Postoperative Plan: Postoperative administration of opioids is intended.    Pertinent diagnostic labs and testing reviewed    Informed Consent:    Anesthetic plan and risks discussed with patient.    Use of blood products discussed with: patient whom consented to blood products.

## 2019-12-10 NOTE — H&P
Physician H&P    Patient ID:  Chinyere Priest  [id number]  64 y.o. female  1955    History:  Primary Diagnosis: Recurrent paroxysmal atrial flutter and atrial fibrillation    HPI:  Previous persistent atrial fibrillation atrial flutter.  PVI 6/20/2018.  Complicated by pericardial effusion.  Recurrent episodes.  Typical versus atypical flutter.  Mildly symptomatic.  Some palpitations and shortness of breath when an atrial arrhythmia.  Previously intolerant to dofetilide on flecainide.  Here for repeat catheter ablation.    Past Medical History:  has a past medical history of Abnormal CT scan, kidney ( ), Aortic calcification (HCC) ( ), Asthma in adult, Atrial fibrillation (HCC) (06/2019), Chronic anticoagulation, Chronic back pain ( ), Dental disorder, Diverticulosis ( ), Essential hypertension, malignant ( ), Inflamed seborrheic keratosis ( ), Non-rheumatic mitral regurgitation (05/2019), Osteoarthritis of both hips ( ), Plantar fascia syndrome ( ), Plantar wart of right foot ( ), Pruritic dermatitis ( ), Pulmonary hypertension (HCC), Pure hypercholesterolemia ( ), S/P cardiac catheterization (5/26/2017), Spider veins of limb ( ), and Unspecified vitamin D deficiency ( ). She also has no past medical history of Diabetic neuropathy ([facility]).  Past Surgical History:  has a past surgical history that includes lumbar laminectomy diskectomy (9/2/2009); gyn surgery; and hysterectomy laparoscopy.  Past Social History:  reports that she quit smoking about 9 years ago. Her smoking use included cigarettes. She has a 40.00 pack-year smoking history. She has never used smokeless tobacco. She reports that she does not drink alcohol or use drugs.  Past Family History:   Family History   Problem Relation Age of Onset   • Heart Disease Mother         1st MI @ 59 yo   • Hypertension Mother    • Heart Attack Mother 60   • Heart Disease Maternal Aunt    • Cancer Daughter 35        breast   • Heart Disease Maternal Grandmother   "  • Heart Disease Maternal Grandfather    • Heart Disease Maternal Aunt 56     Allergies: Erythromycin; Latex; and Tape    Current Medications:  Prior to Admission medications    Medication Sig Start Date End Date Taking? Authorizing Provider   potassium chloride ER (KLOR-CON) 10 MEQ tablet Take 2 Tabs by mouth 1 time daily as needed. When you take 40mg of lasix. 11/7/19   Moshe Landa M.D.   furosemide (LASIX) 20 MG Tab Take 2 Tabs by mouth 1 time daily as needed. As needed for weight gain >178 pounds or leg swelling or shortness of breath. 11/7/19   Moshe Landa M.D.   ELIQUIS 5 MG Tab TAKE 1 TABLET BY MOUTH TWICE A DAY 9/26/19   TOMA Samaniego.P.NAfua   carvedilol (COREG) 3.125 MG Tab Take 1 Tab by mouth 2 times a day, with meals. 9/10/19   TOMA Samaniego.P.NAfua   losartan (COZAAR) 25 MG Tab Take 1 Tab by mouth every day. 7/9/19   Keila Mccrary M.D.   flecainide (TAMBOCOR) 150 MG Tab Take 0.5 Tabs by mouth 2 Times a Day. 7/1/19   Maddy Fraser A.P.R.NAfua   fexofenadine (ALLEGRA) 180 MG tablet Take 1 Tab by mouth every day.  Patient taking differently: Take 180 mg by mouth 1 time daily as needed. 4/22/19   Raffy Robertson P.A.-C.   fluticasone (FLONASE) 50 MCG/ACT nasal spray Spray 1 Spray in nose 2 times a day.  Patient taking differently: Spray 1 Spray in nose 2 times a day as needed. 4/22/19   Raffy Robertson P.A.-C.   atorvastatin (LIPITOR) 10 MG Tab TAKE 1 TABLET BY MOUTH EVERY DAY 3/8/19   Moshe Landa M.D.   albuterol 108 (90 BASE) MCG/ACT Aero Soln inhalation aerosol Inhale 2 Puffs by mouth every four hours as needed. 11/9/16   Don Chapa P.A.-C.   vitamin D (CHOLECALCIFEROL) 1000 UNIT TABS Take 2 Tabs by mouth every day. 7/1/10   Keila Mccrary M.D.       Review of Systems:  ROS  /77   Pulse 85   Temp 36.5 °C (97.7 °F) (Temporal)   Resp 18   Ht 1.702 m (5' 7\")   Wt 81 kg (178 lb 9.2 oz)   SpO2 98%     Physical Examination:  Physical Exam  Vitals signs reviewed.   Constitutional: "       General: She is not in acute distress.     Appearance: Normal appearance. She is well-developed. She is not diaphoretic.   Eyes:      Conjunctiva/sclera: Conjunctivae normal.   Neck:      Musculoskeletal: Neck supple.      Thyroid: No thyroid mass or thyromegaly.      Vascular: No JVD.      Trachea: No tracheal deviation.   Cardiovascular:      Rate and Rhythm: Normal rate. Rhythm irregular.      Chest Wall: PMI is not displaced.      Pulses: Normal pulses.           Carotid pulses are 2+ on the right side and 2+ on the left side.       Radial pulses are 2+ on the right side and 2+ on the left side.        Femoral pulses are 2+ on the right side and 2+ on the left side.       Dorsalis pedis pulses are 2+ on the right side and 2+ on the left side.      Heart sounds: S1 normal and S2 normal. No murmur. No gallop.       Comments: No peripheral edema.  Pulmonary:      Effort: Pulmonary effort is normal. No respiratory distress.      Breath sounds: Normal breath sounds.   Chest:      Chest wall: No tenderness.   Abdominal:      General: There is no abdominal bruit.      Palpations: Abdomen is soft. There is no mass.      Tenderness: There is no tenderness.   Musculoskeletal: Normal range of motion.      Lumbar back: She exhibits no tenderness and no spasm.   Skin:     General: Skin is warm and dry.      Findings: No rash.      Nails: There is no clubbing.     Neurological:      Mental Status: She is alert and oriented to person, place, and time.      Motor: No tremor.   Psychiatric:         Behavior: Behavior normal.     In atrial flutter today.    Impression:  1.  Recurrent atrial arrhythmias for catheter ablation.  Previous ablation in June 2018 with pericardial effusion.  The risks, benefits,and alternatives to atrial fibrillation ablation with general anesthesia were discussed in great detail. Specific risks mentioned including bleeding, infection, arteriovenous fistula/pseudoaneurysm related to sheath  placement, cardiac perforation with possible tamponade requiring pericardiocentesis or possible open heart surgery were discussed. In addition,we discussed atrial fibrillation ablation specific complications including pulmonary vein stenosis, left atrial perforation (2-4%), and nerve damage involving the recurrent laryngeal nerve, the vagus nerve with resulting gastroparesis, and the phrenic nerve.  Also mentioned was a 1/400 (0.25%) occurrence of atrial esophageal fistula, which is an abnormal communication between the esophagus and the left atrium; this complication is often fatal. Lastly the risks of death, myocardial infarction, stroke, DVT and PE were discussed. The patient verbalized understanding of these potential complications and wishes to proceed with this procedure.  The risks, benefits, and alternatives to transesophageal echocardiogram with IV sedation were discussed with the patient in specific detail, including oropharyngeal and esophageal traumas including hoarseness and dysphagia after the procedure. Rare cases demonstrating serious or fatal complications associated with transesophageal echocardiogram have been reported in the adult population, including cardiac, pulmonary and bleeding complications in less than 1% of people. Patients with an identified intracardiac thrombus are at increased risk for embolic events and this appears to be reduced with anticoagulant therapy. The patient verbalized understandings about these  possible complications and wishes to proceed with this procedure  2.  NOAC held yesterday.        Pre Procedure Assessment:  Pre-Procedure Assessment - Applicable for patients receiving sedation by non-anesthesia practitioner.  Previous drug history, other anesthetic experiences, potential anesthetic problems and choice of anesthesia assessed, discussed with patient.     No prior complications.  Results of relevant diagnostic studies reviewed.    Plan of Sedation:  Patient  assessed immediately prior to sedationPatient deemed appropriate candidate for conscious sedation options and plans discussed with patient / family    ASA 3 - Patient with severe systemic disease            Augusto Steele  12/10/2019

## 2019-12-10 NOTE — ANESTHESIA PROCEDURE NOTES
Airway  Date/Time: 12/10/2019 12:53 PM  Performed by: Hair Marlow M.D.  Authorized by: Hair Marlow M.D.     Location:  OR  Urgency:  Elective  Indications for Airway Management:  Anesthesia  Spontaneous Ventilation: absent    Sedation Level:  Deep  Preoxygenated: Yes    Patient Position:  Sniffing  Final Airway Type:  Endotracheal airway  Final Endotracheal Airway:  ETT  Cuffed: Yes    Technique Used for Successful ETT Placement:  Direct laryngoscopy  Insertion Site:  Oral  Blade Type:  Cook  Laryngoscope Blade/Videolaryngoscope Blade Size:  2  ETT Size (mm):  7.5  Measured from:  Teeth  ETT to Teeth (cm):  22  Placement Verified by: auscultation and capnometry    Cormack-Lehane Classification:  Grade I - full view of glottis  Number of Attempts at Approach:  1

## 2019-12-10 NOTE — ANESTHESIA PROCEDURE NOTES
Arterial Line  Performed by: Hair Marlow M.D.  Authorized by: Hair Marlow M.D.     Localization: surface landmarks    Patient Location:  OR  Indication: continuous blood pressure monitoring    Catheter Size:  20 G  Seldinger Technique?: Yes    Laterality:  Right  Site:  Radial artery  Line Secured:  Antimicrobial disc, tape and transparent dressing  Events: patient tolerated procedure well with no complications

## 2019-12-11 VITALS
OXYGEN SATURATION: 96 % | SYSTOLIC BLOOD PRESSURE: 114 MMHG | DIASTOLIC BLOOD PRESSURE: 54 MMHG | RESPIRATION RATE: 13 BRPM | TEMPERATURE: 98 F | BODY MASS INDEX: 27.99 KG/M2 | HEART RATE: 53 BPM | WEIGHT: 178.35 LBS | HEIGHT: 67 IN

## 2019-12-11 DIAGNOSIS — I70.0 AORTIC CALCIFICATION (HCC): ICD-10-CM

## 2019-12-11 LAB
ALBUMIN SERPL BCP-MCNC: 3.9 G/DL (ref 3.2–4.9)
ALBUMIN/GLOB SERPL: 1.8 G/DL
ALP SERPL-CCNC: 57 U/L (ref 30–99)
ALT SERPL-CCNC: 18 U/L (ref 2–50)
ANION GAP SERPL CALC-SCNC: 7 MMOL/L (ref 0–11.9)
AST SERPL-CCNC: 33 U/L (ref 12–45)
BILIRUB SERPL-MCNC: 0.6 MG/DL (ref 0.1–1.5)
BUN SERPL-MCNC: 18 MG/DL (ref 8–22)
CALCIUM SERPL-MCNC: 8.4 MG/DL (ref 8.5–10.5)
CHLORIDE SERPL-SCNC: 108 MMOL/L (ref 96–112)
CO2 SERPL-SCNC: 26 MMOL/L (ref 20–33)
CREAT SERPL-MCNC: 0.89 MG/DL (ref 0.5–1.4)
EKG IMPRESSION: NORMAL
ERYTHROCYTE [DISTWIDTH] IN BLOOD BY AUTOMATED COUNT: 51.1 FL (ref 35.9–50)
GLOBULIN SER CALC-MCNC: 2.2 G/DL (ref 1.9–3.5)
GLUCOSE SERPL-MCNC: 96 MG/DL (ref 65–99)
HCT VFR BLD AUTO: 37.8 % (ref 37–47)
HGB BLD-MCNC: 12.1 G/DL (ref 12–16)
MCH RBC QN AUTO: 31.3 PG (ref 27–33)
MCHC RBC AUTO-ENTMCNC: 32 G/DL (ref 33.6–35)
MCV RBC AUTO: 97.9 FL (ref 81.4–97.8)
PLATELET # BLD AUTO: 193 K/UL (ref 164–446)
PMV BLD AUTO: 11 FL (ref 9–12.9)
POTASSIUM SERPL-SCNC: 4.3 MMOL/L (ref 3.6–5.5)
PROT SERPL-MCNC: 6.1 G/DL (ref 6–8.2)
RBC # BLD AUTO: 3.86 M/UL (ref 4.2–5.4)
SODIUM SERPL-SCNC: 141 MMOL/L (ref 135–145)
WBC # BLD AUTO: 10.2 K/UL (ref 4.8–10.8)

## 2019-12-11 PROCEDURE — G0378 HOSPITAL OBSERVATION PER HR: HCPCS

## 2019-12-11 PROCEDURE — 93005 ELECTROCARDIOGRAM TRACING: CPT | Performed by: INTERNAL MEDICINE

## 2019-12-11 PROCEDURE — 93010 ELECTROCARDIOGRAM REPORT: CPT | Performed by: INTERNAL MEDICINE

## 2019-12-11 PROCEDURE — A9270 NON-COVERED ITEM OR SERVICE: HCPCS | Performed by: INTERNAL MEDICINE

## 2019-12-11 PROCEDURE — 700102 HCHG RX REV CODE 250 W/ 637 OVERRIDE(OP): Performed by: INTERNAL MEDICINE

## 2019-12-11 PROCEDURE — 99217 PR OBSERVATION CARE DISCHARGE: CPT | Performed by: NURSE PRACTITIONER

## 2019-12-11 PROCEDURE — 36415 COLL VENOUS BLD VENIPUNCTURE: CPT

## 2019-12-11 PROCEDURE — 85027 COMPLETE CBC AUTOMATED: CPT

## 2019-12-11 PROCEDURE — 80053 COMPREHEN METABOLIC PANEL: CPT

## 2019-12-11 RX ORDER — OMEPRAZOLE 20 MG/1
20 CAPSULE, DELAYED RELEASE ORAL
Qty: 30 CAP | Refills: 0 | Status: SHIPPED | OUTPATIENT
Start: 2019-12-12 | End: 2020-01-13

## 2019-12-11 RX ADMIN — ACETAMINOPHEN 650 MG: 325 TABLET, FILM COATED ORAL at 04:34

## 2019-12-11 RX ADMIN — ATORVASTATIN CALCIUM 10 MG: 10 TABLET, FILM COATED ORAL at 04:35

## 2019-12-11 RX ADMIN — CARVEDILOL 3.12 MG: 3.12 TABLET, FILM COATED ORAL at 07:48

## 2019-12-11 RX ADMIN — FLECAINIDE ACETATE 75 MG: 150 TABLET ORAL at 04:35

## 2019-12-11 RX ADMIN — LOSARTAN POTASSIUM 25 MG: 25 TABLET ORAL at 04:35

## 2019-12-11 RX ADMIN — APIXABAN 5 MG: 5 TABLET, FILM COATED ORAL at 04:34

## 2019-12-11 RX ADMIN — OMEPRAZOLE 20 MG: 20 CAPSULE, DELAYED RELEASE ORAL at 07:48

## 2019-12-11 SDOH — ECONOMIC STABILITY: FOOD INSECURITY: WITHIN THE PAST 12 MONTHS, THE FOOD YOU BOUGHT JUST DIDN'T LAST AND YOU DIDN'T HAVE MONEY TO GET MORE.: NEVER TRUE

## 2019-12-11 SDOH — ECONOMIC STABILITY: TRANSPORTATION INSECURITY
IN THE PAST 12 MONTHS, HAS LACK OF TRANSPORTATION KEPT YOU FROM MEETINGS, WORK, OR FROM GETTING THINGS NEEDED FOR DAILY LIVING?: NO

## 2019-12-11 SDOH — ECONOMIC STABILITY: FOOD INSECURITY: WITHIN THE PAST 12 MONTHS, YOU WORRIED THAT YOUR FOOD WOULD RUN OUT BEFORE YOU GOT MONEY TO BUY MORE.: NEVER TRUE

## 2019-12-11 SDOH — ECONOMIC STABILITY: TRANSPORTATION INSECURITY
IN THE PAST 12 MONTHS, HAS THE LACK OF TRANSPORTATION KEPT YOU FROM MEDICAL APPOINTMENTS OR FROM GETTING MEDICATIONS?: NO

## 2019-12-11 NOTE — CATH LAB
Electrophysiology Procedure Note  [facility]    Indication: Recurrent atrial arrhythmias status post pulmonary vein isolation    Procedure Performed: 1. Atrial fibrillation ablation 2. Advanced mapping using CARTO system  3. Transesophageal echocardiography 4. Intracardiac echocardiography 5. Esophageal temperature monitoring 6. Intraarterial blood pressure monitoring 7. Frequent ACT's 8. Additional ablation secondary arrhythmia 9. Ablation of secondary cause of atrial fibrillation    Physician(s): PHYLICIA Steele M.D.     Resident/Assistant(s): None     Anesthesia: General endotracheal anesthetic.    Anaesthesiologist: Dr. Hair Marlow    Specimen(s) Removed: None     Estimated Blood Loss:  30cc     Complications:  None    Pre-procedure ECG: Atypical atrial flutter    Post Procedure ECG: Sinus rhythm    Description of Procedure:   After informed written consent, the patient was brought to the EP lab in the fasting, non-sedated state. The patient was prepped and draped in the usual sterile fashion.  LEEANNA showed no evidence of LA clot. Arterial line placed for monitoring. Esophageal temperature probe placed and monitored.  Femoral venous access was obtained using the modified Seldinger technique. In the left femoral vein, 3 sheaths (6,8,8 Fr) were inserted over 0.35” guidewires. In the right femoral vein, 1 sheaths (10.5 Fr) were inserted over 0.35” guidewires. A deflectable decapolar catheter was advanced to the CS position. CARTO3 FAM ST/SF D/F placed in the right atrium and entrainment was performed without concealed entrainment from the right atrial isthmus.  Activation along the CS was earliest in the mid region.  An intracardiac echo (ICE) catheter was advanced to the right atrium. ICE was used to identify the atrial septum, left atrial appendage, and pulmonary veins and marcelle the anatomic structures to superimpose on our 3D electroanatomic map using CARTOSound. During the procedure, ICE was utilized  to localize the ablation catheter, monitor for thrombus formation, and to exclude pericardial effusion. Intravenous heparin was administered to maintain -350 seconds. Double transseptal left heart catheterization was performed under intracardiac echo and hemodynamic guidance. A Tecumseh needle was inserted into the two 8 Fr sheaths (SL1) for transseptal puncture and placement of sheaths in the left atrium. Advanced mapping of the pulmonary veins and left atrium was performed using CARTO3 FAM  and a deflectable multipolar mapping catheter ([employer]) using the CARTO system.  All the veins were isolated except for the left upper pulmonary vein.  Mapping of the patient's clinical atrial flutter showed arrhythmia coming from anterior to the left upper pulmonary vein or within the left upper pulmonary vein.  During mapping there was termination and reversion back to sinus rhythm.  Wide antral circumferential ablation was performed using an 3.5 mm deflectable irrigated force contact catheter (Biosense ST/SF D/F) at primarily 25 W (posteriorly) to 35 W ( anteriorly)  power to isolate the left upper pulmonary vein.  Most of the ablation was performed anteriorly and along the roof of LUPV.  Patient went back into a spontaneous atrial flutter/atrial tachycardia with more concentric activation along the coronary sinus.  Entrainment mapping and earliest activation was best seen anterior to the right upper pulmonary vein.  Ablation was performed along this location and along the  left anterior line.  Arrhythmia appeared to change to right atrial isthmus flutter.  Next a halo was placed in the right atrium which showed evidence of typical counterclockwise flutter.  Ablation was performed along the right atrial isthmus with conduction block along the isthmus but continued tachycardia. Activation along the halo changed consistent with isthmus block but continued tachycardia.  Further mapping was performed along the  left atrium and further ablation was performed along the roof and the anterior left atrial line at good entrainment and activation sites but failed to terminate the tachycardia.  Next we brought the Penta array back into the right atrium and earliest activation was along the mid charis with possible micro-reentry.  Single ablation at this location with termination of the arrhythmia.  We next looked at the right atrial isthmus and there continued to be block  At the end of the procedure, heparin was reversed with protamine, the catheter and sheaths were removed, and hemostasis was achieved by manual compression. Following recovery from anesthesia, the patient was transferred to the PPU in good condition.       Total ablation time: 1607 seconds    Fluoroscopy time: 14.6 minutes     Electrophysiologic Findings:    1.  Atrial flutter 309 ms,  ms, HV 58 Ms.   2.  Baseline atrial flutter/tachycardia coming from near the left upper pulmonary vein or the left upper pulmonary vein.  3.  Evidence of right atrial isthmus flutter with isthmus block with isthmus ablation.  4.  Mid charis atrial tachycardia ablated with termination of arrhythmia to sinus rhythm.  5.  Reisolation left upper pulmonary vein.    Impressions:    1. Baseline atypical atrial flutter left-sided     2. Reisolation left upper pulmonary vein.  3. Right atrial isthmus ablation with block.    4. Ablation of mid charis atrial tachycardia  5. Multiple atrial arrhythmias.  6.  Significant scarring on voltage map within both atriums.    Recommendations:  1. Resume anticoagulation.  2. Admit to monitored bedrest.

## 2019-12-11 NOTE — ANESTHESIA POSTPROCEDURE EVALUATION
Patient: Chinyere Priest    Procedure Summary     Date:  12/10/19 Room / Location:  [facility] IMAGING - CATH LAB [facility]    Anesthesia Start:  1232 Anesthesia Stop:  1643    Procedure:  CL-EP ABLATION AFIB-AFLUTTER Diagnosis:       Persistent atrial fibrillation      S/P ablation of atrial fibrillation      Typical atrial flutter (HCC)      Other persistent atrial fibrillation      (See Associated Dx)    Scheduled Providers:  Augusto Steele M.D.; Hair Marlow M.D. Responsible Provider:  Hair Marlow M.D.    Anesthesia Type:  general ASA Status:  3          Final Anesthesia Type: general  Last vitals  BP   Blood Pressure : 151/77    Temp   36.5 °C (97.7 °F)    Pulse   Pulse: 85   Resp   18    SpO2   98 %      Anesthesia Post Evaluation    Patient location during evaluation: PACU  Patient participation: complete - patient participated  Level of consciousness: awake and alert  Pain score: 0    Airway patency: patent  Anesthetic complications: no  Cardiovascular status: adequate and hemodynamically stable  Respiratory status: acceptable  Hydration status: acceptable    PONV: none           Nurse Pain Score: 0 (NPRS)

## 2019-12-11 NOTE — OR NURSING
1641 Patient arrived from cath lab s/p a.fib ablation. Patient awake, denies pain, denies nausea. Right and left groin access site dressing clean dry intact. Arterial line right radial TR band clean. Vss.   1715 Patient provided with water tolerating well.   1730 Criteria met to discharge patient out of ppu.   1740 Report given to Tessie SIMEON T714 bed 1 all questions answered.   1800 Patient transported to room T714 bed 1 with all her personal belongings. Checked surgical site with CAILIN Kurtz no bleeding no hematoma. Patient not in any distress or discomfort.

## 2019-12-11 NOTE — ANESTHESIA TIME REPORT
Anesthesia Start and Stop Event Times     Date Time Event    12/10/2019 0935 Ready for Procedure     1232 Anesthesia Start     1643 Anesthesia Stop        Responsible Staff  12/10/19    Name Role Begin End    Hair Marlow M.D. Anesth 1232 1643        Preop Diagnosis (Free Text):  Pre-op Diagnosis             Preop Diagnosis (Codes):    Post op Diagnosis  Atrial fibrillation (HCC)      Premium Reason  A. 3PM - 7AM    Comments:                                                                    Atrial fibrillation mapping and ablation, LEEANNA gomez, premium time

## 2019-12-11 NOTE — PROGRESS NOTES
Patient discharged home via private car with spouse. Nurse went over and gave avs to patient. Patient verbalized understanding of discharge instructions. Any questions or concerns answered at this time. IV removed, telemetry removed.

## 2019-12-11 NOTE — ANESTHESIA QCDR
2019 [facility] Clinical Data Registry (for Quality Improvement)     Postoperative nausea/vomiting risk protocol (Adult = 18 yrs and Pediatric 3-17 yrs)- (430 and 463)  General inhalation anesthetic (NOT TIVA) with PONV risk factors: Yes  Provision of anti-emetic therapy with at least 2 different classes of agents: Yes   Patient DID NOT receive anti-emetic therapy and reason is documented in Medical Record:  N/A    Multimodal Pain Management- (AQI59)  Patient undergoing Elective Surgery (i.e. Outpatient, or ASC, or Prescheduled Surgery prior to Hospital Admission): Yes  Use of Multimodal Pain Management, two or more drugs and/or interventions, NOT including systemic opioids: Yes   Exception: Documented allergy to multiple classes of analgesics:  N/A    PACU assessment of acute postoperative pain prior to Anesthesia Care End- Applies to Patients Age = 18- (ABG7)  Initial PACU pain score is which of the following: < 7/10  Patient unable to report pain score: N/A    Post-anesthetic transfer of care checklist/protocol to PACU/ICU- (426 and 427)  Upon conclusion of case, patient transferred to which of the following locations: PACU/Non-ICU  Use of transfer checklist/protocol: Yes  Exclusion: Service Performed in Patient Hospital Room (and thus did not require transfer): N/A    PACU Reintubation- (AQI31)  General anesthesia requiring endotracheal intubation (ETT) along with subsequent extubation in OR or PACU: No  Required reintubation in the PACU: N/A  Extubation was a planned trial documented in the medical record prior to removal of the original airway device: N/A    Unplanned admission to ICU related to anesthesia service up through end of PACU care- (MD51)  Unplanned admission to ICU (not initially anticipated at anesthesia start time): No

## 2019-12-11 NOTE — CARE PLAN
Problem: Safety  Goal: Will remain free from injury  Outcome: PROGRESSING AS EXPECTED  Goal: Will remain free from falls  Outcome: PROGRESSING AS EXPECTED     Problem: Knowledge Deficit  Goal: Knowledge of the prescribed therapeutic regimen will improve  Outcome: PROGRESSING AS EXPECTED     Problem: Pain Management  Goal: Pain level will decrease to patient's comfort goal  Outcome: PROGRESSING AS EXPECTED

## 2019-12-11 NOTE — PROGRESS NOTES
Assumed care of patient, report received from PPU RN. Pt transported to floor on [place], tele box on and monitor room notified.  Oriented to room and call light, educated on the importance of calling before getting out of bed. Verbalized understanding and no additional questions. Bed locked and in lowest position, treaded socks on. Reviewed orders and labs.      Bilateral groin sites checked and clean,dry, intact and soft. Patient complains of no pain. Monitor room stated patient SR 60's.     Pt on bedrest until 2030. Post op vitals in place.

## 2019-12-11 NOTE — PROGRESS NOTES
Received report from Jacklyn Kurtz.Patient arrived from cath lab, on bedrest until 2030. Patient in bed resting comfortably, family bedside. Bilateral groin sites examined by Jacklyn and DARREN RN, CDI no bleeding or signs of hematoma.    1945: Informed family visiting hours are until 10pm.  states he stays with his wife because he doesn't like hospitals, informed  that due to patient having a roommate visiting hours are only until 10 pm and he cannot stay in the room with patient but is able to stay on the lobby and we can provide blankets and pillows if needed.

## 2019-12-11 NOTE — DISCHARGE INSTRUCTIONS
ACTIVITY/SPECIFIC OUTPATIENT DISCHARGE INSTRUCTIONS:  Post Ablation Patient Instructions:  No lifting > 10 lbs x 1 week.  No baths or hot tubs x 1 week.  May shower on 12/12/2019 and take off groin dressings.  Continue to monitor sites daily for warmth, redness, discolored drainage.     Please take the esophageal protection medication that is prescribed to you for one month post procedure without missed doses.  Please do not miss any doses of your blood thinner Eliquis.       Please walk and take deep breaths after discharge.  After discharge, if  experiences chest pain, shortness of breath, hemoptysis, neurological changes, high fever, lightheadedness/dizziness, trouble with catheter sites needs to be seen in the emergency department.    FOLLOW UP  Patient will follow up with EP in outpatient in 3-4 weeks as arranged for procedural followup or sooner if needed.  Patient instructed to monitor daily weights, blood pressure and Hrs, and call the office if abnormal or with any questions/concerns.      Discharge Instructions    Discharged to home by car with relative. Discharged via walking, hospital escort: Yes.  Special equipment needed: Not Applicable    Be sure to schedule a follow-up appointment with your primary care doctor or any specialists as instructed.     Discharge Plan:        I understand that a diet low in cholesterol, fat, and sodium is recommended for good health. Unless I have been given specific instructions below for another diet, I accept this instruction as my diet prescription.   Other diet: Cardiac    Special Instructions: None    · Is patient discharged on Warfarin / Coumadin?   No     Depression / Suicide Risk    As you are discharged from this [facility] facility, it is important to learn how to keep safe from harming yourself.    Recognize the warning signs:  · Abrupt changes in personality, positive or negative- including increase in energy   · Giving away possessions  · Change in eating  patterns- significant weight changes-  positive or negative  · Change in sleeping patterns- unable to sleep or sleeping all the time   · Unwillingness or inability to communicate  · Depression  · Unusual sadness, discouragement and loneliness  · Talk of wanting to die  · Neglect of personal appearance   · Rebelliousness- reckless behavior  · Withdrawal from people/activities they love  · Confusion- inability to concentrate     If you or a loved one observes any of these behaviors or has concerns about self-harm, here's what you can do:  · Talk about it- your feelings and reasons for harming yourself  · Remove any means that you might use to hurt yourself (examples: pills, rope, extension cords, firearm)  · Get professional help from the community (Mental Health, Substance Abuse, psychological counseling)  · Do not be alone:Call your Safe Contact- someone whom you trust who will be there for you.  · Call your local CRISIS HOTLINE 563-8537 or 457-234-0470  · Call your local Children's Mobile Crisis Response Team [place] (694) 883-1840 or www.K[employer]Nimble  · Call the toll free National Suicide Prevention Hotlines   · National Suicide Prevention Lifeline 700-771-SBLY (1893)  · National Hope Line Network 800-SUICIDE (190-4674)

## 2019-12-11 NOTE — DISCHARGE SUMMARY
HOSPITAL DISCHARGE INSTRUCTIONS    CHIEF COMPLAINT ON ADMISSION  Elective RF ablation for Recurrent paroxysmal atrial flutter and atrial fibrillation    CODE STATUS  Full Code    HPI & HOSPITAL COURSE  This is a 64 y.o. year old female here for elective RF ablation for Recurrent paroxysmal atrial flutter and atrial fibrillation.  Per Dr. Steele's H&P: Previous persistent atrial fibrillation atrial flutter.  PVI 6/20/2018.  Complicated by pericardial effusion.  Recurrent episodes.  Typical versus atypical flutter.  Mildly symptomatic.  Some palpitations and shortness of breath when an atrial arrhythmia.  Previously intolerant to dofetilide on flecainide.  Here for repeat catheter ablation.     Medical history significant for Persistent Atrial Fibrillation,  s/p RFA with PVI by Dr. Steele on 06/17/19, on chronic anticoagulation with Eliquis, hypertension, HDL, aortic calcification, moderate mitral regurgitation, S/P cardiac catheterization (5/26/2017), and mild pulmonary hypertension. Intolerant to Tikosyn secondary to QT prolongation.    Procedural conclusions per Dr. Steele's Op Note (12/11/2019):  Electrophysiology Procedure Note [facility]  Indication: Recurrent atrial arrhythmias status post pulmonary vein isolation  Procedure Performed: 1. Atrial fibrillation ablation 2. Advanced mapping using CARTO system  3. Transesophageal echocardiography 4. Intracardiac echocardiography 5. Esophageal temperature monitoring 6. Intraarterial blood pressure monitoring 7. Frequent ACT's 8. Additional ablation secondary arrhythmia 9. Ablation of secondary cause of atrial fibrillation  Physician(s): PHYLICIA Steele M.D.  Pre-procedure ECG: Atypical atrial flutter  Total ablation time: 1607 seconds  Fluoroscopy time: 14.6 minutes  Electrophysiologic Findings:    1.  Atrial flutter 309 ms,  ms, HV 58 Ms.   2.  Baseline atrial flutter/tachycardia coming from near the left upper pulmonary vein or the left upper  pulmonary vein.  3.  Evidence of right atrial isthmus flutter with isthmus block with isthmus ablation.  4.  Mid charis atrial tachycardia ablated with termination of arrhythmia to sinus rhythm.  5.  Reisolation left upper pulmonary vein.  Impressions:    1. Baseline atypical atrial flutter left-sided     2. Reisolation left upper pulmonary vein.  3. Right atrial isthmus ablation with block.    4. Ablation of mid charis atrial tachycardia  5. Multiple atrial arrhythmias.  6.  Significant scarring on voltage map within both atriums.    The patient has been seen and examined in EP rounds this AM.  Her monitored rhythm is sinus presently.  Telemetry history, EKGs, labs, vital signs, and imaging have been reviewed and are stable, no arrhthymias or abnormalities. EKG shows sinus rhythm, no acute findings, AV delay stable from prior. CrCl calculated: 80.48 mL/min.  Weight stable 178 lbs.  The patient denies any chest pain, dyspnea, dizziness, paraesthesias, or other complaints.  Her physical exam is without acute findings and CMS fully intact; specifically her right and left femoral access sites and right radial site which are clean, dry, intact with tegaderm/gauze. No evidence of active bleeding, edema, erythema, or hematoma.  She has been out of bed and ambulated without difficulty.     Therefore, she is discharged in good and stable condition with close outpatient follow-up. Patient will continue OAC with Eliquis, Flecainide 75 mg BID, and coreg 3.125 mg BID.    DISCHARGE PROBLEM LIST  1. Recurrent atrial arrhythmias   2. S/P successful RF ablation of baseline atypical atrial flutter left-sided with reisolation left upper pulmonary vein, right atrial isthmus ablation with block, ablation of mid charis atrial tachycardia by Dr. Steele on 12/10/2019.  Multiple atrial arrhythmias with significant scarring on voltage map within both atriums.  3. Chronic Anticoagulation with Eliquis  4. High Risk Medication with  Flecainide     Chinyere Priest   Home Medication Instructions GARDENIA:[id number]    Printed on:12/11/19 0987   Medication Information                      albuterol 108 (90 BASE) MCG/ACT Aero Soln inhalation aerosol  Inhale 2 Puffs by mouth every four hours as needed.             atorvastatin (LIPITOR) 10 MG Tab  TAKE 1 TABLET BY MOUTH EVERY DAY             carvedilol (COREG) 3.125 MG Tab  Take 1 Tab by mouth 2 times a day, with meals.             ELIQUIS 5 MG Tab  TAKE 1 TABLET BY MOUTH TWICE A DAY             fexofenadine (ALLEGRA) 180 MG tablet  Take 1 Tab by mouth every day.             flecainide (TAMBOCOR) 150 MG Tab  Take 0.5 Tabs by mouth 2 Times a Day.             fluticasone (FLONASE) 50 MCG/ACT nasal spray  Spray 1 Spray in nose 2 times a day.             furosemide (LASIX) 20 MG Tab  Take 2 Tabs by mouth 1 time daily as needed. As needed for weight gain >178 pounds or leg swelling or shortness of breath.             losartan (COZAAR) 25 MG Tab  Take 1 Tab by mouth every day.             omeprazole (PRILOSEC) 20 MG delayed-release capsule  Take 1 Cap by mouth every morning before breakfast.             potassium chloride ER (KLOR-CON) 10 MEQ tablet  Take 2 Tabs by mouth 1 time daily as needed. When you take 40mg of lasix.             vitamin D (CHOLECALCIFEROL) 1000 UNIT TABS  Take 2 Tabs by mouth every day.               DIET: CARDIAC DIET    LABORATORY  Lab Results   Component Value Date/Time    SODIUM 141 12/10/2019 09:55 AM    POTASSIUM 3.9 12/10/2019 09:55 AM    CHLORIDE 105 12/10/2019 09:55 AM    CO2 28 12/10/2019 09:55 AM    GLUCOSE 87 12/10/2019 09:55 AM    BUN 17 12/10/2019 09:55 AM    CREATININE 0.87 12/10/2019 09:55 AM      Lab Results   Component Value Date/Time    WBC 7.6 12/10/2019 09:55 AM    HEMOGLOBIN 13.7 12/10/2019 09:55 AM    HEMATOCRIT 42.2 12/10/2019 09:55 AM    PLATELETCT 223 12/10/2019 09:55 AM      ACTIVITY/SPECIFIC OUTPATIENT DISCHARGE INSTRUCTIONS:  Post Ablation Patient  Instructions:  No lifting > 10 lbs x 1 week.  No baths or hot tubs x 1 week.  May shower on 12/12/2019 and take off groin dressings.  Continue to monitor sites daily for warmth, redness, discolored drainage.     Please take the esophageal protection medication that is prescribed to you for one month post procedure without missed doses.  Please do not miss any doses of your blood thinner Eliquis.       Please walk and take deep breaths after discharge.  After discharge, if  experiences chest pain, shortness of breath, hemoptysis, neurological changes, high fever, lightheadedness/dizziness, trouble with catheter sites needs to be seen in the emergency department.    FOLLOW UP  Patient will follow up with EP in outpatient in 3-4 weeks as arranged for procedural followup or sooner if needed.  Patient instructed to monitor daily weights, blood pressure and Hrs, and call the office if abnormal or with any questions/concerns.  The above plan and medications were reviewed in detail with the patient at time of discharge.  All patients questions/concerns were answered and patient verbalized understanding and is in agreement.    Future Appointments   Date Time Provider Department Center   1/13/2020 12:40 PM Augusto Steele M.D. [facility] None   1/27/2020 10:40 AM Keila Mccrary M.D. [facility]   3/5/2020  2:00 PM SAL Sam[facility]go [facility] None      Thank you for allowing me to participate in this patients care.  Please contact me with any questions or concerns.     DAISY Lam.   [facility]  [phone]

## 2019-12-11 NOTE — PROGRESS NOTES
Assumed care of patient. Received report from night RN. Patient up to bathroom, tolerating well. Pt expresses no concerns at this time. Spouse at bedside, bed in low position, and call light within reach.

## 2019-12-12 ENCOUNTER — HOSPITAL ENCOUNTER (OUTPATIENT)
Dept: CARDIOLOGY | Facility: MEDICAL CENTER | Age: 64
End: 2019-12-12
Attending: NURSE PRACTITIONER
Payer: COMMERCIAL

## 2019-12-12 ENCOUNTER — OFFICE VISIT (OUTPATIENT)
Dept: CARDIOLOGY | Facility: MEDICAL CENTER | Age: 64
End: 2019-12-12
Payer: COMMERCIAL

## 2019-12-12 ENCOUNTER — TELEPHONE (OUTPATIENT)
Dept: CARDIOLOGY | Facility: MEDICAL CENTER | Age: 64
End: 2019-12-12

## 2019-12-12 VITALS
HEART RATE: 112 BPM | BODY MASS INDEX: 28.88 KG/M2 | OXYGEN SATURATION: 100 % | SYSTOLIC BLOOD PRESSURE: 146 MMHG | DIASTOLIC BLOOD PRESSURE: 88 MMHG | HEIGHT: 67 IN | WEIGHT: 184 LBS

## 2019-12-12 DIAGNOSIS — Z98.890 S/P ABLATION OF ATRIAL FIBRILLATION: ICD-10-CM

## 2019-12-12 DIAGNOSIS — I48.19 PERSISTENT ATRIAL FIBRILLATION (HCC): ICD-10-CM

## 2019-12-12 DIAGNOSIS — Z51.81 ENCOUNTER FOR MONITORING FLECAINIDE THERAPY: ICD-10-CM

## 2019-12-12 DIAGNOSIS — Z86.79 S/P ABLATION OF ATRIAL FIBRILLATION: ICD-10-CM

## 2019-12-12 DIAGNOSIS — Z79.01 CHRONIC ANTICOAGULATION: ICD-10-CM

## 2019-12-12 DIAGNOSIS — I49.8 ATRIAL ARRHYTHMIA: ICD-10-CM

## 2019-12-12 DIAGNOSIS — Z79.899 ENCOUNTER FOR MONITORING FLECAINIDE THERAPY: ICD-10-CM

## 2019-12-12 DIAGNOSIS — I10 ESSENTIAL HYPERTENSION, BENIGN: ICD-10-CM

## 2019-12-12 PROCEDURE — 99214 OFFICE O/P EST MOD 30 MIN: CPT | Performed by: NURSE PRACTITIONER

## 2019-12-12 PROCEDURE — 93308 TTE F-UP OR LMTD: CPT

## 2019-12-12 PROCEDURE — 93000 ELECTROCARDIOGRAM COMPLETE: CPT | Performed by: INTERNAL MEDICINE

## 2019-12-12 PROCEDURE — 93308 TTE F-UP OR LMTD: CPT | Mod: 26 | Performed by: INTERNAL MEDICINE

## 2019-12-12 RX ORDER — FLECAINIDE ACETATE 150 MG/1
150 TABLET ORAL 2 TIMES DAILY
Qty: 60 TAB | Refills: 3 | Status: SHIPPED | OUTPATIENT
Start: 2019-12-12 | End: 2019-12-13

## 2019-12-12 ASSESSMENT — ENCOUNTER SYMPTOMS
COUGH: 0
ORTHOPNEA: 0
PALPITATIONS: 1
CLAUDICATION: 0
CHILLS: 0
LOSS OF CONSCIOUSNESS: 0
DIZZINESS: 0
ABDOMINAL PAIN: 0
WHEEZING: 0
BLOOD IN STOOL: 0
PND: 0
FEVER: 0
SHORTNESS OF BREATH: 0
DIAPHORESIS: 0

## 2019-12-12 NOTE — TELEPHONE ENCOUNTER
Spoke with patient who reported irregular heartbeat (s/p ablation 12/10) starting at 10:00pm last night until 3:00 a.m. this morning when she finally went to sleep.  Confirmed that she's still taking Eliquis and Flecainide.  No other symptoms reported.      As of right now, she's not feeling as many irregularities.    Information routed to Dr. Steele for advice/recommendations.

## 2019-12-12 NOTE — LETTER
[facility]   [address]  [place] [address]  Phone: [phone]  Fax: [phone]              Chinyere Priest  1955    Encounter Date: 12/12/2019    DANIELLE Lam          PROGRESS NOTE:  Cardiology/Electrophysiology Follow-up Note    Subjective:   Chief Complaint:   Chief Complaint   Patient presents with   • Atrial Fibrillation     S/P AF ABLATION     Chinyere Priest is a 63 y.o. female who presents today for follow up for recurrent atrial arrhythmias s/p repeat RFA by Dr. Steele on 12/10/19, on Flecainide.     She is followed by Dr. Landa and Dr. Steele.  Last seen by Dr. Landa on 11/07/19. She underwent RF ablation of baseline atypical AFL left-sided with reisolation left upper pulmonary vein, right atrial isthmus ablation with block, ablation of mid charis atrial tachycardia by Dr. Steele on 12/10/19.  Multiple atrial arrhythmias with significant scarring on voltage map within both atriums. Post procedure, patient was continued on OAC with Eliquis, Flecainide 75 mg BID, and coreg 3.125 mg BID. She has a hx of prior RFA with PVI on 06/17/19 complicated by pericardial effusion.      Medical history significant for Persistent Atrial Fibrillation, s/p RFA with PVI by Dr. Steele on 06/17/19, on chronic anticoagulation with Eliquis, hypertension, HDL, aortic calcification, moderate mitral regurgitation, S/P cardiac catheterization (5/26/2017), and mild pulmonary hypertension. Intolerant to Tikosyn secondary to QT prolongation.    Today in follow up, she reports she is in palpitations/elevated Hrs since 10 PM last night. She reports chest discomfort with deep breathing and mid-back pain.  She denies dizziness, pre syncope or syncope, or lower extremity edema. Denies any signs or symptoms of bleeding or bleeding issues. Patient endorses medication compliance.     Past Medical History:   Diagnosis Date   • Abnormal CT scan,  "kidney      \"Incidentally noted retroaortic left renal vein is identified. Some calcified plaque is noted in the aorta and its branch vessels.\"  CT scan done for diverticulosis     • Aortic calcification (HCC)      Noted incidentally on abdominal CT scan.   • Asthma in adult         • Atrial fibrillation (HCC) 2019    Status post ablation by Dr. Steele.   • Chronic anticoagulation    • Chronic back pain     • Dental disorder     Full upper/lower dentures.   • Diverticulosis     • Essential hypertension, malignant     • Inflamed seborrheic keratosis     • Non-rheumatic mitral regurgitation 2019    Echocardiogram with normal LV size, LVEF 65%. Moderate MR, mild-moderate TR, unchanged from previous.   • Osteoarthritis of both hips     • Plantar fascia syndrome     • Plantar wart of right foot     • Pruritic dermatitis      Ongoing for several months but worse at night on her lower extremities   • Pulmonary hypertension (HCC)    • Pure hypercholesterolemia     • S/P cardiac catheterization 2017    1.  Normal right heart pressures. 3.  Essentially normal coronary arteries. 2.  Left ventricular ejection fraction 65%.    • Spider veins of limb     • Unspecified vitamin D deficiency       Past Surgical History:   Procedure Laterality Date   • LUMBAR LAMINECTOMY DISKECTOMY  2009    Performed by SUDHIR EVRMA at [facility] ORS   • GYN SURGERY       x2   • HYSTERECTOMY LAPAROSCOPY       Family History   Problem Relation Age of Onset   • Heart Disease Mother         1st MI @ 59 yo   • Hypertension Mother    • Heart Attack Mother 60   • Heart Disease Maternal Aunt    • Cancer Daughter 35        breast   • Heart Disease Maternal Grandmother    • Heart Disease Maternal Grandfather    • Heart Disease Maternal Aunt 56     Social History     Socioeconomic History   • Marital status:      Spouse name: Not on file   • Number of children: Not on file   • Years of education: Not on file   • Highest " education level: Not on file   Occupational History   • Not on file   Social Needs   • Financial resource strain: Not on file   • Food insecurity:     Worry: Never true     Inability: Never true   • Transportation needs:     Medical: No     Non-medical: No   Tobacco Use   • Smoking status: Former Smoker     Packs/day: 1.00     Years: 40.00     Pack years: 40.00     Types: Cigarettes     Last attempt to quit: 3/1/2010     Years since quittin.7   • Smokeless tobacco: Never Used   Substance and Sexual Activity   • Alcohol use: No     Alcohol/week: 0.0 oz   • Drug use: No     Comment: tried marijuana once.    • Sexual activity: Yes     Partners: Male     Birth control/protection: Post-Menopausal     Comment: , works at [employer]   Lifestyle   • Physical activity:     Days per week: Not on file     Minutes per session: Not on file   • Stress: Not on file   Relationships   • Social connections:     Talks on phone: Not on file     Gets together: Not on file     Attends Judaism service: Not on file     Active member of club or organization: Not on file     Attends meetings of clubs or organizations: Not on file     Relationship status: Not on file   • Intimate partner violence:     Fear of current or ex partner: Not on file     Emotionally abused: Not on file     Physically abused: Not on file     Forced sexual activity: Not on file   Other Topics Concern   •  Service Not Asked   • Blood Transfusions Not Asked   • Caffeine Concern Not Asked   • Occupational Exposure Not Asked   • Hobby Hazards Not Asked   • Sleep Concern Not Asked   • Stress Concern Not Asked   • Weight Concern Not Asked   • Special Diet Not Asked   • Back Care Not Asked   • Exercise No   • Bike Helmet Not Asked   • Seat Belt Not Asked   • Self-Exams Not Asked   Social History Narrative    Works at [employer]     Allergies   Allergen Reactions   • Erythromycin      Abdominal pain   • Latex    • Tape      Cloth tape  Paper tape okay       Current  Outpatient Medications   Medication Sig Dispense Refill   • flecainide (TAMBOCOR) 150 MG Tab Take 1 Tab by mouth 2 Times a Day. 60 Tab 3   • omeprazole (PRILOSEC) 20 MG delayed-release capsule Take 1 Cap by mouth every morning before breakfast. 30 Cap 0   • potassium chloride ER (KLOR-CON) 10 MEQ tablet Take 2 Tabs by mouth 1 time daily as needed. When you take 40mg of lasix.     • furosemide (LASIX) 20 MG Tab Take 2 Tabs by mouth 1 time daily as needed. As needed for weight gain >178 pounds or leg swelling or shortness of breath. 30 Tab 6   • ELIQUIS 5 MG Tab TAKE 1 TABLET BY MOUTH TWICE A  Tab 3   • carvedilol (COREG) 3.125 MG Tab Take 1 Tab by mouth 2 times a day, with meals. 180 Tab 3   • losartan (COZAAR) 25 MG Tab Take 1 Tab by mouth every day. 90 Tab 3   • fexofenadine (ALLEGRA) 180 MG tablet Take 1 Tab by mouth every day. (Patient taking differently: Take 180 mg by mouth 1 time daily as needed.) 90 Tab 3   • fluticasone (FLONASE) 50 MCG/ACT nasal spray Spray 1 Spray in nose 2 times a day. (Patient taking differently: Spray 1 Spray in nose 2 times a day as needed.) 1 Bottle 11   • atorvastatin (LIPITOR) 10 MG Tab TAKE 1 TABLET BY MOUTH EVERY DAY 90 Tab 2   • albuterol 108 (90 BASE) MCG/ACT Aero Soln inhalation aerosol Inhale 2 Puffs by mouth every four hours as needed. 1 Inhaler 0   • vitamin D (CHOLECALCIFEROL) 1000 UNIT TABS Take 2 Tabs by mouth every day. 60 Each 11     No current facility-administered medications for this visit.      Review of Systems   Constitutional: Negative for chills, diaphoresis and fever.   Respiratory: Negative for cough, shortness of breath and wheezing.    Cardiovascular: Positive for palpitations. Negative for chest pain, orthopnea, claudication, leg swelling and PND.   Gastrointestinal: Negative for abdominal pain and blood in stool.   Genitourinary: Negative for hematuria.   Neurological: Negative for dizziness and loss of consciousness.     All others systems reviewed  "and negative.   Objective:     /88 (BP Location: Left arm, Patient Position: Sitting, BP Cuff Size: Adult)   Pulse (!) 112   Ht 1.702 m (5' 7\")   Wt 83.5 kg (184 lb)   SpO2 100%  Body mass index is 28.82 kg/m².    Physical Exam   Constitutional: She is oriented to person, place, and time. No distress.   Eyes: Pupils are equal, round, and reactive to light.   Neck: No JVD present.   Cardiovascular: Normal rate. An irregular rhythm present.   No murmur heard.  Pulses:       Radial pulses are 2+ on the right side and 2+ on the left side.   Pulmonary/Chest: Effort normal. No respiratory distress. She has decreased breath sounds in the left lower field. She has no wheezes. She has no rales. She exhibits no tenderness.   Musculoskeletal:         General: No edema.   Neurological: She is alert and oriented to person, place, and time.   Skin: Skin is warm and dry. She is not diaphoretic. No erythema.   Psychiatric: Mood, memory, affect and judgment normal.   Nursing note and vitals reviewed.    Cardiac Imaging and Procedures Review:    EKG 12/12/2019: Atrial Fibrillation    Limited Echocardiogram 06/19/2019:    Limited echo to evaluate for pericardial effusion s/p drain removal.   Prior echo on 6/18/19. Compared to the report of the study done - there   has been no significant change.   Normal left ventricular size and systolic function.  Normal pericardium without effusion.  Pleural effusion noted.     Echocardiogram ([facility]):  6/17/19  Normal left ventricular size and systolic function.  No left atrial thrombus.  No thrombus detected in the left atrial appendage.     Procedural conclusions per Dr. Steele's Op Note (12/11/2019):  Electrophysiology Procedure Note [facility]  Indication: Recurrent atrial arrhythmias status post pulmonary vein isolation  Procedure Performed: 1. Atrial fibrillation ablation 2. Advanced mapping using CARTO system  3. Transesophageal echocardiography 4. Intracardiac " echocardiography 5. Esophageal temperature monitoring 6. Intraarterial blood pressure monitoring 7. Frequent ACT's 8. Additional ablation secondary arrhythmia 9. Ablation of secondary cause of atrial fibrillation  Physician(s): PHYLICIA Steele M.D.  Pre-procedure ECG: Atypical atrial flutter  Total ablation time: 1607 seconds  Fluoroscopy time: 14.6 minutes  Electrophysiologic Findings:    1.  Atrial flutter 309 ms,  ms, HV 58 Ms.   2.  Baseline atrial flutter/tachycardia coming from near the left upper pulmonary vein or the left upper pulmonary vein.  3.  Evidence of right atrial isthmus flutter with isthmus block with isthmus ablation.  4.  Mid charis atrial tachycardia ablated with termination of arrhythmia to sinus rhythm.  5.  Reisolation left upper pulmonary vein.  Impressions:    1. Baseline atypical atrial flutter left-sided     2. Reisolation left upper pulmonary vein.  3. Right atrial isthmus ablation with block.    4. Ablation of mid charis atrial tachycardia  5. Multiple atrial arrhythmias.  6.  Significant scarring on voltage map within both atriums.    Procedural conclusions per Dr. Steele's Op Note (06/17/2019):  Electrophysiology Procedure Note [facility]  Indication: Symptomatic persistent atrial fibrillation  Procedure Performed: 1. Atrial fibrillation ablation 2. Advanced mapping using CARTO system  3. Transesophageal echocardiography 4. Intracardiac echocardiography 5. Esophageal temperature monitoring 6. Intraarterial blood pressure monitoring 7. Frequent ACT's   Physician(s): PHYLICIA Steele M.D.  Resident/Assistant(s): None  Anesthesia: General endotracheal anesthetic.  Anaesthesiologist: Dr. Oj Silver  Specimen(s) Removed: None  Pre-procedure ECG: Atrial fibrillation  Post Procedure ECG: Sinus rhythm  Total ablation time: 2468 seconds  Fluoroscopy time: 7.7 minutes  Electrophysiologic Findings:    1. Baseline: Atrial fibrillation 777 ms,  ms, HV 51 Ms.   2. Final  SR  1065 ms  Impressions:    1.  Persistent atrial fibrillation.    2. Successful RF ablation pulmonary vein isolation procedure.       Pre-Operative Diagnosis: Status post atrial fibrillation ablation, late cardiac tamponade   Post-Operative Diagnosis: Successful pericardiocentesis with resolution of hypotension and pericardial fluid  Indications: Cardiac tamponade post catheter ablation  Procedure(s) Performed: Pericardiocentesis  Physician(s): PHYLICIA Steele M.D.  Anesthesia: Local anesthesia  Pre-procedure ECG: Sinus rhythm  Post-procedure ECG: Sinus rhythm  Complications:  None  Fluroscopy time: Less than 2 minutes  Impressions:    1.  Late cardiac tamponade post ablation.  2.  Successful pericardiocentesis.     Cardiac Catheterization:  05/26/2017  1.  LEFT MAIN ARTERY:  Left main vessel is a large caliber vessel, angiographically normal, bifurcates into the left anterior descending artery and circumflex artery.  2.  LEFT ANTERIOR DESCENDING ARTERY:  The LAD gives rise to a first diagonal branch and a normal complement of septal branches and extends around the apex.    The LAD is widely patent with the exception of a very mild eccentric smooth focal proximal atheroma otherwise the LAD appears angiographically normal.  3.  CIRCUMFLEX ARTERY:  The circumflex gives rise to 2 major long marginal branches and a small caliber AV groove branch.  Angiographically, the circumflex artery appears normal.  4.  RIGHT CORONARY ARTERY:  The RCA is moderate size caliber vessel and gives rise to an acute marginal branch, posterior descending artery and a posterolateral branch.  Angiographically, the right coronary artery is normal.    Labs (personally reviewed and notable for):   Lab Results   Component Value Date/Time    SODIUM 141 12/11/2019 08:06 AM    POTASSIUM 4.3 12/11/2019 08:06 AM    CHLORIDE 108 12/11/2019 08:06 AM    CO2 26 12/11/2019 08:06 AM    GLUCOSE 96 12/11/2019 08:06 AM    BUN 18 12/11/2019 08:06 AM    CREATININE 0.89  12/11/2019 08:06 AM      Lab Results   Component Value Date/Time    WBC 10.2 12/11/2019 08:06 AM    RBC 3.86 (L) 12/11/2019 08:06 AM    HEMOGLOBIN 12.1 12/11/2019 08:06 AM    HEMATOCRIT 37.8 12/11/2019 08:06 AM    MCV 97.9 (H) 12/11/2019 08:06 AM    MCH 31.3 12/11/2019 08:06 AM    MCHC 32.0 (L) 12/11/2019 08:06 AM    MPV 11.0 12/11/2019 08:06 AM    NEUTSPOLYS 71.90 12/10/2019 09:55 AM    LYMPHOCYTES 17.20 (L) 12/10/2019 09:55 AM    MONOCYTES 7.80 12/10/2019 09:55 AM    EOSINOPHILS 2.10 12/10/2019 09:55 AM    BASOPHILS 0.70 12/10/2019 09:55 AM      PT/INR:   Lab Results   Component Value Date/Time    PROTHROMBTM 13.5 12/10/2019 09:55 AM    INR 1.01 12/10/2019 09:55 AM   ]  Assessment:     1. Persistent atrial fibrillation  EC-ECHOCARDIOGRAM LTD W/O CONT    CANCELED: EC-ECHOCARDIOGRAM LTD W/O CONT   2. Atrial arrhythmia     3. S/P ablation of atrial fibrillation  EKG    EC-ECHOCARDIOGRAM LTD W/O CONT    CANCELED: EC-ECHOCARDIOGRAM LTD W/O CONT   4. Encounter for monitoring flecainide therapy     5. Chronic anticoagulation     6. Essential hypertension, benign       Medical Decision Making:  Today's Assessment / Status / Plan:   1. Recurrent Atrial Arrhythmias  - S/P RFA of baseline atypical AFL left-sided with reisolation left upper pulmonary vein, right atrial isthmus ablation with block, ablation of mid charis atrial tachycardia by Dr. Steele on 12/10/19.  Multiple atrial arrhythmias with significant scarring on voltage map within both atriums.  - Hx prior RFA with PVI by Dr. Steele on 06/17/19.  - Hx intolerance to Tikosyn secondary to QT prolongation.   - EKG today shows AF, confirmed with Dr. Hill. Does have 1st degree AV block.   - Back in AF, symptomatic, BP stable/mildly elevated. Weight up 184 lbs, no JVD or edema, on lasix 40 mg PRN, encouraged to take and monitor daily weights.   - Reports pericardial type CP/back pain. Ordered stat limited echo. If ok, will start colchicine 0.6 mg daily x 2 weeks.   - Will  increase Flecainide to 150 mg BID with repeat office EKG on Monday 12/16/19. If remains in AF, consider DCCV. Will review plan with Dr. Steele.   - OAC with Eliquis, no s/sx bleeding, continue.   - Continue coreg 3.125 mg BID.   - Continue PPI x 1 month post procedure.   - Encouraged to monitor daily weights, BP, and HR, call office if abnormal.    2. High Risk Medication: Flecainide  - CrCL: > 60 mL/min ( based on 12/11/19 labs)  - Will continue to monitor for side effects of patient's high risk medication(s) including liver, renal function and electrolytes.     3. Chronic Anticoagulation: Eliquis    Plan reviewed in detail with the patient and she verbalizes understanding and is in agreement. RTC 2 weeks, sooner if clinical condition changes.     Thank you for allowing me to participate in this patients care.  Please contact me with any questions or concerns.     DAISY Lam.   [facility]  [phone]    Collaborating MD/ADD: Dr. Codey MD.      Augusto Steele M.D.  [address]  [address]  [place] [address]  VIA In Basket

## 2019-12-12 NOTE — TELEPHONE ENCOUNTER
DS    Pt had ablation done by DS on 12/10, she was told to call if she had irregular heartbeat. Please call pt to advise at [phone].

## 2019-12-12 NOTE — PROGRESS NOTES
"Cardiology/Electrophysiology Follow-up Note    Subjective:   Chief Complaint:   Chief Complaint   Patient presents with   • Atrial Fibrillation     S/P AF ABLATION     Chinyere Priest is a 63 y.o. female who presents today for follow up for recurrent atrial arrhythmias s/p repeat RFA by Dr. Steele on 12/10/19, on Flecainide.     She is followed by Dr. Landa and Dr. Steele.  Last seen by Dr. Landa on 11/07/19. She underwent RF ablation of baseline atypical AFL left-sided with reisolation left upper pulmonary vein, right atrial isthmus ablation with block, ablation of mid charis atrial tachycardia by Dr. Steele on 12/10/19.  Multiple atrial arrhythmias with significant scarring on voltage map within both atriums. Post procedure, patient was continued on OAC with Eliquis, Flecainide 75 mg BID, and coreg 3.125 mg BID. She has a hx of prior RFA with PVI on 06/17/19 complicated by pericardial effusion.      Medical history significant for Persistent Atrial Fibrillation, s/p RFA with PVI by Dr. Steele on 06/17/19, on chronic anticoagulation with Eliquis, hypertension, HDL, aortic calcification, moderate mitral regurgitation, S/P cardiac catheterization (5/26/2017), and mild pulmonary hypertension. Intolerant to Tikosyn secondary to QT prolongation.    Today in follow up, she reports she is in palpitations/elevated Hrs since 10 PM last night. She reports chest discomfort with deep breathing and mid-back pain.  She denies dizziness, pre syncope or syncope, or lower extremity edema. Denies any signs or symptoms of bleeding or bleeding issues. Patient endorses medication compliance.     Past Medical History:   Diagnosis Date   • Abnormal CT scan, kidney      \"Incidentally noted retroaortic left renal vein is identified. Some calcified plaque is noted in the aorta and its branch vessels.\"  CT scan done for diverticulosis     • Aortic calcification (HCC)      Noted incidentally on abdominal CT scan.   • Asthma in adult  "        • Atrial fibrillation (HCC) 2019    Status post ablation by Dr. Steele.   • Chronic anticoagulation    • Chronic back pain     • Dental disorder     Full upper/lower dentures.   • Diverticulosis     • Essential hypertension, malignant     • Inflamed seborrheic keratosis     • Non-rheumatic mitral regurgitation 2019    Echocardiogram with normal LV size, LVEF 65%. Moderate MR, mild-moderate TR, unchanged from previous.   • Osteoarthritis of both hips     • Plantar fascia syndrome     • Plantar wart of right foot     • Pruritic dermatitis      Ongoing for several months but worse at night on her lower extremities   • Pulmonary hypertension (HCC)    • Pure hypercholesterolemia     • S/P cardiac catheterization 2017    1.  Normal right heart pressures. 3.  Essentially normal coronary arteries. 2.  Left ventricular ejection fraction 65%.    • Spider veins of limb     • Unspecified vitamin D deficiency       Past Surgical History:   Procedure Laterality Date   • LUMBAR LAMINECTOMY DISKECTOMY  2009    Performed by SUDHIR VERMA at SURGERY [facility] ORS   • GYN SURGERY       x2   • HYSTERECTOMY LAPAROSCOPY       Family History   Problem Relation Age of Onset   • Heart Disease Mother         1st MI @ 61 yo   • Hypertension Mother    • Heart Attack Mother 60   • Heart Disease Maternal Aunt    • Cancer Daughter 35        breast   • Heart Disease Maternal Grandmother    • Heart Disease Maternal Grandfather    • Heart Disease Maternal Aunt 56     Social History     Socioeconomic History   • Marital status:      Spouse name: Not on file   • Number of children: Not on file   • Years of education: Not on file   • Highest education level: Not on file   Occupational History   • Not on file   Social Needs   • Financial resource strain: Not on file   • Food insecurity:     Worry: Never true     Inability: Never true   • Transportation needs:     Medical: No     Non-medical: No   Tobacco Use   •  Smoking status: Former Smoker     Packs/day: 1.00     Years: 40.00     Pack years: 40.00     Types: Cigarettes     Last attempt to quit: 3/1/2010     Years since quittin.7   • Smokeless tobacco: Never Used   Substance and Sexual Activity   • Alcohol use: No     Alcohol/week: 0.0 oz   • Drug use: No     Comment: tried marijuana once.    • Sexual activity: Yes     Partners: Male     Birth control/protection: Post-Menopausal     Comment: , works at [employer]   Lifestyle   • Physical activity:     Days per week: Not on file     Minutes per session: Not on file   • Stress: Not on file   Relationships   • Social connections:     Talks on phone: Not on file     Gets together: Not on file     Attends [employer] service: Not on file     Active member of club or organization: Not on file     Attends meetings of clubs or organizations: Not on file     Relationship status: Not on file   • Intimate partner violence:     Fear of current or ex partner: Not on file     Emotionally abused: Not on file     Physically abused: Not on file     Forced sexual activity: Not on file   Other Topics Concern   •  Service Not Asked   • Blood Transfusions Not Asked   • Caffeine Concern Not Asked   • Occupational Exposure Not Asked   • Hobby Hazards Not Asked   • Sleep Concern Not Asked   • Stress Concern Not Asked   • Weight Concern Not Asked   • Special Diet Not Asked   • Back Care Not Asked   • Exercise No   • Bike Helmet Not Asked   • Seat Belt Not Asked   • Self-Exams Not Asked   Social History Narrative    Works at [employer]     Allergies   Allergen Reactions   • Erythromycin      Abdominal pain   • Latex    • Tape      Cloth tape  Paper tape okay       Current Outpatient Medications   Medication Sig Dispense Refill   • flecainide (TAMBOCOR) 150 MG Tab Take 1 Tab by mouth 2 Times a Day. 60 Tab 3   • omeprazole (PRILOSEC) 20 MG delayed-release capsule Take 1 Cap by mouth every morning before breakfast. 30 Cap 0   • potassium chloride  "ER (KLOR-CON) 10 MEQ tablet Take 2 Tabs by mouth 1 time daily as needed. When you take 40mg of lasix.     • furosemide (LASIX) 20 MG Tab Take 2 Tabs by mouth 1 time daily as needed. As needed for weight gain >178 pounds or leg swelling or shortness of breath. 30 Tab 6   • ELIQUIS 5 MG Tab TAKE 1 TABLET BY MOUTH TWICE A  Tab 3   • carvedilol (COREG) 3.125 MG Tab Take 1 Tab by mouth 2 times a day, with meals. 180 Tab 3   • losartan (COZAAR) 25 MG Tab Take 1 Tab by mouth every day. 90 Tab 3   • fexofenadine (ALLEGRA) 180 MG tablet Take 1 Tab by mouth every day. (Patient taking differently: Take 180 mg by mouth 1 time daily as needed.) 90 Tab 3   • fluticasone (FLONASE) 50 MCG/ACT nasal spray Spray 1 Spray in nose 2 times a day. (Patient taking differently: Spray 1 Spray in nose 2 times a day as needed.) 1 Bottle 11   • atorvastatin (LIPITOR) 10 MG Tab TAKE 1 TABLET BY MOUTH EVERY DAY 90 Tab 2   • albuterol 108 (90 BASE) MCG/ACT Aero Soln inhalation aerosol Inhale 2 Puffs by mouth every four hours as needed. 1 Inhaler 0   • vitamin D (CHOLECALCIFEROL) 1000 UNIT TABS Take 2 Tabs by mouth every day. 60 Each 11     No current facility-administered medications for this visit.      Review of Systems   Constitutional: Negative for chills, diaphoresis and fever.   Respiratory: Negative for cough, shortness of breath and wheezing.    Cardiovascular: Positive for palpitations. Negative for chest pain, orthopnea, claudication, leg swelling and PND.   Gastrointestinal: Negative for abdominal pain and blood in stool.   Genitourinary: Negative for hematuria.   Neurological: Negative for dizziness and loss of consciousness.     All others systems reviewed and negative.   Objective:     /88 (BP Location: Left arm, Patient Position: Sitting, BP Cuff Size: Adult)   Pulse (!) 112   Ht 1.702 m (5' 7\")   Wt 83.5 kg (184 lb)   SpO2 100%  Body mass index is 28.82 kg/m².    Physical Exam   Constitutional: She is oriented to " person, place, and time. No distress.   Eyes: Pupils are equal, round, and reactive to light.   Neck: No JVD present.   Cardiovascular: Normal rate. An irregular rhythm present.   No murmur heard.  Pulses:       Radial pulses are 2+ on the right side and 2+ on the left side.   Pulmonary/Chest: Effort normal. No respiratory distress. She has decreased breath sounds in the left lower field. She has no wheezes. She has no rales. She exhibits no tenderness.   Musculoskeletal:         General: No edema.   Neurological: She is alert and oriented to person, place, and time.   Skin: Skin is warm and dry. She is not diaphoretic. No erythema.   Psychiatric: Mood, memory, affect and judgment normal.   Nursing note and vitals reviewed.    Cardiac Imaging and Procedures Review:    EKG 12/12/2019: Atrial Fibrillation    Limited Echocardiogram 06/19/2019:    Limited echo to evaluate for pericardial effusion s/p drain removal.   Prior echo on 6/18/19. Compared to the report of the study done - there   has been no significant change.   Normal left ventricular size and systolic function.  Normal pericardium without effusion.  Pleural effusion noted.     Echocardiogram (LEEANNA):  6/17/19  Normal left ventricular size and systolic function.  No left atrial thrombus.  No thrombus detected in the left atrial appendage.     Procedural conclusions per Dr. Steele's Op Note (12/11/2019):  Electrophysiology Procedure Note [facility]  Indication: Recurrent atrial arrhythmias status post pulmonary vein isolation  Procedure Performed: 1. Atrial fibrillation ablation 2. Advanced mapping using CARTO system  3. Transesophageal echocardiography 4. Intracardiac echocardiography 5. Esophageal temperature monitoring 6. Intraarterial blood pressure monitoring 7. Frequent ACT's 8. Additional ablation secondary arrhythmia 9. Ablation of secondary cause of atrial fibrillation  Physician(s): PHYLICIA Steele M.D.  Pre-procedure ECG: Atypical atrial  flutter  Total ablation time: 1607 seconds  Fluoroscopy time: 14.6 minutes  Electrophysiologic Findings:    1.  Atrial flutter 309 ms,  ms, HV 58 Ms.   2.  Baseline atrial flutter/tachycardia coming from near the left upper pulmonary vein or the left upper pulmonary vein.  3.  Evidence of right atrial isthmus flutter with isthmus block with isthmus ablation.  4.  Mid charis atrial tachycardia ablated with termination of arrhythmia to sinus rhythm.  5.  Reisolation left upper pulmonary vein.  Impressions:    1. Baseline atypical atrial flutter left-sided     2. Reisolation left upper pulmonary vein.  3. Right atrial isthmus ablation with block.    4. Ablation of mid charis atrial tachycardia  5. Multiple atrial arrhythmias.  6.  Significant scarring on voltage map within both atriums.    Procedural conclusions per Dr. Steele's Op Note (06/17/2019):  Electrophysiology Procedure Note [facility]  Indication: Symptomatic persistent atrial fibrillation  Procedure Performed: 1. Atrial fibrillation ablation 2. Advanced mapping using CARTO system  3. Transesophageal echocardiography 4. Intracardiac echocardiography 5. Esophageal temperature monitoring 6. Intraarterial blood pressure monitoring 7. Frequent ACT's   Physician(s): PHYLICIA Steele M.D.  Resident/Assistant(s): None  Anesthesia: General endotracheal anesthetic.  Anaesthesiologist: Dr. Oj Silver  Specimen(s) Removed: None  Pre-procedure ECG: Atrial fibrillation  Post Procedure ECG: Sinus rhythm  Total ablation time: 2468 seconds  Fluoroscopy time: 7.7 minutes  Electrophysiologic Findings:    1. Baseline: Atrial fibrillation 777 ms,  ms, HV 51 Ms.   2. Final  SR 1065 ms  Impressions:    1.  Persistent atrial fibrillation.    2. Successful RF ablation pulmonary vein isolation procedure.       Pre-Operative Diagnosis: Status post atrial fibrillation ablation, late cardiac tamponade   Post-Operative Diagnosis: Successful pericardiocentesis  with resolution of hypotension and pericardial fluid  Indications: Cardiac tamponade post catheter ablation  Procedure(s) Performed: Pericardiocentesis  Physician(s): PHYLICIA Steele M.D.  Anesthesia: Local anesthesia  Pre-procedure ECG: Sinus rhythm  Post-procedure ECG: Sinus rhythm  Complications:  None  Fluroscopy time: Less than 2 minutes  Impressions:    1.  Late cardiac tamponade post ablation.  2.  Successful pericardiocentesis.     Cardiac Catheterization:  05/26/2017  1.  LEFT MAIN ARTERY:  Left main vessel is a large caliber vessel, angiographically normal, bifurcates into the left anterior descending artery and circumflex artery.  2.  LEFT ANTERIOR DESCENDING ARTERY:  The LAD gives rise to a first diagonal branch and a normal complement of septal branches and extends around the apex.    The LAD is widely patent with the exception of a very mild eccentric smooth focal proximal atheroma otherwise the LAD appears angiographically normal.  3.  CIRCUMFLEX ARTERY:  The circumflex gives rise to 2 major long marginal branches and a small caliber AV groove branch.  Angiographically, the circumflex artery appears normal.  4.  RIGHT CORONARY ARTERY:  The RCA is moderate size caliber vessel and gives rise to an acute marginal branch, posterior descending artery and a posterolateral branch.  Angiographically, the right coronary artery is normal.    Labs (personally reviewed and notable for):   Lab Results   Component Value Date/Time    SODIUM 141 12/11/2019 08:06 AM    POTASSIUM 4.3 12/11/2019 08:06 AM    CHLORIDE 108 12/11/2019 08:06 AM    CO2 26 12/11/2019 08:06 AM    GLUCOSE 96 12/11/2019 08:06 AM    BUN 18 12/11/2019 08:06 AM    CREATININE 0.89 12/11/2019 08:06 AM      Lab Results   Component Value Date/Time    WBC 10.2 12/11/2019 08:06 AM    RBC 3.86 (L) 12/11/2019 08:06 AM    HEMOGLOBIN 12.1 12/11/2019 08:06 AM    HEMATOCRIT 37.8 12/11/2019 08:06 AM    MCV 97.9 (H) 12/11/2019 08:06 AM    MCH 31.3 12/11/2019 08:06 AM     MCHC 32.0 (L) 12/11/2019 08:06 AM    MPV 11.0 12/11/2019 08:06 AM    NEUTSPOLYS 71.90 12/10/2019 09:55 AM    LYMPHOCYTES 17.20 (L) 12/10/2019 09:55 AM    MONOCYTES 7.80 12/10/2019 09:55 AM    EOSINOPHILS 2.10 12/10/2019 09:55 AM    BASOPHILS 0.70 12/10/2019 09:55 AM      PT/INR:   Lab Results   Component Value Date/Time    PROTHROMBTM 13.5 12/10/2019 09:55 AM    INR 1.01 12/10/2019 09:55 AM   ]  Assessment:     1. Persistent atrial fibrillation  EC-ECHOCARDIOGRAM LTD W/O CONT    CANCELED: EC-ECHOCARDIOGRAM LTD W/O CONT   2. Atrial arrhythmia     3. S/P ablation of atrial fibrillation  EKG    EC-ECHOCARDIOGRAM LTD W/O CONT    CANCELED: EC-ECHOCARDIOGRAM LTD W/O CONT   4. Encounter for monitoring flecainide therapy     5. Chronic anticoagulation     6. Essential hypertension, benign       Medical Decision Making:  Today's Assessment / Status / Plan:   1. Recurrent Atrial Arrhythmias  - S/P RFA of baseline atypical AFL left-sided with reisolation left upper pulmonary vein, right atrial isthmus ablation with block, ablation of mid charis atrial tachycardia by Dr. Steele on 12/10/19.  Multiple atrial arrhythmias with significant scarring on voltage map within both atriums.  - Hx prior RFA with PVI by Dr. Steele on 06/17/19.  - Hx intolerance to Tikosyn secondary to QT prolongation.   - EKG today shows AF, confirmed with Dr. Hill. Does have 1st degree AV block.   - Back in AF, symptomatic, BP stable/mildly elevated. Weight up 184 lbs, no JVD or edema, on lasix 40 mg PRN, encouraged to take and monitor daily weights.   - Reports pericardial type CP/back pain. Ordered stat limited echo. If ok, will start colchicine 0.6 mg daily x 2 weeks.   - Will increase Flecainide to 150 mg BID with repeat office EKG on Monday 12/16/19. If remains in AF, consider DCCV. Will review plan with Dr. Steele.   - OAC with Eliquis, no s/sx bleeding, continue.   - Continue coreg 3.125 mg BID.   - Continue PPI x 1 month post procedure.   - Encouraged  to monitor daily weights, BP, and HR, call office if abnormal.    2. High Risk Medication: Flecainide  - CrCL: > 60 mL/min ( based on 12/11/19 labs)  - Will continue to monitor for side effects of patient's high risk medication(s) including liver, renal function and electrolytes.     3. Chronic Anticoagulation: Eliquis    Plan reviewed in detail with the patient and she verbalizes understanding and is in agreement. RTC 2 weeks, sooner if clinical condition changes.     Thank you for allowing me to participate in this patients care.  Please contact me with any questions or concerns.     DAISY Lam.   [facility]  [phone]    Collaborating MD/ADD: Dr. Codey MD.

## 2019-12-12 NOTE — TELEPHONE ENCOUNTER
Patient call transferred directly by .  Patient asked if we had received a reply from Dr. Steele and expressed continued concerns.  Consulted with Irene SIMEON who recommended that this patient be scheduled with Maddy Fraser at 2:40 p.m. today for s/p ablation follow-up.    Patient scheduled and notified.  Note routed to Maddy Fraser APRN.

## 2019-12-13 ENCOUNTER — TELEPHONE (OUTPATIENT)
Dept: CARDIOLOGY | Facility: MEDICAL CENTER | Age: 64
End: 2019-12-13

## 2019-12-13 DIAGNOSIS — I48.19 PERSISTENT ATRIAL FIBRILLATION (HCC): ICD-10-CM

## 2019-12-13 DIAGNOSIS — Z98.890 S/P ABLATION OF ATRIAL FIBRILLATION: ICD-10-CM

## 2019-12-13 DIAGNOSIS — Z86.79 S/P ABLATION OF ATRIAL FIBRILLATION: ICD-10-CM

## 2019-12-13 LAB — EKG IMPRESSION: NORMAL

## 2019-12-13 RX ORDER — COLCHICINE 0.6 MG/1
0.6 TABLET ORAL DAILY
Qty: 14 TAB | Refills: 0 | OUTPATIENT
Start: 2019-12-13 | End: 2019-12-16

## 2019-12-13 RX ORDER — FLECAINIDE ACETATE 100 MG/1
100 TABLET ORAL 2 TIMES DAILY
Qty: 60 TAB | Refills: 3 | OUTPATIENT
Start: 2019-12-13 | End: 2019-12-16 | Stop reason: SDUPTHER

## 2019-12-13 NOTE — TELEPHONE ENCOUNTER
Per ED:    Order Colchicine 0.6mg daily x 2 weeks.  Adjust Flecainide to 100mg BID with 3 refills.  EKG on Monday, 12/16.  Repeat CMP in two weeks.    Spoke with patient who verbalized understanding of recommendations.  Rxs called in to [facility].  Lab order entered.      Result note read at 4:02PM:    Result Notes for EC-ECHOCARDIOGRAM LTD W/O CONT     Notes recorded by DANIELLE Lam on 12/13/2019 at 3:32 PM PST  Please let patient know her echo was normal. Order colchicine 0.6 mg daily x 2 weeks with repeat CMP in 2 weeks.     Done.

## 2019-12-14 RX ORDER — ATORVASTATIN CALCIUM 10 MG/1
TABLET, FILM COATED ORAL
Qty: 90 TAB | Refills: 3 | Status: SHIPPED | OUTPATIENT
Start: 2019-12-14 | End: 2020-09-28 | Stop reason: SDUPTHER

## 2019-12-16 ENCOUNTER — TELEPHONE (OUTPATIENT)
Dept: CARDIOLOGY | Facility: MEDICAL CENTER | Age: 64
End: 2019-12-16

## 2019-12-16 ENCOUNTER — NON-PROVIDER VISIT (OUTPATIENT)
Dept: CARDIOLOGY | Facility: MEDICAL CENTER | Age: 64
End: 2019-12-16
Payer: COMMERCIAL

## 2019-12-16 DIAGNOSIS — I48.19 PERSISTENT ATRIAL FIBRILLATION (HCC): ICD-10-CM

## 2019-12-16 DIAGNOSIS — I48.3 TYPICAL ATRIAL FLUTTER (HCC): ICD-10-CM

## 2019-12-16 DIAGNOSIS — Z86.79 S/P ABLATION OF ATRIAL FIBRILLATION: ICD-10-CM

## 2019-12-16 DIAGNOSIS — Z98.890 S/P ABLATION OF ATRIAL FIBRILLATION: ICD-10-CM

## 2019-12-16 LAB — EKG IMPRESSION: NORMAL

## 2019-12-16 PROCEDURE — 93000 ELECTROCARDIOGRAM COMPLETE: CPT | Performed by: INTERNAL MEDICINE

## 2019-12-16 RX ORDER — FLECAINIDE ACETATE 150 MG/1
150 TABLET ORAL 2 TIMES DAILY
Qty: 180 TAB | Refills: 1 | Status: SHIPPED | OUTPATIENT
Start: 2019-12-16 | End: 2019-12-31

## 2019-12-16 NOTE — TELEPHONE ENCOUNTER
Pt here for ekg. Having diarrhea on colchicine ED recently started. Feels a few blips on lower dose of flec. Would prefer higher dose of flec if able. To DS to review and advise on both colchicine and flec

## 2019-12-16 NOTE — ADDENDUM NOTE
Addended by: FIDEL CROOK on: 12/16/2019 11:12 AM     Modules accepted: Level of Service    
no difficulties

## 2019-12-18 ENCOUNTER — TELEPHONE (OUTPATIENT)
Dept: CARDIOLOGY | Facility: MEDICAL CENTER | Age: 64
End: 2019-12-18

## 2019-12-18 NOTE — TELEPHONE ENCOUNTER
Pt is better like night and day she says, on flec 150 BID, no problems, chest/ back pain gone, diarrhea gone. She will see DS on 1/13. 12/30 cancelled

## 2019-12-18 NOTE — TELEPHONE ENCOUNTER
SIVAN Yañez    [facility] to [facility] has to [facility] that day. She would like a call back at: [phone].    Thank you so much,    Margoth

## 2019-12-30 ENCOUNTER — TELEPHONE (OUTPATIENT)
Dept: CARDIOLOGY | Facility: MEDICAL CENTER | Age: 64
End: 2019-12-30

## 2019-12-31 DIAGNOSIS — Z98.890 S/P ABLATION OF ATRIAL FIBRILLATION: ICD-10-CM

## 2019-12-31 DIAGNOSIS — Z86.79 S/P ABLATION OF ATRIAL FIBRILLATION: ICD-10-CM

## 2019-12-31 DIAGNOSIS — I48.19 PERSISTENT ATRIAL FIBRILLATION (HCC): ICD-10-CM

## 2019-12-31 RX ORDER — FLECAINIDE ACETATE 100 MG/1
100 TABLET ORAL 2 TIMES DAILY
Qty: 60 TAB | Refills: 1 | Status: SHIPPED | OUTPATIENT
Start: 2019-12-31 | End: 2020-02-18

## 2019-12-31 NOTE — PROGRESS NOTES
See telephone note regarding decrease in Flecainide dosage due to s/sx.     Thanks,     DAISY Lam.   [facility]  [phone]

## 2020-01-13 ENCOUNTER — OFFICE VISIT (OUTPATIENT)
Dept: CARDIOLOGY | Facility: MEDICAL CENTER | Age: 65
End: 2020-01-13
Payer: COMMERCIAL

## 2020-01-13 VITALS
OXYGEN SATURATION: 96 % | SYSTOLIC BLOOD PRESSURE: 130 MMHG | DIASTOLIC BLOOD PRESSURE: 80 MMHG | WEIGHT: 180 LBS | BODY MASS INDEX: 28.25 KG/M2 | HEIGHT: 67 IN | HEART RATE: 60 BPM

## 2020-01-13 DIAGNOSIS — I48.19 PERSISTENT ATRIAL FIBRILLATION (HCC): ICD-10-CM

## 2020-01-13 DIAGNOSIS — Z98.890 S/P ABLATION OF ATRIAL FIBRILLATION: ICD-10-CM

## 2020-01-13 DIAGNOSIS — I48.19 OTHER PERSISTENT ATRIAL FIBRILLATION (HCC): ICD-10-CM

## 2020-01-13 DIAGNOSIS — Z86.79 S/P ABLATION OF ATRIAL FIBRILLATION: ICD-10-CM

## 2020-01-13 DIAGNOSIS — Z79.01 CHRONIC ANTICOAGULATION: ICD-10-CM

## 2020-01-13 DIAGNOSIS — Z98.890 S/P CARDIAC CATHETERIZATION: ICD-10-CM

## 2020-01-13 PROBLEM — I48.91 ATRIAL FIBRILLATION (HCC): Status: ACTIVE | Noted: 2019-06-01

## 2020-01-13 LAB — EKG IMPRESSION: NORMAL

## 2020-01-13 PROCEDURE — 99214 OFFICE O/P EST MOD 30 MIN: CPT | Performed by: INTERNAL MEDICINE

## 2020-01-13 PROCEDURE — 93000 ELECTROCARDIOGRAM COMPLETE: CPT | Performed by: INTERNAL MEDICINE

## 2020-01-13 NOTE — PROGRESS NOTES
"Chief Complaint   Patient presents with   • Atrial Fibrillation     F/V DX:PERSISTENT AFIB       Subjective:   Chinyere Priest is a 64 y.o. female who presents today with repeat catheter ablation 12/20/2019.  Early recurrence.  Paroxysmal, back in sinus rhythm has not had recurrence since then.  Felt poorly on 150 mg twice a day of flecainide.  Feels well on 100 mg twice a day.  On anticoagulation.  Has not had a sleep study.  Denies shortness of breath, chest pain.  Does not have a Kardia.    Past Medical History:   Diagnosis Date   • Abnormal CT scan, kidney      \"Incidentally noted retroaortic left renal vein is identified. Some calcified plaque is noted in the aorta and its branch vessels.\"  CT scan done for diverticulosis     • Aortic calcification (HCC)      Noted incidentally on abdominal CT scan.   • Asthma in adult         • Atrial fibrillation (HCC) 06/2019    Status post ablation by Dr. Steele.   • Chronic anticoagulation    • Chronic back pain     • Dental disorder     Full upper/lower dentures.   • Diverticulosis     • Essential hypertension, malignant     • Inflamed seborrheic keratosis     • Non-rheumatic mitral regurgitation 05/2019    Echocardiogram with normal LV size, LVEF 65%. Moderate MR, mild-moderate TR, unchanged from previous.   • Osteoarthritis of both hips     • Plantar fascia syndrome     • Plantar wart of right foot     • Pruritic dermatitis      Ongoing for several months but worse at night on her lower extremities   • Pulmonary hypertension (HCC)    • Pure hypercholesterolemia     • S/P cardiac catheterization 5/26/2017    1.  Normal right heart pressures. 3.  Essentially normal coronary arteries. 2.  Left ventricular ejection fraction 65%.    • Spider veins of limb     • Unspecified vitamin D deficiency       Past Surgical History:   Procedure Laterality Date   • LUMBAR LAMINECTOMY DISKECTOMY  9/2/2009    Performed by SUDHIR VERMA at [facility]   • GYN SURGERY   "     x2   • HYSTERECTOMY LAPAROSCOPY       Family History   Problem Relation Age of Onset   • Heart Disease Mother         1st MI @ 61 yo   • Hypertension Mother    • Heart Attack Mother 60   • Heart Disease Maternal Aunt    • Cancer Daughter 35        breast   • Heart Disease Maternal Grandmother    • Heart Disease Maternal Grandfather    • Heart Disease Maternal Aunt 56     Social History     Socioeconomic History   • Marital status:      Spouse name: Not on file   • Number of children: Not on file   • Years of education: Not on file   • Highest education level: Not on file   Occupational History   • Not on file   Social Needs   • Financial resource strain: Not on file   • Food insecurity:     Worry: Never true     Inability: Never true   • Transportation needs:     Medical: No     Non-medical: No   Tobacco Use   • Smoking status: Former Smoker     Packs/day: 1.00     Years: 40.00     Pack years: 40.00     Types: Cigarettes     Last attempt to quit: 3/1/2010     Years since quittin.8   • Smokeless tobacco: Never Used   Substance and Sexual Activity   • Alcohol use: No     Alcohol/week: 0.0 oz   • Drug use: No     Comment: tried marijuana once.    • Sexual activity: Yes     Partners: Male     Birth control/protection: Post-Menopausal     Comment: , works at [employer]   Lifestyle   • Physical activity:     Days per week: Not on file     Minutes per session: Not on file   • Stress: Not on file   Relationships   • Social connections:     Talks on phone: Not on file     Gets together: Not on file     Attends Restorationist service: Not on file     Active member of club or organization: Not on file     Attends meetings of clubs or organizations: Not on file     Relationship status: Not on file   • Intimate partner violence:     Fear of current or ex partner: Not on file     Emotionally abused: Not on file     Physically abused: Not on file     Forced sexual activity: Not on file   Other Topics Concern   •   Service Not Asked   • Blood Transfusions Not Asked   • Caffeine Concern Not Asked   • Occupational Exposure Not Asked   • Hobby Hazards Not Asked   • Sleep Concern Not Asked   • Stress Concern Not Asked   • Weight Concern Not Asked   • Special Diet Not Asked   • Back Care Not Asked   • Exercise No   • Bike Helmet Not Asked   • Seat Belt Not Asked   • Self-Exams Not Asked   Social History Narrative    Works at [employer]     Allergies   Allergen Reactions   • Erythromycin      Abdominal pain   • Latex    • Tape      Cloth tape  Paper tape okay   • Tikosyn [Dofetilide]      Outpatient Encounter Medications as of 1/13/2020   Medication Sig Dispense Refill   • flecainide (TAMBOCOR) 100 MG Tab Take 1 Tab by mouth 2 times a day. 60 Tab 1   • atorvastatin (LIPITOR) 10 MG Tab TAKE 1 TABLET BY MOUTH EVERY DAY 90 Tab 3   • potassium chloride ER (KLOR-CON) 10 MEQ tablet Take 2 Tabs by mouth 1 time daily as needed. When you take 40mg of lasix.     • furosemide (LASIX) 20 MG Tab Take 2 Tabs by mouth 1 time daily as needed. As needed for weight gain >178 pounds or leg swelling or shortness of breath. 30 Tab 6   • ELIQUIS 5 MG Tab TAKE 1 TABLET BY MOUTH TWICE A  Tab 3   • carvedilol (COREG) 3.125 MG Tab Take 1 Tab by mouth 2 times a day, with meals. 180 Tab 3   • losartan (COZAAR) 25 MG Tab Take 1 Tab by mouth every day. 90 Tab 3   • fexofenadine (ALLEGRA) 180 MG tablet Take 1 Tab by mouth every day. (Patient taking differently: Take 180 mg by mouth 1 time daily as needed.) 90 Tab 3   • albuterol 108 (90 BASE) MCG/ACT Aero Soln inhalation aerosol Inhale 2 Puffs by mouth every four hours as needed. 1 Inhaler 0   • vitamin D (CHOLECALCIFEROL) 1000 UNIT TABS Take 2 Tabs by mouth every day. 60 Each 11   • [DISCONTINUED] omeprazole (PRILOSEC) 20 MG delayed-release capsule Take 1 Cap by mouth every morning before breakfast. (Patient not taking: Reported on 1/13/2020) 30 Cap 0   • [DISCONTINUED] fluticasone (FLONASE) 50 MCG/ACT  "nasal spray Spray 1 Spray in nose 2 times a day. (Patient taking differently: Spray 1 Spray in nose 2 times a day as needed.) 1 Bottle 11     No facility-administered encounter medications on file as of 1/13/2020.      ROS     Objective:   /80 (BP Location: Left arm, Patient Position: Sitting, BP Cuff Size: Adult)   Pulse 60   Ht 1.702 m (5' 7\")   Wt 81.6 kg (180 lb)   SpO2 96%   BMI 28.19 kg/m²     Physical Exam   Constitutional: She is oriented to person, place, and time. She appears well-developed and well-nourished.   HENT:   Head: Normocephalic and atraumatic.   Eyes: Pupils are equal, round, and reactive to light. EOM are normal.   Neck: Normal range of motion. Neck supple.   Cardiovascular: Normal rate, regular rhythm, normal heart sounds and intact distal pulses. Exam reveals no gallop and no friction rub.   No murmur heard.  Pulmonary/Chest: Effort normal and breath sounds normal.   Abdominal: Soft. Bowel sounds are normal.   Musculoskeletal: Normal range of motion.         General: No edema.   Neurological: She is alert and oriented to person, place, and time. No cranial nerve deficit.   Skin: Skin is warm.   Psychiatric: She has a normal mood and affect. Her behavior is normal. Judgment and thought content normal.       Assessment:     1. Other persistent atrial fibrillation  EKG   2. S/P ablation of atrial fibrillation  REFERRAL TO SLEEP STUDIES   3. S/P cardiac catheterization     4. Persistent atrial fibrillation     5. Chronic anticoagulation         Medical Decision Making:  Today's Assessment / Status / Plan:   1.  Persistent atrial fibrillation status post 2 ablations last one in December.  Continue flecainide 100 twice daily.  Get a Kardia.  2.  Anticoagulation continue.  3.  Order sleep study.  4.  Follow-up with me in 3 months.  "

## 2020-01-20 ENCOUNTER — OFFICE VISIT (OUTPATIENT)
Dept: MEDICAL GROUP | Facility: MEDICAL CENTER | Age: 65
End: 2020-01-20
Payer: COMMERCIAL

## 2020-01-20 VITALS
SYSTOLIC BLOOD PRESSURE: 120 MMHG | DIASTOLIC BLOOD PRESSURE: 64 MMHG | RESPIRATION RATE: 14 BRPM | HEART RATE: 55 BPM | OXYGEN SATURATION: 95 % | BODY MASS INDEX: 28.2 KG/M2 | HEIGHT: 67 IN | WEIGHT: 179.68 LBS | TEMPERATURE: 98.2 F

## 2020-01-20 DIAGNOSIS — L20.84 INTRINSIC ECZEMA: ICD-10-CM

## 2020-01-20 DIAGNOSIS — I48.19 PERSISTENT ATRIAL FIBRILLATION (HCC): ICD-10-CM

## 2020-01-20 DIAGNOSIS — E55.9 VITAMIN D DEFICIENCY: Chronic | ICD-10-CM

## 2020-01-20 DIAGNOSIS — M54.9 BACK PAIN WITH SCIATICA: ICD-10-CM

## 2020-01-20 DIAGNOSIS — E78.00 HYPERCHOLESTEROLEMIA: ICD-10-CM

## 2020-01-20 DIAGNOSIS — I10 ESSENTIAL HYPERTENSION, BENIGN: ICD-10-CM

## 2020-01-20 DIAGNOSIS — M54.30 BACK PAIN WITH SCIATICA: ICD-10-CM

## 2020-01-20 PROCEDURE — 99214 OFFICE O/P EST MOD 30 MIN: CPT | Performed by: FAMILY MEDICINE

## 2020-01-20 RX ORDER — CLOBETASOL PROPIONATE 0.5 MG/G
OINTMENT TOPICAL
Qty: 60 G | Refills: 3 | Status: SHIPPED | OUTPATIENT
Start: 2020-01-20 | End: 2021-08-02

## 2020-01-20 ASSESSMENT — PATIENT HEALTH QUESTIONNAIRE - PHQ9: CLINICAL INTERPRETATION OF PHQ2 SCORE: 0

## 2020-01-20 NOTE — ASSESSMENT & PLAN NOTE
Patient is taking 2000 units vitamin D3 daily.  Since were going to do labs in 3 months were going to check that as well.

## 2020-01-20 NOTE — ASSESSMENT & PLAN NOTE
Patient now is persisting in normal sinus rhythm since the second ablation.  She is on 100 mg of flecainide twice daily and continues on Eliquis 5 mg twice daily.  She will continue to follow with cardiology.

## 2020-01-20 NOTE — ASSESSMENT & PLAN NOTE
This is a chronic health problem that is well controlled with current medications and lifestyle measures. Her BP is great at 120/64.

## 2020-01-20 NOTE — ASSESSMENT & PLAN NOTE
This is a chronic health problem for this patient that well controlled with medication.  She has not had a lipid panel since July of last year.  Were going to plan to see her back in about 3 months and we will recheck then.  Her last comprehensive metabolic panel which was done in December showed no liver dysfunction and so her atorvastatin 10 mg daily is not causing her any side effects.

## 2020-01-20 NOTE — PROGRESS NOTES
CC:Diagnoses of Persistent atrial fibrillation, Back pain with sciatica, Essential hypertension, benign, Hypercholesterolemia, Intrinsic eczema, and Vitamin D deficiency were pertinent to this visit.    HISTORY OF PRESENT ILLNESS: Patient is a 64 y.o. female established patient who presents today to talk about her chronic health problems as outlined below.  Patient has not done blood work for a number of months but recently did blood work for her cardiologist.  Patient expresses a great deal of stress because her  seems to be developing short-term memory loss which in the long run is going to cause her a great deal of problems.    Atrial fibrillation (HCC)  Patient now is persisting in normal sinus rhythm since the second ablation.  She is on 100 mg of flecainide twice daily and continues on Eliquis 5 mg twice daily.  She will continue to follow with cardiology.    Back pain with sciatica  This is a chronic health problem for this patient where she is able to get epidurals and keep this under control.  She continues to follow at [facility].    Essential hypertension, benign  This is a chronic health problem that is well controlled with current medications and lifestyle measures. Her BP is great at 120/64.    Hypercholesterolemia  This is a chronic health problem for this patient that well controlled with medication.  She has not had a lipid panel since July of last year.  Were going to plan to see her back in about 3 months and we will recheck then.  Her last comprehensive metabolic panel which was done in December showed no liver dysfunction and so her atorvastatin 10 mg daily is not causing her any side effects.    Intrinsic eczema  This is a new problem is started for this patient mainly involving her lower extremities.  Her  has clobetasol available.  She put that on her rash twice a day until it cleared.  Right now she looks really good.  When it comes back she would like to have her own  clobetasol to use.    Vitamin D deficiency  Patient is taking 2000 units vitamin D3 daily.  Since were going to do labs in 3 months were going to check that as well.      Patient Active Problem List    Diagnosis Date Noted   • Localized edema 09/09/2019     Priority: High   • S/P ablation of atrial fibrillation 06/20/2019     Priority: High   • Chronic anticoagulation 10/30/2017     Priority: High   • Hypercholesterolemia 06/19/2017     Priority: High   • Essential hypertension, benign 05/25/2017     Priority: High   • Pulmonary hypertension (HCC) 08/28/2015     Priority: High   • S/P cardiac catheterization 05/26/2017     Priority: Medium   • Non-rheumatic mitral regurgitation 05/25/2017     Priority: Medium   • Aortic calcification (HCC) 05/15/2015     Priority: Medium   • Intrinsic eczema 01/20/2020   • Atrial fibrillation (HCC) 06/01/2019   • Degenerative disc disease, lumbar 01/25/2017   • Back pain with sciatica 01/25/2017   • Family history of ovarian cancer 11/21/2016   • Family history of breast cancer 11/21/2016   • Decreased right ventricular systolic function 01/14/2016   • Abnormal CT scan, kidney 05/15/2015   • Diverticulosis 05/15/2015   • Osteoarthritis of both hips 12/10/2014   • Asthma in adult without complication 01/13/2014   • Enlarged ovary 01/13/2014   • Cystocele 09/13/2013   • Spider veins of limb 06/21/2013   • DDD (degenerative disc disease), cervical 01/19/2012   • Trigger point of thoracic region 01/19/2012   • Plantar fascia syndrome 07/01/2010   • Vitamin D deficiency 07/01/2010   • Chronic back pain 06/03/2010      Allergies:Erythromycin; Latex; Tape; and Tikosyn [dofetilide]    Current Outpatient Medications   Medication Sig Dispense Refill   • clobetasol (TEMOVATE) 0.05 % Ointment Apply to affected area twice daily till clear 60 g 3   • flecainide (TAMBOCOR) 100 MG Tab Take 1 Tab by mouth 2 times a day. 60 Tab 1   • atorvastatin (LIPITOR) 10 MG Tab TAKE 1 TABLET BY MOUTH EVERY DAY  90 Tab 3   • potassium chloride ER (KLOR-CON) 10 MEQ tablet Take 2 Tabs by mouth 1 time daily as needed. When you take 40mg of lasix.     • furosemide (LASIX) 20 MG Tab Take 2 Tabs by mouth 1 time daily as needed. As needed for weight gain >178 pounds or leg swelling or shortness of breath. 30 Tab 6   • ELIQUIS 5 MG Tab TAKE 1 TABLET BY MOUTH TWICE A  Tab 3   • carvedilol (COREG) 3.125 MG Tab Take 1 Tab by mouth 2 times a day, with meals. 180 Tab 3   • losartan (COZAAR) 25 MG Tab Take 1 Tab by mouth every day. 90 Tab 3   • fexofenadine (ALLEGRA) 180 MG tablet Take 1 Tab by mouth every day. (Patient taking differently: Take 180 mg by mouth 1 time daily as needed.) 90 Tab 3   • albuterol 108 (90 BASE) MCG/ACT Aero Soln inhalation aerosol Inhale 2 Puffs by mouth every four hours as needed. 1 Inhaler 0   • vitamin D (CHOLECALCIFEROL) 1000 UNIT TABS Take 2 Tabs by mouth every day. 60 Each 11     No current facility-administered medications for this visit.        Social History     Tobacco Use   • Smoking status: Former Smoker     Packs/day: 1.00     Years: 40.00     Pack years: 40.00     Types: Cigarettes     Last attempt to quit: 3/1/2010     Years since quittin.8   • Smokeless tobacco: Never Used   Substance Use Topics   • Alcohol use: No     Alcohol/week: 0.0 oz   • Drug use: No     Comment: tried marijuana once.      Social History     Patient does not qualify to have social determinant information on file (likely too young).   Social History Narrative    Works at [employer]       Family History   Problem Relation Age of Onset   • Heart Disease Mother         1st MI @ 59 yo   • Hypertension Mother    • Heart Attack Mother 60   • Heart Disease Maternal Aunt    • Cancer Daughter 35        breast   • Heart Disease Maternal Grandmother    • Heart Disease Maternal Grandfather    • Heart Disease Maternal Aunt 56        ROS:     - Constitutional:  Negative for fever, chills, unexpected weight change, and  "fatigue/generalized weakness.    - HEENT:  Negative for headaches, vision changes, hearing changes, ear pain, ear discharge, rhinorrhea, sinus congestion, sore throat, and neck pain.      - Respiratory: Negative for cough, sputum production, chest congestion, dyspnea, wheezing, and crackles.      - Cardiovascular: Negative for chest pain, palpitations, orthopnea, and bilateral lower extremity edema.     - Gastrointestinal: Negative for heartburn, nausea, vomiting, abdominal pain, hematochezia, melena, diarrhea, constipation, and greasy/foul-smelling stools.     - Genitourinary: Negative for dysuria, polyuria, hematuria, pyuria, urinary urgency, and urinary incontinence.     - Musculoskeletal: Negative for myalgias, back pain, and joint pain.     - Skin: Negative for rash, itching, cyanotic skin color change.     - Neurological: Negative for dizziness, tingling, tremors, focal sensory deficit, focal weakness and headaches.     - Endo/Heme/Allergies: Does not bruise/bleed easily.     - Psychiatric/Behavioral: Negative for depression, suicidal/homicidal ideation and memory loss.          - NOTE: All other systems reviewed and are negative, except as in HPI.      Exam:    /64 (BP Location: Left arm, Patient Position: Sitting, BP Cuff Size: Adult)   Pulse (!) 55   Temp 36.8 °C (98.2 °F) (Temporal)   Resp 14   Ht 1.702 m (5' 7\")   Wt 81.5 kg (179 lb 10.8 oz)   SpO2 95%  Body mass index is 28.14 kg/m².    General:  Well nourished, well developed female in NAD  Head is grossly normal.  Neck: Supple without JVD or bruit. Thyroid is not enlarged.  Pulmonary: Clear to ausculation and percussion.  Normal effort. No rales, ronchi, or wheezing.  Cardiovascular: Regular rate and rhythm without murmur. Carotid and radial pulses are intact and equal bilaterally.  Extremities: no clubbing, cyanosis, or edema.    Please note that this dictation was created using voice recognition software. I have made every reasonable " attempt to correct obvious errors, but I expect that there are errors of grammar and possibly content that I did not discover before finalizing the note.    Assessment/Plan:  1. Persistent atrial fibrillation  Now seems to be in sinus rhythm since her second atrial fib ablation.  We will continue to monitor    2. Back pain with sciatica  Patient will continue with sciatica ablation    3. Essential hypertension, benign  Controlled, continue with current meds and lifestyle.      4. Hypercholesterolemia  Need to do blood work to make certain that this is well controlled with current meds.  We will see her back in 3 months  - Comp Metabolic Panel; Future  - Lipid Profile; Future    5. Intrinsic eczema  Well-controlled with clobetasol, I wrote her own prescription for this medication.  6. Vitamin D deficiency  Unknown control, she is on 2000 units daily we will make certain that this is adequate  - VITAMIN D,25 HYDROXY; Future

## 2020-01-20 NOTE — ASSESSMENT & PLAN NOTE
* No procedures listed *. Anesthesia Post Evaluation Patient location during evaluation: PACU Note status: Adequate. Level of consciousness: responsive to verbal stimuli and sleepy but conscious Pain management: satisfactory to patient Airway patency: patent Anesthetic complications: no 
Cardiovascular status: acceptable Respiratory status: acceptable Hydration status: acceptable Comments: +Post-Anesthesia Evaluation and Assessment Patient: Nayan Aceves MRN: [id number]  SSN: [id number] YOB: 1938  Age: 79 y.o. Sex: female Cardiovascular Function/Vital Signs /61   Pulse 71   Temp 36.3 °C (97.4 °F)   Resp 18   Ht 5' 4\" (1.626 m)   Wt 78.5 kg (173 lb 1 oz)   SpO2 97%   BMI 29.71 kg/m² Patient is status post * No procedures listed *. Nausea/Vomiting: Controlled. Postoperative hydration reviewed and adequate. Pain: 
Pain Scale 1: Numeric (0 - 10) (11/15/18 1525) Pain Intensity 1: 0 (11/15/18 1525) Managed. Neurological Status:  
Neuro (WDL): Within Defined Limits (11/15/18 1525) At baseline. Mental Status and Level of Consciousness: Arousable. Pulmonary Status:  
O2 Device: Nasal cannula (11/15/18 1517) Adequate oxygenation and airway patent. Complications related to anesthesia: None Post-anesthesia assessment completed. No concerns. Signed By: Shankar Bashir MD  
 11/15/2018 Post anesthesia nausea and vomiting:  controlled Visit Vitals /61 Pulse 71 Temp 36.3 °C (97.4 °F) Resp 18 Ht 5' 4\" (1.626 m) Wt 78.5 kg (173 lb 1 oz) SpO2 97% BMI 29.71 kg/m² This is a chronic health problem for this patient where she is able to get epidurals and keep this under control.  She continues to follow at [facility].

## 2020-02-16 DIAGNOSIS — Z98.890 S/P ABLATION OF ATRIAL FIBRILLATION: ICD-10-CM

## 2020-02-16 DIAGNOSIS — I48.19 PERSISTENT ATRIAL FIBRILLATION (HCC): ICD-10-CM

## 2020-02-16 DIAGNOSIS — Z86.79 S/P ABLATION OF ATRIAL FIBRILLATION: ICD-10-CM

## 2020-02-18 ENCOUNTER — APPOINTMENT (OUTPATIENT)
Dept: RADIOLOGY | Facility: IMAGING CENTER | Age: 65
End: 2020-02-18
Attending: PHYSICIAN ASSISTANT
Payer: COMMERCIAL

## 2020-02-18 ENCOUNTER — OFFICE VISIT (OUTPATIENT)
Dept: URGENT CARE | Facility: CLINIC | Age: 65
End: 2020-02-18
Payer: COMMERCIAL

## 2020-02-18 VITALS
HEIGHT: 67 IN | DIASTOLIC BLOOD PRESSURE: 70 MMHG | HEART RATE: 68 BPM | RESPIRATION RATE: 14 BRPM | BODY MASS INDEX: 28.58 KG/M2 | SYSTOLIC BLOOD PRESSURE: 150 MMHG | WEIGHT: 182.1 LBS | OXYGEN SATURATION: 97 % | TEMPERATURE: 98.6 F

## 2020-02-18 DIAGNOSIS — Z87.09 HISTORY OF ASTHMA: ICD-10-CM

## 2020-02-18 DIAGNOSIS — R06.2 WHEEZING: ICD-10-CM

## 2020-02-18 DIAGNOSIS — J22 LOWER RESP. TRACT INFECTION: ICD-10-CM

## 2020-02-18 PROCEDURE — 99214 OFFICE O/P EST MOD 30 MIN: CPT | Mod: 25 | Performed by: PHYSICIAN ASSISTANT

## 2020-02-18 PROCEDURE — 71046 X-RAY EXAM CHEST 2 VIEWS: CPT | Mod: TC | Performed by: PHYSICIAN ASSISTANT

## 2020-02-18 PROCEDURE — 94640 AIRWAY INHALATION TREATMENT: CPT | Performed by: PHYSICIAN ASSISTANT

## 2020-02-18 RX ORDER — METHYLPREDNISOLONE 4 MG/1
TABLET ORAL
Qty: 1 PACKAGE | Refills: 0 | Status: SHIPPED | OUTPATIENT
Start: 2020-02-18 | End: 2020-06-08

## 2020-02-18 RX ORDER — DOXYCYCLINE HYCLATE 100 MG
100 TABLET ORAL 2 TIMES DAILY
Qty: 14 TAB | Refills: 0 | Status: SHIPPED | OUTPATIENT
Start: 2020-02-18 | End: 2020-02-25

## 2020-02-18 RX ORDER — FLECAINIDE ACETATE 100 MG/1
TABLET ORAL
Qty: 60 TAB | Refills: 3 | Status: SHIPPED | OUTPATIENT
Start: 2020-02-18 | End: 2020-05-18

## 2020-02-18 RX ORDER — ALBUTEROL SULFATE 2.5 MG/3ML
2.5 SOLUTION RESPIRATORY (INHALATION) ONCE
Status: COMPLETED | OUTPATIENT
Start: 2020-02-18 | End: 2020-02-18

## 2020-02-18 RX ORDER — ALBUTEROL SULFATE 90 UG/1
2 AEROSOL, METERED RESPIRATORY (INHALATION) EVERY 6 HOURS PRN
Qty: 8.5 G | Refills: 0 | Status: SHIPPED | OUTPATIENT
Start: 2020-02-18 | End: 2020-02-24 | Stop reason: SDUPTHER

## 2020-02-18 RX ADMIN — ALBUTEROL SULFATE 2.5 MG: 2.5 SOLUTION RESPIRATORY (INHALATION) at 16:42

## 2020-02-18 ASSESSMENT — ENCOUNTER SYMPTOMS
RHINORRHEA: 1
COUGH: 1
DIZZINESS: 0
SORE THROAT: 0
EYE DISCHARGE: 0
WHEEZING: 1
VOMITING: 0
FEVER: 0
MYALGIAS: 1
SHORTNESS OF BREATH: 0
SPUTUM PRODUCTION: 1
EYE REDNESS: 0
HEADACHES: 0
CHILLS: 0
NECK PAIN: 0
DIARRHEA: 0

## 2020-02-18 NOTE — PROGRESS NOTES
Subjective:      Chinyere Priest is a 64 y.o. female who presents with Cough (Wheezing, cough, Sx started approx. 3d ago. )            Patient is a pleasant 64-year-old female who presents to urgent care with cough, congestion, wheezing for the last 3 days.  Patient does report utilizing her rescue inhaler at home but believes that this is .  She does report mild relief with such.  She does report history of bronchitis, pneumonia along with history of asthma.  She has been well controlled for some time.  She does report that her  is currently ill with similar symptoms at this time.    Cough   This is a new problem. Episode onset: 3 days ago. The problem has been gradually worsening. The problem occurs every few minutes. Cough characteristics: Rare sputum productive. Associated symptoms include myalgias, nasal congestion, postnasal drip, rhinorrhea and wheezing. Pertinent negatives include no chest pain, chills, ear pain, eye redness, fever, headaches, rash, sore throat or shortness of breath. The symptoms are aggravated by lying down. She has tried OTC cough suppressant and a beta-agonist inhaler for the symptoms. The treatment provided mild relief. Her past medical history is significant for asthma, bronchitis and pneumonia.       Review of Systems   Constitutional: Positive for malaise/fatigue. Negative for chills and fever.   HENT: Positive for congestion, postnasal drip and rhinorrhea. Negative for ear discharge, ear pain and sore throat.    Eyes: Negative for discharge and redness.   Respiratory: Positive for cough, sputum production and wheezing. Negative for shortness of breath.    Cardiovascular: Negative for chest pain and leg swelling.   Gastrointestinal: Negative for diarrhea and vomiting.   Genitourinary: Negative for dysuria.   Musculoskeletal: Positive for myalgias. Negative for neck pain.   Skin: Negative for itching and rash.   Neurological: Negative for dizziness and  "headaches.          Objective:     /70   Pulse 68   Temp 37 °C (98.6 °F) (Temporal)   Resp 14   Ht 1.702 m (5' 7\")   Wt 82.6 kg (182 lb 1.6 oz)   SpO2 97%   BMI 28.52 kg/m²    PMH:  has a past medical history of Abnormal CT scan, kidney ( ), Aortic calcification (HCC) ( ), Asthma in adult, Atrial fibrillation (HCC) (2019), Chronic anticoagulation, Chronic back pain ( ), Dental disorder, Diverticulosis ( ), Essential hypertension, malignant ( ), Inflamed seborrheic keratosis ( ), Intrinsic eczema (2020), Non-rheumatic mitral regurgitation (2019), Osteoarthritis of both hips ( ), Plantar fascia syndrome ( ), Plantar wart of right foot ( ), Pruritic dermatitis ( ), Pulmonary hypertension ([facility]), Pure hypercholesterolemia ( ), S/P cardiac catheterization (2017), Spider veins of limb ( ), and Unspecified vitamin D deficiency ( ). She also has no past medical history of Diabetic neuropathy ([facility]).  MEDS: Reviewed .   ALLERGIES:   Allergies   Allergen Reactions   • Erythromycin      Abdominal pain   • Latex    • Tape      Cloth tape  Paper tape okay   • Tikosyn [Dofetilide]      SURGHX:   Past Surgical History:   Procedure Laterality Date   • LUMBAR LAMINECTOMY DISKECTOMY  2009    Performed by SUDHIR VERMA at [facility]   • GYN SURGERY       x2   • HYSTERECTOMY LAPAROSCOPY       SOCHX:  reports that she quit smoking about 9 years ago. Her smoking use included cigarettes. She has a 40.00 pack-year smoking history. She has never used smokeless tobacco. She reports that she does not drink alcohol or use drugs.  FH: Family history was reviewed, no pertinent findings to report    Physical Exam  Vitals signs reviewed.   Constitutional:       Appearance: Normal appearance. She is well-developed.   HENT:      Head: Normocephalic and atraumatic.      Ears:      Comments: Bilateral clear middle ear effusions.     Nose:      Comments: Rhinorrhea noted.     Mouth/Throat:      " Comments: Posterior oropharynx is erythematous, positive postnasal drainage.  No evidence of exudate.  Eyes:      Conjunctiva/sclera: Conjunctivae normal.      Pupils: Pupils are equal, round, and reactive to light.   Neck:      Musculoskeletal: Normal range of motion and neck supple.   Cardiovascular:      Rate and Rhythm: Normal rate and regular rhythm.      Heart sounds: No murmur.   Pulmonary:      Effort: Pulmonary effort is normal. No respiratory distress.      Breath sounds: Wheezing present. No rhonchi.      Comments: Inspiratory and expiratory wheezing throughout all lung fields.  Without rhonchi noted.  Musculoskeletal: Normal range of motion.      Right lower leg: No edema.      Left lower leg: No edema.   Lymphadenopathy:      Cervical: No cervical adenopathy.   Skin:     General: Skin is warm.      Findings: No rash.   Neurological:      Mental Status: She is alert and oriented to person, place, and time.   Psychiatric:         Mood and Affect: Mood normal.         Behavior: Behavior normal.         Thought Content: Thought content normal.              Chest x-ray:   Stable cardiomegaly.  Biapical scarring.  I have reviewed the chest x-ray and agree with the official read.  Assessment/Plan:       1. Lower resp. tract infection  - albuterol (PROVENTIL) 2.5mg/3ml nebulizer solution 2.5 mg  - DX-CHEST-2 VIEWS; Future  - doxycycline (VIBRAMYCIN) 100 MG Tab; Take 1 Tab by mouth 2 times a day for 7 days.  Dispense: 14 Tab; Refill: 0  - methylPREDNISolone (MEDROL DOSEPAK) 4 MG Tablet Therapy Pack; UAD  Dispense: 1 Package; Refill: 0  - albuterol 108 (90 Base) MCG/ACT Aero Soln inhalation aerosol; Inhale 2 Puffs by mouth every 6 hours as needed for Shortness of Breath.  Dispense: 8.5 g; Refill: 0    2. Wheezing  - albuterol (PROVENTIL) 2.5mg/3ml nebulizer solution 2.5 mg  - DX-CHEST-2 VIEWS; Future  - albuterol 108 (90 Base) MCG/ACT Aero Soln inhalation aerosol; Inhale 2 Puffs by mouth every 6 hours as needed  for Shortness of Breath.  Dispense: 8.5 g; Refill: 0    3. History of asthma    Recheck after breathing treatments today-improved air movement throughout all lung fields.  Encouraged OTC supportive meds PRN. Humidification, increase fluids, avoid night time dairy.   Discussed side effects of OTC meds and any prescribed.  Given precautionary s/sx that mandate immediate follow up and evaluation in the ED. Advised of risks of not doing so.    DDX, Supportive care, and indications for immediate follow-up discussed with patient.    Instructed to return to clinic or nearest emergency department if we are not available for any change in condition, further concerns, or worsening of symptoms.    The patient  and/or guardian demonstrated a good understanding and agreed with the treatment plan.    Please note that this dictation was created using voice recognition software. I have made every reasonable attempt to correct obvious errors, but I expect that there are errors of grammar and possibly content that I did not discover before finalizing the note.

## 2020-02-24 ENCOUNTER — TELEPHONE (OUTPATIENT)
Dept: CARDIOLOGY | Facility: PHYSICIAN GROUP | Age: 65
End: 2020-02-24

## 2020-02-24 ENCOUNTER — HOSPITAL ENCOUNTER (EMERGENCY)
Facility: MEDICAL CENTER | Age: 65
End: 2020-02-25
Attending: EMERGENCY MEDICINE
Payer: COMMERCIAL

## 2020-02-24 DIAGNOSIS — J22 LOWER RESP. TRACT INFECTION: ICD-10-CM

## 2020-02-24 DIAGNOSIS — N93.9 VAGINAL BLEEDING: ICD-10-CM

## 2020-02-24 DIAGNOSIS — R06.2 WHEEZING: ICD-10-CM

## 2020-02-24 DIAGNOSIS — I10 HYPERTENSION, UNSPECIFIED TYPE: ICD-10-CM

## 2020-02-24 LAB
ALBUMIN SERPL BCP-MCNC: 4.1 G/DL (ref 3.2–4.9)
ALBUMIN/GLOB SERPL: 1.5 G/DL
ALP SERPL-CCNC: 76 U/L (ref 30–99)
ALT SERPL-CCNC: 24 U/L (ref 2–50)
ANION GAP SERPL CALC-SCNC: 5 MMOL/L (ref 0–11.9)
APPEARANCE UR: CLEAR
AST SERPL-CCNC: 20 U/L (ref 12–45)
BASOPHILS # BLD AUTO: 0.7 % (ref 0–1.8)
BASOPHILS # BLD: 0.06 K/UL (ref 0–0.12)
BILIRUB SERPL-MCNC: 0.5 MG/DL (ref 0.1–1.5)
BILIRUB UR QL STRIP.AUTO: NEGATIVE
BUN SERPL-MCNC: 22 MG/DL (ref 8–22)
CALCIUM SERPL-MCNC: 9.4 MG/DL (ref 8.5–10.5)
CHLORIDE SERPL-SCNC: 105 MMOL/L (ref 96–112)
CO2 SERPL-SCNC: 28 MMOL/L (ref 20–33)
COLOR UR: YELLOW
CREAT SERPL-MCNC: 0.99 MG/DL (ref 0.5–1.4)
EOSINOPHIL # BLD AUTO: 0.27 K/UL (ref 0–0.51)
EOSINOPHIL NFR BLD: 3.1 % (ref 0–6.9)
ERYTHROCYTE [DISTWIDTH] IN BLOOD BY AUTOMATED COUNT: 49.1 FL (ref 35.9–50)
GLOBULIN SER CALC-MCNC: 2.8 G/DL (ref 1.9–3.5)
GLUCOSE SERPL-MCNC: 109 MG/DL (ref 65–99)
GLUCOSE UR STRIP.AUTO-MCNC: NEGATIVE MG/DL
HCT VFR BLD AUTO: 39.1 % (ref 37–47)
HGB BLD-MCNC: 12.5 G/DL (ref 12–16)
IMM GRANULOCYTES # BLD AUTO: 0.07 K/UL (ref 0–0.11)
IMM GRANULOCYTES NFR BLD AUTO: 0.8 % (ref 0–0.9)
KETONES UR STRIP.AUTO-MCNC: NEGATIVE MG/DL
LEUKOCYTE ESTERASE UR QL STRIP.AUTO: NEGATIVE
LIPASE SERPL-CCNC: 47 U/L (ref 11–82)
LYMPHOCYTES # BLD AUTO: 1.64 K/UL (ref 1–4.8)
LYMPHOCYTES NFR BLD: 19 % (ref 22–41)
MCH RBC QN AUTO: 31.3 PG (ref 27–33)
MCHC RBC AUTO-ENTMCNC: 32 G/DL (ref 33.6–35)
MCV RBC AUTO: 97.8 FL (ref 81.4–97.8)
MICRO URNS: NORMAL
MONOCYTES # BLD AUTO: 0.84 K/UL (ref 0–0.85)
MONOCYTES NFR BLD AUTO: 9.7 % (ref 0–13.4)
NEUTROPHILS # BLD AUTO: 5.76 K/UL (ref 2–7.15)
NEUTROPHILS NFR BLD: 66.7 % (ref 44–72)
NITRITE UR QL STRIP.AUTO: NEGATIVE
NRBC # BLD AUTO: 0 K/UL
NRBC BLD-RTO: 0 /100 WBC
PH UR STRIP.AUTO: 6 [PH] (ref 5–8)
PLATELET # BLD AUTO: 247 K/UL (ref 164–446)
PMV BLD AUTO: 10.6 FL (ref 9–12.9)
POTASSIUM SERPL-SCNC: 3.7 MMOL/L (ref 3.6–5.5)
PROT SERPL-MCNC: 6.9 G/DL (ref 6–8.2)
PROT UR QL STRIP: NEGATIVE MG/DL
RBC # BLD AUTO: 4 M/UL (ref 4.2–5.4)
RBC UR QL AUTO: NEGATIVE
SODIUM SERPL-SCNC: 138 MMOL/L (ref 135–145)
SP GR UR STRIP.AUTO: 1.02
TROPONIN T SERPL-MCNC: 12 NG/L (ref 6–19)
UROBILINOGEN UR STRIP.AUTO-MCNC: 1 MG/DL
WBC # BLD AUTO: 8.6 K/UL (ref 4.8–10.8)

## 2020-02-24 PROCEDURE — 93005 ELECTROCARDIOGRAM TRACING: CPT | Performed by: EMERGENCY MEDICINE

## 2020-02-24 PROCEDURE — 81003 URINALYSIS AUTO W/O SCOPE: CPT

## 2020-02-24 PROCEDURE — 36415 COLL VENOUS BLD VENIPUNCTURE: CPT

## 2020-02-24 PROCEDURE — 83690 ASSAY OF LIPASE: CPT

## 2020-02-24 PROCEDURE — 84484 ASSAY OF TROPONIN QUANT: CPT

## 2020-02-24 PROCEDURE — 85025 COMPLETE CBC W/AUTO DIFF WBC: CPT

## 2020-02-24 PROCEDURE — 80053 COMPREHEN METABOLIC PANEL: CPT

## 2020-02-24 PROCEDURE — 99284 EMERGENCY DEPT VISIT MOD MDM: CPT

## 2020-02-24 RX ORDER — ALBUTEROL SULFATE 90 UG/1
2 AEROSOL, METERED RESPIRATORY (INHALATION) EVERY 6 HOURS PRN
Qty: 8.5 G | Refills: 11 | Status: SHIPPED | OUTPATIENT
Start: 2020-02-24 | End: 2023-01-13 | Stop reason: SDUPTHER

## 2020-02-25 VITALS
DIASTOLIC BLOOD PRESSURE: 52 MMHG | HEART RATE: 65 BPM | OXYGEN SATURATION: 100 % | TEMPERATURE: 97.5 F | WEIGHT: 188.93 LBS | SYSTOLIC BLOOD PRESSURE: 109 MMHG | HEIGHT: 67 IN | RESPIRATION RATE: 16 BRPM | BODY MASS INDEX: 29.65 KG/M2

## 2020-02-25 NOTE — ED PROVIDER NOTES
ED Provider Note    Scribed for Peter Gentile M.D. by Owen Tristan. 2/24/2020, 10:30 PM.    Primary care provider: Keila Mccrary M.D.  Means of arrival: Walk In  History obtained from: Patient  History limited by: None    CHIEF COMPLAINT  Chief Complaint   Patient presents with   • Vaginal Bleeding       HPI  Chinyere Priest is a 64 y.o. female with history of hypertension and partial hysterectomy 16 years ago who presents to the Emergency Department complaining of vaginal bleeding onset earlier this evening. Patient reports that she was drying off after showering, and noticed blood on her towel. Upon closer inspection, the patient noticed it was vaginal bleeding. She denies any rectal bleeding. The patient also denies any recent NSAID use, trauma, falls, or sexual intercourse. At presentation, the patient endorses a mild headache with shortness of breath earlier this afternoon, mild left sided back pain, and right lower quadrant abdominal pain, but denies any vaginal pain, hematuria, melena, hematochezia, recent weight loss, or chest pain. Patient notes that she had similar right lower quadrant abdominal pain a few weeks ago, which subsided then, before returning earlier this afternoon. The patient reports that she has just finished her prescription of doxycycline yesterday for a recent diagnosis of bronchitis. She is currently taking Eliquis and Losartan as daily medications. Patient denies taking any extra caffeine today. The patient additonanly denies any history of abnormal pap smears or diagnosis of STD's.     REVIEW OF SYSTEMS  Pertinent positives include vaginal bleeding, mild headache with shortness of breath earlier this afternoon, mild left sided back pain, and right lower quadrant abdominal pain. Pertinent negatives include vaginal pain, hematuria, melena, hematochezia, recent weight loss, or chest pain. All other systems negative.    PAST MEDICAL HISTORY   has a past medical history of  Abnormal CT scan, kidney ( ), Aortic calcification (HCC) ( ), Asthma in adult, Atrial fibrillation (HCC) (2019), Chronic anticoagulation, Chronic back pain ( ), Dental disorder, Diverticulosis ( ), Essential hypertension, malignant ( ), Inflamed seborrheic keratosis ( ), Intrinsic eczema (2020), Non-rheumatic mitral regurgitation (2019), Osteoarthritis of both hips ( ), Plantar fascia syndrome ( ), Plantar wart of right foot ( ), Pruritic dermatitis ( ), Pulmonary hypertension (HCC), Pure hypercholesterolemia ( ), S/P cardiac catheterization (2017), Spider veins of limb ( ), and Unspecified vitamin D deficiency ( ).    SURGICAL HISTORY   has a past surgical history that includes lumbar laminectomy diskectomy (2009); gyn surgery; and hysterectomy laparoscopy.    SOCIAL HISTORY  Social History     Tobacco Use   • Smoking status: Former Smoker     Packs/day: 1.00     Years: 40.00     Pack years: 40.00     Types: Cigarettes     Last attempt to quit: 3/1/2010     Years since quittin.9   • Smokeless tobacco: Never Used   Substance Use Topics   • Alcohol use: No     Alcohol/week: 0.0 oz   • Drug use: No     Comment: tried marijuana once.       Social History     Substance and Sexual Activity   Drug Use No    Comment: tried marijuana once.        FAMILY HISTORY  Family History   Problem Relation Age of Onset   • Heart Disease Mother         1st MI @ 61 yo   • Hypertension Mother    • Heart Attack Mother 60   • Heart Disease Maternal Aunt    • Cancer Daughter 35        breast   • Heart Disease Maternal Grandmother    • Heart Disease Maternal Grandfather    • Heart Disease Maternal Aunt 56       CURRENT MEDICATIONS  No current facility-administered medications for this encounter.     Current Outpatient Medications:   •  albuterol 108 (90 Base) MCG/ACT Aero Soln inhalation aerosol, Inhale 2 Puffs by mouth every 6 hours as needed for Shortness of Breath., Disp: 8.5 g, Rfl: 11  •  flecainide (TAMBOCOR)  "100 MG Tab, TAKE 1 TABLET BY MOUTH TWICE A DAY, Disp: 60 Tab, Rfl: 3  •  doxycycline (VIBRAMYCIN) 100 MG Tab, Take 1 Tab by mouth 2 times a day for 7 days., Disp: 14 Tab, Rfl: 0  •  methylPREDNISolone (MEDROL DOSEPAK) 4 MG Tablet Therapy Pack, UAD, Disp: 1 Package, Rfl: 0  •  clobetasol (TEMOVATE) 0.05 % Ointment, Apply to affected area twice daily till clear, Disp: 60 g, Rfl: 3  •  atorvastatin (LIPITOR) 10 MG Tab, TAKE 1 TABLET BY MOUTH EVERY DAY, Disp: 90 Tab, Rfl: 3  •  potassium chloride ER (KLOR-CON) 10 MEQ tablet, Take 2 Tabs by mouth 1 time daily as needed. When you take 40mg of lasix., Disp: , Rfl:   •  furosemide (LASIX) 20 MG Tab, Take 2 Tabs by mouth 1 time daily as needed. As needed for weight gain >178 pounds or leg swelling or shortness of breath., Disp: 30 Tab, Rfl: 6  •  ELIQUIS 5 MG Tab, TAKE 1 TABLET BY MOUTH TWICE A DAY, Disp: 180 Tab, Rfl: 3  •  carvedilol (COREG) 3.125 MG Tab, Take 1 Tab by mouth 2 times a day, with meals., Disp: 180 Tab, Rfl: 3  •  losartan (COZAAR) 25 MG Tab, Take 1 Tab by mouth every day., Disp: 90 Tab, Rfl: 3  •  albuterol 108 (90 BASE) MCG/ACT Aero Soln inhalation aerosol, Inhale 2 Puffs by mouth every four hours as needed., Disp: 1 Inhaler, Rfl: 0  •  vitamin D (CHOLECALCIFEROL) 1000 UNIT TABS, Take 2 Tabs by mouth every day., Disp: 60 Each, Rfl: 11     ALLERGIES  Allergies   Allergen Reactions   • Erythromycin      Abdominal pain   • Latex    • Tape      Cloth tape  Paper tape okay   • Tikosyn [Dofetilide]        PHYSICAL EXAM  VITAL SIGNS: BP (!) 204/108 Comment: Hx of high BP  Pulse 67   Temp 36.4 °C (97.5 °F) (Temporal)   Resp 16   Ht 1.702 m (5' 7\")   Wt 85.7 kg (188 lb 15 oz)   SpO2 98%   BMI 29.59 kg/m²     Constitutional: Well developed, Well nourished, mild distress.   HENT: Normocephalic, Atraumatic, Oropharynx moist.   Eyes: Conjunctiva normal, No discharge.    Cardiovascular: Normal heart rate, Normal rhythm, No murmurs, equal pulses.   Pulmonary: " Normal breath sounds, No respiratory distress, No wheezing, No rales, No rhonchi.  Chest: No chest wall tenderness or deformity.   Abdomen:Soft, slight right lower quadrant tenderness, No masses, no rebound, no guarding.   Back: No CVA tenderness.   Musculoskeletal: No major deformities noted, No tenderness.   :  Patient has normal female genitalia with no cuts or sores on vulva. Vaginal vault is a blind pocket with no blood in vault. No lacerations, cuts, or scrapes are noted.  Rectal: Normal rectal tone noted and guaiac negative.   Skin: Warm, Dry, No erythema, No rash. No bruising.  Small cut on the left hand  Neurologic: Alert & oriented x 3, Normal motor function,  No focal deficits noted.   Psychiatric: Affect normal, Judgment normal, Mood normal.     LABS  Results for orders placed or performed during the hospital encounter of 02/24/20   CBC WITH DIFFERENTIAL   Result Value Ref Range    WBC 8.6 4.8 - 10.8 K/uL    RBC 4.00 (L) 4.20 - 5.40 M/uL    Hemoglobin 12.5 12.0 - 16.0 g/dL    Hematocrit 39.1 37.0 - 47.0 %    MCV 97.8 81.4 - 97.8 fL    MCH 31.3 27.0 - 33.0 pg    MCHC 32.0 (L) 33.6 - 35.0 g/dL    RDW 49.1 35.9 - 50.0 fL    Platelet Count 247 164 - 446 K/uL    MPV 10.6 9.0 - 12.9 fL    Neutrophils-Polys 66.70 44.00 - 72.00 %    Lymphocytes 19.00 (L) 22.00 - 41.00 %    Monocytes 9.70 0.00 - 13.40 %    Eosinophils 3.10 0.00 - 6.90 %    Basophils 0.70 0.00 - 1.80 %    Immature Granulocytes 0.80 0.00 - 0.90 %    Nucleated RBC 0.00 /100 WBC    Neutrophils (Absolute) 5.76 2.00 - 7.15 K/uL    Lymphs (Absolute) 1.64 1.00 - 4.80 K/uL    Monos (Absolute) 0.84 0.00 - 0.85 K/uL    Eos (Absolute) 0.27 0.00 - 0.51 K/uL    Baso (Absolute) 0.06 0.00 - 0.12 K/uL    Immature Granulocytes (abs) 0.07 0.00 - 0.11 K/uL    NRBC (Absolute) 0.00 K/uL   COMP METABOLIC PANEL   Result Value Ref Range    Sodium 138 135 - 145 mmol/L    Potassium 3.7 3.6 - 5.5 mmol/L    Chloride 105 96 - 112 mmol/L    Co2 28 20 - 33 mmol/L    Anion Gap  5.0 0.0 - 11.9    Glucose 109 (H) 65 - 99 mg/dL    Bun 22 8 - 22 mg/dL    Creatinine 0.99 0.50 - 1.40 mg/dL    Calcium 9.4 8.5 - 10.5 mg/dL    AST(SGOT) 20 12 - 45 U/L    ALT(SGPT) 24 2 - 50 U/L    Alkaline Phosphatase 76 30 - 99 U/L    Total Bilirubin 0.5 0.1 - 1.5 mg/dL    Albumin 4.1 3.2 - 4.9 g/dL    Total Protein 6.9 6.0 - 8.2 g/dL    Globulin 2.8 1.9 - 3.5 g/dL    A-G Ratio 1.5 g/dL   LIPASE   Result Value Ref Range    Lipase 47 11 - 82 U/L   URINALYSIS,CULTURE IF INDICATED   Result Value Ref Range    Color Yellow     Character Clear     Specific Gravity 1.021 <1.035    Ph 6.0 5.0 - 8.0    Glucose Negative Negative mg/dL    Ketones Negative Negative mg/dL    Protein Negative Negative mg/dL    Bilirubin Negative Negative    Urobilinogen, Urine 1.0 Negative    Nitrite Negative Negative    Leukocyte Esterase Negative Negative    Occult Blood Negative Negative    Micro Urine Req see below    ESTIMATED GFR   Result Value Ref Range    GFR If African American >60 >60 mL/min/1.73 m 2    GFR If Non  56 (A) >60 mL/min/1.73 m 2   TROPONIN   Result Value Ref Range    Troponin T 12 6 - 19 ng/L   EKG (NOW)   Result Value Ref Range    Report       [facility] Emergency Dept.    Test Date:  2020  Pt Name:    DEANA PATTERSON           Department: ER  MRN:        [id number]                      Room:       [facility]  Gender:     Female                       Technician: [id number]  :        [id number]                   Requested By:ESTEBAN TURCIOS  Order #:    [id number]                    Reading MD:    Measurements  Intervals                                Axis  Rate:       57                           P:          0  ID:         [id number]                          QRS:        -12  QRSD:       100                          T:          41  QT:         468  QTc:        456    Interpretive Statements  SINUS BRADYCARDIA  FIRST DEGREE AV BLOCK  LEFT VENTRICULAR HYPERTROPHY  Compared to ECG 2020  09:50:04  Left ventricular hypertrophy now present  Myocardial infarct finding no longer present  T-wave abnormality no longer present        All labs reviewed by me.    EKG  12 Lead EKG interpreted by me as shown above.    COURSE & MEDICAL DECISION MAKING  Pertinent Labs & Imaging studies reviewed. (See chart for details)    8:15 PM - Ordered CBC w/ Diff, CMP, Estimated GFR, Lipase, and Urinalysis to evaluate.    10:30 PM - Patient seen and examined at bedside. Ordered Troponin and EKG to evaluate her symptoms. The differential diagnoses include but are not limited to: hypertensive urgency, vaginal laceration, cancer, and rectal bleeding. Discussed with the patient that her resulted labs are reassuring at this time. Conveyed to the patient that I will order EKG and perform pelvic exam in order to further evaluate. Patient agrees to plan of care today.     12:38 AM - Performed pelvic and rectal exam at this time with ED Tech chaperone. Patient has normal female genitalia with no cuts or sores on vulva. Vaginal vault has blank pocket with no blood in vault. No lacerations, cuts, or scrapes are noted. Normal rectal tone noted and guaiac negative. Discussed with the patient that her physical exam findings and other exams are reassuring at this time. There are no acute abnormalities that require any interventions. I have conveyed to the patient that she is safe for discharge at this time with instructions to follow up with her PCP regarding her presentation here today. She has been instructed regarding any symptom alleviation following discharge today, and she has been given extensive return precautions. Patient is agreeable and understanding to discharge today. She has been advised to follow up with her PCP regarding her blood pressure.     Medical Decision Making: At this point time I do not find any acute bleeding, I do not see any reason for the patient had bleeding either.  Rectal exam was also negative for  bleeding.  Patient's hemodynamically stable and has a normal white blood cell count.  It is possible that the bleeding came from somewhere else like a cut on her hand she does have a small cut on her left hand that perhaps she did not notice as a source of the blood.  Patient's blood pressures come down.  She does not show any acute endorgan damage from her blood pressure at this point time will discharge her to follow-up with her primary    The patient will return for new or worsening symptoms and is stable at the time of discharge.    The patient is referred to a primary physician for blood pressure management, diabetic screening, and for all other preventative health concerns.    DISPOSITION:  Patient will be discharged home in stable condition.    FOLLOW UP:  Keila Mccrary M.D.  [address]  [address]  [facility] [id number] [phone]    Schedule an appointment as soon as possible for a visit in 1 week  To have your blood pressure rechecked.      FINAL IMPRESSION  1. Vaginal bleeding    2. Hypertension, unspecified type          I, Owen Tristan (Naomi), am scribing for, and in the presence of, Peter Gentile M.D.    Electronically signed by: Owen Tristan (Naomi), 2/24/2020    IPeter M.D. personally performed the services described in this documentation, as scribed by Owen Tristan in my presence, and it is both accurate and complete.    C    The note accurately reflects work and decisions made by me.  Peter Gentile M.D.  2/25/2020  4:38 AM

## 2020-02-25 NOTE — ED TRIAGE NOTES
"Chief Complaint   Patient presents with   • Vaginal Bleeding     Pt reports sudden onset of BRB from vagina this evening after getting out of the shower that lasted approximately 2-3 minutes and then resolved. Pt now reporting some RLQ pain. Denies dysuria, N/V or fever. Pt has HX of partial hysterectomy. Pt educated on triage process and placed back in lobby, pt encourage to alert staff with changes in condition.      BP (!) 204/108 Comment: Hx of high BP  Pulse 67   Temp 36.4 °C (97.5 °F) (Temporal)   Resp 16   Ht 1.702 m (5' 7\")   Wt 85.7 kg (188 lb 15 oz)   SpO2 98%   BMI 29.59 kg/m²     "

## 2020-02-25 NOTE — DISCHARGE INSTRUCTIONS
Have your blood pressure rechecked by your doctor next week. Return if you have chest pain, shortness of breath, difficulty breathing, stroke symptoms, or more bleeding.

## 2020-02-26 ENCOUNTER — OFFICE VISIT (OUTPATIENT)
Dept: MEDICAL GROUP | Facility: MEDICAL CENTER | Age: 65
End: 2020-02-26
Payer: COMMERCIAL

## 2020-02-26 VITALS
SYSTOLIC BLOOD PRESSURE: 148 MMHG | HEIGHT: 67 IN | WEIGHT: 182.98 LBS | RESPIRATION RATE: 14 BRPM | TEMPERATURE: 98.7 F | HEART RATE: 72 BPM | OXYGEN SATURATION: 96 % | BODY MASS INDEX: 28.72 KG/M2 | DIASTOLIC BLOOD PRESSURE: 80 MMHG

## 2020-02-26 DIAGNOSIS — I83.899 BLEEDING FROM VARICOSE VEIN: ICD-10-CM

## 2020-02-26 DIAGNOSIS — I10 ESSENTIAL HYPERTENSION, BENIGN: ICD-10-CM

## 2020-02-26 PROCEDURE — 99214 OFFICE O/P EST MOD 30 MIN: CPT | Performed by: FAMILY MEDICINE

## 2020-02-26 RX ORDER — LOSARTAN POTASSIUM 50 MG/1
50 TABLET ORAL DAILY
Qty: 90 TAB | Refills: 3 | Status: SHIPPED | OUTPATIENT
Start: 2020-02-26 | End: 2021-02-18 | Stop reason: SDUPTHER

## 2020-02-26 NOTE — ASSESSMENT & PLAN NOTE
This is a chronic health problem for this patient where she is on carvedilol 3.125 mg twice daily and losartan 25 mg daily.  She also has Lasix 40 mg that she can take as needed peripheral edema.  She has been having problems with her blood pressure running slightly high such as today where it is 148/80.  I would like to double her losartan up to the 50 mg dose and check her back in 2 weeks.  She will continue with carvedilol at current dosing.

## 2020-02-27 NOTE — PROGRESS NOTES
CC:Diagnoses of Bleeding from varicose vein and Essential hypertension, benign were pertinent to this visit.    HISTORY OF PRESENT ILLNESS: Patient is a 64 y.o. female established patient who presents today to talk about bleeding that she is having in the vaginal area that occurs after she takes her shower and is drying off.  She does not have bleeding throughout the day.  She was seen in the emergency room for this and they could not discover anything to explain what was going on.  She is also concerned because her blood pressures not staying well controlled.      Bleeding from varicose vein  This is an acute problem that the patient was seen in the emergency room on 2/24/2020 because after she got out of the shower she had bright red blood coming from the vaginal area.  Patient's had a previous hysterectomy does not retain her cervix.  She had a full work-up in the emergency room and they did a exam and could not see any reason that she would have been bleeding.  Patient brings with her pictures of how the towel looked and it definitely has bright red blood.  After doing her exam today she does have 2 small varicose veins in the left labia majora that may be irritated by the towel when she goes to dry off.  Were going to have her just pat that area and see if that would keep the bleeding from occurring.  I do not think this is anything to be overly concerned about but I do not want her to continue to worry about bleeding.    Essential hypertension, benign  This is a chronic health problem for this patient where she is on carvedilol 3.125 mg twice daily and losartan 25 mg daily.  She also has Lasix 40 mg that she can take as needed peripheral edema.  She has been having problems with her blood pressure running slightly high such as today where it is 148/80.  I would like to double her losartan up to the 50 mg dose and check her back in 2 weeks.  She will continue with carvedilol at current dosing.      Patient  Active Problem List    Diagnosis Date Noted   • Localized edema 09/09/2019     Priority: High   • S/P ablation of atrial fibrillation 06/20/2019     Priority: High   • Chronic anticoagulation 10/30/2017     Priority: High   • Hypercholesterolemia 06/19/2017     Priority: High   • Essential hypertension, benign 05/25/2017     Priority: High   • Pulmonary hypertension (HCC) 08/28/2015     Priority: High   • S/P cardiac catheterization 05/26/2017     Priority: Medium   • Non-rheumatic mitral regurgitation 05/25/2017     Priority: Medium   • Aortic calcification (HCC) 05/15/2015     Priority: Medium   • Bleeding from varicose vein 02/26/2020   • Intrinsic eczema 01/20/2020   • Atrial fibrillation (HCC) 06/01/2019   • Degenerative disc disease, lumbar 01/25/2017   • Back pain with sciatica 01/25/2017   • Family history of ovarian cancer 11/21/2016   • Family history of breast cancer 11/21/2016   • Decreased right ventricular systolic function 01/14/2016   • Abnormal CT scan, kidney 05/15/2015   • Diverticulosis 05/15/2015   • Osteoarthritis of both hips 12/10/2014   • Asthma in adult without complication 01/13/2014   • Enlarged ovary 01/13/2014   • Cystocele 09/13/2013   • Spider veins of limb 06/21/2013   • DDD (degenerative disc disease), cervical 01/19/2012   • Trigger point of thoracic region 01/19/2012   • Plantar fascia syndrome 07/01/2010   • Vitamin D deficiency 07/01/2010   • Chronic back pain 06/03/2010      Allergies:Erythromycin; Latex; Tape; and Tikosyn [dofetilide]    Current Outpatient Medications   Medication Sig Dispense Refill   • losartan (COZAAR) 50 MG Tab Take 1 Tab by mouth every day. 90 Tab 3   • albuterol 108 (90 Base) MCG/ACT Aero Soln inhalation aerosol Inhale 2 Puffs by mouth every 6 hours as needed for Shortness of Breath. 8.5 g 11   • flecainide (TAMBOCOR) 100 MG Tab TAKE 1 TABLET BY MOUTH TWICE A DAY 60 Tab 3   • methylPREDNISolone (MEDROL DOSEPAK) 4 MG Tablet Therapy Pack UAD 1 Package 0    • clobetasol (TEMOVATE) 0.05 % Ointment Apply to affected area twice daily till clear 60 g 3   • atorvastatin (LIPITOR) 10 MG Tab TAKE 1 TABLET BY MOUTH EVERY DAY 90 Tab 3   • potassium chloride ER (KLOR-CON) 10 MEQ tablet Take 2 Tabs by mouth 1 time daily as needed. When you take 40mg of lasix.     • furosemide (LASIX) 20 MG Tab Take 2 Tabs by mouth 1 time daily as needed. As needed for weight gain >178 pounds or leg swelling or shortness of breath. 30 Tab 6   • ELIQUIS 5 MG Tab TAKE 1 TABLET BY MOUTH TWICE A  Tab 3   • carvedilol (COREG) 3.125 MG Tab Take 1 Tab by mouth 2 times a day, with meals. 180 Tab 3   • albuterol 108 (90 BASE) MCG/ACT Aero Soln inhalation aerosol Inhale 2 Puffs by mouth every four hours as needed. 1 Inhaler 0   • vitamin D (CHOLECALCIFEROL) 1000 UNIT TABS Take 2 Tabs by mouth every day. 60 Each 11     No current facility-administered medications for this visit.        Social History     Tobacco Use   • Smoking status: Former Smoker     Packs/day: 1.00     Years: 40.00     Pack years: 40.00     Types: Cigarettes     Last attempt to quit: 3/1/2010     Years since quittin.9   • Smokeless tobacco: Never Used   Substance Use Topics   • Alcohol use: No     Alcohol/week: 0.0 oz   • Drug use: No     Comment: tried marijuana once.      Social History     Social History Narrative    Works at [employer]       Family History   Problem Relation Age of Onset   • Heart Disease Mother         1st MI @ 59 yo   • Hypertension Mother    • Heart Attack Mother 60   • Heart Disease Maternal Aunt    • Cancer Daughter 35        breast   • Heart Disease Maternal Grandmother    • Heart Disease Maternal Grandfather    • Heart Disease Maternal Aunt 56        ROS:     - Constitutional:  Negative for fever, chills, unexpected weight change, and fatigue/generalized weakness.    - HEENT:  Negative for headaches, vision changes, hearing changes, ear pain, ear discharge, rhinorrhea, sinus congestion, sore throat, and  "neck pain.      - Respiratory: Negative for cough, sputum production, chest congestion, dyspnea, wheezing, and crackles.      - Cardiovascular: Negative for chest pain, palpitations, orthopnea, and bilateral lower extremity edema.     - Gastrointestinal: Negative for heartburn, nausea, vomiting, abdominal pain, hematochezia, melena, diarrhea, constipation, and greasy/foul-smelling stools.     - Genitourinary: Negative for dysuria, polyuria, hematuria, pyuria, urinary urgency, and urinary incontinence.     - Musculoskeletal: Negative for myalgias, back pain, and joint pain.     - Skin: Negative for rash, itching, cyanotic skin color change.     - Neurological: Negative for dizziness, tingling, tremors, focal sensory deficit, focal weakness and headaches.     - Endo/Heme/Allergies: Does not bruise/bleed easily.     - Psychiatric/Behavioral: Negative for depression, suicidal/homicidal ideation and memory loss.          - NOTE: All other systems reviewed and are negative, except as in HPI.      Exam:    /80 (BP Location: Left arm, Patient Position: Sitting, BP Cuff Size: Adult)   Pulse 72   Temp 37.1 °C (98.7 °F)   Resp 14   Ht 1.702 m (5' 7\")   Wt 83 kg (182 lb 15.7 oz)   SpO2 96%  Body mass index is 28.66 kg/m².    General:  Well nourished, well developed female in NAD  Head is grossly normal.  Neck: Supple without JVD or bruit. Thyroid is not enlarged.  Pulmonary: Clear to ausculation and percussion.  Normal effort. No rales, ronchi, or wheezing.  Cardiovascular: Regular rate and rhythm without murmur. Carotid and radial pulses are intact and equal bilaterally.  Pelvic exam:  Perineum: Labia majora on the left has 2 varicose veins that have nodularity and may be bleeding, everything else is without lesions.    Vagina: Vaginal vault is poorly rugated and light in color  Cervix: Surgically absent  Bimanual: No asses no pain with palpation  Please note that this dictation was created using voice recognition " software. I have made every reasonable attempt to correct obvious errors, but I expect that there are errors of grammar and possibly content that I did not discover before finalizing the note.    Assessment/Plan:  1. Bleeding from varicose vein  Uncontrolled, patient strength going to be just more careful with friction see if that can resolve this problem    2. Essential hypertension, benign  Increasing losartan to 50 mg daily continue carvedilol and see her back in 2 weeks

## 2020-03-05 ENCOUNTER — APPOINTMENT (OUTPATIENT)
Dept: CARDIOLOGY | Facility: PHYSICIAN GROUP | Age: 65
End: 2020-03-05
Payer: COMMERCIAL

## 2020-03-16 ENCOUNTER — APPOINTMENT (OUTPATIENT)
Dept: MEDICAL GROUP | Facility: MEDICAL CENTER | Age: 65
End: 2020-03-16
Payer: COMMERCIAL

## 2020-03-18 LAB — EKG IMPRESSION: NORMAL

## 2020-04-13 ENCOUNTER — APPOINTMENT (OUTPATIENT)
Dept: CARDIOLOGY | Facility: MEDICAL CENTER | Age: 65
End: 2020-04-13
Payer: COMMERCIAL

## 2020-04-21 ENCOUNTER — APPOINTMENT (OUTPATIENT)
Dept: SLEEP MEDICINE | Facility: MEDICAL CENTER | Age: 65
End: 2020-04-21
Payer: COMMERCIAL

## 2020-05-16 DIAGNOSIS — Z98.890 S/P ABLATION OF ATRIAL FIBRILLATION: ICD-10-CM

## 2020-05-16 DIAGNOSIS — I48.19 PERSISTENT ATRIAL FIBRILLATION (HCC): ICD-10-CM

## 2020-05-16 DIAGNOSIS — Z86.79 S/P ABLATION OF ATRIAL FIBRILLATION: ICD-10-CM

## 2020-05-18 RX ORDER — FLECAINIDE ACETATE 100 MG/1
TABLET ORAL
Qty: 180 TAB | Refills: 2 | Status: SHIPPED | OUTPATIENT
Start: 2020-05-18 | End: 2020-09-28 | Stop reason: SDUPTHER

## 2020-06-03 ENCOUNTER — HOSPITAL ENCOUNTER (OUTPATIENT)
Dept: LAB | Facility: MEDICAL CENTER | Age: 65
End: 2020-06-03
Attending: FAMILY MEDICINE
Payer: COMMERCIAL

## 2020-06-03 DIAGNOSIS — E78.00 HYPERCHOLESTEROLEMIA: ICD-10-CM

## 2020-06-03 DIAGNOSIS — E55.9 VITAMIN D DEFICIENCY: Chronic | ICD-10-CM

## 2020-06-03 PROCEDURE — 80053 COMPREHEN METABOLIC PANEL: CPT

## 2020-06-03 PROCEDURE — 36415 COLL VENOUS BLD VENIPUNCTURE: CPT

## 2020-06-03 PROCEDURE — 82306 VITAMIN D 25 HYDROXY: CPT

## 2020-06-03 PROCEDURE — 80061 LIPID PANEL: CPT

## 2020-06-04 LAB
25(OH)D3 SERPL-MCNC: 29 NG/ML (ref 30–100)
ALBUMIN SERPL BCP-MCNC: 4.2 G/DL (ref 3.2–4.9)
ALBUMIN/GLOB SERPL: 1.6 G/DL
ALP SERPL-CCNC: 74 U/L (ref 30–99)
ALT SERPL-CCNC: 23 U/L (ref 2–50)
ANION GAP SERPL CALC-SCNC: 9 MMOL/L (ref 7–16)
AST SERPL-CCNC: 30 U/L (ref 12–45)
BILIRUB SERPL-MCNC: 0.7 MG/DL (ref 0.1–1.5)
BUN SERPL-MCNC: 17 MG/DL (ref 8–22)
CALCIUM SERPL-MCNC: 9.7 MG/DL (ref 8.5–10.5)
CHLORIDE SERPL-SCNC: 106 MMOL/L (ref 96–112)
CHOLEST SERPL-MCNC: 138 MG/DL (ref 100–199)
CO2 SERPL-SCNC: 26 MMOL/L (ref 20–33)
CREAT SERPL-MCNC: 0.85 MG/DL (ref 0.5–1.4)
FASTING STATUS PATIENT QL REPORTED: NORMAL
GLOBULIN SER CALC-MCNC: 2.7 G/DL (ref 1.9–3.5)
GLUCOSE SERPL-MCNC: 86 MG/DL (ref 65–99)
HDLC SERPL-MCNC: 65 MG/DL
LDLC SERPL CALC-MCNC: 62 MG/DL
POTASSIUM SERPL-SCNC: 4.2 MMOL/L (ref 3.6–5.5)
PROT SERPL-MCNC: 6.9 G/DL (ref 6–8.2)
SODIUM SERPL-SCNC: 141 MMOL/L (ref 135–145)
TRIGL SERPL-MCNC: 53 MG/DL (ref 0–149)

## 2020-06-08 ENCOUNTER — OFFICE VISIT (OUTPATIENT)
Dept: MEDICAL GROUP | Facility: PHYSICIAN GROUP | Age: 65
End: 2020-06-08
Payer: COMMERCIAL

## 2020-06-08 VITALS
WEIGHT: 187 LBS | DIASTOLIC BLOOD PRESSURE: 88 MMHG | TEMPERATURE: 98.2 F | BODY MASS INDEX: 29.35 KG/M2 | HEIGHT: 67 IN | SYSTOLIC BLOOD PRESSURE: 144 MMHG | RESPIRATION RATE: 12 BRPM | OXYGEN SATURATION: 95 % | HEART RATE: 100 BPM

## 2020-06-08 DIAGNOSIS — E78.00 HYPERCHOLESTEROLEMIA: ICD-10-CM

## 2020-06-08 DIAGNOSIS — I48.19 PERSISTENT ATRIAL FIBRILLATION (HCC): ICD-10-CM

## 2020-06-08 DIAGNOSIS — E55.9 VITAMIN D DEFICIENCY: Chronic | ICD-10-CM

## 2020-06-08 DIAGNOSIS — I10 ESSENTIAL HYPERTENSION, BENIGN: ICD-10-CM

## 2020-06-08 PROBLEM — I48.91 ATRIAL FIBRILLATION (HCC): Status: RESOLVED | Noted: 2019-06-01 | Resolved: 2020-06-08

## 2020-06-08 PROCEDURE — 99214 OFFICE O/P EST MOD 30 MIN: CPT | Performed by: FAMILY MEDICINE

## 2020-06-08 RX ORDER — SPIRONOLACTONE 100 MG/1
100 TABLET, FILM COATED ORAL DAILY
Qty: 30 TAB | Refills: 3 | Status: SHIPPED | OUTPATIENT
Start: 2020-06-08 | End: 2020-06-22

## 2020-06-08 ASSESSMENT — FIBROSIS 4 INDEX: FIB4 SCORE: 1.65

## 2020-06-08 NOTE — ASSESSMENT & PLAN NOTE
This is a chronic health problem for which the patient is on atorvastatin just 10 mg daily but it has her cholesterol under excellent control.  Total cholesterol is 138, triglycerides 53, HDL is up to 65 (greater than 50 is protective) and her LDL is down to 62, less than 70 is acceptable because she does have known heart disease.  We will continue with current dosing.  There is no liver dysfunction.

## 2020-06-08 NOTE — ASSESSMENT & PLAN NOTE
This is a chronic health problem for this patient for which he is on vitamin D supplements at 2000 units daily.  Her vitamin D level is still borderline low at 29 I would like to see that between 40-80.  She is going to go up to 4000 units and we will plan to recheck that for her in approximately 3 months.

## 2020-06-08 NOTE — PROGRESS NOTES
CC:Diagnoses of Vitamin D deficiency, Essential hypertension, benign, Persistent atrial fibrillation (HCC), and Hypercholesterolemia were pertinent to this visit.    HISTORY OF PRESENT ILLNESS: Patient is a 64 y.o. female established patient who presents today to about her chronic health problems and to discuss her labs that were done on 6/3/2020.      Vitamin D deficiency  This is a chronic health problem for this patient for which he is on vitamin D supplements at 2000 units daily.  Her vitamin D level is still borderline low at 29 I would like to see that between 40-80.  She is going to go up to 4000 units and we will plan to recheck that for her in approximately 3 months.    Essential hypertension, benign  This is a chronic health problem for this patient that is not adequately controlled.  Blood pressure today 144/88.  She has noted that she continues to have edema and so she had lowered her dose of flecainide to 100 mg once a day instead of twice a day.  I have asked for her to go back up on that medication and we will stop Lasix and potassium replacement put her on Spironolactone 100 mg daily and in 2 weeks we will have her MyChart me some blood pressure readings.  Our goal is to have her less than 130/90.  If she has reached that we will have her continue with that dosing otherwise we may need to increase her losartan    Atrial fibrillation (HCC)  This is a chronic health problem where the patient had been back in normal sinus rhythm was placed on flecainide 100 mg twice daily.  In April she noted on her Apple Watch that her atrial fib was back and she will be seeing cardiology in the near future.  She is also going to get back on her flecainide at 100 mg twice daily rather than daily.    Hypercholesterolemia  This is a chronic health problem for which the patient is on atorvastatin just 10 mg daily but it has her cholesterol under excellent control.  Total cholesterol is 138, triglycerides 53, HDL is up to  65 (greater than 50 is protective) and her LDL is down to 62, less than 70 is acceptable because she does have known heart disease.  We will continue with current dosing.  There is no liver dysfunction.      Patient Active Problem List    Diagnosis Date Noted   • Localized edema 09/09/2019     Priority: High   • S/P ablation of atrial fibrillation 06/20/2019     Priority: High   • Chronic anticoagulation 10/30/2017     Priority: High   • Hypercholesterolemia 06/19/2017     Priority: High   • Essential hypertension, benign 05/25/2017     Priority: High   • Pulmonary hypertension (HCC) 08/28/2015     Priority: High   • S/P cardiac catheterization 05/26/2017     Priority: Medium   • Non-rheumatic mitral regurgitation 05/25/2017     Priority: Medium   • Aortic calcification (HCC) 05/15/2015     Priority: Medium   • Bleeding from varicose vein 02/26/2020   • Intrinsic eczema 01/20/2020   • Degenerative disc disease, lumbar 01/25/2017   • Back pain with sciatica 01/25/2017   • Family history of ovarian cancer 11/21/2016   • Family history of breast cancer 11/21/2016   • Decreased right ventricular systolic function 01/14/2016   • Abnormal CT scan, kidney 05/15/2015   • Diverticulosis 05/15/2015   • Osteoarthritis of both hips 12/10/2014   • Asthma in adult without complication 01/13/2014   • Enlarged ovary 01/13/2014   • Cystocele 09/13/2013   • Spider veins of limb 06/21/2013   • DDD (degenerative disc disease), cervical 01/19/2012   • Trigger point of thoracic region 01/19/2012   • Plantar fascia syndrome 07/01/2010   • Vitamin D deficiency 07/01/2010   • Chronic back pain 06/03/2010      Allergies:Erythromycin; Latex; Tape; and Tikosyn [dofetilide]    Current Outpatient Medications   Medication Sig Dispense Refill   • spironolactone (ALDACTONE) 100 MG Tab Take 1 Tab by mouth every day. 30 Tab 3   • flecainide (TAMBOCOR) 100 MG Tab TAKE 1 TABLET BY MOUTH TWICE A  Tab 2   • losartan (COZAAR) 50 MG Tab Take 1 Tab  by mouth every day. 90 Tab 3   • albuterol 108 (90 Base) MCG/ACT Aero Soln inhalation aerosol Inhale 2 Puffs by mouth every 6 hours as needed for Shortness of Breath. 8.5 g 11   • clobetasol (TEMOVATE) 0.05 % Ointment Apply to affected area twice daily till clear 60 g 3   • atorvastatin (LIPITOR) 10 MG Tab TAKE 1 TABLET BY MOUTH EVERY DAY 90 Tab 3   • ELIQUIS 5 MG Tab TAKE 1 TABLET BY MOUTH TWICE A  Tab 3   • carvedilol (COREG) 3.125 MG Tab Take 1 Tab by mouth 2 times a day, with meals. 180 Tab 3   • albuterol 108 (90 BASE) MCG/ACT Aero Soln inhalation aerosol Inhale 2 Puffs by mouth every four hours as needed. 1 Inhaler 0   • vitamin D (CHOLECALCIFEROL) 1000 UNIT TABS Take 2 Tabs by mouth every day. 60 Each 11     No current facility-administered medications for this visit.        Social History     Tobacco Use   • Smoking status: Former Smoker     Packs/day: 1.00     Years: 40.00     Pack years: 40.00     Types: Cigarettes     Last attempt to quit: 3/1/2010     Years since quitting: 10.2   • Smokeless tobacco: Never Used   Substance Use Topics   • Alcohol use: No     Alcohol/week: 0.0 oz   • Drug use: No     Comment: tried marijuana once.      Social History     Social History Narrative    Works at [employer]       Family History   Problem Relation Age of Onset   • Heart Disease Mother         1st MI @ 59 yo   • Hypertension Mother    • Heart Attack Mother 60   • Heart Disease Maternal Aunt    • Cancer Daughter 35        breast   • Heart Disease Maternal Grandmother    • Heart Disease Maternal Grandfather    • Heart Disease Maternal Aunt 56        ROS:     - Constitutional:  Negative for fever, chills, unexpected weight change, and fatigue/generalized weakness.    - HEENT:  Negative for headaches, vision changes, hearing changes, ear pain, ear discharge, rhinorrhea, sinus congestion, sore throat, and neck pain.      - Respiratory: Negative for cough, sputum production, chest congestion, dyspnea, wheezing, and  "crackles.      - Cardiovascular: Negative for chest pain, palpitations, orthopnea, and bilateral lower extremity edema.     - Gastrointestinal: Negative for heartburn, nausea, vomiting, abdominal pain, hematochezia, melena, diarrhea, constipation, and greasy/foul-smelling stools.     - Genitourinary: Negative for dysuria, polyuria, hematuria, pyuria, urinary urgency, and urinary incontinence.     - Musculoskeletal: Negative for myalgias, back pain, and joint pain.     - Skin: Negative for rash, itching, cyanotic skin color change.     - Neurological: Negative for dizziness, tingling, tremors, focal sensory deficit, focal weakness and headaches.     - Endo/Heme/Allergies: Does not bruise/bleed easily.     - Psychiatric/Behavioral: Negative for depression, suicidal/homicidal ideation and memory loss.          - NOTE: All other systems reviewed and are negative, except as in HPI.      Exam:    /88 (BP Location: Left arm, Patient Position: Sitting, BP Cuff Size: Adult)   Pulse 100   Temp 36.8 °C (98.2 °F) (Temporal)   Resp 12   Ht 1.702 m (5' 7\")   Wt 84.8 kg (187 lb)   SpO2 95%  Body mass index is 29.29 kg/m².    General:  Well nourished, well developed female in NAD  Head is grossly normal.  Neck: Supple without JVD or bruit. Thyroid is not enlarged.  Pulmonary: Clear to ausculation and percussion.  Normal effort. No rales, ronchi, or wheezing.  Cardiovascular: Regular rate and rhythm without murmur. Carotid and radial pulses are intact and equal bilaterally.  Extremities: no clubbing, cyanosis, or edema.  LABS: 6/3/2020: Results reviewed and discussed with the patient, questions answered.    Please note that this dictation was created using voice recognition software. I have made every reasonable attempt to correct obvious errors, but I expect that there are errors of grammar and possibly content that I did not discover before finalizing the note.    Assessment/Plan:  1. Vitamin D " deficiency  Uncontrolled, patient is going to increase her vitamin D to 4000 units daily and we will recheck in 3 months.  - VITAMIN D,25 HYDROXY; Future    2. Essential hypertension, benign  Uncontrolled, I am going to take the patient off of furosemide 40 mg daily and off of her potassium and put her on Spironolactone 100 mg daily hopefully will get better blood pressure control and control some of her pedal edema    3. Persistent atrial fibrillation (HCC)  Patient having episodes of atrial fib despite recent ablation.  I suspect it has to do with her on making the change to lower her dose of flecainide.  She will get back on 100 mg twice daily as it was originally prescribed.    4. Hypercholesterolemia  Controlled, continue with current meds and lifestyle.    - Comp Metabolic Panel; Future

## 2020-06-08 NOTE — ASSESSMENT & PLAN NOTE
This is a chronic health problem for this patient that is not adequately controlled.  Blood pressure today 144/88.  She has noted that she continues to have edema and so she had lowered her dose of flecainide to 100 mg once a day instead of twice a day.  I have asked for her to go back up on that medication and we will stop Lasix and potassium replacement put her on Spironolactone 100 mg daily and in 2 weeks we will have her MyChart me some blood pressure readings.  Our goal is to have her less than 130/90.  If she has reached that we will have her continue with that dosing otherwise we may need to increase her losartan

## 2020-06-08 NOTE — ASSESSMENT & PLAN NOTE
This is a chronic health problem where the patient had been back in normal sinus rhythm was placed on flecainide 100 mg twice daily.  In April she noted on her Apple Watch that her atrial fib was back and she will be seeing cardiology in the near future.  She is also going to get back on her flecainide at 100 mg twice daily rather than daily.

## 2020-06-11 ENCOUNTER — PATIENT MESSAGE (OUTPATIENT)
Dept: MEDICAL GROUP | Facility: PHYSICIAN GROUP | Age: 65
End: 2020-06-11

## 2020-06-22 ENCOUNTER — OFFICE VISIT (OUTPATIENT)
Dept: CARDIOLOGY | Facility: MEDICAL CENTER | Age: 65
End: 2020-06-22
Payer: COMMERCIAL

## 2020-06-22 VITALS
SYSTOLIC BLOOD PRESSURE: 136 MMHG | OXYGEN SATURATION: 95 % | BODY MASS INDEX: 28.72 KG/M2 | HEART RATE: 66 BPM | DIASTOLIC BLOOD PRESSURE: 62 MMHG | HEIGHT: 67 IN | WEIGHT: 183 LBS

## 2020-06-22 DIAGNOSIS — Z86.79 S/P ABLATION OF ATRIAL FIBRILLATION: ICD-10-CM

## 2020-06-22 DIAGNOSIS — Z98.890 S/P CARDIAC CATHETERIZATION: ICD-10-CM

## 2020-06-22 DIAGNOSIS — I10 ESSENTIAL HYPERTENSION, BENIGN: ICD-10-CM

## 2020-06-22 DIAGNOSIS — Z98.890 S/P ABLATION OF ATRIAL FIBRILLATION: ICD-10-CM

## 2020-06-22 DIAGNOSIS — Z79.01 CHRONIC ANTICOAGULATION: ICD-10-CM

## 2020-06-22 DIAGNOSIS — I48.19 OTHER PERSISTENT ATRIAL FIBRILLATION (HCC): ICD-10-CM

## 2020-06-22 LAB — EKG IMPRESSION: NORMAL

## 2020-06-22 PROCEDURE — 99214 OFFICE O/P EST MOD 30 MIN: CPT | Performed by: INTERNAL MEDICINE

## 2020-06-22 PROCEDURE — 93000 ELECTROCARDIOGRAM COMPLETE: CPT | Performed by: INTERNAL MEDICINE

## 2020-06-22 RX ORDER — SPIRONOLACTONE 25 MG/1
25 TABLET ORAL DAILY
Qty: 90 TAB | Refills: 3 | Status: SHIPPED | OUTPATIENT
Start: 2020-06-22 | End: 2021-02-12

## 2020-06-22 ASSESSMENT — FIBROSIS 4 INDEX: FIB4 SCORE: 1.65

## 2020-06-22 NOTE — PROGRESS NOTES
"Chief Complaint   Patient presents with   • Atrial Fibrillation     Follow up       Subjective:   Chinyere Priest is a 64 y.o. female who presents today with history of ablation x2.  Has an iWatch.  I reviewed the strips all sinus rhythm. Hypertension.  Recently stopped Lasix and placed on Aldactone 100 mg a day.  Mild dizziness on Aldactone.  Blood pressures at home have been fine.  Dizziness is on standing.    Past Medical History:   Diagnosis Date   • Abnormal CT scan, kidney      \"Incidentally noted retroaortic left renal vein is identified. Some calcified plaque is noted in the aorta and its branch vessels.\"  CT scan done for diverticulosis     • Aortic calcification (HCC)      Noted incidentally on abdominal CT scan.   • Asthma in adult         • Atrial fibrillation (HCC) 2019    Status post ablation by Dr. Steele.   • Chronic anticoagulation    • Chronic back pain     • Dental disorder     Full upper/lower dentures.   • Diverticulosis     • Essential hypertension, malignant     • Inflamed seborrheic keratosis     • Intrinsic eczema 2020   • Non-rheumatic mitral regurgitation 2019    Echocardiogram with normal LV size, LVEF 65%. Moderate MR, mild-moderate TR, unchanged from previous.   • Osteoarthritis of both hips     • Plantar fascia syndrome     • Plantar wart of right foot     • Pruritic dermatitis      Ongoing for several months but worse at night on her lower extremities   • Pulmonary hypertension (HCC)    • Pure hypercholesterolemia     • S/P cardiac catheterization 2017    1.  Normal right heart pressures. 3.  Essentially normal coronary arteries. 2.  Left ventricular ejection fraction 65%.    • Spider veins of limb     • Unspecified vitamin D deficiency       Past Surgical History:   Procedure Laterality Date   • LUMBAR LAMINECTOMY DISKECTOMY  2009    Performed by SUDHIR VERMA at SURGERY [facility] ORS   • GYN SURGERY       x2   • HYSTERECTOMY LAPAROSCOPY   "     Family History   Problem Relation Age of Onset   • Heart Disease Mother         1st MI @ 61 yo   • Hypertension Mother    • Heart Attack Mother 60   • Heart Disease Maternal Aunt    • Cancer Daughter 35        breast   • Heart Disease Maternal Grandmother    • Heart Disease Maternal Grandfather    • Heart Disease Maternal Aunt 56     Social History     Socioeconomic History   • Marital status:      Spouse name: Not on file   • Number of children: Not on file   • Years of education: Not on file   • Highest education level: Not on file   Occupational History   • Not on file   Social Needs   • Financial resource strain: Not on file   • Food insecurity     Worry: Never true     Inability: Never true   • Transportation needs     Medical: No     Non-medical: No   Tobacco Use   • Smoking status: Former Smoker     Packs/day: 1.00     Years: 40.00     Pack years: 40.00     Types: Cigarettes     Last attempt to quit: 3/1/2010     Years since quitting: 10.3   • Smokeless tobacco: Never Used   Substance and Sexual Activity   • Alcohol use: No     Alcohol/week: 0.0 oz   • Drug use: No     Comment: tried marijuana once.    • Sexual activity: Yes     Partners: Male     Birth control/protection: Post-Menopausal     Comment: , works at [employer]   • Physical activity     Days per week: Not on file     Minutes per session: Not on file   • Stress: Not on file   Relationships   • Social connections     Talks on phone: Not on file     Gets together: Not on file     Attends Bahai service: Not on file     Active member of club or organization: Not on file     Attends meetings of clubs or organizations: Not on file     Relationship status: Not on file   • Intimate partner violence     Fear of current or ex partner: Not on file     Emotionally abused: Not on file     Physically abused: Not on file     Forced sexual activity: Not on file   Other Topics Concern   •  Service Not Asked   • Blood Transfusions  "Not Asked   • Caffeine Concern Not Asked   • Occupational Exposure Not Asked   • Hobby Hazards Not Asked   • Sleep Concern Not Asked   • Stress Concern Not Asked   • Weight Concern Not Asked   • Special Diet Not Asked   • Back Care Not Asked   • Exercise No   • Bike Helmet Not Asked   • Seat Belt Not Asked   • Self-Exams Not Asked   Social History Narrative    Works at [employer]     Allergies   Allergen Reactions   • Erythromycin      Abdominal pain   • Latex    • Tape      Cloth tape  Paper tape okay   • Tikosyn [Dofetilide]      Outpatient Encounter Medications as of 6/22/2020   Medication Sig Dispense Refill   • spironolactone (ALDACTONE) 25 MG Tab Take 1 Tab by mouth every day. 90 Tab 3   • flecainide (TAMBOCOR) 100 MG Tab TAKE 1 TABLET BY MOUTH TWICE A  Tab 2   • losartan (COZAAR) 50 MG Tab Take 1 Tab by mouth every day. 90 Tab 3   • clobetasol (TEMOVATE) 0.05 % Ointment Apply to affected area twice daily till clear 60 g 3   • atorvastatin (LIPITOR) 10 MG Tab TAKE 1 TABLET BY MOUTH EVERY DAY 90 Tab 3   • ELIQUIS 5 MG Tab TAKE 1 TABLET BY MOUTH TWICE A  Tab 3   • carvedilol (COREG) 3.125 MG Tab Take 1 Tab by mouth 2 times a day, with meals. 180 Tab 3   • albuterol 108 (90 BASE) MCG/ACT Aero Soln inhalation aerosol Inhale 2 Puffs by mouth every four hours as needed. 1 Inhaler 0   • vitamin D (CHOLECALCIFEROL) 1000 UNIT TABS Take 2 Tabs by mouth every day. 60 Each 11   • [DISCONTINUED] spironolactone (ALDACTONE) 100 MG Tab Take 1 Tab by mouth every day. 30 Tab 3   • albuterol 108 (90 Base) MCG/ACT Aero Soln inhalation aerosol Inhale 2 Puffs by mouth every 6 hours as needed for Shortness of Breath. 8.5 g 11     No facility-administered encounter medications on file as of 6/22/2020.      ROS     Objective:   /62 (BP Location: Right arm, Patient Position: Sitting, BP Cuff Size: Adult)   Pulse 66   Ht 1.702 m (5' 7\")   Wt 83 kg (183 lb)   SpO2 95%   BMI 28.66 kg/m²     Physical Exam "   Constitutional: She is oriented to person, place, and time. She appears well-developed and well-nourished.   HENT:   Head: Normocephalic and atraumatic.   Eyes: Pupils are equal, round, and reactive to light. EOM are normal.   Neck: Normal range of motion. Neck supple.   Cardiovascular: Normal rate, regular rhythm, normal heart sounds and intact distal pulses. Exam reveals no gallop and no friction rub.   No murmur heard.  Pulmonary/Chest: Effort normal and breath sounds normal.   Abdominal: Soft. Bowel sounds are normal.   Musculoskeletal: Normal range of motion.         General: No edema.   Neurological: She is alert and oriented to person, place, and time. No cranial nerve deficit.   Skin: Skin is warm.   Psychiatric: She has a normal mood and affect. Her behavior is normal. Judgment and thought content normal.       Assessment:     1. Other persistent atrial fibrillation (HCC)  EKG   2. S/P ablation of atrial fibrillation     3. S/P cardiac catheterization     4. Chronic anticoagulation     5. Essential hypertension, benign         Medical Decision Making:  Today's Assessment / Status / Plan:   1.  Hypertension.  Cut Aldactone to 25 mg a day.  Chemistries pending.  2.  Atrial fibrillation status post ablation.  Has an iWatch.  Continue flecainide.  3.  Anticoagulation continue.  4.  Possible sleep apnea study has been ordered not done yet.  5.  Follow-up in 3 months.

## 2020-07-02 ENCOUNTER — OFFICE VISIT (OUTPATIENT)
Dept: CARDIOLOGY | Facility: PHYSICIAN GROUP | Age: 65
End: 2020-07-02
Payer: COMMERCIAL

## 2020-07-02 VITALS
HEIGHT: 67 IN | DIASTOLIC BLOOD PRESSURE: 64 MMHG | OXYGEN SATURATION: 95 % | WEIGHT: 185 LBS | HEART RATE: 54 BPM | SYSTOLIC BLOOD PRESSURE: 116 MMHG | BODY MASS INDEX: 29.03 KG/M2

## 2020-07-02 DIAGNOSIS — I10 ESSENTIAL HYPERTENSION, BENIGN: ICD-10-CM

## 2020-07-02 DIAGNOSIS — Z98.890 S/P ABLATION OF ATRIAL FIBRILLATION: ICD-10-CM

## 2020-07-02 DIAGNOSIS — R60.0 LOCALIZED EDEMA: ICD-10-CM

## 2020-07-02 DIAGNOSIS — Z79.01 CHRONIC ANTICOAGULATION: ICD-10-CM

## 2020-07-02 DIAGNOSIS — E78.00 HYPERCHOLESTEROLEMIA: ICD-10-CM

## 2020-07-02 DIAGNOSIS — Z86.79 S/P ABLATION OF ATRIAL FIBRILLATION: ICD-10-CM

## 2020-07-02 PROCEDURE — 99214 OFFICE O/P EST MOD 30 MIN: CPT | Performed by: NURSE PRACTITIONER

## 2020-07-02 RX ORDER — SPIRONOLACTONE 100 MG/1
TABLET, FILM COATED ORAL
COMMUNITY
Start: 2020-07-01 | End: 2020-07-02

## 2020-07-02 ASSESSMENT — ENCOUNTER SYMPTOMS
BRUISES/BLEEDS EASILY: 0
HEADACHES: 0
INSOMNIA: 0
CHILLS: 0
NAUSEA: 0
FEVER: 0
LOSS OF CONSCIOUSNESS: 0
SHORTNESS OF BREATH: 0
NERVOUS/ANXIOUS: 1
PALPITATIONS: 0
DIZZINESS: 0
ORTHOPNEA: 0
COUGH: 0
MYALGIAS: 0
ABDOMINAL PAIN: 0
PND: 0

## 2020-07-02 ASSESSMENT — FIBROSIS 4 INDEX: FIB4 SCORE: 1.65

## 2020-09-08 ENCOUNTER — HOSPITAL ENCOUNTER (OUTPATIENT)
Dept: LAB | Facility: MEDICAL CENTER | Age: 65
End: 2020-09-08
Attending: FAMILY MEDICINE
Payer: COMMERCIAL

## 2020-09-08 DIAGNOSIS — E55.9 VITAMIN D DEFICIENCY: Chronic | ICD-10-CM

## 2020-09-08 DIAGNOSIS — E78.00 HYPERCHOLESTEROLEMIA: ICD-10-CM

## 2020-09-08 LAB
25(OH)D3 SERPL-MCNC: 42 NG/ML (ref 30–100)
ALBUMIN SERPL BCP-MCNC: 4.5 G/DL (ref 3.2–4.9)
ALBUMIN/GLOB SERPL: 1.6 G/DL
ALP SERPL-CCNC: 78 U/L (ref 30–99)
ALT SERPL-CCNC: 17 U/L (ref 2–50)
ANION GAP SERPL CALC-SCNC: 12 MMOL/L (ref 7–16)
AST SERPL-CCNC: 21 U/L (ref 12–45)
BILIRUB SERPL-MCNC: 0.4 MG/DL (ref 0.1–1.5)
BUN SERPL-MCNC: 26 MG/DL (ref 8–22)
CALCIUM SERPL-MCNC: 9.6 MG/DL (ref 8.5–10.5)
CHLORIDE SERPL-SCNC: 102 MMOL/L (ref 96–112)
CO2 SERPL-SCNC: 25 MMOL/L (ref 20–33)
CREAT SERPL-MCNC: 1.03 MG/DL (ref 0.5–1.4)
FASTING STATUS PATIENT QL REPORTED: NORMAL
GLOBULIN SER CALC-MCNC: 2.8 G/DL (ref 1.9–3.5)
GLUCOSE SERPL-MCNC: 91 MG/DL (ref 65–99)
POTASSIUM SERPL-SCNC: 4.1 MMOL/L (ref 3.6–5.5)
PROT SERPL-MCNC: 7.3 G/DL (ref 6–8.2)
SODIUM SERPL-SCNC: 139 MMOL/L (ref 135–145)

## 2020-09-08 PROCEDURE — 80053 COMPREHEN METABOLIC PANEL: CPT

## 2020-09-08 PROCEDURE — 82306 VITAMIN D 25 HYDROXY: CPT

## 2020-09-08 PROCEDURE — 36415 COLL VENOUS BLD VENIPUNCTURE: CPT

## 2020-09-14 ENCOUNTER — OFFICE VISIT (OUTPATIENT)
Dept: MEDICAL GROUP | Facility: PHYSICIAN GROUP | Age: 65
End: 2020-09-14
Payer: COMMERCIAL

## 2020-09-14 VITALS
WEIGHT: 185 LBS | DIASTOLIC BLOOD PRESSURE: 80 MMHG | BODY MASS INDEX: 29.03 KG/M2 | OXYGEN SATURATION: 95 % | HEIGHT: 67 IN | HEART RATE: 60 BPM | RESPIRATION RATE: 14 BRPM | SYSTOLIC BLOOD PRESSURE: 132 MMHG | TEMPERATURE: 97.1 F

## 2020-09-14 DIAGNOSIS — E55.9 VITAMIN D DEFICIENCY: Chronic | ICD-10-CM

## 2020-09-14 DIAGNOSIS — E78.00 HYPERCHOLESTEROLEMIA: ICD-10-CM

## 2020-09-14 DIAGNOSIS — N18.30 CKD (CHRONIC KIDNEY DISEASE) STAGE 3, GFR 30-59 ML/MIN: ICD-10-CM

## 2020-09-14 DIAGNOSIS — I10 ESSENTIAL HYPERTENSION, BENIGN: ICD-10-CM

## 2020-09-14 PROCEDURE — 99214 OFFICE O/P EST MOD 30 MIN: CPT | Performed by: FAMILY MEDICINE

## 2020-09-14 ASSESSMENT — FIBROSIS 4 INDEX: FIB4 SCORE: 1.36

## 2020-09-14 NOTE — ASSESSMENT & PLAN NOTE
This is a chronic health problem for this patient where she was on only 2000 units of vitamin D at her last set of labs and her level was low at 29.  She is now doing 4000 units every day and her level has come up to 42.  Our goal is to keep her between 40-80.  She will continue with current supplements.

## 2020-09-14 NOTE — ASSESSMENT & PLAN NOTE
This is a new problem for this patient and she realizes that she is cut back on her water drinking.  Her BUN went up to 26 whereas previously it was normal at 17 and her GFR went down to 54 whereas previously it was normal at greater than 60.  She is going to get back on doing her water drinking as she had previously and this should recover in the coming 4 months.

## 2020-09-14 NOTE — PROGRESS NOTES
CC:Diagnoses of CKD (chronic kidney disease) stage 3, GFR 30-59 ml/min ([facility]), Vitamin D deficiency, Essential hypertension, benign, and Hypercholesterolemia were pertinent to this visit.    HISTORY OF PRESENT ILLNESS: Patient is a 65 y.o. female established patient who presents today to talk about her chronic health problems and to review her labs that were done on 9/8/2020      CKD (chronic kidney disease) stage 3, GFR 30-59 ml/min ([facility])  This is a new problem for this patient and she realizes that she is cut back on her water drinking.  Her BUN went up to 26 whereas previously it was normal at 17 and her GFR went down to 54 whereas previously it was normal at greater than 60.  She is going to get back on doing her water drinking as she had previously and this should recover in the coming 4 months.    Vitamin D deficiency  This is a chronic health problem for this patient where she was on only 2000 units of vitamin D at her last set of labs and her level was low at 29.  She is now doing 4000 units every day and her level has come up to 42.  Our goal is to keep her between 40-80.  She will continue with current supplements.    Essential hypertension, benign  This is a chronic health problem that is well controlled with current medications and lifestyle measures. Her BP is excellent at 132/80.    Hypercholesterolemia  We will plan to do lab work in 4 months when the patient returns      Patient Active Problem List    Diagnosis Date Noted   • Localized edema 09/09/2019     Priority: High   • S/P ablation of atrial fibrillation 06/20/2019     Priority: High   • Chronic anticoagulation 10/30/2017     Priority: High   • Hypercholesterolemia 06/19/2017     Priority: High   • Essential hypertension, benign 05/25/2017     Priority: High   • Pulmonary hypertension (HCC) 08/28/2015     Priority: High   • S/P cardiac catheterization 05/26/2017     Priority: Medium   • Non-rheumatic mitral regurgitation 05/25/2017     Priority:  Medium   • Aortic calcification ([facility]) 05/15/2015     Priority: Medium   • CKD (chronic kidney disease) stage 3, GFR 30-59 ml/min ([facility]) 09/14/2020   • Bleeding from varicose vein 02/26/2020   • Intrinsic eczema 01/20/2020   • Degenerative disc disease, lumbar 01/25/2017   • Back pain with sciatica 01/25/2017   • Family history of ovarian cancer 11/21/2016   • Family history of breast cancer 11/21/2016   • Decreased right ventricular systolic function 01/14/2016   • Abnormal CT scan, kidney 05/15/2015   • Diverticulosis 05/15/2015   • Osteoarthritis of both hips 12/10/2014   • Asthma in adult without complication 01/13/2014   • Enlarged ovary 01/13/2014   • Cystocele 09/13/2013   • Spider veins of limb 06/21/2013   • DDD (degenerative disc disease), cervical 01/19/2012   • Trigger point of thoracic region 01/19/2012   • Plantar fascia syndrome 07/01/2010   • Vitamin D deficiency 07/01/2010   • Chronic back pain 06/03/2010      Allergies:Erythromycin, Latex, Tape, and Tikosyn [dofetilide]    Current Outpatient Medications   Medication Sig Dispense Refill   • Cholecalciferol 100 MCG (4000 UT) Cap Take 4,000 Units by mouth.     • spironolactone (ALDACTONE) 25 MG Tab Take 1 Tab by mouth every day. 90 Tab 3   • flecainide (TAMBOCOR) 100 MG Tab TAKE 1 TABLET BY MOUTH TWICE A  Tab 2   • losartan (COZAAR) 50 MG Tab Take 1 Tab by mouth every day. 90 Tab 3   • albuterol 108 (90 Base) MCG/ACT Aero Soln inhalation aerosol Inhale 2 Puffs by mouth every 6 hours as needed for Shortness of Breath. 8.5 g 11   • clobetasol (TEMOVATE) 0.05 % Ointment Apply to affected area twice daily till clear 60 g 3   • atorvastatin (LIPITOR) 10 MG Tab TAKE 1 TABLET BY MOUTH EVERY DAY 90 Tab 3   • ELIQUIS 5 MG Tab TAKE 1 TABLET BY MOUTH TWICE A  Tab 3   • carvedilol (COREG) 3.125 MG Tab Take 1 Tab by mouth 2 times a day, with meals. 180 Tab 3     No current facility-administered medications for this visit.        Social History      Tobacco Use   • Smoking status: Former Smoker     Packs/day: 1.00     Years: 40.00     Pack years: 40.00     Types: Cigarettes     Quit date: 3/1/2010     Years since quitting: 10.5   • Smokeless tobacco: Never Used   Substance Use Topics   • Alcohol use: No     Alcohol/week: 0.0 oz   • Drug use: No     Comment: tried marijuana once.      Social History     Social History Narrative    Works at [employer]       Family History   Problem Relation Age of Onset   • Heart Disease Mother         1st MI @ 59 yo   • Hypertension Mother    • Heart Attack Mother 60   • Heart Disease Maternal Aunt    • Cancer Daughter 35        breast   • Heart Disease Maternal Grandmother    • Heart Disease Maternal Grandfather    • Heart Disease Maternal Aunt 56        ROS:     - Constitutional:  Negative for fever, chills, unexpected weight change, and fatigue/generalized weakness.    - HEENT:  Negative for headaches, vision changes, hearing changes, ear pain, ear discharge, rhinorrhea, sinus congestion, sore throat, and neck pain.      - Respiratory: Negative for cough, sputum production, chest congestion, dyspnea, wheezing, and crackles.      - Cardiovascular: Negative for chest pain, palpitations, orthopnea, and bilateral lower extremity edema.     - Gastrointestinal: Negative for heartburn, nausea, vomiting, abdominal pain, hematochezia, melena, diarrhea, constipation, and greasy/foul-smelling stools.     - Genitourinary: Negative for dysuria, polyuria, hematuria, pyuria, urinary urgency, and urinary incontinence.     - Musculoskeletal: Negative for myalgias, back pain, and joint pain.     - Skin: Negative for rash, itching, cyanotic skin color change.     - Neurological: Negative for dizziness, tingling, tremors, focal sensory deficit, focal weakness and headaches.     - Endo/Heme/Allergies: Does not bruise/bleed easily.     - Psychiatric/Behavioral: Negative for depression, suicidal/homicidal ideation and memory loss.          - NOTE: All  "other systems reviewed and are negative, except as in HPI.      Exam:    /80 (BP Location: Right arm, Patient Position: Sitting, BP Cuff Size: Adult)   Pulse 60   Temp 36.2 °C (97.1 °F) (Temporal)   Resp 14   Ht 1.702 m (5' 7\")   Wt 83.9 kg (185 lb)   SpO2 95%  Body mass index is 28.98 kg/m².    General:  Well nourished, well developed female in NAD  Head is grossly normal.  Neck: Supple without JVD or bruit. Thyroid is not enlarged.  Pulmonary: Clear to ausculation and percussion.  Normal effort. No rales, ronchi, or wheezing.  Cardiovascular: Regular rate and rhythm without murmur. Carotid and radial pulses are intact and equal bilaterally.  Extremities: no clubbing, cyanosis, or edema.  LABS: 9/8/2020: Results reviewed and discussed with the patient, questions answered.    Please note that this dictation was created using voice recognition software. I have made every reasonable attempt to correct obvious errors, but I expect that there are errors of grammar and possibly content that I did not discover before finalizing the note.    Assessment/Plan:  1. CKD (chronic kidney disease) stage 3, GFR 30-59 ml/min ([facility])  This is a new problem for the patient that is secondary to her not getting in her fluids during the day.  She is going to work on this and we will recheck in 4 months.    2. Vitamin D deficiency  Adequately controlled with current replacement dose    3. Essential hypertension, benign  Controlled, continue with current meds and lifestyle.      4. Hypercholesterolemia  We will plan to recheck labs in 4 months and see her back in the clinic  - Comp Metabolic Panel; Future  - Lipid Profile; Future         "

## 2020-09-14 NOTE — ASSESSMENT & PLAN NOTE
This is a chronic health problem that is well controlled with current medications and lifestyle measures. Her BP is excellent at 132/80.

## 2020-09-24 DIAGNOSIS — I10 ESSENTIAL HYPERTENSION, BENIGN: ICD-10-CM

## 2020-09-24 RX ORDER — CARVEDILOL 3.12 MG/1
3.12 TABLET ORAL 2 TIMES DAILY WITH MEALS
Qty: 180 TAB | Refills: 3 | Status: SHIPPED | OUTPATIENT
Start: 2020-09-24 | End: 2020-09-28 | Stop reason: SDUPTHER

## 2020-09-28 ENCOUNTER — OFFICE VISIT (OUTPATIENT)
Dept: CARDIOLOGY | Facility: MEDICAL CENTER | Age: 65
End: 2020-09-28
Payer: COMMERCIAL

## 2020-09-28 VITALS
WEIGHT: 183 LBS | OXYGEN SATURATION: 96 % | BODY MASS INDEX: 28.72 KG/M2 | SYSTOLIC BLOOD PRESSURE: 122 MMHG | HEIGHT: 67 IN | HEART RATE: 52 BPM | DIASTOLIC BLOOD PRESSURE: 74 MMHG

## 2020-09-28 DIAGNOSIS — I48.19 PERSISTENT ATRIAL FIBRILLATION (HCC): ICD-10-CM

## 2020-09-28 DIAGNOSIS — Z98.890 S/P ABLATION OF ATRIAL FIBRILLATION: ICD-10-CM

## 2020-09-28 DIAGNOSIS — Z79.01 CHRONIC ANTICOAGULATION: ICD-10-CM

## 2020-09-28 DIAGNOSIS — Z86.79 S/P ABLATION OF ATRIAL FIBRILLATION: ICD-10-CM

## 2020-09-28 DIAGNOSIS — E78.00 HYPERCHOLESTEROLEMIA: ICD-10-CM

## 2020-09-28 DIAGNOSIS — I10 ESSENTIAL HYPERTENSION, BENIGN: ICD-10-CM

## 2020-09-28 DIAGNOSIS — I70.0 AORTIC CALCIFICATION (HCC): ICD-10-CM

## 2020-09-28 DIAGNOSIS — I48.19 OTHER PERSISTENT ATRIAL FIBRILLATION (HCC): ICD-10-CM

## 2020-09-28 DIAGNOSIS — N18.30 CKD (CHRONIC KIDNEY DISEASE) STAGE 3, GFR 30-59 ML/MIN: ICD-10-CM

## 2020-09-28 LAB — EKG IMPRESSION: NORMAL

## 2020-09-28 PROCEDURE — 93000 ELECTROCARDIOGRAM COMPLETE: CPT | Performed by: INTERNAL MEDICINE

## 2020-09-28 PROCEDURE — 99214 OFFICE O/P EST MOD 30 MIN: CPT | Performed by: INTERNAL MEDICINE

## 2020-09-28 RX ORDER — CARVEDILOL 3.12 MG/1
3.12 TABLET ORAL 2 TIMES DAILY WITH MEALS
Qty: 180 TAB | Refills: 3 | Status: SHIPPED | OUTPATIENT
Start: 2020-09-28 | End: 2021-10-18

## 2020-09-28 RX ORDER — FLECAINIDE ACETATE 100 MG/1
TABLET ORAL
Qty: 180 TAB | Refills: 2 | Status: SHIPPED | OUTPATIENT
Start: 2020-09-28 | End: 2021-08-16

## 2020-09-28 RX ORDER — ATORVASTATIN CALCIUM 10 MG/1
10 TABLET, FILM COATED ORAL
Qty: 90 TAB | Refills: 3 | Status: SHIPPED | OUTPATIENT
Start: 2020-09-28 | End: 2021-12-06

## 2020-09-28 ASSESSMENT — FIBROSIS 4 INDEX: FIB4 SCORE: 1.36

## 2020-09-28 NOTE — PROGRESS NOTES
Chief Complaint   Patient presents with   • Atrial Fibrillation     F/V DX:PERSISTENT AFIB       Subjective:   Chinyere Priest is a 65 y.o. female who presents today with history of persistent atrial fibrillation status post ablation x2.  Maintaining sinus rhythm.  Feels well.  Mild renal insufficiency possibly related to Aldactone.  Hypertension well treated.  On flecainide and NOAC.  No chest pain or shortness of breath.  No palpitations.    Past Medical History:   Diagnosis Date   • Aortic calcification (HCC)      Noted incidentally on abdominal CT scan.   • Asthma in adult         • Chronic anticoagulation    • Chronic back pain     • CKD (chronic kidney disease) stage 3, GFR 30-59 ml/min ([facility]) 2020   • Dental disorder     Full upper/lower dentures.   • Diverticulosis     • Essential hypertension, malignant     • Inflamed seborrheic keratosis     • Intrinsic eczema 2020   • Non-rheumatic mitral regurgitation 2019    Echocardiogram with normal LV size, LVEF 65%. Moderate MR, mild-moderate TR, unchanged from previous.   • Osteoarthritis of both hips     • Plantar fascia syndrome     • Plantar wart of right foot     • Pruritic dermatitis      Ongoing for several months but worse at night on her lower extremities   • Pulmonary hypertension (HCC)    • Pure hypercholesterolemia     • S/P cardiac catheterization 2017    1.  Normal right heart pressures. 3.  Essentially normal coronary arteries. 2.  Left ventricular ejection fraction 65%.    • Spider veins of limb     • Unspecified vitamin D deficiency       Past Surgical History:   Procedure Laterality Date   • LUMBAR LAMINECTOMY DISKECTOMY  2009    Performed by SUDHIR VERMA at SURGERY [facility] ORS   • GYN SURGERY       x2   • HYSTERECTOMY LAPAROSCOPY       Family History   Problem Relation Age of Onset   • Heart Disease Mother         1st MI @ 59 yo   • Hypertension Mother    • Heart Attack Mother 60   • Heart Disease  Maternal Aunt    • Cancer Daughter 35        breast   • Heart Disease Maternal Grandmother    • Heart Disease Maternal Grandfather    • Heart Disease Maternal Aunt 56     Social History     Socioeconomic History   • Marital status:      Spouse name: Not on file   • Number of children: Not on file   • Years of education: Not on file   • Highest education level: Not on file   Occupational History   • Not on file   Social Needs   • Financial resource strain: Not on file   • Food insecurity     Worry: Never true     Inability: Never true   • Transportation needs     Medical: No     Non-medical: No   Tobacco Use   • Smoking status: Former Smoker     Packs/day: 1.00     Years: 40.00     Pack years: 40.00     Types: Cigarettes     Quit date: 3/1/2010     Years since quitting: 10.5   • Smokeless tobacco: Never Used   Substance and Sexual Activity   • Alcohol use: No     Alcohol/week: 0.0 oz   • Drug use: No     Comment: tried marijuana once.    • Sexual activity: Yes     Partners: Male     Birth control/protection: Post-Menopausal     Comment: , works at [email or web address]   Lifestyle   • Physical activity     Days per week: Not on file     Minutes per session: Not on file   • Stress: Not on file   Relationships   • Social connections     Talks on phone: Not on file     Gets together: Not on file     Attends Christian service: Not on file     Active member of club or organization: Not on file     Attends meetings of clubs or organizations: Not on file     Relationship status: Not on file   • Intimate partner violence     Fear of current or ex partner: Not on file     Emotionally abused: Not on file     Physically abused: Not on file     Forced sexual activity: Not on file   Other Topics Concern   •  Service Not Asked   • Blood Transfusions Not Asked   • Caffeine Concern Not Asked   • Occupational Exposure Not Asked   • Hobby Hazards Not Asked   • Sleep Concern Not Asked   • Stress Concern Not Asked   • Weight Concern  "Not Asked   • Special Diet Not Asked   • Back Care Not Asked   • Exercise No   • Bike Helmet Not Asked   • Seat Belt Not Asked   • Self-Exams Not Asked   Social History Narrative    Works at [employer]     Allergies   Allergen Reactions   • Erythromycin      Abdominal pain   • Latex    • Tape      Cloth tape  Paper tape okay   • Tikosyn [Dofetilide]      Outpatient Encounter Medications as of 9/28/2020   Medication Sig Dispense Refill   • carvedilol (COREG) 3.125 MG Tab Take 1 Tab by mouth 2 times a day, with meals. 180 Tab 3   • flecainide (TAMBOCOR) 100 MG Tab TAKE 1 TABLET BY MOUTH TWICE A  Tab 2   • apixaban (ELIQUIS) 5mg Tab Take 1 Tab by mouth 2 Times a Day. TAKE 1 TABLET BY MOUTH TWICE A  Tab 3   • atorvastatin (LIPITOR) 10 MG Tab Take 1 Tab by mouth every day. 90 Tab 3   • Cholecalciferol 100 MCG (4000 UT) Cap Take 4,000 Units by mouth.     • spironolactone (ALDACTONE) 25 MG Tab Take 1 Tab by mouth every day. 90 Tab 3   • losartan (COZAAR) 50 MG Tab Take 1 Tab by mouth every day. 90 Tab 3   • albuterol 108 (90 Base) MCG/ACT Aero Soln inhalation aerosol Inhale 2 Puffs by mouth every 6 hours as needed for Shortness of Breath. 8.5 g 11   • clobetasol (TEMOVATE) 0.05 % Ointment Apply to affected area twice daily till clear 60 g 3   • [DISCONTINUED] carvedilol (COREG) 3.125 MG Tab Take 1 Tab by mouth 2 times a day, with meals. 180 Tab 3   • [DISCONTINUED] flecainide (TAMBOCOR) 100 MG Tab TAKE 1 TABLET BY MOUTH TWICE A  Tab 2   • [DISCONTINUED] atorvastatin (LIPITOR) 10 MG Tab TAKE 1 TABLET BY MOUTH EVERY DAY 90 Tab 3   • [DISCONTINUED] ELIQUIS 5 MG Tab TAKE 1 TABLET BY MOUTH TWICE A  Tab 3     No facility-administered encounter medications on file as of 9/28/2020.      ROS     Objective:   /74 (BP Location: Right arm, Patient Position: Sitting, BP Cuff Size: Adult)   Pulse (!) 52   Ht 1.702 m (5' 7\")   Wt 83 kg (183 lb)   SpO2 96%   BMI 28.66 kg/m²     Physical Exam "   Constitutional: She is oriented to person, place, and time. She appears well-developed and well-nourished.   HENT:   Head: Normocephalic and atraumatic.   Eyes: Pupils are equal, round, and reactive to light. EOM are normal.   Neck: Normal range of motion. Neck supple.   Cardiovascular: Normal rate, regular rhythm, normal heart sounds and intact distal pulses. Exam reveals no gallop and no friction rub.   No murmur heard.  Pulmonary/Chest: Effort normal and breath sounds normal.   Abdominal: Soft. Bowel sounds are normal.   Musculoskeletal: Normal range of motion.         General: No edema.   Neurological: She is alert and oriented to person, place, and time. No cranial nerve deficit.   Skin: Skin is warm.   Psychiatric: She has a normal mood and affect. Her behavior is normal. Judgment and thought content normal.       Assessment:     1. Other persistent atrial fibrillation (HCC)  EKG   2. Essential hypertension, benign  carvedilol (COREG) 3.125 MG Tab   3. Hypercholesterolemia     4. S/P ablation of atrial fibrillation  flecainide (TAMBOCOR) 100 MG Tab   5. Persistent atrial fibrillation (HCC)  flecainide (TAMBOCOR) 100 MG Tab    apixaban (ELIQUIS) 5mg Tab   6. Aortic calcification (HCC)  atorvastatin (LIPITOR) 10 MG Tab   7. Chronic anticoagulation     8. CKD (chronic kidney disease) stage 3, GFR 30-59 ml/min ([facility])         Medical Decision Making:  Today's Assessment / Status / Plan:   1.  Persistent atrial fibrillation status post ablation x2,  maintaining sinus rhythm.  Continue flecainide.  May try tapering down next visit.  2.  Minor renal insufficiency possible related Aldactone she will discuss with her primary.  3.  Anticoagulation continue.  4.  Follow-up in 6 months.

## 2020-09-29 ENCOUNTER — TELEPHONE (OUTPATIENT)
Dept: MEDICAL GROUP | Facility: PHYSICIAN GROUP | Age: 65
End: 2020-09-29

## 2020-09-29 NOTE — TELEPHONE ENCOUNTER
Pt called today wanting to inform Dr. Mccrary that her heart surgeon () would like the spironolactone prescription mg to be cut in half. Blood work was done showing abnormality with the kidneys.  believes the cause was the spironolactone and so the mg should be reduced.  Pt would like a callback to further discuss this.  Callback number: [phone]

## 2020-09-29 NOTE — TELEPHONE ENCOUNTER
Called patient and she will start cutting her Spironolactone 25 mg tablets in half to take a 12.5 mg dose.  We will see how this works when we recheck her labs later this year.

## 2020-12-07 ENCOUNTER — HOSPITAL ENCOUNTER (OUTPATIENT)
Dept: LAB | Facility: MEDICAL CENTER | Age: 65
End: 2020-12-07
Attending: FAMILY MEDICINE
Payer: COMMERCIAL

## 2020-12-07 DIAGNOSIS — E78.00 HYPERCHOLESTEROLEMIA: ICD-10-CM

## 2020-12-07 LAB
ALBUMIN SERPL BCP-MCNC: 4.5 G/DL (ref 3.2–4.9)
ALBUMIN/GLOB SERPL: 1.5 G/DL
ALP SERPL-CCNC: 83 U/L (ref 30–99)
ALT SERPL-CCNC: 18 U/L (ref 2–50)
ANION GAP SERPL CALC-SCNC: 9 MMOL/L (ref 7–16)
AST SERPL-CCNC: 22 U/L (ref 12–45)
BILIRUB SERPL-MCNC: 0.7 MG/DL (ref 0.1–1.5)
BUN SERPL-MCNC: 24 MG/DL (ref 8–22)
CALCIUM SERPL-MCNC: 9.7 MG/DL (ref 8.5–10.5)
CHLORIDE SERPL-SCNC: 102 MMOL/L (ref 96–112)
CHOLEST SERPL-MCNC: 154 MG/DL (ref 100–199)
CO2 SERPL-SCNC: 29 MMOL/L (ref 20–33)
CREAT SERPL-MCNC: 1.06 MG/DL (ref 0.5–1.4)
FASTING STATUS PATIENT QL REPORTED: NORMAL
GLOBULIN SER CALC-MCNC: 3.1 G/DL (ref 1.9–3.5)
GLUCOSE SERPL-MCNC: 97 MG/DL (ref 65–99)
HDLC SERPL-MCNC: 68 MG/DL
LDLC SERPL CALC-MCNC: 77 MG/DL
POTASSIUM SERPL-SCNC: 4.7 MMOL/L (ref 3.6–5.5)
PROT SERPL-MCNC: 7.6 G/DL (ref 6–8.2)
SODIUM SERPL-SCNC: 140 MMOL/L (ref 135–145)
TRIGL SERPL-MCNC: 47 MG/DL (ref 0–149)

## 2020-12-07 PROCEDURE — 80053 COMPREHEN METABOLIC PANEL: CPT

## 2020-12-07 PROCEDURE — 36415 COLL VENOUS BLD VENIPUNCTURE: CPT

## 2020-12-07 PROCEDURE — 80061 LIPID PANEL: CPT

## 2020-12-21 ENCOUNTER — OFFICE VISIT (OUTPATIENT)
Dept: MEDICAL GROUP | Facility: PHYSICIAN GROUP | Age: 65
End: 2020-12-21
Payer: COMMERCIAL

## 2020-12-21 VITALS
HEIGHT: 67 IN | TEMPERATURE: 97.9 F | OXYGEN SATURATION: 97 % | SYSTOLIC BLOOD PRESSURE: 126 MMHG | HEART RATE: 51 BPM | RESPIRATION RATE: 16 BRPM | BODY MASS INDEX: 29.51 KG/M2 | DIASTOLIC BLOOD PRESSURE: 69 MMHG | WEIGHT: 188 LBS

## 2020-12-21 DIAGNOSIS — N18.31 STAGE 3A CHRONIC KIDNEY DISEASE: ICD-10-CM

## 2020-12-21 DIAGNOSIS — I10 ESSENTIAL HYPERTENSION, BENIGN: ICD-10-CM

## 2020-12-21 DIAGNOSIS — I27.20 PULMONARY HYPERTENSION (HCC): ICD-10-CM

## 2020-12-21 DIAGNOSIS — E78.00 HYPERCHOLESTEROLEMIA: ICD-10-CM

## 2020-12-21 PROCEDURE — 99214 OFFICE O/P EST MOD 30 MIN: CPT | Performed by: FAMILY MEDICINE

## 2020-12-21 ASSESSMENT — FIBROSIS 4 INDEX: FIB4 SCORE: 1.39

## 2020-12-21 NOTE — ASSESSMENT & PLAN NOTE
This is a chronic health problem that is uncontrolled with current medications and lifestyle measures. Her GFR dropped barely to 52 but her BUN is elevated at 24 but her creatinine is 1.06.  All the rest of her electrolytes are normal.

## 2020-12-21 NOTE — ASSESSMENT & PLAN NOTE
This is a chronic health problem for this patient well-controlled with current meds.  Blood pressure today 126/69.

## 2020-12-21 NOTE — PROGRESS NOTES
CC:Diagnoses of Stage 3a chronic kidney disease, Essential hypertension, benign, Hypercholesterolemia, and Pulmonary hypertension (HCC) were pertinent to this visit.    HISTORY OF PRESENT ILLNESS: Patient is a 65 y.o. female established patient who presents today to about her chronic health problems and review her lab work that was done for her on 12/7/2020.      CKD (chronic kidney disease) stage 3, GFR 30-59 ml/min (HCC)  This is a chronic health problem that is uncontrolled with current medications and lifestyle measures. Her GFR dropped barely to 52 but her BUN is elevated at 24 but her creatinine is 1.06.  All the rest of her electrolytes are normal.    Essential hypertension, benign  This is a chronic health problem that is well controlled with current medications and lifestyle measures. Her BP is great at 126/68.    Hypercholesterolemia  This is a chronic health problem that is well controlled with current medications and lifestyle measures. Her total cholesterol is excellent at 154, triglycerides are very good at 47, HDL good at 68 LDL excellent at 77 and there is no liver dysfunction.    Pulmonary hypertension  This is a chronic health problem for this patient well-controlled with current meds.  Blood pressure today 126/69.      Patient Active Problem List    Diagnosis Date Noted   • Localized edema 09/09/2019     Priority: High   • S/P ablation of atrial fibrillation 06/20/2019     Priority: High   • Chronic anticoagulation 10/30/2017     Priority: High   • Hypercholesterolemia 06/19/2017     Priority: High   • Essential hypertension, benign 05/25/2017     Priority: High   • Pulmonary hypertension (HCC) 08/28/2015     Priority: High   • S/P cardiac catheterization 05/26/2017     Priority: Medium   • Non-rheumatic mitral regurgitation 05/25/2017     Priority: Medium   • Aortic calcification (HCC) 05/15/2015     Priority: Medium   • CKD (chronic kidney disease) stage 3, GFR 30-59 ml/min 09/14/2020   •  Bleeding from varicose vein 02/26/2020   • Intrinsic eczema 01/20/2020   • Degenerative disc disease, lumbar 01/25/2017   • Back pain with sciatica 01/25/2017   • Family history of ovarian cancer 11/21/2016   • Family history of breast cancer 11/21/2016   • Decreased right ventricular systolic function 01/14/2016   • Abnormal CT scan, kidney 05/15/2015   • Diverticulosis 05/15/2015   • Osteoarthritis of both hips 12/10/2014   • Asthma in adult without complication 01/13/2014   • Enlarged ovary 01/13/2014   • Cystocele 09/13/2013   • Spider veins of limb 06/21/2013   • DDD (degenerative disc disease), cervical 01/19/2012   • Trigger point of thoracic region 01/19/2012   • Plantar fascia syndrome 07/01/2010   • Vitamin D deficiency 07/01/2010   • Chronic back pain 06/03/2010      Allergies:Erythromycin, Latex, Tape, and Tikosyn [dofetilide]    Current Outpatient Medications   Medication Sig Dispense Refill   • carvedilol (COREG) 3.125 MG Tab Take 1 Tab by mouth 2 times a day, with meals. 180 Tab 3   • flecainide (TAMBOCOR) 100 MG Tab TAKE 1 TABLET BY MOUTH TWICE A  Tab 2   • apixaban (ELIQUIS) 5mg Tab Take 1 Tab by mouth 2 Times a Day. TAKE 1 TABLET BY MOUTH TWICE A  Tab 3   • atorvastatin (LIPITOR) 10 MG Tab Take 1 Tab by mouth every day. 90 Tab 3   • Cholecalciferol 100 MCG (4000 UT) Cap Take 4,000 Units by mouth.     • spironolactone (ALDACTONE) 25 MG Tab Take 1 Tab by mouth every day. 90 Tab 3   • losartan (COZAAR) 50 MG Tab Take 1 Tab by mouth every day. 90 Tab 3   • albuterol 108 (90 Base) MCG/ACT Aero Soln inhalation aerosol Inhale 2 Puffs by mouth every 6 hours as needed for Shortness of Breath. 8.5 g 11   • clobetasol (TEMOVATE) 0.05 % Ointment Apply to affected area twice daily till clear 60 g 3     No current facility-administered medications for this visit.        Social History     Tobacco Use   • Smoking status: Former Smoker     Packs/day: 1.00     Years: 40.00     Pack years: 40.00      Types: Cigarettes     Quit date: 3/1/2010     Years since quitting: 10.8   • Smokeless tobacco: Never Used   Substance Use Topics   • Alcohol use: No     Alcohol/week: 0.0 oz   • Drug use: No     Comment: tried marijuana once.      Social History     Social History Narrative    Works at [employer]       Family History   Problem Relation Age of Onset   • Heart Disease Mother         1st MI @ 59 yo   • Hypertension Mother    • Heart Attack Mother 60   • Heart Disease Maternal Aunt    • Cancer Daughter 35        breast   • Heart Disease Maternal Grandmother    • Heart Disease Maternal Grandfather    • Heart Disease Maternal Aunt 56        ROS:     - Constitutional:  Negative for fever, chills, unexpected weight change, and fatigue/generalized weakness.    - HEENT:  Negative for headaches, vision changes, hearing changes, ear pain, ear discharge, rhinorrhea, sinus congestion, sore throat, and neck pain.      - Respiratory: Negative for cough, sputum production, chest congestion, dyspnea, wheezing, and crackles.      - Cardiovascular: Negative for chest pain, palpitations, orthopnea, and bilateral lower extremity edema.     - Gastrointestinal: Negative for heartburn, nausea, vomiting, abdominal pain, hematochezia, melena, diarrhea, constipation, and greasy/foul-smelling stools.     - Genitourinary: Negative for dysuria, polyuria, hematuria, pyuria, urinary urgency, and urinary incontinence.     - Musculoskeletal: Negative for myalgias, back pain, and joint pain.     - Skin: Negative for rash, itching, cyanotic skin color change.     - Neurological: Negative for dizziness, tingling, tremors, focal sensory deficit, focal weakness and headaches.     - Endo/Heme/Allergies: Does not bruise/bleed easily.     - Psychiatric/Behavioral: Negative for depression, suicidal/homicidal ideation and memory loss.          - NOTE: All other systems reviewed and are negative, except as in HPI.      Exam:    /69 (BP Location: Right arm,  "Patient Position: Sitting, BP Cuff Size: Adult)   Pulse (!) 51   Temp 36.6 °C (97.9 °F) (Temporal)   Resp 16   Ht 1.702 m (5' 7\")   Wt 85.3 kg (188 lb)   SpO2 97%  Body mass index is 29.44 kg/m².    General:  Well nourished, well developed female in NAD  Head is grossly normal.  Neck: Supple without JVD or bruit. Thyroid is not enlarged.  Pulmonary: Clear to ausculation and percussion.  Normal effort. No rales, ronchi, or wheezing.  Cardiovascular: Regular rate and rhythm without murmur. Carotid and radial pulses are intact and equal bilaterally.  Extremities: no clubbing, cyanosis, or edema.  LABS: 12/7/2020: Results reviewed and discussed with the patient, questions answered.    Please note that this dictation was created using voice recognition software. I have made every reasonable attempt to correct obvious errors, but I expect that there are errors of grammar and possibly content that I did not discover before finalizing the note.    Assessment/Plan:  1. Stage 3a chronic kidney disease  Stable, patient is going to work on increasing her fluid intake.  She has not really graded that.  Her GFR dropped 2 points.  We want to try and keep her in the upper 50s.    2. Essential hypertension, benign  Controlled, continue with current meds and lifestyle.      3. Hypercholesterolemia  Controlled, continue with current meds and lifestyle.    - Comp Metabolic Panel; Future  - Lipid Profile; Future    4. Pulmonary hypertension (HCC)  Controlled, continue with current meds and lifestyle.           "

## 2020-12-21 NOTE — ASSESSMENT & PLAN NOTE
This is a chronic health problem that is well controlled with current medications and lifestyle measures. Her BP is great at 126/68.

## 2020-12-21 NOTE — ASSESSMENT & PLAN NOTE
This is a chronic health problem that is well controlled with current medications and lifestyle measures. Her total cholesterol is excellent at 154, triglycerides are very good at 47, HDL good at 68 LDL excellent at 77 and there is no liver dysfunction.

## 2021-01-04 ENCOUNTER — APPOINTMENT (OUTPATIENT)
Dept: RADIOLOGY | Facility: MEDICAL CENTER | Age: 66
End: 2021-01-04
Attending: FAMILY MEDICINE
Payer: MEDICARE

## 2021-02-01 ENCOUNTER — HOSPITAL ENCOUNTER (OUTPATIENT)
Dept: RADIOLOGY | Facility: MEDICAL CENTER | Age: 66
End: 2021-02-01
Attending: FAMILY MEDICINE
Payer: COMMERCIAL

## 2021-02-01 DIAGNOSIS — Z12.31 VISIT FOR SCREENING MAMMOGRAM: ICD-10-CM

## 2021-02-01 PROCEDURE — 77063 BREAST TOMOSYNTHESIS BI: CPT

## 2021-02-11 ENCOUNTER — OFFICE VISIT (OUTPATIENT)
Dept: URGENT CARE | Facility: CLINIC | Age: 66
End: 2021-02-11
Payer: COMMERCIAL

## 2021-02-11 ENCOUNTER — APPOINTMENT (OUTPATIENT)
Dept: RADIOLOGY | Facility: IMAGING CENTER | Age: 66
End: 2021-02-11
Attending: PHYSICIAN ASSISTANT
Payer: COMMERCIAL

## 2021-02-11 ENCOUNTER — NURSE TRIAGE (OUTPATIENT)
Dept: HEALTH INFORMATION MANAGEMENT | Facility: OTHER | Age: 66
End: 2021-02-11

## 2021-02-11 VITALS
RESPIRATION RATE: 14 BRPM | HEART RATE: 68 BPM | DIASTOLIC BLOOD PRESSURE: 82 MMHG | OXYGEN SATURATION: 98 % | SYSTOLIC BLOOD PRESSURE: 132 MMHG | HEIGHT: 67 IN | WEIGHT: 184 LBS | TEMPERATURE: 97.7 F | BODY MASS INDEX: 28.88 KG/M2

## 2021-02-11 DIAGNOSIS — R07.81 RIB PAIN ON LEFT SIDE: ICD-10-CM

## 2021-02-11 DIAGNOSIS — R94.31 ABNORMAL EKG: ICD-10-CM

## 2021-02-11 PROCEDURE — 71101 X-RAY EXAM UNILAT RIBS/CHEST: CPT | Mod: TC,FY,LT | Performed by: PHYSICIAN ASSISTANT

## 2021-02-11 PROCEDURE — 99214 OFFICE O/P EST MOD 30 MIN: CPT | Performed by: PHYSICIAN ASSISTANT

## 2021-02-11 PROCEDURE — 93000 ELECTROCARDIOGRAM COMPLETE: CPT | Performed by: PHYSICIAN ASSISTANT

## 2021-02-11 ASSESSMENT — ENCOUNTER SYMPTOMS
MYALGIAS: 0
HEADACHES: 1
SINUS PAIN: 1
CHILLS: 0
FEVER: 0
PALPITATIONS: 0

## 2021-02-11 ASSESSMENT — FIBROSIS 4 INDEX: FIB4 SCORE: 1.39

## 2021-02-11 NOTE — TELEPHONE ENCOUNTER
Regarding: office clearance  ----- Message from Reina Newell sent at 2/11/2021  7:25 AM PST -----  Patient is wanting to get established with a new PCP, but is having some sinus issues, runny nose and congestion. She would like clearance for an office visit.    [facility] / SIRISHA DEVI / SINUS CONCERNS / RIB PAIN ON LEFT SIDE / [facility] / [facility]

## 2021-02-11 NOTE — TELEPHONE ENCOUNTER
Sinus headache, allergies throughout the year.  No congestion or runny nose.  LEFT sided rib hurts in the same are of the ablation. Pt had an ablation a year ago.    1. Caller Name: Chinyere Priest                   Call Back Number: [phone] (home)     [facility] PCP or Specialty Provider: Yes No pcp        2.  Has the patient previously tested positive for COVID-19? No    3.  In the last two weeks, has the patient had any new or worsening symptoms (not explained by alternative diagnosis)? Yes, the patient reports the following COVID-19 consistent symptoms: headache.    4.  Does patient have any comoribidities? COPD and CHF    5.  Has the patient had any known contact with someone who is suspected or confirmed to have COVID-19? No.    5. Disposition: Cleared by RN Triage as potential is low for COVID-19; OK to keep/schedule appointment     NO PCP.  Pt scheduled at     Note routed to [facility] Provider: ROD only.     Reason for Disposition  • Patient wants to be seen    Additional Information  • Negative: Difficult to awaken or acting confused (e.g., disoriented, slurred speech)  • Negative: Weakness of the face, arm or leg on one side of the body and new onset  • Negative: Numbness of the face, arm or leg on one side of the body and new onset  • Negative: Loss of speech or garbled speech and new onset  • Negative: Passed out (i.e., fainted, collapsed and was not responding)  • Negative: Sounds like a life-threatening emergency to the triager  • Negative: Unable to walk without falling  • Negative: Stiff neck (can't touch chin to chest)  • Negative: Possibility of carbon monoxide exposure  • Negative: Followed a head injury within last 3 days  • Negative: Traumatic Brain Injury (TBI) is suspected  • Negative: Sinus pain of forehead and yellow or green nasal discharge  • Negative: Pregnant  • Negative: SEVERE headache, states 'worst headache' of life  • Negative: SEVERE headache, sudden onset (i.e., reaching  "maximum intensity within 30 seconds)  • Negative: Severe pain in one eye  • Negative: Loss of vision or double vision  • Negative: Patient sounds very sick or weak to the triager  • Negative: Fever > 103 F (39.4 C)  • Negative: Fever > 100.0 F (37.8 C) and has diabetes mellitus or a weak immune system (e.g., HIV positive, cancer chemotherapy, organ transplant, splenectomy, chronic steroids)  • Negative: SEVERE headache (e.g., excruciating) and has had severe headaches before  • Negative: SEVERE headache and not relieved by pain meds  • Negative: SEVERE headache and vomiting  • Negative: SEVERE headache and fever  • Negative: Fever present > 3 days (72 hours)  • Negative: New headache and weak immune system (e.g., HIV positive, cancer chemotherapy, chronic steroid treatment)    Answer Assessment - Initial Assessment Questions  1. LOCATION: \"Where does it hurt?\"   Headache behind eyes  2. ONSET: \"When did the headache start?\" (Minutes, hours or days)     2 days ago  3. PATTERN: \"Does the pain come and go, or has it been constant since it started?\"      no  4. SEVERITY: \"How bad is the pain?\" and \"What does it keep you from doing?\"  (e.g., Scale 1-10; mild, moderate, or severe)    - MILD (1-3): doesn't interfere with normal activities     - MODERATE (4-7): interferes with normal activities or awakens from sleep     - SEVERE (8-10): excruciating pain, unable to do any normal activities         mild  5. RECURRENT SYMPTOM: \"Have you ever had headaches before?\" If so, ask: \"When was the last time?\" and \"What happened that time?\"       History of sinus infection  6. CAUSE: \"What do you think is causing the headache?\"   sinus ramonita  7. MIGRAINE: \"Have you been diagnosed with migraine headaches?\" If so, ask: \"Is this headache similar?\"   no  8. HEAD INJURY: \"Has there been any recent injury to the head?\"       no  9. OTHER SYMPTOMS: \"Do you have any other symptoms?\" (fever, stiff neck, eye pain, sore throat, cold symptoms)     " " no  10. PREGNANCY: \"Is there any chance you are pregnant?\" \"When was your last menstrual period?\"        no    Protocols used: HEADACHE-A-OH      "

## 2021-02-11 NOTE — PROGRESS NOTES
Subjective:   Chinyere Priest is a 65 y.o. female who presents today with   Chief Complaint   Patient presents with   • Headache     x2 days, sinus headache   • Rib Pain     x4 days, left side of ribs        Other  This is a new problem. Episode onset: 4 days. The problem occurs constantly. The problem has been unchanged. Associated symptoms include headaches. Pertinent negatives include no chest pain, chills, congestion, fever or myalgias. The symptoms are aggravated by bending and twisting (palpation). She has tried nothing for the symptoms. The treatment provided no relief.   Patient denies any injury or trauma.  She does state that she has history of A. fib.  She has follow-up with cardiology next month.    PMH:  has a past medical history of Aortic calcification (HCC) ( ), Asthma in adult, Chronic anticoagulation, Chronic back pain ( ), CKD (chronic kidney disease) stage 3, GFR 30-59 ml/min (9/14/2020), Dental disorder, Diverticulosis ( ), Essential hypertension, malignant ( ), Inflamed seborrheic keratosis ( ), Intrinsic eczema (1/20/2020), Non-rheumatic mitral regurgitation (05/2019), Osteoarthritis of both hips ( ), Plantar fascia syndrome ( ), Plantar wart of right foot ( ), Pruritic dermatitis ( ), Pulmonary hypertension (HCC), Pure hypercholesterolemia ( ), S/P cardiac catheterization (5/26/2017), Spider veins of limb ( ), and Unspecified vitamin D deficiency ( ). She also has no past medical history of Diabetic neuropathy (HCC).  MEDS:   Current Outpatient Medications:   •  carvedilol (COREG) 3.125 MG Tab, Take 1 Tab by mouth 2 times a day, with meals., Disp: 180 Tab, Rfl: 3  •  flecainide (TAMBOCOR) 100 MG Tab, TAKE 1 TABLET BY MOUTH TWICE A DAY, Disp: 180 Tab, Rfl: 2  •  apixaban (ELIQUIS) 5mg Tab, Take 1 Tab by mouth 2 Times a Day. TAKE 1 TABLET BY MOUTH TWICE A DAY, Disp: 180 Tab, Rfl: 3  •  atorvastatin (LIPITOR) 10 MG Tab, Take 1 Tab by mouth every day., Disp: 90 Tab, Rfl: 3  •   "spironolactone (ALDACTONE) 25 MG Tab, Take 1 Tab by mouth every day., Disp: 90 Tab, Rfl: 3  •  losartan (COZAAR) 50 MG Tab, Take 1 Tab by mouth every day., Disp: 90 Tab, Rfl: 3  •  albuterol 108 (90 Base) MCG/ACT Aero Soln inhalation aerosol, Inhale 2 Puffs by mouth every 6 hours as needed for Shortness of Breath., Disp: 8.5 g, Rfl: 11  •  clobetasol (TEMOVATE) 0.05 % Ointment, Apply to affected area twice daily till clear, Disp: 60 g, Rfl: 3  •  Cholecalciferol 100 MCG (4000 UT) Cap, Take 4,000 Units by mouth., Disp: , Rfl:   ALLERGIES:   Allergies   Allergen Reactions   • Erythromycin      Abdominal pain   • Latex    • Tape      Cloth tape  Paper tape okay   • Tikosyn [Dofetilide]      SURGHX:   Past Surgical History:   Procedure Laterality Date   • LUMBAR LAMINECTOMY DISKECTOMY  2009    Performed by SUDHIR VERMA at SURGERY [facility]   • GYN SURGERY       x2   • HYSTERECTOMY LAPAROSCOPY       SOCHX:  reports that she quit smoking about 10 years ago. Her smoking use included cigarettes. She has a 40.00 pack-year smoking history. She has never used smokeless tobacco. She reports that she does not drink alcohol and does not use drugs.  FH: Reviewed with patient, not pertinent to this visit.       Review of Systems   Constitutional: Negative for chills and fever.   HENT: Positive for sinus pain. Negative for congestion.    Cardiovascular: Negative for chest pain and palpitations.   Musculoskeletal: Negative for myalgias.        Left anterior rib pain   Neurological: Positive for headaches.        Objective:   /82   Pulse 68   Temp 36.5 °C (97.7 °F) (Temporal)   Resp 14   Ht 1.702 m (5' 7\")   Wt 83.5 kg (184 lb)   SpO2 98%   BMI 28.82 kg/m²   Physical Exam  Vitals and nursing note reviewed.   Constitutional:       General: She is not in acute distress.     Appearance: Normal appearance. She is well-developed.   HENT:      Head: Normocephalic and atraumatic.      Right Ear: Hearing " normal.      Left Ear: Hearing normal.   Eyes:      Pupils: Pupils are equal, round, and reactive to light.   Cardiovascular:      Rate and Rhythm: Normal rate and regular rhythm.      Heart sounds: Normal heart sounds.   Pulmonary:      Effort: Pulmonary effort is normal.      Breath sounds: Normal breath sounds.   Chest:      Chest wall: Tenderness present. No swelling or crepitus.          Comments: Anterior portion of the left lower ribs protrudes out further than the right side.  Tenderness with palpation and movement.  Musculoskeletal:      Comments: Normal movement in all 4 extremities   Skin:     General: Skin is warm and dry.   Neurological:      Mental Status: She is alert.      Coordination: Coordination normal.   Psychiatric:         Mood and Affect: Mood normal.       DX RIBS  FINDINGS:  The cardiomediastinal silhouette is enlarged. Evaluation of the left lung base is limited by overlying soft tissues. No pleural effusion is identified. No pneumothorax is seen. There is biapical pleural thickening/scarring.     No displaced rib fracture is identified. Atherosclerotic calcification is seen.     IMPRESSION:     No displaced rib fracture is identified.     Cardiomegaly.     Atherosclerotic plaque.    EKG today shows irregularly irregular rate consistent with A. fib.  Also shows prolonged MT intervals.  T wave abnormalities as well.  Does appear to be different when compared to EKG that was done in September.  No acute ST wave changes.    Assessment/Plan:   Assessment    1. Rib pain on left side  - [id number] (WITH 1-VIEW CXR) LEFT; Future  - EKG - Clinic Performed    2. Abnormal EKG  Given new onset of left rib/chest wall pain along with irregular EKG findings recommend she have further work-up at this time in the ER.  Patient is agreeable with this plan and would like to have her  take her there by private vehicle.    Please note that this dictation was created using voice recognition  software. I have made every reasonable attempt to correct obvious errors, but I expect that there are errors of grammar and possibly content that I did not discover before finalizing the note.    Jaime Bonilla PA-C

## 2021-02-12 ENCOUNTER — OFFICE VISIT (OUTPATIENT)
Dept: CARDIOLOGY | Facility: MEDICAL CENTER | Age: 66
End: 2021-02-12
Payer: COMMERCIAL

## 2021-02-12 VITALS
HEIGHT: 67 IN | WEIGHT: 184 LBS | BODY MASS INDEX: 28.88 KG/M2 | SYSTOLIC BLOOD PRESSURE: 152 MMHG | OXYGEN SATURATION: 98 % | HEART RATE: 58 BPM | DIASTOLIC BLOOD PRESSURE: 62 MMHG

## 2021-02-12 DIAGNOSIS — R07.81 RIB PAIN ON LEFT SIDE: ICD-10-CM

## 2021-02-12 DIAGNOSIS — Z98.890 S/P ABLATION OF ATRIAL FIBRILLATION: ICD-10-CM

## 2021-02-12 DIAGNOSIS — I27.20 PULMONARY HYPERTENSION (HCC): ICD-10-CM

## 2021-02-12 DIAGNOSIS — E78.00 HYPERCHOLESTEROLEMIA: ICD-10-CM

## 2021-02-12 DIAGNOSIS — Z79.01 CHRONIC ANTICOAGULATION: ICD-10-CM

## 2021-02-12 DIAGNOSIS — I10 ESSENTIAL HYPERTENSION, BENIGN: ICD-10-CM

## 2021-02-12 DIAGNOSIS — Z86.79 S/P ABLATION OF ATRIAL FIBRILLATION: ICD-10-CM

## 2021-02-12 PROCEDURE — 99214 OFFICE O/P EST MOD 30 MIN: CPT | Performed by: PHYSICIAN ASSISTANT

## 2021-02-12 RX ORDER — POTASSIUM CHLORIDE 750 MG/1
10 TABLET, EXTENDED RELEASE ORAL DAILY
Qty: 30 TABLET | Refills: 5 | Status: SHIPPED | OUTPATIENT
Start: 2021-02-12 | End: 2021-06-03

## 2021-02-12 RX ORDER — FUROSEMIDE 20 MG/1
20 TABLET ORAL DAILY
Qty: 30 TABLET | Refills: 5 | Status: SHIPPED | OUTPATIENT
Start: 2021-02-12 | End: 2021-06-03

## 2021-02-12 ASSESSMENT — ENCOUNTER SYMPTOMS
SYNCOPE: 0
DECREASED APPETITE: 0
BRUISES/BLEEDS EASILY: 0
WEAKNESS: 0
ABDOMINAL PAIN: 0
VOMITING: 0
PALPITATIONS: 0
DIZZINESS: 0
ORTHOPNEA: 0
SNORING: 0
DIAPHORESIS: 0
BACK PAIN: 1
BLOATING: 0
NEAR-SYNCOPE: 0
DYSPNEA ON EXERTION: 0
BLURRED VISION: 0
FEVER: 0
NAUSEA: 0
MYALGIAS: 0
SHORTNESS OF BREATH: 0

## 2021-02-12 ASSESSMENT — FIBROSIS 4 INDEX: FIB4 SCORE: 1.39

## 2021-02-12 NOTE — PROGRESS NOTES
Cardiology Clinic Follow-up Note    Date of note:    2/12/2021  Primary Care Provider: Keila Mccrary M.D.    Name:             Chinyere Priest  YOB: 1955  MRN:               [id number]    CC: AFIB    Primary Cardiologist: Dr. Moshe Landa    Patient HPI:   Chinyere Priest is a 65 y.o. female with PMH including paroxysmal AFIB s/p ablation x2 on flecainide and Eliquis, Essential hypertension, Dyslipidemia, hx of pericardial effusion s/p drain in 6/2019 after ablation, moderate MR.    Interim History:  Ms. Priest was last seen in this cardiology office by Dr. Steele on 9/28/20.   She went to urgent care on 2/11/21 with c/o L sided rib pain for the past 4-5 days.  She does not remember an injury.  The pain is moderate, intermittent, sometimes radiates around the side to the back.  Denies pleurtic pain.  The pain is reproducible with palpation.  She notes asymmetry to her rib cage, which is not normal.  Does have some back issues.  She denies palpitations.  The PA at urgent care did an EKG and said it was abnormal, concerned for AFIB.  Thus, she is here today for follow up.    I did review her EKG from 2/11/21.  Shows sinus arrhythmia.  No afib.    She also c/o of spironolactone not working well for her.   The dose has been cut back from 50 to 25 due to dizziness.  She says now her LE are swollen.  She wants to go back to lasix and K she had been on before.    Review of Systems   Constitution: Negative for decreased appetite, diaphoresis, fever and malaise/fatigue.   Eyes: Negative for blurred vision.   Cardiovascular: Negative for chest pain, dyspnea on exertion, leg swelling, near-syncope, orthopnea, palpitations and syncope.   Respiratory: Negative for shortness of breath and snoring.    Hematologic/Lymphatic: Negative for bleeding problem. Does not bruise/bleed easily.   Skin: Negative for rash.   Musculoskeletal: Positive for back pain and joint pain. Negative for myalgias.  "  Gastrointestinal: Negative for bloating, abdominal pain, nausea and vomiting.   Genitourinary: Negative for frequency.   Neurological: Negative for dizziness and weakness.        Past medical history, social history and family history reviewed.  No changes other than what is outlined in HPI.    Current Outpatient Medications   Medication Sig Dispense Refill   • carvedilol (COREG) 3.125 MG Tab Take 1 Tab by mouth 2 times a day, with meals. 180 Tab 3   • flecainide (TAMBOCOR) 100 MG Tab TAKE 1 TABLET BY MOUTH TWICE A  Tab 2   • apixaban (ELIQUIS) 5mg Tab Take 1 Tab by mouth 2 Times a Day. TAKE 1 TABLET BY MOUTH TWICE A  Tab 3   • atorvastatin (LIPITOR) 10 MG Tab Take 1 Tab by mouth every day. 90 Tab 3   • Cholecalciferol 100 MCG (4000 UT) Cap Take 4,000 Units by mouth.     • spironolactone (ALDACTONE) 25 MG Tab Take 1 Tab by mouth every day. 90 Tab 3   • losartan (COZAAR) 50 MG Tab Take 1 Tab by mouth every day. 90 Tab 3   • albuterol 108 (90 Base) MCG/ACT Aero Soln inhalation aerosol Inhale 2 Puffs by mouth every 6 hours as needed for Shortness of Breath. 8.5 g 11   • clobetasol (TEMOVATE) 0.05 % Ointment Apply to affected area twice daily till clear 60 g 3     No current facility-administered medications for this visit.       Allergies   Allergen Reactions   • Erythromycin      Abdominal pain   • Latex    • Tape      Cloth tape  Paper tape okay   • Tikosyn [Dofetilide]          Physical Exam:  Ambulatory Vitals  /62 (BP Location: Left arm, Patient Position: Sitting)   Pulse (!) 58   Ht 1.702 m (5' 7\")   Wt 83.5 kg (184 lb)   SpO2 98%    BP Readings from Last 4 Encounters:   02/12/21 152/62   02/11/21 132/82   12/21/20 126/69   09/28/20 122/74       Weight/BMI: Body mass index is 28.82 kg/m².  Wt Readings from Last 4 Encounters:   02/12/21 83.5 kg (184 lb)   02/11/21 83.5 kg (184 lb)   12/21/20 85.3 kg (188 lb)   09/28/20 83 kg (183 lb)       General: no apparent distress  Lungs: normal " effort, without crackles, no wheezing or rhonchi.  Heart: raymundo rate, regular rhythm, no murmur, no rub  Ext:  puffy lower extremities without pitting edema.  Abdomen: soft, non tender, non distended,  Neurological: No focal deficits, no facial asymmetry.  Normal speech.  Psychiatric: Appropriate affect, alert and oriented x 3.   Skin: Warm extremities, no rash.      Lab Data Review:  Lab Results   Component Value Date/Time    CHOLSTRLTOT 154 12/07/2020 10:02 AM    LDL 77 12/07/2020 10:02 AM    HDL 68 12/07/2020 10:02 AM    TRIGLYCERIDE 47 12/07/2020 10:02 AM       Lab Results   Component Value Date/Time    SODIUM 140 12/07/2020 10:02 AM    POTASSIUM 4.7 12/07/2020 10:02 AM    CHLORIDE 102 12/07/2020 10:02 AM    CO2 29 12/07/2020 10:02 AM    GLUCOSE 97 12/07/2020 10:02 AM    BUN 24 (H) 12/07/2020 10:02 AM    CREATININE 1.06 12/07/2020 10:02 AM     Lab Results   Component Value Date/Time    ALKPHOSPHAT 83 12/07/2020 10:02 AM    ASTSGOT 22 12/07/2020 10:02 AM    ALTSGPT 18 12/07/2020 10:02 AM    TBILIRUBIN 0.7 12/07/2020 10:02 AM      Lab Results   Component Value Date/Time    WBC 8.6 02/24/2020 08:40 PM         Cardiac Imaging and Procedures Review:      Personal interpretation of EKG 2/11/21:  Sinus arrhythmia, HR 55, Qrsd 109, Qtc 485.  Meets criteria for LVH    Echo 6/17/2019:  CONCLUSIONS  Normal left ventricular size and systolic function.  No left atrial thrombus.  No thrombus detected in the left atrial appendage.    [facility] 5/26/2017:  FINDINGS:  HEMODYNAMICS:     RIGHT HEART PRESSURES:  1.  Mean right atrial pressure 5 mmHg.  2.  Right ventricular pressure 22/5.  3.  Pulmonary artery pressure 25/11, mean of 17 mmHg.  4.  Pulmonary capillary wedge pressure 7 mmHg.     LEFT HEART PRESSURES:  1.  LVEDP of 15 mmHg.  2.  Left ventricular systolic pressure 126 mmHg.  3.  Central aortic pressure systolic 121, diastolic 66, mean of 89 mmHg.     OXIMETRY MEASUREMENTS:  1.  Systemic arterial 97.1%.  2.  Pulmonary artery  78 mmHg.     CARDIAC OUTPUT:  1.  Thermodilution method 3.0 liters per minute, cardiac index 1.6 liters per   minute per meter squared.  2.  Basim method cardiac output 5.2 liters per minute, cardiac index 2.7 liters   per minute per meter squared.     Pulmonary vascular resistance of 1.9 Wood units.     LEFT VENTRICULOGRAPHY:  Left ventricular chamber size, wall motion and   systolic function are normal.  Calculated ejection fraction is 65%.  No   identifiable mitral regurgitation or LV thrombus present.     Rhythm, atrial fibrillation.     CORONARY ANGIOGRAPHY:  1.  LEFT MAIN ARTERY:  Left main vessel is a large caliber vessel,   angiographically normal, bifurcates into the left anterior descending artery and   circumflex artery.  2.  LEFT ANTERIOR DESCENDING ARTERY:  The LAD gives rise to a first diagonal   branch and a normal complement of septal branches and extends around the apex.    The LAD is widely patent with the exception of a very mild eccentric smooth   focal proximal atheroma otherwise the LAD appears angiographically normal.  3.  CIRCUMFLEX ARTERY:  The circumflex gives rise to 2 major long marginal   branches and a small caliber AV groove branch.  Angiographically, the   circumflex artery appears normal.  4.  RIGHT CORONARY ARTERY:  The RCA is moderate size caliber vessel and gives   rise to an acute marginal branch, posterior descending artery and a   posterolateral branch.  Angiographically, the right coronary artery is normal.    EP 2019:   AFIB ablation    EP 12/10/2019:   AFIB ablation      Assessment and Clinical Decision Makin   Paroyxsmal AFIB   -    S/p ablation x 2 ( and Dec 2019)  -    Continue flecainide 100 BID  -    Low dose carvedilol  -    EKG reviewed, maintaining SR    2   Chronic anticoagulation with Eliquis  -    [id number] at least 3 (age, F, htn)    3   Essential hypertension   -    BP trending up after adjusting jagdish dose.    4    Dyslipidemia  -     Continue  atorvastatin 10 mg  -     LDL 12/2020 was 77    5    LE swelling - non pitting.  -     likely more related to increased Na or dependency   -     She would prefer to go back to lasix + K  -     D/c jagdish, start lasix 20 + K 10 daily    6    L rib pain  -     Certainly her thorax appears asymmetrical  -     I am more suspicious for musculoskeletal etiology  -     Do NOT suspect cardiac etiology.  -     May continue Tylenol which helps   -     She has an appt to see ortho.      Marcie Mayer PA-C     [facility]

## 2021-02-12 NOTE — PATIENT INSTRUCTIONS
1)   Stop spironolactone    2)   Resume furosemide 20 mg + potassium 10 daily, may increase to twice per day if needed.

## 2021-02-18 ENCOUNTER — TELEPHONE (OUTPATIENT)
Dept: CARDIOLOGY | Facility: MEDICAL CENTER | Age: 66
End: 2021-02-18

## 2021-02-18 RX ORDER — LOSARTAN POTASSIUM 50 MG/1
50 TABLET ORAL DAILY
Qty: 90 TABLET | Refills: 3 | Status: SHIPPED | OUTPATIENT
Start: 2021-02-18 | End: 2022-04-27

## 2021-02-18 NOTE — TELEPHONE ENCOUNTER
I think she does take losartan 50.  I sent the refills.     Thanks, Marcie Mayer PA-C  2/18/2021

## 2021-02-18 NOTE — PROGRESS NOTES
Agree with refill losartan 50.  Sent 90 days with 3 refills to her pharmacy.    Marcie Mayer PA-C  2/18/2021

## 2021-03-08 ENCOUNTER — HOSPITAL ENCOUNTER (OUTPATIENT)
Dept: LAB | Facility: MEDICAL CENTER | Age: 66
End: 2021-03-08
Attending: FAMILY MEDICINE
Payer: COMMERCIAL

## 2021-03-08 DIAGNOSIS — E78.00 HYPERCHOLESTEROLEMIA: ICD-10-CM

## 2021-03-08 PROCEDURE — 80061 LIPID PANEL: CPT

## 2021-03-08 PROCEDURE — 80053 COMPREHEN METABOLIC PANEL: CPT

## 2021-03-08 PROCEDURE — 36415 COLL VENOUS BLD VENIPUNCTURE: CPT

## 2021-03-09 ENCOUNTER — TELEPHONE (OUTPATIENT)
Dept: CARDIOLOGY | Facility: MEDICAL CENTER | Age: 66
End: 2021-03-09

## 2021-03-09 ENCOUNTER — OFFICE VISIT (OUTPATIENT)
Dept: CARDIOLOGY | Facility: MEDICAL CENTER | Age: 66
End: 2021-03-09
Payer: COMMERCIAL

## 2021-03-09 VITALS
HEIGHT: 67 IN | DIASTOLIC BLOOD PRESSURE: 80 MMHG | HEART RATE: 60 BPM | WEIGHT: 183 LBS | SYSTOLIC BLOOD PRESSURE: 122 MMHG | OXYGEN SATURATION: 96 % | BODY MASS INDEX: 28.72 KG/M2

## 2021-03-09 DIAGNOSIS — Z86.79 S/P ABLATION OF ATRIAL FIBRILLATION: ICD-10-CM

## 2021-03-09 DIAGNOSIS — R10.12 LEFT UPPER QUADRANT ABDOMINAL PAIN: ICD-10-CM

## 2021-03-09 DIAGNOSIS — Z79.01 CHRONIC ANTICOAGULATION: ICD-10-CM

## 2021-03-09 DIAGNOSIS — Z98.890 S/P ABLATION OF ATRIAL FIBRILLATION: ICD-10-CM

## 2021-03-09 DIAGNOSIS — I48.0 PAF (PAROXYSMAL ATRIAL FIBRILLATION) (HCC): ICD-10-CM

## 2021-03-09 DIAGNOSIS — Z98.890 S/P CARDIAC CATHETERIZATION: ICD-10-CM

## 2021-03-09 DIAGNOSIS — I10 ESSENTIAL HYPERTENSION, BENIGN: ICD-10-CM

## 2021-03-09 LAB
ALBUMIN SERPL BCP-MCNC: 4.2 G/DL (ref 3.2–4.9)
ALBUMIN/GLOB SERPL: 1.3 G/DL
ALP SERPL-CCNC: 75 U/L (ref 30–99)
ALT SERPL-CCNC: 19 U/L (ref 2–50)
ANION GAP SERPL CALC-SCNC: 8 MMOL/L (ref 7–16)
AST SERPL-CCNC: 28 U/L (ref 12–45)
BILIRUB SERPL-MCNC: 0.6 MG/DL (ref 0.1–1.5)
BUN SERPL-MCNC: 21 MG/DL (ref 8–22)
CALCIUM SERPL-MCNC: 9.8 MG/DL (ref 8.5–10.5)
CHLORIDE SERPL-SCNC: 104 MMOL/L (ref 96–112)
CHOLEST SERPL-MCNC: 144 MG/DL (ref 100–199)
CO2 SERPL-SCNC: 27 MMOL/L (ref 20–33)
CREAT SERPL-MCNC: 0.9 MG/DL (ref 0.5–1.4)
EKG IMPRESSION: NORMAL
FASTING STATUS PATIENT QL REPORTED: NORMAL
GLOBULIN SER CALC-MCNC: 3.2 G/DL (ref 1.9–3.5)
GLUCOSE SERPL-MCNC: 94 MG/DL (ref 65–99)
HDLC SERPL-MCNC: 63 MG/DL
LDLC SERPL CALC-MCNC: 69 MG/DL
POTASSIUM SERPL-SCNC: 4.2 MMOL/L (ref 3.6–5.5)
PROT SERPL-MCNC: 7.4 G/DL (ref 6–8.2)
SODIUM SERPL-SCNC: 139 MMOL/L (ref 135–145)
TRIGL SERPL-MCNC: 61 MG/DL (ref 0–149)

## 2021-03-09 PROCEDURE — 99214 OFFICE O/P EST MOD 30 MIN: CPT | Performed by: INTERNAL MEDICINE

## 2021-03-09 PROCEDURE — 93000 ELECTROCARDIOGRAM COMPLETE: CPT | Performed by: INTERNAL MEDICINE

## 2021-03-09 ASSESSMENT — FIBROSIS 4 INDEX: FIB4 SCORE: 1.72

## 2021-03-09 NOTE — PROGRESS NOTES
Chief Complaint   Patient presents with   • Atrial Fibrillation     F/V DX:PERSISTENT AFIB       Subjective:   Chinyere Priest is a 65 y.o. female who presents today with history of persistent atrial fibrillation status post 2 ablations last one in 2019.  No recurrence and feels well cardiac wise.  Has been complaining of a 2 to 3-month history of left upper quadrant pain.  Seen in urgent care.  Needs back surgery with lumbar fusion.  No palpitations.  No change in bowel movements.    Past Medical History:   Diagnosis Date   • Aortic calcification (HCC)      Noted incidentally on abdominal CT scan.   • Asthma in adult         • Chronic anticoagulation    • Chronic back pain     • CKD (chronic kidney disease) stage 3, GFR 30-59 ml/min 2020   • Dental disorder     Full upper/lower dentures.   • Diverticulosis     • Essential hypertension, malignant     • Inflamed seborrheic keratosis     • Intrinsic eczema 2020   • Non-rheumatic mitral regurgitation 2019    Echocardiogram with normal LV size, LVEF 65%. Moderate MR, mild-moderate TR, unchanged from previous.   • Osteoarthritis of both hips     • Plantar fascia syndrome     • Plantar wart of right foot     • Pruritic dermatitis      Ongoing for several months but worse at night on her lower extremities   • Pulmonary hypertension (HCC)    • Pure hypercholesterolemia     • S/P cardiac catheterization 2017    1.  Normal right heart pressures. 3.  Essentially normal coronary arteries. 2.  Left ventricular ejection fraction 65%.    • Spider veins of limb     • Unspecified vitamin D deficiency       Past Surgical History:   Procedure Laterality Date   • LUMBAR LAMINECTOMY DISKECTOMY  2009    Performed by SUDHIR VERMA at SURGERY [facility]S   • GYN SURGERY       x2   • HYSTERECTOMY LAPAROSCOPY       Family History   Problem Relation Age of Onset   • Heart Disease Mother         1st MI @ 61 yo   • Hypertension Mother    • Heart  Attack Mother 60   • Heart Disease Maternal Aunt    • Cancer Daughter 35        breast   • Heart Disease Maternal Grandmother    • Heart Disease Maternal Grandfather    • Heart Disease Maternal Aunt 56     Social History     Socioeconomic History   • Marital status:      Spouse name: Not on file   • Number of children: Not on file   • Years of education: Not on file   • Highest education level: Not on file   Occupational History   • Not on file   Tobacco Use   • Smoking status: Former Smoker     Packs/day: 1.00     Years: 40.00     Pack years: 40.00     Types: Cigarettes     Quit date: 3/1/2010     Years since quittin.0   • Smokeless tobacco: Never Used   Substance and Sexual Activity   • Alcohol use: No     Alcohol/week: 0.0 oz   • Drug use: No     Comment: tried marijuana once.    • Sexual activity: Yes     Partners: Male     Birth control/protection: Post-Menopausal     Comment: , works at [employer](part time)   Other Topics Concern   •  Service Not Asked   • Blood Transfusions Not Asked   • Caffeine Concern Not Asked   • Occupational Exposure Not Asked   • Hobby Hazards Not Asked   • Sleep Concern Not Asked   • Stress Concern Not Asked   • Weight Concern Not Asked   • Special Diet Not Asked   • Back Care Not Asked   • Exercise No   • Bike Helmet Not Asked   • Seat Belt Not Asked   • Self-Exams Not Asked   Social History Narrative    Works at [employer]     Social Determinants of Health     Financial Resource Strain:    • Difficulty of Paying Living Expenses:    Food Insecurity:    • Worried About Running Out of Food in the Last Year:    • Ran Out of Food in the Last Year:    Transportation Needs:    • Lack of Transportation (Medical):    • Lack of Transportation (Non-Medical):    Physical Activity:    • Days of Exercise per Week:    • Minutes of Exercise per Session:    Stress:    • Feeling of Stress :    Social Connections:    • Frequency of Communication with Friends and Family:    • Frequency of  "Social Gatherings with Friends and Family:    • Attends Voodoo Services:    • Active Member of Clubs or Organizations:    • Attends Club or Organization Meetings:    • Marital Status:    Intimate Partner Violence:    • Fear of Current or Ex-Partner:    • Emotionally Abused:    • Physically Abused:    • Sexually Abused:      Allergies   Allergen Reactions   • Erythromycin      Abdominal pain   • Latex    • Tape      Cloth tape  Paper tape okay   • Tikosyn [Dofetilide]      Outpatient Encounter Medications as of 3/9/2021   Medication Sig Dispense Refill   • losartan (COZAAR) 50 MG Tab Take 1 tablet by mouth every day. 90 tablet 3   • furosemide (LASIX) 20 MG Tab Take 1 tablet by mouth every day. 30 tablet 5   • potassium chloride SA (K-DUR) 10 MEQ Tab CR Take 1 tablet by mouth every day. 30 tablet 5   • carvedilol (COREG) 3.125 MG Tab Take 1 Tab by mouth 2 times a day, with meals. 180 Tab 3   • flecainide (TAMBOCOR) 100 MG Tab TAKE 1 TABLET BY MOUTH TWICE A  Tab 2   • apixaban (ELIQUIS) 5mg Tab Take 1 Tab by mouth 2 Times a Day. TAKE 1 TABLET BY MOUTH TWICE A  Tab 3   • atorvastatin (LIPITOR) 10 MG Tab Take 1 Tab by mouth every day. 90 Tab 3   • Cholecalciferol 100 MCG (4000 UT) Cap Take 4,000 Units by mouth.     • albuterol 108 (90 Base) MCG/ACT Aero Soln inhalation aerosol Inhale 2 Puffs by mouth every 6 hours as needed for Shortness of Breath. 8.5 g 11   • clobetasol (TEMOVATE) 0.05 % Ointment Apply to affected area twice daily till clear 60 g 3     No facility-administered encounter medications on file as of 3/9/2021.     ROS     Objective:   /80 (BP Location: Left arm, Patient Position: Sitting, BP Cuff Size: Adult)   Pulse 60   Ht 1.702 m (5' 7\")   Wt 83 kg (183 lb)   SpO2 96%   BMI 28.66 kg/m²     Physical Exam   Constitutional: She is oriented to person, place, and time. She appears well-developed and well-nourished.   HENT:   Head: Normocephalic and atraumatic.   Eyes: Pupils are " equal, round, and reactive to light. EOM are normal.   Cardiovascular: Normal rate, regular rhythm, normal heart sounds and intact distal pulses. Exam reveals no gallop and no friction rub.   No murmur heard.  Pulmonary/Chest: Effort normal and breath sounds normal.   Abdominal: Soft. Bowel sounds are normal.   Musculoskeletal:         General: No edema. Normal range of motion.      Cervical back: Normal range of motion and neck supple.   Neurological: She is alert and oriented to person, place, and time. No cranial nerve deficit.   Skin: Skin is warm.   Psychiatric: She has a normal mood and affect. Her behavior is normal. Judgment and thought content normal.       Assessment:     1. PAF (paroxysmal atrial fibrillation) ([facility])  EKG   2. S/P cardiac catheterization     3. S/P ablation of atrial fibrillation     4. Chronic anticoagulation     5. Left upper quadrant abdominal pain  CT-ABDOMEN-PELVIS WITH & W/O   6. Essential hypertension, benign         Medical Decision Making:  Today's Assessment / Status / Plan:   1.  Left upper quadrant pain mild tenderness in the splenic area on palpation.  Check CT scan.  2.  Hypertension well controlled.  3.  Atrial fibrillation no recurrence continue flecainide.  4.  Anticoagulation continue Noac.  5.  Follow-up in 3 months

## 2021-03-10 NOTE — TELEPHONE ENCOUNTER
S/w pt advised from cardiac standpoint there is not contraindications for her to get the vaccine. Deferred clearance for all other health issues to PCP

## 2021-03-10 NOTE — TELEPHONE ENCOUNTER
DS    NM: Chinyere Priest   PH: [phone]   PT NM: Chinyere Priest   : 1955   REG DR: Dr Steele   RE: Just in the office today, 3/9/21,  can she get a COVID vaccine safely?   DISP HIST: 2021 12:49P 2RK, pt  dsp  2021 12:53P  man fax    --------------------------------------  Message History  Account: [id number]  Taken:  Tue 09-Mar-2021 12:50p 2RK  Serial#: [id number]      Thank you,  Yolanda MOULTON

## 2021-03-15 ENCOUNTER — APPOINTMENT (OUTPATIENT)
Dept: MEDICAL GROUP | Facility: MEDICAL CENTER | Age: 66
End: 2021-03-15
Payer: COMMERCIAL

## 2021-03-18 ENCOUNTER — OFFICE VISIT (OUTPATIENT)
Dept: MEDICAL GROUP | Facility: MEDICAL CENTER | Age: 66
End: 2021-03-18
Payer: COMMERCIAL

## 2021-03-18 VITALS
SYSTOLIC BLOOD PRESSURE: 104 MMHG | DIASTOLIC BLOOD PRESSURE: 58 MMHG | WEIGHT: 184.3 LBS | HEART RATE: 60 BPM | RESPIRATION RATE: 20 BRPM | OXYGEN SATURATION: 98 % | HEIGHT: 67 IN | BODY MASS INDEX: 28.93 KG/M2 | TEMPERATURE: 98.7 F

## 2021-03-18 DIAGNOSIS — M54.30 BACK PAIN WITH SCIATICA: ICD-10-CM

## 2021-03-18 DIAGNOSIS — N18.30 STAGE 3 CHRONIC KIDNEY DISEASE, UNSPECIFIED WHETHER STAGE 3A OR 3B CKD: ICD-10-CM

## 2021-03-18 DIAGNOSIS — Z98.890 S/P ABLATION OF ATRIAL FIBRILLATION: ICD-10-CM

## 2021-03-18 DIAGNOSIS — I10 ESSENTIAL HYPERTENSION, BENIGN: ICD-10-CM

## 2021-03-18 DIAGNOSIS — M54.9 BACK PAIN WITH SCIATICA: ICD-10-CM

## 2021-03-18 DIAGNOSIS — Z87.891 HISTORY OF TOBACCO USE: ICD-10-CM

## 2021-03-18 DIAGNOSIS — E78.00 HYPERCHOLESTEROLEMIA: ICD-10-CM

## 2021-03-18 DIAGNOSIS — R07.81 RIB PAIN ON LEFT SIDE: ICD-10-CM

## 2021-03-18 DIAGNOSIS — J45.20 MILD INTERMITTENT ASTHMA IN ADULT WITHOUT COMPLICATION: ICD-10-CM

## 2021-03-18 DIAGNOSIS — Z78.0 ASYMPTOMATIC MENOPAUSE: ICD-10-CM

## 2021-03-18 DIAGNOSIS — Z86.79 S/P ABLATION OF ATRIAL FIBRILLATION: ICD-10-CM

## 2021-03-18 DIAGNOSIS — R73.01 IMPAIRED FASTING GLUCOSE: ICD-10-CM

## 2021-03-18 DIAGNOSIS — I70.0 AORTIC CALCIFICATION (HCC): ICD-10-CM

## 2021-03-18 PROCEDURE — 99204 OFFICE O/P NEW MOD 45 MIN: CPT | Performed by: INTERNAL MEDICINE

## 2021-03-18 ASSESSMENT — FIBROSIS 4 INDEX: FIB4 SCORE: 1.72

## 2021-03-18 ASSESSMENT — PATIENT HEALTH QUESTIONNAIRE - PHQ9: CLINICAL INTERPRETATION OF PHQ2 SCORE: 0

## 2021-03-18 NOTE — PROGRESS NOTES
CC:  Diagnoses of History of tobacco use, Stage 3 chronic kidney disease, unspecified whether stage 3a or 3b CKD, Back pain with sciatica, Essential hypertension, benign, Hypercholesterolemia, S/P ablation of atrial fibrillation, Aortic calcification (HCC), Mild intermittent asthma in adult without complication, Rib pain on left side, Asymptomatic menopause, and Impaired fasting glucose were pertinent to this visit.    HISTORY OF THE PRESENT ILLNESS: Patient is a 65 y.o. female. This pleasant patient is here today to establish care.    She says she wanted advice for upcoming surgery.  She is having her third lumbar spine surgery with Dr. Clay in April.  Her first surgery was on 2009 laminectomy and discectomy, she reports a second surgery around 2015 that was similar, and now she says she is pending fusion L3-4-5.  She is followed by her cardiologist Dr. Steele note was reviewed 3/9/2021 regarding history of PAF, hypertension.  She is noted to be doing well on anticoagulation and remains on flecainide without recurrence atrial fibrillation, noted she also had history of 2 ablations in the past most recently 2019.  Blood pressure remains well controlled as well she says leg edema is also controlled on Lasix.  Still some minimal leg swelling at the end of the day, she says she can start wearing compression hose.  She has no symptoms of arrhythmia, no symptoms of heart failure such as PND/orthopnea or dyspnea, no chest pain.  No strokelike symptoms.  I feel she should be cleared from a cardiac perspective for her surgery but she will check in with Dr. Steele.    Prior imaging most recently 2/11/2020 one of her rib x-ray does show some atherosclerotic plaque, noted she is on Lipitor.  Labs reviewed 3/8/2021 total cholesterol 144, triglycerides 61, HDL 63 and LDL also controlled at 69.  CMP normal at that time.  She was having some rib pain that has since resolved she thinks her rib got out of place, she is pending some  additional imaging ordered by her cardiologist to assess some other structures.    History labs from 3/8/2021 show GFR has improved to 60s but she does have underlying chronic kidney disease and GFR fluctuates from 50s to 60.    History of vitamin D deficiency most recently normal at 42 on labs 9/8/2020.  She says she is on cholecalciferol 4000 units daily with calcium.  More remotely 7/17/2019 A1c minimally elevated 5.8.    History of asthma and also smoking.  She has not needed to use her albuterol for more than 8 months, no current pulmonary symptoms.  Quit smoking little over 10 years ago, 40-pack-year history, CT 1/27/2016 showed no nodules but there were signs of fibrosis of the lungs.  She will consider updating lung cancer screening.  Otherwise mammogram is up-to-date.  She declines Tdap vaccine today because she gets her Covid vaccine soon.    Allergies: Erythromycin, Latex, Tape, and Tikosyn [dofetilide]    Current Outpatient Medications Ordered in Epic   Medication Sig Dispense Refill   • losartan (COZAAR) 50 MG Tab Take 1 tablet by mouth every day. 90 tablet 3   • furosemide (LASIX) 20 MG Tab Take 1 tablet by mouth every day. 30 tablet 5   • potassium chloride SA (K-DUR) 10 MEQ Tab CR Take 1 tablet by mouth every day. 30 tablet 5   • carvedilol (COREG) 3.125 MG Tab Take 1 Tab by mouth 2 times a day, with meals. 180 Tab 3   • flecainide (TAMBOCOR) 100 MG Tab TAKE 1 TABLET BY MOUTH TWICE A  Tab 2   • apixaban (ELIQUIS) 5mg Tab Take 1 Tab by mouth 2 Times a Day. TAKE 1 TABLET BY MOUTH TWICE A  Tab 3   • atorvastatin (LIPITOR) 10 MG Tab Take 1 Tab by mouth every day. 90 Tab 3   • Cholecalciferol 100 MCG (4000 UT) Cap Take 4,000 Units by mouth.     • albuterol 108 (90 Base) MCG/ACT Aero Soln inhalation aerosol Inhale 2 Puffs by mouth every 6 hours as needed for Shortness of Breath. 8.5 g 11   • clobetasol (TEMOVATE) 0.05 % Ointment Apply to affected area twice daily till clear 60 g 3     No  current Epic-ordered facility-administered medications on file.       Past Medical History:   Diagnosis Date   • Aortic calcification (HCC)      Noted incidentally on abdominal CT scan.   • Asthma in adult         • Chronic anticoagulation    • Chronic back pain     • CKD (chronic kidney disease) stage 3, GFR 30-59 ml/min 2020   • Dental disorder     Full upper/lower dentures.   • Diverticulosis     • Essential hypertension, malignant     • Inflamed seborrheic keratosis     • Intrinsic eczema 2020   • Non-rheumatic mitral regurgitation 2019    Echocardiogram with normal LV size, LVEF 65%. Moderate MR, mild-moderate TR, unchanged from previous.   • Osteoarthritis of both hips     • Plantar fascia syndrome     • Plantar wart of right foot     • Pruritic dermatitis      Ongoing for several months but worse at night on her lower extremities   • Pulmonary hypertension (HCC)    • Pure hypercholesterolemia     • S/P cardiac catheterization 2017    1.  Normal right heart pressures. 3.  Essentially normal coronary arteries. 2.  Left ventricular ejection fraction 65%.    • Spider veins of limb     • Unspecified vitamin D deficiency         Past Surgical History:   Procedure Laterality Date   • LUMBAR LAMINECTOMY DISKECTOMY  2009    Performed by SUDHIR VERMA at [facility]   • GYN SURGERY       x2   • HYSTERECTOMY LAPAROSCOPY         Social History     Tobacco Use   • Smoking status: Former Smoker     Packs/day: 1.00     Years: 40.00     Pack years: 40.00     Types: Cigarettes     Quit date: 3/1/2010     Years since quittin.0   • Smokeless tobacco: Never Used   Substance Use Topics   • Alcohol use: No     Alcohol/week: 0.0 oz   • Drug use: No     Comment: tried marijuana once.        Social History     Social History Narrative    Works at [employer]       Family History   Problem Relation Age of Onset   • Heart Disease Mother         1st MI @ 61 yo   • Hypertension Mother    • Heart  "Attack Mother 60   • Heart Disease Maternal Aunt    • Cancer Daughter 35        breast   • Heart Disease Maternal Grandmother    • Heart Disease Maternal Grandfather    • Heart Disease Maternal Aunt 56       Exam: /58 (BP Location: Right arm, Patient Position: Sitting, BP Cuff Size: Adult)   Pulse 60   Temp 37.1 °C (98.7 °F) (Temporal)   Resp 20   Ht 1.702 m (5' 7\")   Wt 83.6 kg (184 lb 4.9 oz)   SpO2 98%  Body mass index is 28.87 kg/m².    General: Normal appearing. No distress.  EYES: Conjunctiva clear lids without ptosis, pupils equal  EARS: Normal shape and contour   Pulmonary: Clear to ausculation.  Normal effort. No rales or wheezing.  Cardiovascular: Regular rate and rhythm with 2 out of 6 systolic murmur left sternal border.  No gallop.  Abdomen: Soft, nontender, nondistended. Normal bowel sounds.  Neurologic: Cranial nerves grossly nonfocal  Skin: Warm and dry.  No obvious lesions.  Musculoskeletal: Normal gait.  1+ pitting edema distally and symmetric  Psych: Normal mood and affect. Alert and oriented x3. Judgment and insight is normal.      Assessment/Plan  1. History of tobacco use  - REFERRAL TO LUNG CANCER SCREENING PROGRAM; Future    2. Stage 3 chronic kidney disease, unspecified whether stage 3a or 3b CKD  Stable at this time, will avoid NSAIDs and other nephrotoxic medications, blood pressure is well controlled, avoid dehydration.    - Comp Metabolic Panel; Future  - CBC WITHOUT DIFFERENTIAL; Future    3. Back pain with sciatica  Pending lumbar fusion Dr. Clay, clinically I feel that she can proceed with her surgery without any additional testing needed but she will check with her cardiologist Dr. Steele.  She has not brought in preoperative clearance paperwork to today's appointment.    4. Essential hypertension, benign  Well-controlled continue current management losartan, carvedilol, Lasix.  Recommend daily compression hose, stay under 2 g salt daily, elevating legs, etc. she does " have some trace swelling bilaterally.  - Comp Metabolic Panel; Future  - CBC WITHOUT DIFFERENTIAL; Future    5. Hypercholesterolemia  Well-controlled continue Lipitor 10 mg daily    6. S/P ablation of atrial fibrillation  Street atrial fibrillation ablation most recent cardiology note indicates rhythm controlled on flecainide, also on beta-blocker and Eliquis for anticoagulation, follow-up cardiology.    7. Aortic calcification (HCC)  Continue statin, also noted on Eliquis, no strokelike symptoms.    8. Mild intermittent asthma in adult without complication  Well-controlled with rare use of albuterol, will monitor.  Recommend update Tdap but too soon with Covid vaccine.    9. Rib pain on left side  This pain resolved, her rib may have been out of place but she is pending additional imaging however recommended by her cardiologist.    10. Asymptomatic menopause  Plan to update screening, continue vitamin D 4000 units daily, calcium, exercise as tolerated, etc.  - DS-BONE DENSITY STUDY (DEXA); Future    11. Impaired fasting glucose  On reassess status, encourage healthy diet/exercise  - HEMOGLOBIN A1C; Future        rtc 4m      Please note that this dictation was created using voice recognition software. I have made every reasonable attempt to correct obvious errors, but I expect that there are errors of grammar and possibly content that I did not discover before finalizing the note.

## 2021-03-19 ENCOUNTER — HOSPITAL ENCOUNTER (OUTPATIENT)
Dept: RADIOLOGY | Facility: MEDICAL CENTER | Age: 66
End: 2021-03-19
Attending: INTERNAL MEDICINE
Payer: COMMERCIAL

## 2021-03-19 DIAGNOSIS — R10.12 LEFT UPPER QUADRANT ABDOMINAL PAIN: ICD-10-CM

## 2021-03-19 PROCEDURE — 700117 HCHG RX CONTRAST REV CODE 255: Performed by: INTERNAL MEDICINE

## 2021-03-19 PROCEDURE — 74177 CT ABD & PELVIS W/CONTRAST: CPT | Mod: MG

## 2021-03-19 RX ADMIN — IOHEXOL 100 ML: 350 INJECTION, SOLUTION INTRAVENOUS at 12:00

## 2021-03-26 ENCOUNTER — TELEPHONE (OUTPATIENT)
Dept: HEMATOLOGY ONCOLOGY | Facility: MEDICAL CENTER | Age: 66
End: 2021-03-26

## 2021-03-26 NOTE — TELEPHONE ENCOUNTER
Received referral to lung cancer screening program.  Chart review to assess for lung cancer screening program eligibility.  1. Age 55-77 yrs of age? 65yrs  2. 30 pack year hx of smoking, or greater? Yes, 1 ppdx 40yrs=  40pkyr hx  3. Current smoker or if quit, has pt quit within last 15 yrs? Quit, 03/01/2010  4. Any signs or symptoms of lung cancer? None Stated  5. Previous history of lung cancer? None Stated  6. Chest CT within past 12 mos.? None   Patient DOES meet eligibility criteria. [facility] scheduling notified to schedule the shared decision making visit.

## 2021-04-12 ENCOUNTER — TELEPHONE (OUTPATIENT)
Dept: CARDIOLOGY | Facility: MEDICAL CENTER | Age: 66
End: 2021-04-12

## 2021-04-12 ENCOUNTER — HOSPITAL ENCOUNTER (OUTPATIENT)
Dept: RADIOLOGY | Facility: MEDICAL CENTER | Age: 66
DRG: 455 | End: 2021-04-12
Attending: NEUROLOGICAL SURGERY | Admitting: NEUROLOGICAL SURGERY
Payer: COMMERCIAL

## 2021-04-12 ENCOUNTER — PRE-ADMISSION TESTING (OUTPATIENT)
Dept: ADMISSIONS | Facility: MEDICAL CENTER | Age: 66
DRG: 455 | End: 2021-04-12
Attending: NEUROLOGICAL SURGERY
Payer: COMMERCIAL

## 2021-04-12 DIAGNOSIS — Z01.810 PRE-OPERATIVE CARDIOVASCULAR EXAMINATION: ICD-10-CM

## 2021-04-12 DIAGNOSIS — R94.31 NONSPECIFIC ABNORMAL ELECTROCARDIOGRAM (ECG) (EKG): ICD-10-CM

## 2021-04-12 DIAGNOSIS — Z01.812 PRE-OPERATIVE LABORATORY EXAMINATION: ICD-10-CM

## 2021-04-12 DIAGNOSIS — Z01.812 PRE-OPERATIVE LABORATORY EXAMINATION: Primary | ICD-10-CM

## 2021-04-12 DIAGNOSIS — Z01.811 PRE-OPERATIVE RESPIRATORY EXAMINATION: ICD-10-CM

## 2021-04-12 DIAGNOSIS — M47.896 OTHER SPONDYLOSIS, LUMBAR REGION: ICD-10-CM

## 2021-04-12 DIAGNOSIS — M48.061 SPINAL STENOSIS, LUMBAR REGION, WITHOUT NEUROGENIC CLAUDICATION: ICD-10-CM

## 2021-04-12 DIAGNOSIS — R82.90 NONSPECIFIC FINDING ON EXAMINATION OF URINE: ICD-10-CM

## 2021-04-12 DIAGNOSIS — M54.16 LUMBAR RADICULOPATHY: ICD-10-CM

## 2021-04-12 DIAGNOSIS — R79.1 ABNORMAL COAGULATION PROFILE: ICD-10-CM

## 2021-04-12 LAB
ANION GAP SERPL CALC-SCNC: 9 MMOL/L (ref 7–16)
APPEARANCE UR: CLEAR
APTT PPP: 33.7 SEC (ref 24.7–36)
BASOPHILS # BLD AUTO: 0.5 % (ref 0–1.8)
BASOPHILS # BLD: 0.04 K/UL (ref 0–0.12)
BILIRUB UR QL STRIP.AUTO: NEGATIVE
BUN SERPL-MCNC: 19 MG/DL (ref 8–22)
CALCIUM SERPL-MCNC: 9.2 MG/DL (ref 8.5–10.5)
CHLORIDE SERPL-SCNC: 105 MMOL/L (ref 96–112)
CO2 SERPL-SCNC: 27 MMOL/L (ref 20–33)
COLOR UR: YELLOW
CREAT SERPL-MCNC: 0.84 MG/DL (ref 0.5–1.4)
EKG IMPRESSION: NORMAL
EOSINOPHIL # BLD AUTO: 0.27 K/UL (ref 0–0.51)
EOSINOPHIL NFR BLD: 3.6 % (ref 0–6.9)
ERYTHROCYTE [DISTWIDTH] IN BLOOD BY AUTOMATED COUNT: 47.8 FL (ref 35.9–50)
GLUCOSE SERPL-MCNC: 94 MG/DL (ref 65–99)
GLUCOSE UR STRIP.AUTO-MCNC: NEGATIVE MG/DL
HCT VFR BLD AUTO: 39.1 % (ref 37–47)
HGB BLD-MCNC: 12.5 G/DL (ref 12–16)
IMM GRANULOCYTES # BLD AUTO: 0.02 K/UL (ref 0–0.11)
IMM GRANULOCYTES NFR BLD AUTO: 0.3 % (ref 0–0.9)
INR PPP: 1.09 (ref 0.87–1.13)
KETONES UR STRIP.AUTO-MCNC: NEGATIVE MG/DL
LEUKOCYTE ESTERASE UR QL STRIP.AUTO: NEGATIVE
LYMPHOCYTES # BLD AUTO: 1.52 K/UL (ref 1–4.8)
LYMPHOCYTES NFR BLD: 20.5 % (ref 22–41)
MCH RBC QN AUTO: 31.6 PG (ref 27–33)
MCHC RBC AUTO-ENTMCNC: 32 G/DL (ref 33.6–35)
MCV RBC AUTO: 99 FL (ref 81.4–97.8)
MICRO URNS: NORMAL
MONOCYTES # BLD AUTO: 0.89 K/UL (ref 0–0.85)
MONOCYTES NFR BLD AUTO: 12 % (ref 0–13.4)
NEUTROPHILS # BLD AUTO: 4.66 K/UL (ref 2–7.15)
NEUTROPHILS NFR BLD: 63.1 % (ref 44–72)
NITRITE UR QL STRIP.AUTO: NEGATIVE
NRBC # BLD AUTO: 0 K/UL
NRBC BLD-RTO: 0 /100 WBC
PH UR STRIP.AUTO: 6.5 [PH] (ref 5–8)
PLATELET # BLD AUTO: 198 K/UL (ref 164–446)
PMV BLD AUTO: 12.1 FL (ref 9–12.9)
POTASSIUM SERPL-SCNC: 4.5 MMOL/L (ref 3.6–5.5)
PROT UR QL STRIP: NEGATIVE MG/DL
PROTHROMBIN TIME: 14.5 SEC (ref 12–14.6)
RBC # BLD AUTO: 3.95 M/UL (ref 4.2–5.4)
RBC UR QL AUTO: NEGATIVE
SODIUM SERPL-SCNC: 141 MMOL/L (ref 135–145)
SP GR UR STRIP.AUTO: 1.03
UROBILINOGEN UR STRIP.AUTO-MCNC: 1 MG/DL
WBC # BLD AUTO: 7.4 K/UL (ref 4.8–10.8)

## 2021-04-12 PROCEDURE — 81003 URINALYSIS AUTO W/O SCOPE: CPT

## 2021-04-12 PROCEDURE — 72110 X-RAY EXAM L-2 SPINE 4/>VWS: CPT

## 2021-04-12 PROCEDURE — 80048 BASIC METABOLIC PNL TOTAL CA: CPT

## 2021-04-12 PROCEDURE — 85025 COMPLETE CBC W/AUTO DIFF WBC: CPT

## 2021-04-12 PROCEDURE — 36415 COLL VENOUS BLD VENIPUNCTURE: CPT

## 2021-04-12 PROCEDURE — 93005 ELECTROCARDIOGRAM TRACING: CPT

## 2021-04-12 PROCEDURE — 93010 ELECTROCARDIOGRAM REPORT: CPT | Performed by: INTERNAL MEDICINE

## 2021-04-12 PROCEDURE — 85610 PROTHROMBIN TIME: CPT

## 2021-04-12 PROCEDURE — 71046 X-RAY EXAM CHEST 2 VIEWS: CPT

## 2021-04-12 PROCEDURE — 85730 THROMBOPLASTIN TIME PARTIAL: CPT

## 2021-04-12 ASSESSMENT — FIBROSIS 4 INDEX: FIB4 SCORE: 1.72

## 2021-04-12 NOTE — TELEPHONE ENCOUNTER
To DS - ok to proceed? Ok to hold eliquis x2 days if needed?    Received clearance request from [facility] for L3-5 unilateral postiorio spinal fusion left side.     HX PAF, HTN, Cardiac cath

## 2021-04-14 NOTE — TELEPHONE ENCOUNTER
Called patient and advised Dr. Steele is out of the office and will most likely respond to clearance request on Monday. Patient inquired about Eliquis, and I advised if clearance is given, she can hold Eliquis 2 days prior.

## 2021-04-23 ENCOUNTER — PRE-ADMISSION TESTING (OUTPATIENT)
Dept: ADMISSIONS | Facility: MEDICAL CENTER | Age: 66
DRG: 455 | End: 2021-04-23
Attending: NEUROLOGICAL SURGERY
Payer: COMMERCIAL

## 2021-04-23 DIAGNOSIS — Z01.812 PRE-OPERATIVE LABORATORY EXAMINATION: ICD-10-CM

## 2021-04-23 LAB
COVID ORDER STATUS COVID19: NORMAL
SARS-COV-2 RNA RESP QL NAA+PROBE: NOTDETECTED
SPECIMEN SOURCE: NORMAL

## 2021-04-23 PROCEDURE — U0005 INFEC AGEN DETEC AMPLI PROBE: HCPCS

## 2021-04-23 PROCEDURE — C9803 HOPD COVID-19 SPEC COLLECT: HCPCS

## 2021-04-23 PROCEDURE — U0003 INFECTIOUS AGENT DETECTION BY NUCLEIC ACID (DNA OR RNA); SEVERE ACUTE RESPIRATORY SYNDROME CORONAVIRUS 2 (SARS-COV-2) (CORONAVIRUS DISEASE [COVID-19]), AMPLIFIED PROBE TECHNIQUE, MAKING USE OF HIGH THROUGHPUT TECHNOLOGIES AS DESCRIBED BY CMS-2020-01-R: HCPCS

## 2021-04-27 ENCOUNTER — APPOINTMENT (OUTPATIENT)
Dept: RADIOLOGY | Facility: MEDICAL CENTER | Age: 66
DRG: 455 | End: 2021-04-27
Attending: NEUROLOGICAL SURGERY
Payer: COMMERCIAL

## 2021-04-27 ENCOUNTER — ANESTHESIA (OUTPATIENT)
Dept: SURGERY | Facility: MEDICAL CENTER | Age: 66
DRG: 455 | End: 2021-04-27
Payer: COMMERCIAL

## 2021-04-27 ENCOUNTER — HOSPITAL ENCOUNTER (INPATIENT)
Facility: MEDICAL CENTER | Age: 66
LOS: 4 days | DRG: 455 | End: 2021-05-01
Attending: NEUROLOGICAL SURGERY | Admitting: NEUROLOGICAL SURGERY
Payer: COMMERCIAL

## 2021-04-27 ENCOUNTER — ANESTHESIA EVENT (OUTPATIENT)
Dept: SURGERY | Facility: MEDICAL CENTER | Age: 66
DRG: 455 | End: 2021-04-27
Payer: COMMERCIAL

## 2021-04-27 DIAGNOSIS — G89.18 ACUTE POSTOPERATIVE PAIN: ICD-10-CM

## 2021-04-27 DIAGNOSIS — Z98.1 S/P LUMBAR SPINAL FUSION: ICD-10-CM

## 2021-04-27 PROCEDURE — 700105 HCHG RX REV CODE 258: Performed by: ANESTHESIOLOGY

## 2021-04-27 PROCEDURE — 4A11X4G MONITORING OF PERIPHERAL NERVOUS ELECTRICAL ACTIVITY, INTRAOPERATIVE, EXTERNAL APPROACH: ICD-10-PCS | Performed by: NEUROLOGICAL SURGERY

## 2021-04-27 PROCEDURE — 700111 HCHG RX REV CODE 636 W/ 250 OVERRIDE (IP): Performed by: PHYSICIAN ASSISTANT

## 2021-04-27 PROCEDURE — 700111 HCHG RX REV CODE 636 W/ 250 OVERRIDE (IP): Performed by: ANESTHESIOLOGY

## 2021-04-27 PROCEDURE — C1821 INTERSPINOUS IMPLANT: HCPCS | Performed by: NEUROLOGICAL SURGERY

## 2021-04-27 PROCEDURE — 160002 HCHG RECOVERY MINUTES (STAT): Performed by: NEUROLOGICAL SURGERY

## 2021-04-27 PROCEDURE — 95940 IONM IN OPERATNG ROOM 15 MIN: CPT | Performed by: NEUROLOGICAL SURGERY

## 2021-04-27 PROCEDURE — 700102 HCHG RX REV CODE 250 W/ 637 OVERRIDE(OP): Performed by: PHYSICIAN ASSISTANT

## 2021-04-27 PROCEDURE — 160035 HCHG PACU - 1ST 60 MINS PHASE I: Performed by: NEUROLOGICAL SURGERY

## 2021-04-27 PROCEDURE — 502240 HCHG MISC OR SUPPLY RC 0272: Performed by: NEUROLOGICAL SURGERY

## 2021-04-27 PROCEDURE — 501838 HCHG SUTURE GENERAL: Performed by: NEUROLOGICAL SURGERY

## 2021-04-27 PROCEDURE — 700105 HCHG RX REV CODE 258: Performed by: NEUROLOGICAL SURGERY

## 2021-04-27 PROCEDURE — 160042 HCHG SURGERY MINUTES - EA ADDL 1 MIN LEVEL 5: Performed by: NEUROLOGICAL SURGERY

## 2021-04-27 PROCEDURE — 160048 HCHG OR STATISTICAL LEVEL 1-5: Performed by: NEUROLOGICAL SURGERY

## 2021-04-27 PROCEDURE — 72100 X-RAY EXAM L-S SPINE 2/3 VWS: CPT

## 2021-04-27 PROCEDURE — 110454 HCHG SHELL REV 250: Performed by: NEUROLOGICAL SURGERY

## 2021-04-27 PROCEDURE — 160036 HCHG PACU - EA ADDL 30 MINS PHASE I: Performed by: NEUROLOGICAL SURGERY

## 2021-04-27 PROCEDURE — 700105 HCHG RX REV CODE 258: Performed by: PHYSICIAN ASSISTANT

## 2021-04-27 PROCEDURE — C1713 ANCHOR/SCREW BN/BN,TIS/BN: HCPCS | Performed by: NEUROLOGICAL SURGERY

## 2021-04-27 PROCEDURE — 700101 HCHG RX REV CODE 250: Performed by: ANESTHESIOLOGY

## 2021-04-27 PROCEDURE — A9270 NON-COVERED ITEM OR SERVICE: HCPCS | Performed by: ANESTHESIOLOGY

## 2021-04-27 PROCEDURE — 700111 HCHG RX REV CODE 636 W/ 250 OVERRIDE (IP): Performed by: NEUROLOGICAL SURGERY

## 2021-04-27 PROCEDURE — 95907 NVR CNDJ TST 1-2 STUDIES: CPT | Performed by: NEUROLOGICAL SURGERY

## 2021-04-27 PROCEDURE — 160031 HCHG SURGERY MINUTES - 1ST 30 MINS LEVEL 5: Performed by: NEUROLOGICAL SURGERY

## 2021-04-27 PROCEDURE — 160009 HCHG ANES TIME/MIN: Performed by: NEUROLOGICAL SURGERY

## 2021-04-27 PROCEDURE — 0SB20ZZ EXCISION OF LUMBAR VERTEBRAL DISC, OPEN APPROACH: ICD-10-PCS | Performed by: NEUROLOGICAL SURGERY

## 2021-04-27 PROCEDURE — 502000 HCHG MISC OR IMPLANTS RC 0278: Performed by: NEUROLOGICAL SURGERY

## 2021-04-27 PROCEDURE — 700102 HCHG RX REV CODE 250 W/ 637 OVERRIDE(OP): Performed by: ANESTHESIOLOGY

## 2021-04-27 PROCEDURE — 0SG10A0 FUSION OF 2 OR MORE LUMBAR VERTEBRAL JOINTS WITH INTERBODY FUSION DEVICE, ANTERIOR APPROACH, ANTERIOR COLUMN, OPEN APPROACH: ICD-10-PCS | Performed by: NEUROLOGICAL SURGERY

## 2021-04-27 PROCEDURE — 95939 C MOTOR EVOKED UPR&LWR LIMBS: CPT | Performed by: NEUROLOGICAL SURGERY

## 2021-04-27 PROCEDURE — A9270 NON-COVERED ITEM OR SERVICE: HCPCS | Performed by: NEUROLOGICAL SURGERY

## 2021-04-27 PROCEDURE — 770006 HCHG ROOM/CARE - MED/SURG/GYN SEMI*

## 2021-04-27 PROCEDURE — 700101 HCHG RX REV CODE 250: Performed by: NEUROLOGICAL SURGERY

## 2021-04-27 PROCEDURE — 95925 SOMATOSENSORY TESTING: CPT | Performed by: NEUROLOGICAL SURGERY

## 2021-04-27 PROCEDURE — 95937 NEUROMUSCULAR JUNCTION TEST: CPT | Performed by: NEUROLOGICAL SURGERY

## 2021-04-27 PROCEDURE — A9270 NON-COVERED ITEM OR SERVICE: HCPCS | Performed by: PHYSICIAN ASSISTANT

## 2021-04-27 PROCEDURE — 95861 NEEDLE EMG 2 EXTREMITIES: CPT | Performed by: NEUROLOGICAL SURGERY

## 2021-04-27 DEVICE — PUTTY 10CC NANOBONE (1/EA): Type: IMPLANTABLE DEVICE | Status: FUNCTIONAL

## 2021-04-27 DEVICE — IMPLANTABLE DEVICE: Type: IMPLANTABLE DEVICE | Status: FUNCTIONAL

## 2021-04-27 DEVICE — CAGE 6 D 22 X 50 10 MM INTERBODY FUSION: Type: IMPLANTABLE DEVICE | Status: FUNCTIONAL

## 2021-04-27 RX ORDER — ONDANSETRON 2 MG/ML
4 INJECTION INTRAMUSCULAR; INTRAVENOUS EVERY 4 HOURS PRN
Status: DISCONTINUED | OUTPATIENT
Start: 2021-04-27 | End: 2021-04-29

## 2021-04-27 RX ORDER — DIPHENHYDRAMINE HYDROCHLORIDE 50 MG/ML
12.5 INJECTION INTRAMUSCULAR; INTRAVENOUS
Status: DISCONTINUED | OUTPATIENT
Start: 2021-04-27 | End: 2021-04-27 | Stop reason: HOSPADM

## 2021-04-27 RX ORDER — SODIUM CHLORIDE, SODIUM LACTATE, POTASSIUM CHLORIDE, CALCIUM CHLORIDE 600; 310; 30; 20 MG/100ML; MG/100ML; MG/100ML; MG/100ML
INJECTION, SOLUTION INTRAVENOUS CONTINUOUS
Status: DISCONTINUED | OUTPATIENT
Start: 2021-04-27 | End: 2021-04-27 | Stop reason: HOSPADM

## 2021-04-27 RX ORDER — ATORVASTATIN CALCIUM 10 MG/1
10 TABLET, FILM COATED ORAL DAILY
Status: DISCONTINUED | OUTPATIENT
Start: 2021-04-27 | End: 2021-05-01 | Stop reason: HOSPADM

## 2021-04-27 RX ORDER — CALCIUM CARBONATE 500 MG/1
500 TABLET, CHEWABLE ORAL 2 TIMES DAILY
Status: DISCONTINUED | OUTPATIENT
Start: 2021-04-27 | End: 2021-04-29

## 2021-04-27 RX ORDER — CEFAZOLIN SODIUM 2 G/100ML
2 INJECTION, SOLUTION INTRAVENOUS EVERY 8 HOURS
Status: COMPLETED | OUTPATIENT
Start: 2021-04-27 | End: 2021-04-28

## 2021-04-27 RX ORDER — ACETAMINOPHEN 500 MG
1000 TABLET ORAL ONCE
Status: COMPLETED | OUTPATIENT
Start: 2021-04-27 | End: 2021-04-27

## 2021-04-27 RX ORDER — DIPHENHYDRAMINE HYDROCHLORIDE 50 MG/ML
25 INJECTION INTRAMUSCULAR; INTRAVENOUS EVERY 6 HOURS PRN
Status: DISCONTINUED | OUTPATIENT
Start: 2021-04-27 | End: 2021-04-29

## 2021-04-27 RX ORDER — POLYETHYLENE GLYCOL 3350 17 G/17G
1 POWDER, FOR SOLUTION ORAL 2 TIMES DAILY PRN
Status: DISCONTINUED | OUTPATIENT
Start: 2021-04-27 | End: 2021-04-29

## 2021-04-27 RX ORDER — OXYCODONE HCL 10 MG/1
10 TABLET, FILM COATED, EXTENDED RELEASE ORAL ONCE
Status: COMPLETED | OUTPATIENT
Start: 2021-04-27 | End: 2021-04-27

## 2021-04-27 RX ORDER — ALPRAZOLAM 0.25 MG/1
0.25 TABLET ORAL 2 TIMES DAILY PRN
Status: DISCONTINUED | OUTPATIENT
Start: 2021-04-27 | End: 2021-04-29

## 2021-04-27 RX ORDER — DEXAMETHASONE SODIUM PHOSPHATE 4 MG/ML
INJECTION, SOLUTION INTRA-ARTICULAR; INTRALESIONAL; INTRAMUSCULAR; INTRAVENOUS; SOFT TISSUE PRN
Status: DISCONTINUED | OUTPATIENT
Start: 2021-04-27 | End: 2021-04-27 | Stop reason: SURG

## 2021-04-27 RX ORDER — DOCUSATE SODIUM 100 MG/1
100 CAPSULE, LIQUID FILLED ORAL 2 TIMES DAILY
Status: DISCONTINUED | OUTPATIENT
Start: 2021-04-27 | End: 2021-04-29

## 2021-04-27 RX ORDER — DIPHENHYDRAMINE HCL 25 MG
25 TABLET ORAL EVERY 6 HOURS PRN
Status: DISCONTINUED | OUTPATIENT
Start: 2021-04-27 | End: 2021-04-29

## 2021-04-27 RX ORDER — HYDROMORPHONE HYDROCHLORIDE 1 MG/ML
0.1 INJECTION, SOLUTION INTRAMUSCULAR; INTRAVENOUS; SUBCUTANEOUS
Status: DISCONTINUED | OUTPATIENT
Start: 2021-04-27 | End: 2021-04-27 | Stop reason: HOSPADM

## 2021-04-27 RX ORDER — ONDANSETRON 4 MG/1
4 TABLET, ORALLY DISINTEGRATING ORAL EVERY 4 HOURS PRN
Status: DISCONTINUED | OUTPATIENT
Start: 2021-04-27 | End: 2021-04-29

## 2021-04-27 RX ORDER — HYDROMORPHONE HYDROCHLORIDE 1 MG/ML
0.4 INJECTION, SOLUTION INTRAMUSCULAR; INTRAVENOUS; SUBCUTANEOUS
Status: DISCONTINUED | OUTPATIENT
Start: 2021-04-27 | End: 2021-04-27 | Stop reason: HOSPADM

## 2021-04-27 RX ORDER — MIDAZOLAM HYDROCHLORIDE 1 MG/ML
INJECTION INTRAMUSCULAR; INTRAVENOUS PRN
Status: DISCONTINUED | OUTPATIENT
Start: 2021-04-27 | End: 2021-04-27 | Stop reason: SURG

## 2021-04-27 RX ORDER — FLECAINIDE ACETATE 100 MG/1
100 TABLET ORAL 2 TIMES DAILY
Status: DISCONTINUED | OUTPATIENT
Start: 2021-04-27 | End: 2021-05-01 | Stop reason: HOSPADM

## 2021-04-27 RX ORDER — SODIUM CHLORIDE, SODIUM LACTATE, POTASSIUM CHLORIDE, CALCIUM CHLORIDE 600; 310; 30; 20 MG/100ML; MG/100ML; MG/100ML; MG/100ML
INJECTION, SOLUTION INTRAVENOUS CONTINUOUS
Status: ACTIVE | OUTPATIENT
Start: 2021-04-27 | End: 2021-04-27

## 2021-04-27 RX ORDER — GABAPENTIN 300 MG/1
300 CAPSULE ORAL ONCE
Status: COMPLETED | OUTPATIENT
Start: 2021-04-27 | End: 2021-04-27

## 2021-04-27 RX ORDER — CEFAZOLIN SODIUM 1 G/3ML
INJECTION, POWDER, FOR SOLUTION INTRAMUSCULAR; INTRAVENOUS PRN
Status: DISCONTINUED | OUTPATIENT
Start: 2021-04-27 | End: 2021-04-27 | Stop reason: SURG

## 2021-04-27 RX ORDER — POTASSIUM CHLORIDE 20 MEQ/1
10 TABLET, EXTENDED RELEASE ORAL DAILY
Status: DISCONTINUED | OUTPATIENT
Start: 2021-04-27 | End: 2021-05-01 | Stop reason: HOSPADM

## 2021-04-27 RX ORDER — CARVEDILOL 3.12 MG/1
3.12 TABLET ORAL 2 TIMES DAILY WITH MEALS
Status: DISCONTINUED | OUTPATIENT
Start: 2021-04-27 | End: 2021-05-01 | Stop reason: HOSPADM

## 2021-04-27 RX ORDER — LOSARTAN POTASSIUM 50 MG/1
50 TABLET ORAL DAILY
Status: DISCONTINUED | OUTPATIENT
Start: 2021-04-27 | End: 2021-05-01 | Stop reason: HOSPADM

## 2021-04-27 RX ORDER — HYDROMORPHONE HYDROCHLORIDE 1 MG/ML
0.2 INJECTION, SOLUTION INTRAMUSCULAR; INTRAVENOUS; SUBCUTANEOUS
Status: DISCONTINUED | OUTPATIENT
Start: 2021-04-27 | End: 2021-04-27 | Stop reason: HOSPADM

## 2021-04-27 RX ORDER — HYDROCODONE BITARTRATE AND ACETAMINOPHEN 10; 325 MG/1; MG/1
1-2 TABLET ORAL EVERY 4 HOURS PRN
Status: DISCONTINUED | OUTPATIENT
Start: 2021-04-27 | End: 2021-04-29

## 2021-04-27 RX ORDER — OXYCODONE HCL 5 MG/5 ML
10 SOLUTION, ORAL ORAL
Status: COMPLETED | OUTPATIENT
Start: 2021-04-27 | End: 2021-04-27

## 2021-04-27 RX ORDER — BUPIVACAINE HYDROCHLORIDE AND EPINEPHRINE 5; 5 MG/ML; UG/ML
INJECTION, SOLUTION PERINEURAL
Status: DISCONTINUED | OUTPATIENT
Start: 2021-04-27 | End: 2021-04-27 | Stop reason: HOSPADM

## 2021-04-27 RX ORDER — ONDANSETRON 2 MG/ML
INJECTION INTRAMUSCULAR; INTRAVENOUS PRN
Status: DISCONTINUED | OUTPATIENT
Start: 2021-04-27 | End: 2021-04-27 | Stop reason: SURG

## 2021-04-27 RX ORDER — ALBUTEROL SULFATE 90 UG/1
2 AEROSOL, METERED RESPIRATORY (INHALATION)
Status: DISCONTINUED | OUTPATIENT
Start: 2021-04-27 | End: 2021-05-01 | Stop reason: HOSPADM

## 2021-04-27 RX ORDER — METHOCARBAMOL 750 MG/1
750 TABLET, FILM COATED ORAL EVERY 8 HOURS PRN
Status: DISCONTINUED | OUTPATIENT
Start: 2021-04-27 | End: 2021-05-01 | Stop reason: HOSPADM

## 2021-04-27 RX ORDER — BISACODYL 10 MG
10 SUPPOSITORY, RECTAL RECTAL
Status: DISCONTINUED | OUTPATIENT
Start: 2021-04-27 | End: 2021-04-29

## 2021-04-27 RX ORDER — ENEMA 19; 7 G/133ML; G/133ML
1 ENEMA RECTAL
Status: DISCONTINUED | OUTPATIENT
Start: 2021-04-27 | End: 2021-04-29

## 2021-04-27 RX ORDER — OXYCODONE HCL 5 MG/5 ML
5 SOLUTION, ORAL ORAL
Status: COMPLETED | OUTPATIENT
Start: 2021-04-27 | End: 2021-04-27

## 2021-04-27 RX ORDER — ONDANSETRON 2 MG/ML
4 INJECTION INTRAMUSCULAR; INTRAVENOUS
Status: DISCONTINUED | OUTPATIENT
Start: 2021-04-27 | End: 2021-04-27 | Stop reason: HOSPADM

## 2021-04-27 RX ORDER — OXYCODONE HYDROCHLORIDE 5 MG/1
5 TABLET ORAL EVERY 4 HOURS PRN
Status: DISCONTINUED | OUTPATIENT
Start: 2021-04-27 | End: 2021-05-01 | Stop reason: HOSPADM

## 2021-04-27 RX ORDER — CEFAZOLIN SODIUM 1 G/3ML
INJECTION, POWDER, FOR SOLUTION INTRAMUSCULAR; INTRAVENOUS
Status: DISCONTINUED | OUTPATIENT
Start: 2021-04-27 | End: 2021-04-27 | Stop reason: HOSPADM

## 2021-04-27 RX ORDER — SODIUM CHLORIDE 9 MG/ML
INJECTION, SOLUTION INTRAVENOUS CONTINUOUS
Status: DISCONTINUED | OUTPATIENT
Start: 2021-04-27 | End: 2021-05-01 | Stop reason: HOSPADM

## 2021-04-27 RX ORDER — AMOXICILLIN 250 MG
1 CAPSULE ORAL NIGHTLY
Status: DISCONTINUED | OUTPATIENT
Start: 2021-04-27 | End: 2021-04-29

## 2021-04-27 RX ORDER — HYDROMORPHONE HYDROCHLORIDE 1 MG/ML
0.5 INJECTION, SOLUTION INTRAMUSCULAR; INTRAVENOUS; SUBCUTANEOUS
Status: DISCONTINUED | OUTPATIENT
Start: 2021-04-27 | End: 2021-04-29

## 2021-04-27 RX ORDER — LABETALOL HYDROCHLORIDE 5 MG/ML
10 INJECTION, SOLUTION INTRAVENOUS
Status: DISCONTINUED | OUTPATIENT
Start: 2021-04-27 | End: 2021-04-29

## 2021-04-27 RX ORDER — SUCCINYLCHOLINE/SOD CL,ISO/PF 200MG/10ML
SYRINGE (ML) INTRAVENOUS PRN
Status: DISCONTINUED | OUTPATIENT
Start: 2021-04-27 | End: 2021-04-27 | Stop reason: SURG

## 2021-04-27 RX ORDER — FUROSEMIDE 20 MG/1
20 TABLET ORAL DAILY
Status: DISCONTINUED | OUTPATIENT
Start: 2021-04-27 | End: 2021-05-01 | Stop reason: HOSPADM

## 2021-04-27 RX ORDER — HALOPERIDOL 5 MG/ML
1 INJECTION INTRAMUSCULAR
Status: DISCONTINUED | OUTPATIENT
Start: 2021-04-27 | End: 2021-04-27 | Stop reason: HOSPADM

## 2021-04-27 RX ORDER — AMOXICILLIN 250 MG
1 CAPSULE ORAL
Status: DISCONTINUED | OUTPATIENT
Start: 2021-04-27 | End: 2021-04-29

## 2021-04-27 RX ADMIN — OXYCODONE HYDROCHLORIDE 10 MG: 10 TABLET, FILM COATED, EXTENDED RELEASE ORAL at 07:01

## 2021-04-27 RX ADMIN — FENTANYL CITRATE 150 MCG: 50 INJECTION, SOLUTION INTRAMUSCULAR; INTRAVENOUS at 08:58

## 2021-04-27 RX ADMIN — DEXAMETHASONE SODIUM PHOSPHATE 8 MG: 4 INJECTION, SOLUTION INTRA-ARTICULAR; INTRALESIONAL; INTRAMUSCULAR; INTRAVENOUS; SOFT TISSUE at 07:11

## 2021-04-27 RX ADMIN — ATORVASTATIN CALCIUM 10 MG: 10 TABLET, FILM COATED ORAL at 16:25

## 2021-04-27 RX ADMIN — POVIDONE IODINE 15 ML: 100 SOLUTION TOPICAL at 06:23

## 2021-04-27 RX ADMIN — MIDAZOLAM HYDROCHLORIDE 2 MG: 1 INJECTION, SOLUTION INTRAMUSCULAR; INTRAVENOUS at 07:11

## 2021-04-27 RX ADMIN — DOCUSATE SODIUM 100 MG: 100 CAPSULE, LIQUID FILLED ORAL at 17:48

## 2021-04-27 RX ADMIN — OXYCODONE 5 MG: 5 TABLET ORAL at 20:34

## 2021-04-27 RX ADMIN — DOCUSATE SODIUM 50 MG AND SENNOSIDES 8.6 MG 1 TABLET: 8.6; 5 TABLET, FILM COATED ORAL at 20:34

## 2021-04-27 RX ADMIN — FENTANYL CITRATE 100 MCG: 50 INJECTION, SOLUTION INTRAMUSCULAR; INTRAVENOUS at 07:11

## 2021-04-27 RX ADMIN — ANTACID TABLETS 500 MG: 500 TABLET, CHEWABLE ORAL at 17:49

## 2021-04-27 RX ADMIN — SODIUM CHLORIDE, POTASSIUM CHLORIDE, SODIUM LACTATE AND CALCIUM CHLORIDE: 600; 310; 30; 20 INJECTION, SOLUTION INTRAVENOUS at 12:28

## 2021-04-27 RX ADMIN — SODIUM CHLORIDE, POTASSIUM CHLORIDE, SODIUM LACTATE AND CALCIUM CHLORIDE: 600; 310; 30; 20 INJECTION, SOLUTION INTRAVENOUS at 09:10

## 2021-04-27 RX ADMIN — SODIUM CHLORIDE, POTASSIUM CHLORIDE, SODIUM LACTATE AND CALCIUM CHLORIDE: 600; 310; 30; 20 INJECTION, SOLUTION INTRAVENOUS at 06:36

## 2021-04-27 RX ADMIN — DEXMEDETOMIDINE 0.7 MCG/KG/HR: 200 INJECTION, SOLUTION INTRAVENOUS at 07:10

## 2021-04-27 RX ADMIN — Medication 200 MG: at 07:11

## 2021-04-27 RX ADMIN — LIDOCAINE HYDROCHLORIDE 0.5 ML: 10 INJECTION, SOLUTION EPIDURAL; INFILTRATION; INTRACAUDAL at 06:35

## 2021-04-27 RX ADMIN — CEFAZOLIN SODIUM 2 G: 2 INJECTION, SOLUTION INTRAVENOUS at 23:40

## 2021-04-27 RX ADMIN — PROPOFOL 200 MG: 10 INJECTION, EMULSION INTRAVENOUS at 07:11

## 2021-04-27 RX ADMIN — FLECAINIDE ACETATE 100 MG: 100 TABLET ORAL at 17:48

## 2021-04-27 RX ADMIN — OXYCODONE HYDROCHLORIDE 10 MG: 5 SOLUTION ORAL at 10:47

## 2021-04-27 RX ADMIN — ACETAMINOPHEN 1000 MG: 500 TABLET ORAL at 07:01

## 2021-04-27 RX ADMIN — FENTANYL CITRATE 50 MCG: 50 INJECTION, SOLUTION INTRAMUSCULAR; INTRAVENOUS at 10:45

## 2021-04-27 RX ADMIN — GABAPENTIN 300 MG: 300 CAPSULE ORAL at 07:01

## 2021-04-27 RX ADMIN — POTASSIUM CHLORIDE 10 MEQ: 1500 TABLET, EXTENDED RELEASE ORAL at 16:25

## 2021-04-27 RX ADMIN — CEFAZOLIN 2 G: 330 INJECTION, POWDER, FOR SOLUTION INTRAMUSCULAR; INTRAVENOUS at 07:32

## 2021-04-27 RX ADMIN — CEFAZOLIN SODIUM 2 G: 2 INJECTION, SOLUTION INTRAVENOUS at 16:26

## 2021-04-27 RX ADMIN — SODIUM CHLORIDE: 9 INJECTION, SOLUTION INTRAVENOUS at 16:26

## 2021-04-27 RX ADMIN — ONDANSETRON 4 MG: 2 INJECTION INTRAMUSCULAR; INTRAVENOUS at 07:11

## 2021-04-27 ASSESSMENT — PAIN DESCRIPTION - PAIN TYPE
TYPE: SURGICAL PAIN
TYPE: SURGICAL PAIN
TYPE: ACUTE PAIN;CHRONIC PAIN;SURGICAL PAIN
TYPE: SURGICAL PAIN
TYPE: ACUTE PAIN;SURGICAL PAIN
TYPE: SURGICAL PAIN

## 2021-04-27 ASSESSMENT — FIBROSIS 4 INDEX: FIB4 SCORE: 2.14

## 2021-04-27 ASSESSMENT — PAIN SCALES - GENERAL: PAIN_LEVEL: 3

## 2021-04-27 NOTE — OR NURSING
Sleeping soundly,appears comfortable,still await for a room.BP checks meeting criteria's. for Q 30 min. checks.

## 2021-04-27 NOTE — ANESTHESIA PROCEDURE NOTES
Airway    Date/Time: 4/27/2021 7:12 AM  Performed by: Chris Humphreys M.D.  Authorized by: Chris Humphreys M.D.     Location:  OR  Urgency:  Elective  Difficult Airway: No    Indications for Airway Management:  Anesthesia      Spontaneous Ventilation: present    Sedation Level:  Deep  Preoxygenated: Yes    Patient Position:  Sniffing  MILS Maintained Throughout: No    Mask Difficulty Assessment:  0 - not attempted  Final Airway Type:  Endotracheal airway  Final Endotracheal Airway:  ETT  Cuffed: Yes    Technique Used for Successful ETT Placement:  Direct laryngoscopy    Blade Type:  Rishabh  Laryngoscope Blade/Videolaryngoscope Blade Size:  3  ETT Size (mm):  6.5  Placement Verified by: auscultation and capnometry    Cormack-Lehane Classification:  Grade I - full view of glottis  Number of Attempts at Approach:  1

## 2021-04-27 NOTE — ANESTHESIA PROCEDURE NOTES
Arterial Line  Performed by: Chris Humphreys M.D.  Authorized by: Chris Humphreys M.D.     Start Time:  4/27/2021 7:24 AM  End Time:  4/27/2021 7:28 AM  Localization: ultrasound guidance  Image captured, interpreted and electronically stored.  Patient Location:  OR  Indication: continuous blood pressure monitoring        Catheter Size:  20 G  Seldinger Technique?: Yes    Laterality:  Left  Site:  Radial artery  Line Secured:  Antimicrobial disc, tape and transparent dressing  Events: patient tolerated procedure well with no complications

## 2021-04-27 NOTE — OR NURSING
"Await for a room.V/S and neuro. V/S stable as assessed by Pierre ALEXANDRA as well.Medicated for c/o\"mild\"left leg pain/MAR.Right side/flank area P/O site with dressing CD/I,ice pack applied over site.  Pt's.  updated.  "

## 2021-04-27 NOTE — OR NURSING
"Transported to room by CNA.Pt. AAOX4,neuro's. as per baseline.Verbalized\"manageable\" pain level.  Pt's  updated.  "

## 2021-04-27 NOTE — ANESTHESIA PREPROCEDURE EVALUATION
Relevant Problems   PULMONARY   (+) Asthma in adult without complication      NEURO   (+) S/P ablation of atrial fibrillation      CARDIAC   (+) Aortic calcification (HCC)   (+) Bleeding from varicose vein   (+) Essential hypertension, benign   (+) Hypertensive disease   (+) Non-rheumatic mitral regurgitation   (+) Pulmonary hypertension (HCC)   (+) Spider veins of limb         (+) CKD (chronic kidney disease) stage 3, GFR 30-59 ml/min       Physical Exam    Airway   Mallampati: II  TM distance: >3 FB  Neck ROM: full       Cardiovascular   Rhythm: regular  Rate: normal     Dental - normal exam           Pulmonary   Breath sounds clear to auscultation     Abdominal    Neurological - normal exam                 Anesthesia Plan    ASA 2       Plan - general       Airway plan will be ETT        Plan Factors:   Patient was not previously instructed to abstain from smoking on day of procedure.  Patient did not smoke on day of procedure.                Informed Consent:    Anesthetic plan and risks discussed with patient.    Use of blood products discussed with: patient whom.

## 2021-04-27 NOTE — PROGRESS NOTES
Patient in pre-op, assessment completed, patient and  Kyle updated on plan of care, all questions answered, no further needs at this time, call light within reach.

## 2021-04-27 NOTE — ANESTHESIA TIME REPORT
Anesthesia Start and Stop Event Times     Date Time Event    4/27/2021 0700 Ready for Procedure     0708 Anesthesia Start     1007 Anesthesia Stop        Responsible Staff  04/27/21    Name Role Begin End    Chris Humphreys M.D. Anesth 0708 1007        Preop Diagnosis (Free Text):  Pre-op Diagnosis     LUMBAR STENOSIS, SPONDYLOSIS, RADICULOPATHY        Preop Diagnosis (Codes):  Diagnosis Information     Diagnosis Code(s): Lumbar stenosis [M48.061]     Lumbar spondylosis [M47.816]     Lumbar radiculopathy [M54.16]        Post op Diagnosis  Back pain      Premium Reason  Non-Premium    Comments:

## 2021-04-27 NOTE — ANESTHESIA POSTPROCEDURE EVALUATION
Patient: Chinyere Priest    Procedure Summary     Date: 04/27/21 Room / Location: [place] [id number] / SURGERY [facility]    Anesthesia Start: 0708 Anesthesia Stop: 1007    Procedure: FUSION, SPINE, XLIF - STAGE #1 L3-5, RIGHT-SIDED APPROACH. (Right Flank) Diagnosis:       Lumbar stenosis      Lumbar spondylosis      Lumbar radiculopathy      (lumbar stenosis spondylosis radiculopathy )    Surgeons: Chad Clay M.D. Responsible Provider: Chris Humphreys M.D.    Anesthesia Type: general ASA Status: 2          Final Anesthesia Type: general  Last vitals  BP   Blood Pressure : (!) 96/35    Temp   36.1 °C (97 °F)    Pulse   (!) 48   Resp   (!) 28    SpO2   99 %      Anesthesia Post Evaluation    Patient location during evaluation: PACU  Patient participation: complete - patient participated  Level of consciousness: awake  Pain score: 3    Airway patency: patent  Anesthetic complications: no  Cardiovascular status: adequate  Respiratory status: acceptable  Hydration status: acceptable    PONV: none          No complications documented.     Nurse Pain Score: 0 (NPRS)

## 2021-04-27 NOTE — OR SURGEON
Immediate Post OP Note    PreOp Diagnosis: L3-5 DDD, left radiculopathy.       PostOp Diagnosis: Same.       Procedure(s):  FUSION, SPINE, XLIF - STAGE #1 L3-5, RIGHT-SIDED APPROACH. - Wound Class: Clean    Surgeon(s):  Chad Clay M.D.    Anesthesiologist/Type of Anesthesia:  Anesthesiologist: Chris Humphreys M.D./General    Surgical Staff:  Assistant: Qamar Baker P.A.-C.  Circulator: Ayaka Andino R.N.; Patty Fung R.N.  Scrub Person: Melba Villa  Radiology Technologist: Jessie Cook    Specimens removed if any:  * No specimens in log *    Estimated Blood Loss: < 20 cc     Findings: L3-5 DDD, see dictation.     Complications: None.         4/27/2021 10:20 AM Qamar Baker P.A.-C.

## 2021-04-28 ENCOUNTER — HOSPITAL ENCOUNTER (OUTPATIENT)
Dept: RADIOLOGY | Facility: MEDICAL CENTER | Age: 66
End: 2021-04-28
Attending: PHYSICIAN ASSISTANT
Payer: COMMERCIAL

## 2021-04-28 LAB
ANION GAP SERPL CALC-SCNC: 6 MMOL/L (ref 7–16)
BUN SERPL-MCNC: 11 MG/DL (ref 8–22)
CALCIUM SERPL-MCNC: 7.3 MG/DL (ref 8.5–10.5)
CHLORIDE SERPL-SCNC: 109 MMOL/L (ref 96–112)
CO2 SERPL-SCNC: 24 MMOL/L (ref 20–33)
CREAT SERPL-MCNC: 0.66 MG/DL (ref 0.5–1.4)
ERYTHROCYTE [DISTWIDTH] IN BLOOD BY AUTOMATED COUNT: 47.5 FL (ref 35.9–50)
GLUCOSE SERPL-MCNC: 122 MG/DL (ref 65–99)
HCT VFR BLD AUTO: 29.3 % (ref 37–47)
HGB BLD-MCNC: 9.7 G/DL (ref 12–16)
MCH RBC QN AUTO: 32.6 PG (ref 27–33)
MCHC RBC AUTO-ENTMCNC: 33.1 G/DL (ref 33.6–35)
MCV RBC AUTO: 98.3 FL (ref 81.4–97.8)
PLATELET # BLD AUTO: 177 K/UL (ref 164–446)
PMV BLD AUTO: 11.5 FL (ref 9–12.9)
POTASSIUM SERPL-SCNC: 3.8 MMOL/L (ref 3.6–5.5)
RBC # BLD AUTO: 2.98 M/UL (ref 4.2–5.4)
SODIUM SERPL-SCNC: 139 MMOL/L (ref 135–145)
WBC # BLD AUTO: 11.6 K/UL (ref 4.8–10.8)

## 2021-04-28 PROCEDURE — 72131 CT LUMBAR SPINE W/O DYE: CPT

## 2021-04-28 PROCEDURE — A9270 NON-COVERED ITEM OR SERVICE: HCPCS | Performed by: PHYSICIAN ASSISTANT

## 2021-04-28 PROCEDURE — 80048 BASIC METABOLIC PNL TOTAL CA: CPT

## 2021-04-28 PROCEDURE — 700111 HCHG RX REV CODE 636 W/ 250 OVERRIDE (IP): Performed by: PHYSICIAN ASSISTANT

## 2021-04-28 PROCEDURE — 85027 COMPLETE CBC AUTOMATED: CPT

## 2021-04-28 PROCEDURE — 700102 HCHG RX REV CODE 250 W/ 637 OVERRIDE(OP): Performed by: PHYSICIAN ASSISTANT

## 2021-04-28 PROCEDURE — 700105 HCHG RX REV CODE 258: Performed by: PHYSICIAN ASSISTANT

## 2021-04-28 PROCEDURE — 770006 HCHG ROOM/CARE - MED/SURG/GYN SEMI*

## 2021-04-28 PROCEDURE — 36415 COLL VENOUS BLD VENIPUNCTURE: CPT

## 2021-04-28 RX ORDER — FAMOTIDINE 20 MG/1
20 TABLET, FILM COATED ORAL 2 TIMES DAILY
Status: DISCONTINUED | OUTPATIENT
Start: 2021-04-28 | End: 2021-05-01 | Stop reason: HOSPADM

## 2021-04-28 RX ORDER — DEXAMETHASONE SODIUM PHOSPHATE 4 MG/ML
4 INJECTION, SOLUTION INTRA-ARTICULAR; INTRALESIONAL; INTRAMUSCULAR; INTRAVENOUS; SOFT TISSUE EVERY 6 HOURS
Status: COMPLETED | OUTPATIENT
Start: 2021-04-28 | End: 2021-04-28

## 2021-04-28 RX ADMIN — ANTACID TABLETS 500 MG: 500 TABLET, CHEWABLE ORAL at 06:05

## 2021-04-28 RX ADMIN — METHOCARBAMOL 750 MG: 750 TABLET ORAL at 09:54

## 2021-04-28 RX ADMIN — DEXAMETHASONE SODIUM PHOSPHATE 4 MG: 4 INJECTION, SOLUTION INTRA-ARTICULAR; INTRALESIONAL; INTRAMUSCULAR; INTRAVENOUS; SOFT TISSUE at 09:51

## 2021-04-28 RX ADMIN — POTASSIUM CHLORIDE 10 MEQ: 1500 TABLET, EXTENDED RELEASE ORAL at 06:04

## 2021-04-28 RX ADMIN — ATORVASTATIN CALCIUM 10 MG: 10 TABLET, FILM COATED ORAL at 06:04

## 2021-04-28 RX ADMIN — OXYCODONE 5 MG: 5 TABLET ORAL at 07:31

## 2021-04-28 RX ADMIN — FLECAINIDE ACETATE 100 MG: 100 TABLET ORAL at 06:04

## 2021-04-28 RX ADMIN — LOSARTAN POTASSIUM 50 MG: 50 TABLET, FILM COATED ORAL at 06:05

## 2021-04-28 RX ADMIN — ANTACID TABLETS 500 MG: 500 TABLET, CHEWABLE ORAL at 17:36

## 2021-04-28 RX ADMIN — FAMOTIDINE 20 MG: 20 TABLET ORAL at 09:51

## 2021-04-28 RX ADMIN — ENOXAPARIN SODIUM 40 MG: 40 INJECTION SUBCUTANEOUS at 17:36

## 2021-04-28 RX ADMIN — OXYCODONE 5 MG: 5 TABLET ORAL at 03:00

## 2021-04-28 RX ADMIN — DOCUSATE SODIUM 100 MG: 100 CAPSULE, LIQUID FILLED ORAL at 17:36

## 2021-04-28 RX ADMIN — HYDROCODONE BITARTRATE AND ACETAMINOPHEN 1 TABLET: 10; 325 TABLET ORAL at 14:17

## 2021-04-28 RX ADMIN — DOCUSATE SODIUM 100 MG: 100 CAPSULE, LIQUID FILLED ORAL at 06:04

## 2021-04-28 RX ADMIN — SODIUM CHLORIDE: 9 INJECTION, SOLUTION INTRAVENOUS at 03:51

## 2021-04-28 RX ADMIN — FLECAINIDE ACETATE 100 MG: 100 TABLET ORAL at 17:36

## 2021-04-28 RX ADMIN — FAMOTIDINE 20 MG: 20 TABLET ORAL at 17:36

## 2021-04-28 RX ADMIN — DEXAMETHASONE SODIUM PHOSPHATE 4 MG: 4 INJECTION, SOLUTION INTRA-ARTICULAR; INTRALESIONAL; INTRAMUSCULAR; INTRAVENOUS; SOFT TISSUE at 17:44

## 2021-04-28 RX ADMIN — DEXAMETHASONE SODIUM PHOSPHATE 4 MG: 4 INJECTION, SOLUTION INTRA-ARTICULAR; INTRALESIONAL; INTRAMUSCULAR; INTRAVENOUS; SOFT TISSUE at 14:17

## 2021-04-28 ASSESSMENT — PAIN DESCRIPTION - PAIN TYPE
TYPE: ACUTE PAIN;CHRONIC PAIN;SURGICAL PAIN
TYPE: ACUTE PAIN
TYPE: ACUTE PAIN;CHRONIC PAIN;SURGICAL PAIN

## 2021-04-28 NOTE — THERAPY
Physical Therapy Contact Note    PT consult received and acknowledged. Per chart review patient undergoing staged spinal surgery. Will initiate PT eval once surgical intervention complete.     Chaya Mejia, PT, DPT  [phone]

## 2021-04-28 NOTE — PROGRESS NOTES
Neurosurgery Progress Note    Subjective:  Postop day 1 L3-5 XLIF via right-sided approach  Complains of left anterior tibialis pain, contralateral to the side of approach  Reports some associated weakness  Denies right thigh dysesthesia  Scheduled for stage II tomorrow  Passing flatus    Exam:  Motor 4+/5 to left quadricep, iliopsoas, anterior tibialis, gastrocnemius  Motor 4+/5 to right iliopsoas, 5/5 all else  Sensation diminished over left anterior shin  Tenderness noted to left shin to palpation  XLIF incision clean and dry  Abdomen soft  No calf tenderness    BP  Min: 86/37  Max: 129/50  Pulse  Av.8  Min: 44  Max: 71  Resp  Av.8  Min: 10  Max: 28  Temp  Av.5 °C (97.7 °F)  Min: 36.1 °C (97 °F)  Max: 36.9 °C (98.5 °F)  SpO2  Av.3 %  Min: 93 %  Max: 99 %    No data recorded    Recent Labs     21  0048   WBC 11.6*   RBC 2.98*   HEMOGLOBIN 9.7*   HEMATOCRIT 29.3*   MCV 98.3*   MCH 32.6   MCHC 33.1*   RDW 47.5   PLATELETCT 177   MPV 11.5     Recent Labs     21  0048   SODIUM 139   POTASSIUM 3.8   CHLORIDE 109   CO2 24   GLUCOSE 122*   BUN 11   CREATININE 0.66   CALCIUM 7.3*               Intake/Output       21 0700 - 21 0659 21 0700 - 21 0659      1900-0659 Total 1900-0659 Total       Intake    P.O.  240  -- 240  --  -- --    P.O. 240 -- 240 -- -- --    I.V.  2700  1213 3913  --  -- --    Volume (mL) (NS infusion) -- 1213 1213 -- -- --    Volume (mL) (lactated ringers infusion) 2700 -- 2700 -- -- --    Total Intake 2940 1213 4153 -- -- --       Output    Urine  600  -- 600  --  -- --    Number of Times Voided 2 x 3 x 5 x -- -- --    Urine Void (mL) 600 -- 600 -- -- --    Stool  --  -- --  --  -- --    Number of Times Stooled -- 2 x 2 x -- -- --    Total Output 600 -- 600 -- -- --       Net I/O     9166 0140 7201 -- -- --            Intake/Output Summary (Last 24 hours) at 2021 0743  Last data filed at 2021 0351  Gross per 24 hour    Intake 4153 ml   Output 600 ml   Net 3553 ml            • albuterol  2 Puff Q6HRS PRN (RT)   • atorvastatin  10 mg DAILY   • carvedilol  3.125 mg BID WITH MEALS   • flecainide  100 mg BID   • furosemide  20 mg DAILY   • losartan  50 mg DAILY   • potassium chloride SA  10 mEq DAILY   • Pharmacy Consult Request  1 Each PHARMACY TO DOSE   • MD ALERT...DO NOT ADMINISTER NSAIDS or ASPIRIN unless ORDERED By Neurosurgery  1 Each PRN   • docusate sodium  100 mg BID   • senna-docusate  1 tablet Nightly   • senna-docusate  1 tablet Q24HRS PRN   • polyethylene glycol/lytes  1 Packet BID PRN   • magnesium hydroxide  30 mL QDAY PRN   • bisacodyl  10 mg Q24HRS PRN   • fleet  1 Each Once PRN   • NS   Continuous   • enoxaparin  40 mg DAILY AT 1800   • diphenhydrAMINE  25 mg Q6HRS PRN    Or   • diphenhydrAMINE  25 mg Q6HRS PRN   • ondansetron  4 mg Q4HRS PRN   • ondansetron  4 mg Q4HRS PRN   • methocarbamol  750 mg Q8HRS PRN   • ALPRAZolam  0.25 mg BID PRN   • labetalol  10 mg Q HOUR PRN   • benzocaine-menthol  1 Lozenge Q2HRS PRN   • calcium carbonate  500 mg BID   • HYDROcodone/acetaminophen  1-2 tablet Q4HRS PRN   • oxyCODONE immediate-release  5 mg Q4HRS PRN   • HYDROmorphone  0.5 mg Q3HRS PRN       Assessment and Plan:  POD #1  CT today, assess XLIF cages, preoperative planning  N.p.o. after midnight  Add Decadron  Prophylactic anticoagulation: Yes x1

## 2021-04-28 NOTE — THERAPY
Occupational Therapy Contact Note:    OT orders received, pt w/ staged spine sx. Will hold until all stages are completed to best assess pts functional level.    Marcie Lacey, OTR/L

## 2021-04-28 NOTE — CARE PLAN
Problem: Safety  Goal: Will remain free from falls  Outcome: PROGRESSING AS EXPECTED  Intervention: Implement fall precautions  Flowsheets  Taken 4/28/2021 0643  IV Pole on Same Side of Bed as Bathroom: Yes  Taken 4/27/2021 1930  Environmental Precautions:   Treaded Slipper Socks on Patient   Personal Belongings, Wastebasket, Call Bell etc. in Easy Reach   Transferred to Stronger Side   Report Given to Other Health Care Providers Regarding Fall Risk   Bed in Low Position   Communication Sign for Patients & Families   Mobility Assessed & Appropriate Sign Placed  Note: Assistive device provided; patient calls for assistance appropriately     Problem: Pain Management  Goal: Pain level will decrease to patient's comfort goal  Outcome: PROGRESSING AS EXPECTED  Flowsheets (Taken 4/28/2021 0643)  Comfort Goal:   3   Comfort with Movement   Perform Activity  Pain Rating Scale (NPRS): 6  Note: Multimodal interventions offered. PRS and pain management goals discussed     Problem: Fluid Volume:  Goal: Will maintain balanced intake and output  Outcome: PROGRESSING AS EXPECTED  Intervention: Monitor, educate, and encourage compliance with therapeutic intake of liquids  Note: Patient aware of importance to void. Refused diuretics this morning, education provided.      Problem: Pain Management  Goal: Pain level will decrease to patient's comfort goal  Outcome: PROGRESSING AS EXPECTED  Flowsheets (Taken 4/28/2021 0643)  Comfort Goal:   3   Comfort with Movement   Perform Activity  Pain Rating Scale (NPRS): 6  Note: Multimodal interventions offered. PRS and pain management goals discussed

## 2021-04-28 NOTE — PROGRESS NOTES
2 RN skin check completed with CAILIN MAGANA.  Devices: scd, nasal cannula.  Skin assessed under devices: yes  Confirmed pressure ulcers found: no  New potential pressure ulcers noted: no  Wound consult placed: no  The following interventions in place: Pt on pressure redistribution mattress. Encouraged to reposition every 2 hours.    Skin intact. Surgical incision noted. Dressing C/D/I.

## 2021-04-29 ENCOUNTER — APPOINTMENT (OUTPATIENT)
Dept: RADIOLOGY | Facility: MEDICAL CENTER | Age: 66
DRG: 455 | End: 2021-04-29
Attending: NEUROLOGICAL SURGERY
Payer: COMMERCIAL

## 2021-04-29 ENCOUNTER — ANESTHESIA EVENT (OUTPATIENT)
Dept: SURGERY | Facility: MEDICAL CENTER | Age: 66
DRG: 455 | End: 2021-04-29
Payer: COMMERCIAL

## 2021-04-29 ENCOUNTER — ANESTHESIA (OUTPATIENT)
Dept: SURGERY | Facility: MEDICAL CENTER | Age: 66
DRG: 455 | End: 2021-04-29
Payer: COMMERCIAL

## 2021-04-29 LAB
ANION GAP SERPL CALC-SCNC: 10 MMOL/L (ref 7–16)
BUN SERPL-MCNC: 15 MG/DL (ref 8–22)
CALCIUM SERPL-MCNC: 9 MG/DL (ref 8.5–10.5)
CHLORIDE SERPL-SCNC: 103 MMOL/L (ref 96–112)
CO2 SERPL-SCNC: 26 MMOL/L (ref 20–33)
CREAT SERPL-MCNC: 0.72 MG/DL (ref 0.5–1.4)
ERYTHROCYTE [DISTWIDTH] IN BLOOD BY AUTOMATED COUNT: 47.9 FL (ref 35.9–50)
GLUCOSE SERPL-MCNC: 129 MG/DL (ref 65–99)
HCT VFR BLD AUTO: 34.6 % (ref 37–47)
HGB BLD-MCNC: 11.6 G/DL (ref 12–16)
MCH RBC QN AUTO: 32.2 PG (ref 27–33)
MCHC RBC AUTO-ENTMCNC: 33.5 G/DL (ref 33.6–35)
MCV RBC AUTO: 96.1 FL (ref 81.4–97.8)
PLATELET # BLD AUTO: 188 K/UL (ref 164–446)
PMV BLD AUTO: 11.9 FL (ref 9–12.9)
POTASSIUM SERPL-SCNC: 4.3 MMOL/L (ref 3.6–5.5)
RBC # BLD AUTO: 3.6 M/UL (ref 4.2–5.4)
SODIUM SERPL-SCNC: 139 MMOL/L (ref 135–145)
WBC # BLD AUTO: 11.7 K/UL (ref 4.8–10.8)

## 2021-04-29 PROCEDURE — 80048 BASIC METABOLIC PNL TOTAL CA: CPT

## 2021-04-29 PROCEDURE — 36415 COLL VENOUS BLD VENIPUNCTURE: CPT

## 2021-04-29 PROCEDURE — 700102 HCHG RX REV CODE 250 W/ 637 OVERRIDE(OP): Performed by: PHYSICIAN ASSISTANT

## 2021-04-29 PROCEDURE — 95861 NEEDLE EMG 2 EXTREMITIES: CPT | Performed by: NEUROLOGICAL SURGERY

## 2021-04-29 PROCEDURE — 160048 HCHG OR STATISTICAL LEVEL 1-5: Performed by: NEUROLOGICAL SURGERY

## 2021-04-29 PROCEDURE — 0SG1071 FUSION OF 2 OR MORE LUMBAR VERTEBRAL JOINTS WITH AUTOLOGOUS TISSUE SUBSTITUTE, POSTERIOR APPROACH, POSTERIOR COLUMN, OPEN APPROACH: ICD-10-PCS | Performed by: NEUROLOGICAL SURGERY

## 2021-04-29 PROCEDURE — 700111 HCHG RX REV CODE 636 W/ 250 OVERRIDE (IP): Performed by: ANESTHESIOLOGY

## 2021-04-29 PROCEDURE — 502240 HCHG MISC OR SUPPLY RC 0272: Performed by: NEUROLOGICAL SURGERY

## 2021-04-29 PROCEDURE — A9270 NON-COVERED ITEM OR SERVICE: HCPCS | Performed by: PHYSICIAN ASSISTANT

## 2021-04-29 PROCEDURE — 01NB0ZZ RELEASE LUMBAR NERVE, OPEN APPROACH: ICD-10-PCS | Performed by: NEUROLOGICAL SURGERY

## 2021-04-29 PROCEDURE — 770001 HCHG ROOM/CARE - MED/SURG/GYN PRIV*

## 2021-04-29 PROCEDURE — 160042 HCHG SURGERY MINUTES - EA ADDL 1 MIN LEVEL 5: Performed by: NEUROLOGICAL SURGERY

## 2021-04-29 PROCEDURE — 95937 NEUROMUSCULAR JUNCTION TEST: CPT | Performed by: NEUROLOGICAL SURGERY

## 2021-04-29 PROCEDURE — 4A1134G MONITORING OF PERIPHERAL NERVOUS ELECTRICAL ACTIVITY, INTRAOPERATIVE, PERCUTANEOUS APPROACH: ICD-10-PCS | Performed by: NEUROLOGICAL SURGERY

## 2021-04-29 PROCEDURE — 500367 HCHG DRAIN KIT, HEMOVAC: Performed by: NEUROLOGICAL SURGERY

## 2021-04-29 PROCEDURE — 700101 HCHG RX REV CODE 250: Performed by: NEUROLOGICAL SURGERY

## 2021-04-29 PROCEDURE — 160035 HCHG PACU - 1ST 60 MINS PHASE I: Performed by: NEUROLOGICAL SURGERY

## 2021-04-29 PROCEDURE — 501838 HCHG SUTURE GENERAL: Performed by: NEUROLOGICAL SURGERY

## 2021-04-29 PROCEDURE — 500885 HCHG PACK, JACKSON TABLE: Performed by: NEUROLOGICAL SURGERY

## 2021-04-29 PROCEDURE — 95925 SOMATOSENSORY TESTING: CPT | Performed by: NEUROLOGICAL SURGERY

## 2021-04-29 PROCEDURE — 95940 IONM IN OPERATNG ROOM 15 MIN: CPT | Performed by: NEUROLOGICAL SURGERY

## 2021-04-29 PROCEDURE — C1713 ANCHOR/SCREW BN/BN,TIS/BN: HCPCS | Performed by: NEUROLOGICAL SURGERY

## 2021-04-29 PROCEDURE — 700111 HCHG RX REV CODE 636 W/ 250 OVERRIDE (IP): Performed by: NEUROLOGICAL SURGERY

## 2021-04-29 PROCEDURE — 500375 HCHG DRAIN, J-P ROUND 10FR: Performed by: NEUROLOGICAL SURGERY

## 2021-04-29 PROCEDURE — 700101 HCHG RX REV CODE 250: Performed by: PHYSICIAN ASSISTANT

## 2021-04-29 PROCEDURE — 502000 HCHG MISC OR IMPLANTS RC 0278: Performed by: NEUROLOGICAL SURGERY

## 2021-04-29 PROCEDURE — 110454 HCHG SHELL REV 250: Performed by: NEUROLOGICAL SURGERY

## 2021-04-29 PROCEDURE — 160031 HCHG SURGERY MINUTES - 1ST 30 MINS LEVEL 5: Performed by: NEUROLOGICAL SURGERY

## 2021-04-29 PROCEDURE — 700101 HCHG RX REV CODE 250: Performed by: ANESTHESIOLOGY

## 2021-04-29 PROCEDURE — 85027 COMPLETE CBC AUTOMATED: CPT

## 2021-04-29 PROCEDURE — 160009 HCHG ANES TIME/MIN: Performed by: NEUROLOGICAL SURGERY

## 2021-04-29 PROCEDURE — 72100 X-RAY EXAM L-S SPINE 2/3 VWS: CPT

## 2021-04-29 PROCEDURE — 160002 HCHG RECOVERY MINUTES (STAT): Performed by: NEUROLOGICAL SURGERY

## 2021-04-29 RX ORDER — HYDROMORPHONE HYDROCHLORIDE 1 MG/ML
.5-1 INJECTION, SOLUTION INTRAMUSCULAR; INTRAVENOUS; SUBCUTANEOUS
Status: DISCONTINUED | OUTPATIENT
Start: 2021-04-29 | End: 2021-05-01 | Stop reason: HOSPADM

## 2021-04-29 RX ORDER — OXYCODONE HCL 5 MG/5 ML
10 SOLUTION, ORAL ORAL
Status: DISCONTINUED | OUTPATIENT
Start: 2021-04-29 | End: 2021-04-29 | Stop reason: HOSPADM

## 2021-04-29 RX ORDER — ACETAMINOPHEN 325 MG/1
650 TABLET ORAL EVERY 6 HOURS PRN
Status: DISCONTINUED | OUTPATIENT
Start: 2021-05-04 | End: 2021-05-01 | Stop reason: HOSPADM

## 2021-04-29 RX ORDER — DEXAMETHASONE SODIUM PHOSPHATE 4 MG/ML
4 INJECTION, SOLUTION INTRA-ARTICULAR; INTRALESIONAL; INTRAMUSCULAR; INTRAVENOUS; SOFT TISSUE EVERY 6 HOURS
Status: DISCONTINUED | OUTPATIENT
Start: 2021-04-30 | End: 2021-04-30

## 2021-04-29 RX ORDER — LABETALOL HYDROCHLORIDE 5 MG/ML
10 INJECTION, SOLUTION INTRAVENOUS
Status: DISCONTINUED | OUTPATIENT
Start: 2021-04-29 | End: 2021-05-01 | Stop reason: HOSPADM

## 2021-04-29 RX ORDER — DIPHENHYDRAMINE HYDROCHLORIDE 50 MG/ML
12.5 INJECTION INTRAMUSCULAR; INTRAVENOUS
Status: DISCONTINUED | OUTPATIENT
Start: 2021-04-29 | End: 2021-04-29 | Stop reason: HOSPADM

## 2021-04-29 RX ORDER — CEFAZOLIN SODIUM 1 G/3ML
INJECTION, POWDER, FOR SOLUTION INTRAMUSCULAR; INTRAVENOUS
Status: DISCONTINUED | OUTPATIENT
Start: 2021-04-29 | End: 2021-04-29 | Stop reason: HOSPADM

## 2021-04-29 RX ORDER — OXYCODONE HCL 5 MG/5 ML
5 SOLUTION, ORAL ORAL
Status: DISCONTINUED | OUTPATIENT
Start: 2021-04-29 | End: 2021-04-29 | Stop reason: HOSPADM

## 2021-04-29 RX ORDER — ONDANSETRON 2 MG/ML
4 INJECTION INTRAMUSCULAR; INTRAVENOUS
Status: COMPLETED | OUTPATIENT
Start: 2021-04-29 | End: 2021-04-29

## 2021-04-29 RX ORDER — HALOPERIDOL 5 MG/ML
1 INJECTION INTRAMUSCULAR
Status: DISCONTINUED | OUTPATIENT
Start: 2021-04-29 | End: 2021-04-29 | Stop reason: HOSPADM

## 2021-04-29 RX ORDER — ONDANSETRON 2 MG/ML
INJECTION INTRAMUSCULAR; INTRAVENOUS PRN
Status: DISCONTINUED | OUTPATIENT
Start: 2021-04-29 | End: 2021-04-29 | Stop reason: SURG

## 2021-04-29 RX ORDER — ONDANSETRON 2 MG/ML
4 INJECTION INTRAMUSCULAR; INTRAVENOUS EVERY 4 HOURS PRN
Status: DISCONTINUED | OUTPATIENT
Start: 2021-04-29 | End: 2021-05-01 | Stop reason: HOSPADM

## 2021-04-29 RX ORDER — ONDANSETRON 4 MG/1
4 TABLET, ORALLY DISINTEGRATING ORAL EVERY 4 HOURS PRN
Status: DISCONTINUED | OUTPATIENT
Start: 2021-04-29 | End: 2021-05-01 | Stop reason: HOSPADM

## 2021-04-29 RX ORDER — HYDROMORPHONE HYDROCHLORIDE 1 MG/ML
0.4 INJECTION, SOLUTION INTRAMUSCULAR; INTRAVENOUS; SUBCUTANEOUS
Status: DISCONTINUED | OUTPATIENT
Start: 2021-04-29 | End: 2021-04-29 | Stop reason: HOSPADM

## 2021-04-29 RX ORDER — VANCOMYCIN HYDROCHLORIDE 1 G/20ML
INJECTION, POWDER, LYOPHILIZED, FOR SOLUTION INTRAVENOUS
Status: COMPLETED | OUTPATIENT
Start: 2021-04-29 | End: 2021-04-29

## 2021-04-29 RX ORDER — BUPIVACAINE HYDROCHLORIDE AND EPINEPHRINE 5; 5 MG/ML; UG/ML
INJECTION, SOLUTION PERINEURAL
Status: DISCONTINUED | OUTPATIENT
Start: 2021-04-29 | End: 2021-04-29 | Stop reason: HOSPADM

## 2021-04-29 RX ORDER — AMOXICILLIN 250 MG
1 CAPSULE ORAL
Status: DISCONTINUED | OUTPATIENT
Start: 2021-04-29 | End: 2021-05-01 | Stop reason: HOSPADM

## 2021-04-29 RX ORDER — POLYETHYLENE GLYCOL 3350 17 G/17G
1 POWDER, FOR SOLUTION ORAL 2 TIMES DAILY PRN
Status: DISCONTINUED | OUTPATIENT
Start: 2021-04-29 | End: 2021-05-01 | Stop reason: HOSPADM

## 2021-04-29 RX ORDER — BISACODYL 10 MG
10 SUPPOSITORY, RECTAL RECTAL
Status: DISCONTINUED | OUTPATIENT
Start: 2021-04-29 | End: 2021-05-01 | Stop reason: HOSPADM

## 2021-04-29 RX ORDER — DEXAMETHASONE SODIUM PHOSPHATE 4 MG/ML
INJECTION, SOLUTION INTRA-ARTICULAR; INTRALESIONAL; INTRAMUSCULAR; INTRAVENOUS; SOFT TISSUE PRN
Status: DISCONTINUED | OUTPATIENT
Start: 2021-04-29 | End: 2021-04-29 | Stop reason: SURG

## 2021-04-29 RX ORDER — HYDROMORPHONE HYDROCHLORIDE 2 MG/ML
INJECTION, SOLUTION INTRAMUSCULAR; INTRAVENOUS; SUBCUTANEOUS PRN
Status: DISCONTINUED | OUTPATIENT
Start: 2021-04-29 | End: 2021-04-29 | Stop reason: SURG

## 2021-04-29 RX ORDER — HYDRALAZINE HYDROCHLORIDE 20 MG/ML
10 INJECTION INTRAMUSCULAR; INTRAVENOUS
Status: DISCONTINUED | OUTPATIENT
Start: 2021-04-29 | End: 2021-05-01 | Stop reason: HOSPADM

## 2021-04-29 RX ORDER — DOCUSATE SODIUM 100 MG/1
100 CAPSULE, LIQUID FILLED ORAL 2 TIMES DAILY
Status: DISCONTINUED | OUTPATIENT
Start: 2021-04-29 | End: 2021-05-01 | Stop reason: HOSPADM

## 2021-04-29 RX ORDER — ALPRAZOLAM 0.25 MG/1
0.25 TABLET ORAL 2 TIMES DAILY PRN
Status: DISCONTINUED | OUTPATIENT
Start: 2021-04-29 | End: 2021-05-01 | Stop reason: HOSPADM

## 2021-04-29 RX ORDER — MEPERIDINE HYDROCHLORIDE 25 MG/ML
12.5 INJECTION INTRAMUSCULAR; INTRAVENOUS; SUBCUTANEOUS
Status: DISCONTINUED | OUTPATIENT
Start: 2021-04-29 | End: 2021-04-29 | Stop reason: HOSPADM

## 2021-04-29 RX ORDER — HYDROMORPHONE HYDROCHLORIDE 1 MG/ML
0.2 INJECTION, SOLUTION INTRAMUSCULAR; INTRAVENOUS; SUBCUTANEOUS
Status: DISCONTINUED | OUTPATIENT
Start: 2021-04-29 | End: 2021-04-29 | Stop reason: HOSPADM

## 2021-04-29 RX ORDER — DIPHENHYDRAMINE HYDROCHLORIDE 50 MG/ML
25 INJECTION INTRAMUSCULAR; INTRAVENOUS EVERY 6 HOURS PRN
Status: DISCONTINUED | OUTPATIENT
Start: 2021-04-29 | End: 2021-05-01 | Stop reason: HOSPADM

## 2021-04-29 RX ORDER — SODIUM CHLORIDE, SODIUM LACTATE, POTASSIUM CHLORIDE, CALCIUM CHLORIDE 600; 310; 30; 20 MG/100ML; MG/100ML; MG/100ML; MG/100ML
INJECTION, SOLUTION INTRAVENOUS CONTINUOUS
Status: DISCONTINUED | OUTPATIENT
Start: 2021-04-29 | End: 2021-04-29 | Stop reason: HOSPADM

## 2021-04-29 RX ORDER — DEXTROSE MONOHYDRATE, SODIUM CHLORIDE, AND POTASSIUM CHLORIDE 50; 1.49; 9 G/1000ML; G/1000ML; G/1000ML
INJECTION, SOLUTION INTRAVENOUS CONTINUOUS
Status: DISCONTINUED | OUTPATIENT
Start: 2021-04-29 | End: 2021-05-01 | Stop reason: HOSPADM

## 2021-04-29 RX ORDER — CALCIUM CARBONATE 500 MG/1
500 TABLET, CHEWABLE ORAL 2 TIMES DAILY
Status: DISCONTINUED | OUTPATIENT
Start: 2021-04-29 | End: 2021-05-01 | Stop reason: HOSPADM

## 2021-04-29 RX ORDER — AMOXICILLIN 250 MG
1 CAPSULE ORAL NIGHTLY
Status: DISCONTINUED | OUTPATIENT
Start: 2021-04-29 | End: 2021-05-01 | Stop reason: HOSPADM

## 2021-04-29 RX ORDER — ACETAMINOPHEN 325 MG/1
650 TABLET ORAL EVERY 6 HOURS
Status: DISCONTINUED | OUTPATIENT
Start: 2021-04-29 | End: 2021-05-01 | Stop reason: HOSPADM

## 2021-04-29 RX ORDER — CEFAZOLIN SODIUM 1 G/3ML
INJECTION, POWDER, FOR SOLUTION INTRAMUSCULAR; INTRAVENOUS PRN
Status: DISCONTINUED | OUTPATIENT
Start: 2021-04-29 | End: 2021-04-29 | Stop reason: SURG

## 2021-04-29 RX ORDER — HYDROMORPHONE HYDROCHLORIDE 1 MG/ML
0.6 INJECTION, SOLUTION INTRAMUSCULAR; INTRAVENOUS; SUBCUTANEOUS
Status: DISCONTINUED | OUTPATIENT
Start: 2021-04-29 | End: 2021-04-29 | Stop reason: HOSPADM

## 2021-04-29 RX ORDER — CEFAZOLIN SODIUM 2 G/100ML
2 INJECTION, SOLUTION INTRAVENOUS EVERY 8 HOURS
Status: COMPLETED | OUTPATIENT
Start: 2021-04-30 | End: 2021-04-30

## 2021-04-29 RX ORDER — DIPHENHYDRAMINE HCL 25 MG
25 TABLET ORAL EVERY 6 HOURS PRN
Status: DISCONTINUED | OUTPATIENT
Start: 2021-04-29 | End: 2021-05-01 | Stop reason: HOSPADM

## 2021-04-29 RX ORDER — ENEMA 19; 7 G/133ML; G/133ML
1 ENEMA RECTAL
Status: DISCONTINUED | OUTPATIENT
Start: 2021-04-29 | End: 2021-05-01 | Stop reason: HOSPADM

## 2021-04-29 RX ADMIN — PROPOFOL 200 MG: 10 INJECTION, EMULSION INTRAVENOUS at 16:49

## 2021-04-29 RX ADMIN — DEXAMETHASONE SODIUM PHOSPHATE 8 MG: 4 INJECTION, SOLUTION INTRA-ARTICULAR; INTRALESIONAL; INTRAMUSCULAR; INTRAVENOUS; SOFT TISSUE at 18:47

## 2021-04-29 RX ADMIN — FENTANYL CITRATE 100 MCG: 50 INJECTION, SOLUTION INTRAMUSCULAR; INTRAVENOUS at 16:46

## 2021-04-29 RX ADMIN — CEFAZOLIN 2 G: 330 INJECTION, POWDER, FOR SOLUTION INTRAMUSCULAR; INTRAVENOUS at 16:50

## 2021-04-29 RX ADMIN — DOCUSATE SODIUM 100 MG: 100 CAPSULE, LIQUID FILLED ORAL at 05:25

## 2021-04-29 RX ADMIN — HYDROMORPHONE HYDROCHLORIDE 1 MG: 2 INJECTION, SOLUTION INTRAMUSCULAR; INTRAVENOUS; SUBCUTANEOUS at 19:14

## 2021-04-29 RX ADMIN — FENTANYL CITRATE 50 MCG: 50 INJECTION, SOLUTION INTRAMUSCULAR; INTRAVENOUS at 17:26

## 2021-04-29 RX ADMIN — FENTANYL CITRATE 50 MCG: 50 INJECTION, SOLUTION INTRAMUSCULAR; INTRAVENOUS at 18:05

## 2021-04-29 RX ADMIN — ATORVASTATIN CALCIUM 10 MG: 10 TABLET, FILM COATED ORAL at 05:25

## 2021-04-29 RX ADMIN — ACETAMINOPHEN 650 MG: 325 TABLET, FILM COATED ORAL at 20:51

## 2021-04-29 RX ADMIN — ONDANSETRON 4 MG: 2 INJECTION INTRAMUSCULAR; INTRAVENOUS at 19:32

## 2021-04-29 RX ADMIN — FLECAINIDE ACETATE 100 MG: 100 TABLET ORAL at 05:25

## 2021-04-29 RX ADMIN — ROCURONIUM BROMIDE 25 MG: 10 INJECTION, SOLUTION INTRAVENOUS at 16:49

## 2021-04-29 RX ADMIN — HYDROMORPHONE HYDROCHLORIDE 1 MG: 2 INJECTION, SOLUTION INTRAMUSCULAR; INTRAVENOUS; SUBCUTANEOUS at 18:49

## 2021-04-29 RX ADMIN — MIDAZOLAM 2 MG: 1 INJECTION INTRAMUSCULAR; INTRAVENOUS at 16:45

## 2021-04-29 RX ADMIN — CARVEDILOL 3.12 MG: 3.12 TABLET, FILM COATED ORAL at 07:37

## 2021-04-29 RX ADMIN — ANTACID TABLETS 500 MG: 500 TABLET, CHEWABLE ORAL at 05:25

## 2021-04-29 RX ADMIN — FAMOTIDINE 20 MG: 20 TABLET ORAL at 05:25

## 2021-04-29 RX ADMIN — FENTANYL CITRATE 50 MCG: 50 INJECTION, SOLUTION INTRAMUSCULAR; INTRAVENOUS at 18:22

## 2021-04-29 RX ADMIN — LABETALOL HYDROCHLORIDE 10 MG: 5 INJECTION, SOLUTION INTRAVENOUS at 20:51

## 2021-04-29 RX ADMIN — ONDANSETRON 4 MG: 2 INJECTION INTRAMUSCULAR; INTRAVENOUS at 18:47

## 2021-04-29 RX ADMIN — POTASSIUM CHLORIDE, DEXTROSE MONOHYDRATE AND SODIUM CHLORIDE: 150; 5; 900 INJECTION, SOLUTION INTRAVENOUS at 20:50

## 2021-04-29 ASSESSMENT — PAIN DESCRIPTION - PAIN TYPE
TYPE: SURGICAL PAIN
TYPE: ACUTE PAIN;SURGICAL PAIN
TYPE: SURGICAL PAIN
TYPE: ACUTE PAIN
TYPE: SURGICAL PAIN
TYPE: SURGICAL PAIN

## 2021-04-29 ASSESSMENT — PAIN SCALES - GENERAL: PAIN_LEVEL: 2

## 2021-04-29 NOTE — ANESTHESIA PROCEDURE NOTES
Airway    Date/Time: 4/29/2021 4:49 PM  Performed by: Renato Don M.D.  Authorized by: Renato Don M.D.     Location:  OR  Urgency:  Elective  Indications for Airway Management:  Anesthesia      Spontaneous Ventilation: absent    Sedation Level:  Deep  Preoxygenated: Yes    Patient Position:  Sniffing  Final Airway Type:  Endotracheal airway  Final Endotracheal Airway:  ETT  Cuffed: Yes    Technique Used for Successful ETT Placement:  Direct laryngoscopy    Insertion Site:  Oral  Blade Type:  Rishabh  Laryngoscope Blade/Videolaryngoscope Blade Size:  3  ETT Size (mm):  7.0  Measured from:  Teeth  ETT to Teeth (cm):  22  Placement Verified by: auscultation and capnometry    Cormack-Lehane Classification:  Grade I - full view of glottis  Number of Attempts at Approach:  1

## 2021-04-29 NOTE — CARE PLAN
Problem: Communication  Goal: The ability to communicate needs accurately and effectively will improve  Outcome: PROGRESSING AS EXPECTED   Pt education provided on pt's need to communicate pain level.     Problem: Safety  Goal: Will remain free from injury  Outcome: PROGRESSING AS EXPECTED  Goal: Will remain free from falls  Outcome: PROGRESSING AS EXPECTED   Safety precautions in place. Education on safety and falls provided to pt.     Problem: Pain Management  Goal: Pain level will decrease to patient's comfort goal  Outcome: PROGRESSING AS EXPECTED   Pain medication given to pt as required by proper pain scale. Medication alternatives provided to pt.

## 2021-04-29 NOTE — ANESTHESIA PREPROCEDURE EVALUATION
Relevant Problems   PULMONARY   (+) Asthma in adult without complication      NEURO   (+) S/P ablation of atrial fibrillation      CARDIAC   (+) Aortic calcification (HCC)   (+) Bleeding from varicose vein   (+) Essential hypertension, benign   (+) Hypertensive disease   (+) Non-rheumatic mitral regurgitation   (+) Pulmonary hypertension (HCC)   (+) Spider veins of limb         (+) CKD (chronic kidney disease) stage 3, GFR 30-59 ml/min       Physical Exam    Airway   Mallampati: II  TM distance: >3 FB  Neck ROM: full       Cardiovascular - normal exam  Rhythm: regular  Rate: normal  (-) murmur     Dental - normal exam           Pulmonary - normal exam  Breath sounds clear to auscultation     Abdominal    Neurological - normal exam                 Anesthesia Plan    ASA 3   ASA physical status 3 criteria: hypertension - poorly controlled    Plan - general       Airway plan will be ETT          Induction: intravenous    Postoperative Plan: Postoperative administration of opioids is intended.    Pertinent diagnostic labs and testing reviewed    Informed Consent:    Anesthetic plan and risks discussed with patient.    Use of blood products discussed with: patient whom consented to blood products.

## 2021-04-29 NOTE — PROGRESS NOTES
Neurosurgery Progress Note    Subjective:  Postop day 2 L3-5 XLIF via right-sided approach  States left LE pain improved today.  CT completed yesterday and looks good.  Denies right thigh dysesthesia.  Scheduled for stage II today.  Passing flatus    Exam:  Motor 4+/5 to left quadricep, iliopsoas, anterior tibialis, gastrocnemius  Motor 4+/5 to right iliopsoas, 5/5 all else  Sensation diminished over left anterior shin  Tenderness noted to left shin to palpation  XLIF incision clean and dry  Abdomen soft  No calf tenderness    BP  Min: 125/57  Max: 155/70  Pulse  Av.5  Min: 62  Max: 70  Resp  Avg: 15.7  Min: 15  Max: 16  Temp  Av.8 °C (98.3 °F)  Min: 36.4 °C (97.5 °F)  Max: 37.3 °C (99.2 °F)  SpO2  Av.3 %  Min: 94 %  Max: 97 %    No data recorded    Recent Labs     218 21  0053   WBC 11.6* 11.7*   RBC 2.98* 3.60*   HEMOGLOBIN 9.7* 11.6*   HEMATOCRIT 29.3* 34.6*   MCV 98.3* 96.1   MCH 32.6 32.2   MCHC 33.1* 33.5*   RDW 47.5 47.9   PLATELETCT 177 188   MPV 11.5 11.9     Recent Labs     218 21  0053   SODIUM 139 139   POTASSIUM 3.8 4.3   CHLORIDE 109 103   CO2 24 26   GLUCOSE 122* 129*   BUN 11 15   CREATININE 0.66 0.72   CALCIUM 7.3* 9.0               Intake/Output       21 0700 - 21 0659 21 0700 - 21 0659      5819-4631 0780-5765 Total 7215-6746 5511-1548 Total       Intake    P.O.  480  -- 480  --  -- --    P.O. 480 -- 480 -- -- --    Total Intake 480 -- 480 -- -- --       Output    Urine  --  1200 1200  --  -- --    Number of Times Voided 2 x 3 x 5 x -- -- --    Urine Void (mL) -- 1200 1200 -- -- --    Total Output -- 1200 1200 -- -- --       Net I/O     480 -1200 -720 -- -- --            Intake/Output Summary (Last 24 hours) at 2021 0744  Last data filed at 2021 0109  Gross per 24 hour   Intake 480 ml   Output 1200 ml   Net -720 ml            • famotidine  20 mg BID   • albuterol  2 Puff Q6HRS PRN (RT)   • atorvastatin  10 mg DAILY    • carvedilol  3.125 mg BID WITH MEALS   • flecainide  100 mg BID   • furosemide  20 mg DAILY   • losartan  50 mg DAILY   • potassium chloride SA  10 mEq DAILY   • Pharmacy Consult Request  1 Each PHARMACY TO DOSE   • MD ALERT...DO NOT ADMINISTER NSAIDS or ASPIRIN unless ORDERED By Neurosurgery  1 Each PRN   • docusate sodium  100 mg BID   • senna-docusate  1 tablet Nightly   • senna-docusate  1 tablet Q24HRS PRN   • polyethylene glycol/lytes  1 Packet BID PRN   • magnesium hydroxide  30 mL QDAY PRN   • bisacodyl  10 mg Q24HRS PRN   • fleet  1 Each Once PRN   • NS   Continuous   • diphenhydrAMINE  25 mg Q6HRS PRN    Or   • diphenhydrAMINE  25 mg Q6HRS PRN   • ondansetron  4 mg Q4HRS PRN   • ondansetron  4 mg Q4HRS PRN   • methocarbamol  750 mg Q8HRS PRN   • ALPRAZolam  0.25 mg BID PRN   • labetalol  10 mg Q HOUR PRN   • benzocaine-menthol  1 Lozenge Q2HRS PRN   • calcium carbonate  500 mg BID   • HYDROcodone/acetaminophen  1-2 tablet Q4HRS PRN   • oxyCODONE immediate-release  5 mg Q4HRS PRN   • HYDROmorphone  0.5 mg Q3HRS PRN       Assessment and Plan:  POD #2/0  Stage 2 scheduled for today.   N.p.o. currently, ok for sips with meds.   Prophylactic anticoagulation: N.

## 2021-04-30 LAB
ANION GAP SERPL CALC-SCNC: 7 MMOL/L (ref 7–16)
BUN SERPL-MCNC: 16 MG/DL (ref 8–22)
CALCIUM SERPL-MCNC: 8.4 MG/DL (ref 8.5–10.5)
CHLORIDE SERPL-SCNC: 103 MMOL/L (ref 96–112)
CO2 SERPL-SCNC: 27 MMOL/L (ref 20–33)
CREAT SERPL-MCNC: 0.75 MG/DL (ref 0.5–1.4)
ERYTHROCYTE [DISTWIDTH] IN BLOOD BY AUTOMATED COUNT: 48.1 FL (ref 35.9–50)
GLUCOSE SERPL-MCNC: 177 MG/DL (ref 65–99)
HCT VFR BLD AUTO: 31.1 % (ref 37–47)
HGB BLD-MCNC: 10.1 G/DL (ref 12–16)
MCH RBC QN AUTO: 31.7 PG (ref 27–33)
MCHC RBC AUTO-ENTMCNC: 32.5 G/DL (ref 33.6–35)
MCV RBC AUTO: 97.5 FL (ref 81.4–97.8)
PLATELET # BLD AUTO: 167 K/UL (ref 164–446)
PMV BLD AUTO: 11.8 FL (ref 9–12.9)
POTASSIUM SERPL-SCNC: 4.1 MMOL/L (ref 3.6–5.5)
RBC # BLD AUTO: 3.19 M/UL (ref 4.2–5.4)
SODIUM SERPL-SCNC: 137 MMOL/L (ref 135–145)
WBC # BLD AUTO: 12.1 K/UL (ref 4.8–10.8)

## 2021-04-30 PROCEDURE — 700111 HCHG RX REV CODE 636 W/ 250 OVERRIDE (IP): Performed by: PHYSICIAN ASSISTANT

## 2021-04-30 PROCEDURE — 36415 COLL VENOUS BLD VENIPUNCTURE: CPT

## 2021-04-30 PROCEDURE — 85027 COMPLETE CBC AUTOMATED: CPT

## 2021-04-30 PROCEDURE — 700102 HCHG RX REV CODE 250 W/ 637 OVERRIDE(OP): Performed by: PHYSICIAN ASSISTANT

## 2021-04-30 PROCEDURE — 97165 OT EVAL LOW COMPLEX 30 MIN: CPT

## 2021-04-30 PROCEDURE — 80048 BASIC METABOLIC PNL TOTAL CA: CPT

## 2021-04-30 PROCEDURE — A9270 NON-COVERED ITEM OR SERVICE: HCPCS | Performed by: PHYSICIAN ASSISTANT

## 2021-04-30 PROCEDURE — 770001 HCHG ROOM/CARE - MED/SURG/GYN PRIV*

## 2021-04-30 PROCEDURE — 97161 PT EVAL LOW COMPLEX 20 MIN: CPT

## 2021-04-30 RX ORDER — DEXAMETHASONE 4 MG/1
2 TABLET ORAL 3 TIMES DAILY
Status: DISCONTINUED | OUTPATIENT
Start: 2021-04-30 | End: 2021-05-01 | Stop reason: HOSPADM

## 2021-04-30 RX ADMIN — OXYCODONE 5 MG: 5 TABLET ORAL at 01:10

## 2021-04-30 RX ADMIN — LOSARTAN POTASSIUM 50 MG: 50 TABLET, FILM COATED ORAL at 04:23

## 2021-04-30 RX ADMIN — CEFAZOLIN SODIUM 2 G: 2 INJECTION, SOLUTION INTRAVENOUS at 01:11

## 2021-04-30 RX ADMIN — ACETAMINOPHEN 650 MG: 325 TABLET, FILM COATED ORAL at 04:23

## 2021-04-30 RX ADMIN — ANTACID TABLETS 500 MG: 500 TABLET, CHEWABLE ORAL at 04:23

## 2021-04-30 RX ADMIN — DEXAMETHASONE SODIUM PHOSPHATE 4 MG: 4 INJECTION, SOLUTION INTRA-ARTICULAR; INTRALESIONAL; INTRAMUSCULAR; INTRAVENOUS; SOFT TISSUE at 09:31

## 2021-04-30 RX ADMIN — DEXAMETHASONE SODIUM PHOSPHATE 4 MG: 4 INJECTION, SOLUTION INTRA-ARTICULAR; INTRALESIONAL; INTRAMUSCULAR; INTRAVENOUS; SOFT TISSUE at 01:10

## 2021-04-30 RX ADMIN — DOCUSATE SODIUM 100 MG: 100 CAPSULE, LIQUID FILLED ORAL at 17:33

## 2021-04-30 RX ADMIN — OXYCODONE 5 MG: 5 TABLET ORAL at 20:13

## 2021-04-30 RX ADMIN — ACETAMINOPHEN 650 MG: 325 TABLET, FILM COATED ORAL at 09:31

## 2021-04-30 RX ADMIN — ACETAMINOPHEN 650 MG: 325 TABLET, FILM COATED ORAL at 20:13

## 2021-04-30 RX ADMIN — ANTACID TABLETS 500 MG: 500 TABLET, CHEWABLE ORAL at 17:33

## 2021-04-30 RX ADMIN — OXYCODONE 5 MG: 5 TABLET ORAL at 07:28

## 2021-04-30 RX ADMIN — FLECAINIDE ACETATE 100 MG: 100 TABLET ORAL at 04:24

## 2021-04-30 RX ADMIN — CARVEDILOL 3.12 MG: 3.12 TABLET, FILM COATED ORAL at 17:33

## 2021-04-30 RX ADMIN — ENOXAPARIN SODIUM 40 MG: 40 INJECTION SUBCUTANEOUS at 17:32

## 2021-04-30 RX ADMIN — FLECAINIDE ACETATE 100 MG: 100 TABLET ORAL at 17:33

## 2021-04-30 RX ADMIN — METHOCARBAMOL 750 MG: 750 TABLET ORAL at 01:10

## 2021-04-30 RX ADMIN — ACETAMINOPHEN 650 MG: 325 TABLET, FILM COATED ORAL at 14:58

## 2021-04-30 RX ADMIN — CEFAZOLIN SODIUM 2 G: 2 INJECTION, SOLUTION INTRAVENOUS at 09:32

## 2021-04-30 RX ADMIN — METHOCARBAMOL 750 MG: 750 TABLET ORAL at 20:13

## 2021-04-30 RX ADMIN — DOCUSATE SODIUM 50 MG AND SENNOSIDES 8.6 MG 1 TABLET: 8.6; 5 TABLET, FILM COATED ORAL at 20:13

## 2021-04-30 RX ADMIN — MAGNESIUM HYDROXIDE 30 ML: 400 SUSPENSION ORAL at 07:28

## 2021-04-30 RX ADMIN — DOCUSATE SODIUM 100 MG: 100 CAPSULE, LIQUID FILLED ORAL at 04:23

## 2021-04-30 RX ADMIN — DEXAMETHASONE 2 MG: 4 TABLET ORAL at 14:58

## 2021-04-30 RX ADMIN — DEXAMETHASONE 2 MG: 4 TABLET ORAL at 20:24

## 2021-04-30 RX ADMIN — ATORVASTATIN CALCIUM 10 MG: 10 TABLET, FILM COATED ORAL at 04:23

## 2021-04-30 RX ADMIN — FAMOTIDINE 20 MG: 20 TABLET ORAL at 04:23

## 2021-04-30 RX ADMIN — FAMOTIDINE 20 MG: 20 TABLET ORAL at 17:33

## 2021-04-30 ASSESSMENT — COGNITIVE AND FUNCTIONAL STATUS - GENERAL
CLIMB 3 TO 5 STEPS WITH RAILING: A LOT
SUGGESTED CMS G CODE MODIFIER MOBILITY: CK
MOBILITY SCORE: 23
SUGGESTED CMS G CODE MODIFIER DAILY ACTIVITY: CJ
MOVING FROM LYING ON BACK TO SITTING ON SIDE OF FLAT BED: A LITTLE
DRESSING REGULAR LOWER BODY CLOTHING: A LITTLE
STANDING UP FROM CHAIR USING ARMS: A LITTLE
DRESSING REGULAR LOWER BODY CLOTHING: A LITTLE
TOILETING: A LITTLE
DAILY ACTIVITIY SCORE: 22
SUGGESTED CMS G CODE MODIFIER MOBILITY: CI
HELP NEEDED FOR BATHING: A LITTLE
MOVING TO AND FROM BED TO CHAIR: A LITTLE
DAILY ACTIVITIY SCORE: 20
MOBILITY SCORE: 17
WALKING IN HOSPITAL ROOM: A LITTLE
SUGGESTED CMS G CODE MODIFIER DAILY ACTIVITY: CJ
DRESSING REGULAR UPPER BODY CLOTHING: A LITTLE
CLIMB 3 TO 5 STEPS WITH RAILING: A LITTLE
TURNING FROM BACK TO SIDE WHILE IN FLAT BAD: A LITTLE
HELP NEEDED FOR BATHING: A LITTLE

## 2021-04-30 ASSESSMENT — PAIN DESCRIPTION - PAIN TYPE
TYPE: ACUTE PAIN
TYPE: ACUTE PAIN;SURGICAL PAIN
TYPE: ACUTE PAIN;SURGICAL PAIN
TYPE: ACUTE PAIN
TYPE: ACUTE PAIN

## 2021-04-30 ASSESSMENT — GAIT ASSESSMENTS
ASSISTIVE DEVICE: FRONT WHEEL WALKER
DISTANCE (FEET): 180
GAIT LEVEL OF ASSIST: SUPERVISED

## 2021-04-30 ASSESSMENT — ACTIVITIES OF DAILY LIVING (ADL): TOILETING: INDEPENDENT

## 2021-04-30 NOTE — CARE PLAN
Problem: Venous Thromboembolism (VTW)/Deep Vein Thrombosis (DVT) Prevention:  Goal: Patient will participate in Venous Thrombosis (VTE)/Deep Vein Thrombosis (DVT)Prevention Measures  Outcome: PROGRESSING AS EXPECTED   Pt is wearing her SCDs in bed and is ambulating with staff in her room and to the bathroom.   Problem: Bowel/Gastric:  Goal: Normal bowel function is maintained or improved  Outcome: PROGRESSING SLOWER THAN EXPECTED   The pt has not had a BM in multiple days, will escalate bowel regimen protocol   Problem: Knowledge Deficit  Goal: Knowledge of disease process/condition, treatment plan, diagnostic tests, and medications will improve  Outcome: PROGRESSING AS EXPECTED   The pt is educated on her plan of care, treatments, and medications. Questions and concerns from the pt and family are answered.

## 2021-04-30 NOTE — DISCHARGE PLANNING
Received Choice form at 2675  Agency/Facility Name: [facility]  Referral sent per Choice form @ 0726

## 2021-04-30 NOTE — PROGRESS NOTES
Bedside shift report received by nightshift RN, safety check completed, all patient needs met at this time.

## 2021-04-30 NOTE — PROGRESS NOTES
2 RN skin check complete.   Devices in place dressings on back/ side.  Skin assessed under devices no.  Confirmed pressure ulcers found on none.  New potential pressure ulcers noted on none. Wound consult placed n/a.  The following interventions in place: pt is able to reposition herself in bed, she is ambulating in her room, and has pillows to redistribute her weight/ take pressure of high pressure areas.       Mobility Progress Note    Surgery patient: yes  Date of surgery: 4/27/21 and 4/29/21  Ambulated 50 ft on day of surgery? (N/A if patient did not undergo surgery today): yes  Number of times ambulated 50 feet or greater today: 1  Patient has been up to chair, edge of bed or HOB 90 degrees for all meals?: yes  Goal met? (goal is ambulating at least 50 feet 2 times on day shift, one time on night shift): yes  If patient did not meet mobility goal, why?: n/a pt is ambulating with staff in room

## 2021-04-30 NOTE — DISCHARGE PLANNING
Anticipated Discharge Disposition: Home with DME-FWW    Action: LSW met with pt at bedside to obtain DME choice. Pt requested the referral be sent to [facility]. LSW faxed choice form to [facility]. Notified RN Karen to order walker from traction.    Barriers to Discharge: none.    Plan: No DC needs identified at this time.

## 2021-04-30 NOTE — THERAPY
Physical Therapy   Initial Evaluation     Patient Name: Chinyere Priest  Age:  65 y.o., Sex:  female  Medical Record #: [id number]  Today's Date: 4/30/2021     Precautions: Lumbosacral Orthosis    Assessment  Patient is 65 y.o. female POD 3 L3-5 XLIF via right-sided approach and POD 1 L3-5 left-sided xvision guided pedicle screw fixation with L3-5, presenting to PT with report of incisional pain during hip flexion, but was able to perform log roll with flat bed, no rail and initiate sit to stand at spv. Pt completed x180' of gait with FWW. She reports a hx of L foot drop from childhood and demonstrates compensatory excessive L hip flexion during swing to clear toes. At this time, pt does not require further acute PT intervention and appears functionally capable of return home. Can follow up with OP PT as appropriate.        Plan    Recommend Physical Therapy for Evaluation only     DC Equipment Recommendations: Front-Wheel Walker  Discharge Recommendations: Recommend outpatient physical therapy services to address higher level deficits        Objective       04/30/21 1009   Prior Living Situation   Prior Services None   Housing / Facility 1 Story House   Steps Into Home 1   Equipment Owned None   Lives with - Patient's Self Care Capacity Spouse;Adult Children   Comments pt reports her adult children will assist when her spouse returns to work   Prior Level of Functional Mobility   Bed Mobility Independent   Transfer Status Independent   Ambulation Independent   Distance Ambulation (Feet)   (community)   Assistive Devices Used None   Stairs Independent   Gait Analysis   Gait Level Of Assist Supervised   Assistive Device Front Wheel Walker   Distance (Feet) 180   # of Times Distance was Traveled 1   Weight Bearing Status FWB   Comments Cues for toe extension to facilitate heel strike. Pt with compensatory L hip flexion during swing. Reports this is longstanding from childhood/birth.    Bed Mobility    Supine to  Sit Supervised   Sit to Supine Supervised   Scooting Supervised   Rolling Supervised   Comments flat bed, no rail via log roll    Functional Mobility   Sit to Stand Supervised   Bed, Chair, Wheelchair Transfer Supervised   Transfer Method Stand Step   Anticipated Discharge Equipment and Recommendations   DC Equipment Recommendations Front-Wheel Walker   Discharge Recommendations Recommend outpatient physical therapy services to address higher level deficits

## 2021-04-30 NOTE — OR SURGEON
Immediate Post OP Note    PreOp Diagnosis: Degenerative scoliosis, lumbar stenosis L3-5, low back pain, lumbar radiculopathy      PostOp Diagnosis: Same      Procedure(s):  FUSION, SPINE, LUMBAR, WITH O-ARM IMAGING GUIDANCE - STAGE #2 L3-5 UNILATERAL, LEFT-SIDED.laminotomy/foraminotomies left lumbar 3-5 - Wound Class: Clean with Drain    Surgeon(s):  Chad Clay M.D.    Anesthesiologist/Type of Anesthesia:  Anesthesiologist: Renato Don M.D./General    Surgical Staff:  Assistant: Heather Stanley P.A.-C.  Circulator: Ayaka Andino R.N.  Relief Scrub: Norma Guaman  Scrub Person: Melba Villa  Radiology Technologist: Saritha Shaw; Thomas Berman    Specimens removed if any:  * No specimens in log *    Estimated Blood Loss: 100ccs    Findings: See op report    Complications: None        4/29/2021 7:19 PM Heather Stanley P.A.-C.

## 2021-04-30 NOTE — THERAPY
"Occupational Therapy   Initial Evaluation     Patient Name: Chinyere Priest  Age:  65 y.o., Sex:  female  Medical Record #: [id number]  Today's Date: 4/30/2021     Precautions  Precautions: Lumbosacral Orthosis  Comments: LSO on when OOB >5mins. Don in sitting.     Assessment  Patient is 65 y.o. female who presents to acute s/p L3-5 XLIF and L3-5 left-sided xvision guided pedicle screw fixation w/ L3-5 lami/foraminotomies. Pt performed functional mobility and BADLs at SPV level. Ed/trained pt on how to perform BADLs w/ decreased pain and reviewed brace. Recommend DC home.    Plan    Recommend Occupational Therapy Eval only  DC Equipment Recommendations: (P) None  Discharge Recommendations: (P) Anticipate that the patient will have no further occupational therapy needs after discharge from the hospital     Subjective    \"I would love to do that\"     Objective       04/30/21 1005   Total Time Spent   Total Time Spent (Mins) 20   Charge Group   OT Evaluation OT Evaluation Low   Initial Contact Note    Initial Contact Note Order Received and Verified, Occupational Therapy Evaluation in Progress with Full Report to Follow.   Prior Living Situation   Prior Services None   Housing / Facility [address]   Bathroom Set up Walk In Shower;Bathtub / Shower Combination   Equipment Owned None   Lives with - Patient's Self Care Capacity Spouse;Adult Children   Comments Pt reports her son will assist w/ IADLs   Prior Level of ADL Function   Self Feeding Independent   Grooming / Hygiene Independent   Bathing Independent   Dressing Independent   Toileting Independent   Precautions   Precautions Spinal / Back Precautions ;Lumbosacral Orthosis   Comments LSO when OOB >5 min   Pain 0 - 10 Group   Therapist Pain Assessment Post Activity Pain Same as Prior to Activity;Nurse Notified  (no c/o pain during session)   Cognition    Cognition / Consciousness WDL   Level of Consciousness Alert   Comments pleasent and cooperative "   Balance Assessment   Sitting Balance (Static) Good   Sitting Balance (Dynamic) Good   Standing Balance (Static) Good   Standing Balance (Dynamic) Good   Weight Shift Sitting Good   Weight Shift Standing Good   Comments w/ FWW   Bed Mobility    Supine to Sit   (in chair pre)   Sit to Supine Supervised   Scooting Supervised   ADL Assessment   Grooming Supervision;Standing   Upper Body Dressing Supervision  (donned/doffed LSO)   Lower Body Dressing Supervision  (underpants)   How much help from another person does the patient currently need...   Putting on and taking off regular lower body clothing? 3   Bathing (including washing, rinsing, and drying)? 3   Toileting, which includes using a toilet, bedpan, or urinal? 4   Putting on and taking off regular upper body clothing? 4   Taking care of personal grooming such as brushing teeth? 4   Eating meals? 4   6 Clicks Daily Activity Score 22   Functional Mobility   Sit to Stand Supervised   Bed, Chair, Wheelchair Transfer Supervised   Mobility within room and bathroom w/ FWW   Activity Tolerance   Sitting in Chair up pre   Sitting Edge of Bed 10 min   Standing 10 min   Education Group   Role of Occupational Therapist Patient Response Patient;Acceptance;Explanation;Demonstration;Verbal Demonstration   Back Safety Patient Response Patient;Acceptance;Explanation;Demonstration;Verbal Demonstration   Splints Wear & Care Patient Response Patient;Acceptance;Explanation;Demonstration;Verbal Demonstration   ADL Patient Response Patient;Acceptance;Explanation;Demonstration;Verbal Demonstration   Anticipated Discharge Equipment and Recommendations   DC Equipment Recommendations None   Discharge Recommendations Anticipate that the patient will have no further occupational therapy needs after discharge from the hospital   Interdisciplinary Plan of Care Collaboration   IDT Collaboration with  Nursing;Physical Therapist   Patient Position at End of Therapy In Bed;Call Light within  Reach;Tray Table within Reach;Phone within Reach   Collaboration Comments report given   Session Information   Date / Session Number  4/30, 1x only

## 2021-04-30 NOTE — ANESTHESIA TIME REPORT
Anesthesia Start and Stop Event Times     Date Time Event    4/29/2021 1645 Anesthesia Start     1653 Ready for Procedure     1926 Anesthesia Stop        Responsible Staff  04/29/21    Name Role Begin End    Renato Don M.D. Anesth 1645 1926        Preop Diagnosis (Free Text):  Pre-op Diagnosis     LUMBAR STENOSIS, SPONDYLOSIS, RADICULOPATHY        Preop Diagnosis (Codes):  Diagnosis Information     Diagnosis Code(s): Lumbar stenosis [M48.061]     Lumbar spondylosis [M47.816]     Lumbar radiculopathy [M54.16]        Post op Diagnosis  Fusion of lumbar spine  Laminectomy    Premium Reason  A. 3PM - 7AM    Comments: Premium SHAYY

## 2021-04-30 NOTE — FACE TO FACE
Face to Face Note  -  Durable Medical Equipment    Heather Stanley P.A.-C. - NPI: [id number]  I certify that this patient is under my care and that they had a durable medical equipment(DME)face to face encounter by myself that meets the physician DME face-to-face encounter requirements with this patient on:    Date of encounter:   Patient:                    MRN:                       YOB: 2022  Chinyere Priest  [id number]  1955     The encounter with the patient was in whole, or in part, for the following medical condition, which is the primary reason for durable medical equipment:  Post-Op Surgery    I certify that, based on my findings, the following durable medical equipment is medically necessary:  Walkers.    HOME O2 Saturation Measurements:(Values must be present for Home Oxygen orders)         ,     ,         My Clinical findings support the need for the above equipment due to:  Abnormal Gait    Supporting Symptoms: no    If patient feels more short of breath, they can go up to 6 liters per minute and contact healthcare provider.

## 2021-04-30 NOTE — ANESTHESIA POSTPROCEDURE EVALUATION
Patient: Chinyere Priest    Procedure Summary     Date: 04/29/21 Room / Location: [facility] OR 07 / SURGERY [facility]    Anesthesia Start: 1645 Anesthesia Stop:     Procedure: FUSION, SPINE, LUMBAR, WITH O-ARM IMAGING GUIDANCE - STAGE #2 L3-5 UNILATERAL, LEFT-SIDED.laminotomy lumbar 3-5 (N/A Back) Diagnosis:       Lumbar stenosis      Lumbar spondylosis      Lumbar radiculopathy      (lumbar stenosis spondylosis radiculopathy )    Surgeons: Chad Clay M.D. Responsible Provider: Renato Don M.D.    Anesthesia Type: general ASA Status: 3          Final Anesthesia Type: general  Last vitals  BP   Blood Pressure : (!) 175/68    Temp   36.3 °C (97.4 °F)    Pulse   77   Resp   18    SpO2   94 %      Anesthesia Post Evaluation    Patient location during evaluation: PACU  Patient participation: complete - patient participated  Level of consciousness: awake and alert  Pain score: 2    Airway patency: patent  Anesthetic complications: no  Cardiovascular status: hemodynamically stable  Respiratory status: acceptable  Hydration status: euvolemic    PONV: none          No complications documented.     Nurse Pain Score: 2 (NPRS)

## 2021-04-30 NOTE — OR NURSING
1922 - Pt arrived to PACU 15B. Oral airway removed and pt woke up confused.    1930 - Dressing to back CD&I. Pt denies pain but c/o nausea. Pt A&Ox4.     1945 - Pt  Joel updated. Pt denies pain and nausea.    1950 - Report given to Maru SIMEON.     2004 - Pt transported by transport to room with O2 tank FULL

## 2021-05-01 VITALS
HEIGHT: 67 IN | RESPIRATION RATE: 16 BRPM | HEART RATE: 72 BPM | TEMPERATURE: 97.1 F | BODY MASS INDEX: 29 KG/M2 | SYSTOLIC BLOOD PRESSURE: 165 MMHG | WEIGHT: 184.75 LBS | DIASTOLIC BLOOD PRESSURE: 77 MMHG | OXYGEN SATURATION: 97 %

## 2021-05-01 LAB
ANION GAP SERPL CALC-SCNC: 5 MMOL/L (ref 7–16)
BUN SERPL-MCNC: 17 MG/DL (ref 8–22)
CALCIUM SERPL-MCNC: 8.5 MG/DL (ref 8.5–10.5)
CHLORIDE SERPL-SCNC: 101 MMOL/L (ref 96–112)
CO2 SERPL-SCNC: 29 MMOL/L (ref 20–33)
CREAT SERPL-MCNC: 0.71 MG/DL (ref 0.5–1.4)
ERYTHROCYTE [DISTWIDTH] IN BLOOD BY AUTOMATED COUNT: 46.3 FL (ref 35.9–50)
GLUCOSE SERPL-MCNC: 120 MG/DL (ref 65–99)
HCT VFR BLD AUTO: 30.7 % (ref 37–47)
HGB BLD-MCNC: 10.3 G/DL (ref 12–16)
MCH RBC QN AUTO: 32 PG (ref 27–33)
MCHC RBC AUTO-ENTMCNC: 33.6 G/DL (ref 33.6–35)
MCV RBC AUTO: 95.3 FL (ref 81.4–97.8)
PLATELET # BLD AUTO: 183 K/UL (ref 164–446)
PMV BLD AUTO: 12.2 FL (ref 9–12.9)
POTASSIUM SERPL-SCNC: 4.2 MMOL/L (ref 3.6–5.5)
RBC # BLD AUTO: 3.22 M/UL (ref 4.2–5.4)
SODIUM SERPL-SCNC: 135 MMOL/L (ref 135–145)
WBC # BLD AUTO: 12.2 K/UL (ref 4.8–10.8)

## 2021-05-01 PROCEDURE — A9270 NON-COVERED ITEM OR SERVICE: HCPCS | Performed by: PHYSICIAN ASSISTANT

## 2021-05-01 PROCEDURE — 700102 HCHG RX REV CODE 250 W/ 637 OVERRIDE(OP): Performed by: PHYSICIAN ASSISTANT

## 2021-05-01 PROCEDURE — 36415 COLL VENOUS BLD VENIPUNCTURE: CPT

## 2021-05-01 PROCEDURE — 85027 COMPLETE CBC AUTOMATED: CPT

## 2021-05-01 PROCEDURE — 80048 BASIC METABOLIC PNL TOTAL CA: CPT

## 2021-05-01 RX ORDER — METHOCARBAMOL 750 MG/1
750 TABLET, FILM COATED ORAL EVERY 8 HOURS PRN
Qty: 90 TABLET | Refills: 0 | Status: SHIPPED | OUTPATIENT
Start: 2021-05-01 | End: 2023-01-13

## 2021-05-01 RX ORDER — OXYCODONE HYDROCHLORIDE 5 MG/1
5 TABLET ORAL EVERY 4 HOURS PRN
Qty: 84 TABLET | Refills: 0 | Status: SHIPPED | OUTPATIENT
Start: 2021-05-01 | End: 2021-05-15

## 2021-05-01 RX ORDER — FAMOTIDINE 20 MG/1
20 TABLET, FILM COATED ORAL 2 TIMES DAILY
Qty: 12 TABLET | Refills: 0 | Status: SHIPPED | OUTPATIENT
Start: 2021-05-01 | End: 2023-08-17

## 2021-05-01 RX ORDER — METHYLPREDNISOLONE 4 MG/1
TABLET ORAL
Qty: 21 TABLET | Refills: 0 | Status: SHIPPED | OUTPATIENT
Start: 2021-05-01 | End: 2021-08-02

## 2021-05-01 RX ORDER — CEPHALEXIN 500 MG/1
500 CAPSULE ORAL 4 TIMES DAILY
Qty: 20 CAPSULE | Refills: 0 | Status: SHIPPED | OUTPATIENT
Start: 2021-05-01 | End: 2021-10-18

## 2021-05-01 RX ADMIN — FLECAINIDE ACETATE 100 MG: 100 TABLET ORAL at 04:15

## 2021-05-01 RX ADMIN — ATORVASTATIN CALCIUM 10 MG: 10 TABLET, FILM COATED ORAL at 04:11

## 2021-05-01 RX ADMIN — OXYCODONE 5 MG: 5 TABLET ORAL at 10:46

## 2021-05-01 RX ADMIN — DEXAMETHASONE 2 MG: 4 TABLET ORAL at 10:46

## 2021-05-01 RX ADMIN — OXYCODONE 5 MG: 5 TABLET ORAL at 01:17

## 2021-05-01 RX ADMIN — ACETAMINOPHEN 650 MG: 325 TABLET, FILM COATED ORAL at 04:10

## 2021-05-01 RX ADMIN — LOSARTAN POTASSIUM 50 MG: 50 TABLET, FILM COATED ORAL at 04:10

## 2021-05-01 RX ADMIN — FAMOTIDINE 20 MG: 20 TABLET ORAL at 04:10

## 2021-05-01 RX ADMIN — ANTACID TABLETS 500 MG: 500 TABLET, CHEWABLE ORAL at 04:11

## 2021-05-01 RX ADMIN — DEXAMETHASONE 2 MG: 4 TABLET ORAL at 04:11

## 2021-05-01 RX ADMIN — OXYCODONE 5 MG: 5 TABLET ORAL at 06:20

## 2021-05-01 RX ADMIN — METHOCARBAMOL 750 MG: 750 TABLET ORAL at 10:51

## 2021-05-01 RX ADMIN — DOCUSATE SODIUM 100 MG: 100 CAPSULE, LIQUID FILLED ORAL at 04:10

## 2021-05-01 RX ADMIN — CARVEDILOL 3.12 MG: 3.12 TABLET, FILM COATED ORAL at 07:56

## 2021-05-01 ASSESSMENT — PAIN DESCRIPTION - PAIN TYPE
TYPE: ACUTE PAIN;SURGICAL PAIN
TYPE: ACUTE PAIN;SURGICAL PAIN
TYPE: ACUTE PAIN
TYPE: ACUTE PAIN

## 2021-05-01 NOTE — DISCHARGE SUMMARY
DATE OF ADMISSION:  04/27/2021   DATE OF DISCHARGE:  05/01/2021     ADMISSION DIAGNOSES:  1.  Status post L3-L5 laminectomies, done L4 remotely.  2.  Status post minimally invasive redo left L3-L4 laminotomy and   microdiskectomy in 2017 by Dr. Clay.  3.  New onset of left L3 radiculopathy.  4.  L3-L4, L4-L5 disk collapse with severe foraminal stenosis on the left at   L3-L4.     DISCHARGE DIAGNOSES:  1.  Status post L3-L5 laminectomies, done L4 remotely.  2.  Status post minimally invasive redo left L3-L4 laminotomy and   microdiskectomy in 2017 by Dr. Clay.  3.  New onset of left L3 radiculopathy.  4.  L3-L4, L4-L5 disk collapse with severe foraminal stenosis on the left at   L3-L4.  5.  Status post L3-L5 right-sided XLIF followed by staged L3-L5 unilateral   pedicle screw fixation using neuronavigation and left L3-L5 redo   laminotomy/foraminotomy.     HOSPITAL COURSE:  This is a very pleasant female who is status post redo left   L3-L4 laminotomy with microdiskectomy, who returned with significant   degenerative changes at L3-L4 and L4-L5 with a far lateral disk protrusion and   L3 radiculopathy.  After much discussion and failure of nonoperative   measures, she elected to proceed with surgical intervention.  For details of   surgery, please see Dr. Clay's operative report.  She underwent stage I of   the procedure on date of admission. She was stable on postop day #1.  CT scan   was ordered for planning for stage II.  She underwent stage II surgery without   complication.  She has had some intermittent fluctuating pain to the left   leg, which is generally improved.  She is mobilizing well with PT and OT.  Her   vital signs have been stable.  She is afebrile.  Her drain output is still 90   mL over 8 hours but she is quite adamant to go home.  Front-wheeled walker   has been ordered.  At time of discharge, vital signs are stable.  She is   afebrile and passing flatus.     DISCHARGE INSTRUCTIONS:  Drain  instructions:  Empty drain every 8 hours and   record output.  Return to clinic on Monday for drain removal, our office will   call with appointment.  Ambulate as tolerated.  Keep wound clean, dry and   intact.  May shower, no bathtub.  Ice the incision frequently.  Stool   softeners p.r.n.  No bending, lifting, twisting.  Wear LSO brace when upright   and out of bed.  Return to the ER with chest pain, shortness of breath, fever,   chills, leg or arm swelling.     DISCHARGE PRESCRIPTIONS:  Include oxycodone 5 mg 1 p.o. q.4 hours p.r.n.   severe pain, #84, 14-day supply; Robaxin 750 mg 1 p.o. q.8 hours p.r.n. spasm,   #90; Keflex 500 mg 1 p.o. q.i.d. x5 days; Medrol Dosepak take as directed;   Pepcid 20 mg p.o. b.i.d. x6 days.  The above represents brief summary of this   patient's hospital stay.  For details, please see the medical chart.        ______________________________  Heather Stanley PA-C        ______________________________  MD HOLDEN BARRERA/CRISTELA/ROBYN    DD:  05/01/2021 09:52  DT:  05/01/2021 15:30    Job#:  [id number]

## 2021-05-01 NOTE — DISCHARGE INSTRUCTIONS
Wear LSO brace when upright/OOB   Return to ER with chest pain, shortness of breath, difficulty swallowing, leg or arm swelling.   Return to clinic Monday for drain removal   Keep dressing dry   Take pain medication as prescribed.   No driving.   NO NSAIDs X 3 months   May shower tomorrow, no bath tub   Ice to incision frequently   Stool softeners prn   No bending/lifting/twisting greater than 10 pounds   Return on already scheduled post-operative appointmentNOTICE: This order will  in 24 hours.    Discharge Instructions    Discharged to home by car with relative. Discharged via wheelchair, hospital escort: Yes.  Special equipment needed: TLSO and Walker    Be sure to schedule a follow-up appointment with your primary care doctor or any specialists as instructed.     Discharge Plan:        I understand that a diet low in cholesterol, fat, and sodium is recommended for good health. Unless I have been given specific instructions below for another diet, I accept this instruction as my diet prescription.   Other diet: regular     Special Instructions: None    · Is patient discharged on Warfarin / Coumadin?   No     Depression / Suicide Risk    As you are discharged from this [facility] facility, it is important to learn how to keep safe from harming yourself.    Recognize the warning signs:  · Abrupt changes in personality, positive or negative- including increase in energy   · Giving away possessions  · Change in eating patterns- significant weight changes-  positive or negative  · Change in sleeping patterns- unable to sleep or sleeping all the time   · Unwillingness or inability to communicate  · Depression  · Unusual sadness, discouragement and loneliness  · Talk of wanting to die  · Neglect of personal appearance   · Rebelliousness- reckless behavior  · Withdrawal from people/activities they love  · Confusion- inability to concentrate     If you or a loved one observes any of these behaviors or has  concerns about self-harm, here's what you can do:  · Talk about it- your feelings and reasons for harming yourself  · Remove any means that you might use to hurt yourself (examples: pills, rope, extension cords, firearm)  · Get professional help from the community (Mental Health, Substance Abuse, psychological counseling)  · Do not be alone:Call your Safe Contact- someone whom you trust who will be there for you.  · Call your local CRISIS HOTLINE 913-7344 or 441-588-1869  · Call your local Children's Mobile Crisis Response Team [place] (289) 168-8564 or www.[facility]  · Call the toll free National Suicide Prevention Hotlines   · National Suicide Prevention Lifeline 692-775-YJDS (2253)  · National Hope Line Network 800-SUICIDE (898-8976)

## 2021-05-01 NOTE — CARE PLAN
Problem: Communication  Goal: The ability to communicate needs accurately and effectively will improve  Outcome: PROGRESSING AS EXPECTED  Note: Instructed on and encouraged call light use; received understanding of call light; was able to answer questions and attain needed items.      Problem: Venous Thromboembolism (VTW)/Deep Vein Thrombosis (DVT) Prevention:  Goal: Patient will participate in Venous Thrombosis (VTE)/Deep Vein Thrombosis (DVT)Prevention Measures  Outcome: PROGRESSING AS EXPECTED  Flowsheets (Taken 5/1/2021 0100)  Risk Assessment Score: 2  VTE RISK: Moderate  Mechanical Prophylaxis: SCDs, Sequential Compression Device  SCDs, Sequential Compression Device: On  Pharmacologic Prophylaxis Used: LMWH: Enoxaparin(Lovenox)  Note: No signs or symptoms of clot formation at this time.

## 2021-05-01 NOTE — PROGRESS NOTES
Neurosurgery Progress Note    Subjective:  Postop day 4 L3-5 XLIF via right-sided approach  Postop day 2 L3-5 left-sided xvision guided pedicle screw fixation with L3-5 laminotomies/foraminotomies  States left LE pain improved today, no longer feeling pain in left anterior tibialis region, some mild discomfort in left anterior thigh  Pain control adequate  Drain 90cc/8h  Passing flatus  Mobilizing with PT    Exam:  Motor 5/5 to left quadricep, iliopsoas, 4+/5 anterior tibialis, gastrocnemius, EHL  Motor 5/5 all else  Sensation intact  Incisions clean and dry with strikethrough  Abdomen soft  No calf tenderness    BP  Min: 132/64  Max: 165/77  Pulse  Av.5  Min: 65  Max: 88  Resp  Av.3  Min: 16  Max: 19  Temp  Av.7 °C (98.1 °F)  Min: 36.2 °C (97.1 °F)  Max: 37.1 °C (98.7 °F)  SpO2  Av.5 %  Min: 90 %  Max: 97 %    No data recorded    Recent Labs     21  0545   WBC 11.7* 12.1* 12.2*   RBC 3.60* 3.19* 3.22*   HEMOGLOBIN 11.6* 10.1* 10.3*   HEMATOCRIT 34.6* 31.1* 30.7*   MCV 96.1 97.5 95.3   MCH 32.2 31.7 32.0   MCHC 33.5* 32.5* 33.6   RDW 47.9 48.1 46.3   PLATELETCT 188 167 183   MPV 11.9 11.8 12.2     Recent Labs     21  0545   SODIUM 139 137 135   POTASSIUM 4.3 4.1 4.2   CHLORIDE 103 103 101   CO2 26 27 29   GLUCOSE 129* 177* 120*   BUN 15 16 17   CREATININE 0.72 0.75 0.71   CALCIUM 9.0 8.4* 8.5               Intake/Output       21 - 2159 21 - 21 Total  Total       Intake    P.O.  --  360 360  --  -- --    P.O. -- 360 360 -- -- --    Total Intake -- 360 360 -- -- --       Output    Urine  --  -- --  --  -- --    Number of Times Voided 1 x 3 x 4 x -- -- --    Drains  170  240 410  --  -- --    Output (mL) (Closed/Suction Drain 1 Posterior Back Hemovac) 170 240 410 -- -- --    Total Output 170 240 410 -- -- --       Net I/O     -170 120 -50 -- --  --            Intake/Output Summary (Last 24 hours) at 5/1/2021 0942  Last data filed at 5/1/2021 0300  Gross per 24 hour   Intake 360 ml   Output 410 ml   Net -50 ml            • dexamethasone  2 mg TID   • HYDROmorphone  0.5-1 mg Q3HRS PRN   • Pharmacy Consult Request  1 Each PHARMACY TO DOSE   • MD HERNANDEZ...DO NOT ADMINISTER NSAIDS or ASPIRIN unless ORDERED By Neurosurgery  1 Each PRN   • docusate sodium  100 mg BID   • senna-docusate  1 tablet Nightly   • senna-docusate  1 tablet Q24HRS PRN   • polyethylene glycol/lytes  1 Packet BID PRN   • magnesium hydroxide  30 mL QDAY PRN   • bisacodyl  10 mg Q24HRS PRN   • fleet  1 Each Once PRN   • dextrose 5 % and 0.9 % NaCl with KCl 20 mEq   Continuous   • enoxaparin  40 mg DAILY AT 1800   • acetaminophen  650 mg Q6HRS    Followed by   • [START ON 5/4/2021] acetaminophen  650 mg Q6HRS PRN   • diphenhydrAMINE  25 mg Q6HRS PRN    Or   • diphenhydrAMINE  25 mg Q6HRS PRN   • ondansetron  4 mg Q4HRS PRN   • ondansetron  4 mg Q4HRS PRN   • ALPRAZolam  0.25 mg BID PRN   • labetalol  10 mg Q HOUR PRN   • hydrALAZINE  10 mg Q HOUR PRN   • benzocaine-menthol  1 Lozenge Q2HRS PRN   • calcium carbonate  500 mg BID   • famotidine  20 mg BID   • albuterol  2 Puff Q6HRS PRN (RT)   • atorvastatin  10 mg DAILY   • carvedilol  3.125 mg BID WITH MEALS   • flecainide  100 mg BID   • furosemide  20 mg DAILY   • losartan  50 mg DAILY   • potassium chloride SA  10 mEq DAILY   • NS   Continuous   • methocarbamol  750 mg Q8HRS PRN   • oxyCODONE immediate-release  5 mg Q4HRS PRN       Assessment and Plan:  POD #4/2  Dc home with drain in place  F/u Monday for drain removal  Has DME  Rx sent to pharmacy  Prophylactic anticoagulation: Yes

## 2021-05-09 NOTE — OP REPORT
DATE OF SERVICE:  04/27/2021     PREOPERATIVE DIAGNOSES:  1.  Left L3-L5 radiculopathies.  2.  Neurogenic claudication.  3.  L3-L4 and L4-L5 degenerative disk disease.  4.  L3-L4 and L4-L5 collapsed disk space with foraminal stenosis.  5.  Low back pain, refractory to medical care.     POSTOPERATIVE DIAGNOSES:  1.  Left L3-L5 radiculopathies.  2.  Neurogenic claudication.  3.  L3-L4 and L4-L5 degenerative disk disease.  4.  L3-L4 and L4-L5 collapsed disk space with foraminal stenosis.  5.  Low back pain, refractory to medical care.     PROCEDURES:  Stage I of a planned II-stage procedure for anterior, posterior,   circumferential decompression and fusion in separate stages.     STAGE I:  1.  Minimally invasive left L3-L5 XLIF.  2.  Partial corpectomy of L3.  3.  Partial corpectomy of L4.  4.  Partial corpectomy of L5.  5.  L3-L4 and L4-L5 arthrodesis with 4-WEB titanium interbody cage and   NanoBone arthrodesis.  6.  Insertion of titanium cages at 2 levels.  7.  L3-L4 and L4-L5 lateral 4-WEB 2-hole plating system with bicortical   screws.  8.  Modifier-22 for extra degree of difficulty given the patient's   degenerative scoliosis and the amount of collapse and osteophyte formation,   which all added an extra hour to the standard surgical procedure.  9.  SSEPs and EMG monitoring performed by neuromonitoring associates, which   remained stable throughout.     SURGEON:  Chad Clay MD, Neurosurgery-Spine Surgery     ASSISTANT:  Qamar Baker PA-C.     ANESTHESIA:  General endotracheal anesthesia.     ANESTHESIOLOGIST:  Chris Humphreys MD     COMPLICATIONS:  None.     ESTIMATED BLOOD LOSS:  Less than 50 mL.     PREOPERATIVE NOTE:  This extremely pleasant 65-year-old lady who presents with   left lower extremity radicular leg pain, weakness and paresthesia and   neurogenic claudication.  MRI showed L3-L4 and L4-L5 degenerative disk disease   with marked collapse and foraminal stenosis.  Given the amount of stenosis    and collapse and failure to improve with conservative care, I offered the   patient the above listed procedures for stage procedure for L3-L5 XLIF   followed by posterior redo decompression and instrumented stabilization.  I   discussed surgical procedure, alternatives, goals, risks, benefits, and   complications in detail with the patient, used a bone model, MRI and   educational handouts to assist the patient with her decision making.  I   answered all questions to the best of my ability.  The patient understood and   consented to surgery.     DESCRIPTION OF PROCEDURE:  The patient was brought to the operating room and   placed under general anesthesia.  She was placed in a true lateral position   with the left side up with great care taken about the bony prominences and   peripheral nerves.  The left flank region was prepped and draped in the usual   sterile fashion.  Following localization of cross table fluoroscopy, made a   small incision over the left L4-L5 level and then went into the left   retroperitoneal space, identifying the psoas muscle with direct visualization.    Excellent fusion was achieved of the psoas.  I placed the first dilator   through the psoas at L4-L5 with continuous EMG monitoring, mapping out the   lumbar plexus posteriorly.  Sequential dilation was achieved and then the   Lattus retractor system was applied.  The blades opened up allowing excellent   exposure at L4-L5 level.  The ALL retractor was placed anteriorly.  The rick   was placed in the disk space at L4-L5.  I incised the disk at L4-L5 and   removed the disk from the endplates.  Disk space was quite arthrodesed and   collapsed down, did not distract up well, however.  Hence, partial corpectomy   of L4 and a partial corpectomy of L5 was required and completed in standard   fashion.  I released the annulus, sequentially distracted.  I sized up the   appropriate 4WEB titanium interbody cage.  Appropriate lordotic cage with    50x22 mm cage was chosen, packed with NanoBone and delivered under distraction   between the body of L4-L5 for an excellent A plus fit.  Lateral plate was   secured over L4-L5 with bicortical screws.  Locking mechanism was engaged.  AP   and lateral x-rays confirmed excellent position of the cage and the plate   with excellent restored lordosis and opening up the foramen at this level.    The retractor was gently closed and withdrawn.  I then moved up to the L3-L4   level.  A small incision was made over L3-L4.  I entered the left   retroperitoneal space at L3-L4, placed a dilator through the psoas at L3-L4,   mapping out the lumbar plexus.  I placed the Lattus retractor and opened it up   in the same fashion.  I incised the disk at L3-L4, removed the disk from the   endplates.  Disk space was quite arthrodesed, hence a partial corpectomy of L3   was completed in standard fashion.  I then sized up the appropriate lordotic   4WEB cage.  The cage was chosen, packed with bone, delivered under distraction   between the body of L3-L4 for an excellent A plus fit.  Lateral plate was   secured over L3-L4 with bicortical screws.  Locking mechanism was engaged.  AP   and lateral x-rays confirmed excellent position of the cages and the plate   with excellent restored lordosis.  The wound was irrigated and hemostasis   achieved.  Retractor was removed.  I then infiltrated the muscle with local   anesthetic and closed the wound in multiple layers with 0 Vicryl to deep   fascia and muscle with 2-0 in Vicryl deep dermal layer, Steri-Strips to skin.    Small sterile dressing was applied.  Swabs, needles, and instruments correct   by 2-count.  No complications were encountered.  The patient tolerated the   procedure, stable and transferred to recovery room.  The patient will be   observed at [facility] until she meets discharge criteria.    Stage II will take place in 2 days' time.     INTRAOPERATIVE FINDINGS:   Severe collapse at L3-L4 and L4-L5 levels, which was   corrected and stabilized with interbody cages via left-sided approach.  No   complications were encountered.  The patient was neurologically intact after   surgery.        ______________________________  SUDHIR VERMA MD    JJL/DENNIS    DD:  05/09/2021 12:58  DT:  05/09/2021 14:38    Job#:  [id number]    CC:MATA Livingston MD Heather Baxter, MD

## 2021-05-09 NOTE — OP REPORT
DATE OF SERVICE:  04/29/2021     PREOPERATIVE DIAGNOSES:  1.  Left L3-L5 radiculopathies.  2.  L3-L4 and L4-L5 collapsed disk space with foraminal narrowing.  3.  Status post stage I L3-L5 XLIF.     POSTOPERATIVE DIAGNOSES:    1.  Left L3-L5 radiculopathies.  2.  L3-L4 and L4-L5 collapsed disk space with foraminal narrowing.  3.  Status post stage I L3-L5 XLIF.     PROCEDURES PERFORMED:  Stage II of a planned multistage procedure for   anterior, posterior, circumferential decompression and fusion L3-L5 level.    Stage II.  1.  Redo left L3 laminotomy and foraminotomy, decompression, left L3 nerve   root.  2.  Redo left L4 laminotomy and foraminotomy, decompression, left L4 nerve   root.  3.  Redo left L5 laminotomy and foraminotomy, decompression of left L5 nerve   root.  4.  Left L4 transpedicular approach far lateral decompression of left L4 nerve   root.  5.  Left L5 transpedicular approach far lateral decompression of left L5 nerve   root.  6.  L3-L5 posterolateral arthrodesis with local morselized autograft and   NanoBone arthrodesis.  7.  Left L3-L5 pedicle screw fixation with neuronavigation XVision augmented   reality system.  8.  SSEPs and EMG monitoring, intraoperative screw stimulation performed by   neuro monitoring associates, which remained stable throughout.     SURGEON:  Chad Clay MD, and neurosurgery-spine surgery.     ASSISTANT:  Heather Stanley PA-C     ANESTHESIA:  General endotracheal anesthesia.     ANESTHESIOLOGIST:  Renato Don MD     COMPLICATIONS:  None.     ESTIMATED BLOOD LOSS:  Less than 100 mL     PREOPERATIVE NOTE:  This extremely pleasant 65-year-old lady who presents with   low back pain and left lower extremity radicular leg pain, weakness and   paresthesia consistent with left L3-L5 radiculopathies.  MRI showed L3-L4 and   L4-L5 degenerative disk disease with marked collapse and foraminal stenosis.    The patient failed conservative care.  She underwent first stage  of procedure   with L3-L4 and L4-L5 XLIF with interbody cage and plating.  She has done very   well after that.  She returns for planned stage II procedure for posterior   redo decompression and instrumented stabilization unilaterally on the left   side.  I discussed surgical procedure, alternatives, goals, risks, benefits,   complications in detail with the patient, used a bone model, MRI and   educational handouts to assist the patient with her decision making, answered   all questions to the best of my ability.  The patient understood and consented   to surgery.  Details are well contained in the office visit notes.     DESCRIPTION OF PROCEDURE:  The patient was brought to the operating room.  She   was placed under general endotracheal anesthesia.  She was placed prone on   the operating OSI table with care taken by the bony prominences, peripheral   nerves.  Lumbar region was prepped and draped in the usual sterile fashion.    Following localization of cross table fluoroscopy, a small incision was made   to the left of midline over L3-L5 level.  A spinous process clamp was placed   over the L2 spinous process.  Intraoperative CT scan was performed to register   the system, which was connected up to the extubation system for robotic   guidance.  I then placed screws in the left, L3, L4, L5 pedicles using   standard anatomical landmarks and the extubation neuronavigation system to   help place the screws accurately.  All screws had excellent purchase.  I   stimulated the screws over 20 millivolts.  Precontoured gwendolyn was placed over   the polyaxial screw heads, locking screws applied and final tightened.  I   decorticated the transverse process of L3-L5 and packed local morselized   autograft with NanoBone for posterolateral arthrodesis.  Wound was irrigated,   hemostasis achieved.  Wound was closed in multiple layers with 0 Vicryl deep   fascia, 2-0 Vicryl deep dermal layer, Steri-Strips to skin.  I exposed the  L3,   L4, L5 level and used a combination of [place] AM8 drillbit, Kerrison   rongeurs and curettes to create redo left L3 laminotomy and foraminotomy,   decompression left L3 nerve root.  Separately and distinctly, I created redo   left L4 laminotomy and foraminotomy for decompression of left L4 nerve root.    Separately and distinctly, I created redo left L5 laminotomy and foraminotomy,   decompression of left L5 nerve root.  There was still considerable stenosis   and collapse, hence I drilled down the left L4 pedicle for transpedicular   decompression of left L4 nerve root.  I drilled down the left L5 pedicle for   transpedicular decompression of left L5 nerve root.  This required extra   degree of expertise, effort and time, hence a modifier-59 is required for CPT   code 92531-70123.  The edges of bone were bone waxed.  Thrombin Gelfoam placed   in epidural gutters for hemostasis.  Diaz ball hook was easily placed over   the exiting nerve roots.  Wound was irrigated, hemostasis achieved.  Wound was   closed in multiple layers, 0 Vicryl deep fascia, 2-0 Vicryl deep dermal   layer, Steri-Strips to skin.  Small sterile dressing was applied.  Swabs,   needles, instruments correct by two count.  No complications were encountered.    The patient tolerated the procedure, was stable and transferred to recovery   room, was neurologically intact.  This completed stage II of a planned 2-stage   procedure for anterior, posterior, circumferential decompression and fusion   L3-L5 level.  No complications were encountered.  The patient was doing very   well in recovery.        ______________________________  MD DUNCAN BARRERA/YOKASTA    DD:  05/09/2021 12:28  DT:  05/09/2021 13:47    Job#:  [id number]    CC:MATA Lubin MD

## 2021-06-03 DIAGNOSIS — I48.0 PAF (PAROXYSMAL ATRIAL FIBRILLATION) (HCC): ICD-10-CM

## 2021-06-03 DIAGNOSIS — I10 ESSENTIAL HYPERTENSION, BENIGN: ICD-10-CM

## 2021-06-04 RX ORDER — FUROSEMIDE 20 MG/1
20 TABLET ORAL DAILY
Qty: 90 TABLET | Refills: 3 | Status: SHIPPED | OUTPATIENT
Start: 2021-06-04 | End: 2022-08-08

## 2021-06-04 RX ORDER — POTASSIUM CHLORIDE 750 MG/1
10 TABLET, EXTENDED RELEASE ORAL DAILY
Qty: 90 TABLET | Refills: 3 | Status: SHIPPED | OUTPATIENT
Start: 2021-06-04 | End: 2021-08-05

## 2021-06-21 ENCOUNTER — HOSPITAL ENCOUNTER (OUTPATIENT)
Dept: RADIOLOGY | Facility: MEDICAL CENTER | Age: 66
End: 2021-06-21
Attending: PHYSICIAN ASSISTANT
Payer: COMMERCIAL

## 2021-06-21 DIAGNOSIS — M54.50 LOW BACK PAIN, UNSPECIFIED BACK PAIN LATERALITY, UNSPECIFIED CHRONICITY, UNSPECIFIED WHETHER SCIATICA PRESENT: ICD-10-CM

## 2021-06-21 PROCEDURE — 72131 CT LUMBAR SPINE W/O DYE: CPT | Mod: MH

## 2021-07-19 ENCOUNTER — APPOINTMENT (OUTPATIENT)
Dept: MEDICAL GROUP | Facility: MEDICAL CENTER | Age: 66
End: 2021-07-19
Payer: COMMERCIAL

## 2021-08-02 ENCOUNTER — OFFICE VISIT (OUTPATIENT)
Dept: CARDIOLOGY | Facility: MEDICAL CENTER | Age: 66
End: 2021-08-02
Payer: COMMERCIAL

## 2021-08-02 ENCOUNTER — HOSPITAL ENCOUNTER (OUTPATIENT)
Dept: LAB | Facility: MEDICAL CENTER | Age: 66
End: 2021-08-02
Attending: INTERNAL MEDICINE
Payer: COMMERCIAL

## 2021-08-02 VITALS
SYSTOLIC BLOOD PRESSURE: 136 MMHG | DIASTOLIC BLOOD PRESSURE: 76 MMHG | HEART RATE: 67 BPM | OXYGEN SATURATION: 94 % | HEIGHT: 67 IN | RESPIRATION RATE: 16 BRPM | BODY MASS INDEX: 28.25 KG/M2 | WEIGHT: 180 LBS

## 2021-08-02 DIAGNOSIS — R73.01 IMPAIRED FASTING GLUCOSE: ICD-10-CM

## 2021-08-02 DIAGNOSIS — I10 ESSENTIAL HYPERTENSION: ICD-10-CM

## 2021-08-02 DIAGNOSIS — N18.30 STAGE 3 CHRONIC KIDNEY DISEASE, UNSPECIFIED WHETHER STAGE 3A OR 3B CKD: ICD-10-CM

## 2021-08-02 DIAGNOSIS — I10 ESSENTIAL HYPERTENSION, BENIGN: ICD-10-CM

## 2021-08-02 LAB
ALBUMIN SERPL BCP-MCNC: 4.1 G/DL (ref 3.2–4.9)
ALBUMIN/GLOB SERPL: 1.4 G/DL
ALP SERPL-CCNC: 74 U/L (ref 30–99)
ALT SERPL-CCNC: 12 U/L (ref 2–50)
ANION GAP SERPL CALC-SCNC: 10 MMOL/L (ref 7–16)
AST SERPL-CCNC: 20 U/L (ref 12–45)
BILIRUB SERPL-MCNC: 0.5 MG/DL (ref 0.1–1.5)
BUN SERPL-MCNC: 13 MG/DL (ref 8–22)
CALCIUM SERPL-MCNC: 9.5 MG/DL (ref 8.5–10.5)
CHLORIDE SERPL-SCNC: 107 MMOL/L (ref 96–112)
CO2 SERPL-SCNC: 27 MMOL/L (ref 20–33)
CREAT SERPL-MCNC: 0.81 MG/DL (ref 0.5–1.4)
ERYTHROCYTE [DISTWIDTH] IN BLOOD BY AUTOMATED COUNT: 46.7 FL (ref 35.9–50)
EST. AVERAGE GLUCOSE BLD GHB EST-MCNC: 108 MG/DL
FASTING STATUS PATIENT QL REPORTED: NORMAL
GLOBULIN SER CALC-MCNC: 3 G/DL (ref 1.9–3.5)
GLUCOSE SERPL-MCNC: 90 MG/DL (ref 65–99)
HBA1C MFR BLD: 5.4 % (ref 4–5.6)
HCT VFR BLD AUTO: 36.4 % (ref 37–47)
HGB BLD-MCNC: 11.6 G/DL (ref 12–16)
MCH RBC QN AUTO: 30.9 PG (ref 27–33)
MCHC RBC AUTO-ENTMCNC: 31.9 G/DL (ref 33.6–35)
MCV RBC AUTO: 97.1 FL (ref 81.4–97.8)
PLATELET # BLD AUTO: 212 K/UL (ref 164–446)
PMV BLD AUTO: 12.4 FL (ref 9–12.9)
POTASSIUM SERPL-SCNC: 3.8 MMOL/L (ref 3.6–5.5)
PROT SERPL-MCNC: 7.1 G/DL (ref 6–8.2)
RBC # BLD AUTO: 3.75 M/UL (ref 4.2–5.4)
SODIUM SERPL-SCNC: 144 MMOL/L (ref 135–145)
WBC # BLD AUTO: 6.8 K/UL (ref 4.8–10.8)

## 2021-08-02 PROCEDURE — 36415 COLL VENOUS BLD VENIPUNCTURE: CPT

## 2021-08-02 PROCEDURE — 83036 HEMOGLOBIN GLYCOSYLATED A1C: CPT

## 2021-08-02 PROCEDURE — 85027 COMPLETE CBC AUTOMATED: CPT

## 2021-08-02 PROCEDURE — 99213 OFFICE O/P EST LOW 20 MIN: CPT | Performed by: PHYSICIAN ASSISTANT

## 2021-08-02 PROCEDURE — 80053 COMPREHEN METABOLIC PANEL: CPT

## 2021-08-02 RX ORDER — HYDROCODONE BITARTRATE AND ACETAMINOPHEN 10; 325 MG/1; MG/1
TABLET ORAL
COMMUNITY
Start: 2021-07-26 | End: 2022-08-17

## 2021-08-02 RX ORDER — GABAPENTIN 300 MG/1
CAPSULE ORAL
COMMUNITY
Start: 2021-07-29 | End: 2021-12-27

## 2021-08-02 ASSESSMENT — ENCOUNTER SYMPTOMS
BLURRED VISION: 0
DYSPNEA ON EXERTION: 0
PALPITATIONS: 0
FEVER: 0
MYALGIAS: 0
BACK PAIN: 1
BRUISES/BLEEDS EASILY: 0
SHORTNESS OF BREATH: 0
DIAPHORESIS: 0
DIZZINESS: 0
ABDOMINAL PAIN: 0
ORTHOPNEA: 0
SYNCOPE: 0
SNORING: 0
NEAR-SYNCOPE: 0
BLOATING: 0
WEAKNESS: 0
VOMITING: 0
DECREASED APPETITE: 0
NAUSEA: 0

## 2021-08-02 ASSESSMENT — FIBROSIS 4 INDEX: FIB4 SCORE: 2.35

## 2021-08-02 NOTE — PROGRESS NOTES
Cardiology Clinic Follow-up Note    Date of note:    8/2/2021  Primary Care Provider: Keila Mccrary M.D.    Name:             Chinyere Priest  YOB: 1955  MRN:               [id number]    CC: AFIB    Primary Cardiologist: Dr. Moshe Landa    Patient HPI:   Chinyere Priest is a 65 y.o. female with PMH including paroxysmal AFIB s/p ablation x2 on flecainide and Eliquis, Essential hypertension, Dyslipidemia, hx of pericardial effusion s/p drain in 6/2019 after ablation, moderate MR.    Interim History:  Ms. Priest was last seen in this cardiology office by Dr. Steele on 3/9/2021 and was stable from CV perspective at that time.      She had back surgery in 5/2021.   States since then she has noticed more LE swelling.   She has hx of varicose veins.   Notes if she sits down and elevates her legs the swelling improves.   Better in AM after sleeping all night.   Also notes she gets dizzy sometimes when she stands up quickly to use the restroom in the middle of the night.  Denies shortness of breath or chest pain.     Review of Systems   Constitutional: Negative for decreased appetite, diaphoresis, fever and malaise/fatigue.   Eyes: Negative for blurred vision.   Cardiovascular: Positive for leg swelling. Negative for chest pain, dyspnea on exertion, near-syncope, orthopnea, palpitations and syncope.   Respiratory: Negative for shortness of breath and snoring.    Hematologic/Lymphatic: Negative for bleeding problem. Does not bruise/bleed easily.   Skin: Negative for rash.   Musculoskeletal: Positive for back pain and joint pain. Negative for myalgias.   Gastrointestinal: Negative for bloating, abdominal pain, nausea and vomiting.   Genitourinary: Negative for frequency.   Neurological: Negative for dizziness and weakness.        Past medical history, social history and family history reviewed.  No changes other than what is outlined in HPI.    Current Outpatient Medications   Medication  Sig Dispense Refill   • gabapentin (NEURONTIN) 300 MG Cap      • HYDROcodone/acetaminophen (NORCO)  MG Tab TAKE 1 TAB BY MOUTH EVERY 4 HOURS AS NEEDED FOR PAIN     • furosemide (LASIX) 20 MG Tab TAKE 1 TABLET BY MOUTH EVERY DAY 90 tablet 3   • potassium chloride SA (K-DUR) 10 MEQ Tab CR TAKE 1 TABLET BY MOUTH EVERY DAY 90 tablet 3   • famotidine (PEPCID) 20 MG Tab Take 1 tablet by mouth 2 times a day. 12 tablet 0   • methocarbamol (ROBAXIN) 750 MG Tab Take 1 tablet by mouth every 8 hours as needed (spasm). 90 tablet 0   • cephALEXin (KEFLEX) 500 MG Cap Take 1 capsule by mouth 4 times a day. 20 capsule 0   • losartan (COZAAR) 50 MG Tab Take 1 tablet by mouth every day. 90 tablet 3   • carvedilol (COREG) 3.125 MG Tab Take 1 Tab by mouth 2 times a day, with meals. 180 Tab 3   • flecainide (TAMBOCOR) 100 MG Tab TAKE 1 TABLET BY MOUTH TWICE A DAY (Patient taking differently: Take 100 mg by mouth 2 times a day. TAKE 1 TABLET BY MOUTH TWICE A DAY) 180 Tab 2   • apixaban (ELIQUIS) 5mg Tab Take 1 Tab by mouth 2 Times a Day. TAKE 1 TABLET BY MOUTH TWICE A  Tab 3   • atorvastatin (LIPITOR) 10 MG Tab Take 1 Tab by mouth every day. 90 Tab 3   • albuterol 108 (90 Base) MCG/ACT Aero Soln inhalation aerosol Inhale 2 Puffs by mouth every 6 hours as needed for Shortness of Breath. 8.5 g 11   • methylPREDNISolone (MEDROL DOSEPAK) 4 MG Tablet Therapy Pack Follow schedule on package instructions. (Patient not taking: Reported on 8/2/2021) 21 tablet 0   • CHOLECALCIFEROL PO Take 2 Capsules by mouth every day.     • clobetasol (TEMOVATE) 0.05 % Ointment Apply to affected area twice daily till clear (Patient not taking: Reported on 4/27/2021) 60 g 3     No current facility-administered medications for this visit.       Allergies   Allergen Reactions   • Erythromycin      Abdominal pain   • Latex      rash   • Tape Hives     Cloth tape  Paper tape okay   • Tikosyn [Dofetilide]      diarrhea         Physical Exam:  Ambulatory  "Vitals  /76 (BP Location: Left arm, Patient Position: Sitting, BP Cuff Size: Adult)   Pulse 67   Resp 16   Ht 1.702 m (5' 7\")   Wt 81.6 kg (180 lb)   SpO2 94%    BP Readings from Last 4 Encounters:   08/02/21 136/76   05/01/21 (!) 165/77   03/18/21 104/58   03/09/21 122/80       Weight/BMI: Body mass index is 28.19 kg/m².  Wt Readings from Last 4 Encounters:   08/02/21 81.6 kg (180 lb)   04/27/21 83.8 kg (184 lb 11.9 oz)   03/18/21 83.6 kg (184 lb 4.9 oz)   03/09/21 83 kg (183 lb)       General: no apparent distress  Lungs: normal effort, without crackles, no wheezing or rhonchi.  Heart: raymundo rate, regular rhythm, no murmur, no rub  Ext:  puffy lower extremities without pitting edema.  Neurological: No focal deficits, no facial asymmetry.  Normal speech.  Psychiatric: Appropriate affect, alert and oriented x 3.   Skin: Warm extremities, no rash.      Lab Data Review:  Lab Results   Component Value Date/Time    CHOLSTRLTOT 144 03/08/2021 07:16 AM    LDL 69 03/08/2021 07:16 AM    HDL 63 03/08/2021 07:16 AM    TRIGLYCERIDE 61 03/08/2021 07:16 AM       Lab Results   Component Value Date/Time    SODIUM 135 05/01/2021 05:45 AM    POTASSIUM 4.2 05/01/2021 05:45 AM    CHLORIDE 101 05/01/2021 05:45 AM    CO2 29 05/01/2021 05:45 AM    GLUCOSE 120 (H) 05/01/2021 05:45 AM    BUN 17 05/01/2021 05:45 AM    CREATININE 0.71 05/01/2021 05:45 AM     Lab Results   Component Value Date/Time    ALKPHOSPHAT 75 03/08/2021 07:16 AM    ASTSGOT 28 03/08/2021 07:16 AM    ALTSGPT 19 03/08/2021 07:16 AM    TBILIRUBIN 0.6 03/08/2021 07:16 AM      Lab Results   Component Value Date/Time    WBC 12.2 (H) 05/01/2021 05:45 AM         Cardiac Imaging and Procedures Review:      Personal interpretation of EKG 2/11/21:  Sinus arrhythmia, HR 55, Qrsd 109, Qtc 485.  Meets criteria for LVH    Echo 6/17/2019:  CONCLUSIONS  Normal left ventricular size and systolic function.  No left atrial thrombus.  No thrombus detected in the left atrial " appendage.    LEEANNA 12/2019:  CONCLUSIONS  AV normal.   Mild MR 2 jets on each side of P2.   Mild TI.   [place] Sanjana no clots or masses pw doppler >0.4 m/s.   EF=60%.       [facility] 5/26/2017:  FINDINGS:  HEMODYNAMICS:     RIGHT HEART PRESSURES:  1.  Mean right atrial pressure 5 mmHg.  2.  Right ventricular pressure 22/5.  3.  Pulmonary artery pressure 25/11, mean of 17 mmHg.  4.  Pulmonary capillary wedge pressure 7 mmHg.     LEFT HEART PRESSURES:  1.  LVEDP of 15 mmHg.  2.  Left ventricular systolic pressure 126 mmHg.  3.  Central aortic pressure systolic 121, diastolic 66, mean of 89 mmHg.     OXIMETRY MEASUREMENTS:  1.  Systemic arterial 97.1%.  2.  Pulmonary artery 78 mmHg.     CARDIAC OUTPUT:  1.  Thermodilution method 3.0 liters per minute, cardiac index 1.6 liters per   minute per meter squared.  2.  Basim method cardiac output 5.2 liters per minute, cardiac index 2.7 liters   per minute per meter squared.     Pulmonary vascular resistance of 1.9 Wood units.     LEFT VENTRICULOGRAPHY:  Left ventricular chamber size, wall motion and   systolic function are normal.  Calculated ejection fraction is 65%.  No   identifiable mitral regurgitation or LV thrombus present.     Rhythm, atrial fibrillation.     CORONARY ANGIOGRAPHY:  1.  LEFT MAIN ARTERY:  Left main vessel is a large caliber vessel,   angiographically normal, bifurcates into the left anterior descending artery and   circumflex artery.  2.  LEFT ANTERIOR DESCENDING ARTERY:  The LAD gives rise to a first diagonal   branch and a normal complement of septal branches and extends around the apex.    The LAD is widely patent with the exception of a very mild eccentric smooth   focal proximal atheroma otherwise the LAD appears angiographically normal.  3.  CIRCUMFLEX ARTERY:  The circumflex gives rise to 2 major long marginal   branches and a small caliber AV groove branch.  Angiographically, the   circumflex artery appears normal.  4.  RIGHT CORONARY ARTERY:  The RCA  is moderate size caliber vessel and gives   rise to an acute marginal branch, posterior descending artery and a   posterolateral branch.  Angiographically, the right coronary artery is normal.    EP 2019:   AFIB ablation    EP 12/10/2019:   AFIB ablation      Assessment and Clinical Decision Makin   Paroyxsmal AFIB   -    S/p ablation x 2 ( and Dec 2019)  -    Continue flecainide 100 BID - recommend follow up with Dr. Steele.  -    Continue Low dose carvedilol, can continue to hold PM dose.  -    EKG reviewed, maintaining SR    2   Chronic anticoagulation with Eliquis  -    [id number] at least 3 (age, F, htn)    3   Essential hypertension   -     BP ok today.    4    Dyslipidemia  -     Continue atorvastatin 10 mg  -     LDL 2020 was 77    5    LE swelling - non pitting.  -     I explained to her this was likely related to venous dysfunction, encouraged compression stockings and elevation.    -     Can continue lasix 20/K10 daily for now, renal function stable.    follow up with Dr. Steele and Dr. Landa.    Marcie Mayer PA-C     [facility]

## 2021-08-03 ENCOUNTER — TELEPHONE (OUTPATIENT)
Dept: CARDIOLOGY | Facility: MEDICAL CENTER | Age: 66
End: 2021-08-03

## 2021-08-03 DIAGNOSIS — Z79.899 ON POTASSIUM WASTING DIURETIC THERAPY: ICD-10-CM

## 2021-08-03 NOTE — TELEPHONE ENCOUNTER
AH    Patient called to advise they need the Potassium that is Klor-con 10 meq tab. As they are easier for the Pt to take. Okay to send to the pharmacy on file.     Thank you,  Yolanda MOULTON

## 2021-08-05 RX ORDER — POTASSIUM CHLORIDE 750 MG/1
10 TABLET, FILM COATED, EXTENDED RELEASE ORAL DAILY
Qty: 90 TABLET | Refills: 3 | Status: SHIPPED | OUTPATIENT
Start: 2021-08-05 | End: 2022-08-17 | Stop reason: SDUPTHER

## 2021-08-08 NOTE — TELEPHONE ENCOUNTER
Patient presented to the clinic today for EKG check s/p successful AF ablation with pulmonary vein isolation procedure by Dr. Steele on 06/17/19 with late pericardial effusion with pericardiocentesis on Flecainide.     Her EKG today was reviewed and shows sinus bradycardia, 1st AVB, no acute abnormalities. NJ or QRS stable from prior.     Patient to follow up next week as previously scheduled.     Thanks,     DANIELLE Lam   [facility]  [phone]   08-Aug-2021 22:57

## 2021-08-16 ENCOUNTER — OFFICE VISIT (OUTPATIENT)
Dept: MEDICAL GROUP | Facility: MEDICAL CENTER | Age: 66
End: 2021-08-16
Payer: COMMERCIAL

## 2021-08-16 VITALS
HEART RATE: 51 BPM | BODY MASS INDEX: 28.1 KG/M2 | HEIGHT: 67 IN | TEMPERATURE: 97.8 F | WEIGHT: 179.01 LBS | OXYGEN SATURATION: 95 % | SYSTOLIC BLOOD PRESSURE: 128 MMHG | DIASTOLIC BLOOD PRESSURE: 64 MMHG

## 2021-08-16 DIAGNOSIS — D64.9 ANEMIA, UNSPECIFIED TYPE: ICD-10-CM

## 2021-08-16 DIAGNOSIS — K57.90 DIVERTICULOSIS: ICD-10-CM

## 2021-08-16 DIAGNOSIS — R73.01 IMPAIRED FASTING GLUCOSE: ICD-10-CM

## 2021-08-16 DIAGNOSIS — R07.81 RIB PAIN ON LEFT SIDE: ICD-10-CM

## 2021-08-16 DIAGNOSIS — L98.9 SKIN LESIONS: ICD-10-CM

## 2021-08-16 DIAGNOSIS — K59.09 OTHER CONSTIPATION: ICD-10-CM

## 2021-08-16 PROBLEM — N18.30 CKD (CHRONIC KIDNEY DISEASE) STAGE 3, GFR 30-59 ML/MIN: Status: RESOLVED | Noted: 2020-09-14 | Resolved: 2021-08-16

## 2021-08-16 PROCEDURE — 99214 OFFICE O/P EST MOD 30 MIN: CPT | Performed by: INTERNAL MEDICINE

## 2021-08-16 ASSESSMENT — FIBROSIS 4 INDEX: FIB4 SCORE: 1.82

## 2021-08-16 NOTE — PROGRESS NOTES
CC:  Diagnoses of Anemia, unspecified type, Diverticulosis, Impaired fasting glucose, Rib pain on left side, Other constipation, and Skin lesions were pertinent to this visit.    HISTORY OF THE PRESENT ILLNESS: Patient is a 66 y.o. female. This pleasant patient is here today follow-up.    We reviewed labs from 8/2/2021 hemoglobin remains depressed at 11.6.  Historically her hemoglobin was around 12.5 as per lab 4/12/2021.  She did have back surgery 4/27/2021.  Denies any change in bowel movements, rectal bleeding/tarry stools, no trouble with swallowing, no hematemesis/vomiting, no abdominal pain, etc.  She has felt tired while being anemic.  She says that she had a colonoscopy December 2011 and that it was normal.  Has had history of left upper quadrant discomfort, CT/19/21 was reviewed with patient today showing diverticulosis, some moderate atherosclerotic calcification abdominal aorta (noted she is on statin).  She does admit to some chronic constipation and we discussed treatment of this today.  She tells me that this left upper quadrant discomfort does radiate towards her thoracic spine and that her physical therapist is helping to relieve the symptoms thought to be due to muscular in origin.    Remainder of labs 8/2/2021 show hemoglobin A1c of 5.4, with history impaired fasting glucose she is recommended to exercise as tolerated have healthy diet, otherwise normal CMP, GFR normal.    She still pending a DEXA scan and lung cancer screening referral.  States she was to see dermatology for skin check.  She points to area of concern looks like prior biopsy over the right forearm, she confirms that it was benign biopsy.  No history of skin cancer.  She points to some flat brown macules over her hand and forearm region that she was told her probably solar lentigos.  She still wishes to see dermatologist.    Allergies: Erythromycin, Latex, Tape, and Tikosyn [dofetilide]    Current Outpatient Medications Ordered in  Epic   Medication Sig Dispense Refill   • potassium chloride ER (KLOR-CON 10) 10 MEQ tablet Take 1 tablet by mouth every day. 90 tablet 3   • gabapentin (NEURONTIN) 300 MG Cap      • HYDROcodone/acetaminophen (NORCO)  MG Tab TAKE 1 TAB BY MOUTH EVERY 4 HOURS AS NEEDED FOR PAIN     • furosemide (LASIX) 20 MG Tab TAKE 1 TABLET BY MOUTH EVERY DAY 90 tablet 3   • famotidine (PEPCID) 20 MG Tab Take 1 tablet by mouth 2 times a day. 12 tablet 0   • methocarbamol (ROBAXIN) 750 MG Tab Take 1 tablet by mouth every 8 hours as needed (spasm). 90 tablet 0   • cephALEXin (KEFLEX) 500 MG Cap Take 1 capsule by mouth 4 times a day. 20 capsule 0   • losartan (COZAAR) 50 MG Tab Take 1 tablet by mouth every day. 90 tablet 3   • carvedilol (COREG) 3.125 MG Tab Take 1 Tab by mouth 2 times a day, with meals. 180 Tab 3   • flecainide (TAMBOCOR) 100 MG Tab TAKE 1 TABLET BY MOUTH TWICE A DAY (Patient taking differently: Take 100 mg by mouth 2 times a day. TAKE 1 TABLET BY MOUTH TWICE A DAY) 180 Tab 2   • apixaban (ELIQUIS) 5mg Tab Take 1 Tab by mouth 2 Times a Day. TAKE 1 TABLET BY MOUTH TWICE A  Tab 3   • atorvastatin (LIPITOR) 10 MG Tab Take 1 Tab by mouth every day. 90 Tab 3   • CHOLECALCIFEROL PO Take 2 Capsules by mouth every day.     • albuterol 108 (90 Base) MCG/ACT Aero Soln inhalation aerosol Inhale 2 Puffs by mouth every 6 hours as needed for Shortness of Breath. 8.5 g 11     No current [facility]-ordered facility-administered medications on file.       Past Medical History:   Diagnosis Date   • Aortic calcification (HCC)      Noted incidentally on abdominal CT scan.   • Arrhythmia     hx of afibb, ablation x 2    • Asthma in adult         • Chronic anticoagulation    • Chronic back pain     • CKD (chronic kidney disease) stage 3, GFR 30-59 ml/min ([facility]) 9/14/2020   • Dental disorder     Full upper/lower dentures.   • Diverticulosis     • Essential hypertension, malignant 04/12/2021    pt states well controlled on meds   •  Inflamed seborrheic keratosis     • Intrinsic eczema 2020   • Non-rheumatic mitral regurgitation 2019    Echocardiogram with normal LV size, LVEF 65%. Moderate MR, mild-moderate TR, unchanged from previous.   • Osteoarthritis of both hips     • Plantar fascia syndrome     • Plantar wart of right foot     • Pruritic dermatitis      Ongoing for several months but worse at night on her lower extremities   • Pulmonary hypertension (HCC)    • Pure hypercholesterolemia     • S/P cardiac catheterization 2017    1.  Normal right heart pressures. 3.  Essentially normal coronary arteries. 2.  Left ventricular ejection fraction 65%.    • Spider veins of limb     • Unspecified vitamin D deficiency         Past Surgical History:   Procedure Laterality Date   • LUMBAR FUSION O-ARM N/A 2021    Procedure: FUSION, SPINE, LUMBAR, WITH O-ARM IMAGING GUIDANCE - STAGE #2 L3-5 UNILATERAL, LEFT-SIDED.laminotomy lumbar 3-5;  Surgeon: Sudhir Clay M.D.;  Location: SURGERY [facility];  Service: Neurosurgery   • EXTREME LATERAL INTERBODY FUSION Right 2021    Procedure: FUSION, SPINE, XLIF - STAGE #1 L3-5, RIGHT-SIDED APPROACH.;  Surgeon: Sudhir Clay M.D.;  Location: SURGERY [facility];  Service: Neurosurgery   • OTHER CARDIAC SURGERY  2019    cardiac ablation x 2 , for Afibb   • LUMBAR LAMINECTOMY DISKECTOMY  2009    Performed by SUDHIR CLAY at SURGERY [facility] ORS   • GYN SURGERY       x2   • HYSTERECTOMY LAPAROSCOPY         Social History     Tobacco Use   • Smoking status: Former Smoker     Packs/day: 1.00     Years: 40.00     Pack years: 40.00     Types: Cigarettes     Quit date: 3/1/2010     Years since quittin.4   • Smokeless tobacco: Never Used   Vaping Use   • Vaping Use: Never used   Substance Use Topics   • Alcohol use: No     Alcohol/week: 0.0 oz   • Drug use: No     Comment: tried marijuana once.        Social History     Social History Narrative    Works at [employer]  "History   Problem Relation Age of Onset   • Heart Disease Mother         1st MI @ 59 yo   • Hypertension Mother    • Heart Attack Mother 60   • Heart Disease Maternal Aunt    • Cancer Daughter 35        breast   • Heart Disease Maternal Grandmother    • Heart Disease Maternal Grandfather    • Heart Disease Maternal Aunt 56       Exam: /64 (BP Location: Right arm, Patient Position: Sitting, BP Cuff Size: Adult)   Pulse (!) 51   Temp 36.6 °C (97.8 °F) (Temporal)   Ht 1.69 m (5' 6.54\")   Wt 81.2 kg (179 lb 0.2 oz)   SpO2 95%  Body mass index is 28.43 kg/m².    General: Normal appearing. No distress.  EYES: Conjunctiva clear lids without ptosis, pupils equal  EARS: Normal shape and contour   Pulmonary: Clear to ausculation.  Normal effort. No rales or wheezing.  Cardiovascular: Regular rate and rhythm without significant murmur.   Abdomen: Soft, nontender, nondistended. Normal bowel sounds.  Neurologic: Cranial nerves grossly nonfocal  Skin: Warm and dry.  No obvious lesions.  Musculoskeletal: Normal gait. No extremity cyanosis, clubbing, or edema.  Psych: Normal mood and affect. Alert and oriented x3. Judgment and insight is normal.      Assessment/Plan  1. Anemia, unspecified type  Plan to evaluate further, may be due to her recent surgery never would expected over the last 4 months that she should have recovered.  Would also consider slow ooze from hemorrhoids with her chronic constipation, etc.  If iron deficient may benefit by GI referral for endoscopy as last colonoscopy was roughly 10 years ago this December.  - FERRITIN; Future  - IRON/TOTAL IRON BIND; Future  - VITAMIN B12; Future    2. Diverticulosis  Prevention of constipation discussed, 30 g of fiber such as incorporating Metamucil, daily MiraLAX as needed, probiotic, exercise, water, etc.    3. Impaired fasting glucose  Currently hemoglobin A1c is within normal limits, recommend diet/exercise and will monitor.    4. Rib pain on left side  CT " reviewed with patient no concerning findings for the left-sided discomfort near the rib, and this has actually improved with her physical therapy treatments, suspect musculoskeletal source.    5. Other constipation  See #2 above    6. Skin lesions  Despite reassurance that her current concerns are benign over her arm she wishes to see a dermatologist.  - REFERRAL TO DERMATOLOGY      RTC roughly 3 months      Please note that this dictation was created using voice recognition software. I have made every reasonable attempt to correct obvious errors, but I expect that there are errors of grammar and possibly content that I did not discover before finalizing the note.

## 2021-10-05 DIAGNOSIS — I48.19 PERSISTENT ATRIAL FIBRILLATION (HCC): ICD-10-CM

## 2021-10-06 RX ORDER — APIXABAN 5 MG/1
TABLET, FILM COATED ORAL
Qty: 180 TABLET | Refills: 3 | Status: SHIPPED | OUTPATIENT
Start: 2021-10-06 | End: 2022-10-18

## 2021-10-18 ENCOUNTER — OFFICE VISIT (OUTPATIENT)
Dept: CARDIOLOGY | Facility: MEDICAL CENTER | Age: 66
End: 2021-10-18
Payer: COMMERCIAL

## 2021-10-18 ENCOUNTER — HOSPITAL ENCOUNTER (OUTPATIENT)
Dept: RADIOLOGY | Facility: MEDICAL CENTER | Age: 66
End: 2021-10-18
Attending: PHYSICIAN ASSISTANT
Payer: COMMERCIAL

## 2021-10-18 VITALS
WEIGHT: 177 LBS | DIASTOLIC BLOOD PRESSURE: 68 MMHG | HEIGHT: 67 IN | BODY MASS INDEX: 27.78 KG/M2 | RESPIRATION RATE: 12 BRPM | HEART RATE: 59 BPM | OXYGEN SATURATION: 97 % | SYSTOLIC BLOOD PRESSURE: 116 MMHG

## 2021-10-18 DIAGNOSIS — I48.0 PAF (PAROXYSMAL ATRIAL FIBRILLATION) (HCC): ICD-10-CM

## 2021-10-18 DIAGNOSIS — I27.20 PULMONARY HYPERTENSION (HCC): ICD-10-CM

## 2021-10-18 DIAGNOSIS — Z98.890 S/P CARDIAC CATHETERIZATION: ICD-10-CM

## 2021-10-18 DIAGNOSIS — M54.50 LOW BACK PAIN, UNSPECIFIED BACK PAIN LATERALITY, UNSPECIFIED CHRONICITY, UNSPECIFIED WHETHER SCIATICA PRESENT: ICD-10-CM

## 2021-10-18 DIAGNOSIS — I10 ESSENTIAL HYPERTENSION, BENIGN: ICD-10-CM

## 2021-10-18 DIAGNOSIS — Z98.890 S/P ABLATION OF ATRIAL FIBRILLATION: ICD-10-CM

## 2021-10-18 DIAGNOSIS — I48.19 PERSISTENT ATRIAL FIBRILLATION (HCC): ICD-10-CM

## 2021-10-18 DIAGNOSIS — Z79.01 CHRONIC ANTICOAGULATION: ICD-10-CM

## 2021-10-18 DIAGNOSIS — Z86.79 S/P ABLATION OF ATRIAL FIBRILLATION: ICD-10-CM

## 2021-10-18 LAB — EKG IMPRESSION: NORMAL

## 2021-10-18 PROCEDURE — 99214 OFFICE O/P EST MOD 30 MIN: CPT | Performed by: INTERNAL MEDICINE

## 2021-10-18 PROCEDURE — 93000 ELECTROCARDIOGRAM COMPLETE: CPT | Performed by: INTERNAL MEDICINE

## 2021-10-18 PROCEDURE — 72131 CT LUMBAR SPINE W/O DYE: CPT | Mod: MH

## 2021-10-18 RX ORDER — FLECAINIDE ACETATE 50 MG/1
TABLET ORAL
Qty: 180 TABLET | Refills: 3 | Status: SHIPPED | OUTPATIENT
Start: 2021-10-18 | End: 2021-12-27 | Stop reason: SDUPTHER

## 2021-10-18 ASSESSMENT — FIBROSIS 4 INDEX: FIB4 SCORE: 1.82

## 2021-10-18 NOTE — PROGRESS NOTES
Chief Complaint   Patient presents with   • Atrial Fibrillation     Fv Dx: PAF (paroxysmal atrial fibrillation)       Subjective     Chinyere Priest is a 66 y.o. female who presents today with history of persistent atrial arrhythmia status post 2 ablations in 2019.  Maintaining sinus rhythm.  Some hypotension and bradycardia following evening carvedilol dose.  Otherwise feels well.  Recent back surgery.    Past Medical History:   Diagnosis Date   • Aortic calcification (HCC)      Noted incidentally on abdominal CT scan.   • Arrhythmia     hx of afibb, ablation x 2    • Asthma in adult         • Chronic anticoagulation    • Chronic back pain     • CKD (chronic kidney disease) stage 3, GFR 30-59 ml/min ([facility]) 9/14/2020   • Dental disorder     Full upper/lower dentures.   • Diverticulosis     • Essential hypertension, malignant 04/12/2021    pt states well controlled on meds   • Inflamed seborrheic keratosis     • Intrinsic eczema 1/20/2020   • Non-rheumatic mitral regurgitation 05/2019    Echocardiogram with normal LV size, LVEF 65%. Moderate MR, mild-moderate TR, unchanged from previous.   • Osteoarthritis of both hips     • Plantar fascia syndrome     • Plantar wart of right foot     • Pruritic dermatitis      Ongoing for several months but worse at night on her lower extremities   • Pulmonary hypertension (HCC)    • Pure hypercholesterolemia     • S/P cardiac catheterization 5/26/2017    1.  Normal right heart pressures. 3.  Essentially normal coronary arteries. 2.  Left ventricular ejection fraction 65%.    • Spider veins of limb     • Unspecified vitamin D deficiency       Past Surgical History:   Procedure Laterality Date   • LUMBAR FUSION O-ARM N/A 4/29/2021    Procedure: FUSION, SPINE, LUMBAR, WITH O-ARM IMAGING GUIDANCE - STAGE #2 L3-5 UNILATERAL, LEFT-SIDED.laminotomy lumbar 3-5;  Surgeon: Chad Clay M.D.;  Location: SURGERY [facility];  Service: Neurosurgery   • EXTREME LATERAL INTERBODY FUSION  Right 2021    Procedure: FUSION, SPINE, XLIF - STAGE #1 L3-5, RIGHT-SIDED APPROACH.;  Surgeon: Sudhir Clay M.D.;  Location: SURGERY [facility];  Service: Neurosurgery   • OTHER CARDIAC SURGERY  2019    cardiac ablation x 2 , for Afibb   • LUMBAR LAMINECTOMY DISKECTOMY  2009    Performed by SUDHIR CLAY at SURGERY [facility] ORS   • GYN SURGERY       x2   • HYSTERECTOMY LAPAROSCOPY       Family History   Problem Relation Age of Onset   • Heart Disease Mother         1st MI @ 59 yo   • Hypertension Mother    • Heart Attack Mother 60   • Heart Disease Maternal Aunt    • Cancer Daughter 35        breast   • Heart Disease Maternal Grandmother    • Heart Disease Maternal Grandfather    • Heart Disease Maternal Aunt 56     Social History     Socioeconomic History   • Marital status:      Spouse name: Not on file   • Number of children: Not on file   • Years of education: Not on file   • Highest education level: Not on file   Occupational History   • Not on file   Tobacco Use   • Smoking status: Former Smoker     Packs/day: 1.00     Years: 40.00     Pack years: 40.00     Types: Cigarettes     Quit date: 3/1/2010     Years since quittin.6   • Smokeless tobacco: Never Used   Vaping Use   • Vaping Use: Never used   Substance and Sexual Activity   • Alcohol use: No     Alcohol/week: 0.0 oz   • Drug use: No     Comment: tried marijuana once.    • Sexual activity: Yes     Partners: Male     Birth control/protection: Post-Menopausal     Comment: , works at [employer](part time)   Other Topics Concern   •  Service Not Asked   • Blood Transfusions Not Asked   • Caffeine Concern Not Asked   • Occupational Exposure Not Asked   • Hobby Hazards Not Asked   • Sleep Concern Not Asked   • Stress Concern Not Asked   • Weight Concern Not Asked   • Special Diet Not Asked   • Back Care Not Asked   • Exercise No   • Bike Helmet Not Asked   • Seat Belt Not Asked   • Self-Exams Not Asked   Social  History Narrative    Works at [employer]     Social Determinants of Health     Financial Resource Strain:    • Difficulty of Paying Living Expenses:    Food Insecurity:    • Worried About Running Out of Food in the Last Year:    • Ran Out of Food in the Last Year:    Transportation Needs:    • Lack of Transportation (Medical):    • Lack of Transportation (Non-Medical):    Physical Activity:    • Days of Exercise per Week:    • Minutes of Exercise per Session:    Stress:    • Feeling of Stress :    Social Connections:    • Frequency of Communication with Friends and Family:    • Frequency of Social Gatherings with Friends and Family:    • Attends Shinto Services:    • Active Member of Clubs or Organizations:    • Attends Club or Organization Meetings:    • Marital Status:    Intimate Partner Violence:    • Fear of Current or Ex-Partner:    • Emotionally Abused:    • Physically Abused:    • Sexually Abused:      Allergies   Allergen Reactions   • Erythromycin      Abdominal pain   • Latex      rash   • Tape Hives     Cloth tape  Paper tape okay   • Tikosyn [Dofetilide]      diarrhea     Outpatient Encounter Medications as of 10/18/2021   Medication Sig Dispense Refill   • flecainide (TAMBOCOR) 50 MG tablet TAKE 1 TABLET BY MOUTH TWICE A  Tablet 3   • ELIQUIS 5 MG Tab TAKE 1 TAB BY MOUTH 2 TIMES A DAY. TAKE 1 TABLET BY MOUTH TWICE A  Tablet 3   • potassium chloride ER (KLOR-CON 10) 10 MEQ tablet Take 1 tablet by mouth every day. 90 tablet 3   • gabapentin (NEURONTIN) 300 MG Cap      • HYDROcodone/acetaminophen (NORCO)  MG Tab TAKE 1 TAB BY MOUTH EVERY 4 HOURS AS NEEDED FOR PAIN     • furosemide (LASIX) 20 MG Tab TAKE 1 TABLET BY MOUTH EVERY DAY 90 tablet 3   • famotidine (PEPCID) 20 MG Tab Take 1 tablet by mouth 2 times a day. 12 tablet 0   • methocarbamol (ROBAXIN) 750 MG Tab Take 1 tablet by mouth every 8 hours as needed (spasm). 90 tablet 0   • losartan (COZAAR) 50 MG Tab Take 1 tablet by mouth every  "day. 90 tablet 3   • atorvastatin (LIPITOR) 10 MG Tab Take 1 Tab by mouth every day. 90 Tab 3   • CHOLECALCIFEROL PO Take 2 Capsules by mouth every day.     • albuterol 108 (90 Base) MCG/ACT Aero Soln inhalation aerosol Inhale 2 Puffs by mouth every 6 hours as needed for Shortness of Breath. 8.5 g 11   • [DISCONTINUED] flecainide (TAMBOCOR) 100 MG Tab TAKE 1 TABLET BY MOUTH TWICE A  Tablet 3   • [DISCONTINUED] cephALEXin (KEFLEX) 500 MG Cap Take 1 capsule by mouth 4 times a day. (Patient not taking: Reported on 10/18/2021) 20 capsule 0   • [DISCONTINUED] carvedilol (COREG) 3.125 MG Tab Take 1 Tab by mouth 2 times a day, with meals. 180 Tab 3     No facility-administered encounter medications on file as of 10/18/2021.     ROS           Objective     /68 (BP Location: Left arm, Patient Position: Sitting, BP Cuff Size: Adult)   Pulse (!) 59   Resp 12   Ht 1.702 m (5' 7\")   Wt 80.3 kg (177 lb)   SpO2 97%   BMI 27.72 kg/m²     Physical Exam  Constitutional:       Appearance: She is well-developed.   HENT:      Head: Normocephalic and atraumatic.   Eyes:      Pupils: Pupils are equal, round, and reactive to light.   Cardiovascular:      Rate and Rhythm: Normal rate and regular rhythm.      Heart sounds: Normal heart sounds. No murmur heard.   No friction rub. No gallop.    Pulmonary:      Effort: Pulmonary effort is normal.      Breath sounds: Normal breath sounds.   Abdominal:      General: Bowel sounds are normal.      Palpations: Abdomen is soft.   Musculoskeletal:         General: Normal range of motion.      Cervical back: Normal range of motion and neck supple.   Skin:     General: Skin is warm.   Neurological:      Mental Status: She is alert and oriented to person, place, and time.      Cranial Nerves: No cranial nerve deficit.   Psychiatric:         Behavior: Behavior normal.         Thought Content: Thought content normal.         Judgment: Judgment normal.                Assessment & Plan "     1. PAF (paroxysmal atrial fibrillation) (HCC)  EKG   2. S/P ablation of atrial fibrillation  flecainide (TAMBOCOR) 50 MG tablet   3. S/P cardiac catheterization     4. Pulmonary hypertension (HCC)     5. Essential hypertension, benign     6. Chronic anticoagulation     7. Persistent atrial fibrillation (HCC)  flecainide (TAMBOCOR) 50 MG tablet       Medical Decision Making: Today's Assessment/Status/Plan:   1.  Persistent atrial fibrillation status post 2 ablations.  Some bradycardia.  Taper off Coreg.  Cut flecainide to 50 twice daily.  2.  Anticoagulation continue Eliquis.  3.  Follow-up in 3 months.

## 2021-11-02 ENCOUNTER — HOSPITAL ENCOUNTER (OUTPATIENT)
Dept: LAB | Facility: MEDICAL CENTER | Age: 66
End: 2021-11-02
Attending: INTERNAL MEDICINE
Payer: COMMERCIAL

## 2021-11-02 DIAGNOSIS — D64.9 ANEMIA, UNSPECIFIED TYPE: ICD-10-CM

## 2021-11-02 LAB
FERRITIN SERPL-MCNC: 209 NG/ML (ref 10–291)
IRON SATN MFR SERPL: 23 % (ref 15–55)
IRON SERPL-MCNC: 75 UG/DL (ref 40–170)
TIBC SERPL-MCNC: 332 UG/DL (ref 250–450)
UIBC SERPL-MCNC: 257 UG/DL (ref 110–370)
VIT B12 SERPL-MCNC: 356 PG/ML (ref 211–911)

## 2021-11-02 PROCEDURE — 36415 COLL VENOUS BLD VENIPUNCTURE: CPT

## 2021-11-02 PROCEDURE — 82607 VITAMIN B-12: CPT

## 2021-11-02 PROCEDURE — 83540 ASSAY OF IRON: CPT

## 2021-11-02 PROCEDURE — 82728 ASSAY OF FERRITIN: CPT

## 2021-11-02 PROCEDURE — 83550 IRON BINDING TEST: CPT

## 2021-11-04 ENCOUNTER — TELEPHONE (OUTPATIENT)
Dept: MEDICAL GROUP | Facility: MEDICAL CENTER | Age: 66
End: 2021-11-04

## 2021-11-04 NOTE — TELEPHONE ENCOUNTER
----- Message from Dorothea Sandoval M.D. sent at 11/3/2021 11:51 AM PDT -----  Vitamin B12, ferritin and iron panel within normal limits.

## 2021-12-27 ENCOUNTER — OFFICE VISIT (OUTPATIENT)
Dept: CARDIOLOGY | Facility: MEDICAL CENTER | Age: 66
End: 2021-12-27
Payer: COMMERCIAL

## 2021-12-27 VITALS
OXYGEN SATURATION: 98 % | DIASTOLIC BLOOD PRESSURE: 92 MMHG | HEART RATE: 74 BPM | HEIGHT: 66 IN | WEIGHT: 170 LBS | BODY MASS INDEX: 27.32 KG/M2 | RESPIRATION RATE: 14 BRPM | SYSTOLIC BLOOD PRESSURE: 144 MMHG

## 2021-12-27 DIAGNOSIS — Z98.890 S/P ABLATION OF ATRIAL FIBRILLATION: ICD-10-CM

## 2021-12-27 DIAGNOSIS — I48.19 PERSISTENT ATRIAL FIBRILLATION (HCC): ICD-10-CM

## 2021-12-27 DIAGNOSIS — Z86.79 S/P ABLATION OF ATRIAL FIBRILLATION: ICD-10-CM

## 2021-12-27 DIAGNOSIS — E78.00 HYPERCHOLESTEROLEMIA: ICD-10-CM

## 2021-12-27 DIAGNOSIS — Z79.01 CHRONIC ANTICOAGULATION: ICD-10-CM

## 2021-12-27 DIAGNOSIS — I48.0 PAF (PAROXYSMAL ATRIAL FIBRILLATION) (HCC): ICD-10-CM

## 2021-12-27 DIAGNOSIS — I10 ESSENTIAL HYPERTENSION, BENIGN: ICD-10-CM

## 2021-12-27 DIAGNOSIS — Z98.890 S/P CARDIAC CATHETERIZATION: ICD-10-CM

## 2021-12-27 LAB — EKG IMPRESSION: NORMAL

## 2021-12-27 PROCEDURE — 99214 OFFICE O/P EST MOD 30 MIN: CPT | Performed by: INTERNAL MEDICINE

## 2021-12-27 PROCEDURE — 93000 ELECTROCARDIOGRAM COMPLETE: CPT | Performed by: INTERNAL MEDICINE

## 2021-12-27 RX ORDER — FLECAINIDE ACETATE 50 MG/1
TABLET ORAL
Qty: 180 TABLET | Refills: 3 | Status: SHIPPED | OUTPATIENT
Start: 2021-12-27 | End: 2023-01-13 | Stop reason: SDUPTHER

## 2021-12-27 ASSESSMENT — FIBROSIS 4 INDEX: FIB4 SCORE: 1.82

## 2021-12-27 NOTE — PROGRESS NOTES
Chief Complaint   Patient presents with   • Atrial Fibrillation     F/V Dx: PAF (paroxysmal atrial fibrillation) ([facility])       Subjective     Chinyere Priest is a 66 y.o. female who presents today with history of persistent atrial fibrillation status post ablation x2.  Feels well. Recent back surgery.  Does have a fluid collection in back.  May need surgery to relieve this.  Some numbness in her leg but the back pain did improve after the fusion.  Cardia with no symptoms.  Blood pressures at home have been within normal limits    Past Medical History:   Diagnosis Date   • Aortic calcification (HCC)      Noted incidentally on abdominal CT scan.   • Arrhythmia     hx of afibb, ablation x 2    • Asthma in adult         • Chronic anticoagulation    • Chronic back pain     • CKD (chronic kidney disease) stage 3, GFR 30-59 ml/min ([facility]) 9/14/2020   • Dental disorder     Full upper/lower dentures.   • Diverticulosis     • Inflamed seborrheic keratosis     • Intrinsic eczema 1/20/2020   • Non-rheumatic mitral regurgitation 05/2019    Echocardiogram with normal LV size, LVEF 65%. Moderate MR, mild-moderate TR, unchanged from previous.   • Osteoarthritis of both hips     • Plantar fascia syndrome     • Plantar wart of right foot     • Pruritic dermatitis      Ongoing for several months but worse at night on her lower extremities   • Pulmonary hypertension ([facility])    • Pure hypercholesterolemia     • S/P cardiac catheterization 5/26/2017    1.  Normal right heart pressures. 3.  Essentially normal coronary arteries. 2.  Left ventricular ejection fraction 65%.    • Spider veins of limb     • Unspecified vitamin D deficiency       Past Surgical History:   Procedure Laterality Date   • LUMBAR FUSION O-ARM N/A 4/29/2021    Procedure: FUSION, SPINE, LUMBAR, WITH O-ARM IMAGING GUIDANCE - STAGE #2 L3-5 UNILATERAL, LEFT-SIDED.laminotomy lumbar 3-5;  Surgeon: Chad Clay M.D.;  Location: SURGERY [facility];  Service:  Neurosurgery   • FUSION, SPINE, XLIF Right 2021    Procedure: FUSION, SPINE, XLIF - STAGE #1 L3-5, RIGHT-SIDED APPROACH.;  Surgeon: Sudhir Clay M.D.;  Location: SURGERY [facility];  Service: Neurosurgery   • OTHER CARDIAC SURGERY  2019    cardiac ablation x 2 , for Afibb   • LUMBAR LAMINECTOMY DISKECTOMY  2009    Performed by SUDHIR CLAY at SURGERY [facility] ORS   • GYN SURGERY       x2   • HYSTERECTOMY LAPAROSCOPY       Family History   Problem Relation Age of Onset   • Heart Disease Mother         1st MI @ 61 yo   • Hypertension Mother    • Heart Attack Mother 60   • Heart Disease Maternal Aunt    • Cancer Daughter 35        breast   • Heart Disease Maternal Grandmother    • Heart Disease Maternal Grandfather    • Heart Disease Maternal Aunt 56     Social History     Socioeconomic History   • Marital status:      Spouse name: Not on file   • Number of children: Not on file   • Years of education: Not on file   • Highest education level: Not on file   Occupational History   • Not on file   Tobacco Use   • Smoking status: Former Smoker     Packs/day: 1.00     Years: 40.00     Pack years: 40.00     Types: Cigarettes     Quit date: 3/1/2010     Years since quittin.8   • Smokeless tobacco: Never Used   Vaping Use   • Vaping Use: Never used   Substance and Sexual Activity   • Alcohol use: No     Alcohol/week: 0.0 oz   • Drug use: No     Comment: tried marijuana once.    • Sexual activity: Yes     Partners: Male     Birth control/protection: Post-Menopausal     Comment: , works at [employer](part time)   Other Topics Concern   •  Service Not Asked   • Blood Transfusions Not Asked   • Caffeine Concern Not Asked   • Occupational Exposure Not Asked   • Hobby Hazards Not Asked   • Sleep Concern Not Asked   • Stress Concern Not Asked   • Weight Concern Not Asked   • Special Diet Not Asked   • Back Care Not Asked   • Exercise No   • Bike Helmet Not Asked   • Seat Belt Not Asked    • Self-Exams Not Asked   Social History Narrative    Works at [employer]     Social Determinants of Health     Financial Resource Strain:    • Difficulty of Paying Living Expenses: Not on file   Food Insecurity:    • Worried About Running Out of Food in the Last Year: Not on file   • Ran Out of Food in the Last Year: Not on file   Transportation Needs:    • Lack of Transportation (Medical): Not on file   • Lack of Transportation (Non-Medical): Not on file   Physical Activity:    • Days of Exercise per Week: Not on file   • Minutes of Exercise per Session: Not on file   Stress:    • Feeling of Stress : Not on file   Social Connections:    • Frequency of Communication with Friends and Family: Not on file   • Frequency of Social Gatherings with Friends and Family: Not on file   • Attends Buddhism Services: Not on file   • Active Member of Clubs or Organizations: Not on file   • Attends Club or Organization Meetings: Not on file   • Marital Status: Not on file   Intimate Partner Violence:    • Fear of Current or Ex-Partner: Not on file   • Emotionally Abused: Not on file   • Physically Abused: Not on file   • Sexually Abused: Not on file   Housing Stability:    • Unable to Pay for Housing in the Last Year: Not on file   • Number of Places Lived in the Last Year: Not on file   • Unstable Housing in the Last Year: Not on file     Allergies   Allergen Reactions   • Erythromycin      Abdominal pain   • Latex      rash   • Tape Hives     Cloth tape  Paper tape okay   • Tikosyn [Dofetilide]      diarrhea     Outpatient Encounter Medications as of 12/27/2021   Medication Sig Dispense Refill   • flecainide (TAMBOCOR) 50 MG tablet TAKE 1 TABLET BY MOUTH TWICE A  Tablet 3   • atorvastatin (LIPITOR) 10 MG Tab TAKE 1 TABLET BY MOUTH EVERY DAY 90 Tablet 3   • ELIQUIS 5 MG Tab TAKE 1 TAB BY MOUTH 2 TIMES A DAY. TAKE 1 TABLET BY MOUTH TWICE A  Tablet 3   • potassium chloride ER (KLOR-CON 10) 10 MEQ tablet Take 1 tablet by  "mouth every day. 90 tablet 3   • HYDROcodone/acetaminophen (NORCO)  MG Tab TAKE 1 TAB BY MOUTH EVERY 4 HOURS AS NEEDED FOR PAIN     • furosemide (LASIX) 20 MG Tab TAKE 1 TABLET BY MOUTH EVERY DAY 90 tablet 3   • famotidine (PEPCID) 20 MG Tab Take 1 tablet by mouth 2 times a day. 12 tablet 0   • methocarbamol (ROBAXIN) 750 MG Tab Take 1 tablet by mouth every 8 hours as needed (spasm). 90 tablet 0   • losartan (COZAAR) 50 MG Tab Take 1 tablet by mouth every day. 90 tablet 3   • CHOLECALCIFEROL PO Take 2 Capsules by mouth every day.     • albuterol 108 (90 Base) MCG/ACT Aero Soln inhalation aerosol Inhale 2 Puffs by mouth every 6 hours as needed for Shortness of Breath. 8.5 g 11   • [DISCONTINUED] flecainide (TAMBOCOR) 50 MG tablet TAKE 1 TABLET BY MOUTH TWICE A  Tablet 3   • [DISCONTINUED] gabapentin (NEURONTIN) 300 MG Cap  (Patient not taking: Reported on 12/27/2021)       No facility-administered encounter medications on file as of 12/27/2021.     ROS           Objective     /92 (BP Location: Left arm, Patient Position: Sitting, BP Cuff Size: Adult)   Pulse 74   Resp 14   Ht 1.676 m (5' 6\")   Wt 77.1 kg (170 lb)   SpO2 98%   BMI 27.44 kg/m²     Physical Exam  Constitutional:       Appearance: She is well-developed.   HENT:      Head: Normocephalic and atraumatic.   Eyes:      Pupils: Pupils are equal, round, and reactive to light.   Cardiovascular:      Rate and Rhythm: Normal rate and regular rhythm.      Heart sounds: Normal heart sounds. No murmur heard.  No friction rub. No gallop.    Pulmonary:      Effort: Pulmonary effort is normal.      Breath sounds: Normal breath sounds.   Abdominal:      General: Bowel sounds are normal.      Palpations: Abdomen is soft.   Musculoskeletal:         General: Normal range of motion.      Cervical back: Normal range of motion and neck supple.   Skin:     General: Skin is warm.   Neurological:      Mental Status: She is alert and oriented to person, " place, and time.      Cranial Nerves: No cranial nerve deficit.   Psychiatric:         Behavior: Behavior normal.         Thought Content: Thought content normal.         Judgment: Judgment normal.                Assessment & Plan     1. PAF (paroxysmal atrial fibrillation) (HCC)  EKG   2. S/P ablation of atrial fibrillation  flecainide (TAMBOCOR) 50 MG tablet   3. S/P cardiac catheterization     4. Essential hypertension, benign     5. Hypercholesterolemia     6. Chronic anticoagulation     7. Persistent atrial fibrillation (HCC)  flecainide (TAMBOCOR) 50 MG tablet       Medical Decision Making: Today's Assessment/Status/Plan:   1.  Hypertension continue current regimen good blood pressures at home.  2.  Atrial fibrillation continue low-dose flecainide.  Maintaining sinus rhythm.  3.  Anticoagulation continue NOAC.  4.  Hyperlipidemia continue statins.  5.  Follow-up with me in 6 months.

## 2022-01-17 ENCOUNTER — OFFICE VISIT (OUTPATIENT)
Dept: MEDICAL GROUP | Facility: MEDICAL CENTER | Age: 67
End: 2022-01-17
Payer: COMMERCIAL

## 2022-01-17 VITALS
OXYGEN SATURATION: 97 % | TEMPERATURE: 97.2 F | HEART RATE: 67 BPM | RESPIRATION RATE: 14 BRPM | DIASTOLIC BLOOD PRESSURE: 74 MMHG | HEIGHT: 65 IN | WEIGHT: 170.42 LBS | SYSTOLIC BLOOD PRESSURE: 118 MMHG | BODY MASS INDEX: 28.39 KG/M2

## 2022-01-17 DIAGNOSIS — I10 ESSENTIAL HYPERTENSION, BENIGN: ICD-10-CM

## 2022-01-17 DIAGNOSIS — E78.00 HYPERCHOLESTEROLEMIA: ICD-10-CM

## 2022-01-17 DIAGNOSIS — D64.9 ANEMIA, UNSPECIFIED TYPE: ICD-10-CM

## 2022-01-17 DIAGNOSIS — M51.36 DEGENERATIVE DISC DISEASE, LUMBAR: ICD-10-CM

## 2022-01-17 DIAGNOSIS — Z12.11 SCREENING FOR COLORECTAL CANCER: ICD-10-CM

## 2022-01-17 DIAGNOSIS — Z12.12 SCREENING FOR COLORECTAL CANCER: ICD-10-CM

## 2022-01-17 DIAGNOSIS — R73.01 IMPAIRED FASTING GLUCOSE: ICD-10-CM

## 2022-01-17 DIAGNOSIS — Z80.41 FAMILY HISTORY OF MALIGNANT NEOPLASM OF OVARY: ICD-10-CM

## 2022-01-17 DIAGNOSIS — J45.20 MILD INTERMITTENT ASTHMA IN ADULT WITHOUT COMPLICATION: ICD-10-CM

## 2022-01-17 DIAGNOSIS — Z23 NEED FOR VACCINATION: ICD-10-CM

## 2022-01-17 DIAGNOSIS — Z98.890 S/P ABLATION OF ATRIAL FIBRILLATION: ICD-10-CM

## 2022-01-17 DIAGNOSIS — E55.9 VITAMIN D DEFICIENCY: Chronic | ICD-10-CM

## 2022-01-17 DIAGNOSIS — Z86.79 S/P ABLATION OF ATRIAL FIBRILLATION: ICD-10-CM

## 2022-01-17 DIAGNOSIS — Z80.3 FAMILY HISTORY OF MALIGNANT NEOPLASM OF BREAST: ICD-10-CM

## 2022-01-17 DIAGNOSIS — D68.69 SECONDARY HYPERCOAGULABLE STATE (HCC): ICD-10-CM

## 2022-01-17 DIAGNOSIS — Z12.31 ENCOUNTER FOR SCREENING MAMMOGRAM FOR BREAST CANCER: ICD-10-CM

## 2022-01-17 DIAGNOSIS — I70.0 AORTIC CALCIFICATION (HCC): ICD-10-CM

## 2022-01-17 PROBLEM — M54.30 BACK PAIN WITH SCIATICA: Status: RESOLVED | Noted: 2017-01-25 | Resolved: 2022-01-17

## 2022-01-17 PROBLEM — M54.9 BACK PAIN WITH SCIATICA: Status: RESOLVED | Noted: 2017-01-25 | Resolved: 2022-01-17

## 2022-01-17 PROBLEM — Z79.01 CHRONIC ANTICOAGULATION: Status: RESOLVED | Noted: 2017-10-30 | Resolved: 2022-01-17

## 2022-01-17 PROCEDURE — 90471 IMMUNIZATION ADMIN: CPT | Performed by: FAMILY MEDICINE

## 2022-01-17 PROCEDURE — 90715 TDAP VACCINE 7 YRS/> IM: CPT | Performed by: FAMILY MEDICINE

## 2022-01-17 PROCEDURE — 99214 OFFICE O/P EST MOD 30 MIN: CPT | Mod: 25 | Performed by: FAMILY MEDICINE

## 2022-01-17 PROCEDURE — 90472 IMMUNIZATION ADMIN EACH ADD: CPT | Performed by: FAMILY MEDICINE

## 2022-01-17 PROCEDURE — 90732 PPSV23 VACC 2 YRS+ SUBQ/IM: CPT | Performed by: FAMILY MEDICINE

## 2022-01-17 ASSESSMENT — PATIENT HEALTH QUESTIONNAIRE - PHQ9: CLINICAL INTERPRETATION OF PHQ2 SCORE: 0

## 2022-01-17 ASSESSMENT — ENCOUNTER SYMPTOMS
WHEEZING: 0
SHORTNESS OF BREATH: 0
FEVER: 0
PALPITATIONS: 0
COUGH: 0
CHILLS: 0

## 2022-01-17 ASSESSMENT — FIBROSIS 4 INDEX: FIB4 SCORE: 1.82

## 2022-01-17 NOTE — ASSESSMENT & PLAN NOTE
Chronic health problem, stable, continue to follow-up with cardiology, continue same medication regimen.

## 2022-01-17 NOTE — ASSESSMENT & PLAN NOTE
Chronic ongoing problem, recommended to start multivitamins and vitamin B12 supplementation.  Recheck CBC in 3 to 4 months.  Referral to gastroenterology for colonoscopy.

## 2022-01-17 NOTE — ASSESSMENT & PLAN NOTE
Chronic health problem, improving, continue to follow-up with orthopedics.  Continue same medication regimen.

## 2022-01-17 NOTE — PROGRESS NOTES
FAMILY MEDICINE VISIT                                                               Chief complaint::Diagnoses of Aortic calcification (HCC), Hypercholesterolemia, S/P ablation of atrial fibrillation, Degenerative disc disease, lumbar, Anemia, unspecified type, Mild intermittent asthma in adult without complication, Essential hypertension, benign, Family history of breast cancer, Family history of ovarian cancer, Impaired fasting glucose, Vitamin D deficiency, Encounter for screening mammogram for breast cancer, Screening for colorectal cancer, Need for vaccination, and Secondary hypercoagulable state (HCC) were pertinent to this visit.    History of present illness: Chinyere Priest is a 66 y.o. female who presented to [facility] care and lab follow-up.    Problem   Secondary Hypercoagulable State (Hcc)   Anemia    Previous history of anemia, last CBC was done in August which showed hemoglobin at 11.6.  Recently iron levels were checked.  Iron and ferritin levels came back in normal range vitamin B12 on lower end.  She recently had surgery done.     Impaired Fasting Glucose    Last A1c came back in normal range, previous A1c was elevated.    Lab Results   Component Value Date/Time    HBA1C 5.4 08/02/2021 08:40 AM         S/P Ablation of Atrial Fibrillation    Times two last 12/2019.  She is on Eliquis 5 mg twice daily and on flecainide.  Currently following with cardiology.     Hypercholesterolemia    Last lipid panel was done in March and was in normal range.  She is currently on Lipitor 10 mg daily.  No side effects with medication.    Lab Results   Component Value Date/Time    CHOLSTRLTOT 144 03/08/2021 0716    TRIGLYCERIDE 61 03/08/2021 0716    HDL 63 03/08/2021 0716    LDL 69 03/08/2021 0716        Essential Hypertension, Benign    Blood pressure today came back at 118/74.  She is taking losartan 50 mg once daily.  No side effects with medication.  Recent kidney function test in normal range.      Degenerative Disc Disease, Lumbar    Has history of chronic low back pain, following with orthopedics.  Recently had surgery.  Doing well, currently on Norco prescribed by orthopedics.  Also takes Robaxin as needed for muscle spasms.  Still has numbness in her feet     Family history of breast cancer    Family history of breast cancer in her daughter at age 35, maternal aunt  of breast cancer in her 60s.  One of her maternal aunt was diagnosed with ovarian cancer.  She reports that she never had genetic testing, she is concerned about cost for this testing.  Denies any personal history of cancer.  Her last mammogram was in 2021.  Breasts are heterogeneously dense, which may obscure small masses. No gross evidence of malignancy and no interval change..  Screening mammogram in one year is recommended.        Aortic calcification (HCC)    Noted incidentally on abdominal CT scan.  Currently she is on atorvastatin 10 mg once daily.    Lab Results   Component Value Date/Time    CHOLSTRLTOT 144 2021 0716    TRIGLYCERIDE 61 2021 0716    HDL 63 2021 0716    LDL 69 2021 0716        Asthma in Adult Without Complication    Has a history of asthma, currently on albuterol inhaler as needed.  No symptoms.     Vitamin D Deficiency                             Review of systems:     Review of Systems   Constitutional: Negative for chills, fever and malaise/fatigue.   Respiratory: Negative for cough, shortness of breath and wheezing.    Cardiovascular: Negative for chest pain, palpitations and leg swelling.   Musculoskeletal:        Sometimes get left rib pain        Medications and Allergies:     Current Outpatient Medications   Medication Sig Dispense Refill   • flecainide (TAMBOCOR) 50 MG tablet TAKE 1 TABLET BY MOUTH TWICE A  Tablet 3   • atorvastatin (LIPITOR) 10 MG Tab TAKE 1 TABLET BY MOUTH EVERY DAY 90 Tablet 3   • ELIQUIS 5 MG Tab TAKE 1 TAB BY MOUTH 2 TIMES A DAY. TAKE 1 TABLET  "BY MOUTH TWICE A  Tablet 3   • potassium chloride ER (KLOR-CON 10) 10 MEQ tablet Take 1 tablet by mouth every day. 90 tablet 3   • HYDROcodone/acetaminophen (NORCO)  MG Tab TAKE 1 TAB BY MOUTH EVERY 4 HOURS AS NEEDED FOR PAIN     • furosemide (LASIX) 20 MG Tab TAKE 1 TABLET BY MOUTH EVERY DAY 90 tablet 3   • famotidine (PEPCID) 20 MG Tab Take 1 tablet by mouth 2 times a day. 12 tablet 0   • methocarbamol (ROBAXIN) 750 MG Tab Take 1 tablet by mouth every 8 hours as needed (spasm). 90 tablet 0   • losartan (COZAAR) 50 MG Tab Take 1 tablet by mouth every day. 90 tablet 3   • albuterol 108 (90 Base) MCG/ACT Aero Soln inhalation aerosol Inhale 2 Puffs by mouth every 6 hours as needed for Shortness of Breath. 8.5 g 11   • CHOLECALCIFEROL PO Take 2 Capsules by mouth every day. (Patient not taking: Reported on 1/17/2022)       No current facility-administered medications for this visit.          Vitals:    /74 (BP Location: Left arm, Patient Position: Sitting, BP Cuff Size: Adult)   Pulse 67   Temp 36.2 °C (97.2 °F) (Temporal)   Resp 14   Ht 1.66 m (5' 5.35\")   Wt 77.3 kg (170 lb 6.7 oz)   SpO2 97%  Body mass index is 28.05 kg/m².    Physical Exam:     Physical Exam  Constitutional:       General: She is not in acute distress.  HENT:      Head: Normocephalic and atraumatic.   Eyes:      Conjunctiva/sclera: Conjunctivae normal.   Cardiovascular:      Rate and Rhythm: Normal rate and regular rhythm.      Heart sounds: Normal heart sounds. No murmur heard.  No friction rub. No gallop.    Pulmonary:      Effort: Pulmonary effort is normal. No respiratory distress.      Breath sounds: Normal breath sounds. No wheezing or rales.   Musculoskeletal:         General: No deformity.      Cervical back: Neck supple.   Neurological:      Mental Status: She is alert.      Gait: Gait is intact.   Psychiatric:         Mood and Affect: Mood and affect normal.         Judgment: Judgment normal.          Labs:  I " reviewed with patient recent labs resulted on 11/2/2021.    Assessment/Plan:    Problem List Items Addressed This Visit     Anemia     Chronic ongoing problem, recommended to start multivitamins and vitamin B12 supplementation.  Recheck CBC in 3 to 4 months.  Referral to gastroenterology for colonoscopy.         Relevant Orders    CBC WITHOUT DIFFERENTIAL    VITAMIN B12    Aortic calcification (HCC)     Chronic health problem, stable, continue same medication regimen.         Asthma in adult without complication     Chronic health problem, well-controlled, continue same medication regimen.         Degenerative disc disease, lumbar     Chronic health problem, improving, continue to follow-up with orthopedics.  Continue same medication regimen.         Essential hypertension, benign     Chronic health problem, stable, continue same medication regimen.         Family history of breast cancer     We discussed about referral to genetics, she is concerned about cost for this testing.  We discussed about [facility], given pamphlet for [facility] to do genetic testing as well as counseling if needed.  Ordered mammogram as she will be due that in February.         Family history of ovarian cancer    Hypercholesterolemia     Chronic health problem, stable, continue same medication regimen, recheck labs with next blood work.         Relevant Orders    Lipid Profile    Impaired fasting glucose     Chronic health problem, stable, continue to eat healthy diet and do aerobic exercise.         Relevant Orders    Comp Metabolic Panel    HEMOGLOBIN A1C    S/P ablation of atrial fibrillation     Chronic health problem, stable, continue to follow-up with cardiology, continue same medication regimen.         Secondary hypercoagulable state (HCC)    Vitamin D deficiency (Chronic)     Recheck vitamin D level to assess control.  Continue vitamin D supplementation at same dose.         Relevant Orders    VITAMIN D,25  HYDROXY      Other Visit Diagnoses     Encounter for screening mammogram for breast cancer        Relevant Orders    MA-SCREENING MAMMO BILAT W/TOMOSYNTHESIS W/CAD    Screening for colorectal cancer        Relevant Orders    Referral to GI for Colonoscopy    Need for vaccination        Relevant Orders    Tdap =>8yo IM (Completed)    PneumoVax PPV23 =>3yo (Completed)           Please note that this dictation was created using voice recognition software. I have made every reasonable attempt to correct obvious errors, but I expect that there are errors of grammar and possibly content that I did not discover before finalizing the note.    Follow up in 4 months for lab follow-up.

## 2022-01-17 NOTE — ASSESSMENT & PLAN NOTE
We discussed about referral to genetics, she is concerned about cost for this testing.  We discussed about [facility], given pamphlet for [facility] to do genetic testing as well as counseling if needed.  Ordered mammogram as she will be due that in February.

## 2022-01-17 NOTE — ASSESSMENT & PLAN NOTE
Chronic health problem, stable, continue same medication regimen, recheck labs with next blood work.

## 2022-04-27 DIAGNOSIS — I10 ESSENTIAL HYPERTENSION, BENIGN: ICD-10-CM

## 2022-05-02 ENCOUNTER — TELEPHONE (OUTPATIENT)
Dept: CARDIOLOGY | Facility: MEDICAL CENTER | Age: 67
End: 2022-05-02
Payer: MEDICARE

## 2022-05-02 DIAGNOSIS — I10 ESSENTIAL HYPERTENSION, BENIGN: ICD-10-CM

## 2022-05-02 RX ORDER — LOSARTAN POTASSIUM 50 MG/1
50 TABLET ORAL DAILY
Qty: 90 TABLET | Refills: 2 | Status: SHIPPED | OUTPATIENT
Start: 2022-05-02 | End: 2023-01-13 | Stop reason: SDUPTHER

## 2022-05-02 RX ORDER — LOSARTAN POTASSIUM 50 MG/1
50 TABLET ORAL DAILY
Qty: 90 TABLET | Refills: 2 | Status: SHIPPED | OUTPATIENT
Start: 2022-05-02 | End: 2022-05-02

## 2022-05-02 NOTE — TELEPHONE ENCOUNTER
Called [facility] pharmacy, spoke with Teresita. She said there was a fax they received regarding losartan script to notify pt's provider. Advised resending RX (phone in) for losartan under [facility]. Information given to Teresita and she read back order appropriately.

## 2022-05-02 NOTE — TELEPHONE ENCOUNTER
RUTH Larkin from [facility] is calling regarding script for:   losartan (COZAAR) 50 MG Tab [[id number]]    Chaya - [phone]    Thank you,   Xiomara ANDRADE

## 2022-05-09 ENCOUNTER — HOSPITAL ENCOUNTER (OUTPATIENT)
Dept: LAB | Facility: MEDICAL CENTER | Age: 67
End: 2022-05-09
Attending: FAMILY MEDICINE
Payer: COMMERCIAL

## 2022-05-09 DIAGNOSIS — E55.9 VITAMIN D DEFICIENCY: Chronic | ICD-10-CM

## 2022-05-09 DIAGNOSIS — D64.9 ANEMIA, UNSPECIFIED TYPE: ICD-10-CM

## 2022-05-09 DIAGNOSIS — E78.00 HYPERCHOLESTEROLEMIA: ICD-10-CM

## 2022-05-09 DIAGNOSIS — R73.01 IMPAIRED FASTING GLUCOSE: ICD-10-CM

## 2022-05-09 LAB
25(OH)D3 SERPL-MCNC: 50 NG/ML (ref 30–100)
ALBUMIN SERPL BCP-MCNC: 4.3 G/DL (ref 3.2–4.9)
ALBUMIN/GLOB SERPL: 1.5 G/DL
ALP SERPL-CCNC: 77 U/L (ref 30–99)
ALT SERPL-CCNC: 21 U/L (ref 2–50)
ANION GAP SERPL CALC-SCNC: 11 MMOL/L (ref 7–16)
AST SERPL-CCNC: 28 U/L (ref 12–45)
BILIRUB SERPL-MCNC: 0.5 MG/DL (ref 0.1–1.5)
BUN SERPL-MCNC: 18 MG/DL (ref 8–22)
CALCIUM SERPL-MCNC: 9.4 MG/DL (ref 8.5–10.5)
CHLORIDE SERPL-SCNC: 105 MMOL/L (ref 96–112)
CHOLEST SERPL-MCNC: 159 MG/DL (ref 100–199)
CO2 SERPL-SCNC: 26 MMOL/L (ref 20–33)
CREAT SERPL-MCNC: 0.87 MG/DL (ref 0.5–1.4)
ERYTHROCYTE [DISTWIDTH] IN BLOOD BY AUTOMATED COUNT: 46.4 FL (ref 35.9–50)
EST. AVERAGE GLUCOSE BLD GHB EST-MCNC: 120 MG/DL
FASTING STATUS PATIENT QL REPORTED: NORMAL
GFR SERPLBLD CREATININE-BSD FMLA CKD-EPI: 73 ML/MIN/1.73 M 2
GLOBULIN SER CALC-MCNC: 2.9 G/DL (ref 1.9–3.5)
GLUCOSE SERPL-MCNC: 93 MG/DL (ref 65–99)
HBA1C MFR BLD: 5.8 % (ref 4–5.6)
HCT VFR BLD AUTO: 36.8 % (ref 37–47)
HDLC SERPL-MCNC: 61 MG/DL
HGB BLD-MCNC: 11.9 G/DL (ref 12–16)
LDLC SERPL CALC-MCNC: 88 MG/DL
MCH RBC QN AUTO: 31.4 PG (ref 27–33)
MCHC RBC AUTO-ENTMCNC: 32.3 G/DL (ref 33.6–35)
MCV RBC AUTO: 97.1 FL (ref 81.4–97.8)
PLATELET # BLD AUTO: 223 K/UL (ref 164–446)
PMV BLD AUTO: 11.9 FL (ref 9–12.9)
POTASSIUM SERPL-SCNC: 4.1 MMOL/L (ref 3.6–5.5)
PROT SERPL-MCNC: 7.2 G/DL (ref 6–8.2)
RBC # BLD AUTO: 3.79 M/UL (ref 4.2–5.4)
SODIUM SERPL-SCNC: 142 MMOL/L (ref 135–145)
TRIGL SERPL-MCNC: 49 MG/DL (ref 0–149)
VIT B12 SERPL-MCNC: 473 PG/ML (ref 211–911)
WBC # BLD AUTO: 6.7 K/UL (ref 4.8–10.8)

## 2022-05-09 PROCEDURE — 85027 COMPLETE CBC AUTOMATED: CPT

## 2022-05-09 PROCEDURE — 80053 COMPREHEN METABOLIC PANEL: CPT

## 2022-05-09 PROCEDURE — 83036 HEMOGLOBIN GLYCOSYLATED A1C: CPT

## 2022-05-09 PROCEDURE — 82306 VITAMIN D 25 HYDROXY: CPT

## 2022-05-09 PROCEDURE — 80061 LIPID PANEL: CPT

## 2022-05-09 PROCEDURE — 82607 VITAMIN B-12: CPT

## 2022-05-09 PROCEDURE — 36415 COLL VENOUS BLD VENIPUNCTURE: CPT

## 2022-05-16 ENCOUNTER — OFFICE VISIT (OUTPATIENT)
Dept: MEDICAL GROUP | Facility: MEDICAL CENTER | Age: 67
End: 2022-05-16
Payer: COMMERCIAL

## 2022-05-16 ENCOUNTER — APPOINTMENT (OUTPATIENT)
Dept: MEDICAL GROUP | Facility: MEDICAL CENTER | Age: 67
End: 2022-05-16
Payer: COMMERCIAL

## 2022-05-16 VITALS
OXYGEN SATURATION: 98 % | WEIGHT: 163.14 LBS | TEMPERATURE: 97 F | HEIGHT: 66 IN | RESPIRATION RATE: 16 BRPM | HEART RATE: 66 BPM | DIASTOLIC BLOOD PRESSURE: 60 MMHG | BODY MASS INDEX: 26.22 KG/M2 | SYSTOLIC BLOOD PRESSURE: 130 MMHG

## 2022-05-16 DIAGNOSIS — Z13.820 ENCOUNTER FOR OSTEOPOROSIS SCREENING IN ASYMPTOMATIC POSTMENOPAUSAL PATIENT: ICD-10-CM

## 2022-05-16 DIAGNOSIS — Z78.0 ENCOUNTER FOR OSTEOPOROSIS SCREENING IN ASYMPTOMATIC POSTMENOPAUSAL PATIENT: ICD-10-CM

## 2022-05-16 DIAGNOSIS — L84 CALLUS OF FOOT: ICD-10-CM

## 2022-05-16 DIAGNOSIS — E55.9 VITAMIN D DEFICIENCY: Chronic | ICD-10-CM

## 2022-05-16 DIAGNOSIS — D64.9 ANEMIA, UNSPECIFIED TYPE: ICD-10-CM

## 2022-05-16 DIAGNOSIS — I27.20 PULMONARY HYPERTENSION (HCC): ICD-10-CM

## 2022-05-16 DIAGNOSIS — R73.03 PREDIABETES: ICD-10-CM

## 2022-05-16 DIAGNOSIS — L20.84 INTRINSIC ECZEMA: ICD-10-CM

## 2022-05-16 PROCEDURE — 99214 OFFICE O/P EST MOD 30 MIN: CPT | Performed by: FAMILY MEDICINE

## 2022-05-16 RX ORDER — CLOBETASOL PROPIONATE 0.5 MG/G
1 OINTMENT TOPICAL 2 TIMES DAILY PRN
Qty: 45 G | Refills: 2 | Status: SHIPPED | OUTPATIENT
Start: 2022-05-16 | End: 2023-07-17

## 2022-05-16 ASSESSMENT — ENCOUNTER SYMPTOMS
CHILLS: 0
FEVER: 0
PALPITATIONS: 0

## 2022-05-16 ASSESSMENT — FIBROSIS 4 INDEX: FIB4 SCORE: 1.84

## 2022-05-16 NOTE — ASSESSMENT & PLAN NOTE
Chronic health problem, hemoglobin numbers are improving.  Continue multivitamins, take iron 1 tablet daily.  Continue vitamin B12 supplementation.  Repeat labs in 3 months.  If numbers are not improving, will refer to gastroenterology for further evaluation for anemia as she is on blood thinner.

## 2022-05-16 NOTE — ASSESSMENT & PLAN NOTE
Chronic problem, A1c got elevated, still in range.  Recommended to eat healthy diet and exercise 1 to 2 minutes in a week.

## 2022-05-16 NOTE — ASSESSMENT & PLAN NOTE
Chronic health problem, stable, continue same medication regimen, follow-up with cardiology for further management.

## 2022-05-16 NOTE — PROGRESS NOTES
FAMILY MEDICINE VISIT                                                               Chief complaint::Diagnoses of Pulmonary hypertension (HCC), Prediabetes, Anemia, unspecified type, Vitamin D deficiency, Intrinsic eczema, Encounter for osteoporosis screening in asymptomatic postmenopausal patient, and Callus of foot were pertinent to this visit.    History of present illness: Chinyere Priest is a 66 y.o. female who presented for for lab follow-up.    Problem   Anemia    Previous history of anemia, last CBC was done in August which showed hemoglobin at 11.6.  Recently iron levels were checked.  Iron and ferritin levels came back in normal range.  Recent hemoglobin improved to 11.9.  Hematocrit also improved.  MCHC is mildly low.  Vitamin B12 levels are also improving.  She denies any blood in stools, and vomiting.  She is on Eliquis 5 mg twice daily.     Prediabetes    Recent A1c came back at 5.8 which is increased from 5.4.  She is currently not on any medication.    Lab Results   Component Value Date/Time    HBA1C 5.8 (H) 05/09/2022 08:02 AM         Intrinsic Eczema    Reports that she has eczema rash on lower extremities, uses clobetasol ointment.  Requested refill for clobetasol ointment.     Pulmonary hypertension (HCC)    Has history of documented pulmonary hypertension diagnosed in 2015.  Last echo on 06/20/2019 showed Limited echo to evaluate for pericardial effusion s/p drain removal.   Prior echo on 6/18/19. Compared to the report of the study done - there   has been no significant change.   Normal left ventricular size and systolic function.  Normal pericardium without effusion.  Pleural effusion noted.  She has history of atrial fibrillation, currently on flecainide, Eliquis.  Following with cardiology.     Vitamin D Deficiency    Recent vitamin D level came back at 50.  She is taking vitamin D supplementation daily             Review of systems:     Review of Systems   Constitutional: Negative  "for chills, fever and malaise/fatigue.   Cardiovascular: Negative for chest pain, palpitations and leg swelling.   Skin: Positive for rash.        Medications and Allergies:     Current Outpatient Medications   Medication Sig Dispense Refill   • clobetasol (TEMOVATE) 0.05 % Ointment Apply 1 Application topically 2 times a day as needed (skin rash on lower legs). 45 g 2   • losartan (COZAAR) 50 MG Tab Take 1 Tablet by mouth every day. 90 Tablet 2   • flecainide (TAMBOCOR) 50 MG tablet TAKE 1 TABLET BY MOUTH TWICE A  Tablet 3   • atorvastatin (LIPITOR) 10 MG Tab TAKE 1 TABLET BY MOUTH EVERY DAY 90 Tablet 3   • ELIQUIS 5 MG Tab TAKE 1 TAB BY MOUTH 2 TIMES A DAY. TAKE 1 TABLET BY MOUTH TWICE A  Tablet 3   • potassium chloride ER (KLOR-CON 10) 10 MEQ tablet Take 1 tablet by mouth every day. 90 tablet 3   • HYDROcodone/acetaminophen (NORCO)  MG Tab TAKE 1 TAB BY MOUTH EVERY 4 HOURS AS NEEDED FOR PAIN     • furosemide (LASIX) 20 MG Tab TAKE 1 TABLET BY MOUTH EVERY DAY 90 tablet 3   • famotidine (PEPCID) 20 MG Tab Take 1 tablet by mouth 2 times a day. 12 tablet 0   • methocarbamol (ROBAXIN) 750 MG Tab Take 1 tablet by mouth every 8 hours as needed (spasm). 90 tablet 0   • CHOLECALCIFEROL PO Take 2 Capsules by mouth every day.     • albuterol 108 (90 Base) MCG/ACT Aero Soln inhalation aerosol Inhale 2 Puffs by mouth every 6 hours as needed for Shortness of Breath. 8.5 g 11     No current facility-administered medications for this visit.          Vitals:    /60 (BP Location: Left arm, Patient Position: Sitting, BP Cuff Size: Adult)   Pulse 66   Temp 36.1 °C (97 °F)   Resp 16   Ht 1.676 m (5' 6\")   Wt 74 kg (163 lb 2.3 oz)   SpO2 98%  Body mass index is 26.33 kg/m².    Physical Exam:     Physical Exam  Constitutional:       General: She is not in acute distress.  HENT:      Head: Normocephalic and atraumatic.   Eyes:      Conjunctiva/sclera: Conjunctivae normal.   Cardiovascular:      Rate and " Rhythm: Normal rate.   Pulmonary:      Effort: Pulmonary effort is normal. No respiratory distress.   Musculoskeletal:         General: No deformity.      Cervical back: Neck supple.   Skin:     Comments: Dry skin of lower extremities, mildly erythematous diffuse rash at left lower leg.  Callus formation in front of left foot on plantar side   Neurological:      Mental Status: She is alert.      Gait: Gait is intact.   Psychiatric:         Mood and Affect: Mood and affect normal.         Judgment: Judgment normal.        Reviewed recent labs resulted on 5/9/2022.    Assessment/Plan:    Problem List Items Addressed This Visit     Vitamin D deficiency (Chronic)     Chronic health problem, stable, continue same vitamin D supplementation.  Recheck labs in 1 year.           Anemia     Chronic health problem, hemoglobin numbers are improving.  Continue multivitamins, take iron 1 tablet daily.  Continue vitamin B12 supplementation.  Repeat labs in 3 months.  If numbers are not improving, will refer to gastroenterology for further evaluation for anemia as she is on blood thinner.           Relevant Orders    CBC WITHOUT DIFFERENTIAL    IRON/TOTAL IRON BIND    FERRITIN    Intrinsic eczema     Chronic health problem, stable, prescribed clobetasol ointment.  Moisturize skin well.           Relevant Medications    clobetasol (TEMOVATE) 0.05 % Ointment    Prediabetes     Chronic problem, A1c got elevated, still in range.  Recommended to eat healthy diet and exercise 1 to 2 minutes in a week.           Pulmonary hypertension (HCC)     Chronic health problem, stable, continue same medication regimen, follow-up with cardiology for further management.             Other Visit Diagnoses     Encounter for osteoporosis screening in asymptomatic postmenopausal patient        Relevant Orders    DS-BONE DENSITY STUDY (DEXA)    Callus of foot  New health problem, she is having pain at front of her left foot, there is callus formation.  Will  refer her to podiatry for further evaluation and management.       Relevant Orders    Referral to Podiatry           Please note that this dictation was created using voice recognition software. I have made every reasonable attempt to correct obvious errors, but I expect that there are errors of grammar and possibly content that I did not discover before finalizing the note.    Follow up in 3 months for lab follow-up.

## 2022-07-04 ENCOUNTER — OFFICE VISIT (OUTPATIENT)
Dept: URGENT CARE | Facility: PHYSICIAN GROUP | Age: 67
End: 2022-07-04
Payer: COMMERCIAL

## 2022-07-04 VITALS
BODY MASS INDEX: 26.99 KG/M2 | DIASTOLIC BLOOD PRESSURE: 80 MMHG | RESPIRATION RATE: 16 BRPM | TEMPERATURE: 97.9 F | OXYGEN SATURATION: 97 % | WEIGHT: 162 LBS | HEART RATE: 98 BPM | SYSTOLIC BLOOD PRESSURE: 120 MMHG | HEIGHT: 65 IN

## 2022-07-04 DIAGNOSIS — J45.20 MILD INTERMITTENT ASTHMA IN ADULT WITHOUT COMPLICATION: ICD-10-CM

## 2022-07-04 DIAGNOSIS — B34.9 NONSPECIFIC SYNDROME SUGGESTIVE OF VIRAL ILLNESS: ICD-10-CM

## 2022-07-04 DIAGNOSIS — U07.1 COVID-19: Primary | ICD-10-CM

## 2022-07-04 DIAGNOSIS — I51.89 DECREASED RIGHT VENTRICULAR SYSTOLIC FUNCTION: ICD-10-CM

## 2022-07-04 LAB
EXTERNAL QUALITY CONTROL: ABNORMAL
SARS-COV+SARS-COV-2 AG RESP QL IA.RAPID: POSITIVE

## 2022-07-04 PROCEDURE — 99213 OFFICE O/P EST LOW 20 MIN: CPT | Mod: CS | Performed by: PHYSICIAN ASSISTANT

## 2022-07-04 PROCEDURE — 87426 SARSCOV CORONAVIRUS AG IA: CPT | Performed by: PHYSICIAN ASSISTANT

## 2022-07-04 RX ORDER — DOXYCYCLINE HYCLATE 100 MG
100 TABLET ORAL 2 TIMES DAILY
Qty: 14 TABLET | Refills: 0 | Status: SHIPPED | OUTPATIENT
Start: 2022-07-04 | End: 2022-07-11

## 2022-07-04 ASSESSMENT — ENCOUNTER SYMPTOMS
NAUSEA: 0
COUGH: 1
CHILLS: 0
ABDOMINAL PAIN: 0
SHORTNESS OF BREATH: 0
VOMITING: 0
DIARRHEA: 0
FEVER: 1
CONSTIPATION: 0
EYE PAIN: 0
MYALGIAS: 0
HEADACHES: 0
SORE THROAT: 1

## 2022-07-04 ASSESSMENT — FIBROSIS 4 INDEX: FIB4 SCORE: 1.84

## 2022-07-18 ENCOUNTER — HOSPITAL ENCOUNTER (OUTPATIENT)
Dept: RADIOLOGY | Facility: MEDICAL CENTER | Age: 67
End: 2022-07-18
Attending: PHYSICIAN ASSISTANT
Payer: COMMERCIAL

## 2022-07-18 DIAGNOSIS — M41.84 OTHER FORMS OF SCOLIOSIS, THORACIC REGION: ICD-10-CM

## 2022-07-18 PROCEDURE — 71250 CT THORAX DX C-: CPT | Mod: MH

## 2022-07-20 ENCOUNTER — OFFICE VISIT (OUTPATIENT)
Dept: CARDIOLOGY | Facility: MEDICAL CENTER | Age: 67
End: 2022-07-20
Payer: COMMERCIAL

## 2022-07-20 VITALS
HEART RATE: 63 BPM | WEIGHT: 165 LBS | BODY MASS INDEX: 26.52 KG/M2 | SYSTOLIC BLOOD PRESSURE: 126 MMHG | OXYGEN SATURATION: 100 % | RESPIRATION RATE: 14 BRPM | HEIGHT: 66 IN | DIASTOLIC BLOOD PRESSURE: 74 MMHG

## 2022-07-20 DIAGNOSIS — E78.00 HYPERCHOLESTEROLEMIA: ICD-10-CM

## 2022-07-20 DIAGNOSIS — Z98.890 S/P CARDIAC CATHETERIZATION: ICD-10-CM

## 2022-07-20 DIAGNOSIS — Z98.890 S/P ABLATION OF ATRIAL FIBRILLATION: ICD-10-CM

## 2022-07-20 DIAGNOSIS — Z79.01 ANTICOAGULATED: ICD-10-CM

## 2022-07-20 DIAGNOSIS — I10 ESSENTIAL HYPERTENSION, BENIGN: ICD-10-CM

## 2022-07-20 DIAGNOSIS — Z86.79 S/P ABLATION OF ATRIAL FIBRILLATION: ICD-10-CM

## 2022-07-20 DIAGNOSIS — I48.0 PAF (PAROXYSMAL ATRIAL FIBRILLATION) (HCC): ICD-10-CM

## 2022-07-20 LAB — EKG IMPRESSION: NORMAL

## 2022-07-20 PROCEDURE — 93000 ELECTROCARDIOGRAM COMPLETE: CPT | Performed by: INTERNAL MEDICINE

## 2022-07-20 PROCEDURE — 99214 OFFICE O/P EST MOD 30 MIN: CPT | Performed by: INTERNAL MEDICINE

## 2022-07-20 ASSESSMENT — FIBROSIS 4 INDEX: FIB4 SCORE: 1.84

## 2022-07-20 NOTE — PROGRESS NOTES
Chief Complaint   Patient presents with   • Atrial Fibrillation     F/V Dx: PAF (paroxysmal atrial fibrillation) ([facility])       Subjective     Chinyere Priest is a 66 y.o. female who presents today status post ablation for atrial fibrillation no recurrence on flecainide.  Previously persistent atrial fibrillation.  Feels well no chest pain or shortness of breath.  Recent COVID.    Past Medical History:   Diagnosis Date   • Aortic calcification (HCC)      Noted incidentally on abdominal CT scan.   • Arrhythmia     hx of afibb, ablation x 2    • Asthma in adult         • Chronic anticoagulation    • Chronic back pain     • CKD (chronic kidney disease) stage 3, GFR 30-59 ml/min ([facility]) 9/14/2020   • Dental disorder     Full upper/lower dentures.   • Diverticulosis     • Inflamed seborrheic keratosis     • Intrinsic eczema 1/20/2020   • Non-rheumatic mitral regurgitation 05/2019    Echocardiogram with normal LV size, LVEF 65%. Moderate MR, mild-moderate TR, unchanged from previous.   • Osteoarthritis of both hips     • Plantar fascia syndrome     • Plantar wart of right foot     • Pruritic dermatitis      Ongoing for several months but worse at night on her lower extremities   • Pulmonary hypertension ([facility])    • Pure hypercholesterolemia     • S/P cardiac catheterization 5/26/2017    1.  Normal right heart pressures. 3.  Essentially normal coronary arteries. 2.  Left ventricular ejection fraction 65%.    • Spider veins of limb     • Unspecified vitamin D deficiency       Past Surgical History:   Procedure Laterality Date   • LUMBAR FUSION O-ARM N/A 4/29/2021    Procedure: FUSION, SPINE, LUMBAR, WITH O-ARM IMAGING GUIDANCE - STAGE #2 L3-5 UNILATERAL, LEFT-SIDED.laminotomy lumbar 3-5;  Surgeon: Chad Clay M.D.;  Location: SURGERY [facility];  Service: Neurosurgery   • FUSION, SPINE, XLIF Right 4/27/2021    Procedure: FUSION, SPINE, XLIF - STAGE #1 L3-5, RIGHT-SIDED APPROACH.;  Surgeon: Chad Clay M.D.;   Location: SURGERY [facility];  Service: Neurosurgery   • OTHER CARDIAC SURGERY  2019    cardiac ablation x 2 , for Afibb   • LUMBAR LAMINECTOMY DISKECTOMY  2009    Performed by SUDHIR VERMA at SURGERY [facility]   • GYN SURGERY       x2   • HYSTERECTOMY LAPAROSCOPY       Family History   Problem Relation Age of Onset   • Heart Disease Mother         1st MI @ 61 yo   • Hypertension Mother    • Heart Attack Mother 60   • Heart Disease Maternal Aunt    • Breast Cancer Maternal Aunt    • Cancer Daughter 35        breast   • Breast Cancer Daughter    • Heart Disease Maternal Grandmother    • Heart Disease Maternal Grandfather    • Heart Disease Maternal Aunt 56   • Ovarian Cancer Other      Social History     Socioeconomic History   • Marital status:      Spouse name: Not on file   • Number of children: Not on file   • Years of education: Not on file   • Highest education level: Not on file   Occupational History   • Not on file   Tobacco Use   • Smoking status: Former Smoker     Packs/day: 0.50     Years: 40.00     Pack years: 20.00     Types: Cigarettes     Quit date: 3/1/2010     Years since quittin.3   • Smokeless tobacco: Never Used   Vaping Use   • Vaping Use: Never used   Substance and Sexual Activity   • Alcohol use: No     Alcohol/week: 0.0 oz   • Drug use: No     Comment: tried marijuana once.    • Sexual activity: Yes     Partners: Male     Birth control/protection: Post-Menopausal     Comment: , works at [employer](part time)   Other Topics Concern   •  Service Not Asked   • Blood Transfusions Not Asked   • Caffeine Concern Not Asked   • Occupational Exposure Not Asked   • Hobby Hazards Not Asked   • Sleep Concern Not Asked   • Stress Concern Not Asked   • Weight Concern Not Asked   • Special Diet Not Asked   • Back Care Not Asked   • Exercise No   • Bike Helmet Not Asked   • Seat Belt Not Asked   • Self-Exams Not Asked   Social History Narrative    Works at [employer]  "    Social Determinants of Health     Financial Resource Strain: Not on file   Food Insecurity: Not on file   Transportation Needs: Not on file   Physical Activity: Not on file   Stress: Not on file   Social Connections: Not on file   Intimate Partner Violence: Not on file   Housing Stability: Not on file     Allergies   Allergen Reactions   • Erythromycin      Abdominal pain   • Latex      rash   • Tape Hives     Cloth tape  Paper tape okay   • Tikosyn [Dofetilide]      diarrhea     Outpatient Encounter Medications as of 7/20/2022   Medication Sig Dispense Refill   • clobetasol (TEMOVATE) 0.05 % Ointment Apply 1 Application topically 2 times a day as needed (skin rash on lower legs). 45 g 2   • losartan (COZAAR) 50 MG Tab Take 1 Tablet by mouth every day. 90 Tablet 2   • flecainide (TAMBOCOR) 50 MG tablet TAKE 1 TABLET BY MOUTH TWICE A  Tablet 3   • atorvastatin (LIPITOR) 10 MG Tab TAKE 1 TABLET BY MOUTH EVERY DAY 90 Tablet 3   • ELIQUIS 5 MG Tab TAKE 1 TAB BY MOUTH 2 TIMES A DAY. TAKE 1 TABLET BY MOUTH TWICE A  Tablet 3   • potassium chloride ER (KLOR-CON 10) 10 MEQ tablet Take 1 tablet by mouth every day. 90 tablet 3   • HYDROcodone/acetaminophen (NORCO)  MG Tab      • furosemide (LASIX) 20 MG Tab TAKE 1 TABLET BY MOUTH EVERY DAY 90 tablet 3   • famotidine (PEPCID) 20 MG Tab Take 1 tablet by mouth 2 times a day. 12 tablet 0   • methocarbamol (ROBAXIN) 750 MG Tab Take 1 tablet by mouth every 8 hours as needed (spasm). 90 tablet 0   • CHOLECALCIFEROL PO Take 2 Capsules by mouth every day.     • albuterol 108 (90 Base) MCG/ACT Aero Soln inhalation aerosol Inhale 2 Puffs by mouth every 6 hours as needed for Shortness of Breath. 8.5 g 11     No facility-administered encounter medications on file as of 7/20/2022.     ROS           Objective     /74 (BP Location: Left arm, Patient Position: Sitting, BP Cuff Size: Adult)   Pulse 63   Resp 14   Ht 1.676 m (5' 6\")   Wt 74.8 kg (165 lb)   SpO2 " 100%   BMI 26.63 kg/m²     Physical Exam  Constitutional:       Appearance: She is well-developed.   HENT:      Head: Normocephalic and atraumatic.   Eyes:      Pupils: Pupils are equal, round, and reactive to light.   Cardiovascular:      Rate and Rhythm: Normal rate and regular rhythm.      Heart sounds: Normal heart sounds. No murmur heard.    No friction rub. No gallop.   Pulmonary:      Effort: Pulmonary effort is normal.      Breath sounds: Normal breath sounds.   Abdominal:      General: Bowel sounds are normal.      Palpations: Abdomen is soft.   Musculoskeletal:         General: Normal range of motion.      Cervical back: Normal range of motion and neck supple.   Skin:     General: Skin is warm.   Neurological:      Mental Status: She is alert and oriented to person, place, and time.      Cranial Nerves: No cranial nerve deficit.   Psychiatric:         Behavior: Behavior normal.         Thought Content: Thought content normal.         Judgment: Judgment normal.                Assessment & Plan     1. PAF (paroxysmal atrial fibrillation) ([facility])  EKG   2. S/P cardiac catheterization     3. S/P ablation of atrial fibrillation     4. Essential hypertension, benign     5. Hypercholesterolemia     6. Anticoagulated         Medical Decision Making: Today's Assessment/Status/Plan:   1.  Atrial fibrillation continue flecainide.  2.  Hypertension continue current regimen.  3.  Hyperlipidemia on statins.  4.  Anticoagulation continue Eliquis.  5.  Follow-up with me in 6 months

## 2022-08-07 DIAGNOSIS — I48.0 PAF (PAROXYSMAL ATRIAL FIBRILLATION) (HCC): ICD-10-CM

## 2022-08-07 DIAGNOSIS — I10 ESSENTIAL HYPERTENSION, BENIGN: ICD-10-CM

## 2022-08-08 RX ORDER — FUROSEMIDE 20 MG/1
20 TABLET ORAL DAILY
Qty: 90 TABLET | Refills: 3 | Status: SHIPPED | OUTPATIENT
Start: 2022-08-08 | End: 2023-01-13 | Stop reason: SDUPTHER

## 2022-08-08 NOTE — TELEPHONE ENCOUNTER
Is the patient due for a refill? Yes    Was the patient seen the past year? Yes    Date of last office visit: 7/20/22    Does the patient have an upcoming appointment?  No    Provider to refill:DS    Does the patients insurance require a 100 day supply?  No

## 2022-08-09 ENCOUNTER — HOSPITAL ENCOUNTER (OUTPATIENT)
Dept: LAB | Facility: MEDICAL CENTER | Age: 67
End: 2022-08-09
Attending: FAMILY MEDICINE
Payer: COMMERCIAL

## 2022-08-09 DIAGNOSIS — D64.9 ANEMIA, UNSPECIFIED TYPE: ICD-10-CM

## 2022-08-09 LAB
ERYTHROCYTE [DISTWIDTH] IN BLOOD BY AUTOMATED COUNT: 49.7 FL (ref 35.9–50)
FERRITIN SERPL-MCNC: 201 NG/ML (ref 10–291)
HCT VFR BLD AUTO: 36.2 % (ref 37–47)
HGB BLD-MCNC: 11.6 G/DL (ref 12–16)
IRON SATN MFR SERPL: 26 % (ref 15–55)
IRON SERPL-MCNC: 73 UG/DL (ref 40–170)
MCH RBC QN AUTO: 31.3 PG (ref 27–33)
MCHC RBC AUTO-ENTMCNC: 32 G/DL (ref 33.6–35)
MCV RBC AUTO: 97.6 FL (ref 81.4–97.8)
PLATELET # BLD AUTO: 206 K/UL (ref 164–446)
PMV BLD AUTO: 11.9 FL (ref 9–12.9)
RBC # BLD AUTO: 3.71 M/UL (ref 4.2–5.4)
TIBC SERPL-MCNC: 281 UG/DL (ref 250–450)
UIBC SERPL-MCNC: 208 UG/DL (ref 110–370)
WBC # BLD AUTO: 7 K/UL (ref 4.8–10.8)

## 2022-08-09 PROCEDURE — 36415 COLL VENOUS BLD VENIPUNCTURE: CPT

## 2022-08-09 PROCEDURE — 82728 ASSAY OF FERRITIN: CPT

## 2022-08-09 PROCEDURE — 83550 IRON BINDING TEST: CPT

## 2022-08-09 PROCEDURE — 85027 COMPLETE CBC AUTOMATED: CPT

## 2022-08-09 PROCEDURE — 83540 ASSAY OF IRON: CPT

## 2022-08-17 ENCOUNTER — TELEPHONE (OUTPATIENT)
Dept: CARDIOLOGY | Facility: MEDICAL CENTER | Age: 67
End: 2022-08-17

## 2022-08-17 ENCOUNTER — OFFICE VISIT (OUTPATIENT)
Dept: MEDICAL GROUP | Facility: MEDICAL CENTER | Age: 67
End: 2022-08-17
Payer: COMMERCIAL

## 2022-08-17 VITALS
HEART RATE: 65 BPM | WEIGHT: 163.14 LBS | DIASTOLIC BLOOD PRESSURE: 64 MMHG | TEMPERATURE: 97.6 F | BODY MASS INDEX: 27.18 KG/M2 | RESPIRATION RATE: 16 BRPM | SYSTOLIC BLOOD PRESSURE: 122 MMHG | OXYGEN SATURATION: 95 % | HEIGHT: 65 IN

## 2022-08-17 DIAGNOSIS — Z79.899 ON POTASSIUM WASTING DIURETIC THERAPY: ICD-10-CM

## 2022-08-17 DIAGNOSIS — R73.03 PREDIABETES: ICD-10-CM

## 2022-08-17 DIAGNOSIS — E78.00 HYPERCHOLESTEROLEMIA: ICD-10-CM

## 2022-08-17 DIAGNOSIS — D64.9 ANEMIA, UNSPECIFIED TYPE: ICD-10-CM

## 2022-08-17 DIAGNOSIS — I10 ESSENTIAL HYPERTENSION, BENIGN: ICD-10-CM

## 2022-08-17 DIAGNOSIS — E55.9 VITAMIN D DEFICIENCY: ICD-10-CM

## 2022-08-17 DIAGNOSIS — Z23 NEED FOR VACCINATION: ICD-10-CM

## 2022-08-17 PROCEDURE — 90471 IMMUNIZATION ADMIN: CPT | Performed by: FAMILY MEDICINE

## 2022-08-17 PROCEDURE — 99214 OFFICE O/P EST MOD 30 MIN: CPT | Mod: 25 | Performed by: FAMILY MEDICINE

## 2022-08-17 PROCEDURE — 90746 HEPB VACCINE 3 DOSE ADULT IM: CPT | Performed by: FAMILY MEDICINE

## 2022-08-17 RX ORDER — POTASSIUM CHLORIDE 750 MG/1
10 TABLET, FILM COATED, EXTENDED RELEASE ORAL DAILY
Qty: 90 TABLET | Refills: 3 | Status: SHIPPED | OUTPATIENT
Start: 2022-08-17 | End: 2023-01-13 | Stop reason: SDUPTHER

## 2022-08-17 ASSESSMENT — ENCOUNTER SYMPTOMS
CHILLS: 0
PALPITATIONS: 0
FEVER: 0
CONSTIPATION: 1

## 2022-08-17 ASSESSMENT — FIBROSIS 4 INDEX: FIB4 SCORE: 2.02

## 2022-08-17 NOTE — ASSESSMENT & PLAN NOTE
Chronic health problem, hemoglobin has been at lower end and decreased from previous numbers.  She denies any blood in stools.  I referred her to gastroenterology at previous visit.  Recommended to follow-up with gastroenterology for colonoscopy for evaluation for this low hemoglobin as she is on blood thinner.  Continue iron supplementation.  She is taking over-the-counter stool softener as iron is causing her constipation.  Recommended to continue stool softener also.

## 2022-08-17 NOTE — PATIENT INSTRUCTIONS
[facility] - [address] [address]  Phone: [phone]   Fax: [phone]

## 2022-08-17 NOTE — TELEPHONE ENCOUNTER
DS        Caller: Chinyere Priest    Medication Name and Dosage: potassium chloride ER (KLOR-CON 10) 10 MEQ tablet    Did patient contact pharmacy (If no, please call pharmacy first): YES  Medication amount left: 0  Preferred Pharmacy: [facility] in [place]  Other questions (Topic): n/a  Callback Number (Will only call for issues): [phone] (home)         Thank you    -Vladimir MULLER

## 2022-08-17 NOTE — ASSESSMENT & PLAN NOTE
Chronic health problem, stable, continue same medication regimen.  Recheck labs with next blood work.

## 2022-08-17 NOTE — ASSESSMENT & PLAN NOTE
Chronic health problem, recheck vitamin D level with next blood work.  Continue vitamin D supplementation.

## 2022-08-17 NOTE — PROGRESS NOTES
FAMILY MEDICINE VISIT                                                               Chief complaint::Diagnoses of Anemia, unspecified type, Vitamin D deficiency, Essential hypertension, benign, Hypercholesterolemia, Prediabetes, and Need for vaccination were pertinent to this visit.    History of present illness: Chinyere Priest is a 67 y.o. female who presented for lab follow-up.      Problem   Anemia    Previous history of anemia, last CBC was done in August which showed hemoglobin at 11.6 which improved to 11.9 on later blood work..  Recently iron levels were checked.  Iron and ferritin levels came back in normal range.  Recent hemoglobin came back at 11.6..  Hematocrit also improved.  MCHC is mildly low.  Vitamin B12 levels are also improving.  She denies any blood in stools, and vomiting.  She is on Eliquis 5 mg twice daily.     Prediabetes    Recent A1c came back at 5.8 which is increased from 5.4.  She is currently not on any medication.    Lab Results   Component Value Date/Time    HBA1C 5.8 (H) 05/09/2022 08:02 AM         Hypercholesterolemia    Last lipid panel came back in normal range..  She is currently on Lipitor 10 mg daily.  No side effects with medication.    Lab Results   Component Value Date/Time    CHOLSTRLTOT 159 05/09/2022 0802    TRIGLYCERIDE 49 05/09/2022 0802    HDL 61 05/09/2022 0802    LDL 88 05/09/2022 0802        Essential Hypertension, Benign    Blood pressure today came back at 122/64.  She is taking losartan 50 mg once daily.  No side effects with medication.  Recent kidney function test in normal range.     Vitamin D Deficiency    Recent vitamin D level came back at 50.  She is taking vitamin D supplementation daily            Review of systems:     Review of Systems   Constitutional:  Negative for chills, fever and malaise/fatigue.   Cardiovascular:  Negative for chest pain, palpitations and leg swelling.   Gastrointestinal:  Positive for constipation.      Medications and  "Allergies:     Current Outpatient Medications   Medication Sig Dispense Refill    furosemide (LASIX) 20 MG Tab TAKE 1 TABLET BY MOUTH EVERY DAY 90 Tablet 3    clobetasol (TEMOVATE) 0.05 % Ointment Apply 1 Application topically 2 times a day as needed (skin rash on lower legs). 45 g 2    losartan (COZAAR) 50 MG Tab Take 1 Tablet by mouth every day. 90 Tablet 2    flecainide (TAMBOCOR) 50 MG tablet TAKE 1 TABLET BY MOUTH TWICE A  Tablet 3    atorvastatin (LIPITOR) 10 MG Tab TAKE 1 TABLET BY MOUTH EVERY DAY 90 Tablet 3    ELIQUIS 5 MG Tab TAKE 1 TAB BY MOUTH 2 TIMES A DAY. TAKE 1 TABLET BY MOUTH TWICE A  Tablet 3    potassium chloride ER (KLOR-CON 10) 10 MEQ tablet Take 1 tablet by mouth every day. 90 tablet 3    famotidine (PEPCID) 20 MG Tab Take 1 tablet by mouth 2 times a day. 12 tablet 0    methocarbamol (ROBAXIN) 750 MG Tab Take 1 tablet by mouth every 8 hours as needed (spasm). 90 tablet 0    CHOLECALCIFEROL PO Take 2 Capsules by mouth every day.      albuterol 108 (90 Base) MCG/ACT Aero Soln inhalation aerosol Inhale 2 Puffs by mouth every 6 hours as needed for Shortness of Breath. 8.5 g 11     No current facility-administered medications for this visit.          Vitals:    /64 (BP Location: Left arm, Patient Position: Sitting, BP Cuff Size: Adult)   Pulse 65   Temp 36.4 °C (97.6 °F)   Resp 16   Ht 1.651 m (5' 5\")   Wt 74 kg (163 lb 2.3 oz)   SpO2 95%  Body mass index is 27.15 kg/m².    Physical Exam:     Physical Exam  Constitutional:       General: She is not in acute distress.  HENT:      Head: Normocephalic and atraumatic.      Right Ear: External ear normal.      Left Ear: External ear normal.   Eyes:      Conjunctiva/sclera: Conjunctivae normal.   Cardiovascular:      Rate and Rhythm: Normal rate.   Pulmonary:      Effort: Pulmonary effort is normal. No respiratory distress.   Musculoskeletal:         General: No deformity.      Cervical back: Neck supple.   Skin:     Findings: No " rash.   Neurological:      Mental Status: She is alert.      Gait: Gait is intact.   Psychiatric:         Mood and Affect: Mood and affect normal.         Judgment: Judgment normal.        Labs:  I reviewed with patient recent labs resulted on 8/9/2022.    Assessment/Plan:    Problem List Items Addressed This Visit       Vitamin D deficiency (Chronic)     Chronic health problem, recheck vitamin D level with next blood work.  Continue vitamin D supplementation.         Relevant Orders    VITAMIN D,25 HYDROXY    Anemia     Chronic health problem, hemoglobin has been at lower end and decreased from previous numbers.  She denies any blood in stools.  I referred her to gastroenterology at previous visit.  Recommended to follow-up with gastroenterology for colonoscopy for evaluation for this low hemoglobin as she is on blood thinner.  Continue iron supplementation.  She is taking over-the-counter stool softener as iron is causing her constipation.  Recommended to continue stool softener also.         Relevant Orders    CBC WITHOUT DIFFERENTIAL    IRON/TOTAL IRON BIND    FERRITIN    Essential hypertension, benign     Chronic health problem, stable, continue same medication regimen.         Relevant Orders    Comp Metabolic Panel    Hypercholesterolemia     Chronic health problem, stable, continue same medication regimen.  Recheck labs with next blood work.         Relevant Orders    Lipid Profile    Prediabetes     Chronic health problem, eat healthy diet and exercise.  Recheck labs with next blood work.         Relevant Orders    HEMOGLOBIN A1C     Other Visit Diagnoses       Need for vaccination      First dose of hepatitis B vaccine given today.  Second dose in 1 to 2-month, third dose in 6-month.    Relevant Orders    Hep B Adult 20+           She is scheduled for mammogram and bone density scan.  Please note that this dictation was created using voice recognition software. I have made every reasonable attempt to  correct obvious errors, but I expect that there are errors of grammar and possibly content that I did not discover before finalizing the note.    Follow up in 6 months for lab follow-up

## 2022-09-06 ENCOUNTER — HOSPITAL ENCOUNTER (OUTPATIENT)
Dept: RADIOLOGY | Facility: MEDICAL CENTER | Age: 67
End: 2022-09-06
Attending: FAMILY MEDICINE
Payer: MEDICARE

## 2022-09-06 DIAGNOSIS — Z12.31 ENCOUNTER FOR SCREENING MAMMOGRAM FOR BREAST CANCER: ICD-10-CM

## 2022-09-06 DIAGNOSIS — Z13.820 ENCOUNTER FOR OSTEOPOROSIS SCREENING IN ASYMPTOMATIC POSTMENOPAUSAL PATIENT: ICD-10-CM

## 2022-09-06 DIAGNOSIS — Z78.0 ENCOUNTER FOR OSTEOPOROSIS SCREENING IN ASYMPTOMATIC POSTMENOPAUSAL PATIENT: ICD-10-CM

## 2022-09-06 PROCEDURE — 77080 DXA BONE DENSITY AXIAL: CPT

## 2022-09-06 PROCEDURE — 77063 BREAST TOMOSYNTHESIS BI: CPT

## 2022-10-15 DIAGNOSIS — I48.19 PERSISTENT ATRIAL FIBRILLATION (HCC): ICD-10-CM

## 2022-10-17 NOTE — TELEPHONE ENCOUNTER
Is the patient due for a refill? Yes    Was the patient seen the past year? Yes    Date of last office visit: 7/20/2022    Does the patient have an upcoming appointment?  No  Provider to refill:DS    Does the patients insurance require a 100 day supply?  No

## 2022-10-18 RX ORDER — APIXABAN 5 MG/1
TABLET, FILM COATED ORAL
Qty: 180 TABLET | Refills: 2 | Status: SHIPPED | OUTPATIENT
Start: 2022-10-18 | End: 2023-07-18

## 2022-11-28 ENCOUNTER — NON-PROVIDER VISIT (OUTPATIENT)
Dept: MEDICAL GROUP | Facility: MEDICAL CENTER | Age: 67
End: 2022-11-28
Payer: MEDICARE

## 2022-11-28 DIAGNOSIS — Z23 NEED FOR VACCINATION: ICD-10-CM

## 2022-11-28 PROCEDURE — 90746 HEPB VACCINE 3 DOSE ADULT IM: CPT | Performed by: FAMILY MEDICINE

## 2022-11-28 PROCEDURE — G0010 ADMIN HEPATITIS B VACCINE: HCPCS | Performed by: FAMILY MEDICINE

## 2022-12-06 ENCOUNTER — TELEPHONE (OUTPATIENT)
Dept: CARDIOLOGY | Facility: MEDICAL CENTER | Age: 67
End: 2022-12-06
Payer: MEDICARE

## 2022-12-06 NOTE — TELEPHONE ENCOUNTER
To DS - ok to proceed? Ok to hold eliquis 2days prior?    Received clearance request from [facility] for colonoscopy. Requesting to hold eliquis.     Last office visit - 7/20/2022    Past Medical History:   : Aortic calcification (HCC)      Comment:  Noted incidentally on abdominal CT scan.  No date: Arrhythmia      Comment:  hx of afibb, ablation x 2   No date: Asthma in adult      Comment:     No date: Chronic anticoagulation   : Chronic back pain  9/14/2020: CKD (chronic kidney disease) stage 3, GFR 30-59 ml/min   ([facility])  No date: Dental disorder      Comment:  Full upper/lower dentures.   : Diverticulosis   : Inflamed seborrheic keratosis  1/20/2020: Intrinsic eczema  05/2019: Non-rheumatic mitral regurgitation      Comment:  Echocardiogram with normal LV size, LVEF 65%. Moderate                MR, mild-moderate TR, unchanged from previous.   : Osteoarthritis of both hips   : Plantar fascia syndrome   : Plantar wart of right foot   : Pruritic dermatitis      Comment:  Ongoing for several months but worse at night on her                lower extremities  No date: Pulmonary hypertension ([facility])   : Pure hypercholesterolemia  5/26/2017: S/P cardiac catheterization      Comment:  1.  Normal right heart pressures. 3.  Essentially normal               coronary arteries. 2.  Left ventricular ejection fraction               65%.    : Spider veins of limb   : Unspecified vitamin D deficiency     Return clearance to:   [phone]  Fax:

## 2022-12-09 ENCOUNTER — HOSPITAL ENCOUNTER (OUTPATIENT)
Dept: LAB | Facility: MEDICAL CENTER | Age: 67
End: 2022-12-09
Payer: MEDICARE

## 2022-12-09 LAB
BASOPHILS # BLD AUTO: 0.9 % (ref 0–1.8)
BASOPHILS # BLD: 0.05 K/UL (ref 0–0.12)
EOSINOPHIL # BLD AUTO: 0.22 K/UL (ref 0–0.51)
EOSINOPHIL NFR BLD: 4 % (ref 0–6.9)
ERYTHROCYTE [DISTWIDTH] IN BLOOD BY AUTOMATED COUNT: 45.7 FL (ref 35.9–50)
FERRITIN SERPL-MCNC: 214 NG/ML (ref 10–291)
HCT VFR BLD AUTO: 37.9 % (ref 37–47)
HGB BLD-MCNC: 12.3 G/DL (ref 12–16)
IMM GRANULOCYTES # BLD AUTO: 0.02 K/UL (ref 0–0.11)
IMM GRANULOCYTES NFR BLD AUTO: 0.4 % (ref 0–0.9)
IRON SATN MFR SERPL: 27 % (ref 15–55)
IRON SERPL-MCNC: 84 UG/DL (ref 40–170)
LYMPHOCYTES # BLD AUTO: 1.29 K/UL (ref 1–4.8)
LYMPHOCYTES NFR BLD: 23.2 % (ref 22–41)
MCH RBC QN AUTO: 31.4 PG (ref 27–33)
MCHC RBC AUTO-ENTMCNC: 32.5 G/DL (ref 33.6–35)
MCV RBC AUTO: 96.7 FL (ref 81.4–97.8)
MONOCYTES # BLD AUTO: 0.54 K/UL (ref 0–0.85)
MONOCYTES NFR BLD AUTO: 9.7 % (ref 0–13.4)
NEUTROPHILS # BLD AUTO: 3.44 K/UL (ref 2–7.15)
NEUTROPHILS NFR BLD: 61.8 % (ref 44–72)
NRBC # BLD AUTO: 0 K/UL
NRBC BLD-RTO: 0 /100 WBC
PLATELET # BLD AUTO: 184 K/UL (ref 164–446)
PMV BLD AUTO: 12.1 FL (ref 9–12.9)
RBC # BLD AUTO: 3.92 M/UL (ref 4.2–5.4)
TIBC SERPL-MCNC: 313 UG/DL (ref 250–450)
UIBC SERPL-MCNC: 229 UG/DL (ref 110–370)
WBC # BLD AUTO: 5.6 K/UL (ref 4.8–10.8)

## 2022-12-09 PROCEDURE — 83550 IRON BINDING TEST: CPT

## 2022-12-09 PROCEDURE — 85025 COMPLETE CBC W/AUTO DIFF WBC: CPT

## 2022-12-09 PROCEDURE — 36415 COLL VENOUS BLD VENIPUNCTURE: CPT

## 2022-12-09 PROCEDURE — 83540 ASSAY OF IRON: CPT

## 2022-12-09 PROCEDURE — 82728 ASSAY OF FERRITIN: CPT

## 2022-12-14 DIAGNOSIS — E78.00 HYPERCHOLESTEROLEMIA: ICD-10-CM

## 2022-12-14 DIAGNOSIS — I70.0 AORTIC CALCIFICATION (HCC): ICD-10-CM

## 2022-12-14 NOTE — TELEPHONE ENCOUNTER
Is the patient due for a refill? Yes    Was the patient seen the past year? Yes    Date of last office visit: 7/20/22    Does the patient have an upcoming appointment?  Yes   If yes, When? 1/13/23    Provider to refill:DS    Does the patients insurance require a 100 day supply?  No

## 2022-12-15 RX ORDER — ATORVASTATIN CALCIUM 10 MG/1
TABLET, FILM COATED ORAL
Qty: 90 TABLET | Refills: 2 | Status: SHIPPED | OUTPATIENT
Start: 2022-12-15 | End: 2023-07-18

## 2023-01-13 ENCOUNTER — OFFICE VISIT (OUTPATIENT)
Dept: CARDIOLOGY | Facility: MEDICAL CENTER | Age: 68
End: 2023-01-13
Payer: MEDICARE

## 2023-01-13 VITALS
RESPIRATION RATE: 16 BRPM | HEIGHT: 65 IN | SYSTOLIC BLOOD PRESSURE: 142 MMHG | HEART RATE: 67 BPM | WEIGHT: 163 LBS | OXYGEN SATURATION: 96 % | DIASTOLIC BLOOD PRESSURE: 64 MMHG | BODY MASS INDEX: 27.16 KG/M2

## 2023-01-13 DIAGNOSIS — Z98.890 S/P CARDIAC CATHETERIZATION: ICD-10-CM

## 2023-01-13 DIAGNOSIS — R06.2 WHEEZING: ICD-10-CM

## 2023-01-13 DIAGNOSIS — I27.20 PULMONARY HYPERTENSION (HCC): ICD-10-CM

## 2023-01-13 DIAGNOSIS — I10 ESSENTIAL HYPERTENSION, BENIGN: ICD-10-CM

## 2023-01-13 DIAGNOSIS — Z86.79 S/P ABLATION OF ATRIAL FIBRILLATION: ICD-10-CM

## 2023-01-13 DIAGNOSIS — Z79.899 ON POTASSIUM WASTING DIURETIC THERAPY: ICD-10-CM

## 2023-01-13 DIAGNOSIS — Z79.01 ANTICOAGULATED: ICD-10-CM

## 2023-01-13 DIAGNOSIS — J22 LOWER RESP. TRACT INFECTION: ICD-10-CM

## 2023-01-13 DIAGNOSIS — Z98.890 S/P ABLATION OF ATRIAL FIBRILLATION: ICD-10-CM

## 2023-01-13 DIAGNOSIS — I48.19 PERSISTENT ATRIAL FIBRILLATION (HCC): ICD-10-CM

## 2023-01-13 DIAGNOSIS — I48.0 PAF (PAROXYSMAL ATRIAL FIBRILLATION) (HCC): ICD-10-CM

## 2023-01-13 LAB — EKG IMPRESSION: NORMAL

## 2023-01-13 PROCEDURE — 99214 OFFICE O/P EST MOD 30 MIN: CPT | Performed by: INTERNAL MEDICINE

## 2023-01-13 PROCEDURE — 93000 ELECTROCARDIOGRAM COMPLETE: CPT | Performed by: INTERNAL MEDICINE

## 2023-01-13 RX ORDER — ALBUTEROL SULFATE 90 UG/1
2 AEROSOL, METERED RESPIRATORY (INHALATION) EVERY 6 HOURS PRN
Qty: 8.5 G | Refills: 11 | Status: SHIPPED | OUTPATIENT
Start: 2023-01-13

## 2023-01-13 RX ORDER — FLECAINIDE ACETATE 50 MG/1
TABLET ORAL
Qty: 180 TABLET | Refills: 3 | Status: ON HOLD | OUTPATIENT
Start: 2023-01-13 | End: 2023-10-27

## 2023-01-13 RX ORDER — FUROSEMIDE 20 MG/1
20 TABLET ORAL DAILY
Qty: 90 TABLET | Refills: 3 | Status: SHIPPED | OUTPATIENT
Start: 2023-01-13 | End: 2023-11-28 | Stop reason: SDUPTHER

## 2023-01-13 RX ORDER — POTASSIUM CHLORIDE 750 MG/1
10 TABLET, FILM COATED, EXTENDED RELEASE ORAL DAILY
Qty: 90 TABLET | Refills: 3 | Status: SHIPPED | OUTPATIENT
Start: 2023-01-13 | End: 2023-11-28 | Stop reason: SDUPTHER

## 2023-01-13 RX ORDER — LOSARTAN POTASSIUM 50 MG/1
50 TABLET ORAL DAILY
Qty: 90 TABLET | Refills: 3 | Status: SHIPPED | OUTPATIENT
Start: 2023-01-13 | End: 2023-12-15 | Stop reason: SDUPTHER

## 2023-01-13 ASSESSMENT — FIBROSIS 4 INDEX: FIB4 SCORE: 2.26

## 2023-01-13 NOTE — PROGRESS NOTES
Chief Complaint   Patient presents with    Atrial Fibrillation     PAF (paroxysmal atrial fibrillation) ([facility])       Subjective     Chinyere Priest is a 67 y.o. female who presents today with 2 ablations for atrial fibrillation.  No recurrence.  Colonoscopy scheduled.  Occasional skipped heartbeats.  Occasional unsteady gait but not too troublesome.  On anticoagulation.  On low-dose flecainide.    Past Medical History:   Diagnosis Date    Aortic calcification (HCC)      Noted incidentally on abdominal CT scan.    Arrhythmia     hx of afibb, ablation x 2     Asthma in adult          Chronic anticoagulation     Chronic back pain      CKD (chronic kidney disease) stage 3, GFR 30-59 ml/min ([facility]) 9/14/2020    Dental disorder     Full upper/lower dentures.    Diverticulosis      Inflamed seborrheic keratosis      Intrinsic eczema 1/20/2020    Non-rheumatic mitral regurgitation 05/2019    Echocardiogram with normal LV size, LVEF 65%. Moderate MR, mild-moderate TR, unchanged from previous.    Osteoarthritis of both hips      Plantar fascia syndrome      Plantar wart of right foot      Pruritic dermatitis      Ongoing for several months but worse at night on her lower extremities    Pulmonary hypertension ([facility])     Pure hypercholesterolemia      S/P cardiac catheterization 5/26/2017    1.  Normal right heart pressures. 3.  Essentially normal coronary arteries. 2.  Left ventricular ejection fraction 65%.     Spider veins of limb      Unspecified vitamin D deficiency       Past Surgical History:   Procedure Laterality Date    LUMBAR FUSION O-ARM N/A 4/29/2021    Procedure: FUSION, SPINE, LUMBAR, WITH O-ARM IMAGING GUIDANCE - STAGE #2 L3-5 UNILATERAL, LEFT-SIDED.laminotomy lumbar 3-5;  Surgeon: Chad Clay M.D.;  Location: SURGERY [facility];  Service: Neurosurgery    FUSION, SPINE, XLIF Right 4/27/2021    Procedure: FUSION, SPINE, XLIF - STAGE #1 L3-5, RIGHT-SIDED APPROACH.;  Surgeon: Chad Clay M.D.;   Location: SURGERY [facility];  Service: Neurosurgery    OTHER CARDIAC SURGERY  2019    cardiac ablation x 2 , for Afibb    LUMBAR LAMINECTOMY DISKECTOMY  2009    Performed by SUDHIR VERMA at SURGERY [facility] ORS    GYN SURGERY       x2    HYSTERECTOMY LAPAROSCOPY       Family History   Problem Relation Age of Onset    Heart Disease Mother         1st MI @ 61 yo    Hypertension Mother     Heart Attack Mother 60    Heart Disease Maternal Aunt     Breast Cancer Maternal Aunt     Cancer Daughter 35        breast    Breast Cancer Daughter     Heart Disease Maternal Grandmother     Heart Disease Maternal Grandfather     Heart Disease Maternal Aunt 56    Ovarian Cancer Other      Social History     Socioeconomic History    Marital status:      Spouse name: Not on file    Number of children: Not on file    Years of education: Not on file    Highest education level: Not on file   Occupational History    Not on file   Tobacco Use    Smoking status: Former     Packs/day: 0.50     Years: 40.00     Pack years: 20.00     Types: Cigarettes     Quit date: 3/1/2010     Years since quittin.8    Smokeless tobacco: Never   Vaping Use    Vaping Use: Never used   Substance and Sexual Activity    Alcohol use: No     Alcohol/week: 0.0 oz    Drug use: No     Comment: tried marijuana once.     Sexual activity: Yes     Partners: Male     Birth control/protection: Post-Menopausal     Comment: , works at [employer](part time)   Other Topics Concern     Service Not Asked    Blood Transfusions Not Asked    Caffeine Concern Not Asked    Occupational Exposure Not Asked    Hobby Hazards Not Asked    Sleep Concern Not Asked    Stress Concern Not Asked    Weight Concern Not Asked    Special Diet Not Asked    Back Care Not Asked    Exercise No    Bike Helmet Not Asked    Seat Belt Not Asked    Self-Exams Not Asked   Social History Narrative    Works at [employer]     Social Determinants of Health     Financial Resource  Strain: Not on file   Food Insecurity: Not on file   Transportation Needs: Not on file   Physical Activity: Not on file   Stress: Not on file   Social Connections: Not on file   Intimate Partner Violence: Not on file   Housing Stability: Not on file     Allergies   Allergen Reactions    Erythromycin      Abdominal pain    Latex      rash    Tape Hives     Cloth tape  Paper tape taryn    Tikosyn [Dofetilide]      diarrhea     Outpatient Encounter Medications as of 1/13/2023   Medication Sig Dispense Refill    losartan (COZAAR) 50 MG Tab Take 1 Tablet by mouth every day. 90 Tablet 3    furosemide (LASIX) 20 MG Tab Take 1 Tablet by mouth every day. 90 Tablet 3    flecainide (TAMBOCOR) 50 MG tablet TAKE 1 TABLET BY MOUTH TWICE A  Tablet 3    albuterol 108 (90 Base) MCG/ACT Aero Soln inhalation aerosol Inhale 2 Puffs every 6 hours as needed for Shortness of Breath. 8.5 g 11    potassium chloride ER (KLOR-CON 10) 10 MEQ tablet Take 1 Tablet by mouth every day. 90 Tablet 3    atorvastatin (LIPITOR) 10 MG Tab TAKE 1 TABLET BY MOUTH EVERY DAY 90 Tablet 2    ELIQUIS 5 MG Tab TAKE 1 TABLET BY MOUTH TWICE A  Tablet 2    clobetasol (TEMOVATE) 0.05 % Ointment Apply 1 Application topically 2 times a day as needed (skin rash on lower legs). 45 g 2    famotidine (PEPCID) 20 MG Tab Take 1 tablet by mouth 2 times a day. 12 tablet 0    CHOLECALCIFEROL PO Take 2 Capsules by mouth every day.      [DISCONTINUED] potassium chloride ER (KLOR-CON 10) 10 MEQ tablet Take 1 Tablet by mouth every day. 90 Tablet 3    [DISCONTINUED] furosemide (LASIX) 20 MG Tab TAKE 1 TABLET BY MOUTH EVERY DAY 90 Tablet 3    [DISCONTINUED] losartan (COZAAR) 50 MG Tab Take 1 Tablet by mouth every day. 90 Tablet 2    [DISCONTINUED] flecainide (TAMBOCOR) 50 MG tablet TAKE 1 TABLET BY MOUTH TWICE A  Tablet 3    [DISCONTINUED] methocarbamol (ROBAXIN) 750 MG Tab Take 1 tablet by mouth every 8 hours as needed (spasm). (Patient not taking: Reported on  "1/13/2023) 90 tablet 0    [DISCONTINUED] albuterol 108 (90 Base) MCG/ACT Aero Soln inhalation aerosol Inhale 2 Puffs by mouth every 6 hours as needed for Shortness of Breath. 8.5 g 11     No facility-administered encounter medications on file as of 1/13/2023.     ROS           Objective     BP (!) 142/64 (BP Location: Left arm, Patient Position: Sitting, BP Cuff Size: Adult)   Pulse 67   Resp 16   Ht 1.651 m (5' 5\")   Wt 73.9 kg (163 lb)   SpO2 96%   BMI 27.12 kg/m²     Physical Exam  Constitutional:       Appearance: She is well-developed.   HENT:      Head: Normocephalic and atraumatic.   Eyes:      Pupils: Pupils are equal, round, and reactive to light.   Cardiovascular:      Rate and Rhythm: Normal rate and regular rhythm.      Heart sounds: Normal heart sounds. No murmur heard.    No friction rub. No gallop.   Pulmonary:      Effort: Pulmonary effort is normal.      Breath sounds: Normal breath sounds.   Abdominal:      General: Bowel sounds are normal.      Palpations: Abdomen is soft.   Musculoskeletal:         General: Normal range of motion.      Cervical back: Normal range of motion and neck supple.   Skin:     General: Skin is warm.   Neurological:      Mental Status: She is alert and oriented to person, place, and time.      Cranial Nerves: No cranial nerve deficit.   Psychiatric:         Behavior: Behavior normal.         Thought Content: Thought content normal.         Judgment: Judgment normal.              Assessment & Plan     1. Persistent atrial fibrillation (HCC)  EKG    flecainide (TAMBOCOR) 50 MG tablet      2. S/P ablation of atrial fibrillation  flecainide (TAMBOCOR) 50 MG tablet      3. S/P cardiac catheterization        4. Pulmonary hypertension (HCC)        5. Essential hypertension, benign  losartan (COZAAR) 50 MG Tab    furosemide (LASIX) 20 MG Tab      6. Anticoagulated        7. PAF (paroxysmal atrial fibrillation) (HCC)  furosemide (LASIX) 20 MG Tab      8. Lower resp. tract " infection  albuterol 108 (90 Base) MCG/ACT Aero Soln inhalation aerosol      9. Wheezing  albuterol 108 (90 Base) MCG/ACT Aero Soln inhalation aerosol      10. On potassium wasting diuretic therapy  potassium chloride ER (KLOR-CON 10) 10 MEQ tablet          Medical Decision Making: Today's Assessment/Status/Plan:   1.  Atrial fibrillation status post ablation no clinical recurrence.  2.  Continue flecainide.  3.  Continue NOAC.  4.  Hypertension well-controlled continue regimen.  5.  Follow-up in 6 months.

## 2023-02-07 ENCOUNTER — HOSPITAL ENCOUNTER (OUTPATIENT)
Dept: LAB | Facility: MEDICAL CENTER | Age: 68
End: 2023-02-07
Attending: FAMILY MEDICINE
Payer: MEDICARE

## 2023-02-07 DIAGNOSIS — E55.9 VITAMIN D DEFICIENCY: ICD-10-CM

## 2023-02-07 DIAGNOSIS — I10 ESSENTIAL HYPERTENSION, BENIGN: ICD-10-CM

## 2023-02-07 DIAGNOSIS — E78.00 HYPERCHOLESTEROLEMIA: ICD-10-CM

## 2023-02-07 DIAGNOSIS — D64.9 ANEMIA, UNSPECIFIED TYPE: ICD-10-CM

## 2023-02-07 DIAGNOSIS — R73.03 PREDIABETES: ICD-10-CM

## 2023-02-07 LAB
ERYTHROCYTE [DISTWIDTH] IN BLOOD BY AUTOMATED COUNT: 45.7 FL (ref 35.9–50)
EST. AVERAGE GLUCOSE BLD GHB EST-MCNC: 120 MG/DL
HBA1C MFR BLD: 5.8 % (ref 4–5.6)
HCT VFR BLD AUTO: 39.1 % (ref 37–47)
HGB BLD-MCNC: 12.9 G/DL (ref 12–16)
MCH RBC QN AUTO: 31.4 PG (ref 27–33)
MCHC RBC AUTO-ENTMCNC: 33 G/DL (ref 33.6–35)
MCV RBC AUTO: 95.1 FL (ref 81.4–97.8)
PLATELET # BLD AUTO: 188 K/UL (ref 164–446)
PMV BLD AUTO: 12 FL (ref 9–12.9)
RBC # BLD AUTO: 4.11 M/UL (ref 4.2–5.4)
WBC # BLD AUTO: 5.8 K/UL (ref 4.8–10.8)

## 2023-02-07 PROCEDURE — 82728 ASSAY OF FERRITIN: CPT

## 2023-02-07 PROCEDURE — 82306 VITAMIN D 25 HYDROXY: CPT

## 2023-02-07 PROCEDURE — 83550 IRON BINDING TEST: CPT

## 2023-02-07 PROCEDURE — 80061 LIPID PANEL: CPT

## 2023-02-07 PROCEDURE — 83036 HEMOGLOBIN GLYCOSYLATED A1C: CPT | Mod: GA

## 2023-02-07 PROCEDURE — 85027 COMPLETE CBC AUTOMATED: CPT

## 2023-02-07 PROCEDURE — 83540 ASSAY OF IRON: CPT

## 2023-02-07 PROCEDURE — 80053 COMPREHEN METABOLIC PANEL: CPT

## 2023-02-07 PROCEDURE — 36415 COLL VENOUS BLD VENIPUNCTURE: CPT

## 2023-02-08 LAB
25(OH)D3 SERPL-MCNC: 56 NG/ML (ref 30–100)
ALBUMIN SERPL BCP-MCNC: 4.6 G/DL (ref 3.2–4.9)
ALBUMIN/GLOB SERPL: 1.5 G/DL
ALP SERPL-CCNC: 74 U/L (ref 30–99)
ALT SERPL-CCNC: 18 U/L (ref 2–50)
ANION GAP SERPL CALC-SCNC: 10 MMOL/L (ref 7–16)
AST SERPL-CCNC: 29 U/L (ref 12–45)
BILIRUB SERPL-MCNC: 0.5 MG/DL (ref 0.1–1.5)
BUN SERPL-MCNC: 21 MG/DL (ref 8–22)
CALCIUM ALBUM COR SERPL-MCNC: 9 MG/DL (ref 8.5–10.5)
CALCIUM SERPL-MCNC: 9.5 MG/DL (ref 8.5–10.5)
CHLORIDE SERPL-SCNC: 103 MMOL/L (ref 96–112)
CHOLEST SERPL-MCNC: 178 MG/DL (ref 100–199)
CO2 SERPL-SCNC: 28 MMOL/L (ref 20–33)
CREAT SERPL-MCNC: 0.94 MG/DL (ref 0.5–1.4)
FASTING STATUS PATIENT QL REPORTED: NORMAL
FERRITIN SERPL-MCNC: 258 NG/ML (ref 10–291)
GFR SERPLBLD CREATININE-BSD FMLA CKD-EPI: 66 ML/MIN/1.73 M 2
GLOBULIN SER CALC-MCNC: 3.1 G/DL (ref 1.9–3.5)
GLUCOSE SERPL-MCNC: 86 MG/DL (ref 65–99)
HDLC SERPL-MCNC: 69 MG/DL
IRON SATN MFR SERPL: 33 % (ref 15–55)
IRON SERPL-MCNC: 108 UG/DL (ref 40–170)
LDLC SERPL CALC-MCNC: 97 MG/DL
POTASSIUM SERPL-SCNC: 4 MMOL/L (ref 3.6–5.5)
PROT SERPL-MCNC: 7.7 G/DL (ref 6–8.2)
SODIUM SERPL-SCNC: 141 MMOL/L (ref 135–145)
TIBC SERPL-MCNC: 330 UG/DL (ref 250–450)
TRIGL SERPL-MCNC: 59 MG/DL (ref 0–149)
UIBC SERPL-MCNC: 222 UG/DL (ref 110–370)

## 2023-02-16 ENCOUNTER — OFFICE VISIT (OUTPATIENT)
Dept: MEDICAL GROUP | Facility: MEDICAL CENTER | Age: 68
End: 2023-02-16
Payer: MEDICARE

## 2023-02-16 VITALS
SYSTOLIC BLOOD PRESSURE: 130 MMHG | TEMPERATURE: 96.9 F | HEIGHT: 65 IN | BODY MASS INDEX: 25.71 KG/M2 | OXYGEN SATURATION: 98 % | RESPIRATION RATE: 17 BRPM | WEIGHT: 154.32 LBS | DIASTOLIC BLOOD PRESSURE: 70 MMHG | HEART RATE: 66 BPM

## 2023-02-16 DIAGNOSIS — I10 ESSENTIAL HYPERTENSION, BENIGN: ICD-10-CM

## 2023-02-16 DIAGNOSIS — R73.03 PREDIABETES: ICD-10-CM

## 2023-02-16 DIAGNOSIS — D64.9 ANEMIA, UNSPECIFIED TYPE: ICD-10-CM

## 2023-02-16 DIAGNOSIS — E55.9 VITAMIN D DEFICIENCY: Chronic | ICD-10-CM

## 2023-02-16 DIAGNOSIS — E78.00 HYPERCHOLESTEROLEMIA: ICD-10-CM

## 2023-02-16 PROCEDURE — 99214 OFFICE O/P EST MOD 30 MIN: CPT | Performed by: FAMILY MEDICINE

## 2023-02-16 ASSESSMENT — ENCOUNTER SYMPTOMS
CHILLS: 0
WHEEZING: 0
COUGH: 0
SHORTNESS OF BREATH: 0
PALPITATIONS: 0
FEVER: 0

## 2023-02-16 ASSESSMENT — PATIENT HEALTH QUESTIONNAIRE - PHQ9: CLINICAL INTERPRETATION OF PHQ2 SCORE: 0

## 2023-02-16 ASSESSMENT — FIBROSIS 4 INDEX: FIB4 SCORE: 2.47

## 2023-02-17 ENCOUNTER — APPOINTMENT (OUTPATIENT)
Dept: MEDICAL GROUP | Facility: MEDICAL CENTER | Age: 68
End: 2023-02-17
Payer: MEDICARE

## 2023-02-17 NOTE — PROGRESS NOTES
FAMILY MEDICINE VISIT                                                               Chief complaint::Diagnoses of Hypercholesterolemia, Essential hypertension, benign, Vitamin D deficiency, Prediabetes, and Anemia, unspecified type were pertinent to this visit.    History of present illness: Chinyere Priest is a 67 y.o. female who presented for lab follow-up.    Problem   Anemia    Her recent labs showed significant improvement in hemoglobin and came back normal.  Iron levels also came back normal.  She is taking multivitamins.     Prediabetes    Recent A1c came back at 5.8 which remains same as previous numbers.  She is trying to eat healthy diet and is physically active.  She is currently not on any medication.    Lab Results   Component Value Date/Time    HBA1C 5.8 (H) 02/07/2023 07:16 AM         Hypercholesterolemia    Last lipid panel came back in normal range..  She is currently on Lipitor 10 mg daily.  No side effects with medication.    Lab Results   Component Value Date/Time    CHOLSTRLTOT 178 02/07/2023 0716    TRIGLYCERIDE 59 02/07/2023 0716    HDL 69 02/07/2023 0716    LDL 97 02/07/2023 0716        Essential Hypertension, Benign    Blood pressure today came back at 130/70..  She is taking losartan 50 mg once daily.  No side effects with medication.  Recent kidney function test in normal range.     Vitamin D Deficiency    Recent vitamin D level came back at 56..  She is taking vitamin D supplementation daily          Review of systems:     Review of Systems   Constitutional:  Negative for chills and fever.   Respiratory:  Negative for cough, shortness of breath and wheezing.    Cardiovascular:  Negative for chest pain, palpitations and leg swelling.      Medications and Allergies:     Current Outpatient Medications   Medication Sig Dispense Refill    losartan (COZAAR) 50 MG Tab Take 1 Tablet by mouth every day. 90 Tablet 3    furosemide (LASIX) 20 MG Tab Take 1 Tablet by mouth every day. 90  "Tablet 3    flecainide (TAMBOCOR) 50 MG tablet TAKE 1 TABLET BY MOUTH TWICE A  Tablet 3    albuterol 108 (90 Base) MCG/ACT Aero Soln inhalation aerosol Inhale 2 Puffs every 6 hours as needed for Shortness of Breath. 8.5 g 11    potassium chloride ER (KLOR-CON 10) 10 MEQ tablet Take 1 Tablet by mouth every day. 90 Tablet 3    atorvastatin (LIPITOR) 10 MG Tab TAKE 1 TABLET BY MOUTH EVERY DAY 90 Tablet 2    ELIQUIS 5 MG Tab TAKE 1 TABLET BY MOUTH TWICE A  Tablet 2    clobetasol (TEMOVATE) 0.05 % Ointment Apply 1 Application topically 2 times a day as needed (skin rash on lower legs). 45 g 2    famotidine (PEPCID) 20 MG Tab Take 1 tablet by mouth 2 times a day. 12 tablet 0    CHOLECALCIFEROL PO Take 2 Capsules by mouth every day.       No current facility-administered medications for this visit.          Vitals:    /70   Pulse 66   Temp 36.1 °C (96.9 °F)   Resp 17   Ht 1.651 m (5' 5\")   Wt 70 kg (154 lb 5.2 oz)   SpO2 98%  Body mass index is 25.68 kg/m².    Physical Exam:     Physical Exam  Constitutional:       Appearance: Normal appearance. She is well-developed and well-groomed.   HENT:      Head: Normocephalic and atraumatic.      Right Ear: External ear normal.      Left Ear: External ear normal.   Eyes:      General:         Right eye: No discharge.         Left eye: No discharge.      Conjunctiva/sclera: Conjunctivae normal.   Cardiovascular:      Rate and Rhythm: Normal rate.   Pulmonary:      Effort: Pulmonary effort is normal. No respiratory distress.   Musculoskeletal:      Cervical back: Neck supple.   Skin:     Findings: No rash.   Neurological:      Mental Status: She is alert.   Psychiatric:         Mood and Affect: Mood and affect normal.         Behavior: Behavior normal.        Labs:  I reviewed with patient recent labs resulted on 2/7/2023.    Assessment/Plan:         Problem List Items Addressed This Visit       Vitamin D deficiency (Chronic)     Chronic problem, stable, " continue same vitamin D supplementation.         Relevant Orders    VITAMIN D,25 HYDROXY (DEFICIENCY)    Prediabetes     Chronic problem, stable, continue to eat healthy diet and exercise.  Recheck labs in 6 months.         Relevant Orders    Comp Metabolic Panel    HEMOGLOBIN A1C    Hypercholesterolemia     Chronic problem, stable, continue same medication regimen.  Recheck labs in 6-month         Relevant Orders    Lipid Profile    Essential hypertension, benign     Chronic problem, stable, continue losartan 50 mg daily.         Anemia     Chronic problem, stable, continue multivitamins.  Recheck labs in 6 months.         Relevant Orders    CBC WITHOUT DIFFERENTIAL        Please note that this dictation was created using voice recognition software. I have made every reasonable attempt to correct obvious errors, but I expect that there are errors of grammar and possibly content that I did not discover before finalizing the note.    Follow up in 6 months for lab follow up.

## 2023-03-02 ENCOUNTER — NON-PROVIDER VISIT (OUTPATIENT)
Dept: MEDICAL GROUP | Facility: MEDICAL CENTER | Age: 68
End: 2023-03-02
Payer: MEDICARE

## 2023-03-02 DIAGNOSIS — Z23 NEED FOR VACCINATION: ICD-10-CM

## 2023-03-02 PROCEDURE — G0010 ADMIN HEPATITIS B VACCINE: HCPCS | Performed by: FAMILY MEDICINE

## 2023-03-02 PROCEDURE — 90746 HEPB VACCINE 3 DOSE ADULT IM: CPT | Performed by: FAMILY MEDICINE

## 2023-03-02 NOTE — PROGRESS NOTES
"Chinyere Priest is a 67 y.o. female here for a non-provider visit for:   HEPATITIS B 3 of 3    Reason for immunization: started in office  Immunization records indicate need for vaccine: Yes, confirmed with Epic  Minimum interval has been met for this vaccine: Yes  ABN completed: Yes    VIS Dated  2021 was given to patient: Yes  All IAC Questionnaire questions were answered \"No.\"    Patient tolerated injection and no adverse effects were observed or reported: Yes    Pt scheduled for next dose in series: Not Indicated  "
547

## 2023-05-12 ENCOUNTER — APPOINTMENT (OUTPATIENT)
Dept: MEDICAL GROUP | Facility: MEDICAL CENTER | Age: 68
End: 2023-05-12
Payer: MEDICARE

## 2023-06-23 NOTE — PROGRESS NOTES
"Chief Complaint   Patient presents with   • Follow-Up   • Atrial Fibrillation   • Anticoagulation   • HTN (Controlled)   • Hyperlipidemia       Subjective:   Chinyere Priest is a 64 y.o. female who presents today for follow-up of medication change evaluation.    Chinyere is a 64 year old female with history of atrial fibrillation, status post ablation in June 2019 and again in December 2019 by Dr. Steele, on Flecainide and Coreg, anticoagulation, hypertension, hyperlipidemia, and moderate MR. She just saw Dr. Steele last month, and Aldactone was cut from 100mg to 25mg once daily.    She is here today for follow-up. LE edema seems to be stable, although still present to some degree.  No chest pain, pressure or discomfort; no symptomatic palpitations. Some mild shortness of breath, but no orthopnea or PND; no dizziness, lightheadedness or syncope. BP has been stable. She is cutting down work to 4 days a week; she had a lot of anxiety with the Covid pandemic, but it is better now.    Past Medical History:   Diagnosis Date   • Abnormal CT scan, kidney      \"Incidentally noted retroaortic left renal vein is identified. Some calcified plaque is noted in the aorta and its branch vessels.\"  CT scan done for diverticulosis     • Aortic calcification (HCC)      Noted incidentally on abdominal CT scan.   • Asthma in adult         • Atrial fibrillation (HCC) 06/2019    Status post ablation by Dr. Steele.   • Chronic anticoagulation    • Chronic back pain     • Dental disorder     Full upper/lower dentures.   • Diverticulosis     • Essential hypertension, malignant     • Inflamed seborrheic keratosis     • Intrinsic eczema 1/20/2020   • Non-rheumatic mitral regurgitation 05/2019    Echocardiogram with normal LV size, LVEF 65%. Moderate MR, mild-moderate TR, unchanged from previous.   • Osteoarthritis of both hips     • Plantar fascia syndrome     • Plantar wart of right foot     • Pruritic dermatitis      Ongoing for " several months but worse at night on her lower extremities   • Pulmonary hypertension (HCC)    • Pure hypercholesterolemia     • S/P cardiac catheterization 2017    1.  Normal right heart pressures. 3.  Essentially normal coronary arteries. 2.  Left ventricular ejection fraction 65%.    • Spider veins of limb     • Unspecified vitamin D deficiency       Past Surgical History:   Procedure Laterality Date   • LUMBAR LAMINECTOMY DISKECTOMY  2009    Performed by SUDHIR VERMA at [facility]   • GYN SURGERY       x2   • HYSTERECTOMY LAPAROSCOPY       Family History   Problem Relation Age of Onset   • Heart Disease Mother         1st MI @ 61 yo   • Hypertension Mother    • Heart Attack Mother 60   • Heart Disease Maternal Aunt    • Cancer Daughter 35        breast   • Heart Disease Maternal Grandmother    • Heart Disease Maternal Grandfather    • Heart Disease Maternal Aunt 56     Social History     Socioeconomic History   • Marital status:      Spouse name: Not on file   • Number of children: Not on file   • Years of education: Not on file   • Highest education level: Not on file   Occupational History   • Not on file   Social Needs   • Financial resource strain: Not on file   • Food insecurity     Worry: Never true     Inability: Never true   • Transportation needs     Medical: No     Non-medical: No   Tobacco Use   • Smoking status: Former Smoker     Packs/day: 1.00     Years: 40.00     Pack years: 40.00     Types: Cigarettes     Last attempt to quit: 3/1/2010     Years since quitting: 10.3   • Smokeless tobacco: Never Used   Substance and Sexual Activity   • Alcohol use: No     Alcohol/week: 0.0 oz   • Drug use: No     Comment: tried marijuana once.    • Sexual activity: Yes     Partners: Male     Birth control/protection: Post-Menopausal     Comment: , works at [employer]   • Physical activity     Days per week: Not on file     Minutes per session: Not on file   •  Stress: Not on file   Relationships   • Social connections     Talks on phone: Not on file     Gets together: Not on file     Attends Temple service: Not on file     Active member of club or organization: Not on file     Attends meetings of clubs or organizations: Not on file     Relationship status: Not on file   • Intimate partner violence     Fear of current or ex partner: Not on file     Emotionally abused: Not on file     Physically abused: Not on file     Forced sexual activity: Not on file   Other Topics Concern   •  Service Not Asked   • Blood Transfusions Not Asked   • Caffeine Concern Not Asked   • Occupational Exposure Not Asked   • Hobby Hazards Not Asked   • Sleep Concern Not Asked   • Stress Concern Not Asked   • Weight Concern Not Asked   • Special Diet Not Asked   • Back Care Not Asked   • Exercise No   • Bike Helmet Not Asked   • Seat Belt Not Asked   • Self-Exams Not Asked   Social History Narrative    Works at [employer]     Allergies   Allergen Reactions   • Erythromycin      Abdominal pain   • Latex    • Tape      Cloth tape  Paper tape okay   • Tikosyn [Dofetilide]      Outpatient Encounter Medications as of 7/2/2020   Medication Sig Dispense Refill   • spironolactone (ALDACTONE) 25 MG Tab Take 1 Tab by mouth every day. 90 Tab 3   • flecainide (TAMBOCOR) 100 MG Tab TAKE 1 TABLET BY MOUTH TWICE A  Tab 2   • losartan (COZAAR) 50 MG Tab Take 1 Tab by mouth every day. 90 Tab 3   • albuterol 108 (90 Base) MCG/ACT Aero Soln inhalation aerosol Inhale 2 Puffs by mouth every 6 hours as needed for Shortness of Breath. 8.5 g 11   • clobetasol (TEMOVATE) 0.05 % Ointment Apply to affected area twice daily till clear 60 g 3   • atorvastatin (LIPITOR) 10 MG Tab TAKE 1 TABLET BY MOUTH EVERY DAY 90 Tab 3   • ELIQUIS 5 MG Tab TAKE 1 TABLET BY MOUTH TWICE A  Tab 3   • carvedilol (COREG) 3.125 MG Tab Take 1 Tab by mouth 2 times a day, with meals. 180 Tab 3   • vitamin D (CHOLECALCIFEROL) 1000  "UNIT TABS Take 2 Tabs by mouth every day. (Patient taking differently: Take 4,000 Units by mouth every day.) 60 Each 11   • [DISCONTINUED] spironolactone (ALDACTONE) 100 MG Tab      • [DISCONTINUED] albuterol 108 (90 BASE) MCG/ACT Aero Soln inhalation aerosol Inhale 2 Puffs by mouth every four hours as needed. 1 Inhaler 0     No facility-administered encounter medications on file as of 7/2/2020.      Review of Systems   Constitutional: Negative for chills and fever.   HENT: Negative for congestion.    Respiratory: Negative for cough and shortness of breath.    Cardiovascular: Positive for leg swelling. Negative for chest pain, palpitations, orthopnea and PND.   Gastrointestinal: Negative for abdominal pain and nausea.   Musculoskeletal: Negative for myalgias.   Skin: Negative for rash.   Neurological: Negative for dizziness, loss of consciousness and headaches.   Endo/Heme/Allergies: Does not bruise/bleed easily.   Psychiatric/Behavioral: The patient is nervous/anxious. The patient does not have insomnia.         Objective:   /64 (BP Location: Left arm, Patient Position: Sitting, BP Cuff Size: Adult)   Pulse (!) 54   Ht 1.702 m (5' 7\")   Wt 83.9 kg (185 lb)   SpO2 95%   BMI 28.98 kg/m²     Physical Exam   Constitutional: She is oriented to person, place, and time. She appears well-developed and well-nourished.   HENT:   Head: Normocephalic.   Eyes: EOM are normal.   Neck: Normal range of motion. Neck supple. No JVD present.   Cardiovascular: Normal rate, regular rhythm and normal heart sounds.   Pulmonary/Chest: Effort normal and breath sounds normal. No respiratory distress. She has no wheezes. She has no rales.   Abdominal: Soft. Bowel sounds are normal. She exhibits no distension. There is no abdominal tenderness.   Musculoskeletal: Normal range of motion.         General: Edema present.      Comments: 1+ edema to the ankles, L>R   Neurological: She is alert and oriented to person, place, and time. "   Skin: Skin is warm and dry. No rash noted.   Psychiatric: She has a normal mood and affect.     Impressions of ablation of 12/10/2019:  1. Baseline atypical atrial flutter left-sided     2. Reisolation left upper pulmonary vein.  3. Right atrial isthmus ablation with block.    4. Ablation of mid charis atrial tachycardia  5. Multiple atrial arrhythmias.  6.  Significant scarring on voltage map within both atriums.    CONCLUSIONS OF LIMITED ECHO OF 12/12/2019:  Limited echocardiogram study for pericardial effusion.  Normal pericardium without effusion.    Lab Results   Component Value Date/Time    SODIUM 141 06/03/2020 09:13 AM    POTASSIUM 4.2 06/03/2020 09:13 AM    CHLORIDE 106 06/03/2020 09:13 AM    CO2 26 06/03/2020 09:13 AM    GLUCOSE 86 06/03/2020 09:13 AM    BUN 17 06/03/2020 09:13 AM    CREATININE 0.85 06/03/2020 09:13 AM     Lab Results   Component Value Date/Time    CHOLSTRLTOT 138 06/03/2020 09:13 AM    LDL 62 06/03/2020 09:13 AM    HDL 65 06/03/2020 09:13 AM    TRIGLYCERIDE 53 06/03/2020 09:13 AM       Lab Results   Component Value Date/Time    ASTSGOT 30 06/03/2020 09:13 AM    ALTSGPT 23 06/03/2020 09:13 AM        Assessment:     1. S/P ablation of atrial fibrillation     2. Chronic anticoagulation     3. Essential hypertension, benign     4. Hypercholesterolemia     5. Localized edema         Medical Decision Making:  Today's Assessment / Status / Plan:     1. History of atrial fibrillation/flutter, status post ablation in June and December 2019, maintaining sinus rhythm on Flecainide and Coreg.    2. Chronic anticoagulation with Eliquis, no bleeding problems.    3. Hypertension, treatetd with Losartan and Coreg, and Aldactone. BP is stable.    4. Hyperlipidemia, treated with Lipitor. Recent lipids were excellent.    5. LE edema, stable on Aldactone 25mg. Dizziness is better.    Same medications for now. Keep September 2020 FU with Dr. Steele; FU after that to be determined by him.    Collaborating MD:  Jesse   Deep Sutures: 5-0 Monocryl

## 2023-06-30 ENCOUNTER — OFFICE VISIT (OUTPATIENT)
Dept: MEDICAL GROUP | Facility: MEDICAL CENTER | Age: 68
End: 2023-06-30
Payer: MEDICARE

## 2023-06-30 VITALS
HEIGHT: 65 IN | RESPIRATION RATE: 16 BRPM | HEART RATE: 72 BPM | TEMPERATURE: 97 F | BODY MASS INDEX: 27.92 KG/M2 | DIASTOLIC BLOOD PRESSURE: 64 MMHG | SYSTOLIC BLOOD PRESSURE: 138 MMHG | OXYGEN SATURATION: 97 % | WEIGHT: 167.55 LBS

## 2023-06-30 DIAGNOSIS — J45.21 MILD INTERMITTENT ASTHMA WITH EXACERBATION: ICD-10-CM

## 2023-06-30 DIAGNOSIS — J40 BRONCHITIS: ICD-10-CM

## 2023-06-30 PROCEDURE — 3078F DIAST BP <80 MM HG: CPT | Performed by: STUDENT IN AN ORGANIZED HEALTH CARE EDUCATION/TRAINING PROGRAM

## 2023-06-30 PROCEDURE — 99214 OFFICE O/P EST MOD 30 MIN: CPT | Performed by: STUDENT IN AN ORGANIZED HEALTH CARE EDUCATION/TRAINING PROGRAM

## 2023-06-30 PROCEDURE — 3075F SYST BP GE 130 - 139MM HG: CPT | Performed by: STUDENT IN AN ORGANIZED HEALTH CARE EDUCATION/TRAINING PROGRAM

## 2023-06-30 RX ORDER — DOXYCYCLINE HYCLATE 100 MG
100 TABLET ORAL 2 TIMES DAILY
Qty: 14 TABLET | Refills: 0 | Status: SHIPPED | OUTPATIENT
Start: 2023-06-30 | End: 2023-07-07

## 2023-06-30 RX ORDER — METHYLPREDNISOLONE 4 MG/1
TABLET ORAL
Qty: 21 TABLET | Refills: 0 | Status: SHIPPED | OUTPATIENT
Start: 2023-06-30 | End: 2023-08-11

## 2023-06-30 ASSESSMENT — FIBROSIS 4 INDEX: FIB4 SCORE: 2.47

## 2023-06-30 NOTE — PROGRESS NOTES
"Subjective:     CC: Bronchitis, asthma exacerbation    HPI:   Chinyere presents today with shortness of breath and productive cough.  She states that initially about a month ago when she was in [place].  She was treated with doxycycline and prednisone and symptoms improved a little bit but have been coming back.  She states that she still has a cough that is productive.  She has been using her albuterol inhaler more than usual.      ROS:  ROS    Objective:     Exam:  /64   Pulse 72   Temp 36.1 °C (97 °F) (Temporal)   Resp 16   Ht 1.651 m (5' 5\")   Wt 76 kg (167 lb 8.8 oz)   SpO2 97%   BMI 27.88 kg/m²  Body mass index is 27.88 kg/m².    Physical Exam  Vitals reviewed.   Constitutional:       General: She is not in acute distress.     Appearance: She is not toxic-appearing.   HENT:      Head: Normocephalic and atraumatic.      Right Ear: External ear normal.      Left Ear: External ear normal.   Eyes:      General:         Right eye: No discharge.         Left eye: No discharge.      Extraocular Movements: Extraocular movements intact.      Conjunctiva/sclera: Conjunctivae normal.   Cardiovascular:      Rate and Rhythm: Normal rate and regular rhythm.      Heart sounds: Normal heart sounds. No murmur heard.     No gallop.   Pulmonary:      Effort: Pulmonary effort is normal. No respiratory distress.      Breath sounds: Wheezing (expiratory throughout) present.   Skin:     General: Skin is warm and dry.   Neurological:      Mental Status: She is alert.   Psychiatric:         Mood and Affect: Mood normal.         Behavior: Behavior normal.         Thought Content: Thought content normal.         Judgment: Judgment normal.           Assessment & Plan:     67 y.o. female with the following -     1. Mild intermittent asthma with exacerbation  This is an acute condition, vitals are stable but she does have wheezing throughout.  Start prednisone and doxycycline.  - methylPREDNISolone (MEDROL DOSEPAK) 4 MG " Tablet Therapy Pack; As directed on the packaging label.  Dispense: 21 Tablet; Refill: 0    2. Bronchitis  Doxycycline event for bronchitis.  I did advise patient to reach out if this is not clearing of her symptoms.  She is allergic to azithromycin so cannot have this.  - doxycycline (VIBRAMYCIN) 100 MG Tab; Take 1 Tablet by mouth 2 times a day for 7 days.  Dispense: 14 Tablet; Refill: 0    No follow-ups on file.    Please note that this dictation was created using voice recognition software. I have made every reasonable attempt to correct obvious errors, but I expect that there are errors of grammar and possibly content that I did not discover before finalizing the note.

## 2023-07-17 DIAGNOSIS — E78.00 HYPERCHOLESTEROLEMIA: ICD-10-CM

## 2023-07-17 DIAGNOSIS — I70.0 AORTIC CALCIFICATION (HCC): ICD-10-CM

## 2023-07-17 DIAGNOSIS — I48.19 PERSISTENT ATRIAL FIBRILLATION (HCC): ICD-10-CM

## 2023-07-17 DIAGNOSIS — L20.84 INTRINSIC ECZEMA: ICD-10-CM

## 2023-07-17 RX ORDER — CLOBETASOL PROPIONATE 0.5 MG/G
1 OINTMENT TOPICAL 2 TIMES DAILY PRN
Qty: 45 G | Refills: 2 | Status: SHIPPED | OUTPATIENT
Start: 2023-07-17

## 2023-07-18 RX ORDER — ATORVASTATIN CALCIUM 10 MG/1
TABLET, FILM COATED ORAL
Qty: 90 TABLET | Refills: 2 | Status: SHIPPED | OUTPATIENT
Start: 2023-07-18

## 2023-07-18 RX ORDER — APIXABAN 5 MG/1
TABLET, FILM COATED ORAL
Qty: 60 TABLET | Refills: 8 | Status: SHIPPED | OUTPATIENT
Start: 2023-07-18

## 2023-08-07 ENCOUNTER — HOSPITAL ENCOUNTER (OUTPATIENT)
Dept: LAB | Facility: MEDICAL CENTER | Age: 68
End: 2023-08-07
Attending: FAMILY MEDICINE
Payer: MEDICARE

## 2023-08-07 DIAGNOSIS — D64.9 ANEMIA, UNSPECIFIED TYPE: ICD-10-CM

## 2023-08-07 DIAGNOSIS — R73.03 PREDIABETES: ICD-10-CM

## 2023-08-07 DIAGNOSIS — E78.00 HYPERCHOLESTEROLEMIA: ICD-10-CM

## 2023-08-07 DIAGNOSIS — E55.9 VITAMIN D DEFICIENCY: Chronic | ICD-10-CM

## 2023-08-07 LAB
25(OH)D3 SERPL-MCNC: 62 NG/ML (ref 30–100)
ALBUMIN SERPL BCP-MCNC: 4.4 G/DL (ref 3.2–4.9)
ALBUMIN/GLOB SERPL: 1.5 G/DL
ALP SERPL-CCNC: 77 U/L (ref 30–99)
ALT SERPL-CCNC: 16 U/L (ref 2–50)
ANION GAP SERPL CALC-SCNC: 9 MMOL/L (ref 7–16)
AST SERPL-CCNC: 27 U/L (ref 12–45)
BILIRUB SERPL-MCNC: 0.5 MG/DL (ref 0.1–1.5)
BUN SERPL-MCNC: 25 MG/DL (ref 8–22)
CALCIUM ALBUM COR SERPL-MCNC: 9.4 MG/DL (ref 8.5–10.5)
CALCIUM SERPL-MCNC: 9.7 MG/DL (ref 8.5–10.5)
CHLORIDE SERPL-SCNC: 105 MMOL/L (ref 96–112)
CHOLEST SERPL-MCNC: 175 MG/DL (ref 100–199)
CO2 SERPL-SCNC: 26 MMOL/L (ref 20–33)
CREAT SERPL-MCNC: 1.09 MG/DL (ref 0.5–1.4)
ERYTHROCYTE [DISTWIDTH] IN BLOOD BY AUTOMATED COUNT: 50.4 FL (ref 35.9–50)
EST. AVERAGE GLUCOSE BLD GHB EST-MCNC: 111 MG/DL
FASTING STATUS PATIENT QL REPORTED: NORMAL
GFR SERPLBLD CREATININE-BSD FMLA CKD-EPI: 55 ML/MIN/1.73 M 2
GLOBULIN SER CALC-MCNC: 3 G/DL (ref 1.9–3.5)
GLUCOSE SERPL-MCNC: 94 MG/DL (ref 65–99)
HBA1C MFR BLD: 5.5 % (ref 4–5.6)
HCT VFR BLD AUTO: 41.5 % (ref 37–47)
HDLC SERPL-MCNC: 72 MG/DL
HGB BLD-MCNC: 13.3 G/DL (ref 12–16)
LDLC SERPL CALC-MCNC: 92 MG/DL
MCH RBC QN AUTO: 30.9 PG (ref 27–33)
MCHC RBC AUTO-ENTMCNC: 32 G/DL (ref 32.2–35.5)
MCV RBC AUTO: 96.5 FL (ref 81.4–97.8)
PLATELET # BLD AUTO: 196 K/UL (ref 164–446)
PMV BLD AUTO: 12.2 FL (ref 9–12.9)
POTASSIUM SERPL-SCNC: 5.4 MMOL/L (ref 3.6–5.5)
PROT SERPL-MCNC: 7.4 G/DL (ref 6–8.2)
RBC # BLD AUTO: 4.3 M/UL (ref 4.2–5.4)
SODIUM SERPL-SCNC: 140 MMOL/L (ref 135–145)
TRIGL SERPL-MCNC: 57 MG/DL (ref 0–149)
WBC # BLD AUTO: 7.2 K/UL (ref 4.8–10.8)

## 2023-08-07 PROCEDURE — 85027 COMPLETE CBC AUTOMATED: CPT

## 2023-08-07 PROCEDURE — 82306 VITAMIN D 25 HYDROXY: CPT

## 2023-08-07 PROCEDURE — 80061 LIPID PANEL: CPT

## 2023-08-07 PROCEDURE — 36415 COLL VENOUS BLD VENIPUNCTURE: CPT

## 2023-08-07 PROCEDURE — 83036 HEMOGLOBIN GLYCOSYLATED A1C: CPT | Mod: GA

## 2023-08-07 PROCEDURE — 80053 COMPREHEN METABOLIC PANEL: CPT

## 2023-08-11 ENCOUNTER — OFFICE VISIT (OUTPATIENT)
Dept: MEDICAL GROUP | Facility: MEDICAL CENTER | Age: 68
End: 2023-08-11
Payer: MEDICARE

## 2023-08-11 VITALS
WEIGHT: 169.75 LBS | DIASTOLIC BLOOD PRESSURE: 62 MMHG | HEIGHT: 66 IN | BODY MASS INDEX: 27.28 KG/M2 | TEMPERATURE: 98.1 F | OXYGEN SATURATION: 97 % | SYSTOLIC BLOOD PRESSURE: 124 MMHG | RESPIRATION RATE: 17 BRPM | HEART RATE: 87 BPM

## 2023-08-11 DIAGNOSIS — N18.31 STAGE 3A CHRONIC KIDNEY DISEASE: ICD-10-CM

## 2023-08-11 DIAGNOSIS — I10 ESSENTIAL HYPERTENSION, BENIGN: ICD-10-CM

## 2023-08-11 DIAGNOSIS — R10.30 LOWER ABDOMINAL PAIN: ICD-10-CM

## 2023-08-11 PROCEDURE — 99214 OFFICE O/P EST MOD 30 MIN: CPT | Performed by: FAMILY MEDICINE

## 2023-08-11 PROCEDURE — 3074F SYST BP LT 130 MM HG: CPT | Performed by: FAMILY MEDICINE

## 2023-08-11 PROCEDURE — 3078F DIAST BP <80 MM HG: CPT | Performed by: FAMILY MEDICINE

## 2023-08-11 ASSESSMENT — FIBROSIS 4 INDEX: FIB4 SCORE: 2.38

## 2023-08-11 ASSESSMENT — ENCOUNTER SYMPTOMS
VOMITING: 0
ABDOMINAL PAIN: 1
PALPITATIONS: 0
FEVER: 0
NAUSEA: 0
CHILLS: 0
CONSTIPATION: 1
BLOOD IN STOOL: 0
DIARRHEA: 0

## 2023-08-11 NOTE — ASSESSMENT & PLAN NOTE
Chronic problem, unstable, GFR reduced to 55%, recommended patient to increase water intake.  Recheck labs in 3 months

## 2023-08-11 NOTE — PROGRESS NOTES
FAMILY MEDICINE VISIT                                                               Chief complaint::Diagnoses of Essential hypertension, benign, Stage 3a chronic kidney disease (HCC), and Lower abdominal pain were pertinent to this visit.    History of present illness: Chinyere Priest is a 68 y.o. female who presented for lab follow up, abdominal pain.    Problem   Lower Abdominal Pain    She has been experiencing lower abdominal pain on right side since last few weeks.  Has history of hysterectomy in the past.  No aggravating or relieving factors.  Pain is intermittent in nature.  She has constipation sometimes.  No diarrhea, blood in stools.     Stage 3a Chronic Kidney Disease (Hcc)    Filtration rate of kidneys came back at 55% which reduced from previous numbers    Component      Latest Ref Rng 5/9/2022 2/7/2023 8/7/2023   GFR (CKD-EPI)      >60 mL/min/1.73 m 2 73  66  55 !       Legend:  ! Abnormal       Essential Hypertension, Benign    Blood pressure today came back at 124/62.  She is taking losartan 50 mg once daily.  No side effects with medication.  Takes Lasix with potassium as needed.            Review of systems:     Review of Systems   Constitutional:  Negative for chills, fever and malaise/fatigue.   Cardiovascular:  Negative for chest pain, palpitations and leg swelling.   Gastrointestinal:  Positive for abdominal pain and constipation. Negative for blood in stool, diarrhea, melena, nausea and vomiting.        Medications and Allergies:     Current Outpatient Medications   Medication Sig Dispense Refill    ELIQUIS 5 MG Tab TAKE 1 TABLET BY MOUTH TWICE A DAY 60 Tablet 8    atorvastatin (LIPITOR) 10 MG Tab TAKE 1 TABLET BY MOUTH EVERY DAY 90 Tablet 2    clobetasol (TEMOVATE) 0.05 % Ointment APPLY 1 APPLICATION TOPICALLY 2 TIMES A DAY AS NEEDED (SKIN RASH ON LOWER LEGS). 45 g 2    losartan (COZAAR) 50 MG Tab Take 1 Tablet by mouth every day. 90 Tablet 3    furosemide (LASIX) 20 MG Tab Take 1  "Tablet by mouth every day. 90 Tablet 3    flecainide (TAMBOCOR) 50 MG tablet TAKE 1 TABLET BY MOUTH TWICE A  Tablet 3    albuterol 108 (90 Base) MCG/ACT Aero Soln inhalation aerosol Inhale 2 Puffs every 6 hours as needed for Shortness of Breath. 8.5 g 11    potassium chloride ER (KLOR-CON 10) 10 MEQ tablet Take 1 Tablet by mouth every day. 90 Tablet 3    famotidine (PEPCID) 20 MG Tab Take 1 tablet by mouth 2 times a day. 12 tablet 0    CHOLECALCIFEROL PO Take 2 Capsules by mouth every day.       No current facility-administered medications for this visit.          Vitals:    /62   Pulse 87   Temp 36.7 °C (98.1 °F)   Resp 17   Ht 1.676 m (5' 6\")   Wt 77 kg (169 lb 12.1 oz)   SpO2 97%  Body mass index is 27.4 kg/m².    Physical Exam:     Physical Exam  Constitutional:       Appearance: Normal appearance. She is well-developed and well-groomed.   HENT:      Head: Normocephalic and atraumatic.      Right Ear: External ear normal.      Left Ear: External ear normal.   Eyes:      General:         Right eye: No discharge.         Left eye: No discharge.      Conjunctiva/sclera: Conjunctivae normal.   Cardiovascular:      Rate and Rhythm: Normal rate.   Pulmonary:      Effort: Pulmonary effort is normal. No respiratory distress.   Abdominal:      General: Bowel sounds are normal. There is no distension.      Palpations: Abdomen is soft.      Tenderness: There is abdominal tenderness.      Comments: Mild right-sided lower quadrant tenderness   Musculoskeletal:      Cervical back: Neck supple.   Skin:     Findings: No rash.   Neurological:      Mental Status: She is alert.   Psychiatric:         Mood and Affect: Mood and affect normal.         Behavior: Behavior normal.          Labs:  I reviewed with patient recent labs resulted on 8/7/2023    Assessment/Plan:         Problem List Items Addressed This Visit       Essential hypertension, benign     Chronic problem, stable, continue losartan 50 mg once " daily         Relevant Orders    Basic Metabolic Panel    Stage 3a chronic kidney disease (HCC)     Chronic problem, unstable, GFR reduced to 55%, recommended patient to increase water intake.  Recheck labs in 3 months         Relevant Orders    Basic Metabolic Panel    Lower abdominal pain     New problem, unstable, mild tenderness at right lower quadrant we will check CT abdomen pelvis         Relevant Orders    CT-ABDOMEN-PELVIS W/O        Please note that this dictation was created using voice recognition software. I have made every reasonable attempt to correct obvious errors, but I expect that there are errors of grammar and possibly content that I did not discover before finalizing the note.    Follow up in 3 months for annual and lab follow-up

## 2023-08-16 ENCOUNTER — APPOINTMENT (OUTPATIENT)
Dept: CARDIOLOGY | Facility: MEDICAL CENTER | Age: 68
End: 2023-08-16
Attending: INTERNAL MEDICINE
Payer: COMMERCIAL

## 2023-08-17 ENCOUNTER — TELEPHONE (OUTPATIENT)
Dept: CARDIOLOGY | Facility: MEDICAL CENTER | Age: 68
End: 2023-08-17
Payer: MEDICARE

## 2023-08-17 ENCOUNTER — OFFICE VISIT (OUTPATIENT)
Dept: CARDIOLOGY | Facility: MEDICAL CENTER | Age: 68
End: 2023-08-17
Attending: INTERNAL MEDICINE
Payer: MEDICARE

## 2023-08-17 VITALS
SYSTOLIC BLOOD PRESSURE: 120 MMHG | BODY MASS INDEX: 27.82 KG/M2 | HEART RATE: 78 BPM | WEIGHT: 167 LBS | DIASTOLIC BLOOD PRESSURE: 72 MMHG | OXYGEN SATURATION: 97 % | RESPIRATION RATE: 16 BRPM | HEIGHT: 65 IN

## 2023-08-17 DIAGNOSIS — I48.0 PAF (PAROXYSMAL ATRIAL FIBRILLATION) (HCC): ICD-10-CM

## 2023-08-17 DIAGNOSIS — I48.19 PERSISTENT ATRIAL FIBRILLATION (HCC): ICD-10-CM

## 2023-08-17 DIAGNOSIS — I10 ESSENTIAL HYPERTENSION, BENIGN: ICD-10-CM

## 2023-08-17 DIAGNOSIS — Z86.79 S/P ABLATION OF ATRIAL FIBRILLATION: ICD-10-CM

## 2023-08-17 DIAGNOSIS — E78.00 HYPERCHOLESTEROLEMIA: ICD-10-CM

## 2023-08-17 DIAGNOSIS — Z79.01 ANTICOAGULATED: ICD-10-CM

## 2023-08-17 DIAGNOSIS — Z98.890 S/P CARDIAC CATHETERIZATION: ICD-10-CM

## 2023-08-17 DIAGNOSIS — I48.92 ATRIAL FLUTTER, UNSPECIFIED TYPE (HCC): ICD-10-CM

## 2023-08-17 DIAGNOSIS — Z98.890 S/P ABLATION OF ATRIAL FIBRILLATION: ICD-10-CM

## 2023-08-17 LAB — EKG IMPRESSION: NORMAL

## 2023-08-17 PROCEDURE — 93005 ELECTROCARDIOGRAM TRACING: CPT | Performed by: INTERNAL MEDICINE

## 2023-08-17 PROCEDURE — 3078F DIAST BP <80 MM HG: CPT | Performed by: INTERNAL MEDICINE

## 2023-08-17 PROCEDURE — 3074F SYST BP LT 130 MM HG: CPT | Performed by: INTERNAL MEDICINE

## 2023-08-17 PROCEDURE — 93010 ELECTROCARDIOGRAM REPORT: CPT | Performed by: INTERNAL MEDICINE

## 2023-08-17 PROCEDURE — 99214 OFFICE O/P EST MOD 30 MIN: CPT | Mod: 25 | Performed by: INTERNAL MEDICINE

## 2023-08-17 PROCEDURE — 99212 OFFICE O/P EST SF 10 MIN: CPT | Performed by: INTERNAL MEDICINE

## 2023-08-17 ASSESSMENT — FIBROSIS 4 INDEX: FIB4 SCORE: 2.38

## 2023-08-17 NOTE — PROGRESS NOTES
Chief Complaint   Patient presents with    Atrial Fibrillation     F/v Dx:Persistent atrial fibrillation       Subjective     Chinyere Priest is a 68 y.o. female who presents today back out of rhythm and in atrial flutter atypical.  Felt fatigue and shortness of breath since June.  Compliant with medications.  2 previous ablations for persistent atrial fibrillation.    Past Medical History:   Diagnosis Date    Aortic calcification (HCC)      Noted incidentally on abdominal CT scan.    Arrhythmia     hx of afibb, ablation x 2     Asthma in adult          Chronic anticoagulation     Chronic back pain      CKD (chronic kidney disease) stage 3, GFR 30-59 ml/min ([facility]) 9/14/2020    Dental disorder     Full upper/lower dentures.    Diverticulosis      Inflamed seborrheic keratosis      Intrinsic eczema 1/20/2020    Non-rheumatic mitral regurgitation 05/2019    Echocardiogram with normal LV size, LVEF 65%. Moderate MR, mild-moderate TR, unchanged from previous.    Osteoarthritis of both hips      Plantar fascia syndrome      Plantar wart of right foot      Pruritic dermatitis      Ongoing for several months but worse at night on her lower extremities    Pulmonary hypertension ([facility])     Pure hypercholesterolemia      S/P cardiac catheterization 5/26/2017    1.  Normal right heart pressures. 3.  Essentially normal coronary arteries. 2.  Left ventricular ejection fraction 65%.     Spider veins of limb      Unspecified vitamin D deficiency       Past Surgical History:   Procedure Laterality Date    LUMBAR FUSION O-ARM N/A 4/29/2021    Procedure: FUSION, SPINE, LUMBAR, WITH O-ARM IMAGING GUIDANCE - STAGE #2 L3-5 UNILATERAL, LEFT-SIDED.laminotomy lumbar 3-5;  Surgeon: Chad Clay M.D.;  Location: [facility];  Service: Neurosurgery    FUSION, SPINE, XLIF Right 4/27/2021    Procedure: FUSION, SPINE, XLIF - STAGE #1 L3-5, RIGHT-SIDED APPROACH.;  Surgeon: Chad Clay M.D.;  Location: [facility];   Service: Neurosurgery    OTHER CARDIAC SURGERY  2019    cardiac ablation x 2 , for Afibb    LUMBAR LAMINECTOMY DISKECTOMY  2009    Performed by SUDHIR VERMA at SURGERY [facility]    GYN SURGERY       x2    HYSTERECTOMY LAPAROSCOPY       Family History   Problem Relation Age of Onset    Heart Disease Mother         1st MI @ 61 yo    Hypertension Mother     Heart Attack Mother 60    Heart Disease Maternal Aunt     Breast Cancer Maternal Aunt     Cancer Daughter 35        breast    Breast Cancer Daughter     Heart Disease Maternal Grandmother     Heart Disease Maternal Grandfather     Heart Disease Maternal Aunt 56    Ovarian Cancer Other      Social History     Socioeconomic History    Marital status:      Spouse name: Not on file    Number of children: Not on file    Years of education: Not on file    Highest education level: Not on file   Occupational History    Not on file   Tobacco Use    Smoking status: Former     Packs/day: 0.50     Years: 40.00     Additional pack years: 0.00     Total pack years: 20.00     Types: Cigarettes     Quit date: 3/1/2010     Years since quittin.4    Smokeless tobacco: Never   Vaping Use    Vaping Use: Never used   Substance and Sexual Activity    Alcohol use: No     Alcohol/week: 0.0 oz    Drug use: No     Comment: tried marijuana once.     Sexual activity: Yes     Partners: Male     Birth control/protection: Post-Menopausal     Comment: , works at [employer](part time)   Other Topics Concern     Service Not Asked    Blood Transfusions Not Asked    Caffeine Concern Not Asked    Occupational Exposure Not Asked    Hobby Hazards Not Asked    Sleep Concern Not Asked    Stress Concern Not Asked    Weight Concern Not Asked    Special Diet Not Asked    Back Care Not Asked    Exercise No    Bike Helmet Not Asked    Seat Belt Not Asked    Self-Exams Not Asked   Social History Narrative    Works at [employer]     Social Determinants of Health     Financial  Resource Strain: Not on file   Food Insecurity: No Food Insecurity (12/11/2019)    Hunger Vital Sign     Worried About Running Out of Food in the Last Year: Never true     Ran Out of Food in the Last Year: Never true   Transportation Needs: No Transportation Needs (12/11/2019)    PRAPARE - Transportation     Lack of Transportation (Medical): No     Lack of Transportation (Non-Medical): No   Physical Activity: Not on file   Stress: Not on file   Social Connections: Not on file   Intimate Partner Violence: Not on file   Housing Stability: Not on file     Allergies   Allergen Reactions    Erythromycin      Abdominal pain    Latex      rash    Tape Hives     Cloth tape  Paper tape okay    Tikosyn [Dofetilide]      diarrhea     Outpatient Encounter Medications as of 8/17/2023   Medication Sig Dispense Refill    ELIQUIS 5 MG Tab TAKE 1 TABLET BY MOUTH TWICE A DAY 60 Tablet 8    atorvastatin (LIPITOR) 10 MG Tab TAKE 1 TABLET BY MOUTH EVERY DAY 90 Tablet 2    clobetasol (TEMOVATE) 0.05 % Ointment APPLY 1 APPLICATION TOPICALLY 2 TIMES A DAY AS NEEDED (SKIN RASH ON LOWER LEGS). 45 g 2    losartan (COZAAR) 50 MG Tab Take 1 Tablet by mouth every day. 90 Tablet 3    furosemide (LASIX) 20 MG Tab Take 1 Tablet by mouth every day. 90 Tablet 3    flecainide (TAMBOCOR) 50 MG tablet TAKE 1 TABLET BY MOUTH TWICE A  Tablet 3    albuterol 108 (90 Base) MCG/ACT Aero Soln inhalation aerosol Inhale 2 Puffs every 6 hours as needed for Shortness of Breath. 8.5 g 11    potassium chloride ER (KLOR-CON 10) 10 MEQ tablet Take 1 Tablet by mouth every day. 90 Tablet 3    CHOLECALCIFEROL PO Take 2 Capsules by mouth every day.      [DISCONTINUED] famotidine (PEPCID) 20 MG Tab Take 1 tablet by mouth 2 times a day. (Patient not taking: Reported on 8/17/2023) 12 tablet 0     No facility-administered encounter medications on file as of 8/17/2023.     ROS           Objective     /72 (BP Location: Left arm, Patient Position: Sitting, BP Cuff  "Size: Adult)   Pulse 78   Resp 16   Ht 1.651 m (5' 5\")   Wt 75.8 kg (167 lb)   SpO2 97%   BMI 27.79 kg/m²     Physical Exam  Constitutional:       Appearance: She is well-developed.   HENT:      Head: Normocephalic and atraumatic.   Eyes:      Pupils: Pupils are equal, round, and reactive to light.   Cardiovascular:      Rate and Rhythm: Normal rate. Rhythm irregular.      Heart sounds: Normal heart sounds. No murmur heard.     No friction rub. No gallop.   Pulmonary:      Effort: Pulmonary effort is normal.      Breath sounds: Normal breath sounds.   Abdominal:      General: Bowel sounds are normal.      Palpations: Abdomen is soft.   Musculoskeletal:         General: Normal range of motion.      Cervical back: Normal range of motion and neck supple.   Skin:     General: Skin is warm.   Neurological:      Mental Status: She is alert and oriented to person, place, and time.      Cranial Nerves: No cranial nerve deficit.   Psychiatric:         Behavior: Behavior normal.         Thought Content: Thought content normal.         Judgment: Judgment normal.                Assessment & Plan     1. S/P ablation of atrial fibrillation        2. S/P cardiac catheterization        3. Hypercholesterolemia        4. Essential hypertension, benign        5. Anticoagulated        6. Persistent atrial fibrillation (HCC)  EKG      7. Atrial flutter, unspecified type (HCC)            Medical Decision Making: Today's Assessment/Status/Plan:   1.  Recurrent atypical atrial flutter.  Discussed cardioversion versus ablation patient wished to proceed with ablation.  The risks, benefits,and alternatives to atrial fibrillation/flutter ablation with general anesthesia were discussed in great detail. Specific risks mentioned including bleeding, infection, arteriovenous fistula/pseudoaneurysm related to sheath placement, cardiac perforation with possible tamponade requiring pericardiocentesis or possible open heart surgery were " discussed. In addition,we discussed atrial fibrillation ablation specific complications including pulmonary vein stenosis, left atrial perforation (2-4%), and nerve damage involving the recurrent laryngeal nerve, the vagus nerve with resulting gastroparesis, and the phrenic nerve.  Also mentioned was a 1/400 (0.25%) occurrence of atrial esophageal fistula, which is an abnormal communication between the esophagus and the left atrium; this complication is often fatal. Lastly the risks of death, myocardial infarction, stroke, DVT and PE were discussed. The patient verbalized understanding of these potential complications and wishes to proceed with this procedure.  The risks, benefits, and alternatives to transesophageal echocardiogram with IV sedation were discussed with the patient in specific detail, including oropharyngeal and esophageal traumas including hoarseness and dysphagia after the procedure. Rare cases demonstrating serious or fatal complications associated with transesophageal echocardiogram have been reported in the adult population, including cardiac, pulmonary and bleeding complications in less than 1% of people. Patients with an identified intracardiac thrombus are at increased risk for embolic events and this appears to be reduced with anticoagulant therapy. The patient verbalized understandings about these  possible complications and wishes to proceed with this procedure  2.  Hold flecainide 2 days.

## 2023-08-17 NOTE — TELEPHONE ENCOUNTER
Patient scheduled for afib/flutter ablation w/LEEANNA on 10-26-23 with Dr. Steele. Patient has been instructed to check in 8:30 for 10:30 case time. Hold Flecainide 2 days. Message sent to authorizations. Emailed Carto.

## 2023-08-17 NOTE — TELEPHONE ENCOUNTER
Angelita,    Can you please enter the orders for this procedure?    Afib/Flutter Ablation w/LEEANNA    Thank You,  Mickie

## 2023-08-23 ENCOUNTER — HOSPITAL ENCOUNTER (OUTPATIENT)
Dept: RADIOLOGY | Facility: MEDICAL CENTER | Age: 68
End: 2023-08-23
Attending: FAMILY MEDICINE
Payer: MEDICARE

## 2023-08-23 DIAGNOSIS — R10.30 LOWER ABDOMINAL PAIN: ICD-10-CM

## 2023-08-23 PROCEDURE — 74176 CT ABD & PELVIS W/O CONTRAST: CPT

## 2023-08-24 ENCOUNTER — PRE-ADMISSION TESTING (OUTPATIENT)
Dept: ADMISSIONS | Facility: MEDICAL CENTER | Age: 68
End: 2023-08-24
Attending: INTERNAL MEDICINE
Payer: MEDICARE

## 2023-10-06 ENCOUNTER — TELEPHONE (OUTPATIENT)
Dept: CARDIOLOGY | Facility: MEDICAL CENTER | Age: 68
End: 2023-10-06
Payer: MEDICARE

## 2023-10-06 NOTE — TELEPHONE ENCOUNTER
RUTH    Caller: Chinyere Priest    Topic/issue: Patient states she has ablation scheduled on 10/26/23 and asking if she needs to get a COVID vaccine prior to this procedure? Please advise.    Callback Number: [phone] (cell)     Thank you,  Kavya FERMIN

## 2023-10-06 NOTE — TELEPHONE ENCOUNTER
Phone Number Called: [phone]    Call outcome: Spoke to patient regarding message below.    Message: Called to inform patient that she can get the COVID booster she may, but that is not required for her procedure. Patient verbalizes understanding.

## 2023-10-23 ENCOUNTER — PRE-ADMISSION TESTING (OUTPATIENT)
Dept: ADMISSIONS | Facility: MEDICAL CENTER | Age: 68
End: 2023-10-23
Attending: INTERNAL MEDICINE
Payer: MEDICARE

## 2023-10-23 DIAGNOSIS — Z01.812 PRE-OPERATIVE LABORATORY EXAMINATION: ICD-10-CM

## 2023-10-23 DIAGNOSIS — Z01.810 PRE-OPERATIVE CARDIOVASCULAR EXAMINATION: ICD-10-CM

## 2023-10-23 LAB
ALBUMIN SERPL BCP-MCNC: 4.5 G/DL (ref 3.2–4.9)
ALBUMIN/GLOB SERPL: 1.6 G/DL
ALP SERPL-CCNC: 68 U/L (ref 30–99)
ALT SERPL-CCNC: 15 U/L (ref 2–50)
ANION GAP SERPL CALC-SCNC: 10 MMOL/L (ref 7–16)
APTT PPP: 38.2 SEC (ref 24.7–36)
AST SERPL-CCNC: 25 U/L (ref 12–45)
BASOPHILS # BLD AUTO: 0.6 % (ref 0–1.8)
BASOPHILS # BLD: 0.04 K/UL (ref 0–0.12)
BILIRUB SERPL-MCNC: 0.9 MG/DL (ref 0.1–1.5)
BUN SERPL-MCNC: 16 MG/DL (ref 8–22)
CALCIUM ALBUM COR SERPL-MCNC: 9 MG/DL (ref 8.5–10.5)
CALCIUM SERPL-MCNC: 9.4 MG/DL (ref 8.5–10.5)
CHLORIDE SERPL-SCNC: 105 MMOL/L (ref 96–112)
CO2 SERPL-SCNC: 25 MMOL/L (ref 20–33)
CREAT SERPL-MCNC: 1.05 MG/DL (ref 0.5–1.4)
EKG IMPRESSION: NORMAL
EOSINOPHIL # BLD AUTO: 0.15 K/UL (ref 0–0.51)
EOSINOPHIL NFR BLD: 2.4 % (ref 0–6.9)
ERYTHROCYTE [DISTWIDTH] IN BLOOD BY AUTOMATED COUNT: 46.3 FL (ref 35.9–50)
GFR SERPLBLD CREATININE-BSD FMLA CKD-EPI: 57 ML/MIN/1.73 M 2
GLOBULIN SER CALC-MCNC: 2.8 G/DL (ref 1.9–3.5)
GLUCOSE SERPL-MCNC: 94 MG/DL (ref 65–99)
HCT VFR BLD AUTO: 39 % (ref 37–47)
HGB BLD-MCNC: 12.6 G/DL (ref 12–16)
IMM GRANULOCYTES # BLD AUTO: 0.01 K/UL (ref 0–0.11)
IMM GRANULOCYTES NFR BLD AUTO: 0.2 % (ref 0–0.9)
INR PPP: 1.42 (ref 0.87–1.13)
LYMPHOCYTES # BLD AUTO: 1.39 K/UL (ref 1–4.8)
LYMPHOCYTES NFR BLD: 22 % (ref 22–41)
MCH RBC QN AUTO: 31 PG (ref 27–33)
MCHC RBC AUTO-ENTMCNC: 32.3 G/DL (ref 32.2–35.5)
MCV RBC AUTO: 95.8 FL (ref 81.4–97.8)
MONOCYTES # BLD AUTO: 0.74 K/UL (ref 0–0.85)
MONOCYTES NFR BLD AUTO: 11.7 % (ref 0–13.4)
NEUTROPHILS # BLD AUTO: 3.99 K/UL (ref 1.82–7.42)
NEUTROPHILS NFR BLD: 63.1 % (ref 44–72)
NRBC # BLD AUTO: 0 K/UL
NRBC BLD-RTO: 0 /100 WBC (ref 0–0.2)
PLATELET # BLD AUTO: 182 K/UL (ref 164–446)
PMV BLD AUTO: 11.6 FL (ref 9–12.9)
POTASSIUM SERPL-SCNC: 4.3 MMOL/L (ref 3.6–5.5)
PROT SERPL-MCNC: 7.3 G/DL (ref 6–8.2)
PROTHROMBIN TIME: 17.5 SEC (ref 12–14.6)
RBC # BLD AUTO: 4.07 M/UL (ref 4.2–5.4)
SODIUM SERPL-SCNC: 140 MMOL/L (ref 135–145)
WBC # BLD AUTO: 6.3 K/UL (ref 4.8–10.8)

## 2023-10-23 PROCEDURE — 93010 ELECTROCARDIOGRAM REPORT: CPT | Performed by: INTERNAL MEDICINE

## 2023-10-23 PROCEDURE — 85610 PROTHROMBIN TIME: CPT

## 2023-10-23 PROCEDURE — 93005 ELECTROCARDIOGRAM TRACING: CPT

## 2023-10-23 PROCEDURE — 85730 THROMBOPLASTIN TIME PARTIAL: CPT

## 2023-10-23 PROCEDURE — 85025 COMPLETE CBC W/AUTO DIFF WBC: CPT

## 2023-10-23 PROCEDURE — 36415 COLL VENOUS BLD VENIPUNCTURE: CPT

## 2023-10-23 PROCEDURE — 80053 COMPREHEN METABOLIC PANEL: CPT

## 2023-10-26 ENCOUNTER — APPOINTMENT (OUTPATIENT)
Dept: CARDIOLOGY | Facility: MEDICAL CENTER | Age: 68
End: 2023-10-26
Attending: INTERNAL MEDICINE
Payer: MEDICARE

## 2023-10-26 ENCOUNTER — ANESTHESIA (OUTPATIENT)
Dept: CARDIOLOGY | Facility: MEDICAL CENTER | Age: 68
End: 2023-10-26
Payer: MEDICARE

## 2023-10-26 ENCOUNTER — ANESTHESIA EVENT (OUTPATIENT)
Dept: CARDIOLOGY | Facility: MEDICAL CENTER | Age: 68
End: 2023-10-26
Payer: MEDICARE

## 2023-10-26 ENCOUNTER — HOSPITAL ENCOUNTER (OUTPATIENT)
Facility: MEDICAL CENTER | Age: 68
End: 2023-10-27
Attending: INTERNAL MEDICINE | Admitting: INTERNAL MEDICINE
Payer: MEDICARE

## 2023-10-26 DIAGNOSIS — I48.0 PAF (PAROXYSMAL ATRIAL FIBRILLATION) (HCC): ICD-10-CM

## 2023-10-26 DIAGNOSIS — I48.0 PAROXYSMAL ATRIAL FIBRILLATION (HCC): ICD-10-CM

## 2023-10-26 DIAGNOSIS — I48.4 ATYPICAL ATRIAL FLUTTER (HCC): ICD-10-CM

## 2023-10-26 DIAGNOSIS — I48.92 ATRIAL FLUTTER, UNSPECIFIED TYPE (HCC): ICD-10-CM

## 2023-10-26 LAB
ACT BLD: 245 SEC (ref 74–137)
ACT BLD: 257 SEC (ref 74–137)
EKG IMPRESSION: NORMAL
LV EJECT FRACT  99904: 65

## 2023-10-26 PROCEDURE — 700102 HCHG RX REV CODE 250 W/ 637 OVERRIDE(OP): Performed by: INTERNAL MEDICINE

## 2023-10-26 PROCEDURE — A9270 NON-COVERED ITEM OR SERVICE: HCPCS | Performed by: NURSE PRACTITIONER

## 2023-10-26 PROCEDURE — 160035 HCHG PACU - 1ST 60 MINS PHASE I

## 2023-10-26 PROCEDURE — G0378 HOSPITAL OBSERVATION PER HR: HCPCS

## 2023-10-26 PROCEDURE — 700105 HCHG RX REV CODE 258: Performed by: INTERNAL MEDICINE

## 2023-10-26 PROCEDURE — 93325 DOPPLER ECHO COLOR FLOW MAPG: CPT

## 2023-10-26 PROCEDURE — 85347 COAGULATION TIME ACTIVATED: CPT

## 2023-10-26 PROCEDURE — 93005 ELECTROCARDIOGRAM TRACING: CPT | Performed by: INTERNAL MEDICINE

## 2023-10-26 PROCEDURE — 700101 HCHG RX REV CODE 250: Performed by: ANESTHESIOLOGY

## 2023-10-26 PROCEDURE — 160002 HCHG RECOVERY MINUTES (STAT)

## 2023-10-26 PROCEDURE — 93010 ELECTROCARDIOGRAM REPORT: CPT | Performed by: INTERNAL MEDICINE

## 2023-10-26 PROCEDURE — 700117 HCHG RX CONTRAST REV CODE 255: Performed by: INTERNAL MEDICINE

## 2023-10-26 PROCEDURE — 700101 HCHG RX REV CODE 250

## 2023-10-26 PROCEDURE — 700111 HCHG RX REV CODE 636 W/ 250 OVERRIDE (IP): Performed by: ANESTHESIOLOGY

## 2023-10-26 PROCEDURE — 700111 HCHG RX REV CODE 636 W/ 250 OVERRIDE (IP)

## 2023-10-26 PROCEDURE — 700102 HCHG RX REV CODE 250 W/ 637 OVERRIDE(OP): Performed by: NURSE PRACTITIONER

## 2023-10-26 PROCEDURE — 700101 HCHG RX REV CODE 250: Performed by: INTERNAL MEDICINE

## 2023-10-26 PROCEDURE — C1769 GUIDE WIRE: HCPCS

## 2023-10-26 PROCEDURE — A9270 NON-COVERED ITEM OR SERVICE: HCPCS | Performed by: INTERNAL MEDICINE

## 2023-10-26 RX ORDER — EPHEDRINE SULFATE 50 MG/ML
5 INJECTION, SOLUTION INTRAVENOUS
Status: DISCONTINUED | OUTPATIENT
Start: 2023-10-26 | End: 2023-10-26 | Stop reason: HOSPADM

## 2023-10-26 RX ORDER — POTASSIUM CHLORIDE 750 MG/1
10 TABLET, FILM COATED, EXTENDED RELEASE ORAL DAILY
Status: DISCONTINUED | OUTPATIENT
Start: 2023-10-27 | End: 2023-10-27 | Stop reason: HOSPADM

## 2023-10-26 RX ORDER — ATORVASTATIN CALCIUM 10 MG/1
10 TABLET, FILM COATED ORAL DAILY
Status: DISCONTINUED | OUTPATIENT
Start: 2023-10-27 | End: 2023-10-27 | Stop reason: HOSPADM

## 2023-10-26 RX ORDER — LABETALOL HYDROCHLORIDE 5 MG/ML
INJECTION, SOLUTION INTRAVENOUS PRN
Status: DISCONTINUED | OUTPATIENT
Start: 2023-10-26 | End: 2023-10-26 | Stop reason: SURG

## 2023-10-26 RX ORDER — EPHEDRINE SULFATE 50 MG/ML
INJECTION, SOLUTION INTRAVENOUS PRN
Status: DISCONTINUED | OUTPATIENT
Start: 2023-10-26 | End: 2023-10-26 | Stop reason: SURG

## 2023-10-26 RX ORDER — HYDROMORPHONE HYDROCHLORIDE 1 MG/ML
0.1 INJECTION, SOLUTION INTRAMUSCULAR; INTRAVENOUS; SUBCUTANEOUS
Status: DISCONTINUED | OUTPATIENT
Start: 2023-10-26 | End: 2023-10-26 | Stop reason: HOSPADM

## 2023-10-26 RX ORDER — OXYCODONE HCL 5 MG/5 ML
5 SOLUTION, ORAL ORAL
Status: DISCONTINUED | OUTPATIENT
Start: 2023-10-26 | End: 2023-10-26 | Stop reason: HOSPADM

## 2023-10-26 RX ORDER — HEPARIN SODIUM 1000 [USP'U]/ML
INJECTION, SOLUTION INTRAVENOUS; SUBCUTANEOUS
Status: COMPLETED
Start: 2023-10-26 | End: 2023-10-26

## 2023-10-26 RX ORDER — HALOPERIDOL 5 MG/ML
1 INJECTION INTRAMUSCULAR
Status: DISCONTINUED | OUTPATIENT
Start: 2023-10-26 | End: 2023-10-26 | Stop reason: HOSPADM

## 2023-10-26 RX ORDER — SODIUM CHLORIDE 9 MG/ML
INJECTION, SOLUTION INTRAVENOUS ONCE
Status: COMPLETED | OUTPATIENT
Start: 2023-10-26 | End: 2023-10-26

## 2023-10-26 RX ORDER — PROTAMINE SULFATE 10 MG/ML
INJECTION, SOLUTION INTRAVENOUS
Status: COMPLETED
Start: 2023-10-26 | End: 2023-10-26

## 2023-10-26 RX ORDER — OXYCODONE HCL 5 MG/5 ML
10 SOLUTION, ORAL ORAL
Status: DISCONTINUED | OUTPATIENT
Start: 2023-10-26 | End: 2023-10-26 | Stop reason: HOSPADM

## 2023-10-26 RX ORDER — LIDOCAINE HYDROCHLORIDE 40 MG/ML
SOLUTION TOPICAL PRN
Status: DISCONTINUED | OUTPATIENT
Start: 2023-10-26 | End: 2023-10-26 | Stop reason: SURG

## 2023-10-26 RX ORDER — ACETAMINOPHEN 325 MG/1
650 TABLET ORAL EVERY 4 HOURS PRN
Status: DISCONTINUED | OUTPATIENT
Start: 2023-10-26 | End: 2023-10-27 | Stop reason: HOSPADM

## 2023-10-26 RX ORDER — HYDROMORPHONE HYDROCHLORIDE 1 MG/ML
0.2 INJECTION, SOLUTION INTRAMUSCULAR; INTRAVENOUS; SUBCUTANEOUS
Status: DISCONTINUED | OUTPATIENT
Start: 2023-10-26 | End: 2023-10-26 | Stop reason: HOSPADM

## 2023-10-26 RX ORDER — ALBUTEROL SULFATE 90 UG/1
2 AEROSOL, METERED RESPIRATORY (INHALATION)
Status: DISCONTINUED | OUTPATIENT
Start: 2023-10-26 | End: 2023-10-27 | Stop reason: HOSPADM

## 2023-10-26 RX ORDER — LIDOCAINE HYDROCHLORIDE 40 MG/ML
SOLUTION TOPICAL
Status: COMPLETED
Start: 2023-10-26 | End: 2023-10-26

## 2023-10-26 RX ORDER — AMIODARONE HYDROCHLORIDE 200 MG/1
200 TABLET ORAL TWICE DAILY
Status: DISCONTINUED | OUTPATIENT
Start: 2023-10-26 | End: 2023-10-27 | Stop reason: HOSPADM

## 2023-10-26 RX ORDER — SODIUM CHLORIDE, SODIUM LACTATE, POTASSIUM CHLORIDE, CALCIUM CHLORIDE 600; 310; 30; 20 MG/100ML; MG/100ML; MG/100ML; MG/100ML
INJECTION, SOLUTION INTRAVENOUS CONTINUOUS
Status: DISCONTINUED | OUTPATIENT
Start: 2023-10-26 | End: 2023-10-26 | Stop reason: ALTCHOICE

## 2023-10-26 RX ORDER — HYDROMORPHONE HYDROCHLORIDE 1 MG/ML
0.4 INJECTION, SOLUTION INTRAMUSCULAR; INTRAVENOUS; SUBCUTANEOUS
Status: DISCONTINUED | OUTPATIENT
Start: 2023-10-26 | End: 2023-10-26 | Stop reason: HOSPADM

## 2023-10-26 RX ORDER — ONDANSETRON 2 MG/ML
4 INJECTION INTRAMUSCULAR; INTRAVENOUS
Status: DISCONTINUED | OUTPATIENT
Start: 2023-10-26 | End: 2023-10-26 | Stop reason: HOSPADM

## 2023-10-26 RX ORDER — LOSARTAN POTASSIUM 25 MG/1
50 TABLET ORAL DAILY
Status: DISCONTINUED | OUTPATIENT
Start: 2023-10-27 | End: 2023-10-27 | Stop reason: HOSPADM

## 2023-10-26 RX ORDER — PHENYLEPHRINE HYDROCHLORIDE 10 MG/ML
INJECTION, SOLUTION INTRAMUSCULAR; INTRAVENOUS; SUBCUTANEOUS PRN
Status: DISCONTINUED | OUTPATIENT
Start: 2023-10-26 | End: 2023-10-26 | Stop reason: SURG

## 2023-10-26 RX ORDER — HYDRALAZINE HYDROCHLORIDE 20 MG/ML
5 INJECTION INTRAMUSCULAR; INTRAVENOUS
Status: DISCONTINUED | OUTPATIENT
Start: 2023-10-26 | End: 2023-10-26 | Stop reason: HOSPADM

## 2023-10-26 RX ORDER — HEPARIN SODIUM 200 [USP'U]/100ML
INJECTION, SOLUTION INTRAVENOUS
Status: COMPLETED
Start: 2023-10-26 | End: 2023-10-26

## 2023-10-26 RX ORDER — DEXAMETHASONE SODIUM PHOSPHATE 4 MG/ML
INJECTION, SOLUTION INTRA-ARTICULAR; INTRALESIONAL; INTRAMUSCULAR; INTRAVENOUS; SOFT TISSUE PRN
Status: DISCONTINUED | OUTPATIENT
Start: 2023-10-26 | End: 2023-10-26 | Stop reason: SURG

## 2023-10-26 RX ORDER — LIDOCAINE HYDROCHLORIDE 20 MG/ML
INJECTION, SOLUTION EPIDURAL; INFILTRATION; INTRACAUDAL; PERINEURAL PRN
Status: DISCONTINUED | OUTPATIENT
Start: 2023-10-26 | End: 2023-10-26 | Stop reason: SURG

## 2023-10-26 RX ORDER — ALCOHOL 1 ML/ML
10 INJECTION, SOLUTION PERCUTANEOUS ONCE
Status: COMPLETED | OUTPATIENT
Start: 2023-10-26 | End: 2023-10-26

## 2023-10-26 RX ORDER — ONDANSETRON 2 MG/ML
INJECTION INTRAMUSCULAR; INTRAVENOUS PRN
Status: DISCONTINUED | OUTPATIENT
Start: 2023-10-26 | End: 2023-10-26 | Stop reason: SURG

## 2023-10-26 RX ORDER — LIDOCAINE HYDROCHLORIDE 20 MG/ML
INJECTION, SOLUTION INFILTRATION; PERINEURAL
Status: COMPLETED
Start: 2023-10-26 | End: 2023-10-26

## 2023-10-26 RX ADMIN — HEPARIN SODIUM: 1000 INJECTION, SOLUTION INTRAVENOUS; SUBCUTANEOUS at 13:52

## 2023-10-26 RX ADMIN — PHENYLEPHRINE HYDROCHLORIDE 100 MCG: 10 INJECTION INTRAVENOUS at 12:49

## 2023-10-26 RX ADMIN — EPHEDRINE SULFATE 10 MG: 50 INJECTION INTRAVENOUS at 13:26

## 2023-10-26 RX ADMIN — PHENYLEPHRINE HYDROCHLORIDE 100 MCG: 10 INJECTION INTRAVENOUS at 13:19

## 2023-10-26 RX ADMIN — PROPOFOL 50 MG: 10 INJECTION, EMULSION INTRAVENOUS at 13:15

## 2023-10-26 RX ADMIN — DEXAMETHASONE SODIUM PHOSPHATE 8 MG: 4 INJECTION INTRA-ARTICULAR; INTRALESIONAL; INTRAMUSCULAR; INTRAVENOUS; SOFT TISSUE at 12:35

## 2023-10-26 RX ADMIN — IOHEXOL 18 ML: 350 INJECTION, SOLUTION INTRAVENOUS at 14:42

## 2023-10-26 RX ADMIN — SODIUM CHLORIDE: 9 INJECTION, SOLUTION INTRAVENOUS at 12:08

## 2023-10-26 RX ADMIN — HEPARIN SODIUM 6000 UNITS: 200 INJECTION, SOLUTION INTRAVENOUS at 12:42

## 2023-10-26 RX ADMIN — SUGAMMADEX 200 MG: 100 INJECTION, SOLUTION INTRAVENOUS at 14:44

## 2023-10-26 RX ADMIN — LIDOCAINE HYDROCHLORIDE 4 ML: 40 SOLUTION TOPICAL at 12:15

## 2023-10-26 RX ADMIN — LIDOCAINE HYDROCHLORIDE: 20 INJECTION, SOLUTION INFILTRATION; PERINEURAL at 12:28

## 2023-10-26 RX ADMIN — ALCOHOL 10 ML: 1 INJECTION, SOLUTION PERCUTANEOUS at 13:47

## 2023-10-26 RX ADMIN — ROCURONIUM BROMIDE 20 MG: 10 INJECTION, SOLUTION INTRAVENOUS at 14:30

## 2023-10-26 RX ADMIN — PROPOFOL 150 MG: 10 INJECTION, EMULSION INTRAVENOUS at 12:15

## 2023-10-26 RX ADMIN — PROTAMINE SULFATE 40 MG: 10 INJECTION, SOLUTION INTRAVENOUS at 14:43

## 2023-10-26 RX ADMIN — ROCURONIUM BROMIDE 50 MG: 10 INJECTION, SOLUTION INTRAVENOUS at 12:15

## 2023-10-26 RX ADMIN — APIXABAN 5 MG: 5 TABLET, FILM COATED ORAL at 17:25

## 2023-10-26 RX ADMIN — ROCURONIUM BROMIDE 30 MG: 10 INJECTION, SOLUTION INTRAVENOUS at 13:15

## 2023-10-26 RX ADMIN — AMIODARONE HYDROCHLORIDE 200 MG: 200 TABLET ORAL at 17:25

## 2023-10-26 RX ADMIN — ONDANSETRON 4 MG: 2 INJECTION INTRAMUSCULAR; INTRAVENOUS at 14:44

## 2023-10-26 RX ADMIN — LIDOCAINE HYDROCHLORIDE 80 MG: 20 INJECTION, SOLUTION EPIDURAL; INFILTRATION; INTRACAUDAL at 12:15

## 2023-10-26 RX ADMIN — ACETAMINOPHEN 650 MG: 325 TABLET, FILM COATED ORAL at 17:34

## 2023-10-26 RX ADMIN — LABETALOL HYDROCHLORIDE 5 MG: 5 INJECTION, SOLUTION INTRAVENOUS at 14:44

## 2023-10-26 RX ADMIN — HEPARIN SODIUM: 1000 INJECTION, SOLUTION INTRAVENOUS; SUBCUTANEOUS at 12:28

## 2023-10-26 RX ADMIN — PHENYLEPHRINE HYDROCHLORIDE 100 MCG: 10 INJECTION INTRAVENOUS at 12:38

## 2023-10-26 ASSESSMENT — CHA2DS2 SCORE
VASCULAR DISEASE: NO
CHF OR LEFT VENTRICULAR DYSFUNCTION: NO
PRIOR STROKE OR TIA OR THROMBOEMBOLISM: NO
AGE 65 TO 74: YES
SEX: FEMALE
DIABETES: NO
HYPERTENSION: YES
CHA2DS2 VASC SCORE: 3
AGE 75 OR GREATER: NO

## 2023-10-26 ASSESSMENT — COGNITIVE AND FUNCTIONAL STATUS - GENERAL
MOBILITY SCORE: 19
DAILY ACTIVITIY SCORE: 21
DRESSING REGULAR LOWER BODY CLOTHING: A LITTLE
SUGGESTED CMS G CODE MODIFIER MOBILITY: CK
MOVING TO AND FROM BED TO CHAIR: A LITTLE
WALKING IN HOSPITAL ROOM: A LITTLE
STANDING UP FROM CHAIR USING ARMS: A LITTLE
MOVING FROM LYING ON BACK TO SITTING ON SIDE OF FLAT BED: A LITTLE
SUGGESTED CMS G CODE MODIFIER DAILY ACTIVITY: CJ
TOILETING: A LITTLE
HELP NEEDED FOR BATHING: A LITTLE
CLIMB 3 TO 5 STEPS WITH RAILING: A LITTLE

## 2023-10-26 ASSESSMENT — PATIENT HEALTH QUESTIONNAIRE - PHQ9
SUM OF ALL RESPONSES TO PHQ9 QUESTIONS 1 AND 2: 0
1. LITTLE INTEREST OR PLEASURE IN DOING THINGS: NOT AT ALL
2. FEELING DOWN, DEPRESSED, IRRITABLE, OR HOPELESS: NOT AT ALL

## 2023-10-26 ASSESSMENT — PAIN SCALES - GENERAL: PAIN_LEVEL: 1

## 2023-10-26 ASSESSMENT — LIFESTYLE VARIABLES
TOTAL SCORE: 0
TOTAL SCORE: 0
EVER FELT BAD OR GUILTY ABOUT YOUR DRINKING: NO
HAVE PEOPLE ANNOYED YOU BY CRITICIZING YOUR DRINKING: NO
CONSUMPTION TOTAL: NEGATIVE
HAVE YOU EVER FELT YOU SHOULD CUT DOWN ON YOUR DRINKING: NO
ALCOHOL_USE: NO
HOW MANY TIMES IN THE PAST YEAR HAVE YOU HAD 5 OR MORE DRINKS IN A DAY: 0
ON A TYPICAL DAY WHEN YOU DRINK ALCOHOL HOW MANY DRINKS DO YOU HAVE: 0
AVERAGE NUMBER OF DAYS PER WEEK YOU HAVE A DRINK CONTAINING ALCOHOL: 0
TOTAL SCORE: 0
EVER HAD A DRINK FIRST THING IN THE MORNING TO STEADY YOUR NERVES TO GET RID OF A HANGOVER: NO

## 2023-10-26 ASSESSMENT — PAIN DESCRIPTION - PAIN TYPE
TYPE: ACUTE PAIN
TYPE: ACUTE PAIN

## 2023-10-26 ASSESSMENT — FIBROSIS 4 INDEX: FIB4 SCORE: 2.45

## 2023-10-26 NOTE — ANESTHESIA TIME REPORT
Anesthesia Start and Stop Event Times     Date Time Event    10/26/2023 1200 Ready for Procedure     1208 Anesthesia Start     1458 Anesthesia Stop        Responsible Staff  10/26/23    Name Role Begin End    Jonas Thomas M.D. Anesth 1208 1450        Overtime Reason:  no overtime (within assigned shift)    Comments:

## 2023-10-26 NOTE — ANESTHESIA PROCEDURE NOTES
Airway    Date/Time: 10/26/2023 12:15 PM    Performed by: Jonas Thomas M.D.  Authorized by: Jonas Thomas M.D.    Location:  OR  Urgency:  Elective  Difficult Airway: No    Indications for Airway Management:  Anesthesia      Spontaneous Ventilation: absent    Sedation Level:  Deep  Preoxygenated: Yes    Patient Position:  Sniffing  Mask Difficulty Assessment:  0 - not attempted  Final Airway Type:  Endotracheal airway  Final Endotracheal Airway:  ETT  Cuffed: Yes    Technique Used for Successful ETT Placement:  Direct laryngoscopy  Devices/Methods Used in Placement:  Intubating stylet    Insertion Site:  Oral  Blade Type:  Cook  Laryngoscope Blade/Videolaryngoscope Blade Size:  2  ETT Size (mm):  6.5  Measured from:  Teeth  ETT to Teeth (cm):  21  Placement Verified by: auscultation and capnometry    Cormack-Lehane Classification:  Grade I - full view of glottis  Number of Attempts at Approach:  1

## 2023-10-26 NOTE — ANESTHESIA PREPROCEDURE EVALUATION
Date/Time: 10/26/23 1100    Scheduled providers: Augusto Steele M.D.; Jonas Thomas M.D.    Procedure: CL-EP ABLATION AFIB-AFLUTTER    Diagnosis:       Atrial flutter, unspecified type (HCC) [I48.92]      PAF (paroxysmal atrial fibrillation) (HCC) [I48.0]      Unspecified atrial flutter [I48.92]    Indications: See Associated Dx    Location: [facility] - Cath Lab - [facility]          Relevant Problems   PULMONARY   (positive) Asthma in adult without complication      CARDIAC   (positive) Aortic calcification (HCC)   (positive) Atrial flutter (HCC)   (positive) Bleeding from varicose vein   (positive) Essential hypertension, benign   (positive) Non-rheumatic mitral regurgitation   (positive) Pulmonary hypertension (HCC)   (positive) Spider veins of limb         (positive) Stage 3a chronic kidney disease (HCC)       Physical Exam    Airway   Mallampati: II  TM distance: <3 FB  Neck ROM: full       Cardiovascular   Rhythm: irregular  Rate: abnormal  (+) murmur, weak pulses     Dental - normal exam  (+) upper dentures, lower dentures           Pulmonary   (+) decreased breath sounds     Abdominal   (-) obese     Neurological - normal exam                 Anesthesia Plan    ASA 3   ASA physical status 3 criteria: a thrombophilic disease requiring anticoagulation and MI or angina - history (> 3 months)    Plan - general       Airway plan will be ETT  LEEANNA Planned        Induction: intravenous      Pertinent diagnostic labs and testing reviewed    Informed Consent:    Anesthetic plan and risks discussed with patient.

## 2023-10-26 NOTE — H&P
Physician H&P    Patient ID:  Chinyere Priest  [id number]  68 y.o. female  1955    History:  Primary Diagnosis: Recurrent atrial flutter atypical    HPI:  2 previous ablations in 2019.  PVI right atrial isthmus.  Mid karon right atrial tachycardia on last ablation.  Symptomatic.    Past Medical History:  has a past medical history of Aortic calcification ([facility]) ( ), Arrhythmia, Asthma in adult, Chronic anticoagulation, Chronic back pain ( ), CKD (chronic kidney disease) stage 3, GFR 30-59 ml/min ([facility]) (09/14/2020), Dental disorder, Diverticulosis ( ), High cholesterol, Hypertension, Inflamed seborrheic keratosis ( ), Intrinsic eczema (01/20/2020), Non-rheumatic mitral regurgitation (05/2019), Osteoarthritis of both hips ( ), Plantar fascia syndrome ( ), Plantar wart of right foot ( ), Pruritic dermatitis ( ), Pulmonary hypertension (HCC), Pure hypercholesterolemia ( ), S/P cardiac catheterization (05/26/2017), Spider veins of limb ( ), and Unspecified vitamin D deficiency ( ).    She has no past medical history of Diabetic neuropathy ([facility]).  Past Surgical History:  has a past surgical history that includes lumbar laminectomy diskectomy (09/02/2009); gyn surgery; hysterectomy laparoscopy (2004); other cardiac surgery (2019); lumbar fusion o-arm (N/A, 04/29/2021); and fusion, spine, xlif (Right, 04/27/2021).  Past Social History:  reports that she quit smoking about 13 years ago. Her smoking use included cigarettes. She started smoking about 53 years ago. She has a 20.0 pack-year smoking history. She has never used smokeless tobacco. She reports that she does not drink alcohol and does not use drugs.  Past Family History:   Family History   Problem Relation Age of Onset    Heart Disease Mother         1st MI @ 59 yo    Hypertension Mother     Heart Attack Mother 60    Heart Disease Maternal Aunt     Breast Cancer Maternal Aunt     Cancer Daughter 35        breast    Breast Cancer Daughter     Heart Disease  "Maternal Grandmother     Heart Disease Maternal Grandfather     Heart Disease Maternal Aunt 56    Ovarian Cancer Other      Allergies: Erythromycin, Tikosyn [dofetilide], Latex, and Tape    Current Medications:  Prior to Admission medications    Medication Sig Start Date End Date Taking? Authorizing Provider   Multiple Vitamin (MULTIVITAMIN ADULT PO) Take  by mouth.    Physician Outpatient   ELIQUIS 5 MG Tab TAKE 1 TABLET BY MOUTH TWICE A DAY 7/18/23   Augusto Steele M.D.   atorvastatin (LIPITOR) 10 MG Tab TAKE 1 TABLET BY MOUTH EVERY DAY  Patient taking differently: Take 10 mg by mouth every morning. 7/18/23   Augusto Steele M.D.   clobetasol (TEMOVATE) 0.05 % Ointment APPLY 1 APPLICATION TOPICALLY 2 TIMES A DAY AS NEEDED (SKIN RASH ON LOWER LEGS). 7/17/23   Eliceo Guzman M.D.   losartan (COZAAR) 50 MG Tab Take 1 Tablet by mouth every day.  Patient taking differently: Take 50 mg by mouth every morning. 1/13/23   Augusto Steele M.D.   furosemide (LASIX) 20 MG Tab Take 1 Tablet by mouth every day. 1/13/23   Augusto Steele M.D.   flecainide (TAMBOCOR) 50 MG tablet TAKE 1 TABLET BY MOUTH TWICE A DAY 1/13/23   Augusto Steele M.D.   albuterol 108 (90 Base) MCG/ACT Aero Soln inhalation aerosol Inhale 2 Puffs every 6 hours as needed for Shortness of Breath. 1/13/23   Augusto Steele M.D.   potassium chloride ER (KLOR-CON 10) 10 MEQ tablet Take 1 Tablet by mouth every day. 1/13/23   Augusto Steele M.D.   CHOLECALCIFEROL PO Take 2 Capsules by mouth every day. 4000 Vit D 9/14/20   Keila Mccrary M.D.       Review of Systems:  ROS  Ht 1.676 m (5' 6\")   Wt 72.8 kg (160 lb 7.9 oz)     Physical Examination:  Physical Exam  Constitutional:       General: She is not in acute distress.     Appearance: Normal appearance. She is well-developed.   Eyes:      Conjunctiva/sclera: Conjunctivae normal.   Neck:      Thyroid: No thyroid mass.      Vascular: No JVD.   Cardiovascular:      Rate and Rhythm: Normal rate. Rhythm irregular. "      Chest Wall: PMI is not displaced.      Pulses: Normal pulses.           Carotid pulses are 2+ on the right side and 2+ on the left side.       Radial pulses are 2+ on the right side and 2+ on the left side.        Femoral pulses are 2+ on the right side and 2+ on the left side.       Dorsalis pedis pulses are 2+ on the right side and 2+ on the left side.      Heart sounds: S1 normal and S2 normal. No murmur heard.     No gallop.      Comments: No peripheral edema.  Pulmonary:      Effort: Pulmonary effort is normal.      Breath sounds: Normal breath sounds.   Abdominal:      General: There is no abdominal bruit.      Palpations: Abdomen is soft. There is no mass.      Tenderness: There is no abdominal tenderness.   Musculoskeletal:         General: Normal range of motion.      Cervical back: Neck supple.      Lumbar back: No spasms or tenderness.   Skin:     General: Skin is warm and dry.      Findings: No rash.      Nails: There is no clubbing.   Neurological:      Mental Status: She is alert and oriented to person, place, and time.         Impression:  1.  Recurrent atrial arrhythmias for catheter ablation.  The risks, benefits,and alternatives to atrial fibrillation/flutter ablation with general anesthesia were discussed in great detail. Specific risks mentioned including bleeding, infection, arteriovenous fistula/pseudoaneurysm related to sheath placement, cardiac perforation with possible tamponade requiring pericardiocentesis or possible open heart surgery were discussed. In addition,we discussed atrial fibrillation ablation specific complications including pulmonary vein stenosis, left atrial perforation (2-4%), and nerve damage involving the recurrent laryngeal nerve, the vagus nerve with resulting gastroparesis, and the phrenic nerve.  Also mentioned was a 1/400 (0.25%) occurrence of atrial esophageal fistula, which is an abnormal communication between the esophagus and the left atrium; this  complication is often fatal. Lastly the risks of death, myocardial infarction, stroke, DVT and PE were discussed. The patient verbalized understanding of these potential complications and wishes to proceed with this procedure.  The risks, benefits, and alternatives to transesophageal echocardiogram with IV sedation were discussed with the patient in specific detail, including oropharyngeal and esophageal traumas including hoarseness and dysphagia after the procedure. Rare cases demonstrating serious or fatal complications associated with transesophageal echocardiogram have been reported in the adult population, including cardiac, pulmonary and bleeding complications in less than 1% of people. Patients with an identified intracardiac thrombus are at increased risk for embolic events and this appears to be reduced with anticoagulant therapy. The patient verbalized understandings about these  possible complications and wishes to proceed with this procedure

## 2023-10-26 NOTE — ANESTHESIA PROCEDURE NOTES
LEEANNA    Date/Time: 10/26/2023 12:25 PM    Performed by: Jonas Thomas M.D.  Authorized by: Jonas Thomas M.D.    Start Time:10/26/2023 12:25 PM  Preanesthetic Checklist: patient identified, IV checked, site marked, risks and benefits discussed, surgical consent, monitors and equipment checked, pre-op evaluation and timeout performed    Indication for LEEANNA: diagnostic   Patient Location: OR  Intubated: Yes  Bite Block: Yes  Heart Visualized: Yes  Insertion: atraumatic    **See FULL LEEANNA report in patient's chart via [facility]**

## 2023-10-26 NOTE — CATH LAB
Electrophysiology Procedure Note  [facility]    Indication:Recurrent atrial fibrillation/flutter    Procedure Performed: 1. Atrial fibrillation ablation 2. Advanced mapping using CARTO system  3. Transesophageal echocardiography 4. Intracardiac echocardiography 5. Esophageal temperature monitoring 6. Intraarterial blood pressure monitoring 7. Frequent ACT's 8. Additional ablation secondary arrhythmia 9.  Additional ablation of secondary cause of atrial fibrillation    Physician(s): PHYLICIA Steele M.D.     Resident/Assistant(s): None     Anesthesia: General endotracheal anesthetic.    Anaesthesiologist: Dr. Jonas Thomas    Specimen(s) Removed: None     Estimated Blood Loss:  30cc     Complications:  None    Pre-procedure ECG: Atrial fibrillation    Post Procedure ECG: Sinus rhythm    Description of Procedure:   After informed written consent, the patient was brought to the EP lab in the fasting, non-sedated state. The patient was prepped and draped in the usual sterile fashion.  LEEANNA showed no evidence of LA clot. Arterial line placed for monitoring. Esophageal temperature probe placed and monitored.  Femoral venous access was obtained using the modified Seldinger technique. In the right femoral vein, 3 sheaths (8,8, 6 Fr) were inserted over 0.35” guidewires. In the left femoral vein,  1 sheath (8 Fr) were inserted over 0.35” guidewires. A deflectable decapolar catheter was advanced to the CS position.   An intracardiac echo (ICE) catheter was advanced to the right atrium. ICE was used to identify the atrial septum, left atrial appendage, and pulmonary veins and marcelle the anatomic structures to superimpose on our 3D electroanatomic map using CARTOSound. During the procedure, ICE was utilized to localize the ablation catheter, monitor for thrombus formation, and to exclude pericardial effusion. Intravenous heparin was administered to maintain -350 seconds. Single transseptal left heart  "catheterization was performed under intracardiac echo and hemodynamic guidance. A Brandt needle was inserted into the 8 Fr sheath (TorFlex)for transseptal puncture and placement of sheath in the left atrium. Sheath was switched to a Vizigo sheath. Advanced mapping of the pulmonary veins and left atrium was performed using CARTO3 FAM  and a deflectable multipolar mapping catheter (Biosense OctaRay) using the CARTO system. Difficult transseptal cath. Difficulty maneuvering in LA. Baseline atrial fibrillation.  Pulmonary veins were isolated at baseline.  The patient received DC cardioversion.  Patient went into typical right atrial flutter.  The sheath were brought back into the RA. The RA isthmus was rechecked and showed conduction thru isthmus.  A OctaRay was advanced into the right atrium for mapping of the flutter. A HIS cloud was performed. 8Fr saline irrigated ablation catheter with 3.5mm distal electrode and location sensor for the CARTO system (Biosense ST/ SF F/J) was used for RFA of right atrial isthmus at 40 brewster. During ablation while CS pacing we saw bilateral isthmus block. Pacing on both sides of the isthmus confirmed block.  The medium-curve Vizigo was brought  into the right atrium. The Ablation catheter was inserted through this sheath and the CS was cannulated, following this the Vizigo was advanced  into the CS os. A 5F Glide Cath was inserted into the Vizigo. Used a Glide Wire 0.32\" the CS was cannulated with this Glide Cath and advanced into posterolateral CS. A contrast injection through the LIMA revealed a large and patent Vein of Manny. Using a 0.014\" Whisper Wire, the Vein of Manny was wired, and a 2.0 mm balloon (Medtronic sprinter balloon  OTW 6 mm) was advanced into the os of the vein.  This balloon was inflated to nominal setting 6 SANTA and contrast injection demonstrated occlusion of the vein. At this time 5cc of ethanol 99% were injected through the balloon tip into the " vein over 60 seconds times two. After a 60 second waiting period contrast was once more injected demonstrating distal staining. The balloon and glide cath were removed at this time. Trans-septal access was re-attained using the ablation catheter and Vizigo sheath.  Significant loss of voltage in vein of Manny location.  Further ablation on the ventricular side of the mitral isthmus at 40 W.  At the end of the procedure, heparin was reversed with protamine, the catheter and sheaths were removed, and hemostasis was achieved with Vascade. Following recovery from anesthesia, the patient was transferred to recovery.      Total ablation time: 463 seconds    Fluoroscopy time: 196 mGy      Electrophysiologic Findings:    1.  Atrial fibrillation .  2.  Difficult transseptal catheterization with single transseptal catheterization.  3.  Right atrial isthmus ablation.  4.  Vein of Manny alcohol ablation.  5.  Further ablation along the mitral isthmus on the ventricular side.  6.  Start amiodarone    Impressions:    1.  Persistent atrial fibrillation.    2.  Ablation as above    Recommendations:  1. Resume anticoagulation.  2. Monitored bedrest.

## 2023-10-26 NOTE — ANESTHESIA POSTPROCEDURE EVALUATION
Patient: Chinyere Priest    Procedure Summary     Date: 10/26/23 Room / Location: [facility] Imaging - Cath Lab [facility]    Anesthesia Start: 1208 Anesthesia Stop: 1458    Procedure: CL-EP ABLATION AFIB-AFLUTTER Diagnosis:       Atrial flutter, unspecified type (HCC)      PAF (paroxysmal atrial fibrillation) (HCC)      Unspecified atrial flutter      (See Associated Dx)    Scheduled Providers: Augusto Steele M.D.; Jonas Thomas M.D. Responsible Provider: Jonas Thomas M.D.    Anesthesia Type: general ASA Status: 3          Final Anesthesia Type: general  Last vitals  BP   Blood Pressure : 112/61    Temp   36.3 °C (97.4 °F)    Pulse   (!) 59   Resp   14    SpO2   92 %      Anesthesia Post Evaluation    Patient location during evaluation: PACU  Patient participation: complete - patient participated  Level of consciousness: awake  Pain score: 1    Airway patency: patent  Anesthetic complications: no  Cardiovascular status: adequate  Respiratory status: acceptable  Hydration status: stable    PONV: controlled          No notable events documented.     Nurse Pain Score: 0 (NPRS)

## 2023-10-26 NOTE — DISCHARGE INSTRUCTIONS
[facility] Post Ablation Patient Instructions:  No lifting > 10 lbs x 1 week.      No soaking in baths, hot tubs, pools x 1 week.  May shower the day after discharge and take off groin dressings and leave  sites uncovered.  Continue to monitor sites daily for warmth, redness, discolored drainage.  It is common to have a small lump in the area where the cather was (usually the size of a marble); this will go away but takes approximately 6 weeks to normalize.     3.   Please take all medications as prescribed to you; please do not stop any medications prescribed post ablation unless directed by your healthcare provider.      4.   Please do not miss any doses of your blood thinner (if you have been started on, or take chronic blood thinners) without discussion with your healthcare provider first.     5.   Please walk and take deep breaths after discharge.  After discharge, if you experience neurological changes/signs of stroke or high fever you should be seen in the emergency dept.     6.   It is possible you may experience some chest discomfort or chest tightness post ablation.  This is usually secondary to inflammation and irritation of the tissues at the area of the ablation.  If this occurs, it is advised to try 400 mg of Ibuprofen with food as needed up to three times a day for a maximum of two days.  This should help to decrease pain and tissue inflammation.          **Please notify the office ([phone]) if this occurs.         ** DO NOT TAKE Ibuprofen IF HISTORY OF ALLERGY, SIGNIFICANT BLEEDING OR KIDNEY DISEASE WITHOUT DISCUSSING WITH YOUR CARDIOLOGY PROVIDER FIRST.          ** If pain becomes severe or you have additional symptoms you may need to be medically evaluated; please contact the cardiology office ([phone]) for further direction.     7. It is possible that you may experience arrhythmia/Atrial Fibrillation post ablation.  This is secondary to irritation and  inflammation of the cardiac tissues from the ablation.  If you have atrial fibrillation all day or feel poorly with it, please notify your cardiologist's via phone ([phone]) or mychart.      8.  Please contact call our office ([phone]) or message via [employer] message if you have any questions or concerns post procedurally.    9. You need to be seen for post ablation follow up 3-4 weeks post procedure. An appointment is scheduled for you.  Please contact the office ([phone]) if you need to change your appointment.

## 2023-10-26 NOTE — ANESTHESIA PROCEDURE NOTES
Arterial Line    Performed by: Jonas Thomas M.D.  Authorized by: Jonas Thomas M.D.    Start Time:  10/26/2023 12:22 PM  Localization: ultrasound guidance and surface landmarks    Patient Location:  OR  Indication: continuous blood pressure monitoring        Catheter Size:  20 G  Seldinger Technique?: Yes    Laterality:  Right  Site:  Radial artery  Line Secured:  Antimicrobial disc, tape and transparent dressing  Events: patient tolerated procedure well with no complications

## 2023-10-26 NOTE — OR NURSING
1455: Pt arrived from cath lab post afib ablation under anesthesia. BL groin sights are CDI and soft; pedal pulses present. Cardiac rhythm appears to be SB. Pt denies pain or nausea.     1540: Report to CAILIN Mijares. RN updated pt's daughter, April.     1548: Pt to [id number] via [facility] with CAILIN Tsang. Pt on a monitor for transport.

## 2023-10-27 ENCOUNTER — PHARMACY VISIT (OUTPATIENT)
Dept: PHARMACY | Facility: MEDICAL CENTER | Age: 68
End: 2023-10-27
Payer: COMMERCIAL

## 2023-10-27 VITALS
RESPIRATION RATE: 18 BRPM | HEART RATE: 61 BPM | DIASTOLIC BLOOD PRESSURE: 63 MMHG | SYSTOLIC BLOOD PRESSURE: 132 MMHG | WEIGHT: 161.6 LBS | HEIGHT: 66 IN | BODY MASS INDEX: 25.97 KG/M2 | TEMPERATURE: 97.7 F | OXYGEN SATURATION: 95 %

## 2023-10-27 LAB
ALBUMIN SERPL BCP-MCNC: 3.9 G/DL (ref 3.2–4.9)
ALBUMIN/GLOB SERPL: 1.6 G/DL
ALP SERPL-CCNC: 62 U/L (ref 30–99)
ALT SERPL-CCNC: 22 U/L (ref 2–50)
ANION GAP SERPL CALC-SCNC: 11 MMOL/L (ref 7–16)
AST SERPL-CCNC: 42 U/L (ref 12–45)
BILIRUB SERPL-MCNC: 0.6 MG/DL (ref 0.1–1.5)
BUN SERPL-MCNC: 21 MG/DL (ref 8–22)
CALCIUM ALBUM COR SERPL-MCNC: 9 MG/DL (ref 8.5–10.5)
CALCIUM SERPL-MCNC: 8.9 MG/DL (ref 8.5–10.5)
CHLORIDE SERPL-SCNC: 108 MMOL/L (ref 96–112)
CO2 SERPL-SCNC: 22 MMOL/L (ref 20–33)
CREAT SERPL-MCNC: 1.16 MG/DL (ref 0.5–1.4)
EKG IMPRESSION: NORMAL
ERYTHROCYTE [DISTWIDTH] IN BLOOD BY AUTOMATED COUNT: 46.5 FL (ref 35.9–50)
GFR SERPLBLD CREATININE-BSD FMLA CKD-EPI: 51 ML/MIN/1.73 M 2
GLOBULIN SER CALC-MCNC: 2.5 G/DL (ref 1.9–3.5)
GLUCOSE SERPL-MCNC: 196 MG/DL (ref 65–99)
HCT VFR BLD AUTO: 33.2 % (ref 37–47)
HGB BLD-MCNC: 11 G/DL (ref 12–16)
MCH RBC QN AUTO: 31.6 PG (ref 27–33)
MCHC RBC AUTO-ENTMCNC: 33.1 G/DL (ref 32.2–35.5)
MCV RBC AUTO: 95.4 FL (ref 81.4–97.8)
PLATELET # BLD AUTO: 144 K/UL (ref 164–446)
PMV BLD AUTO: 11.8 FL (ref 9–12.9)
POTASSIUM SERPL-SCNC: 4.1 MMOL/L (ref 3.6–5.5)
PROT SERPL-MCNC: 6.4 G/DL (ref 6–8.2)
RBC # BLD AUTO: 3.48 M/UL (ref 4.2–5.4)
SODIUM SERPL-SCNC: 141 MMOL/L (ref 135–145)
WBC # BLD AUTO: 5.7 K/UL (ref 4.8–10.8)

## 2023-10-27 PROCEDURE — 85027 COMPLETE CBC AUTOMATED: CPT

## 2023-10-27 PROCEDURE — 36415 COLL VENOUS BLD VENIPUNCTURE: CPT

## 2023-10-27 PROCEDURE — 93657 TX L/R ATRIAL FIB ADDL: CPT | Performed by: INTERNAL MEDICINE

## 2023-10-27 PROCEDURE — 80053 COMPREHEN METABOLIC PANEL: CPT

## 2023-10-27 PROCEDURE — 93005 ELECTROCARDIOGRAM TRACING: CPT | Performed by: INTERNAL MEDICINE

## 2023-10-27 PROCEDURE — 93655 ICAR CATH ABLTJ DSCRT ARRHYT: CPT | Performed by: INTERNAL MEDICINE

## 2023-10-27 PROCEDURE — 93010 ELECTROCARDIOGRAM REPORT: CPT | Performed by: INTERNAL MEDICINE

## 2023-10-27 PROCEDURE — 700102 HCHG RX REV CODE 250 W/ 637 OVERRIDE(OP): Performed by: INTERNAL MEDICINE

## 2023-10-27 PROCEDURE — RXMED WILLOW AMBULATORY MEDICATION CHARGE: Performed by: NURSE PRACTITIONER

## 2023-10-27 PROCEDURE — A9270 NON-COVERED ITEM OR SERVICE: HCPCS | Performed by: INTERNAL MEDICINE

## 2023-10-27 PROCEDURE — G0378 HOSPITAL OBSERVATION PER HR: HCPCS

## 2023-10-27 PROCEDURE — 93656 COMPRE EP EVAL ABLTJ ATR FIB: CPT | Performed by: INTERNAL MEDICINE

## 2023-10-27 RX ORDER — AMIODARONE HYDROCHLORIDE 200 MG/1
200 TABLET ORAL 2 TIMES DAILY
Qty: 60 TABLET | Refills: 3 | Status: SHIPPED | OUTPATIENT
Start: 2023-10-27 | End: 2023-11-28 | Stop reason: SDUPTHER

## 2023-10-27 RX ADMIN — LOSARTAN POTASSIUM 50 MG: 25 TABLET, FILM COATED ORAL at 05:20

## 2023-10-27 RX ADMIN — AMIODARONE HYDROCHLORIDE 200 MG: 200 TABLET ORAL at 05:18

## 2023-10-27 RX ADMIN — POTASSIUM CHLORIDE 10 MEQ: 750 TABLET, EXTENDED RELEASE ORAL at 05:18

## 2023-10-27 RX ADMIN — ATORVASTATIN CALCIUM 10 MG: 10 TABLET, FILM COATED ORAL at 05:19

## 2023-10-27 RX ADMIN — APIXABAN 5 MG: 5 TABLET, FILM COATED ORAL at 05:19

## 2023-10-27 ASSESSMENT — FIBROSIS 4 INDEX: FIB4 SCORE: 4.29

## 2023-10-27 ASSESSMENT — PAIN DESCRIPTION - PAIN TYPE: TYPE: ACUTE PAIN

## 2023-10-27 NOTE — PROGRESS NOTES
Monitor Summary  SB-SR with first degree HB  Ectopy: PVC, PAC, Couplet  Rate: 53-78  .22/.10/.43

## 2023-10-27 NOTE — PROGRESS NOTES
4 Eyes Skin Assessment Completed by CAILIN Mijares and CAILIN Padilla.    Head WDL  Ears WDL  Nose WDL  Mouth WDL  Neck WDL  Breast/Chest WDL  Shoulder Blades WDL  Spine WDL  (R) Arm/Elbow/Hand WDL  (L) Arm/Elbow/Hand WDL  Abdomen WDL  Groin WDL  Scrotum/Coccyx/Buttocks Redness and Blanching  (R) Leg Edema  (L) Leg Edema  (R) Heel/Foot/Toe Redness and Blanching  (L) Heel/Foot/Toe Redness and Blanching          Devices In Places Tele Box, Blood Pressure Cuff, and Pulse Ox      Interventions In Place Pillows and Pressure Redistribution Mattress    Possible Skin Injury No    Pictures Uploaded Into Epic N/A  Wound Consult Placed N/A  RN Wound Prevention Protocol Ordered No

## 2023-10-27 NOTE — CARE PLAN
The patient is Stable - Low risk of patient condition declining or worsening    Shift Goals  Clinical Goals: Monitor heart rate/rhythm    Progress made toward(s) clinical / shift goals:      Problem: Knowledge Deficit - Standard  Goal: Patient and family/care givers will demonstrate understanding of plan of care, disease process/condition, diagnostic tests and medications  Outcome: Progressing  Note: Patient updated on plan of care. All questions answered. Patient verbalized understanding. No further questions at this time.         Patient is not progressing towards the following goals:

## 2023-10-27 NOTE — PROGRESS NOTES
Assumed care of patient at bedside report from day shift RN. Updated on POC. Patient currently A & O x 4; on RA; pt SR on monitor; Call light within reach. Whiteboard updated. Fall precautions in place. Bed locked and in lowest position. All questions answered. No other needs indicated at this time.

## 2023-10-27 NOTE — DISCHARGE SUMMARY
Discharge Summary    CHIEF COMPLAINT ON ADMISSION  Fatigue and SOB       Reason for Admission  Unspecified atrial flutter     Admission Date  10/26/2023    CODE STATUS  Full Code    HPI & HOSPITAL COURSE  This is a 68 y.o. female here with medical history of atrial flutter and afib with 2 previous ablations for persistent atrial fibrillation . Underwent  Difficult transseptal catheterization with single transseptal catheterization. Right atrial isthmus ablation. Vein of Manny alcohol ablation. Further ablation along the mitral isthmus on the ventricular side. Started on amiodarone and stop the flecainide.     Overnight, patient was observed. EKG showed NSR this morning. Vs stable. Access site is soft and good peripheral pulse.  Therefore, she is discharged in good and stable condition to home with close outpatient follow-up.    The patient recovered much more quickly than anticipated on admission.    Discharge Date  10/27/2023    DISCHARGE DIAGNOSES  Principal Problem:    Paroxysmal atrial fibrillation (HCC) (POA: Yes)  Resolved Problems:    * No resolved hospital problems. *      FOLLOW UP  Future Appointments   Date Time Provider Department [place]   11/14/2023 11:20 AM Eliceo Guzman M.D. [facility]   11/28/2023 11:20 AM Augusto Steele M.D. [facility] None     No follow-up provider specified.       Medication List        START taking these medications        Instructions   amiodarone 200 MG Tabs  Commonly known as: Cordarone   Take 1 Tablet by mouth 2 times a day.  Dose: 200 mg            CHANGE how you take these medications        Instructions   atorvastatin 10 MG Tabs  What changed: when to take this  Commonly known as: Lipitor   TAKE 1 TABLET BY MOUTH EVERY DAY     losartan 50 MG Tabs  What changed: when to take this  Commonly known as: Cozaar   Take 1 Tablet by mouth every day.  Dose: 50 mg            CONTINUE taking these medications        Instructions   albuterol 108 (90 Base) MCG/ACT Aers  inhalation aerosol   Inhale 2 Puffs every 6 hours as needed for Shortness of Breath.  Dose: 2 Puff     CHOLECALCIFEROL PO   Take 2 Capsules by mouth every day. 4000 Vit D  Dose: 2 Capsule     clobetasol 0.05 % Oint  Commonly known as: Temovate   APPLY 1 APPLICATION TOPICALLY 2 TIMES A DAY AS NEEDED (SKIN RASH ON LOWER LEGS).  Dose: 1 Application      Eliquis 5mg Tabs  Generic drug: apixaban   TAKE 1 TABLET BY MOUTH TWICE A DAY     furosemide 20 MG Tabs  Commonly known as: Lasix   Take 1 Tablet by mouth every day.  Dose: 20 mg     MULTIVITAMIN ADULT PO   Take  by mouth.     potassium chloride ER 10 MEQ tablet  Commonly known as: Klor-Con 10   Take 1 Tablet by mouth every day.  Dose: 10 mEq            STOP taking these medications      flecainide 50 MG tablet  Commonly known as: Tambocor               Allergies  Allergies   Allergen Reactions    Erythromycin Unspecified     Severe Abdominal pain    Tikosyn [Dofetilide] Diarrhea     diarrhea    Latex Hives     rash    Tape Hives       Paper tape okay       DIET  Orders Placed This Encounter   Procedures    Diet Order Diet: Cardiac     Standing Status:   Standing     Number of Occurrences:   1     Order Specific Question:   Diet:     Answer:   Cardiac [6]       ACTIVITY  [facility] Post Ablation Patient Instructions:  No lifting > 10 lbs x 1 week.    No soaking in baths, hot tubs, pools x 1 week.  May shower the day after discharge and take off groin dressings and leave uncovered.  Continue to monitor sites daily for warmth, redness, discolored drainage.  It is common to have a small lump in the area where the cather was (usually the size of a small marble); this will go away but takes approximately 6 weeks to normalize.   3.   Please take all medications as prescribed to you; please do not stop any medications prescribed post ablation unless directed by your healthcare provider.    4.   Please do not miss any doses of your blood  thinner (if you have been started on, or take chronic blood thinners) without discussion with your healthcare provider first.   5.   Please walk and take deep breaths after discharge.  After discharge, if  experiences neurological changes/signs of stroke, high fever, you should be seen in the emergency dept.   6.   It is possible you may experience some chest discomfort or chest tightness post ablation.  This is usually secondary to inflammation and irritation of the tissues at the area of the ablation.  If this occurs, it is advised to try 400 mg of Ibuprofen with food as needed up to three times a day for a maximum of two days.  This should help to decrease pain and tissue inflammation.          **Please notify the office ([phone]) if this occurs.         ** DO NOT TAKE Ibuprofen IF HISTORY OF SIGNIFICANT BLEEDING OR KIDNEY DISEASE WITHOUT DISCUSSING WITH YOUR CARDIOLOGY PROVIDER FIRST.          ** If pain becomes severe or you have additional symptoms you may need to be medically evaluated; please contact the cardiology office ([phone]) for further direction.   7. It is possible that you may experience arrhythmia/Atrial Fibrillation post ablation.  This is secondary to irritation and inflammation of the cardiac tissues from the ablation.  If you have atrial fibrillation all day or feel poorly with it, please notify your cardiologist's via phone ([phone]) or [employer].    8.  Please contact call our office ([phone]) or message via [employer] message if you have any questions or concerns post procedurally.  9. You need to be seen for post ablation follow up 2-4 weeks post procedure.  If you do not have a follow up appointment scheduled, please call [phone] to schedule your follow up appointment.          PROCEDURES  10/26/2023  Electrophysiologic Findings:    1.  Atrial fibrillation .  2.  Difficult transseptal catheterization with single transseptal catheterization.  3.  Right atrial isthmus  ablation.  4.  Vein of Manny alcohol ablation.  5.  Further ablation along the mitral isthmus on the ventricular side.  6.  Start amiodarone     Impressions:    1.  Persistent atrial fibrillation.    2.  Ablation as above    LABORATORY  Lab Results   Component Value Date    SODIUM 141 10/27/2023    POTASSIUM 4.1 10/27/2023    CHLORIDE 108 10/27/2023    CO2 22 10/27/2023    GLUCOSE 196 (H) 10/27/2023    BUN 21 10/27/2023    CREATININE 1.16 10/27/2023        Lab Results   Component Value Date    WBC 5.7 10/27/2023    HEMOGLOBIN 11.0 (L) 10/27/2023    HEMATOCRIT 33.2 (L) 10/27/2023    PLATELETCT 144 (L) 10/27/2023        Total time of the discharge process exceeds 25 minutes.

## 2023-11-14 ENCOUNTER — OFFICE VISIT (OUTPATIENT)
Dept: MEDICAL GROUP | Facility: MEDICAL CENTER | Age: 68
End: 2023-11-14
Payer: MEDICARE

## 2023-11-14 VITALS
WEIGHT: 156.53 LBS | OXYGEN SATURATION: 96 % | HEIGHT: 66 IN | HEART RATE: 61 BPM | RESPIRATION RATE: 16 BRPM | TEMPERATURE: 98 F | BODY MASS INDEX: 25.16 KG/M2 | SYSTOLIC BLOOD PRESSURE: 130 MMHG | DIASTOLIC BLOOD PRESSURE: 70 MMHG

## 2023-11-14 DIAGNOSIS — D68.69 SECONDARY HYPERCOAGULABLE STATE (HCC): ICD-10-CM

## 2023-11-14 DIAGNOSIS — N18.31 STAGE 3A CHRONIC KIDNEY DISEASE: ICD-10-CM

## 2023-11-14 DIAGNOSIS — Z98.890 S/P CARDIAC CATHETERIZATION: ICD-10-CM

## 2023-11-14 DIAGNOSIS — L20.84 INTRINSIC ECZEMA: ICD-10-CM

## 2023-11-14 DIAGNOSIS — I70.0 AORTIC CALCIFICATION (HCC): ICD-10-CM

## 2023-11-14 DIAGNOSIS — Z87.891 HISTORY OF SMOKING 10-25 PACK YEARS: ICD-10-CM

## 2023-11-14 DIAGNOSIS — Z00.00 MEDICARE ANNUAL WELLNESS VISIT, INITIAL: ICD-10-CM

## 2023-11-14 DIAGNOSIS — I27.20 PULMONARY HYPERTENSION (HCC): ICD-10-CM

## 2023-11-14 DIAGNOSIS — I48.92 ATRIAL FLUTTER, UNSPECIFIED TYPE (HCC): ICD-10-CM

## 2023-11-14 DIAGNOSIS — Z80.41 FAMILY HISTORY OF MALIGNANT NEOPLASM OF OVARY: ICD-10-CM

## 2023-11-14 DIAGNOSIS — Z86.79 S/P ABLATION OF ATRIAL FIBRILLATION: ICD-10-CM

## 2023-11-14 DIAGNOSIS — E55.9 VITAMIN D DEFICIENCY: Chronic | ICD-10-CM

## 2023-11-14 DIAGNOSIS — E78.00 HYPERCHOLESTEROLEMIA: ICD-10-CM

## 2023-11-14 DIAGNOSIS — M50.30 DDD (DEGENERATIVE DISC DISEASE), CERVICAL: ICD-10-CM

## 2023-11-14 DIAGNOSIS — D64.9 ANEMIA, UNSPECIFIED TYPE: ICD-10-CM

## 2023-11-14 DIAGNOSIS — Z12.2 SCREENING FOR LUNG CANCER: ICD-10-CM

## 2023-11-14 DIAGNOSIS — Z12.31 ENCOUNTER FOR SCREENING MAMMOGRAM FOR MALIGNANT NEOPLASM OF BREAST: ICD-10-CM

## 2023-11-14 DIAGNOSIS — I10 ESSENTIAL HYPERTENSION, BENIGN: ICD-10-CM

## 2023-11-14 DIAGNOSIS — R73.03 PREDIABETES: ICD-10-CM

## 2023-11-14 DIAGNOSIS — Z98.890 S/P ABLATION OF ATRIAL FIBRILLATION: ICD-10-CM

## 2023-11-14 DIAGNOSIS — M51.36 DEGENERATIVE DISC DISEASE, LUMBAR: ICD-10-CM

## 2023-11-14 DIAGNOSIS — I34.0 NON-RHEUMATIC MITRAL REGURGITATION: ICD-10-CM

## 2023-11-14 DIAGNOSIS — J45.20 MILD INTERMITTENT ASTHMA IN ADULT WITHOUT COMPLICATION: ICD-10-CM

## 2023-11-14 DIAGNOSIS — I48.0 PAROXYSMAL ATRIAL FIBRILLATION (HCC): ICD-10-CM

## 2023-11-14 DIAGNOSIS — Z80.3 FAMILY HISTORY OF MALIGNANT NEOPLASM OF BREAST: ICD-10-CM

## 2023-11-14 DIAGNOSIS — M16.0 PRIMARY OSTEOARTHRITIS OF BOTH HIPS: ICD-10-CM

## 2023-11-14 DIAGNOSIS — K57.90 DIVERTICULOSIS: ICD-10-CM

## 2023-11-14 PROBLEM — R10.30 LOWER ABDOMINAL PAIN: Status: RESOLVED | Noted: 2023-08-11 | Resolved: 2023-11-14

## 2023-11-14 PROBLEM — I83.899 BLEEDING FROM VARICOSE VEIN: Status: RESOLVED | Noted: 2020-02-26 | Resolved: 2023-11-14

## 2023-11-14 PROBLEM — R60.0 LOCALIZED EDEMA: Status: RESOLVED | Noted: 2019-09-09 | Resolved: 2023-11-14

## 2023-11-14 PROBLEM — R07.81 RIB PAIN ON LEFT SIDE: Status: RESOLVED | Noted: 2021-02-12 | Resolved: 2023-11-14

## 2023-11-14 PROCEDURE — 3078F DIAST BP <80 MM HG: CPT | Performed by: FAMILY MEDICINE

## 2023-11-14 PROCEDURE — 99214 OFFICE O/P EST MOD 30 MIN: CPT | Mod: 25 | Performed by: FAMILY MEDICINE

## 2023-11-14 PROCEDURE — G0438 PPPS, INITIAL VISIT: HCPCS | Performed by: FAMILY MEDICINE

## 2023-11-14 PROCEDURE — 3075F SYST BP GE 130 - 139MM HG: CPT | Performed by: FAMILY MEDICINE

## 2023-11-14 ASSESSMENT — ENCOUNTER SYMPTOMS
WHEEZING: 0
DIARRHEA: 0
FEVER: 0
SHORTNESS OF BREATH: 0
GENERAL WELL-BEING: GOOD
CONSTIPATION: 0
COUGH: 0
CHILLS: 0
VOMITING: 0
PALPITATIONS: 0
NAUSEA: 0
ABDOMINAL PAIN: 1

## 2023-11-14 ASSESSMENT — PATIENT HEALTH QUESTIONNAIRE - PHQ9: CLINICAL INTERPRETATION OF PHQ2 SCORE: 0

## 2023-11-14 ASSESSMENT — FIBROSIS 4 INDEX: FIB4 SCORE: 4.29

## 2023-11-14 ASSESSMENT — ACTIVITIES OF DAILY LIVING (ADL): BATHING_REQUIRES_ASSISTANCE: 0

## 2023-11-14 NOTE — ASSESSMENT & PLAN NOTE
Chronic problem, unstable, likely having exacerbation, recommended to continue liquid diet.  If she is not feeling better by Thursday or Friday, recommended to contact us on MyChart will prescribe antibiotics.

## 2023-11-14 NOTE — PROGRESS NOTES
Chief Complaint   Patient presents with    Annual Exam       HPI:  Chinyere Priest is a 68 y.o. here for Medicare Annual Wellness Visit     Patient Active Problem List    Diagnosis Date Noted    Paroxysmal atrial fibrillation (HCC) 10/26/2023    Unspecified atrial flutter (HCC) 08/17/2023    Anemia 08/16/2021    Prediabetes 03/18/2021    Stage 3a chronic kidney disease (HCC) 09/14/2020    Intrinsic eczema 01/20/2020    S/P ablation of atrial fibrillation 06/20/2019    Secondary hypercoagulable state (HCC) 10/30/2017    Hypercholesterolemia 06/19/2017    S/P cardiac catheterization 05/26/2017    Non-rheumatic mitral regurgitation 05/25/2017    Essential hypertension, benign 05/25/2017    Degenerative disc disease, lumbar 01/25/2017    Family history of ovarian cancer 11/21/2016    Family history of breast cancer 11/21/2016    Pulmonary hypertension (HCC) 08/28/2015    Aortic calcification (HCC) 05/15/2015    Diverticulosis 05/15/2015    Osteoarthritis of both hips 12/10/2014    Asthma in adult without complication 01/13/2014    DDD (degenerative disc disease), cervical 01/19/2012    Vitamin D deficiency 07/01/2010       Current Outpatient Medications   Medication Sig Dispense Refill    amiodarone (CORDARONE) 200 MG Tab Take 1 Tablet by mouth 2 times a day. 60 Tablet 3    Multiple Vitamin (MULTIVITAMIN ADULT PO) Take  by mouth.      ELIQUIS 5 MG Tab TAKE 1 TABLET BY MOUTH TWICE A DAY 60 Tablet 8    atorvastatin (LIPITOR) 10 MG Tab TAKE 1 TABLET BY MOUTH EVERY DAY (Patient taking differently: Take 10 mg by mouth every morning.) 90 Tablet 2    clobetasol (TEMOVATE) 0.05 % Ointment APPLY 1 APPLICATION TOPICALLY 2 TIMES A DAY AS NEEDED (SKIN RASH ON LOWER LEGS). 45 g 2    losartan (COZAAR) 50 MG Tab Take 1 Tablet by mouth every day. (Patient taking differently: Take 50 mg by mouth every morning.) 90 Tablet 3    furosemide (LASIX) 20 MG Tab Take 1 Tablet by mouth every day. 90 Tablet 3    albuterol 108 (90 Base)  MCG/ACT Aero Soln inhalation aerosol Inhale 2 Puffs every 6 hours as needed for Shortness of Breath. 8.5 g 11    potassium chloride ER (KLOR-CON 10) 10 MEQ tablet Take 1 Tablet by mouth every day. 90 Tablet 3    CHOLECALCIFEROL PO Take 2 Capsules by mouth every day. 4000 Vit D       No current facility-administered medications for this visit.          Current supplements as per medication list.     Allergies: Erythromycin, Tikosyn [dofetilide], Latex, and Tape    Current social contact/activities: Spends time with family and friends    She  reports that she quit smoking about 13 years ago. Her smoking use included cigarettes. She started smoking about 53 years ago. She has a 20.0 pack-year smoking history. She has never used smokeless tobacco. She reports that she does not drink alcohol and does not use drugs.  Counseling given: Not Answered      ROS:    Gait: Uses no assistive device  Ostomy: No  Other tubes: No  Amputations: No  Chronic oxygen use: No  Last eye exam: SEP 2023  Wears hearing aids: No   : Denies any urinary leakage during the last 6 months    Screening:    Depression Screening  Little interest or pleasure in doing things?  0 - not at all  Feeling down, depressed , or hopeless? 0 - not at all    If depressive symptoms identified deferred to follow up visit unless specifically addressed in assessment and plan.    Interpretation of PHQ-9 Total Score   Score Severity   1-4 No Depression   5-9 Mild Depression   10-14 Moderate Depression   15-19 Moderately Severe Depression   20-27 Severe Depression    Screening for Cognitive Impairment  Do you or any of your friends or family members have any concern about your memory? No  Three Minute Recall (Banana, Sunrise, Chair) 3/3    Steve clock face with all 12 numbers and set the hands to show 20 past 8.  Yes    Cognitive concerns identified deferred for follow up unless specifically addressed in assessment and plan.    Fall Risk Assessment  Has the patient had  two or more falls in the last year or any fall with injury in the last year?  No    Safety Assessment  Do you always wear your seatbelt?  Yes  Any changes to home needed to function safely? No  Difficulty hearing.  No  Patient counseled about all safety risks that were identified.    Functional Assessment ADLs  Are there any barriers preventing you from cooking for yourself or meeting nutritional needs?  No.    Are there any barriers preventing you from driving safely or obtaining transportation?  No.    Are there any barriers preventing you from using a telephone or calling for help?  No    Are there any barriers preventing you from shopping?  No.    Are there any barriers preventing you from taking care of your own finances?  No    Are there any barriers preventing you from managing your medications?  No    Are there any barriers preventing you from showering, bathing or dressing yourself? No    Are there any barriers preventing you from doing housework or laundry? No    Are there any barriers preventing you from using the toilet?No    Are you currently engaging in any exercise or physical activity?  No.      Self-Assessment of Health  What is your perception of your health? Good    Do you sleep more than six hours a night? Yes    In the past 7 days, how much did pain keep you from doing your normal work? Some    Do you spend quality time with family or friends (virtually or in person)? Yes    Do you usually eat a heart healthy diet that constists of a variety of fruits, vegetables, whole grains and fiber? Yes    Do you eat foods high in fat and/or Fast Food more than three times per week? No    How concerned are you that your medical conditions are not being well managed? Not at all        Advance Care Planning  Do you have an Advance Directive, Living Will, Durable Power of , or POLST? No                 Health Maintenance Summary            Overdue - COVID-19 Vaccine (2 - Pfizer series) Overdue since  4/11/2023 12/11/2022  Imm Admin: MODERNA BIVALENT BOOSTER SARS-COV-2 VACCINE (6+)    11/13/2021  Imm Admin: MODERNA SARS-COV-2 VACCINE (12+)    04/13/2021  Imm Admin: MODERNA SARS-COV-2 VACCINE (12+)    03/16/2021  Imm Admin: MODERNA SARS-COV-2 VACCINE (12+)              Ordered - Lung Cancer Screening (Yearly) Ordered on 11/14/2023 07/18/2022  CT-CHEST (THORAX) W/O    01/27/2016  CT-CTA CHEST PULMONARY ARTERY W/ RECONS              Ordered - Mammogram (Every 2 Years) Ordered on 11/14/2023 09/06/2022  MA-SCREENING MAMMO BILAT W/TOMOSYNTHESIS W/CAD    02/01/2021  MA-SCREENING MAMMO BILAT W/TOMOSYNTHESIS W/CAD    09/11/2018  MA-SCREEN MAMMO W/CAD-BILAT    05/21/2015  MA-SCREENING MAMMOGRAM W/ CAD    01/20/2014  MA-SCREEING MAMMOGRAM W/ CAD    Only the first 5 history entries have been loaded, but more history exists.              Annual Wellness Visit (Every 366 Days) Next due on 11/14/2024 11/14/2023  Visit Dx: Medicare annual wellness visit, initial    11/14/2023  Level of Service: INITIAL ANNUAL WELLNESS VISIT-INCLUDES PPPS              Colorectal Cancer Screening (Colonoscopy - Every 3 Years) Next due on 1/24/2026 01/24/2023  AMB EXTERNAL COLONOSCOPY RESULTS    01/17/2023  AMB EXTERNAL COLONOSCOPY RESULTS    12/08/2011  Colonoscopy (Previously completed - [facility], diverticulosis, repeat in 10 years)              Bone Density Scan (Every 5 Years) Next due on 9/6/2027 09/06/2022  DS-BONE DENSITY STUDY (DEXA)              IMM DTaP/Tdap/Td Vaccine (2 - Td or Tdap) Next due on 1/17/2032 01/17/2022  Imm Admin: Tdap Vaccine              Hepatitis C Screening  Tentatively Complete      09/17/2019  Hepatitis C Antibody component of HEP C VIRUS ANTIBODY              Pneumococcal Vaccine: 65+ Years (Series Information) Completed      01/17/2022  Imm Admin: Pneumococcal polysaccharide vaccine (PPSV-23)    11/21/2016  Imm Admin: Pneumococcal polysaccharide vaccine (PPSV-23)    10/16/2015  Imm  Admin: Pneumococcal Conjugate Vaccine (Prevnar/PCV-13)              Hepatitis B Vaccine (Hep B) (Series Information) Completed      2023  Imm Admin: Hepatitis B Vaccine (Adol/Adult)    2022  Imm Admin: Hepatitis B Vaccine (Adol/Adult)    2022  Imm Admin: Hepatitis B Vaccine (Adol/Adult)              Zoster (Shingles) Vaccines (Series Information) Completed      2023  Outside Immunization: Zoster Tony (Shingrix)    2023  Outside Immunization: zoster recombinant              Influenza Vaccine (Series Information) Completed      2023  Outside Immunization: Influenza Quad Adjuvanted    10/31/2022  Imm Admin: Influenza Vaccine Adult HD    2021  Imm Admin: Influenza Vaccine Adult HD    2020  Imm Admin: Influenza Vaccine Adult HD    10/02/2019  Imm Admin: Influenza Vaccine Adult HD    Only the first 5 history entries have been loaded, but more history exists.              Hepatitis A Vaccine (Hep A) (Series Information) Aged Out      No completion history exists for this topic.              HPV Vaccines (Series Information) Aged Out      No completion history exists for this topic.              Polio Vaccine (Inactivated Polio) (Series Information) Aged Out      No completion history exists for this topic.              Meningococcal Immunization (Series Information) Aged Out      No completion history exists for this topic.                    Patient Care Team:  Eliceo Guzman M.D. as PCP - General (Family Medicine)      Social History     Tobacco Use    Smoking status: Former     Current packs/day: 0.00     Average packs/day: 0.5 packs/day for 40.0 years (20.0 ttl pk-yrs)     Types: Cigarettes     Start date: 3/1/1970     Quit date: 3/1/2010     Years since quittin.7    Smokeless tobacco: Never   Vaping Use    Vaping Use: Never used   Substance Use Topics    Alcohol use: No     Alcohol/week: 0.0 oz    Drug use: No     Comment: tried marijuana once.      Family  History   Problem Relation Age of Onset    Heart Disease Mother         1st MI @ 61 yo    Hypertension Mother     Heart Attack Mother 60    Heart Disease Maternal Aunt     Breast Cancer Maternal Aunt     Cancer Daughter 35        breast    Breast Cancer Daughter     Heart Disease Maternal Grandmother     Heart Disease Maternal Grandfather     Heart Disease Maternal Aunt 56    Ovarian Cancer Other      She  has a past medical history of Aortic calcification ([facility]) ( ), Arrhythmia, Asthma in adult, Chronic anticoagulation, Chronic back pain ( ), CKD (chronic kidney disease) stage 3, GFR 30-59 ml/min ([facility]) (2020), Dental disorder, Diverticulosis ( ), High cholesterol, Hypertension, Inflamed seborrheic keratosis ( ), Intrinsic eczema (2020), Non-rheumatic mitral regurgitation (2019), Osteoarthritis of both hips ( ), Plantar fascia syndrome ( ), Plantar wart of right foot ( ), Pruritic dermatitis ( ), Pulmonary hypertension ([facility]), Pure hypercholesterolemia ( ), S/P cardiac catheterization (2017), Spider veins of limb ( ), and Unspecified vitamin D deficiency ( ).    She has no past medical history of Diabetic neuropathy ([facility]).   Past Surgical History:   Procedure Laterality Date    LUMBAR FUSION O-ARM N/A 2021    Procedure: FUSION, SPINE, LUMBAR, WITH O-ARM IMAGING GUIDANCE - STAGE #2 L3-5 UNILATERAL, LEFT-SIDED.laminotomy lumbar 3-5;  Surgeon: Sudhir Clay M.D.;  Location: [facility];  Service: Neurosurgery    FUSION, SPINE, XLIF Right 2021    Procedure: FUSION, SPINE, XLIF - STAGE #1 L3-5, RIGHT-SIDED APPROACH.;  Surgeon: Sudhir Clay M.D.;  Location: [facility];  Service: Neurosurgery    OTHER CARDIAC SURGERY      cardiac ablation x 2 , for Afibb    LUMBAR LAMINECTOMY DISKECTOMY  2009    Performed by SUDHIR CLAY at [facility] ORS    HYSTERECTOMY LAPAROSCOPY      GYN SURGERY       x2       Exam:   /70   Pulse 61   Temp 36.7  "°C (98 °F)   Resp 16   Ht 1.676 m (5' 6\")   Wt 71 kg (156 lb 8.4 oz)   SpO2 96%  Body mass index is 25.26 kg/m².    Hearing good.    Dentition good  Alert, oriented in no acute distress.  Eye contact is good, speech goal directed, affect calm    Assessment and Plan. The following treatment and monitoring plan is recommended:    1. Medicare annual wellness visit, initial    2. Stage 3a chronic kidney disease (HCC)  - Comp Metabolic Panel; Future    3. Prediabetes  - HEMOGLOBIN A1C; Future    4. Vitamin D deficiency    5. DDD (degenerative disc disease), cervical    6. Mild intermittent asthma in adult without complication    7. Primary osteoarthritis of both hips    8. Aortic calcification (HCC)    9. Diverticulosis    10. Pulmonary hypertension (HCC)    11. Essential hypertension, benign    12. Degenerative disc disease, lumbar    13. Family history of breast cancer    14. Family history of ovarian cancer    15. Non-rheumatic mitral regurgitation    16. S/P cardiac catheterization    17. Hypercholesterolemia    18. Secondary hypercoagulable state (HCC)    19. S/P ablation of atrial fibrillation    20. Intrinsic eczema    21. Anemia, unspecified type  - CBC WITH DIFFERENTIAL; Future    22. Atrial flutter, unspecified type (HCC)    23. Screening for lung cancer  - CT-LUNG CANCER-SCREENING; Future    24. History of smoking 10-25 pack years  - CT-LUNG CANCER-SCREENING; Future    25. Encounter for screening mammogram for malignant neoplasm of breast  - MA-SCREENING MAMMO BILAT W/TOMOSYNTHESIS W/CAD; Future    26. Paroxysmal atrial fibrillation (HCC)      Services suggested: No services needed at this time  Health Care Screening: Age-appropriate preventive services recommended by USPTF and ACIP covered by Medicare were discussed today. Services ordered if indicated and agreed upon by the patient.  Referrals offered: Community-based lifestyle interventions to reduce health risks and promote self-management and " wellness, fall prevention, nutrition, physical activity, tobacco-use cessation, weight loss, and mental health services as per orders if indicated.    Discussion today about general wellness and lifestyle habits:    Prevent falls and reduce trip hazards; Cautioned about securing or removing rugs.  Have a working fire alarm and carbon monoxide detector;   Engage in regular physical activity and social activities         Problem   Paroxysmal Atrial Fibrillation (Hcc)    Recently had ablation done, on Eliquis, amiodarone.  Follow-up with cardiology     Unspecified Atrial Flutter (Hcc)    Recently had ablation done, following with cardiology.     Anemia    Her recent labs showed low hemoglobin.  She recently had ablation done.  We will recheck her labs.     Prediabetes    Recent A1c came back at 5.5 which improved from previous numbers    Lab Results   Component Value Date/Time    HBA1C 5.5 08/07/2023 07:19 AM         Stage 3a Chronic Kidney Disease (Hcc)    Filtration rate of kidneys came back at 51% which reduced from previous numbers.    Lab Results   Component Value Date/Time    CREATININE 1.16 10/27/2023 01:50 AM          Intrinsic Eczema    Reports that she has eczema rash on lower extremities, uses clobetasol ointment.       S/P Ablation of Atrial Fibrillation    Times two last 12/2019.  She is on Eliquis 5 mg twice daily and on amiodarone.  Currently following with cardiology.  Recently had ablation also.     Secondary Hypercoagulable State (Hcc)    On Eliquis     Hypercholesterolemia    Last lipid panel came back in normal range..  She is currently on Lipitor 10 mg daily.  No side effects with medication.      Lab Results   Component Value Date/Time    CHOLSTRLTOT 175 08/07/2023 0719    TRIGLYCERIDE 57 08/07/2023 0719    HDL 72 08/07/2023 0719    LDL 92 08/07/2023 0719           Non-Rheumatic Mitral Regurgitation    May 2019: Echocardiogram with normal LV size, LVEF 65%. Moderate MR, mild-moderate TR.  May  , MR moderately severe, PAH worse but cath showed normal pressures and no significant MR.  2016: Echo normal LV size, mild concentric LVH, LVEF 65%. Grade I diastolic dysfunction. Trace MR, trace TR, RVSP 40mmHg.  May 2015: Echo normal LV size, mild concentric LVH, LVEF 60%. Mild RVH, RVSP 55mmHg. Mild MR, trace TR.    Echo 10/23 showed  LEEANNA for afib ablation. Normal biventricular systolic function, LVEF   65%.  Severe TR. Biatrial enlargement. Patient in afib/flutter during   the exam with HR 130s. Stable trivial pericardial effusion. Compared to   prior echo, TR is now severe.    Currently following with cardiology.     Essential Hypertension, Benign    Blood pressure today came back at 130/70.  She is taking losartan 50 mg once daily.  No side effects with medication.  Takes Lasix with potassium as needed.     Degenerative Disc Disease, Lumbar    Has history of chronic low back pain, following with orthopedics.  Recently had surgery.  Doing well currently.     Family history of ovarian cancer   Family history of breast cancer    Family history of breast cancer in her daughter at age 35, maternal aunt  of breast cancer in her 60s.  One of her maternal aunt was diagnosed with ovarian cancer.\   Denies any personal history of cancer.  Her last mammogram normal, ordered new mammogram.  [facility], [facility] with [facility]        Aortic Calcification (Hcc)    Noted incidentally on abdominal CT scan.  Currently she is on atorvastatin 10 mg once daily.    Lab Results   Component Value Date/Time    CHOLSTRLTOT 175 2023 0719    TRIGLYCERIDE 57 2023 0719    HDL 72 2023 0719    LDL 92 2023 0719         Diverticulosis    Last CT showed diverticulosis.  She has been having left lower quadrant pain since last few days but she started drinking liquid diet and feeling better today.     Osteoarthritis of Both Hips     Doing well without any medications currently.     Ddd (Degenerative Disc Disease), Cervical    Doing well currently without any medications.         Review of Systems   Constitutional:  Negative for chills and fever.   Respiratory:  Negative for cough, shortness of breath and wheezing.    Cardiovascular:  Negative for chest pain, palpitations and leg swelling.   Gastrointestinal:  Positive for abdominal pain. Negative for constipation, diarrhea, nausea and vomiting.        Physical Exam  Constitutional:       Appearance: Normal appearance. She is well-developed and well-groomed.   HENT:      Head: Normocephalic and atraumatic.   Eyes:      General:         Right eye: No discharge.         Left eye: No discharge.      Conjunctiva/sclera: Conjunctivae normal.   Cardiovascular:      Rate and Rhythm: Normal rate and regular rhythm.      Heart sounds: Normal heart sounds. No murmur heard.     No friction rub. No gallop.   Pulmonary:      Effort: Pulmonary effort is normal. No respiratory distress.      Breath sounds: Normal breath sounds. No wheezing or rales.   Abdominal:      Tenderness: There is abdominal tenderness.      Comments: Mild left lower quadrant tenderness   Neurological:      General: No focal deficit present.      Mental Status: She is alert. Mental status is at baseline.      Gait: Gait is intact.   Psychiatric:         Mood and Affect: Mood and affect normal.         Behavior: Behavior normal.          Problem List Items Addressed This Visit       Diverticulosis     Chronic problem, unstable, likely having exacerbation, recommended to continue liquid diet.  If she is not feeling better by Thursday or Friday, recommended to contact us on MyChart will prescribe antibiotics.         Stage 3a chronic kidney disease (HCC)     Chronic problem, unstable, avoid nephrotoxic agents, recheck labs in 3-month         Relevant Orders    Comp Metabolic Panel    Prediabetes     Chronic problem, stable, recheck labs in  3 months.         Relevant Orders    HEMOGLOBIN A1C    Anemia     Chronic problem, unstable, will recheck labs in 3-month         Relevant Orders    CBC WITH DIFFERENTIAL       I discussed with patient that annual physical covers screening, immunizations,counseling.  Other problem list that was addressed during this visit including diagnostic labs, diagnostic imagings, medications will be billed as a regular visit.  Patient reports understanding of this     Follow-up in 1 year for annual physical, 3 months for lab follow-up

## 2023-11-22 DIAGNOSIS — Z87.891 HISTORY OF SMOKING: ICD-10-CM

## 2023-11-22 DIAGNOSIS — R05.3 CHRONIC COUGH: ICD-10-CM

## 2023-11-28 ENCOUNTER — OFFICE VISIT (OUTPATIENT)
Dept: CARDIOLOGY | Facility: MEDICAL CENTER | Age: 68
End: 2023-11-28
Attending: INTERNAL MEDICINE
Payer: MEDICARE

## 2023-11-28 VITALS
SYSTOLIC BLOOD PRESSURE: 142 MMHG | BODY MASS INDEX: 25.55 KG/M2 | DIASTOLIC BLOOD PRESSURE: 76 MMHG | RESPIRATION RATE: 16 BRPM | HEIGHT: 66 IN | HEART RATE: 58 BPM | WEIGHT: 159 LBS | OXYGEN SATURATION: 99 %

## 2023-11-28 DIAGNOSIS — I10 ESSENTIAL HYPERTENSION, BENIGN: ICD-10-CM

## 2023-11-28 DIAGNOSIS — I48.4 ATYPICAL ATRIAL FLUTTER (HCC): ICD-10-CM

## 2023-11-28 DIAGNOSIS — Z79.899 ON POTASSIUM WASTING DIURETIC THERAPY: ICD-10-CM

## 2023-11-28 DIAGNOSIS — I48.0 PAF (PAROXYSMAL ATRIAL FIBRILLATION) (HCC): ICD-10-CM

## 2023-11-28 DIAGNOSIS — I48.92 ATRIAL FLUTTER, UNSPECIFIED TYPE (HCC): ICD-10-CM

## 2023-11-28 DIAGNOSIS — I48.0 PAROXYSMAL ATRIAL FIBRILLATION (HCC): ICD-10-CM

## 2023-11-28 DIAGNOSIS — I48.19 PERSISTENT ATRIAL FIBRILLATION (HCC): ICD-10-CM

## 2023-11-28 LAB — EKG IMPRESSION: NORMAL

## 2023-11-28 PROCEDURE — 93010 ELECTROCARDIOGRAM REPORT: CPT | Performed by: INTERNAL MEDICINE

## 2023-11-28 PROCEDURE — 99214 OFFICE O/P EST MOD 30 MIN: CPT | Performed by: INTERNAL MEDICINE

## 2023-11-28 PROCEDURE — 3078F DIAST BP <80 MM HG: CPT | Performed by: INTERNAL MEDICINE

## 2023-11-28 PROCEDURE — 3077F SYST BP >= 140 MM HG: CPT | Performed by: INTERNAL MEDICINE

## 2023-11-28 PROCEDURE — 99212 OFFICE O/P EST SF 10 MIN: CPT | Performed by: INTERNAL MEDICINE

## 2023-11-28 PROCEDURE — 93005 ELECTROCARDIOGRAM TRACING: CPT | Performed by: INTERNAL MEDICINE

## 2023-11-28 RX ORDER — AMIODARONE HYDROCHLORIDE 200 MG/1
200 TABLET ORAL DAILY
Qty: 90 TABLET | Refills: 3 | Status: SHIPPED | OUTPATIENT
Start: 2023-11-28

## 2023-11-28 RX ORDER — POTASSIUM CHLORIDE 750 MG/1
10 TABLET, FILM COATED, EXTENDED RELEASE ORAL
Qty: 90 TABLET | Refills: 3 | Status: SHIPPED | OUTPATIENT
Start: 2023-11-28

## 2023-11-28 RX ORDER — FUROSEMIDE 20 MG/1
20 TABLET ORAL
Qty: 90 TABLET | Refills: 3 | Status: SHIPPED | OUTPATIENT
Start: 2023-11-28

## 2023-11-28 ASSESSMENT — FIBROSIS 4 INDEX: FIB4 SCORE: 4.29

## 2023-11-28 NOTE — PROGRESS NOTES
Chief Complaint   Patient presents with    Hospital Follow-up     F/V Dx: Post ablation    Atrial Fibrillation     F/V Dx: Persistent atrial fibrillation (HCC)       Subjective     Chinyere Priest is a 68 y.o. female who presents today with recent ablation and vein of Manny alcohol ablation.  Difficult transseptal.  Moving to [place].  But wants to continue follow-up care.  Offered referral to EP in [place].  Feels improved.  On amiodarone 200 mg twice daily.  Not short of breath.    Past Medical History:   Diagnosis Date    Aortic calcification (HCC)      Noted incidentally on abdominal CT scan.    Arrhythmia     hx of afibb, ablation x 2     Asthma in adult          Chronic anticoagulation     Chronic back pain      CKD (chronic kidney disease) stage 3, GFR 30-59 ml/min ([facility]) 09/14/2020    Dental disorder     Full upper/lower dentures.    Diverticulosis      High cholesterol     Hypertension     Inflamed seborrheic keratosis      Intrinsic eczema 01/20/2020    Non-rheumatic mitral regurgitation 05/2019    Echocardiogram with normal LV size, LVEF 65%. Moderate MR, mild-moderate TR, unchanged from previous.    Osteoarthritis of both hips      Plantar fascia syndrome      Plantar wart of right foot      Pruritic dermatitis      Ongoing for several months but worse at night on her lower extremities    Pulmonary hypertension (HCC)     Pure hypercholesterolemia      S/P cardiac catheterization 05/26/2017    1.  Normal right heart pressures. 3.  Essentially normal coronary arteries. 2.  Left ventricular ejection fraction 65%.     Spider veins of limb      Unspecified vitamin D deficiency       Past Surgical History:   Procedure Laterality Date    LUMBAR FUSION O-ARM N/A 04/29/2021    Procedure: FUSION, SPINE, LUMBAR, WITH O-ARM IMAGING GUIDANCE - STAGE #2 L3-5 UNILATERAL, LEFT-SIDED.laminotomy lumbar 3-5;  Surgeon: Chad Clay M.D.;  Location: SURGERY [facility];  Service: Neurosurgery    FUSION, SPINE,  XLIF Right 2021    Procedure: FUSION, SPINE, XLIF - STAGE #1 L3-5, RIGHT-SIDED APPROACH.;  Surgeon: Sudhir Clay M.D.;  Location: SURGERY [facility];  Service: Neurosurgery    OTHER CARDIAC SURGERY  2019    cardiac ablation x 2 , for Afibb    LUMBAR LAMINECTOMY DISKECTOMY  2009    Performed by SUDHIR CLAY at SURGERY [facility] ORS    HYSTERECTOMY LAPAROSCOPY      GYN SURGERY       x2     Family History   Problem Relation Age of Onset    Heart Disease Mother         1st MI @ 59 yo    Hypertension Mother     Heart Attack Mother 60    Heart Disease Maternal Aunt     Breast Cancer Maternal Aunt     Cancer Daughter 35        breast    Breast Cancer Daughter     Heart Disease Maternal Grandmother     Heart Disease Maternal Grandfather     Heart Disease Maternal Aunt 56    Ovarian Cancer Other      Social History     Socioeconomic History    Marital status:      Spouse name: Not on file    Number of children: Not on file    Years of education: Not on file    Highest education level: Not on file   Occupational History    Not on file   Tobacco Use    Smoking status: Former     Current packs/day: 0.00     Average packs/day: 0.5 packs/day for 40.0 years (20.0 ttl pk-yrs)     Types: Cigarettes     Start date: 3/1/1970     Quit date: 3/1/2010     Years since quittin.7    Smokeless tobacco: Never   Vaping Use    Vaping Use: Never used   Substance and Sexual Activity    Alcohol use: No     Alcohol/week: 0.0 oz    Drug use: No     Comment: tried marijuana once.     Sexual activity: Yes     Partners: Male     Birth control/protection: Post-Menopausal     Comment: , works at [employer](part time)   Other Topics Concern     Service Not Asked    Blood Transfusions Not Asked    Caffeine Concern Not Asked    Occupational Exposure Not Asked    Hobby Hazards Not Asked    Sleep Concern Not Asked    Stress Concern Not Asked    Weight Concern Not Asked    Special Diet Not Asked    Back Care  Not Asked    Exercise No    Bike Helmet Not Asked    Seat Belt Not Asked    Self-Exams Not Asked   Social History Narrative    Works at [employer]     Social Determinants of Health     Financial Resource Strain: Not on file   Food Insecurity: No Food Insecurity (12/11/2019)    Hunger Vital Sign     Worried About Running Out of Food in the Last Year: Never true     Ran Out of Food in the Last Year: Never true   Transportation Needs: No Transportation Needs (12/11/2019)    PRAPARE - Transportation     Lack of Transportation (Medical): No     Lack of Transportation (Non-Medical): No   Physical Activity: Not on file   Stress: Not on file   Social Connections: Not on file   Intimate Partner Violence: Not on file   Housing Stability: Not on file     Allergies   Allergen Reactions    Erythromycin Unspecified     Severe Abdominal pain    Tikosyn [Dofetilide] Diarrhea     diarrhea    Latex Hives     rash    Tape Hives       Paper tape okay     Outpatient Encounter Medications as of 11/28/2023   Medication Sig Dispense Refill    amiodarone (CORDARONE) 200 MG Tab Take 1 Tablet by mouth every day. 90 Tablet 3    furosemide (LASIX) 20 MG Tab Take 1 Tablet by mouth 1 time a day as needed (swelling). 90 Tablet 3    potassium chloride ER (KLOR-CON 10) 10 MEQ tablet Take 1 Tablet by mouth 1 time a day as needed (swelling). 90 Tablet 3    Multiple Vitamin (MULTIVITAMIN ADULT PO) Take  by mouth.      ELIQUIS 5 MG Tab TAKE 1 TABLET BY MOUTH TWICE A DAY 60 Tablet 8    atorvastatin (LIPITOR) 10 MG Tab TAKE 1 TABLET BY MOUTH EVERY DAY (Patient taking differently: Take 10 mg by mouth every morning.) 90 Tablet 2    clobetasol (TEMOVATE) 0.05 % Ointment APPLY 1 APPLICATION TOPICALLY 2 TIMES A DAY AS NEEDED (SKIN RASH ON LOWER LEGS). 45 g 2    losartan (COZAAR) 50 MG Tab Take 1 Tablet by mouth every day. (Patient taking differently: Take 50 mg by mouth every morning.) 90 Tablet 3    albuterol 108 (90 Base) MCG/ACT Aero Soln inhalation aerosol Inhale  "2 Puffs every 6 hours as needed for Shortness of Breath. 8.5 g 11    CHOLECALCIFEROL PO Take 2 Capsules by mouth every day. 4000 Vit D      [DISCONTINUED] amiodarone (CORDARONE) 200 MG Tab Take 1 Tablet by mouth 2 times a day. 60 Tablet 3    [DISCONTINUED] furosemide (LASIX) 20 MG Tab Take 1 Tablet by mouth every day. 90 Tablet 3    [DISCONTINUED] potassium chloride ER (KLOR-CON 10) 10 MEQ tablet Take 1 Tablet by mouth every day. 90 Tablet 3     No facility-administered encounter medications on file as of 11/28/2023.     ROS           Objective     BP (!) 142/76 (BP Location: Left arm, Patient Position: Sitting, BP Cuff Size: Adult)   Pulse (!) 58   Resp 16   Ht 1.676 m (5' 6\")   Wt 72.1 kg (159 lb)   SpO2 99%   BMI 25.66 kg/m²     Physical Exam  Constitutional:       Appearance: She is well-developed.   HENT:      Head: Normocephalic and atraumatic.   Eyes:      Pupils: Pupils are equal, round, and reactive to light.   Cardiovascular:      Rate and Rhythm: Normal rate and regular rhythm.      Heart sounds: Normal heart sounds. No murmur heard.     No friction rub. No gallop.   Pulmonary:      Effort: Pulmonary effort is normal.      Breath sounds: Normal breath sounds.   Abdominal:      General: Bowel sounds are normal.      Palpations: Abdomen is soft.   Musculoskeletal:         General: Normal range of motion.      Cervical back: Normal range of motion and neck supple.   Skin:     General: Skin is warm.   Neurological:      Mental Status: She is alert and oriented to person, place, and time.      Cranial Nerves: No cranial nerve deficit.   Psychiatric:         Behavior: Behavior normal.         Thought Content: Thought content normal.         Judgment: Judgment normal.                Assessment & Plan     1. Persistent atrial fibrillation (HCC)  EKG      2. Atrial flutter, unspecified type (HCC)  amiodarone (CORDARONE) 200 MG Tab      3. Paroxysmal atrial fibrillation (HCC)  amiodarone (CORDARONE) 200 MG " Tab      4. Atypical atrial flutter (HCC)  amiodarone (CORDARONE) 200 MG Tab      5. Essential hypertension, benign  furosemide (LASIX) 20 MG Tab      6. PAF (paroxysmal atrial fibrillation) (HCC)  furosemide (LASIX) 20 MG Tab      7. On potassium wasting diuretic therapy  potassium chloride ER (KLOR-CON 10) 10 MEQ tablet          Medical Decision Making: Today's Assessment/Status/Plan:   1.  Atrial fibrillation status post recent ablation.  Difficult access.  Continue amiodarone.  Cut it down to 200 mg daily.  If recurrence, would recommend ablation with Dr. Scott or Liz.  2.  Hypertension continue current regimen.  3.  Eliquis continue anticoagulation.  4.  Refilled Lasix and potassium chloride.  5.  Follow-up in 6 months.

## 2023-11-29 ENCOUNTER — HOSPITAL ENCOUNTER (OUTPATIENT)
Dept: RADIOLOGY | Facility: MEDICAL CENTER | Age: 68
End: 2023-11-29
Attending: FAMILY MEDICINE
Payer: MEDICARE

## 2023-11-29 DIAGNOSIS — Z87.891 HISTORY OF SMOKING: ICD-10-CM

## 2023-11-29 DIAGNOSIS — R05.3 CHRONIC COUGH: ICD-10-CM

## 2023-11-29 DIAGNOSIS — Z12.31 ENCOUNTER FOR SCREENING MAMMOGRAM FOR MALIGNANT NEOPLASM OF BREAST: ICD-10-CM

## 2023-11-29 PROCEDURE — 71250 CT THORAX DX C-: CPT

## 2023-11-29 PROCEDURE — 77063 BREAST TOMOSYNTHESIS BI: CPT | Mod: 50

## 2023-12-15 DIAGNOSIS — I10 ESSENTIAL HYPERTENSION, BENIGN: ICD-10-CM

## 2023-12-15 RX ORDER — LOSARTAN POTASSIUM 50 MG/1
50 TABLET ORAL DAILY
Qty: 90 TABLET | Refills: 3 | Status: SHIPPED | OUTPATIENT
Start: 2023-12-15

## 2023-12-15 NOTE — TELEPHONE ENCOUNTER
Is the patient due for a refill? Yes    Was the patient seen the past year? Yes    Date of last office visit: 11/28/2023    Does the patient have an upcoming appointment?  Yes   If yes, When? 6/3/2024    Provider to refill:RUTH    Does the patients insurance require a 100 day supply?  No

## 2023-12-29 ENCOUNTER — TELEPHONE (OUTPATIENT)
Dept: MEDICAL GROUP | Facility: MEDICAL CENTER | Age: 68
End: 2023-12-29
Payer: MEDICARE

## 2023-12-29 DIAGNOSIS — K57.92 DIVERTICULITIS: ICD-10-CM

## 2023-12-29 RX ORDER — AMOXICILLIN AND CLAVULANATE POTASSIUM 875; 125 MG/1; MG/1
1 TABLET, FILM COATED ORAL 2 TIMES DAILY
Qty: 14 TABLET | Refills: 0 | Status: SHIPPED | OUTPATIENT
Start: 2023-12-29 | End: 2024-01-05

## 2023-12-29 NOTE — TELEPHONE ENCOUNTER
Augmentin prescription sent to pharmacy.  Please let patient know that I am on vacation next week if she is not feeling better then recommend going to urgent care or ER.

## 2023-12-29 NOTE — TELEPHONE ENCOUNTER
Patient was seen by me on 11/14/2023 for diverticulitis episode at that time she was getting better with liquid diet but she is having recurrent symptoms now.  Augmentin prescription sent to pharmacy.

## 2024-01-17 ENCOUNTER — HOSPITAL ENCOUNTER (OUTPATIENT)
Dept: LAB | Facility: MEDICAL CENTER | Age: 69
End: 2024-01-17
Attending: FAMILY MEDICINE
Payer: MEDICARE

## 2024-01-17 DIAGNOSIS — D64.9 ANEMIA, UNSPECIFIED TYPE: ICD-10-CM

## 2024-01-17 DIAGNOSIS — N18.31 STAGE 3A CHRONIC KIDNEY DISEASE: ICD-10-CM

## 2024-01-17 DIAGNOSIS — R73.03 PREDIABETES: ICD-10-CM

## 2024-01-17 LAB
ALBUMIN SERPL BCP-MCNC: 4.3 G/DL (ref 3.2–4.9)
ALBUMIN/GLOB SERPL: 1.7 G/DL
ALP SERPL-CCNC: 73 U/L (ref 30–99)
ALT SERPL-CCNC: 19 U/L (ref 2–50)
ANION GAP SERPL CALC-SCNC: 9 MMOL/L (ref 7–16)
AST SERPL-CCNC: 26 U/L (ref 12–45)
BASOPHILS # BLD AUTO: 1.1 % (ref 0–1.8)
BASOPHILS # BLD: 0.07 K/UL (ref 0–0.12)
BILIRUB SERPL-MCNC: 0.3 MG/DL (ref 0.1–1.5)
BUN SERPL-MCNC: 14 MG/DL (ref 8–22)
CALCIUM ALBUM COR SERPL-MCNC: 9.1 MG/DL (ref 8.5–10.5)
CALCIUM SERPL-MCNC: 9.3 MG/DL (ref 8.5–10.5)
CHLORIDE SERPL-SCNC: 107 MMOL/L (ref 96–112)
CO2 SERPL-SCNC: 27 MMOL/L (ref 20–33)
CREAT SERPL-MCNC: 0.92 MG/DL (ref 0.5–1.4)
EOSINOPHIL # BLD AUTO: 0.31 K/UL (ref 0–0.51)
EOSINOPHIL NFR BLD: 5.1 % (ref 0–6.9)
ERYTHROCYTE [DISTWIDTH] IN BLOOD BY AUTOMATED COUNT: 49.9 FL (ref 35.9–50)
EST. AVERAGE GLUCOSE BLD GHB EST-MCNC: 114 MG/DL
FASTING STATUS PATIENT QL REPORTED: NORMAL
GFR SERPLBLD CREATININE-BSD FMLA CKD-EPI: 67 ML/MIN/1.73 M 2
GLOBULIN SER CALC-MCNC: 2.6 G/DL (ref 1.9–3.5)
GLUCOSE SERPL-MCNC: 90 MG/DL (ref 65–99)
HBA1C MFR BLD: 5.6 % (ref 4–5.6)
HCT VFR BLD AUTO: 35.1 % (ref 37–47)
HGB BLD-MCNC: 11.8 G/DL (ref 12–16)
IMM GRANULOCYTES # BLD AUTO: 0.02 K/UL (ref 0–0.11)
IMM GRANULOCYTES NFR BLD AUTO: 0.3 % (ref 0–0.9)
LYMPHOCYTES # BLD AUTO: 1.16 K/UL (ref 1–4.8)
LYMPHOCYTES NFR BLD: 19 % (ref 22–41)
MCH RBC QN AUTO: 32.2 PG (ref 27–33)
MCHC RBC AUTO-ENTMCNC: 33.6 G/DL (ref 32.2–35.5)
MCV RBC AUTO: 95.6 FL (ref 81.4–97.8)
MONOCYTES # BLD AUTO: 0.58 K/UL (ref 0–0.85)
MONOCYTES NFR BLD AUTO: 9.5 % (ref 0–13.4)
NEUTROPHILS # BLD AUTO: 3.96 K/UL (ref 1.82–7.42)
NEUTROPHILS NFR BLD: 65 % (ref 44–72)
NRBC # BLD AUTO: 0 K/UL
NRBC BLD-RTO: 0 /100 WBC (ref 0–0.2)
PLATELET # BLD AUTO: 172 K/UL (ref 164–446)
PMV BLD AUTO: 12.2 FL (ref 9–12.9)
POTASSIUM SERPL-SCNC: 4.8 MMOL/L (ref 3.6–5.5)
PROT SERPL-MCNC: 6.9 G/DL (ref 6–8.2)
RBC # BLD AUTO: 3.67 M/UL (ref 4.2–5.4)
SODIUM SERPL-SCNC: 143 MMOL/L (ref 135–145)
WBC # BLD AUTO: 6.1 K/UL (ref 4.8–10.8)

## 2024-01-17 PROCEDURE — 80053 COMPREHEN METABOLIC PANEL: CPT

## 2024-01-17 PROCEDURE — 85025 COMPLETE CBC W/AUTO DIFF WBC: CPT

## 2024-01-17 PROCEDURE — 83036 HEMOGLOBIN GLYCOSYLATED A1C: CPT | Mod: GA

## 2024-01-17 PROCEDURE — 36415 COLL VENOUS BLD VENIPUNCTURE: CPT | Mod: GA

## 2024-01-18 NOTE — ASSESSMENT & PLAN NOTE
This is a new problem is started for this patient mainly involving her lower extremities.  Her  has clobetasol available.  She put that on her rash twice a day until it cleared.  Right now she looks really good.  When it comes back she would like to have her own clobetasol to use.   lmtrc

## 2024-01-25 ENCOUNTER — HOSPITAL ENCOUNTER (OUTPATIENT)
Facility: MEDICAL CENTER | Age: 69
End: 2024-01-25
Attending: FAMILY MEDICINE
Payer: MEDICARE

## 2024-01-25 ENCOUNTER — OFFICE VISIT (OUTPATIENT)
Dept: MEDICAL GROUP | Facility: MEDICAL CENTER | Age: 69
End: 2024-01-25
Payer: MEDICARE

## 2024-01-25 VITALS
DIASTOLIC BLOOD PRESSURE: 56 MMHG | HEIGHT: 66 IN | TEMPERATURE: 97.6 F | RESPIRATION RATE: 16 BRPM | BODY MASS INDEX: 26.22 KG/M2 | HEART RATE: 65 BPM | WEIGHT: 163.14 LBS | SYSTOLIC BLOOD PRESSURE: 120 MMHG | OXYGEN SATURATION: 99 %

## 2024-01-25 DIAGNOSIS — R39.89 URINARY PROBLEM: ICD-10-CM

## 2024-01-25 DIAGNOSIS — D64.9 ANEMIA, UNSPECIFIED TYPE: ICD-10-CM

## 2024-01-25 DIAGNOSIS — R73.03 PREDIABETES: ICD-10-CM

## 2024-01-25 DIAGNOSIS — E55.9 VITAMIN D DEFICIENCY: Chronic | ICD-10-CM

## 2024-01-25 DIAGNOSIS — E78.00 HYPERCHOLESTEROLEMIA: ICD-10-CM

## 2024-01-25 LAB
APPEARANCE UR: CLEAR
BILIRUB UR STRIP-MCNC: NORMAL MG/DL
COLOR UR AUTO: YELLOW
GLUCOSE UR STRIP.AUTO-MCNC: NORMAL MG/DL
KETONES UR STRIP.AUTO-MCNC: NORMAL MG/DL
LEUKOCYTE ESTERASE UR QL STRIP.AUTO: NORMAL
NITRITE UR QL STRIP.AUTO: NORMAL
PH UR STRIP.AUTO: 7 [PH] (ref 5–8)
PROT UR QL STRIP: NORMAL MG/DL
RBC UR QL AUTO: NORMAL
SP GR UR STRIP.AUTO: 1.02
UROBILINOGEN UR STRIP-MCNC: 0.2 MG/DL

## 2024-01-25 PROCEDURE — 87086 URINE CULTURE/COLONY COUNT: CPT

## 2024-01-25 PROCEDURE — 3078F DIAST BP <80 MM HG: CPT | Performed by: FAMILY MEDICINE

## 2024-01-25 PROCEDURE — 82570 ASSAY OF URINE CREATININE: CPT

## 2024-01-25 PROCEDURE — 99214 OFFICE O/P EST MOD 30 MIN: CPT | Performed by: FAMILY MEDICINE

## 2024-01-25 PROCEDURE — 82043 UR ALBUMIN QUANTITATIVE: CPT

## 2024-01-25 PROCEDURE — 3074F SYST BP LT 130 MM HG: CPT | Performed by: FAMILY MEDICINE

## 2024-01-25 PROCEDURE — 81002 URINALYSIS NONAUTO W/O SCOPE: CPT | Performed by: FAMILY MEDICINE

## 2024-01-25 ASSESSMENT — ENCOUNTER SYMPTOMS
FEVER: 0
CHILLS: 0
PALPITATIONS: 0

## 2024-01-25 ASSESSMENT — PATIENT HEALTH QUESTIONNAIRE - PHQ9: CLINICAL INTERPRETATION OF PHQ2 SCORE: 0

## 2024-01-25 ASSESSMENT — FIBROSIS 4 INDEX: FIB4 SCORE: 2.39

## 2024-01-25 NOTE — ASSESSMENT & PLAN NOTE
New problem, unstable, urinalysis in office negative for nitrites, leukocytes and protein is negative.  Check urine culture as she is having symptoms.  Check microalbumin creatinine ratio also.

## 2024-01-25 NOTE — PROGRESS NOTES
FAMILY MEDICINE VISIT                                                               Chief complaint::Diagnoses of Urinary problem, Prediabetes, Vitamin D deficiency, Anemia, unspecified type, and Hypercholesterolemia were pertinent to this visit.    History of present illness: Chinyere Priest is a 68 y.o. female who presented for lab follow up, urinary problem.    Problem   Anemia    Her recent labs showed improvement in hemoglobin and hematocrit.  Her kidney function is improving.  This is likely due to CKD.    Component      Latest Ref Rng 10/27/2023 1/17/2024   RBC      4.20 - 5.40 M/uL 3.48 (L)  3.67 (L)    Hemoglobin      12.0 - 16.0 g/dL 11.0 (L)  11.8 (L)    Hematocrit      37.0 - 47.0 % 33.2 (L)  35.1 (L)    MCV      81.4 - 97.8 fL 95.4  95.6       Legend:  (L) Low     Prediabetes    Recent A1c came back at 5.6.     Lab Results   Component Value Date/Time    HBA1C 5.6 01/17/2024 07:15 AM         Hypercholesterolemia    Last lipid panel came back in normal range..  She is currently on Lipitor 10 mg daily.  No side effects with medication.    Lab Results   Component Value Date/Time    CHOLSTRLTOT 175 08/07/2023 0719    TRIGLYCERIDE 57 08/07/2023 0719    HDL 72 08/07/2023 0719    LDL 92 08/07/2023 0719         Urinary Problem    Few days ago she noticed that there are bubbles in her urine which seems to be getting little bit more worse now.  She has sometimes pelvic pain.  She has urinary frequency.  She also has irritation at the opening of urinary tract.  She denies any vaginal burning, itching, discharge.  She is drinking a lot of fluids     Vitamin D Deficiency    Recent vitamin D level came back at 62..  She is taking vitamin D supplementation daily              Review of systems:     Review of Systems   Constitutional:  Negative for chills and fever.   Cardiovascular:  Negative for chest pain, palpitations and leg swelling.   Genitourinary:  Positive for frequency.        Burning sensation at  "urinary tract.        Medications and Allergies:     Current Outpatient Medications   Medication Sig Dispense Refill    losartan (COZAAR) 50 MG Tab Take 1 Tablet by mouth every day. 90 Tablet 3    amiodarone (CORDARONE) 200 MG Tab Take 1 Tablet by mouth every day. 90 Tablet 3    furosemide (LASIX) 20 MG Tab Take 1 Tablet by mouth 1 time a day as needed (swelling). 90 Tablet 3    potassium chloride ER (KLOR-CON 10) 10 MEQ tablet Take 1 Tablet by mouth 1 time a day as needed (swelling). 90 Tablet 3    Multiple Vitamin (MULTIVITAMIN ADULT PO) Take  by mouth.      ELIQUIS 5 MG Tab TAKE 1 TABLET BY MOUTH TWICE A DAY 60 Tablet 8    atorvastatin (LIPITOR) 10 MG Tab TAKE 1 TABLET BY MOUTH EVERY DAY (Patient taking differently: Take 10 mg by mouth every morning.) 90 Tablet 2    clobetasol (TEMOVATE) 0.05 % Ointment APPLY 1 APPLICATION TOPICALLY 2 TIMES A DAY AS NEEDED (SKIN RASH ON LOWER LEGS). 45 g 2    albuterol 108 (90 Base) MCG/ACT Aero Soln inhalation aerosol Inhale 2 Puffs every 6 hours as needed for Shortness of Breath. 8.5 g 11    CHOLECALCIFEROL PO Take 2 Capsules by mouth every day. 4000 Vit D       No current facility-administered medications for this visit.          Vitals:    /56   Pulse 65   Temp 36.4 °C (97.6 °F)   Resp 16   Ht 1.676 m (5' 6\")   Wt 74 kg (163 lb 2.3 oz)   SpO2 99%  Body mass index is 26.33 kg/m².    Physical Exam:     Physical Exam  Constitutional:       Appearance: Normal appearance. She is well-developed and well-groomed.   HENT:      Head: Normocephalic and atraumatic.      Right Ear: External ear normal.      Left Ear: External ear normal.   Eyes:      General:         Right eye: No discharge.         Left eye: No discharge.      Conjunctiva/sclera: Conjunctivae normal.   Cardiovascular:      Rate and Rhythm: Normal rate.   Pulmonary:      Effort: Pulmonary effort is normal. No respiratory distress.   Musculoskeletal:      Cervical back: Neck supple.   Skin:     Findings: No " rash.   Neurological:      Mental Status: She is alert.   Psychiatric:         Mood and Affect: Mood and affect normal.         Behavior: Behavior normal.          Labs:  I reviewed with patient recent labs resulted on 1/17/2024.    Assessment/Plan:         Problem List Items Addressed This Visit       Vitamin D deficiency (Chronic)     Chronic problem, stable, recheck labs with next blood test         Relevant Orders    VITAMIN D,25 HYDROXY (DEFICIENCY)    Urinary problem     New problem, unstable, urinalysis in office negative for nitrites, leukocytes and protein is negative.  Check urine culture as she is having symptoms.  Check microalbumin creatinine ratio also.         Relevant Orders    POCT Urinalysis    URINE CULTURE(NEW)    MICROALBUMIN CREAT RATIO URINE    Hypercholesterolemia     Chronic problem, stable, continue Lipitor 10 mg daily.  Recheck labs in 6-month.         Relevant Orders    Comp Metabolic Panel    Lipid Profile    Prediabetes     Chronic problem, stable, continue to eat healthy diet.  Recheck labs in 6-month.         Relevant Orders    HEMOGLOBIN A1C    Anemia     Chronic problem, stable, improving, recheck labs in 6-month.         Relevant Orders    CBC WITH DIFFERENTIAL        Please note that this dictation was created using voice recognition software. I have made every reasonable attempt to correct obvious errors, but I expect that there are errors of grammar and possibly content that I did not discover before finalizing the note.    Follow up in 6 months for lab follow up.

## 2024-01-26 LAB
CREAT UR-MCNC: 78.52 MG/DL
MICROALBUMIN UR-MCNC: <1.2 MG/DL
MICROALBUMIN/CREAT UR: NORMAL MG/G (ref 0–30)

## 2024-01-28 LAB
BACTERIA UR CULT: NORMAL
SIGNIFICANT IND 70042: NORMAL
SITE SITE: NORMAL
SOURCE SOURCE: NORMAL

## 2024-04-02 NOTE — TELEPHONE ENCOUNTER
"ED    Pt called to discuss side effects she's having from flecainide re: constipation, \"vertigo feeling\" & SOB. She wants to go back to old dosage. #[phone]  "
. 
Could try to decrease Flecainide dosage to 100 mg BID and see if s/sx improve. Rx order placed. May have increased recurrence with lower dosage.  Please encourage pt. to stay hydrated and monitor BP.  Scheduled to f/u with Dr. Steele 1/13/20. Please call office if s/sx continue.     Dr. Steele- recommendations?    Thanks,     DANIELLE Lam   [facility]  [phone]  
Lm returning call  
Pt notified. She will try Flec 100 BID and work on hydration. Increasing fiber in diet or adding metamucil to help constipation. Not likely a side effect of the med.   
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 BP is fine 127/60. Constipated now. No BP's with vertigo. She would like to go back to the last does she felt good on 150mg 1/2 tab BID   To ED to advise                       
: Yes

## 2024-04-22 DIAGNOSIS — I48.19 PERSISTENT ATRIAL FIBRILLATION (HCC): ICD-10-CM

## 2024-04-22 RX ORDER — APIXABAN 5 MG/1
TABLET, FILM COATED ORAL
Qty: 180 TABLET | Refills: 0 | Status: SHIPPED | OUTPATIENT
Start: 2024-04-22

## 2024-06-03 ENCOUNTER — OFFICE VISIT (OUTPATIENT)
Dept: CARDIOLOGY | Facility: MEDICAL CENTER | Age: 69
End: 2024-06-03
Attending: INTERNAL MEDICINE
Payer: MEDICARE

## 2024-06-03 VITALS
BODY MASS INDEX: 26.52 KG/M2 | HEART RATE: 59 BPM | OXYGEN SATURATION: 100 % | WEIGHT: 165 LBS | SYSTOLIC BLOOD PRESSURE: 130 MMHG | HEIGHT: 66 IN | DIASTOLIC BLOOD PRESSURE: 56 MMHG | RESPIRATION RATE: 16 BRPM

## 2024-06-03 DIAGNOSIS — I10 ESSENTIAL HYPERTENSION, BENIGN: ICD-10-CM

## 2024-06-03 DIAGNOSIS — I48.4 ATYPICAL ATRIAL FLUTTER (HCC): ICD-10-CM

## 2024-06-03 DIAGNOSIS — I48.19 PERSISTENT ATRIAL FIBRILLATION (HCC): ICD-10-CM

## 2024-06-03 DIAGNOSIS — D68.69 SECONDARY HYPERCOAGULABLE STATE (HCC): ICD-10-CM

## 2024-06-03 DIAGNOSIS — I48.0 PAROXYSMAL ATRIAL FIBRILLATION (HCC): ICD-10-CM

## 2024-06-03 DIAGNOSIS — Z86.79 S/P ABLATION OF ATRIAL FIBRILLATION: ICD-10-CM

## 2024-06-03 DIAGNOSIS — I27.20 PULMONARY HYPERTENSION (HCC): ICD-10-CM

## 2024-06-03 DIAGNOSIS — Z98.890 S/P ABLATION OF ATRIAL FIBRILLATION: ICD-10-CM

## 2024-06-03 PROCEDURE — 99214 OFFICE O/P EST MOD 30 MIN: CPT | Performed by: INTERNAL MEDICINE

## 2024-06-03 PROCEDURE — 3078F DIAST BP <80 MM HG: CPT | Performed by: INTERNAL MEDICINE

## 2024-06-03 PROCEDURE — 3075F SYST BP GE 130 - 139MM HG: CPT | Performed by: INTERNAL MEDICINE

## 2024-06-03 PROCEDURE — 99212 OFFICE O/P EST SF 10 MIN: CPT | Performed by: INTERNAL MEDICINE

## 2024-06-03 ASSESSMENT — FIBROSIS 4 INDEX: FIB4 SCORE: 2.39

## 2024-06-03 NOTE — PROGRESS NOTES
Chief Complaint   Patient presents with    Atrial Fibrillation     F/V Dx:Paroxysmal atrial fibrillation (HCC)       Subjective     Chinyere Priest is a 68 y.o. female who presents today with history of recurrent persistent atrial status post multiple ablations.  Some occasional shortness of breath and palpitations.  On amiodarone.  Some fatigue.  Not moving to [place].    Past Medical History:   Diagnosis Date    Aortic calcification (HCC)      Noted incidentally on abdominal CT scan.    Arrhythmia     hx of afibb, ablation x 2     Asthma in adult          Chronic anticoagulation     Chronic back pain      CKD (chronic kidney disease) stage 3, GFR 30-59 ml/min 09/14/2020    Dental disorder     Full upper/lower dentures.    Diverticulosis      High cholesterol     Hypertension     Inflamed seborrheic keratosis      Intrinsic eczema 01/20/2020    Non-rheumatic mitral regurgitation 05/2019    Echocardiogram with normal LV size, LVEF 65%. Moderate MR, mild-moderate TR, unchanged from previous.    Osteoarthritis of both hips      Plantar fascia syndrome      Plantar wart of right foot      Pruritic dermatitis      Ongoing for several months but worse at night on her lower extremities    Pulmonary hypertension (HCC)     Pure hypercholesterolemia      S/P cardiac catheterization 05/26/2017    1.  Normal right heart pressures. 3.  Essentially normal coronary arteries. 2.  Left ventricular ejection fraction 65%.     Spider veins of limb      Unspecified vitamin D deficiency       Past Surgical History:   Procedure Laterality Date    LUMBAR FUSION O-ARM N/A 04/29/2021    Procedure: FUSION, SPINE, LUMBAR, WITH O-ARM IMAGING GUIDANCE - STAGE #2 L3-5 UNILATERAL, LEFT-SIDED.laminotomy lumbar 3-5;  Surgeon: Chad Clay M.D.;  Location: SURGERY [facility];  Service: Neurosurgery    FUSION, SPINE, XLIF Right 04/27/2021    Procedure: FUSION, SPINE, XLIF - STAGE #1 L3-5, RIGHT-SIDED APPROACH.;  Surgeon: Chad Clay  M.D.;  Location: SURGERY [facility];  Service: Neurosurgery    OTHER CARDIAC SURGERY  2019    cardiac ablation x 2 , for Afibb    LUMBAR LAMINECTOMY DISKECTOMY  2009    Performed by SUDHIR VERMA at SURGERY [facility] ORS    HYSTERECTOMY LAPAROSCOPY  2004    GYN SURGERY       x2     Family History   Problem Relation Age of Onset    Heart Disease Mother         1st MI @ 61 yo    Hypertension Mother     Heart Attack Mother 60    Heart Disease Maternal Aunt     Breast Cancer Maternal Aunt     Cancer Daughter 35        breast    Breast Cancer Daughter     Heart Disease Maternal Grandmother     Heart Disease Maternal Grandfather     Heart Disease Maternal Aunt 56    Ovarian Cancer Other      Social History     Socioeconomic History    Marital status:      Spouse name: Not on file    Number of children: Not on file    Years of education: Not on file    Highest education level: Not on file   Occupational History    Not on file   Tobacco Use    Smoking status: Former     Current packs/day: 0.00     Average packs/day: 0.5 packs/day for 40.0 years (20.0 ttl pk-yrs)     Types: Cigarettes     Start date: 3/1/1970     Quit date: 3/1/2010     Years since quittin.2    Smokeless tobacco: Never   Vaping Use    Vaping status: Never Used   Substance and Sexual Activity    Alcohol use: No     Alcohol/week: 0.0 oz    Drug use: No     Comment: tried marijuana once.     Sexual activity: Yes     Partners: Male     Birth control/protection: Post-Menopausal     Comment: , works at [employer](part time)   Other Topics Concern     Service Not Asked    Blood Transfusions Not Asked    Caffeine Concern Not Asked    Occupational Exposure Not Asked    Hobby Hazards Not Asked    Sleep Concern Not Asked    Stress Concern Not Asked    Weight Concern Not Asked    Special Diet Not Asked    Back Care Not Asked    Exercise No    Bike Helmet Not Asked    Seat Belt Not Asked    Self-Exams Not Asked   Social History  Narrative    Works at [employer]     Social Determinants of Health     Financial Resource Strain: Not on file   Food Insecurity: No Food Insecurity (12/11/2019)    Hunger Vital Sign     Worried About Running Out of Food in the Last Year: Never true     Ran Out of Food in the Last Year: Never true   Transportation Needs: No Transportation Needs (12/11/2019)    PRAPARE - Transportation     Lack of Transportation (Medical): No     Lack of Transportation (Non-Medical): No   Physical Activity: Not on file   Stress: Not on file   Social Connections: Not on file   Intimate Partner Violence: Not on file   Housing Stability: Not on file     Allergies   Allergen Reactions    Erythromycin Unspecified     Severe Abdominal pain    Tikosyn [Dofetilide] Diarrhea     diarrhea    Latex Hives     rash    Tape Hives       Paper tape okay     Outpatient Encounter Medications as of 6/3/2024   Medication Sig Dispense Refill    ELIQUIS 5 MG Tab TAKE 1 TABLET BY MOUTH TWICE A  Tablet 0    losartan (COZAAR) 50 MG Tab Take 1 Tablet by mouth every day. 90 Tablet 3    amiodarone (CORDARONE) 200 MG Tab Take 1 Tablet by mouth every day. 90 Tablet 3    furosemide (LASIX) 20 MG Tab Take 1 Tablet by mouth 1 time a day as needed (swelling). 90 Tablet 3    potassium chloride ER (KLOR-CON 10) 10 MEQ tablet Take 1 Tablet by mouth 1 time a day as needed (swelling). 90 Tablet 3    Multiple Vitamin (MULTIVITAMIN ADULT PO) Take  by mouth.      atorvastatin (LIPITOR) 10 MG Tab TAKE 1 TABLET BY MOUTH EVERY DAY (Patient taking differently: Take 10 mg by mouth every morning.) 90 Tablet 2    clobetasol (TEMOVATE) 0.05 % Ointment APPLY 1 APPLICATION TOPICALLY 2 TIMES A DAY AS NEEDED (SKIN RASH ON LOWER LEGS). 45 g 2    albuterol 108 (90 Base) MCG/ACT Aero Soln inhalation aerosol Inhale 2 Puffs every 6 hours as needed for Shortness of Breath. 8.5 g 11    CHOLECALCIFEROL PO Take 2 Capsules by mouth every day. 4000 Vit D       No facility-administered encounter  "medications on file as of 6/3/2024.     ROS           Objective     /56 (BP Location: Left arm, Patient Position: Sitting, BP Cuff Size: Adult)   Pulse (!) 59   Resp 16   Ht 1.676 m (5' 6\")   Wt 74.8 kg (165 lb)   SpO2 100%   BMI 26.63 kg/m²     Physical Exam  Constitutional:       Appearance: She is well-developed.   HENT:      Head: Normocephalic and atraumatic.   Eyes:      Pupils: Pupils are equal, round, and reactive to light.   Cardiovascular:      Rate and Rhythm: Normal rate and regular rhythm.      Heart sounds: Murmur heard.      No friction rub. No gallop.   Pulmonary:      Effort: Pulmonary effort is normal.      Breath sounds: Normal breath sounds.   Abdominal:      General: Bowel sounds are normal.      Palpations: Abdomen is soft.   Musculoskeletal:         General: Normal range of motion.      Cervical back: Normal range of motion and neck supple.   Skin:     General: Skin is warm.   Neurological:      Mental Status: She is alert and oriented to person, place, and time.      Cranial Nerves: No cranial nerve deficit.   Psychiatric:         Behavior: Behavior normal.         Thought Content: Thought content normal.         Judgment: Judgment normal.                Assessment & Plan     1. Persistent atrial fibrillation (HCC)  EKG    TSH+FREE T4    Comp Metabolic Panel    CBC WITH DIFFERENTIAL    EC-ECHOCARDIOGRAM COMPLETE W/O CONT    Holter Monitor Study      2. S/P ablation of atrial fibrillation        3. Secondary hypercoagulable state (HCC)        4. Paroxysmal atrial fibrillation (HCC)  TSH+FREE T4    Comp Metabolic Panel    CBC WITH DIFFERENTIAL      5. Pulmonary hypertension (HCC)  TSH+FREE T4    Comp Metabolic Panel    CBC WITH DIFFERENTIAL      6. Essential hypertension, benign        7. Atypical atrial flutter (HCC)            Medical Decision Making: Today's Assessment/Status/Plan:   1.  Dyspnea occasional check echocardiogram.  Previous TR and MR and pulmonary hypertension.  2.  " Mild orthostatic symptoms try to avoid using too much Lasix.  3.  Atrial arrhythmias recheck Zio patch    4.  Check labs including TFTs.  5.  On amiodarone and DOAC.  6.  Follow-up with EP APN in 1 month.

## 2024-06-04 ENCOUNTER — NON-PROVIDER VISIT (OUTPATIENT)
Dept: CARDIOLOGY | Facility: MEDICAL CENTER | Age: 69
End: 2024-06-04
Attending: FAMILY MEDICINE
Payer: MEDICARE

## 2024-06-04 DIAGNOSIS — I48.19 PERSISTENT ATRIAL FIBRILLATION (HCC): ICD-10-CM

## 2024-06-04 PROCEDURE — 93246 EXT ECG>7D<15D RECORDING: CPT

## 2024-06-04 NOTE — PROGRESS NOTES
Patient enrolled in the 14 day o Holter monitoring program per Augusto Steele MD.  >Office hook-up, serial # [id number].  >Currently pending EOS.     Home

## 2024-06-07 ENCOUNTER — HOSPITAL ENCOUNTER (OUTPATIENT)
Dept: LAB | Facility: MEDICAL CENTER | Age: 69
End: 2024-06-07
Attending: INTERNAL MEDICINE
Payer: MEDICARE

## 2024-06-07 DIAGNOSIS — E78.00 HYPERCHOLESTEROLEMIA: ICD-10-CM

## 2024-06-07 DIAGNOSIS — I48.0 PAROXYSMAL ATRIAL FIBRILLATION (HCC): ICD-10-CM

## 2024-06-07 DIAGNOSIS — I48.19 PERSISTENT ATRIAL FIBRILLATION (HCC): ICD-10-CM

## 2024-06-07 DIAGNOSIS — I70.0 AORTIC CALCIFICATION (HCC): ICD-10-CM

## 2024-06-07 DIAGNOSIS — I27.20 PULMONARY HYPERTENSION (HCC): ICD-10-CM

## 2024-06-07 LAB
BASOPHILS # BLD AUTO: 0.8 % (ref 0–1.8)
BASOPHILS # BLD: 0.05 K/UL (ref 0–0.12)
EOSINOPHIL # BLD AUTO: 0.26 K/UL (ref 0–0.51)
EOSINOPHIL NFR BLD: 4.2 % (ref 0–6.9)
ERYTHROCYTE [DISTWIDTH] IN BLOOD BY AUTOMATED COUNT: 49.3 FL (ref 35.9–50)
HCT VFR BLD AUTO: 37.2 % (ref 37–47)
HGB BLD-MCNC: 12 G/DL (ref 12–16)
IMM GRANULOCYTES # BLD AUTO: 0.01 K/UL (ref 0–0.11)
IMM GRANULOCYTES NFR BLD AUTO: 0.2 % (ref 0–0.9)
LYMPHOCYTES # BLD AUTO: 0.97 K/UL (ref 1–4.8)
LYMPHOCYTES NFR BLD: 15.6 % (ref 22–41)
MCH RBC QN AUTO: 31.8 PG (ref 27–33)
MCHC RBC AUTO-ENTMCNC: 32.3 G/DL (ref 32.2–35.5)
MCV RBC AUTO: 98.7 FL (ref 81.4–97.8)
MONOCYTES # BLD AUTO: 0.61 K/UL (ref 0–0.85)
MONOCYTES NFR BLD AUTO: 9.8 % (ref 0–13.4)
NEUTROPHILS # BLD AUTO: 4.33 K/UL (ref 1.82–7.42)
NEUTROPHILS NFR BLD: 69.4 % (ref 44–72)
NRBC # BLD AUTO: 0 K/UL
NRBC BLD-RTO: 0 /100 WBC (ref 0–0.2)
PLATELET # BLD AUTO: 168 K/UL (ref 164–446)
PMV BLD AUTO: 12.7 FL (ref 9–12.9)
RBC # BLD AUTO: 3.77 M/UL (ref 4.2–5.4)
WBC # BLD AUTO: 6.2 K/UL (ref 4.8–10.8)

## 2024-06-07 PROCEDURE — 36415 COLL VENOUS BLD VENIPUNCTURE: CPT

## 2024-06-07 PROCEDURE — 84439 ASSAY OF FREE THYROXINE: CPT

## 2024-06-07 PROCEDURE — 85025 COMPLETE CBC W/AUTO DIFF WBC: CPT

## 2024-06-07 PROCEDURE — 80053 COMPREHEN METABOLIC PANEL: CPT

## 2024-06-07 PROCEDURE — 84443 ASSAY THYROID STIM HORMONE: CPT

## 2024-06-07 RX ORDER — ATORVASTATIN CALCIUM 10 MG/1
TABLET, FILM COATED ORAL
Qty: 90 TABLET | Refills: 3 | Status: SHIPPED | OUTPATIENT
Start: 2024-06-07

## 2024-06-07 NOTE — TELEPHONE ENCOUNTER
Is the patient due for a refill? Yes    Was the patient seen the past year? Yes    Date of last office visit: 6/3/24    Does the patient have an upcoming appointment?  Yes   If yes, When? 7/10/24    Provider to refill:DS    Does the patients insurance require a 100 day supply?  No

## 2024-06-08 LAB
ALBUMIN SERPL BCP-MCNC: 4.3 G/DL (ref 3.2–4.9)
ALBUMIN/GLOB SERPL: 1.5 G/DL
ALP SERPL-CCNC: 80 U/L (ref 30–99)
ALT SERPL-CCNC: 25 U/L (ref 2–50)
ANION GAP SERPL CALC-SCNC: 12 MMOL/L (ref 7–16)
AST SERPL-CCNC: 31 U/L (ref 12–45)
BILIRUB SERPL-MCNC: 0.5 MG/DL (ref 0.1–1.5)
BUN SERPL-MCNC: 22 MG/DL (ref 8–22)
CALCIUM ALBUM COR SERPL-MCNC: 9.5 MG/DL (ref 8.5–10.5)
CALCIUM SERPL-MCNC: 9.7 MG/DL (ref 8.5–10.5)
CHLORIDE SERPL-SCNC: 105 MMOL/L (ref 96–112)
CO2 SERPL-SCNC: 26 MMOL/L (ref 20–33)
CREAT SERPL-MCNC: 1.19 MG/DL (ref 0.5–1.4)
GFR SERPLBLD CREATININE-BSD FMLA CKD-EPI: 49 ML/MIN/1.73 M 2
GLOBULIN SER CALC-MCNC: 2.8 G/DL (ref 1.9–3.5)
GLUCOSE SERPL-MCNC: 90 MG/DL (ref 65–99)
POTASSIUM SERPL-SCNC: 4.7 MMOL/L (ref 3.6–5.5)
PROT SERPL-MCNC: 7.1 G/DL (ref 6–8.2)
SODIUM SERPL-SCNC: 143 MMOL/L (ref 135–145)
T4 FREE SERPL-MCNC: 1.67 NG/DL (ref 0.93–1.7)
TSH SERPL DL<=0.005 MIU/L-ACNC: 2 UIU/ML (ref 0.38–5.33)

## 2024-06-24 ENCOUNTER — HOSPITAL ENCOUNTER (OUTPATIENT)
Dept: CARDIOLOGY | Facility: MEDICAL CENTER | Age: 69
End: 2024-06-24
Attending: INTERNAL MEDICINE
Payer: COMMERCIAL

## 2024-06-24 ENCOUNTER — TELEPHONE (OUTPATIENT)
Dept: CARDIOLOGY | Facility: MEDICAL CENTER | Age: 69
End: 2024-06-24

## 2024-06-24 DIAGNOSIS — I48.19 PERSISTENT ATRIAL FIBRILLATION (HCC): ICD-10-CM

## 2024-06-24 PROCEDURE — 93306 TTE W/DOPPLER COMPLETE: CPT

## 2024-06-24 NOTE — TELEPHONE ENCOUNTER
QUAN EOS to DS's nurse, Angelita, on 6/24/2024    Monitor analysis time: 13 days 8 hours    Preliminary findings:    Patient had a 1% burden of Atrial Flutter ranging from  with an avg rate of 70 bpm    Primary rhythm was Sinus Rhythm ranging from 39-85 with an avg rate of 57 bpm    Supraventricular ectopy: Isolated SVE(s) were occasional (2.2%), couplets and triplets were rare    Ventricular ectopy: isolated VE(s) were rare, no couplets or triplets noted    Patient recorded no events during monitoring.

## 2024-07-02 LAB
LV EJECT FRACT  99904: 65
LV EJECT FRACT MOD 2C 99903: 60.28
LV EJECT FRACT MOD 4C 99902: 68.51
LV EJECT FRACT MOD BP 99901: 64.34

## 2024-07-08 ENCOUNTER — HOSPITAL ENCOUNTER (OUTPATIENT)
Dept: LAB | Facility: MEDICAL CENTER | Age: 69
End: 2024-07-08
Attending: FAMILY MEDICINE
Payer: MEDICARE

## 2024-07-08 DIAGNOSIS — D64.9 ANEMIA, UNSPECIFIED TYPE: ICD-10-CM

## 2024-07-08 DIAGNOSIS — E78.00 HYPERCHOLESTEROLEMIA: ICD-10-CM

## 2024-07-08 DIAGNOSIS — R73.03 PREDIABETES: ICD-10-CM

## 2024-07-08 DIAGNOSIS — E55.9 VITAMIN D DEFICIENCY: Chronic | ICD-10-CM

## 2024-07-08 LAB
25(OH)D3 SERPL-MCNC: 54 NG/ML (ref 30–100)
ALBUMIN SERPL BCP-MCNC: 4.5 G/DL (ref 3.2–4.9)
ALBUMIN/GLOB SERPL: 2 G/DL
ALP SERPL-CCNC: 81 U/L (ref 30–99)
ALT SERPL-CCNC: 25 U/L (ref 2–50)
ANION GAP SERPL CALC-SCNC: 13 MMOL/L (ref 7–16)
AST SERPL-CCNC: 33 U/L (ref 12–45)
BASOPHILS # BLD AUTO: 0.7 % (ref 0–1.8)
BASOPHILS # BLD: 0.04 K/UL (ref 0–0.12)
BILIRUB SERPL-MCNC: 0.5 MG/DL (ref 0.1–1.5)
BUN SERPL-MCNC: 16 MG/DL (ref 8–22)
CALCIUM ALBUM COR SERPL-MCNC: 9.5 MG/DL (ref 8.5–10.5)
CALCIUM SERPL-MCNC: 9.9 MG/DL (ref 8.5–10.5)
CHLORIDE SERPL-SCNC: 104 MMOL/L (ref 96–112)
CHOLEST SERPL-MCNC: 158 MG/DL (ref 100–199)
CO2 SERPL-SCNC: 24 MMOL/L (ref 20–33)
CREAT SERPL-MCNC: 1.17 MG/DL (ref 0.5–1.4)
EOSINOPHIL # BLD AUTO: 0.3 K/UL (ref 0–0.51)
EOSINOPHIL NFR BLD: 5.6 % (ref 0–6.9)
ERYTHROCYTE [DISTWIDTH] IN BLOOD BY AUTOMATED COUNT: 51.1 FL (ref 35.9–50)
EST. AVERAGE GLUCOSE BLD GHB EST-MCNC: 108 MG/DL
FASTING STATUS PATIENT QL REPORTED: NORMAL
GFR SERPLBLD CREATININE-BSD FMLA CKD-EPI: 50 ML/MIN/1.73 M 2
GLOBULIN SER CALC-MCNC: 2.3 G/DL (ref 1.9–3.5)
GLUCOSE SERPL-MCNC: 94 MG/DL (ref 65–99)
HBA1C MFR BLD: 5.4 % (ref 4–5.6)
HCT VFR BLD AUTO: 38.6 % (ref 37–47)
HDLC SERPL-MCNC: 76 MG/DL
HGB BLD-MCNC: 12.2 G/DL (ref 12–16)
IMM GRANULOCYTES # BLD AUTO: 0.02 K/UL (ref 0–0.11)
IMM GRANULOCYTES NFR BLD AUTO: 0.4 % (ref 0–0.9)
LDLC SERPL CALC-MCNC: 71 MG/DL
LYMPHOCYTES # BLD AUTO: 0.97 K/UL (ref 1–4.8)
LYMPHOCYTES NFR BLD: 18.2 % (ref 22–41)
MCH RBC QN AUTO: 31.5 PG (ref 27–33)
MCHC RBC AUTO-ENTMCNC: 31.6 G/DL (ref 32.2–35.5)
MCV RBC AUTO: 99.7 FL (ref 81.4–97.8)
MONOCYTES # BLD AUTO: 0.54 K/UL (ref 0–0.85)
MONOCYTES NFR BLD AUTO: 10.1 % (ref 0–13.4)
NEUTROPHILS # BLD AUTO: 3.47 K/UL (ref 1.82–7.42)
NEUTROPHILS NFR BLD: 65 % (ref 44–72)
NRBC # BLD AUTO: 0 K/UL
NRBC BLD-RTO: 0 /100 WBC (ref 0–0.2)
PLATELET # BLD AUTO: 187 K/UL (ref 164–446)
PMV BLD AUTO: 12.5 FL (ref 9–12.9)
POTASSIUM SERPL-SCNC: 4.9 MMOL/L (ref 3.6–5.5)
PROT SERPL-MCNC: 6.8 G/DL (ref 6–8.2)
RBC # BLD AUTO: 3.87 M/UL (ref 4.2–5.4)
SODIUM SERPL-SCNC: 141 MMOL/L (ref 135–145)
TRIGL SERPL-MCNC: 54 MG/DL (ref 0–149)
WBC # BLD AUTO: 5.3 K/UL (ref 4.8–10.8)

## 2024-07-08 PROCEDURE — 83036 HEMOGLOBIN GLYCOSYLATED A1C: CPT | Mod: GA

## 2024-07-08 PROCEDURE — 80061 LIPID PANEL: CPT

## 2024-07-08 PROCEDURE — 82306 VITAMIN D 25 HYDROXY: CPT

## 2024-07-08 PROCEDURE — 85025 COMPLETE CBC W/AUTO DIFF WBC: CPT

## 2024-07-08 PROCEDURE — 80053 COMPREHEN METABOLIC PANEL: CPT

## 2024-07-08 PROCEDURE — 36415 COLL VENOUS BLD VENIPUNCTURE: CPT

## 2024-07-10 ENCOUNTER — OFFICE VISIT (OUTPATIENT)
Dept: CARDIOLOGY | Facility: MEDICAL CENTER | Age: 69
End: 2024-07-10
Attending: NURSE PRACTITIONER
Payer: MEDICARE

## 2024-07-10 VITALS
BODY MASS INDEX: 26.52 KG/M2 | RESPIRATION RATE: 16 BRPM | OXYGEN SATURATION: 100 % | HEART RATE: 55 BPM | SYSTOLIC BLOOD PRESSURE: 120 MMHG | HEIGHT: 66 IN | WEIGHT: 165 LBS | DIASTOLIC BLOOD PRESSURE: 52 MMHG

## 2024-07-10 DIAGNOSIS — I48.0 PAROXYSMAL ATRIAL FIBRILLATION (HCC): ICD-10-CM

## 2024-07-10 DIAGNOSIS — I27.20 PULMONARY HYPERTENSION (HCC): ICD-10-CM

## 2024-07-10 DIAGNOSIS — D68.69 SECONDARY HYPERCOAGULABLE STATE (HCC): ICD-10-CM

## 2024-07-10 DIAGNOSIS — Z79.899 ENCOUNTER FOR MONITORING AMIODARONE THERAPY: ICD-10-CM

## 2024-07-10 DIAGNOSIS — I10 ESSENTIAL HYPERTENSION, BENIGN: ICD-10-CM

## 2024-07-10 DIAGNOSIS — Z86.79 S/P ABLATION OF ATRIAL FIBRILLATION: ICD-10-CM

## 2024-07-10 DIAGNOSIS — I48.92 ATRIAL FLUTTER, UNSPECIFIED TYPE (HCC): ICD-10-CM

## 2024-07-10 DIAGNOSIS — Z98.890 S/P ABLATION OF ATRIAL FIBRILLATION: ICD-10-CM

## 2024-07-10 DIAGNOSIS — Z51.81 ENCOUNTER FOR MONITORING AMIODARONE THERAPY: ICD-10-CM

## 2024-07-10 LAB — EKG IMPRESSION: NORMAL

## 2024-07-10 PROCEDURE — 99212 OFFICE O/P EST SF 10 MIN: CPT | Performed by: NURSE PRACTITIONER

## 2024-07-10 PROCEDURE — 3078F DIAST BP <80 MM HG: CPT | Performed by: NURSE PRACTITIONER

## 2024-07-10 PROCEDURE — 3074F SYST BP LT 130 MM HG: CPT | Performed by: NURSE PRACTITIONER

## 2024-07-10 PROCEDURE — 93005 ELECTROCARDIOGRAM TRACING: CPT | Performed by: NURSE PRACTITIONER

## 2024-07-10 PROCEDURE — 93010 ELECTROCARDIOGRAM REPORT: CPT | Performed by: INTERNAL MEDICINE

## 2024-07-10 PROCEDURE — 99214 OFFICE O/P EST MOD 30 MIN: CPT | Performed by: NURSE PRACTITIONER

## 2024-07-10 RX ORDER — AMIODARONE HYDROCHLORIDE 200 MG/1
100 TABLET ORAL DAILY
Qty: 90 TABLET | Refills: 1 | Status: SHIPPED | OUTPATIENT
Start: 2024-07-10 | End: 2024-07-31

## 2024-07-10 ASSESSMENT — ENCOUNTER SYMPTOMS
HEADACHES: 0
SPEECH CHANGE: 0
SENSORY CHANGE: 0
DIZZINESS: 0
SHORTNESS OF BREATH: 0
COUGH: 0
HEMOPTYSIS: 0
LOSS OF CONSCIOUSNESS: 0
TREMORS: 0
WHEEZING: 0
PND: 0
ORTHOPNEA: 0
SPUTUM PRODUCTION: 0
TINGLING: 0
FOCAL WEAKNESS: 0
WEIGHT LOSS: 0
STRIDOR: 0
SORE THROAT: 0
FEVER: 0
CHILLS: 0
PALPITATIONS: 0

## 2024-07-10 ASSESSMENT — FIBROSIS 4 INDEX: FIB4 SCORE: 2.44

## 2024-07-19 ENCOUNTER — NON-PROVIDER VISIT (OUTPATIENT)
Dept: CARDIOLOGY | Facility: MEDICAL CENTER | Age: 69
End: 2024-07-19
Attending: NURSE PRACTITIONER
Payer: MEDICARE

## 2024-07-19 ENCOUNTER — TELEPHONE (OUTPATIENT)
Dept: CARDIOLOGY | Facility: MEDICAL CENTER | Age: 69
End: 2024-07-19

## 2024-07-19 DIAGNOSIS — I48.0 PAROXYSMAL ATRIAL FIBRILLATION (HCC): ICD-10-CM

## 2024-07-19 PROCEDURE — 93005 ELECTROCARDIOGRAM TRACING: CPT

## 2024-07-20 LAB — EKG IMPRESSION: NORMAL

## 2024-07-20 PROCEDURE — 93010 ELECTROCARDIOGRAM REPORT: CPT | Performed by: INTERNAL MEDICINE

## 2024-07-31 ENCOUNTER — APPOINTMENT (OUTPATIENT)
Dept: MEDICAL GROUP | Facility: MEDICAL CENTER | Age: 69
End: 2024-07-31
Payer: MEDICARE

## 2024-07-31 VITALS
HEIGHT: 66 IN | BODY MASS INDEX: 27.48 KG/M2 | TEMPERATURE: 97.1 F | HEART RATE: 56 BPM | OXYGEN SATURATION: 98 % | SYSTOLIC BLOOD PRESSURE: 124 MMHG | WEIGHT: 171 LBS | DIASTOLIC BLOOD PRESSURE: 68 MMHG

## 2024-07-31 DIAGNOSIS — N18.31 STAGE 3A CHRONIC KIDNEY DISEASE: ICD-10-CM

## 2024-07-31 DIAGNOSIS — Z12.31 ENCOUNTER FOR SCREENING MAMMOGRAM FOR MALIGNANT NEOPLASM OF BREAST: ICD-10-CM

## 2024-07-31 DIAGNOSIS — D75.89 MACROCYTOSIS WITHOUT ANEMIA: ICD-10-CM

## 2024-07-31 DIAGNOSIS — M85.852 OSTEOPENIA OF NECK OF LEFT FEMUR: ICD-10-CM

## 2024-07-31 ASSESSMENT — FIBROSIS 4 INDEX: FIB4 SCORE: 2.470588235294117647

## 2024-07-31 ASSESSMENT — ENCOUNTER SYMPTOMS
CHILLS: 0
FEVER: 0

## 2024-08-02 DIAGNOSIS — I48.19 PERSISTENT ATRIAL FIBRILLATION (HCC): ICD-10-CM

## 2024-08-02 RX ORDER — APIXABAN 5 MG/1
TABLET, FILM COATED ORAL
Qty: 180 TABLET | Refills: 3 | Status: SHIPPED | OUTPATIENT
Start: 2024-08-02

## 2024-08-02 NOTE — TELEPHONE ENCOUNTER
Is the patient due for a refill? Yes    Was the patient seen the past year? Yes    Date of last office visit: 7/10/2024    Does the patient have an upcoming appointment?  Yes   If yes, When? 10/21/2024    Provider to refill:DOREEN    Does the patients insurance require a 100 day supply?  No

## 2024-08-02 NOTE — TELEPHONE ENCOUNTER
DOREEN    Caller: Chinyere Priest     Topic/issue: Patient states that she is out of her Eliquis, the pharmacy is not auto refilling and they also advised her that the script was .  Please advise     Callback Number: [phone]    Thank You   Izabella BALLARD

## 2024-08-07 ENCOUNTER — TELEPHONE (OUTPATIENT)
Dept: CARDIOLOGY | Facility: MEDICAL CENTER | Age: 69
End: 2024-08-07
Payer: MEDICARE

## 2024-08-07 NOTE — TELEPHONE ENCOUNTER
DOREEN    Caller: Chinyere Camposlibby    Topic/issue: MEDICAL ADVICE    Chinyere states that she was recently pulled off of the AMIODARONE and is now experiencing increased SOB and has some swelling in both of her ankles. Patient wanted to reschedule her appointment with DS with an APRN or TAMAR szymanskip. Chinyere now has an appointment with MT for Friday 8/9. Please advise    Thank you,  Jose Maria JESSICA    Callback Number: [phone]

## 2024-08-07 NOTE — TELEPHONE ENCOUNTER
Phone Number Called: [phone]    Call outcome: Spoke to patient regarding message below.    Message: Called to discuss patients concerns and symptoms.   Patient reported she was taken off amio on 7/19/24 due to prolongation of QT.   Reporting increased swelling in ankles/legs, started 4 days ago. Taking lasix 20mg as needed, taking consistently last two days.  Gained weight, approx 5 lbs.  SOB started 4 days ago as well, sometimes on exertion.   /70  HR 63  ER precautions give for inc hr sustaining 140+, sob at rest, syncope, worsening/intolerable symptoms such as lightheadedness, dizziness, sob, weakness etc.   Discussed with patient I will reach out to EA for recommendations and moved patients appt up to see JS tomorrow at 9:15am due to symptoms.   Patient verbalized understanding.

## 2024-08-08 ENCOUNTER — OFFICE VISIT (OUTPATIENT)
Dept: CARDIOLOGY | Facility: MEDICAL CENTER | Age: 69
End: 2024-08-08
Attending: NURSE PRACTITIONER
Payer: MEDICARE

## 2024-08-08 ENCOUNTER — TELEPHONE (OUTPATIENT)
Dept: CARDIOLOGY | Facility: MEDICAL CENTER | Age: 69
End: 2024-08-08
Payer: MEDICARE

## 2024-08-08 VITALS
DIASTOLIC BLOOD PRESSURE: 76 MMHG | RESPIRATION RATE: 12 BRPM | WEIGHT: 169 LBS | OXYGEN SATURATION: 98 % | HEIGHT: 66 IN | BODY MASS INDEX: 27.16 KG/M2 | HEART RATE: 53 BPM | SYSTOLIC BLOOD PRESSURE: 122 MMHG

## 2024-08-08 DIAGNOSIS — Z86.79 S/P ABLATION OF ATRIAL FIBRILLATION: ICD-10-CM

## 2024-08-08 DIAGNOSIS — I34.0 NON-RHEUMATIC MITRAL REGURGITATION: ICD-10-CM

## 2024-08-08 DIAGNOSIS — I48.0 PAROXYSMAL ATRIAL FIBRILLATION (HCC): ICD-10-CM

## 2024-08-08 DIAGNOSIS — I70.0 AORTIC CALCIFICATION (HCC): ICD-10-CM

## 2024-08-08 DIAGNOSIS — N18.31 STAGE 3A CHRONIC KIDNEY DISEASE: ICD-10-CM

## 2024-08-08 DIAGNOSIS — I34.0 NONRHEUMATIC MITRAL VALVE REGURGITATION: ICD-10-CM

## 2024-08-08 DIAGNOSIS — D68.69 SECONDARY HYPERCOAGULABLE STATE (HCC): ICD-10-CM

## 2024-08-08 DIAGNOSIS — I10 ESSENTIAL HYPERTENSION, BENIGN: ICD-10-CM

## 2024-08-08 DIAGNOSIS — Z79.899 ON POTASSIUM WASTING DIURETIC THERAPY: ICD-10-CM

## 2024-08-08 DIAGNOSIS — I36.1 NONRHEUMATIC TRICUSPID VALVE REGURGITATION: ICD-10-CM

## 2024-08-08 DIAGNOSIS — Z98.890 S/P ABLATION OF ATRIAL FIBRILLATION: ICD-10-CM

## 2024-08-08 DIAGNOSIS — I27.20 PULMONARY HYPERTENSION (HCC): ICD-10-CM

## 2024-08-08 DIAGNOSIS — E78.00 HYPERCHOLESTEROLEMIA: ICD-10-CM

## 2024-08-08 LAB — EKG IMPRESSION: NORMAL

## 2024-08-08 PROCEDURE — 93005 ELECTROCARDIOGRAM TRACING: CPT | Performed by: NURSE PRACTITIONER

## 2024-08-08 PROCEDURE — 99214 OFFICE O/P EST MOD 30 MIN: CPT | Performed by: NURSE PRACTITIONER

## 2024-08-08 PROCEDURE — 99212 OFFICE O/P EST SF 10 MIN: CPT | Performed by: NURSE PRACTITIONER

## 2024-08-08 PROCEDURE — 93010 ELECTROCARDIOGRAM REPORT: CPT | Performed by: INTERNAL MEDICINE

## 2024-08-08 PROCEDURE — 3078F DIAST BP <80 MM HG: CPT | Performed by: NURSE PRACTITIONER

## 2024-08-08 PROCEDURE — 3074F SYST BP LT 130 MM HG: CPT | Performed by: NURSE PRACTITIONER

## 2024-08-08 RX ORDER — TORSEMIDE 10 MG/1
10 TABLET ORAL DAILY
Qty: 100 TABLET | Refills: 3 | Status: SHIPPED | OUTPATIENT
Start: 2024-08-08

## 2024-08-08 RX ORDER — UBIDECARENONE 75 MG
100 CAPSULE ORAL EVERY MORNING
COMMUNITY

## 2024-08-08 RX ORDER — POTASSIUM CHLORIDE 750 MG/1
10 TABLET, EXTENDED RELEASE ORAL DAILY
Qty: 100 TABLET | Refills: 3 | Status: SHIPPED | OUTPATIENT
Start: 2024-08-08

## 2024-08-08 ASSESSMENT — FIBROSIS 4 INDEX: FIB4 SCORE: 2.470588235294117647

## 2024-08-08 NOTE — TELEPHONE ENCOUNTER
Referral from: Yajaira GOLDEN for Moderate MR/TR/Pulmonic Insufficiency    Patient called on 8/8/2024.    Discussed referral. Patient scheduled to see Dr. Hernandez on 8/14/2024.    All questions answered.

## 2024-08-08 NOTE — PROGRESS NOTES
Chief Complaint   Patient presents with    Atrial Fibrillation     F/V Dx: Paroxysmal atrial fibrillation (HCC)       Subjective     Chinyere Priest is a 69 y.o. female patient of Dr. Steele who presents today with complaints of progressive edema despite use of lasix.      PMH pertinent for AF S/P multiple ablations with previous intolerance to Amiodarone and dofetilide due to QT prolongation. Recent Zio showed 1% atrial arrhythmia burden. Also has h/o HTN, HLD, CKD,     Today patient states that she is taking her lasix 20 mg daily but having increased edema. She  does not feel as though she is urinating as much as she use to after taking her morning dose of lasix. Also having some mild LOWERY and fatigue.     Past Medical History:   Diagnosis Date    Aortic calcification (HCC)      Noted incidentally on abdominal CT scan.    Arrhythmia     hx of afibb, ablation x 2     Asthma in adult          Chronic anticoagulation     Chronic back pain      CKD (chronic kidney disease) stage 3, GFR 30-59 ml/min 09/14/2020    Dental disorder     Full upper/lower dentures.    Diverticulosis      High cholesterol     Hypertension     Inflamed seborrheic keratosis      Intrinsic eczema 01/20/2020    Non-rheumatic mitral regurgitation 05/2019    Echocardiogram with normal LV size, LVEF 65%. Moderate MR, mild-moderate TR, unchanged from previous.    Osteoarthritis of both hips      Plantar fascia syndrome      Plantar wart of right foot      Pruritic dermatitis      Ongoing for several months but worse at night on her lower extremities    Pulmonary hypertension (HCC)     Pure hypercholesterolemia      S/P cardiac catheterization 05/26/2017    1.  Normal right heart pressures. 3.  Essentially normal coronary arteries. 2.  Left ventricular ejection fraction 65%.     Spider veins of limb      Unspecified vitamin D deficiency       Past Surgical History:   Procedure Laterality Date    LUMBAR FUSION O-ARM N/A 04/29/2021    Procedure:  FUSION, SPINE, LUMBAR, WITH O-ARM IMAGING GUIDANCE - STAGE #2 L3-5 UNILATERAL, LEFT-SIDED.laminotomy lumbar 3-5;  Surgeon: Sudhir Clay M.D.;  Location: SURGERY [facility];  Service: Neurosurgery    FUSION, SPINE, XLIF Right 2021    Procedure: FUSION, SPINE, XLIF - STAGE #1 L3-5, RIGHT-SIDED APPROACH.;  Surgeon: Sudhir Clay M.D.;  Location: SURGERY [facility];  Service: Neurosurgery    OTHER CARDIAC SURGERY  2019    cardiac ablation x 2 , for Afibb    LUMBAR LAMINECTOMY DISKECTOMY  2009    Performed by SUDHIR CLAY at SURGERY [facility] ORS    HYSTERECTOMY LAPAROSCOPY      GYN SURGERY       x2     Family History   Problem Relation Age of Onset    Heart Disease Mother         1st MI @ 59 yo    Hypertension Mother     Heart Attack Mother 60    Heart Disease Maternal Aunt     Breast Cancer Maternal Aunt     Cancer Daughter 35        breast    Breast Cancer Daughter     Heart Disease Maternal Grandmother     Heart Disease Maternal Grandfather     Heart Disease Maternal Aunt 56    Ovarian Cancer Other      Social History     Socioeconomic History    Marital status:      Spouse name: Not on file    Number of children: Not on file    Years of education: Not on file    Highest education level: Not on file   Occupational History    Not on file   Tobacco Use    Smoking status: Former     Current packs/day: 0.00     Average packs/day: 0.5 packs/day for 40.0 years (20.0 ttl pk-yrs)     Types: Cigarettes     Start date: 3/1/1970     Quit date: 3/1/2010     Years since quittin.4    Smokeless tobacco: Never   Vaping Use    Vaping status: Never Used   Substance and Sexual Activity    Alcohol use: No     Alcohol/week: 0.0 oz    Drug use: No     Comment: tried marijuana once.     Sexual activity: Yes     Partners: Male     Birth control/protection: Post-Menopausal     Comment: , works at [employer](part time)   Other Topics Concern     Service Not Asked    Blood Transfusions Not  Asked    Caffeine Concern Not Asked    Occupational Exposure Not Asked    Hobby Hazards Not Asked    Sleep Concern Not Asked    Stress Concern Not Asked    Weight Concern Not Asked    Special Diet Not Asked    Back Care Not Asked    Exercise No    Bike Helmet Not Asked    Seat Belt Not Asked    Self-Exams Not Asked   Social History Narrative    Works at [employer]     Social Determinants of Health     Financial Resource Strain: Not on file   Food Insecurity: No Food Insecurity (12/11/2019)    Hunger Vital Sign     Worried About Running Out of Food in the Last Year: Never true     Ran Out of Food in the Last Year: Never true   Transportation Needs: No Transportation Needs (12/11/2019)    PRAPARE - Transportation     Lack of Transportation (Medical): No     Lack of Transportation (Non-Medical): No   Physical Activity: Not on file   Stress: Not on file   Social Connections: Not on file   Intimate Partner Violence: Not on file   Housing Stability: Not on file     Allergies   Allergen Reactions    Erythromycin Unspecified     Severe Abdominal pain    Tikosyn [Dofetilide] Diarrhea     diarrhea    Latex Hives     rash    Tape Hives       Paper tape okay     Outpatient Encounter Medications as of 8/8/2024   Medication Sig Dispense Refill    cyanocobalamin (VITAMIN B-12) 100 MCG Tab Take 100 mcg by mouth every day.      torsemide (DEMADEX) 10 MG tablet Take 1 Tablet by mouth every day. Take 2 tablets (20 mg ) daily for 7 days, then reduce to 1 tablet (10 mg) daily. 100 Tablet 3    potassium chloride ER (KLOR-CON 10) 10 MEQ tablet Take 1 Tablet by mouth every day. 100 Tablet 3    ELIQUIS 5 MG Tab TAKE 1 TABLET BY MOUTH TWICE A  Tablet 3    atorvastatin (LIPITOR) 10 MG Tab TAKE 1 TABLET BY MOUTH EVERY DAY 90 Tablet 3    losartan (COZAAR) 50 MG Tab Take 1 Tablet by mouth every day. 90 Tablet 3    Multiple Vitamin (MULTIVITAMIN ADULT PO) Take  by mouth.      clobetasol (TEMOVATE) 0.05 % Ointment APPLY 1 APPLICATION TOPICALLY 2  "TIMES A DAY AS NEEDED (SKIN RASH ON LOWER LEGS). 45 g 2    albuterol 108 (90 Base) MCG/ACT Aero Soln inhalation aerosol Inhale 2 Puffs every 6 hours as needed for Shortness of Breath. 8.5 g 11    CHOLECALCIFEROL PO Take 2 Capsules by mouth every day. 4000 Vit D      [DISCONTINUED] furosemide (LASIX) 20 MG Tab Take 1 Tablet by mouth 1 time a day as needed (swelling). 90 Tablet 3    [DISCONTINUED] potassium chloride ER (KLOR-CON 10) 10 MEQ tablet Take 1 Tablet by mouth 1 time a day as needed (swelling). 90 Tablet 3     No facility-administered encounter medications on file as of 8/8/2024.     Review of Systems   Constitutional:  Positive for malaise/fatigue. Negative for weight loss.   Respiratory:  Positive for shortness of breath.    Cardiovascular:  Positive for leg swelling. Negative for chest pain, palpitations, orthopnea, claudication and PND.   Neurological:  Negative for dizziness and weakness.   All other systems reviewed and are negative.             Objective     /76 (BP Location: Left arm, Patient Position: Sitting, BP Cuff Size: Adult)   Pulse (!) 53   Resp 12   Ht 1.676 m (5' 6\")   Wt 76.7 kg (169 lb)   SpO2 98%   BMI 27.28 kg/m²     Physical Exam  Constitutional:       General: She is not in acute distress.     Appearance: She is well-developed.   HENT:      Head: Normocephalic.   Neck:      Vascular: No carotid bruit or JVD.   Cardiovascular:      Rate and Rhythm: Normal rate and regular rhythm.      Heart sounds: Normal heart sounds.   Pulmonary:      Effort: No respiratory distress.      Breath sounds: Normal breath sounds.   Musculoskeletal:      Cervical back: Normal range of motion.      Right lower leg: No edema.      Left lower leg: Edema present.   Skin:     General: Skin is warm and dry.   Neurological:      Mental Status: She is alert and oriented to person, place, and time.   Psychiatric:         Behavior: Behavior normal.              TTE 7/2/2024     CONCLUSIONS  Compared to " the prior study on 12/12/2019. Noted pulmonary hypertension   now.  Normal left ventricular chamber size.  Mild concentric left ventricular hypertrophy.  The ejection fraction is measured to be 64 % by Craig's biplane.  Enlarged right atrium.  Moderately dilated left atrium.  Moderate mitral regurgitation.  Moderate tricuspid regurgitation.  Right ventricular systolic pressure is estimated to be  60 mmHg.  Moderate pulmonic insufficiency.      Assessment & Plan     1. Paroxysmal atrial fibrillation (HCC)  EKG      2. On potassium wasting diuretic therapy  potassium chloride ER (KLOR-CON 10) 10 MEQ tablet      3. Nonrheumatic mitral valve regurgitation  REFERRAL TO CARDIOLOGY      4. Nonrheumatic tricuspid valve regurgitation  REFERRAL TO CARDIOLOGY      5. Pulmonary hypertension (HCC)  torsemide (DEMADEX) 10 MG tablet    Basic Metabolic Panel    Referral to Pulmonary and Sleep Medicine      6. Essential hypertension, benign        7. Aortic calcification (HCC)        8. Hypercholesterolemia        9. Non-rheumatic mitral regurgitation        10. S/P ablation of atrial fibrillation        11. Secondary hypercoagulable state (HCC)        12. Stage 3a chronic kidney disease            Medical Decision Making: Today's Assessment/Status/Plan:   pHTN (RVSP 60 mmHg):  - Patient is volume overloaded on exam.  - Stop Lasix and start Torsemide 20 mg daily until edema has resolved and then reduce to 10 mg daily.   - Continue KCL 10 MEQ daily.   - Repeat BMP to check renal function and potassium level.  - RVSP increasing. OPO ordered.     Valvular Heart Disease:  - Moderate MR, TR and PI on recent echo in July. Preserved LV function.   - Discussed MR appears to be stable but will need ongoing monitoring with routine TTE. Discussed  that we could do a LEEANNA for closer evaluation. Patient and her daughter would like to be referred to structural due to increasing BLE and LOWERY for further workup and recommendations, referral has  been placed.   - Torsemide as above.     pAF/AFL:  -H/O ablation X 3.  -Well controlled per recent Zio showing 1% burden. In normal rhythm today.   -QTc interval improved with recent discontinuation of Amiodarone.   -OAC with Eliquis 5 mg BID, tolerating well with no bleeding problems.     CKD:  -Stable.  -Repeat BMP ordered.     HTN:  - Well controlled.  - Continue Losartan 50 mg daily.     Coronary Artery Calcification on CT with h/o HLD:  - Normal coronaries per [facility] in 2017.  - Denies anginal symptoms.  - LDL 71.  - Continue Lipitor 10 mg daily.       Patient will follow up with Dr. Steele in 6 months or earlier if needed. Encouraged patient our office should any questions or concerns arise in the mean time.     Future Appointments   Date Time Provider Department Center   8/14/2024 10:00 AM Carlos Hernandez M.D. [facility] None   12/24/2024  8:20 AM Eliceo Guzman M.D. [facility]

## 2024-08-10 PROBLEM — I36.1 NONRHEUMATIC TRICUSPID VALVE REGURGITATION: Status: ACTIVE | Noted: 2024-08-10

## 2024-08-10 ASSESSMENT — ENCOUNTER SYMPTOMS
PALPITATIONS: 0
WEIGHT LOSS: 0
PND: 0
SHORTNESS OF BREATH: 1
ORTHOPNEA: 0
WEAKNESS: 0
CLAUDICATION: 0
DIZZINESS: 0

## 2024-08-13 ENCOUNTER — TELEPHONE (OUTPATIENT)
Dept: CARDIOLOGY | Facility: MEDICAL CENTER | Age: 69
End: 2024-08-13
Payer: MEDICARE

## 2024-08-13 DIAGNOSIS — I48.0 PAROXYSMAL ATRIAL FIBRILLATION (HCC): ICD-10-CM

## 2024-08-13 DIAGNOSIS — I27.20 PULMONARY HYPERTENSION (HCC): ICD-10-CM

## 2024-08-13 NOTE — TELEPHONE ENCOUNTER
Phone Number Called: [phone]    Call outcome: Did not leave a detailed message. Requested patient to call back.    Message: Called to inform patient of JS orders for OPO study. Patient did not answer. Left VM for a call back.

## 2024-08-13 NOTE — TELEPHONE ENCOUNTER
OPO order was completed and faxed to:  [facility]   Phone #: [phone]   Fax #: [phone]  Confirmation fax has been sent to [facility].

## 2024-08-13 NOTE — TELEPHONE ENCOUNTER
----- Message from DANIELLE Sherman sent at 8/13/2024  6:56 AM PDT -----  Regarding: FW: PH referral  Laverne,    Would you please let patient know that I would like to order an OPO study to assess for AMINAH if he she has never been tested before. Pulmonary would like for me to this out before accepting her referral.     Thank you!    JS  ----- Message -----  From: Radha Quiroz M.D.  Sent: 8/10/2024   9:05 PM PDT  To: DANIELLE Sherman  Subject: PH referral                                      Mikhail Gates,  I have received your referral for PH. I have reviewed the record.  This patient's pulmonary hypertension is Group II 2/2 left heart disease.  The LA is dilated, she has Grade III diastolic dysfunction and there is significant valvular disease.  Also the RVSP is up only about 10 mmHg since 2017 when she had her cath and the PA pressures were normal at that time.  Now her LA is dilated (and she's 7 years older) which likely explains the increase in her RVSP.  There are currently no interventions for her from a PH perspective.  If you are concerned about AMINAH or another pulmonary complaint please resend the referral with the new diagnosis.  Feel free to let me know if you feel I've missed something here.    Thanks!    Radha Qurioz MD RD  Pulmonary and Critical Care    Available on Voalte

## 2024-08-14 ENCOUNTER — OFFICE VISIT (OUTPATIENT)
Dept: CARDIOLOGY | Facility: MEDICAL CENTER | Age: 69
End: 2024-08-14
Attending: INTERNAL MEDICINE
Payer: MEDICARE

## 2024-08-14 ENCOUNTER — TELEPHONE (OUTPATIENT)
Dept: CARDIOLOGY | Facility: MEDICAL CENTER | Age: 69
End: 2024-08-14
Payer: MEDICARE

## 2024-08-14 VITALS
DIASTOLIC BLOOD PRESSURE: 56 MMHG | HEART RATE: 62 BPM | BODY MASS INDEX: 26.52 KG/M2 | HEIGHT: 66 IN | WEIGHT: 165 LBS | OXYGEN SATURATION: 98 % | RESPIRATION RATE: 16 BRPM | SYSTOLIC BLOOD PRESSURE: 110 MMHG

## 2024-08-14 DIAGNOSIS — I34.0 NONRHEUMATIC MITRAL VALVE REGURGITATION: ICD-10-CM

## 2024-08-14 PROCEDURE — 99204 OFFICE O/P NEW MOD 45 MIN: CPT | Performed by: INTERNAL MEDICINE

## 2024-08-14 PROCEDURE — 3074F SYST BP LT 130 MM HG: CPT | Performed by: INTERNAL MEDICINE

## 2024-08-14 PROCEDURE — 3078F DIAST BP <80 MM HG: CPT | Performed by: INTERNAL MEDICINE

## 2024-08-14 PROCEDURE — 99212 OFFICE O/P EST SF 10 MIN: CPT | Performed by: INTERNAL MEDICINE

## 2024-08-14 ASSESSMENT — FIBROSIS 4 INDEX: FIB4 SCORE: 2.470588235294117647

## 2024-08-14 NOTE — TELEPHONE ENCOUNTER
Phone Number Called: [phone]    Call outcome: Spoke to patient regarding message below.    Message: Called to inform patient of OPO orders. Patient verbalized understanding. Patient stated she would call Galina back today. [employer] number sent via [employer] message per patient request.

## 2024-08-14 NOTE — PROGRESS NOTES
Interventional cardiology Initial Consultation Note      Chief Complaint: Dyspnea on exertion, congestive heart failure    Chinyere Priest is a 69 y.o. female  patient presented today consultation regarding valvular heart disease, dyspnea on exertion, congestive heart failure.  She has history of atrial fibrillation ablation, hypertension, mitral regurgitation, she has been struggling with congestive heart failure, dyspnea on exertion, lower extremity edema.  She is here for consultation regarding mitral regurgitation.        Past Medical History:   Diagnosis Date    Aortic calcification (HCC)      Noted incidentally on abdominal CT scan.    Arrhythmia     hx of afibb, ablation x 2     Asthma in adult          Chronic anticoagulation     Chronic back pain      CKD (chronic kidney disease) stage 3, GFR 30-59 ml/min 09/14/2020    Dental disorder     Full upper/lower dentures.    Diverticulosis      High cholesterol     Hypertension     Inflamed seborrheic keratosis      Intrinsic eczema 01/20/2020    Non-rheumatic mitral regurgitation 05/2019    Echocardiogram with normal LV size, LVEF 65%. Moderate MR, mild-moderate TR, unchanged from previous.    Osteoarthritis of both hips      Plantar fascia syndrome      Plantar wart of right foot      Pruritic dermatitis      Ongoing for several months but worse at night on her lower extremities    Pulmonary hypertension (HCC)     Pure hypercholesterolemia      S/P cardiac catheterization 05/26/2017    1.  Normal right heart pressures. 3.  Essentially normal coronary arteries. 2.  Left ventricular ejection fraction 65%.     Spider veins of limb      Unspecified vitamin D deficiency               Current Outpatient Medications   Medication Sig Dispense Refill    cyanocobalamin (VITAMIN B-12) 100 MCG Tab Take 100 mcg by mouth every day.      torsemide (DEMADEX) 10 MG tablet Take 1 Tablet by mouth every day. Take 2 tablets (20 mg ) daily for 7 days, then reduce to 1  "tablet (10 mg) daily. 100 Tablet 3    potassium chloride ER (KLOR-CON 10) 10 MEQ tablet Take 1 Tablet by mouth every day. 100 Tablet 3    ELIQUIS 5 MG Tab TAKE 1 TABLET BY MOUTH TWICE A  Tablet 3    atorvastatin (LIPITOR) 10 MG Tab TAKE 1 TABLET BY MOUTH EVERY DAY 90 Tablet 3    losartan (COZAAR) 50 MG Tab Take 1 Tablet by mouth every day. 90 Tablet 3    Multiple Vitamin (MULTIVITAMIN ADULT PO) Take  by mouth.      clobetasol (TEMOVATE) 0.05 % Ointment APPLY 1 APPLICATION TOPICALLY 2 TIMES A DAY AS NEEDED (SKIN RASH ON LOWER LEGS). 45 g 2    albuterol 108 (90 Base) MCG/ACT Aero Soln inhalation aerosol Inhale 2 Puffs every 6 hours as needed for Shortness of Breath. 8.5 g 11    CHOLECALCIFEROL PO Take 2 Capsules by mouth every day. 4000 Vit D       No current facility-administered medications for this visit.             Physical Exam:  Ambulatory Vitals  /56 (BP Location: Left arm, Patient Position: Sitting, BP Cuff Size: Adult)   Pulse 62   Resp 16   Ht 1.676 m (5' 6\")   Wt 74.8 kg (165 lb)   SpO2 98%    Oxygen Therapy:  Pulse Oximetry: 98 %  BP Readings from Last 4 Encounters:   08/14/24 110/56   08/08/24 122/76   07/31/24 124/68   07/10/24 120/52       Weight/BMI: Body mass index is 26.63 kg/m².  Wt Readings from Last 4 Encounters:   08/14/24 74.8 kg (165 lb)   08/08/24 76.7 kg (169 lb)   07/31/24 77.6 kg (171 lb)   07/10/24 74.8 kg (165 lb)           General: Well appearing and in no apparent distress  Neck: carotid bruits absent  Lungs: respiratory sounds  normal  Heart: Regular rhythm,  No palpable thrills on palpation, murmurs present, no rubs,   Lower extremity edema trace      Echocardiogram 6/24/2024  CONCLUSIONS  Compared to the prior study on 12/12/2019. Noted pulmonary hypertension   now.  Normal left ventricular chamber size.  Mild concentric left ventricular hypertrophy.  The ejection fraction is measured to be 64 % by Craig's biplane.  Enlarged right atrium.  Moderately dilated left " atrium.  Moderate mitral regurgitation.  Moderate tricuspid regurgitation.  Right ventricular systolic pressure is estimated to be  60 mmHg.  Moderate pulmonic insufficiency.       Medical Decision Making:  Problem List Items Addressed This Visit    None  Visit Diagnoses       Nonrheumatic mitral valve regurgitation        Relevant Orders    EC-LEEANNA W/O CONT          I think her mitral regurgitation is significant, likely contributing to her symptoms.  Recommend transesophageal echocardiogram.  If she has severe mitral regurgitation, will refer her to  to consider her for surgical mitral valve repair, left atrial appendage ligation.    Risk benefits of transesophageal echocardiogram discussed in detail.        This note was dictated using Dragon speech recognition software.    Carlos SHARPE  Interventional cardiologist  [facility], [address]  [address] [address]  Phone: [phone]  Fax: [phone]

## 2024-08-14 NOTE — TELEPHONE ENCOUNTER
Dr. Hernandez,    For the LEEANNA ordered on this patient - will you need anesthesia for this case or can she go with conscious sedation?    Thank You,  Mickie

## 2024-08-15 NOTE — TELEPHONE ENCOUNTER
Carlos Hernandez M.D.  Mickie [place], Med Ass't  Caller: Unspecified (Yesterday, 12:58 PM)  Ok with sedation

## 2024-08-16 ENCOUNTER — HOSPITAL ENCOUNTER (OUTPATIENT)
Dept: LAB | Facility: MEDICAL CENTER | Age: 69
End: 2024-08-16
Attending: NURSE PRACTITIONER
Payer: MEDICARE

## 2024-08-16 DIAGNOSIS — I27.20 PULMONARY HYPERTENSION (HCC): ICD-10-CM

## 2024-08-16 LAB
ANION GAP SERPL CALC-SCNC: 13 MMOL/L (ref 7–16)
BUN SERPL-MCNC: 28 MG/DL (ref 8–22)
CALCIUM SERPL-MCNC: 9.4 MG/DL (ref 8.5–10.5)
CHLORIDE SERPL-SCNC: 102 MMOL/L (ref 96–112)
CO2 SERPL-SCNC: 25 MMOL/L (ref 20–33)
CREAT SERPL-MCNC: 1.48 MG/DL (ref 0.5–1.4)
FASTING STATUS PATIENT QL REPORTED: NORMAL
GFR SERPLBLD CREATININE-BSD FMLA CKD-EPI: 38 ML/MIN/1.73 M 2
GLUCOSE SERPL-MCNC: 90 MG/DL (ref 65–99)
POTASSIUM SERPL-SCNC: 4.4 MMOL/L (ref 3.6–5.5)
SODIUM SERPL-SCNC: 140 MMOL/L (ref 135–145)

## 2024-08-16 PROCEDURE — 80048 BASIC METABOLIC PNL TOTAL CA: CPT

## 2024-08-16 PROCEDURE — 36415 COLL VENOUS BLD VENIPUNCTURE: CPT

## 2024-08-19 NOTE — TELEPHONE ENCOUNTER
Patient scheduled for LEEANNA w/o anesthesia on 9-10-24 with Dr. Hernandez. Patient has been instructed to check in at 6:00 for 8:00 case time. Message sent to brandon VELASCO to Dr. Hernandez.

## 2024-08-20 ENCOUNTER — APPOINTMENT (OUTPATIENT)
Dept: ADMISSIONS | Facility: MEDICAL CENTER | Age: 69
End: 2024-08-20
Attending: INTERNAL MEDICINE
Payer: MEDICARE

## 2024-08-21 ENCOUNTER — TELEPHONE (OUTPATIENT)
Dept: CARDIOLOGY | Facility: MEDICAL CENTER | Age: 69
End: 2024-08-21
Payer: MEDICARE

## 2024-08-21 DIAGNOSIS — I27.20 PULMONARY HYPERTENSION (HCC): ICD-10-CM

## 2024-08-21 NOTE — TELEPHONE ENCOUNTER
----- Message from Nurse Practitioner DANIELLE Tello sent at 8/21/2024  3:04 PM PDT -----  Slight bump in Cr. I would like for patient to reduce Torsemide to 10 mg daily if she has not already. Repeat BMP in 7-10 days to re-evaluate. Thanks!

## 2024-08-21 NOTE — TELEPHONE ENCOUNTER
Phone Number Called: [phone]    Call outcome: Spoke to patient regarding message below.    Message: Called to inform patient of JS recommendations. Patient stated she has only been taking 10mg of her Torsemide and will continue to do that per JS recommendations. Lab order placed and patient verbalized understanding that she will have this done in 7-10 days.

## 2024-08-26 ENCOUNTER — PRE-ADMISSION TESTING (OUTPATIENT)
Dept: ADMISSIONS | Facility: MEDICAL CENTER | Age: 69
End: 2024-08-26
Attending: INTERNAL MEDICINE
Payer: MEDICARE

## 2024-08-26 NOTE — OR NURSING
Pre admit appointment completed with Chinyere for surgery/procedure on 09-10-24..    Medication and fasting instructions given per cardiology.      Pt verbalizes understanding of all instructions given. No further questions at this time.

## 2024-08-30 ENCOUNTER — HOSPITAL ENCOUNTER (OUTPATIENT)
Dept: LAB | Facility: MEDICAL CENTER | Age: 69
End: 2024-08-30
Attending: NURSE PRACTITIONER
Payer: MEDICARE

## 2024-08-30 DIAGNOSIS — I27.20 PULMONARY HYPERTENSION (HCC): ICD-10-CM

## 2024-08-30 LAB
ANION GAP SERPL CALC-SCNC: 12 MMOL/L (ref 7–16)
BUN SERPL-MCNC: 15 MG/DL (ref 8–22)
CALCIUM SERPL-MCNC: 9.7 MG/DL (ref 8.5–10.5)
CHLORIDE SERPL-SCNC: 99 MMOL/L (ref 96–112)
CO2 SERPL-SCNC: 27 MMOL/L (ref 20–33)
CREAT SERPL-MCNC: 1.19 MG/DL (ref 0.5–1.4)
FASTING STATUS PATIENT QL REPORTED: NORMAL
GFR SERPLBLD CREATININE-BSD FMLA CKD-EPI: 49 ML/MIN/1.73 M 2
GLUCOSE SERPL-MCNC: 92 MG/DL (ref 65–99)
POTASSIUM SERPL-SCNC: 4.6 MMOL/L (ref 3.6–5.5)
SODIUM SERPL-SCNC: 138 MMOL/L (ref 135–145)

## 2024-08-30 PROCEDURE — 80048 BASIC METABOLIC PNL TOTAL CA: CPT

## 2024-08-30 PROCEDURE — 36415 COLL VENOUS BLD VENIPUNCTURE: CPT

## 2024-09-06 ENCOUNTER — TELEPHONE (OUTPATIENT)
Dept: CARDIOLOGY | Facility: MEDICAL CENTER | Age: 69
End: 2024-09-06
Payer: MEDICARE

## 2024-09-06 NOTE — TELEPHONE ENCOUNTER
AK    Caller:  Chinyere Priest    Topic/issue:   Chinyere has some questions on stopping her medications for the upcoming LEEANNA     Callback Number: [phone]    Thank you,   Xiomara ANDRADE

## 2024-09-06 NOTE — TELEPHONE ENCOUNTER
Phone Number Called: [phone]      Call outcome: Spoke to patient regarding message below.    Message: Called to inform patient no medications need to be held prior to LEEANNA.

## 2024-09-10 ENCOUNTER — APPOINTMENT (OUTPATIENT)
Dept: CARDIOLOGY | Facility: MEDICAL CENTER | Age: 69
End: 2024-09-10
Attending: INTERNAL MEDICINE
Payer: MEDICARE

## 2024-09-10 ENCOUNTER — ANESTHESIA EVENT (OUTPATIENT)
Dept: CARDIOLOGY | Facility: MEDICAL CENTER | Age: 69
End: 2024-09-10
Payer: MEDICARE

## 2024-09-10 ENCOUNTER — HOSPITAL ENCOUNTER (OUTPATIENT)
Facility: MEDICAL CENTER | Age: 69
End: 2024-09-10
Attending: INTERNAL MEDICINE | Admitting: INTERNAL MEDICINE
Payer: MEDICARE

## 2024-09-10 ENCOUNTER — ANESTHESIA (OUTPATIENT)
Dept: CARDIOLOGY | Facility: MEDICAL CENTER | Age: 69
End: 2024-09-10
Payer: MEDICARE

## 2024-09-10 VITALS
HEIGHT: 66 IN | HEART RATE: 59 BPM | OXYGEN SATURATION: 99 % | TEMPERATURE: 97.4 F | RESPIRATION RATE: 18 BRPM | BODY MASS INDEX: 26.36 KG/M2 | SYSTOLIC BLOOD PRESSURE: 131 MMHG | WEIGHT: 164.02 LBS | DIASTOLIC BLOOD PRESSURE: 62 MMHG

## 2024-09-10 DIAGNOSIS — I34.0 NONRHEUMATIC MITRAL VALVE REGURGITATION: ICD-10-CM

## 2024-09-10 LAB
ERYTHROCYTE [DISTWIDTH] IN BLOOD BY AUTOMATED COUNT: 44.3 FL (ref 35.9–50)
HCT VFR BLD AUTO: 38.2 % (ref 37–47)
HGB BLD-MCNC: 12.7 G/DL (ref 12–16)
MCH RBC QN AUTO: 31.6 PG (ref 27–33)
MCHC RBC AUTO-ENTMCNC: 33.2 G/DL (ref 32.2–35.5)
MCV RBC AUTO: 95 FL (ref 81.4–97.8)
PLATELET # BLD AUTO: 218 K/UL (ref 164–446)
PMV BLD AUTO: 11.3 FL (ref 9–12.9)
RBC # BLD AUTO: 4.02 M/UL (ref 4.2–5.4)
WBC # BLD AUTO: 6.9 K/UL (ref 4.8–10.8)

## 2024-09-10 PROCEDURE — 700105 HCHG RX REV CODE 258: Performed by: INTERNAL MEDICINE

## 2024-09-10 PROCEDURE — 160046 HCHG PACU - 1ST 60 MINS PHASE II

## 2024-09-10 PROCEDURE — 85027 COMPLETE CBC AUTOMATED: CPT

## 2024-09-10 PROCEDURE — 160035 HCHG PACU - 1ST 60 MINS PHASE I

## 2024-09-10 PROCEDURE — 93312 ECHO TRANSESOPHAGEAL: CPT

## 2024-09-10 PROCEDURE — 160002 HCHG RECOVERY MINUTES (STAT)

## 2024-09-10 PROCEDURE — 160036 HCHG PACU - EA ADDL 30 MINS PHASE I

## 2024-09-10 PROCEDURE — 700111 HCHG RX REV CODE 636 W/ 250 OVERRIDE (IP): Performed by: ANESTHESIOLOGY

## 2024-09-10 RX ORDER — MEPERIDINE HYDROCHLORIDE 25 MG/ML
12.5 INJECTION INTRAMUSCULAR; INTRAVENOUS; SUBCUTANEOUS
Status: DISCONTINUED | OUTPATIENT
Start: 2024-09-10 | End: 2024-09-10 | Stop reason: HOSPADM

## 2024-09-10 RX ORDER — HALOPERIDOL 5 MG/ML
1 INJECTION INTRAMUSCULAR
Status: DISCONTINUED | OUTPATIENT
Start: 2024-09-10 | End: 2024-09-10 | Stop reason: HOSPADM

## 2024-09-10 RX ORDER — HYDROMORPHONE HYDROCHLORIDE 1 MG/ML
0.2 INJECTION, SOLUTION INTRAMUSCULAR; INTRAVENOUS; SUBCUTANEOUS
Status: DISCONTINUED | OUTPATIENT
Start: 2024-09-10 | End: 2024-09-10 | Stop reason: HOSPADM

## 2024-09-10 RX ORDER — EPHEDRINE SULFATE 50 MG/ML
5 INJECTION, SOLUTION INTRAVENOUS
Status: DISCONTINUED | OUTPATIENT
Start: 2024-09-10 | End: 2024-09-10 | Stop reason: HOSPADM

## 2024-09-10 RX ORDER — HYDROMORPHONE HYDROCHLORIDE 1 MG/ML
0.1 INJECTION, SOLUTION INTRAMUSCULAR; INTRAVENOUS; SUBCUTANEOUS
Status: DISCONTINUED | OUTPATIENT
Start: 2024-09-10 | End: 2024-09-10 | Stop reason: HOSPADM

## 2024-09-10 RX ORDER — SODIUM CHLORIDE, SODIUM LACTATE, POTASSIUM CHLORIDE, CALCIUM CHLORIDE 600; 310; 30; 20 MG/100ML; MG/100ML; MG/100ML; MG/100ML
INJECTION, SOLUTION INTRAVENOUS CONTINUOUS
Status: ACTIVE | OUTPATIENT
Start: 2024-09-10 | End: 2024-09-10

## 2024-09-10 RX ORDER — IPRATROPIUM BROMIDE AND ALBUTEROL SULFATE 2.5; .5 MG/3ML; MG/3ML
3 SOLUTION RESPIRATORY (INHALATION)
Status: DISCONTINUED | OUTPATIENT
Start: 2024-09-10 | End: 2024-09-10 | Stop reason: HOSPADM

## 2024-09-10 RX ORDER — DIPHENHYDRAMINE HYDROCHLORIDE 50 MG/ML
12.5 INJECTION INTRAMUSCULAR; INTRAVENOUS
Status: DISCONTINUED | OUTPATIENT
Start: 2024-09-10 | End: 2024-09-10 | Stop reason: HOSPADM

## 2024-09-10 RX ORDER — OXYCODONE HCL 5 MG/5 ML
10 SOLUTION, ORAL ORAL
Status: DISCONTINUED | OUTPATIENT
Start: 2024-09-10 | End: 2024-09-10 | Stop reason: HOSPADM

## 2024-09-10 RX ORDER — HYDRALAZINE HYDROCHLORIDE 20 MG/ML
5 INJECTION INTRAMUSCULAR; INTRAVENOUS
Status: DISCONTINUED | OUTPATIENT
Start: 2024-09-10 | End: 2024-09-10 | Stop reason: HOSPADM

## 2024-09-10 RX ORDER — HYDROMORPHONE HYDROCHLORIDE 1 MG/ML
0.4 INJECTION, SOLUTION INTRAMUSCULAR; INTRAVENOUS; SUBCUTANEOUS
Status: DISCONTINUED | OUTPATIENT
Start: 2024-09-10 | End: 2024-09-10 | Stop reason: HOSPADM

## 2024-09-10 RX ORDER — SODIUM CHLORIDE, SODIUM LACTATE, POTASSIUM CHLORIDE, CALCIUM CHLORIDE 600; 310; 30; 20 MG/100ML; MG/100ML; MG/100ML; MG/100ML
INJECTION, SOLUTION INTRAVENOUS CONTINUOUS
Status: DISCONTINUED | OUTPATIENT
Start: 2024-09-10 | End: 2024-09-10 | Stop reason: HOSPADM

## 2024-09-10 RX ORDER — OXYCODONE HCL 5 MG/5 ML
5 SOLUTION, ORAL ORAL
Status: DISCONTINUED | OUTPATIENT
Start: 2024-09-10 | End: 2024-09-10 | Stop reason: HOSPADM

## 2024-09-10 RX ORDER — ONDANSETRON 2 MG/ML
4 INJECTION INTRAMUSCULAR; INTRAVENOUS
Status: DISCONTINUED | OUTPATIENT
Start: 2024-09-10 | End: 2024-09-10 | Stop reason: HOSPADM

## 2024-09-10 RX ORDER — LIDOCAINE HYDROCHLORIDE 10 MG/ML
INJECTION, SOLUTION EPIDURAL; INFILTRATION; INTRACAUDAL; PERINEURAL PRN
Status: DISCONTINUED | OUTPATIENT
Start: 2024-09-10 | End: 2024-09-10 | Stop reason: HOSPADM

## 2024-09-10 RX ADMIN — PROPOFOL 20 MG: 10 INJECTION, EMULSION INTRAVENOUS at 08:38

## 2024-09-10 RX ADMIN — PROPOFOL 80 MG: 10 INJECTION, EMULSION INTRAVENOUS at 08:29

## 2024-09-10 RX ADMIN — LIDOCAINE HYDROCHLORIDE 60 MG: 10 INJECTION, SOLUTION EPIDURAL; INFILTRATION; INTRACAUDAL; PERINEURAL at 08:29

## 2024-09-10 RX ADMIN — PROPOFOL 20 MG: 10 INJECTION, EMULSION INTRAVENOUS at 08:42

## 2024-09-10 RX ADMIN — SODIUM CHLORIDE, POTASSIUM CHLORIDE, SODIUM LACTATE AND CALCIUM CHLORIDE: 600; 310; 30; 20 INJECTION, SOLUTION INTRAVENOUS at 08:25

## 2024-09-10 RX ADMIN — PROPOFOL 20 MG: 10 INJECTION, EMULSION INTRAVENOUS at 08:33

## 2024-09-10 RX ADMIN — PROPOFOL 20 MG: 10 INJECTION, EMULSION INTRAVENOUS at 08:36

## 2024-09-10 ASSESSMENT — PAIN SCALES - GENERAL: PAIN_LEVEL: 0

## 2024-09-10 ASSESSMENT — FIBROSIS 4 INDEX
FIB4 SCORE: 2.470588235294117647
FIB4 SCORE: 2.470588235294117647

## 2024-09-10 NOTE — ANESTHESIA TIME REPORT
Anesthesia Start and Stop Event Times       Date Time Event    9/10/2024 0825 Anesthesia Start     0856 Anesthesia Stop          Responsible Staff  09/10/24      Name Role Begin End    Cindi Holloway M.D. Anesth 0825 0856          Overtime Reason:  no overtime (within assigned shift)    Comments:

## 2024-09-10 NOTE — DISCHARGE INSTRUCTIONS
HOME CARE INSTRUCTIONS    ACTIVITY: Rest and take it easy for the first 24 hours.  A responsible adult is recommended to remain with you during that time.  It is normal to feel sleepy.  We encourage you to not do anything that requires balance, judgment or coordination.    FOR 24 HOURS DO NOT:  Drive, operate machinery or run household appliances.  Drink beer or alcoholic beverages.  Make important decisions or sign legal documents.    SPECIAL INSTRUCTIONS: ENDOSCOPY HOME CARE INSTRUCTIONS    LEEANNA - TRANSESOPHAGEAL ECHOCARDIOGRAM  1. Don't drive or drink alcohol for 24 hours. The medication you received will make you too drowsy.  2. If you begin to vomit bloody material, or develop black or bloody stools, call your doctor as soon as possible.  3. If you have any neck, chest, abdominal pain or temp of 100 degrees, call your doctor.  4. Follow up with  Dr Hernandez          You should call 011 if you develop problems with breathing or chest pain.  If any questions arise, call your doctor. If your doctor is not available, please feel free to call [phone]. You can also call the [facility] open 24 hours/day, 7 days/week and speak to a nurse at [phone], or toll free [phone].    I acknowledge receipt and understanding of these Home Care Instructions.        DIET: To avoid nausea, slowly advance diet as tolerated, avoiding spicy or greasy foods for the first day.  Add more substantial food to your diet according to your physician's instructions.  Babies can be fed formula or breast milk as soon as they are hungry.  INCREASE FLUIDS AND FIBER TO AVOID CONSTIPATION.    MEDICATIONS: Resume taking daily medication.  Take prescribed pain medication with food.  If no medication is prescribed, you may take non-aspirin pain medication if needed.  PAIN MEDICATION CAN BE VERY CONSTIPATING.  Take a stool softener or laxative such as senokot, pericolace, or milk of magnesia if needed.    A follow-up appointment  should be arranged with your doctor in 1-2 weeks; call to schedule.    You should CALL YOUR PHYSICIAN if you develop:  Fever greater than 101 degrees F.  Pain not relieved by medication, or persistent nausea or vomiting.  Excessive bleeding (blood soaking through dressing) or unexpected drainage from the wound.  Extreme redness or swelling around the incision site, drainage of pus or foul smelling drainage.  Inability to urinate or empty your bladder within 8 hours.  Problems with breathing or chest pain.    You should call 911 if you develop problems with breathing or chest pain.  If you are unable to contact your doctor or surgical center, you should go to the nearest emergency room or urgent care center.  Physician's telephone #: [phone]     MILD FLU-LIKE SYMPTOMS ARE NORMAL.  YOU MAY EXPERIENCE GENERALIZED MUSCLE ACHES, THROAT IRRITATION, HEADACHE AND/OR SOME NAUSEA.    If any questions arise, call your doctor.  If your doctor is not available, please feel free to call the Surgical Center at [phone].  The Center is open Monday through Friday from 7AM to 7PM.      A registered nurse may call you a few days after your surgery to see how you are doing after your procedure.    You may also receive a survey in the mail within the next two weeks and we ask that you take a few moments to complete the survey and return it to us.  Our goal is to provide you with very good care and we value your comments.     Depression / Suicide Risk    As you are discharged from this [facility] facility, it is important to learn how to keep safe from harming yourself.    Recognize the warning signs:  Abrupt changes in personality, positive or negative- including increase in energy   Giving away possessions  Change in eating patterns- significant weight changes-  positive or negative  Change in sleeping patterns- unable to sleep or sleeping all the time   Unwillingness or inability to communicate  Depression  Unusual  sadness, discouragement and loneliness  Talk of wanting to die  Neglect of personal appearance   Rebelliousness- reckless behavior  Withdrawal from people/activities they love  Confusion- inability to concentrate     If you or a loved one observes any of these behaviors or has concerns about self-harm, here's what you can do:  Talk about it- your feelings and reasons for harming yourself  Remove any means that you might use to hurt yourself (examples: pills, rope, extension cords, firearm)  Get professional help from the community (Mental Health, Substance Abuse, psychological counseling)  Do not be alone:Call your Safe Contact- someone whom you trust who will be there for you.  Call your local CRISIS HOTLINE 370-2094 or 394-892-4718  Call your local Children's Mobile Crisis Response Team [place] (694) 107-7918 or www.[employer]  Call the toll free National Suicide Prevention Hotlines   National Suicide Prevention Lifeline 777-412-KKDQ (1093)  National Hope Line Network 800-SUICIDE (511-2045)    I acknowledge receipt and understanding of these Home Care instructions.

## 2024-09-10 NOTE — OR NURSING
Pt A&OX4. VSS. Pt in sinus bradycardia. HR 40-50's during recovery.   Okay per anesthesiologist to d/c with HR as low as 40's, pt asymptomatic.   Pt on room air. Afebrile.   No complaints of pain or nausea.

## 2024-09-10 NOTE — PROGRESS NOTES
Pharmacy Medication Reconciliation      ~Medication reconciliation updated and complete per patient   ~Allergies have been verified and updated   ~No oral ABX within the last 30 days  ~[facility] home pharmacy :  [facility] [phone]      ~Anticoagulants (rivaroxaban, apixaban, edoxaban, dabigatran, warfarin, enoxaparin) taken in the last 14 days? Yes  ~Anticoagulant: Eliquis 5mg, Last dose: 9/10/24 @0630

## 2024-09-10 NOTE — ANESTHESIA PREPROCEDURE EVALUATION
Date/Time: 09/10/24 0800    Scheduled providers: Carlos Hernandez M.D.; Cindi Holloway M.D.    Procedure: EC-LEEANNA W/O CONT    Diagnosis:       Nonrheumatic mitral valve regurgitation [I34.0]      Nonrheumatic mitral (valve) insufficiency [I34.0]    Location: [facility] - Echocardiology [facility]            Relevant Problems   PULMONARY   (positive) Asthma in adult without complication      CARDIAC   (positive) Aortic calcification (HCC)   (positive) Essential hypertension, benign   (positive) Non-rheumatic mitral regurgitation   (positive) Paroxysmal atrial fibrillation (HCC)   (positive) Pulmonary hypertension (HCC)   (positive) Unspecified atrial flutter (HCC)         (positive) Stage 3a chronic kidney disease      Other   (positive) DDD (degenerative disc disease), cervical   (positive) Hypercholesterolemia   (positive) S/P ablation of atrial fibrillation       Physical Exam    Airway   Mallampati: II  TM distance: >3 FB  Neck ROM: full       Cardiovascular - normal exam  Rhythm: regular  Rate: normal  (-) murmur     Dental - normal exam  (+) upper dentures, lower dentures           Pulmonary - normal exam  Breath sounds clear to auscultation     Abdominal    Neurological - normal exam                   Anesthesia Plan    ASA 3 (pulm htn)       Plan - MAC               Induction: intravenous    Postoperative Plan: Postoperative administration of opioids is intended.    Pertinent diagnostic labs and testing reviewed    Informed Consent:    Anesthetic plan and risks discussed with patient.    Use of blood products discussed with: patient whom consented to blood products.

## 2024-09-10 NOTE — ANESTHESIA POSTPROCEDURE EVALUATION
Patient: Chinyere Priest    Procedure Summary       Date: 09/10/24 Room / Location: [facility] - Echocardiology [facility]    Anesthesia Start: 0825 Anesthesia Stop: 0856    Procedure: EC-LEEANNA W/O CONT Diagnosis:       Nonrheumatic mitral valve regurgitation      Nonrheumatic mitral (valve) insufficiency    Scheduled Providers: Carlos Hernandez M.D.; Cindi Holloway M.D. Responsible Provider: Cindi Holloway M.D.    Anesthesia Type: general ASA Status: 3            Final Anesthesia Type: general  Last vitals  BP   Blood Pressure : (!) 142/66    Temp   36.2 °C (97.1 °F)    Pulse   (!) 48   Resp   18    SpO2   100 %      Anesthesia Post Evaluation    Patient location during evaluation: PACU  Patient participation: complete - patient participated  Level of consciousness: awake and alert  Pain score: 0    Airway patency: patent  Anesthetic complications: no  Cardiovascular status: hemodynamically stable  Respiratory status: acceptable  Hydration status: euvolemic    PONV: none          No notable events documented.     Nurse Pain Score: 0 (NPRS)

## 2024-09-10 NOTE — ADDENDUM NOTE
Addendum  created 09/10/24 1044 by Cindi Holloway M.D.    Review and Sign - Ready for Procedure

## 2024-09-13 LAB — LV EJECT FRACT  99904: 60

## 2024-09-13 PROCEDURE — 93312 ECHO TRANSESOPHAGEAL: CPT | Mod: 26 | Performed by: INTERNAL MEDICINE

## 2024-09-17 ENCOUNTER — TELEPHONE (OUTPATIENT)
Dept: CARDIOLOGY | Facility: MEDICAL CENTER | Age: 69
End: 2024-09-17
Payer: MEDICARE

## 2024-09-17 NOTE — TELEPHONE ENCOUNTER
From: Carlos Hernandez M.D.  Sent: 9/17/2024   1:30 PM PDT  To: NORMA XavierRAfuaN.    No, follow up in 6 months with repeat echo, its not quite severe yet  ----- Message -----  From: DAISY Xavier.  Sent: 9/16/2024   1:19 PM PDT  To: Jessie Bush R.N.; #    AK,    Just confirming you want CTS referral with mod-severe MR?    Thanks,    Courtney

## 2024-09-17 NOTE — TELEPHONE ENCOUNTER
Called and notified patient of LEEANNA results and Dr. Hernandez's recommendations. She is agreeable to the plan, but she states her symptoms are slightly worse. She still gets extremely tired, LOWERY with household chores, and some dizziness. She requests to see SHP prior to the 6mo FU to discuss symptoms further. Scheduled visit with Glenda GOLDEN next week.     FYI to Glenda GOLDEN

## 2024-09-26 ENCOUNTER — OFFICE VISIT (OUTPATIENT)
Dept: CARDIOLOGY | Facility: MEDICAL CENTER | Age: 69
End: 2024-09-26
Attending: NURSE PRACTITIONER
Payer: MEDICARE

## 2024-09-26 VITALS
OXYGEN SATURATION: 99 % | WEIGHT: 164.8 LBS | SYSTOLIC BLOOD PRESSURE: 118 MMHG | HEART RATE: 59 BPM | DIASTOLIC BLOOD PRESSURE: 58 MMHG | RESPIRATION RATE: 16 BRPM | BODY MASS INDEX: 26.48 KG/M2 | HEIGHT: 66 IN

## 2024-09-26 DIAGNOSIS — Z98.890 S/P ABLATION OF ATRIAL FIBRILLATION: ICD-10-CM

## 2024-09-26 DIAGNOSIS — I50.33 ACUTE ON CHRONIC DIASTOLIC HEART FAILURE DUE TO VALVULAR DISEASE (HCC): ICD-10-CM

## 2024-09-26 DIAGNOSIS — I27.20 PULMONARY HYPERTENSION (HCC): ICD-10-CM

## 2024-09-26 DIAGNOSIS — D68.69 SECONDARY HYPERCOAGULABLE STATE (HCC): ICD-10-CM

## 2024-09-26 DIAGNOSIS — I10 ESSENTIAL HYPERTENSION, BENIGN: ICD-10-CM

## 2024-09-26 DIAGNOSIS — Z86.79 S/P ABLATION OF ATRIAL FIBRILLATION: ICD-10-CM

## 2024-09-26 DIAGNOSIS — I70.0 AORTIC CALCIFICATION (HCC): ICD-10-CM

## 2024-09-26 DIAGNOSIS — I38 ACUTE ON CHRONIC DIASTOLIC HEART FAILURE DUE TO VALVULAR DISEASE (HCC): ICD-10-CM

## 2024-09-26 DIAGNOSIS — N18.31 STAGE 3A CHRONIC KIDNEY DISEASE: ICD-10-CM

## 2024-09-26 DIAGNOSIS — Z98.890 S/P CARDIAC CATHETERIZATION: ICD-10-CM

## 2024-09-26 DIAGNOSIS — I34.0 NON-RHEUMATIC MITRAL REGURGITATION: ICD-10-CM

## 2024-09-26 DIAGNOSIS — E78.00 HYPERCHOLESTEROLEMIA: ICD-10-CM

## 2024-09-26 DIAGNOSIS — G47.34 NOCTURNAL HYPOXEMIA: ICD-10-CM

## 2024-09-26 PROBLEM — I48.0 PAROXYSMAL ATRIAL FIBRILLATION (HCC): Status: RESOLVED | Noted: 2023-10-26 | Resolved: 2024-09-26

## 2024-09-26 PROBLEM — I48.92 UNSPECIFIED ATRIAL FLUTTER (HCC): Status: RESOLVED | Noted: 2023-08-17 | Resolved: 2024-09-26

## 2024-09-26 PROCEDURE — 99212 OFFICE O/P EST SF 10 MIN: CPT | Performed by: NURSE PRACTITIONER

## 2024-09-26 RX ORDER — TORSEMIDE 20 MG/1
20 TABLET ORAL DAILY
Qty: 100 TABLET | Refills: 3 | Status: SHIPPED | OUTPATIENT
Start: 2024-09-26

## 2024-09-26 RX ORDER — LOSARTAN POTASSIUM 50 MG/1
50 TABLET ORAL EVERY EVENING
COMMUNITY
Start: 2024-09-26

## 2024-09-26 ASSESSMENT — ENCOUNTER SYMPTOMS
ORTHOPNEA: 0
MYALGIAS: 0
ABDOMINAL PAIN: 0
PALPITATIONS: 0
FEVER: 0
COUGH: 0
SHORTNESS OF BREATH: 1
CLAUDICATION: 0
DIZZINESS: 1
PND: 0

## 2024-09-26 ASSESSMENT — FIBROSIS 4 INDEX: FIB4 SCORE: 2.12

## 2024-09-26 NOTE — PROGRESS NOTES
Chief Complaint   Patient presents with    Other     Nonrheumatic mitral valve regurgitation    Follow-Up     Wants to discuss symptoms further     Subjective     Chinyere Priest is a 69 y.o. female who presents today for review of echocardiogram and symptoms.    She is a patient of Dr. Hernandez in our office. Hx of HTN with CKD stage 3a, HLD, mod-sev TR, PAF S/P ablation with chronic anticoagulation, mod TR, and AMINAH on CPAP with PAH.    She presents today with her daughter. She admits to progressive leg swelling up to thighs at times, shortness of breath with minimal exertion, and fatigue. Some occasional lightheadedness in the mornings after medications.    Trouble sleeping at night.    No palpitations or chest pain.    Past Medical History:   Diagnosis Date    Aortic calcification (HCC)      Noted incidentally on abdominal CT scan.    Arrhythmia     hx of afibb, ablation x 2     Arthritis 2010    Asthma in adult     patient states it is only occasionally    Breath shortness 2020    Bronchitis 2019    Chronic anticoagulation     Chronic back pain      CKD (chronic kidney disease) stage 3, GFR 30-59 ml/min 09/14/2020    Congestive heart failure (HCC) 2024    Dental disorder     Full upper/lower dentures.    Diverticulosis      Heart murmur 2019    High cholesterol     patient denies    Hypertension     Inflamed seborrheic keratosis      Intrinsic eczema 01/20/2020    Non-rheumatic mitral regurgitation 05/2019    Echocardiogram with normal LV size, LVEF 65%. Moderate MR, mild-moderate TR, unchanged from previous.    Osteoarthritis of both hips      Plantar fascia syndrome      Plantar wart of right foot      Pneumonia 2019    Pruritic dermatitis      Ongoing for several months but worse at night on her lower extremities    Pulmonary hypertension (HCC)     Pure hypercholesterolemia      S/P cardiac catheterization 05/26/2017    1.  Normal right heart pressures. 3.  Essentially normal coronary arteries. 2.   Left ventricular ejection fraction 65%.     Spider veins of limb      Unspecified vitamin D deficiency       Past Surgical History:   Procedure Laterality Date    LUMBAR FUSION O-ARM N/A 2021    Procedure: FUSION, SPINE, LUMBAR, WITH O-ARM IMAGING GUIDANCE - STAGE #2 L3-5 UNILATERAL, LEFT-SIDED.laminotomy lumbar 3-5;  Surgeon: Sudhir Clay M.D.;  Location: SURGERY [facility];  Service: Neurosurgery    FUSION, SPINE, XLIF Right 2021    Procedure: FUSION, SPINE, XLIF - STAGE #1 L3-5, RIGHT-SIDED APPROACH.;  Surgeon: Sudhir Clay M.D.;  Location: SURGERY [facility];  Service: Neurosurgery    OTHER CARDIAC SURGERY  2019    cardiac ablation x 2 , for Afibb    LUMBAR LAMINECTOMY DISKECTOMY  2009    Performed by SUDHIR CLAY at SURGERY [facility] ORS    HYSTERECTOMY LAPAROSCOPY  2004    GYN SURGERY       x2     Family History   Problem Relation Age of Onset    Heart Disease Mother         1st MI @ 59 yo    Hypertension Mother     Heart Attack Mother 60    Heart Disease Maternal Aunt     Breast Cancer Maternal Aunt     Cancer Daughter 35        breast    Breast Cancer Daughter     Heart Disease Maternal Grandmother     Heart Disease Maternal Grandfather     Heart Disease Maternal Aunt 56    Ovarian Cancer Other      Social History     Socioeconomic History    Marital status:      Spouse name: Not on file    Number of children: Not on file    Years of education: Not on file    Highest education level: Not on file   Occupational History    Not on file   Tobacco Use    Smoking status: Former     Current packs/day: 0.00     Average packs/day: 0.5 packs/day for 40.0 years (20.0 ttl pk-yrs)     Types: Cigarettes     Start date: 3/1/1970     Quit date: 3/1/2010     Years since quittin.5    Smokeless tobacco: Never   Vaping Use    Vaping status: Never Used   Substance and Sexual Activity    Alcohol use: No    Drug use: Never    Sexual activity: Yes     Partners: Male     Birth  control/protection: Post-Menopausal     Comment: , works at [employer](part time)   Other Topics Concern     Service Not Asked    Blood Transfusions Not Asked    Caffeine Concern Not Asked    Occupational Exposure Not Asked    Hobby Hazards Not Asked    Sleep Concern Not Asked    Stress Concern Not Asked    Weight Concern Not Asked    Special Diet Not Asked    Back Care Not Asked    Exercise No    Bike Helmet Not Asked    Seat Belt Not Asked    Self-Exams Not Asked   Social History Narrative    Works at [employer]     Social Determinants of Health     Financial Resource Strain: Not on file   Food Insecurity: No Food Insecurity (12/11/2019)    Hunger Vital Sign     Worried About Running Out of Food in the Last Year: Never true     Ran Out of Food in the Last Year: Never true   Transportation Needs: No Transportation Needs (12/11/2019)    PRAPARE - Transportation     Lack of Transportation (Medical): No     Lack of Transportation (Non-Medical): No   Physical Activity: Not on file   Stress: Not on file   Social Connections: Not on file   Intimate Partner Violence: Not on file   Housing Stability: Not on file     Allergies   Allergen Reactions    Erythromycin Unspecified     Severe Abdominal pain    Tikosyn [Dofetilide] Diarrhea     diarrhea    Latex Hives     rash    Tape Hives       Paper tape okay     Outpatient Encounter Medications as of 9/26/2024   Medication Sig Dispense Refill    cyanocobalamin (VITAMIN B-12) 100 MCG Tab Take 100 mcg by mouth every morning.      torsemide (DEMADEX) 10 MG tablet Take 1 Tablet by mouth every day. Take 2 tablets (20 mg ) daily for 7 days, then reduce to 1 tablet (10 mg) daily. (Patient taking differently: Take 10 mg by mouth every morning. Take 2 tablets (20 mg ) daily for 7 days, then reduce to 1 tablet (10 mg) daily.) 100 Tablet 3    potassium chloride ER (KLOR-CON 10) 10 MEQ tablet Take 1 Tablet by mouth every day. (Patient taking differently: Take 10 mEq by mouth every  "morning.) 100 Tablet 3    ELIQUIS 5 MG Tab TAKE 1 TABLET BY MOUTH TWICE A DAY (Patient taking differently: Take 5 mg by mouth 2 times a day.) 180 Tablet 3    atorvastatin (LIPITOR) 10 MG Tab TAKE 1 TABLET BY MOUTH EVERY DAY (Patient taking differently: Take 10 mg by mouth every morning.) 90 Tablet 3    losartan (COZAAR) 50 MG Tab Take 1 Tablet by mouth every day. (Patient taking differently: Take 50 mg by mouth every morning.) 90 Tablet 3    Multiple Vitamin (MULTIVITAMIN ADULT PO) Take 1 Tablet by mouth every evening.      clobetasol (TEMOVATE) 0.05 % Ointment APPLY 1 APPLICATION TOPICALLY 2 TIMES A DAY AS NEEDED (SKIN RASH ON LOWER LEGS). 45 g 2    CHOLECALCIFEROL PO Take 2 Capsules by mouth every morning. 4000 Vit D       No facility-administered encounter medications on file as of 9/26/2024.     Review of Systems   Constitutional:  Positive for malaise/fatigue. Negative for fever.   Respiratory:  Positive for shortness of breath. Negative for cough.    Cardiovascular:  Positive for leg swelling. Negative for chest pain, palpitations, orthopnea, claudication and PND.   Gastrointestinal:  Negative for abdominal pain.   Musculoskeletal:  Negative for myalgias.   Neurological:  Positive for dizziness.              Objective     /58 (BP Location: Left arm, Patient Position: Sitting, BP Cuff Size: Adult)   Pulse (!) 59   Resp 16   Ht 1.676 m (5' 6\")   Wt 74.8 kg (164 lb 12.8 oz)   SpO2 99%   BMI 26.60 kg/m²     Physical Exam  Vitals and nursing note reviewed.   Constitutional:       Appearance: Normal appearance. She is well-developed and normal weight.   HENT:      Head: Normocephalic and atraumatic.   Neck:      Vascular: No JVD.   Cardiovascular:      Rate and Rhythm: Normal rate and regular rhythm.      Pulses: Normal pulses.      Heart sounds: Murmur heard.   Pulmonary:      Effort: Pulmonary effort is normal.      Breath sounds: Normal breath sounds.   Musculoskeletal:         General: Normal range " of motion.      Right lower leg: Edema present.      Left lower leg: Edema present.      Comments: Bilateral LE edema 2+ pitting up to mid thighs   Skin:     General: Skin is warm and dry.      Capillary Refill: Capillary refill takes less than 2 seconds.   Neurological:      General: No focal deficit present.      Mental Status: She is alert and oriented to person, place, and time. Mental status is at baseline.   Psychiatric:         Mood and Affect: Mood normal.         Behavior: Behavior normal.         Thought Content: Thought content normal.         Judgment: Judgment normal.                Assessment & Plan     1. Essential hypertension, benign        2. Hypercholesterolemia        3. Pulmonary hypertension (HCC)        4. S/P ablation of atrial fibrillation        5. Secondary hypercoagulable state (HCC)        6. Aortic calcification (HCC)        7. Non-rheumatic mitral regurgitation        8. S/P cardiac catheterization          Medical Decision Making: Today's Assessment/Status/Plan:      1. Moderate-severe MR  -symptomatic with fluid overload  -work on fluid retention  -per Dr. Hernandez, valve not severe enough for clip  -increase torsemide to 20 mg QD with potassium 10 mEq QD  -reduce fluid intake to no more than 1L total daily  -HF booklet reviewed and she will keep track of her weight and BP daily  -my chart check in 2 weeks on symptoms  -cont losartan 50 mg and move to PM for dizziness in AM  -OPO abnormal, refer to sleep medicine for probable AMINAH    2. PAF S/P afib ablation on chronic anticoagulation  -rate controlled, asymptomatic  -regular on exam today  -cont eliquis for OAC, tolerating well  -follow    3. HLD  -cont statin, LDL goal <100  -follow labs annually    4. HTN  -good control, follow at home  -BP goal <130/80  -cont torsemide and losartan    Patient is to follow up with Glenda GOLDEN in 1 month with labs; echo and follow up in 3-6 months.

## 2024-09-26 NOTE — PATIENT INSTRUCTIONS
INCREASE torsemide back up to 20 mg once a day in the morning.    MOVE losartan to evening.    BP goal 110-130/60-80. Follow this a few times a week.    Weight and leg swelling/shortness of breath should all improve with this increased dose.    Try not to over hydrate with more than 35 oz of total fluid daily.    Labs in 1 month with follow up.    Referral to sleep medicine team.    Repeat echocardiogram in 3-6 months.

## 2024-10-03 ENCOUNTER — OFFICE VISIT (OUTPATIENT)
Dept: MEDICAL GROUP | Facility: MEDICAL CENTER | Age: 69
End: 2024-10-03
Payer: MEDICARE

## 2024-10-03 VITALS
DIASTOLIC BLOOD PRESSURE: 50 MMHG | WEIGHT: 157.96 LBS | BODY MASS INDEX: 25.39 KG/M2 | HEIGHT: 66 IN | SYSTOLIC BLOOD PRESSURE: 120 MMHG | TEMPERATURE: 98 F | HEART RATE: 68 BPM

## 2024-10-03 DIAGNOSIS — M89.8X1 PAIN OF LEFT SCAPULA: ICD-10-CM

## 2024-10-03 DIAGNOSIS — R10.9 FLANK PAIN: ICD-10-CM

## 2024-10-03 DIAGNOSIS — M54.6 ACUTE LEFT-SIDED THORACIC BACK PAIN: ICD-10-CM

## 2024-10-03 DIAGNOSIS — Z23 NEED FOR VACCINATION: ICD-10-CM

## 2024-10-03 PROCEDURE — 90662 IIV NO PRSV INCREASED AG IM: CPT | Performed by: FAMILY MEDICINE

## 2024-10-03 PROCEDURE — 3078F DIAST BP <80 MM HG: CPT | Performed by: FAMILY MEDICINE

## 2024-10-03 PROCEDURE — 3074F SYST BP LT 130 MM HG: CPT | Performed by: FAMILY MEDICINE

## 2024-10-03 PROCEDURE — 99214 OFFICE O/P EST MOD 30 MIN: CPT | Mod: 25 | Performed by: FAMILY MEDICINE

## 2024-10-03 PROCEDURE — G0008 ADMIN INFLUENZA VIRUS VAC: HCPCS | Performed by: FAMILY MEDICINE

## 2024-10-03 ASSESSMENT — ENCOUNTER SYMPTOMS
CHILLS: 0
FLANK PAIN: 1
FEVER: 0
BACK PAIN: 1

## 2024-10-03 ASSESSMENT — FIBROSIS 4 INDEX: FIB4 SCORE: 2.12

## 2024-10-04 ENCOUNTER — HOSPITAL ENCOUNTER (OUTPATIENT)
Dept: RADIOLOGY | Facility: MEDICAL CENTER | Age: 69
End: 2024-10-04
Attending: FAMILY MEDICINE
Payer: MEDICARE

## 2024-10-04 ENCOUNTER — TELEPHONE (OUTPATIENT)
Dept: CARDIOLOGY | Facility: MEDICAL CENTER | Age: 69
End: 2024-10-04
Payer: MEDICARE

## 2024-10-04 DIAGNOSIS — M54.6 ACUTE LEFT-SIDED THORACIC BACK PAIN: ICD-10-CM

## 2024-10-04 DIAGNOSIS — R10.9 FLANK PAIN: ICD-10-CM

## 2024-10-04 DIAGNOSIS — M89.8X1 PAIN OF LEFT SCAPULA: ICD-10-CM

## 2024-10-04 PROCEDURE — 73010 X-RAY EXAM OF SHOULDER BLADE: CPT | Mod: LT

## 2024-10-04 PROCEDURE — 74176 CT ABD & PELVIS W/O CONTRAST: CPT

## 2024-10-04 PROCEDURE — 72070 X-RAY EXAM THORAC SPINE 2VWS: CPT

## 2024-10-17 ENCOUNTER — TELEPHONE (OUTPATIENT)
Dept: CARDIOLOGY | Facility: MEDICAL CENTER | Age: 69
End: 2024-10-17
Payer: MEDICARE

## 2024-10-28 ENCOUNTER — HOSPITAL ENCOUNTER (OUTPATIENT)
Dept: LAB | Facility: MEDICAL CENTER | Age: 69
End: 2024-10-28
Attending: NURSE PRACTITIONER
Payer: MEDICARE

## 2024-10-28 DIAGNOSIS — I50.33 ACUTE ON CHRONIC DIASTOLIC HEART FAILURE DUE TO VALVULAR DISEASE (HCC): ICD-10-CM

## 2024-10-28 DIAGNOSIS — I38 ACUTE ON CHRONIC DIASTOLIC HEART FAILURE DUE TO VALVULAR DISEASE (HCC): ICD-10-CM

## 2024-10-28 LAB
ANION GAP SERPL CALC-SCNC: 13 MMOL/L (ref 7–16)
BUN SERPL-MCNC: 24 MG/DL (ref 8–22)
CALCIUM SERPL-MCNC: 10 MG/DL (ref 8.5–10.5)
CHLORIDE SERPL-SCNC: 101 MMOL/L (ref 96–112)
CO2 SERPL-SCNC: 26 MMOL/L (ref 20–33)
CREAT SERPL-MCNC: 1.41 MG/DL (ref 0.5–1.4)
FASTING STATUS PATIENT QL REPORTED: NORMAL
GFR SERPLBLD CREATININE-BSD FMLA CKD-EPI: 40 ML/MIN/1.73 M 2
GLUCOSE SERPL-MCNC: 85 MG/DL (ref 65–99)
NT-PROBNP SERPL IA-MCNC: 821 PG/ML (ref 0–125)
POTASSIUM SERPL-SCNC: 4.2 MMOL/L (ref 3.6–5.5)
SODIUM SERPL-SCNC: 140 MMOL/L (ref 135–145)

## 2024-10-28 PROCEDURE — 80048 BASIC METABOLIC PNL TOTAL CA: CPT

## 2024-10-28 PROCEDURE — 36415 COLL VENOUS BLD VENIPUNCTURE: CPT

## 2024-10-28 PROCEDURE — 83880 ASSAY OF NATRIURETIC PEPTIDE: CPT

## 2024-10-30 ENCOUNTER — OFFICE VISIT (OUTPATIENT)
Dept: CARDIOLOGY | Facility: MEDICAL CENTER | Age: 69
End: 2024-10-30
Attending: NURSE PRACTITIONER
Payer: MEDICARE

## 2024-10-30 VITALS
WEIGHT: 161 LBS | BODY MASS INDEX: 25.88 KG/M2 | RESPIRATION RATE: 14 BRPM | HEART RATE: 63 BPM | SYSTOLIC BLOOD PRESSURE: 110 MMHG | HEIGHT: 66 IN | DIASTOLIC BLOOD PRESSURE: 54 MMHG | OXYGEN SATURATION: 94 %

## 2024-10-30 DIAGNOSIS — N18.31 STAGE 3A CHRONIC KIDNEY DISEASE: ICD-10-CM

## 2024-10-30 DIAGNOSIS — Z86.79 S/P ABLATION OF ATRIAL FIBRILLATION: ICD-10-CM

## 2024-10-30 DIAGNOSIS — G47.34 NOCTURNAL HYPOXEMIA: ICD-10-CM

## 2024-10-30 DIAGNOSIS — I70.0 AORTIC CALCIFICATION (HCC): ICD-10-CM

## 2024-10-30 DIAGNOSIS — I34.0 NON-RHEUMATIC MITRAL REGURGITATION: ICD-10-CM

## 2024-10-30 DIAGNOSIS — E78.00 HYPERCHOLESTEROLEMIA: ICD-10-CM

## 2024-10-30 DIAGNOSIS — I38 ACUTE ON CHRONIC DIASTOLIC HEART FAILURE DUE TO VALVULAR DISEASE (HCC): ICD-10-CM

## 2024-10-30 DIAGNOSIS — I50.33 ACUTE ON CHRONIC DIASTOLIC HEART FAILURE DUE TO VALVULAR DISEASE (HCC): ICD-10-CM

## 2024-10-30 DIAGNOSIS — I36.1 NONRHEUMATIC TRICUSPID VALVE REGURGITATION: ICD-10-CM

## 2024-10-30 DIAGNOSIS — I27.20 PULMONARY HYPERTENSION (HCC): ICD-10-CM

## 2024-10-30 DIAGNOSIS — I10 ESSENTIAL HYPERTENSION, BENIGN: ICD-10-CM

## 2024-10-30 DIAGNOSIS — Z98.890 S/P CARDIAC CATHETERIZATION: ICD-10-CM

## 2024-10-30 DIAGNOSIS — D68.69 SECONDARY HYPERCOAGULABLE STATE (HCC): ICD-10-CM

## 2024-10-30 DIAGNOSIS — Z98.890 S/P ABLATION OF ATRIAL FIBRILLATION: ICD-10-CM

## 2024-10-30 PROCEDURE — 99212 OFFICE O/P EST SF 10 MIN: CPT | Performed by: NURSE PRACTITIONER

## 2024-10-30 RX ORDER — TORSEMIDE 10 MG/1
10 TABLET ORAL DAILY
Qty: 100 TABLET | Refills: 3 | Status: SHIPPED | OUTPATIENT
Start: 2024-10-30

## 2024-10-30 ASSESSMENT — ENCOUNTER SYMPTOMS
CLAUDICATION: 0
MYALGIAS: 0
ABDOMINAL PAIN: 0
ORTHOPNEA: 0
COUGH: 0
PALPITATIONS: 0
FEVER: 0
PND: 0
DIZZINESS: 0
SHORTNESS OF BREATH: 0
INSOMNIA: 1

## 2024-10-30 ASSESSMENT — FIBROSIS 4 INDEX: FIB4 SCORE: 2.12

## 2024-11-22 ENCOUNTER — TELEPHONE (OUTPATIENT)
Dept: CARDIOLOGY | Facility: MEDICAL CENTER | Age: 69
End: 2024-11-22
Payer: MEDICARE

## 2024-11-22 DIAGNOSIS — Z79.899 ON POTASSIUM WASTING DIURETIC THERAPY: ICD-10-CM

## 2024-11-22 RX ORDER — POTASSIUM CITRATE 10 MEQ/1
10 TABLET, EXTENDED RELEASE ORAL DAILY
Qty: 90 TABLET | Refills: 3 | Status: SHIPPED | OUTPATIENT
Start: 2024-11-22

## 2024-11-22 NOTE — TELEPHONE ENCOUNTER
"Phone Number Called: [phone]    Call outcome:  Spoke with pharmacist     Message: Called to verify alternative potassium tablet for pt. Pharmacist recommended Potassium gluconate  mg or Potassium citrate 10 meq daily. Pharmacist advised potassium gluconate is OTC but may not be in stock at pharmacy. Spoke with pt and she is OK to try potassium citrate 10 mEq.       Tele encounter from 10/7/24:       DANIELLE Xavier to Me       10/7/24  9:55 AM  \"Can we have her reach out to her pharmacist on a potassium that is more digestible and we can switch to that? I'm okay with whatever they recommend.\"      Potassium Citrate ordered   "

## 2024-11-22 NOTE — TELEPHONE ENCOUNTER
SC    Caller: Chinyere Priest    Topic/issue: Patient spoke with pharmacy per SC and was told she could take potassium gluconate in replace of the potassium chloride ER (KLOR-CON 10) 10 MEQ tablet. They told her they sent over a request so that SC could order it however as of right now it's not in her chart. She would like someone to call the pharmacy. Please advise.     Pharmacy: [id number] - [place], [place] - [address] AT [facility] [address] [[id number]]     Callback Number: [phone]    Thank you,  Dianna RICHARD

## 2024-12-02 ENCOUNTER — APPOINTMENT (OUTPATIENT)
Dept: RADIOLOGY | Facility: MEDICAL CENTER | Age: 69
End: 2024-12-02
Attending: FAMILY MEDICINE
Payer: MEDICARE

## 2024-12-20 ENCOUNTER — HOSPITAL ENCOUNTER (OUTPATIENT)
Dept: LAB | Facility: MEDICAL CENTER | Age: 69
End: 2024-12-20
Attending: FAMILY MEDICINE
Payer: MEDICARE

## 2024-12-20 DIAGNOSIS — D75.89 MACROCYTOSIS WITHOUT ANEMIA: ICD-10-CM

## 2024-12-20 DIAGNOSIS — N18.31 STAGE 3A CHRONIC KIDNEY DISEASE: ICD-10-CM

## 2024-12-20 LAB
ALBUMIN SERPL BCP-MCNC: 4.3 G/DL (ref 3.2–4.9)
ALBUMIN/GLOB SERPL: 1.5 G/DL
ALP SERPL-CCNC: 78 U/L (ref 30–99)
ALT SERPL-CCNC: 19 U/L (ref 2–50)
ANION GAP SERPL CALC-SCNC: 12 MMOL/L (ref 7–16)
AST SERPL-CCNC: 32 U/L (ref 12–45)
BASOPHILS # BLD AUTO: 0.8 % (ref 0–1.8)
BASOPHILS # BLD: 0.05 K/UL (ref 0–0.12)
BILIRUB SERPL-MCNC: 0.6 MG/DL (ref 0.1–1.5)
BUN SERPL-MCNC: 23 MG/DL (ref 8–22)
CALCIUM ALBUM COR SERPL-MCNC: 9.4 MG/DL (ref 8.5–10.5)
CALCIUM SERPL-MCNC: 9.6 MG/DL (ref 8.5–10.5)
CHLORIDE SERPL-SCNC: 101 MMOL/L (ref 96–112)
CO2 SERPL-SCNC: 27 MMOL/L (ref 20–33)
CREAT SERPL-MCNC: 1.2 MG/DL (ref 0.5–1.4)
EOSINOPHIL # BLD AUTO: 0.29 K/UL (ref 0–0.51)
EOSINOPHIL NFR BLD: 4.5 % (ref 0–6.9)
ERYTHROCYTE [DISTWIDTH] IN BLOOD BY AUTOMATED COUNT: 46.8 FL (ref 35.9–50)
FOLATE SERPL-MCNC: >40 NG/ML
GFR SERPLBLD CREATININE-BSD FMLA CKD-EPI: 49 ML/MIN/1.73 M 2
GLOBULIN SER CALC-MCNC: 2.8 G/DL (ref 1.9–3.5)
GLUCOSE SERPL-MCNC: 84 MG/DL (ref 65–99)
HCT VFR BLD AUTO: 36.8 % (ref 37–47)
HGB BLD-MCNC: 12.1 G/DL (ref 12–16)
IMM GRANULOCYTES # BLD AUTO: 0.01 K/UL (ref 0–0.11)
IMM GRANULOCYTES NFR BLD AUTO: 0.2 % (ref 0–0.9)
LYMPHOCYTES # BLD AUTO: 1.25 K/UL (ref 1–4.8)
LYMPHOCYTES NFR BLD: 19.6 % (ref 22–41)
MCH RBC QN AUTO: 32.8 PG (ref 27–33)
MCHC RBC AUTO-ENTMCNC: 32.9 G/DL (ref 32.2–35.5)
MCV RBC AUTO: 99.7 FL (ref 81.4–97.8)
MONOCYTES # BLD AUTO: 0.62 K/UL (ref 0–0.85)
MONOCYTES NFR BLD AUTO: 9.7 % (ref 0–13.4)
NEUTROPHILS # BLD AUTO: 4.17 K/UL (ref 1.82–7.42)
NEUTROPHILS NFR BLD: 65.2 % (ref 44–72)
NRBC # BLD AUTO: 0 K/UL
NRBC BLD-RTO: 0 /100 WBC (ref 0–0.2)
PLATELET # BLD AUTO: 192 K/UL (ref 164–446)
PMV BLD AUTO: 11.9 FL (ref 9–12.9)
POTASSIUM SERPL-SCNC: 4 MMOL/L (ref 3.6–5.5)
PROT SERPL-MCNC: 7.1 G/DL (ref 6–8.2)
RBC # BLD AUTO: 3.69 M/UL (ref 4.2–5.4)
SODIUM SERPL-SCNC: 140 MMOL/L (ref 135–145)
VIT B12 SERPL-MCNC: 907 PG/ML (ref 211–911)
WBC # BLD AUTO: 6.4 K/UL (ref 4.8–10.8)

## 2024-12-20 PROCEDURE — 82607 VITAMIN B-12: CPT

## 2024-12-20 PROCEDURE — 36415 COLL VENOUS BLD VENIPUNCTURE: CPT

## 2024-12-20 PROCEDURE — 85025 COMPLETE CBC W/AUTO DIFF WBC: CPT

## 2024-12-20 PROCEDURE — 80053 COMPREHEN METABOLIC PANEL: CPT

## 2024-12-20 PROCEDURE — 82746 ASSAY OF FOLIC ACID SERUM: CPT

## 2024-12-24 ENCOUNTER — OFFICE VISIT (OUTPATIENT)
Dept: MEDICAL GROUP | Facility: MEDICAL CENTER | Age: 69
End: 2024-12-24
Payer: MEDICARE

## 2024-12-24 VITALS
WEIGHT: 161.4 LBS | HEART RATE: 54 BPM | TEMPERATURE: 97.2 F | HEIGHT: 66 IN | RESPIRATION RATE: 95 BRPM | BODY MASS INDEX: 25.94 KG/M2 | DIASTOLIC BLOOD PRESSURE: 70 MMHG | SYSTOLIC BLOOD PRESSURE: 126 MMHG

## 2024-12-24 DIAGNOSIS — E78.00 HYPERCHOLESTEROLEMIA: ICD-10-CM

## 2024-12-24 DIAGNOSIS — R73.03 PREDIABETES: ICD-10-CM

## 2024-12-24 DIAGNOSIS — R07.81 PLEURODYNIA: ICD-10-CM

## 2024-12-24 DIAGNOSIS — D75.89 MACROCYTOSIS WITHOUT ANEMIA: ICD-10-CM

## 2024-12-24 DIAGNOSIS — N18.31 STAGE 3A CHRONIC KIDNEY DISEASE: ICD-10-CM

## 2024-12-24 DIAGNOSIS — E55.9 VITAMIN D DEFICIENCY: Chronic | ICD-10-CM

## 2024-12-24 DIAGNOSIS — R07.81 RIB PAIN: ICD-10-CM

## 2024-12-24 PROCEDURE — 99214 OFFICE O/P EST MOD 30 MIN: CPT | Performed by: FAMILY MEDICINE

## 2024-12-24 PROCEDURE — 3074F SYST BP LT 130 MM HG: CPT | Performed by: FAMILY MEDICINE

## 2024-12-24 PROCEDURE — 3078F DIAST BP <80 MM HG: CPT | Performed by: FAMILY MEDICINE

## 2024-12-24 RX ORDER — LIDOCAINE 50 MG/G
1-3 PATCH TOPICAL EVERY 12 HOURS PRN
Qty: 90 PATCH | Refills: 3 | Status: SHIPPED | OUTPATIENT
Start: 2024-12-24

## 2024-12-24 ASSESSMENT — ENCOUNTER SYMPTOMS
PALPITATIONS: 0
CHILLS: 0
FEVER: 0

## 2024-12-24 ASSESSMENT — FIBROSIS 4 INDEX: FIB4 SCORE: 2.68

## 2024-12-24 NOTE — PROGRESS NOTES
Verbal consent was acquired by the patient to use [employer] ambient listening note generation during this visit.      Chinyere was seen today for follow-up.    Diagnoses and all orders for this visit:    Vitamin D deficiency  -     VITAMIN D,25 HYDROXY (DEFICIENCY); Future    Stage 3a chronic kidney disease  -     Comp Metabolic Panel; Future    Prediabetes  -     Comp Metabolic Panel; Future  -     HEMOGLOBIN A1C; Future    Macrocytosis without anemia  -     CBC WITH DIFFERENTIAL; Future    Hypercholesterolemia  -     Lipid Profile; Future    Rib pain  -     lidocaine (LIDODERM) 5 % Patch; Place 1-3 Patches on the skin every 12 hours as needed (rib pain).    Pleurodynia  -     lidocaine (LIDODERM) 5 % Patch; Place 1-3 Patches on the skin every 12 hours as needed (rib pain).                  Assessment & Plan  1.  Vitamin D deficiency  Chronic condition, stable, recheck labs with next blood test.    2.  Stage IIIa chronic kidney disease  Chronic condition, improving, continue to drink more water.  Recheck labs in 4 months    3.  Prediabetes and hypercholesterolemia  Chronic condition, stable, continue Lipitor 10 mg daily.  Recheck labs in 4-month.      4.  Macrocytosis without anemia  Chronic condition, stable, vitamin B12 is getting better.  Recheck labs in 4-month.    5.  Rib pain and pleurodynia  Chronic condition, stable, lidocaine 5% patches sent to pharmacy    Follow-up  - Patient to follow up in 4 months for annual physical examination and lab follow-up.          Chief complaint::Diagnoses of Vitamin D deficiency, Stage 3a chronic kidney disease, Prediabetes, Macrocytosis without anemia, Hypercholesterolemia, Rib pain, and Pleurodynia were pertinent to this visit.      History of Present Illness  The patient is a 69-year-old female who presents for a blood test follow-up.    Increased Thirst  - She has been experiencing increased thirst, prompting her to augment her water intake to 45 ounces  "daily.    Medication Regimen  - She is currently on a regimen of Eliquis 5 mg, administered twice daily, and continues with her other prescribed medications.    Pending Tests  - She has not yet undergone the scheduled bone density test and mammogram.    Rib Pain  - She has requested a prescription for lidocaine patches, specifically 1 to 3 patches every 12 hours, for the management of rib pain.  - She has expressed an allergy to a certain brand of patches.        Review of Systems   Constitutional:  Negative for chills and fever.   Cardiovascular:  Negative for chest pain, palpitations and leg swelling.          Medications and Allergies:     Current Outpatient Medications   Medication Sig Dispense Refill    lidocaine (LIDODERM) 5 % Patch Place 1-3 Patches on the skin every 12 hours as needed (rib pain). 90 Patch 3    potassium citrate SR (UROCIT-K SR) 10 MEQ (1080 MG) Tab CR Take 1 Tablet by mouth every day. 90 Tablet 3    torsemide (DEMADEX) 10 MG tablet Take 1 Tablet by mouth every day. 100 Tablet 3    losartan (COZAAR) 50 MG Tab Take 1 Tablet by mouth every evening.      cyanocobalamin (VITAMIN B-12) 100 MCG Tab Take 100 mcg by mouth every morning.      ELIQUIS 5 MG Tab TAKE 1 TABLET BY MOUTH TWICE A  Tablet 3    atorvastatin (LIPITOR) 10 MG Tab TAKE 1 TABLET BY MOUTH EVERY DAY 90 Tablet 3    Multiple Vitamin (MULTIVITAMIN ADULT PO) Take 1 Tablet by mouth every evening.      clobetasol (TEMOVATE) 0.05 % Ointment APPLY 1 APPLICATION TOPICALLY 2 TIMES A DAY AS NEEDED (SKIN RASH ON LOWER LEGS). 45 g 2    CHOLECALCIFEROL PO Take 2 Capsules by mouth every morning. 4000 Vit D       No current facility-administered medications for this visit.       /70 (BP Location: Left arm, Patient Position: Sitting, BP Cuff Size: Adult)   Pulse (!) 54   Temp 36.2 °C (97.2 °F) (Temporal)   Resp (!) 95   Ht 1.676 m (5' 6\")   Wt 73.2 kg (161 lb 6.4 oz) , Body mass index is 26.05 kg/m².      Physical " Exam  Constitutional:       Appearance: Normal appearance. She is well-developed and well-groomed.   HENT:      Head: Normocephalic and atraumatic.      Right Ear: External ear normal.      Left Ear: External ear normal.   Eyes:      General:         Right eye: No discharge.         Left eye: No discharge.      Conjunctiva/sclera: Conjunctivae normal.   Cardiovascular:      Rate and Rhythm: Normal rate.   Pulmonary:      Effort: Pulmonary effort is normal. No respiratory distress.   Musculoskeletal:      Cervical back: Neck supple.   Skin:     Findings: No rash.   Neurological:      Mental Status: She is alert.   Psychiatric:         Mood and Affect: Mood and affect normal.         Behavior: Behavior normal.            I reviewed with patient lab results resulted on 12/20/2024.        Please note that this dictation was created using voice recognition software. I have made every reasonable attempt to correct obvious errors, but I expect that there are errors of grammar and possibly content that I did not discover before finalizing the note.

## 2025-01-16 DIAGNOSIS — I10 ESSENTIAL HYPERTENSION, BENIGN: ICD-10-CM

## 2025-01-16 RX ORDER — LOSARTAN POTASSIUM 50 MG/1
50 TABLET ORAL
Qty: 90 TABLET | Refills: 3 | Status: SHIPPED | OUTPATIENT
Start: 2025-01-16

## 2025-01-16 NOTE — TELEPHONE ENCOUNTER
Is the patient due for a refill? Yes    Was the patient seen the past year? Yes    Date of last office visit: 10/30/2024    Does the patient have an upcoming appointment?  Yes   If yes, When? 1/17/2025    Provider to refill:SC    Does the patient have snf Plus and need 100-day supply? (This applies to ALL medications) Patient does not have SCP

## 2025-01-17 ENCOUNTER — OFFICE VISIT (OUTPATIENT)
Dept: CARDIOLOGY | Facility: MEDICAL CENTER | Age: 70
End: 2025-01-17
Attending: INTERNAL MEDICINE
Payer: MEDICARE

## 2025-01-17 VITALS
WEIGHT: 154 LBS | BODY MASS INDEX: 24.75 KG/M2 | SYSTOLIC BLOOD PRESSURE: 100 MMHG | RESPIRATION RATE: 16 BRPM | HEIGHT: 66 IN | DIASTOLIC BLOOD PRESSURE: 62 MMHG | OXYGEN SATURATION: 99 % | HEART RATE: 62 BPM

## 2025-01-17 DIAGNOSIS — I36.1 NONRHEUMATIC TRICUSPID VALVE REGURGITATION: ICD-10-CM

## 2025-01-17 DIAGNOSIS — I34.0 NON-RHEUMATIC MITRAL REGURGITATION: ICD-10-CM

## 2025-01-17 DIAGNOSIS — I10 ESSENTIAL HYPERTENSION, BENIGN: ICD-10-CM

## 2025-01-17 DIAGNOSIS — I27.20 PULMONARY HYPERTENSION (HCC): ICD-10-CM

## 2025-01-17 DIAGNOSIS — Z98.890 S/P CARDIAC CATHETERIZATION: ICD-10-CM

## 2025-01-17 DIAGNOSIS — D68.69 SECONDARY HYPERCOAGULABLE STATE (HCC): ICD-10-CM

## 2025-01-17 LAB — EKG IMPRESSION: NORMAL

## 2025-01-17 PROCEDURE — 99212 OFFICE O/P EST SF 10 MIN: CPT | Performed by: INTERNAL MEDICINE

## 2025-01-17 PROCEDURE — 93010 ELECTROCARDIOGRAM REPORT: CPT | Performed by: INTERNAL MEDICINE

## 2025-01-17 PROCEDURE — 93005 ELECTROCARDIOGRAM TRACING: CPT | Mod: TC | Performed by: INTERNAL MEDICINE

## 2025-01-17 PROCEDURE — 3078F DIAST BP <80 MM HG: CPT | Performed by: INTERNAL MEDICINE

## 2025-01-17 PROCEDURE — 3074F SYST BP LT 130 MM HG: CPT | Performed by: INTERNAL MEDICINE

## 2025-01-17 PROCEDURE — 99214 OFFICE O/P EST MOD 30 MIN: CPT | Performed by: INTERNAL MEDICINE

## 2025-01-17 ASSESSMENT — FIBROSIS 4 INDEX: FIB4 SCORE: 2.68

## 2025-01-17 NOTE — PROGRESS NOTES
Chief Complaint   Patient presents with    Atrial Fibrillation     F/V Dx:Persistent atrial fibrillation       Subjective     Chinyere Priets is a 69 y.o. female who presents today status post multiple ablations for atrial fibrillation.  No recurrence.  Workup for mitral clip.  Felt to be nonsevere enough.  Actually feels better.  Less fluid intake.  Less shortness of breath.  Some history of pulmonary hypertension.  Now working at [employer].  On anticoagulation.    Past Medical History:   Diagnosis Date    Aortic calcification (HCC)      Noted incidentally on abdominal CT scan.    Arrhythmia     hx of afibb, ablation x 2     Arthritis 2010    Asthma in adult     patient states it is only occasionally    Breath shortness 2020    Bronchitis 2019    Chronic anticoagulation     Chronic back pain      CKD (chronic kidney disease) stage 3, GFR 30-59 ml/min 09/14/2020    Congestive heart failure (HCC) 2024    Dental disorder     Full upper/lower dentures.    Diverticulosis      Heart murmur 2019    High cholesterol     patient denies    Hypertension     Inflamed seborrheic keratosis      Intrinsic eczema 01/20/2020    Non-rheumatic mitral regurgitation 05/2019    Echocardiogram with normal LV size, LVEF 65%. Moderate MR, mild-moderate TR, unchanged from previous.    Osteoarthritis of both hips      Plantar fascia syndrome      Plantar wart of right foot      Pneumonia 2019    Pruritic dermatitis      Ongoing for several months but worse at night on her lower extremities    Pulmonary hypertension (HCC)     Pure hypercholesterolemia      S/P cardiac catheterization 05/26/2017    1.  Normal right heart pressures. 3.  Essentially normal coronary arteries. 2.  Left ventricular ejection fraction 65%.     Spider veins of limb      Unspecified vitamin D deficiency       Past Surgical History:   Procedure Laterality Date    LUMBAR FUSION O-ARM N/A 04/29/2021    Procedure: FUSION, SPINE, LUMBAR, WITH O-ARM IMAGING GUIDANCE -  STAGE #2 L3-5 UNILATERAL, LEFT-SIDED.laminotomy lumbar 3-5;  Surgeon: Sudhir Clay M.D.;  Location: SURGERY [facility];  Service: Neurosurgery    FUSION, SPINE, XLIF Right 2021    Procedure: FUSION, SPINE, XLIF - STAGE #1 L3-5, RIGHT-SIDED APPROACH.;  Surgeon: Sudhir Clay M.D.;  Location: SURGERY [facility];  Service: Neurosurgery    OTHER CARDIAC SURGERY  2019    cardiac ablation x 2 , for Afibb    LUMBAR LAMINECTOMY DISKECTOMY  2009    Performed by SUDHIR CLAY at SURGERY [facility] ORS    HYSTERECTOMY LAPAROSCOPY      GYN SURGERY       x2     Family History   Problem Relation Age of Onset    Heart Disease Mother         1st MI @ 61 yo    Hypertension Mother     Heart Attack Mother 60    Heart Disease Maternal Aunt     Breast Cancer Maternal Aunt     Cancer Daughter 35        breast    Breast Cancer Daughter     Heart Disease Maternal Grandmother     Heart Disease Maternal Grandfather     Heart Disease Maternal Aunt 56    Ovarian Cancer Other      Social History     Socioeconomic History    Marital status:      Spouse name: Not on file    Number of children: Not on file    Years of education: Not on file    Highest education level: Not on file   Occupational History    Not on file   Tobacco Use    Smoking status: Former     Current packs/day: 0.00     Average packs/day: 0.5 packs/day for 40.0 years (20.0 ttl pk-yrs)     Types: Cigarettes     Start date: 3/1/1970     Quit date: 3/1/2010     Years since quittin.8    Smokeless tobacco: Never   Vaping Use    Vaping status: Never Used   Substance and Sexual Activity    Alcohol use: No    Drug use: Never    Sexual activity: Yes     Partners: Male     Birth control/protection: Post-Menopausal     Comment: , works at [employer](part time)   Other Topics Concern     Service Not Asked    Blood Transfusions Not Asked    Caffeine Concern Not Asked    Occupational Exposure Not Asked    Hobby Hazards Not Asked    Sleep  Concern Not Asked    Stress Concern Not Asked    Weight Concern Not Asked    Special Diet Not Asked    Back Care Not Asked    Exercise No    Bike Helmet Not Asked    Seat Belt Not Asked    Self-Exams Not Asked   Social History Narrative    Works at [employer]     Social Drivers of Health     Financial Resource Strain: Not on file   Food Insecurity: No Food Insecurity (12/11/2019)    Hunger Vital Sign     Worried About Running Out of Food in the Last Year: Never true     Ran Out of Food in the Last Year: Never true   Transportation Needs: No Transportation Needs (12/11/2019)    PRAPARE - Transportation     Lack of Transportation (Medical): No     Lack of Transportation (Non-Medical): No   Physical Activity: Not on file   Stress: Not on file   Social Connections: Not on file   Intimate Partner Violence: Not on file   Housing Stability: Not on file     Allergies   Allergen Reactions    Erythromycin Unspecified     Severe Abdominal pain    Tikosyn [Dofetilide] Diarrhea     diarrhea    Latex Hives     rash    Tape Hives       Paper tape okay     Outpatient Encounter Medications as of 1/17/2025   Medication Sig Dispense Refill    losartan (COZAAR) 50 MG Tab TAKE 1 TABLET BY MOUTH EVERY DAY 90 Tablet 3    lidocaine (LIDODERM) 5 % Patch Place 1-3 Patches on the skin every 12 hours as needed (rib pain). 90 Patch 3    potassium citrate SR (UROCIT-K SR) 10 MEQ (1080 MG) Tab CR Take 1 Tablet by mouth every day. 90 Tablet 3    torsemide (DEMADEX) 10 MG tablet Take 1 Tablet by mouth every day. 100 Tablet 3    cyanocobalamin (VITAMIN B-12) 100 MCG Tab Take 100 mcg by mouth every morning.      ELIQUIS 5 MG Tab TAKE 1 TABLET BY MOUTH TWICE A  Tablet 3    atorvastatin (LIPITOR) 10 MG Tab TAKE 1 TABLET BY MOUTH EVERY DAY 90 Tablet 3    Multiple Vitamin (MULTIVITAMIN ADULT PO) Take 1 Tablet by mouth every evening.      clobetasol (TEMOVATE) 0.05 % Ointment APPLY 1 APPLICATION TOPICALLY 2 TIMES A DAY AS NEEDED (SKIN RASH ON LOWER LEGS).  "45 g 2    CHOLECALCIFEROL PO Take 2 Capsules by mouth every morning. 4000 Vit D       No facility-administered encounter medications on file as of 1/17/2025.     ROS           Objective     /62 (BP Location: Left arm, Patient Position: Sitting, BP Cuff Size: Adult)   Pulse 62   Resp 16   Ht 1.676 m (5' 6\")   Wt 69.9 kg (154 lb)   SpO2 99%   BMI 24.86 kg/m²     Physical Exam  Constitutional:       Appearance: She is well-developed.   HENT:      Head: Normocephalic and atraumatic.   Eyes:      Pupils: Pupils are equal, round, and reactive to light.   Cardiovascular:      Rate and Rhythm: Normal rate and regular rhythm.      Heart sounds: Normal heart sounds. No murmur heard.     No friction rub. No gallop.   Pulmonary:      Effort: Pulmonary effort is normal.      Breath sounds: Normal breath sounds.   Abdominal:      General: Bowel sounds are normal.      Palpations: Abdomen is soft.   Musculoskeletal:         General: Normal range of motion.      Cervical back: Normal range of motion and neck supple.   Skin:     General: Skin is warm.   Neurological:      Mental Status: She is alert and oriented to person, place, and time.      Cranial Nerves: No cranial nerve deficit.   Psychiatric:         Behavior: Behavior normal.         Thought Content: Thought content normal.         Judgment: Judgment normal.                Assessment & Plan     1. S/P cardiac catheterization        2. Pulmonary hypertension (HCC)        3. Essential hypertension, benign  EKG      4. Non-rheumatic mitral regurgitation        5. Nonrheumatic tricuspid valve regurgitation        6. Secondary hypercoagulable state (HCC)            Medical Decision Making: Today's Assessment/Status/Plan:   1.  Atrial arrhythmias stable status post ablation.  2.  MR and TR followed at valve clinic not felt to be candidate for intervention at this time.  3.  Pulmonary hypertension has a sleep study pending.  If worsening shortness of breath could " consider right heart cath.  4.  Hypertension well-controlled.  5.  Follow-up with me in 6 months

## 2025-02-01 ENCOUNTER — OFFICE VISIT (OUTPATIENT)
Dept: URGENT CARE | Facility: PHYSICIAN GROUP | Age: 70
End: 2025-02-01
Payer: MEDICARE

## 2025-02-01 VITALS
SYSTOLIC BLOOD PRESSURE: 104 MMHG | TEMPERATURE: 98.3 F | BODY MASS INDEX: 24.6 KG/M2 | OXYGEN SATURATION: 96 % | HEIGHT: 66 IN | WEIGHT: 153.1 LBS | DIASTOLIC BLOOD PRESSURE: 68 MMHG | HEART RATE: 70 BPM | RESPIRATION RATE: 16 BRPM

## 2025-02-01 DIAGNOSIS — U07.1 COVID-19: Primary | ICD-10-CM

## 2025-02-01 DIAGNOSIS — R68.89 FLU-LIKE SYMPTOMS: ICD-10-CM

## 2025-02-01 DIAGNOSIS — J45.21 MILD INTERMITTENT ASTHMA WITH EXACERBATION: ICD-10-CM

## 2025-02-01 DIAGNOSIS — R05.9 COUGH IN ADULT: ICD-10-CM

## 2025-02-01 DIAGNOSIS — R09.89 CHEST CONGESTION: ICD-10-CM

## 2025-02-01 DIAGNOSIS — R05.1 ACUTE COUGH: ICD-10-CM

## 2025-02-01 LAB
FLUAV RNA SPEC QL NAA+PROBE: NEGATIVE
FLUBV RNA SPEC QL NAA+PROBE: NEGATIVE
RSV RNA SPEC QL NAA+PROBE: NEGATIVE
SARS-COV-2 RNA RESP QL NAA+PROBE: POSITIVE

## 2025-02-01 PROCEDURE — 0241U POCT CEPHEID COV-2, FLU A/B, RSV - PCR: CPT | Performed by: NURSE PRACTITIONER

## 2025-02-01 PROCEDURE — 99213 OFFICE O/P EST LOW 20 MIN: CPT | Performed by: NURSE PRACTITIONER

## 2025-02-01 RX ORDER — ALBUTEROL SULFATE 90 UG/1
2 INHALANT RESPIRATORY (INHALATION) EVERY 6 HOURS PRN
COMMUNITY

## 2025-02-01 RX ORDER — PREDNISONE 20 MG/1
40 TABLET ORAL DAILY
Qty: 10 TABLET | Refills: 0 | Status: SHIPPED | OUTPATIENT
Start: 2025-02-01 | End: 2025-02-06

## 2025-02-01 RX ORDER — ALBUTEROL SULFATE 90 UG/1
2 INHALANT RESPIRATORY (INHALATION) EVERY 6 HOURS PRN
Qty: 8.5 G | Refills: 0 | Status: SHIPPED | OUTPATIENT
Start: 2025-02-01

## 2025-02-01 RX ORDER — DEXTROMETHORPHAN HYDROBROMIDE AND PROMETHAZINE HYDROCHLORIDE 15; 6.25 MG/5ML; MG/5ML
5 SYRUP ORAL EVERY 4 HOURS PRN
Qty: 120 ML | Refills: 0 | Status: SHIPPED | OUTPATIENT
Start: 2025-02-01 | End: 2025-02-08

## 2025-02-01 ASSESSMENT — FIBROSIS 4 INDEX: FIB4 SCORE: 2.68

## 2025-02-01 ASSESSMENT — ENCOUNTER SYMPTOMS
SORE THROAT: 1
FEVER: 0
COUGH: 1
HEADACHES: 0
MYALGIAS: 0
SHORTNESS OF BREATH: 1
WHEEZING: 1

## 2025-02-01 NOTE — LETTER
February 1, 2025    To Whom It May Concern:         This is confirmation that Chinyere Priest attended her scheduled appointment with DANIELLE Hayes on 2/01/25.  Please excuse her absence due to a COVID-19 infection.  Her expected return to work date is 2/9/2025.  She may return sooner if better.         If you have any questions please do not hesitate to call me at the phone number listed below.    Sincerely,          DAISY Hayes.  [phone]

## 2025-02-01 NOTE — PROGRESS NOTES
Subjective:     Chinyere Priest is a 69 y.o. female who presents for Cough (Congestion, chest pressure. X2-3 days )      Cough  This is a new problem. The current episode started in the past 7 days (Chinyere is a pleasant 69 year old female who presents to  today with complaitns of uri symptoms that started 2 days ago). Associated symptoms include nasal congestion, a sore throat, shortness of breath and wheezing. Pertinent negatives include no fever, headaches or myalgias. Treatments tried: Halls cough drops. Her past medical history is significant for asthma. CHF         Review of Systems   Constitutional:  Negative for fever.   HENT:  Positive for sore throat.    Respiratory:  Positive for cough, shortness of breath and wheezing.    Musculoskeletal:  Negative for myalgias.   Neurological:  Negative for headaches.       PMH:   Past Medical History:   Diagnosis Date    Aortic calcification (HCC)      Noted incidentally on abdominal CT scan.    Arrhythmia     hx of afibb, ablation x 2     Arthritis 2010    Asthma in adult     patient states it is only occasionally    Breath shortness 2020    Bronchitis 2019    Chronic anticoagulation     Chronic back pain      CKD (chronic kidney disease) stage 3, GFR 30-59 ml/min 09/14/2020    Congestive heart failure (HCC) 2024    Dental disorder     Full upper/lower dentures.    Diverticulosis      Heart murmur 2019    High cholesterol     patient denies    Hypertension     Inflamed seborrheic keratosis      Intrinsic eczema 01/20/2020    Non-rheumatic mitral regurgitation 05/2019    Echocardiogram with normal LV size, LVEF 65%. Moderate MR, mild-moderate TR, unchanged from previous.    Osteoarthritis of both hips      Plantar fascia syndrome      Plantar wart of right foot      Pneumonia 2019    Pruritic dermatitis      Ongoing for several months but worse at night on her lower extremities    Pulmonary hypertension (HCC)     Pure hypercholesterolemia      S/P cardiac  catheterization 2017    1.  Normal right heart pressures. 3.  Essentially normal coronary arteries. 2.  Left ventricular ejection fraction 65%.     Spider veins of limb      Unspecified vitamin D deficiency       ALLERGIES:   Allergies   Allergen Reactions    Erythromycin Unspecified     Severe Abdominal pain    Tikosyn [Dofetilide] Diarrhea     diarrhea    Latex Hives     rash    Tape Hives       Paper tape okay     SURGHX:   Past Surgical History:   Procedure Laterality Date    LUMBAR FUSION O-ARM N/A 2021    Procedure: FUSION, SPINE, LUMBAR, WITH O-ARM IMAGING GUIDANCE - STAGE #2 L3-5 UNILATERAL, LEFT-SIDED.laminotomy lumbar 3-5;  Surgeon: Sudhir Clay M.D.;  Location: SURGERY [facility];  Service: Neurosurgery    FUSION, SPINE, XLIF Right 2021    Procedure: FUSION, SPINE, XLIF - STAGE #1 L3-5, RIGHT-SIDED APPROACH.;  Surgeon: Sudhir Clay M.D.;  Location: SURGERY [facility];  Service: Neurosurgery    OTHER CARDIAC SURGERY      cardiac ablation x 2 , for Afibb    LUMBAR LAMINECTOMY DISKECTOMY  2009    Performed by SUDHIR CLAY at SURGERY [facility] ORS    HYSTERECTOMY LAPAROSCOPY      GYN SURGERY       x2     SOCHX:   Social History     Socioeconomic History    Marital status:    Tobacco Use    Smoking status: Former     Current packs/day: 0.00     Average packs/day: 0.5 packs/day for 40.0 years (20.0 ttl pk-yrs)     Types: Cigarettes     Start date: 3/1/1970     Quit date: 3/1/2010     Years since quittin.9    Smokeless tobacco: Never   Vaping Use    Vaping status: Never Used   Substance and Sexual Activity    Alcohol use: No    Drug use: Never    Sexual activity: Yes     Partners: Male     Birth control/protection: Post-Menopausal     Comment: , works at [employer](part time)   Other Topics Concern    Exercise No   Social History Narrative    Works at [employer]     Social Drivers of Health     Food Insecurity: No Food Insecurity (2019)    Hunger  "Vital Sign     Worried About Running Out of Food in the Last Year: Never true     Ran Out of Food in the Last Year: Never true   Transportation Needs: No Transportation Needs (12/11/2019)    PRAPARE - Transportation     Lack of Transportation (Medical): No     Lack of Transportation (Non-Medical): No     FH:   Family History   Problem Relation Age of Onset    Heart Disease Mother         1st MI @ 61 yo    Hypertension Mother     Heart Attack Mother 60    Heart Disease Maternal Aunt     Breast Cancer Maternal Aunt     Cancer Daughter 35        breast    Breast Cancer Daughter     Heart Disease Maternal Grandmother     Heart Disease Maternal Grandfather     Heart Disease Maternal Aunt 56    Ovarian Cancer Other          Objective:   /68 (BP Location: Left arm, Patient Position: Sitting, BP Cuff Size: Adult)   Pulse 70   Temp 36.8 °C (98.3 °F) (Temporal)   Resp 16   Ht 1.676 m (5' 6\")   Wt 69.4 kg (153 lb 1.6 oz)   SpO2 96%   BMI 24.71 kg/m²     Physical Exam  Vitals and nursing note reviewed.   Constitutional:       General: She is not in acute distress.     Appearance: Normal appearance. She is ill-appearing.   HENT:      Head: Normocephalic and atraumatic.      Right Ear: Tympanic membrane, ear canal and external ear normal. There is no impacted cerumen.      Left Ear: Tympanic membrane, ear canal and external ear normal. There is no impacted cerumen.      Nose: No congestion or rhinorrhea.      Mouth/Throat:      Mouth: Mucous membranes are moist.      Pharynx: No oropharyngeal exudate or posterior oropharyngeal erythema.   Eyes:      Extraocular Movements: Extraocular movements intact.      Pupils: Pupils are equal, round, and reactive to light.   Cardiovascular:      Rate and Rhythm: Normal rate and regular rhythm.      Pulses: Normal pulses.      Heart sounds: Normal heart sounds.   Pulmonary:      Effort: Pulmonary effort is normal. No respiratory distress.      Breath sounds: No stridor. " Examination of the right-lower field reveals wheezing. Examination of the left-lower field reveals wheezing. Wheezing present. No rhonchi or rales.   Chest:      Chest wall: No tenderness.   Abdominal:      General: Abdomen is flat. Bowel sounds are normal.      Palpations: Abdomen is soft.      Tenderness: There is no abdominal tenderness. There is no right CVA tenderness or left CVA tenderness.   Musculoskeletal:         General: Normal range of motion.      Cervical back: Normal range of motion and neck supple. No tenderness.   Lymphadenopathy:      Cervical: No cervical adenopathy.   Skin:     General: Skin is warm and dry.      Capillary Refill: Capillary refill takes less than 2 seconds.   Neurological:      General: No focal deficit present.      Mental Status: She is alert and oriented to person, place, and time. Mental status is at baseline.   Psychiatric:         Mood and Affect: Mood normal.         Behavior: Behavior normal.         Thought Content: Thought content normal.         Judgment: Judgment normal.       Assessment/Plan:   Assessment      1. COVID-19        2. Chest congestion  POCT Cepheid CoV-2, Flu A/B, RSV - PCR      3. Cough in adult        4. Flu-like symptoms  POCT Cepheid CoV-2, Flu A/B, RSV - PCR    promethazine-dextromethorphan (PROMETHAZINE-DM) 6.25-15 MG/5ML syrup      5. Acute cough  POCT Cepheid CoV-2, Flu A/B, RSV - PCR    promethazine-dextromethorphan (PROMETHAZINE-DM) 6.25-15 MG/5ML syrup      6. Mild intermittent asthma with exacerbation  albuterol 108 (90 Base) MCG/ACT Aero Soln inhalation aerosol    predniSONE (DELTASONE) 20 MG Tab      Patient tested positive for COVID-19 in the clinic today.  Unfortunately she is unable to use antiviral treatment as she takes blood thinners daily.  Supportive treatment discussed.  Patient to return back to urgent care or ER for worsening or persistent symptoms.  Work note provided today.  She verbalizes agreement and understanding to plan  of care.

## 2025-02-13 ENCOUNTER — OFFICE VISIT (OUTPATIENT)
Dept: CARDIOLOGY | Facility: MEDICAL CENTER | Age: 70
End: 2025-02-13
Attending: NURSE PRACTITIONER
Payer: MEDICARE

## 2025-02-13 VITALS
DIASTOLIC BLOOD PRESSURE: 60 MMHG | BODY MASS INDEX: 24.11 KG/M2 | HEIGHT: 66 IN | WEIGHT: 150 LBS | HEART RATE: 64 BPM | OXYGEN SATURATION: 16 % | SYSTOLIC BLOOD PRESSURE: 110 MMHG

## 2025-02-13 DIAGNOSIS — Z98.890 S/P ABLATION OF ATRIAL FIBRILLATION: ICD-10-CM

## 2025-02-13 DIAGNOSIS — I10 ESSENTIAL HYPERTENSION, BENIGN: ICD-10-CM

## 2025-02-13 DIAGNOSIS — Z86.79 S/P ABLATION OF ATRIAL FIBRILLATION: ICD-10-CM

## 2025-02-13 LAB — EKG IMPRESSION: NORMAL

## 2025-02-13 PROCEDURE — 93010 ELECTROCARDIOGRAM REPORT: CPT | Performed by: INTERNAL MEDICINE

## 2025-02-13 PROCEDURE — 99213 OFFICE O/P EST LOW 20 MIN: CPT | Performed by: NURSE PRACTITIONER

## 2025-02-13 PROCEDURE — 93005 ELECTROCARDIOGRAM TRACING: CPT | Mod: TC | Performed by: NURSE PRACTITIONER

## 2025-02-13 PROCEDURE — 3074F SYST BP LT 130 MM HG: CPT | Performed by: NURSE PRACTITIONER

## 2025-02-13 PROCEDURE — 3078F DIAST BP <80 MM HG: CPT | Performed by: NURSE PRACTITIONER

## 2025-02-13 ASSESSMENT — FIBROSIS 4 INDEX: FIB4 SCORE: 2.68

## 2025-02-13 ASSESSMENT — ENCOUNTER SYMPTOMS: PALPITATIONS: 1

## 2025-02-13 NOTE — PROGRESS NOTES
Cardiology Follow up Note    DOS: 2/13/2025  [id number]  Chinyere Priest    Chief complaint/Reason for consult: palpitations    HPI: Pt is a 69 y.o. female who presents to the clinic today in follow up for palpitations. Patient has a past medical history significant for but not limited to: pulmonary hypertension, some moderate mitral regurgitation, hypertension, atrial fibrillation S/P multiple ablations. Positive for COVID 12 days ago. Has felt some palpitations since. Sinus arrhythmia with pronounced 1st degree on EKG. Discussed how COVID could worsen her other chronic issues. She has upcoming echocardiogram to reassess valve. Blood pressure stable.       Past Medical History:   Diagnosis Date    Aortic calcification (HCC)      Noted incidentally on abdominal CT scan.    Arrhythmia     hx of afibb, ablation x 2     Arthritis 2010    Asthma in adult     patient states it is only occasionally    Breath shortness 2020    Bronchitis 2019    Chronic anticoagulation     Chronic back pain      CKD (chronic kidney disease) stage 3, GFR 30-59 ml/min 09/14/2020    Congestive heart failure (HCC) 2024    Dental disorder     Full upper/lower dentures.    Diverticulosis      Heart murmur 2019    High cholesterol     patient denies    Hypertension     Inflamed seborrheic keratosis      Intrinsic eczema 01/20/2020    Non-rheumatic mitral regurgitation 05/2019    Echocardiogram with normal LV size, LVEF 65%. Moderate MR, mild-moderate TR, unchanged from previous.    Osteoarthritis of both hips      Plantar fascia syndrome      Plantar wart of right foot      Pneumonia 2019    Pruritic dermatitis      Ongoing for several months but worse at night on her lower extremities    Pulmonary hypertension (HCC)     Pure hypercholesterolemia      S/P cardiac catheterization 05/26/2017    1.  Normal right heart pressures. 3.  Essentially normal coronary arteries. 2.  Left ventricular ejection fraction 65%.     Spider veins of limb       Unspecified vitamin D deficiency         Past Surgical History:   Procedure Laterality Date    LUMBAR FUSION O-ARM N/A 2021    Procedure: FUSION, SPINE, LUMBAR, WITH O-ARM IMAGING GUIDANCE - STAGE #2 L3-5 UNILATERAL, LEFT-SIDED.laminotomy lumbar 3-5;  Surgeon: Sudhir Clay M.D.;  Location: SURGERY [facility];  Service: Neurosurgery    FUSION, SPINE, XLIF Right 2021    Procedure: FUSION, SPINE, XLIF - STAGE #1 L3-5, RIGHT-SIDED APPROACH.;  Surgeon: Sudhir Clay M.D.;  Location: SURGERY [facility];  Service: Neurosurgery    OTHER CARDIAC SURGERY  2019    cardiac ablation x 2 , for Afibb    LUMBAR LAMINECTOMY DISKECTOMY  2009    Performed by SUDHIR CLAY at SURGERY [facility] ORS    HYSTERECTOMY LAPAROSCOPY  2004    GYN SURGERY       x2       Social History     Socioeconomic History    Marital status:      Spouse name: Not on file    Number of children: Not on file    Years of education: Not on file    Highest education level: Not on file   Occupational History    Not on file   Tobacco Use    Smoking status: Former     Current packs/day: 0.00     Average packs/day: 0.5 packs/day for 40.0 years (20.0 ttl pk-yrs)     Types: Cigarettes     Start date: 3/1/1970     Quit date: 3/1/2010     Years since quittin.9    Smokeless tobacco: Never   Vaping Use    Vaping status: Never Used   Substance and Sexual Activity    Alcohol use: No    Drug use: Never    Sexual activity: Yes     Partners: Male     Birth control/protection: Post-Menopausal     Comment: , works at [employer](part time)   Other Topics Concern     Service Not Asked    Blood Transfusions Not Asked    Caffeine Concern Not Asked    Occupational Exposure Not Asked    Hobby Hazards Not Asked    Sleep Concern Not Asked    Stress Concern Not Asked    Weight Concern Not Asked    Special Diet Not Asked    Back Care Not Asked    Exercise No    Bike Helmet Not Asked    Seat Belt Not Asked    Self-Exams Not Asked    Social History Narrative    Works at [employer]     Social Drivers of Health     Financial Resource Strain: Not on file   Food Insecurity: No Food Insecurity (12/11/2019)    Hunger Vital Sign     Worried About Running Out of Food in the Last Year: Never true     Ran Out of Food in the Last Year: Never true   Transportation Needs: No Transportation Needs (12/11/2019)    PRAPARE - Transportation     Lack of Transportation (Medical): No     Lack of Transportation (Non-Medical): No   Physical Activity: Not on file   Stress: Not on file   Social Connections: Not on file   Intimate Partner Violence: Not on file   Housing Stability: Not on file       Family History   Problem Relation Age of Onset    Heart Disease Mother         1st MI @ 59 yo    Hypertension Mother     Heart Attack Mother 60    Heart Disease Maternal Aunt     Breast Cancer Maternal Aunt     Cancer Daughter 35        breast    Breast Cancer Daughter     Heart Disease Maternal Grandmother     Heart Disease Maternal Grandfather     Heart Disease Maternal Aunt 56    Ovarian Cancer Other        Allergies   Allergen Reactions    Erythromycin Unspecified     Severe Abdominal pain    Tikosyn [Dofetilide] Diarrhea     diarrhea    Latex Hives     rash    Tape Hives       Paper tape okay       Current Outpatient Medications   Medication Sig Dispense Refill    albuterol 108 (90 Base) MCG/ACT Aero Soln inhalation aerosol Inhale 2 Puffs every 6 hours as needed for Shortness of Breath.      albuterol 108 (90 Base) MCG/ACT Aero Soln inhalation aerosol Inhale 2 Puffs every 6 hours as needed for Shortness of Breath. 8.5 g 0    losartan (COZAAR) 50 MG Tab TAKE 1 TABLET BY MOUTH EVERY DAY 90 Tablet 3    lidocaine (LIDODERM) 5 % Patch Place 1-3 Patches on the skin every 12 hours as needed (rib pain). 90 Patch 3    potassium citrate SR (UROCIT-K SR) 10 MEQ (1080 MG) Tab CR Take 1 Tablet by mouth every day. 90 Tablet 3    torsemide (DEMADEX) 10 MG tablet Take 1 Tablet by mouth every  day. 100 Tablet 3    cyanocobalamin (VITAMIN B-12) 100 MCG Tab Take 100 mcg by mouth every morning.      ELIQUIS 5 MG Tab TAKE 1 TABLET BY MOUTH TWICE A  Tablet 3    atorvastatin (LIPITOR) 10 MG Tab TAKE 1 TABLET BY MOUTH EVERY DAY 90 Tablet 3    Multiple Vitamin (MULTIVITAMIN ADULT PO) Take 1 Tablet by mouth every evening.      clobetasol (TEMOVATE) 0.05 % Ointment APPLY 1 APPLICATION TOPICALLY 2 TIMES A DAY AS NEEDED (SKIN RASH ON LOWER LEGS). 45 g 2    CHOLECALCIFEROL PO Take 2 Capsules by mouth every morning. 4000 Vit D       No current facility-administered medications for this visit.       Vitals:    02/13/25 1025   BP: 110/60   Pulse: 64   SpO2: (!) 16%         Review of Systems   Constitutional:  Positive for malaise/fatigue.   Cardiovascular:  Positive for palpitations.            EKG interpreted by me: Sinus arrhythmia     Physical Exam  Constitutional:       Appearance: Normal appearance.   HENT:      Head: Normocephalic.   Eyes:      Pupils: Pupils are equal, round, and reactive to light.   Neck:      Vascular: No JVD.   Cardiovascular:      Rate and Rhythm: Normal rate and regular rhythm.      Pulses: Normal pulses.      Heart sounds: Normal heart sounds.   Pulmonary:      Effort: Pulmonary effort is normal.      Breath sounds: Normal breath sounds.   Abdominal:      General: Abdomen is flat.      Palpations: Abdomen is soft.   Musculoskeletal:      Cervical back: Normal range of motion.      Right lower leg: No edema.      Left lower leg: No edema.   Skin:     General: Skin is warm and dry.   Neurological:      Mental Status: She is alert and oriented to person, place, and time.   Psychiatric:         Mood and Affect: Mood normal.         Behavior: Behavior normal.          Data:  Lipids:   Lab Results   Component Value Date/Time    CHOLSTRLTOT 158 07/08/2024 07:53 AM    TRIGLYCERIDE 54 07/08/2024 07:53 AM    HDL 76 07/08/2024 07:53 AM    LDL 71 07/08/2024 07:53 AM        BMP:  Lab Results  "  Component Value Date/Time    SODIUM 140 12/20/2024 0710    POTASSIUM 4.0 12/20/2024 0710    CHLORIDE 101 12/20/2024 0710    CO2 27 12/20/2024 0710    GLUCOSE 84 12/20/2024 0710    BUN 23 (H) 12/20/2024 0710    CREATININE 1.20 12/20/2024 0710    CALCIUM 9.6 12/20/2024 0710    ANION 12.0 12/20/2024 0710       GFR:  Lab Results   Component Value Date/Time    IFAFRICA >60 08/02/2021 0840    IFNOTAFR >60 08/02/2021 0840        TSH:   Lab Results   Component Value Date/Time    TSHULTRASEN 2.000 06/07/2024 0740       MAGNESIUM:  Lab Results   Component Value Date/Time    MAGNESIUM 2.0 06/20/2019 0430    MAGNESIUM 2.0 06/19/2019 0434    MAGNESIUM 2.2 06/18/2019 0521        THYROXINE (T4):   No results found for: \"FREEDIR\"     CBC:   Lab Results   Component Value Date/Time    WBC 6.4 12/20/2024 07:10 AM    RBC 3.69 (L) 12/20/2024 07:10 AM    HEMOGLOBIN 12.1 12/20/2024 07:10 AM    HEMATOCRIT 36.8 (L) 12/20/2024 07:10 AM    MCV 99.7 (H) 12/20/2024 07:10 AM    MCH 32.8 12/20/2024 07:10 AM    MCHC 32.9 12/20/2024 07:10 AM    RDW 46.8 12/20/2024 07:10 AM    PLATELETCT 192 12/20/2024 07:10 AM    MPV 11.9 12/20/2024 07:10 AM    NEUTSPOLYS 65.20 12/20/2024 07:10 AM    LYMPHOCYTES 19.60 (L) 12/20/2024 07:10 AM    MONOCYTES 9.70 12/20/2024 07:10 AM    EOSINOPHILS 4.50 12/20/2024 07:10 AM    BASOPHILS 0.80 12/20/2024 07:10 AM    IMMGRAN 0.20 12/20/2024 07:10 AM    NRBC 0.00 12/20/2024 07:10 AM    NEUTS 4.17 12/20/2024 07:10 AM    LYMPHS 1.25 12/20/2024 07:10 AM    MONOS 0.62 12/20/2024 07:10 AM    EOS 0.29 12/20/2024 07:10 AM    BASO 0.05 12/20/2024 07:10 AM    IMMGRANAB 0.01 12/20/2024 07:10 AM    NRBCAB 0.00 12/20/2024 07:10 AM        CBC w/o DIFF  Lab Results   Component Value Date/Time    WBC 6.4 12/20/2024 07:10 AM    RBC 3.69 (L) 12/20/2024 07:10 AM    HEMOGLOBIN 12.1 12/20/2024 07:10 AM    MCV 99.7 (H) 12/20/2024 07:10 AM    MCH 32.8 12/20/2024 07:10 AM    MCHC 32.9 12/20/2024 07:10 AM    RDW 46.8 12/20/2024 07:10 AM    MPV " "11.9 12/20/2024 07:10 AM       LIVER:  Lab Results   Component Value Date/Time    ALKPHOSPHAT 78 12/20/2024 07:10 AM    ASTSGOT 32 12/20/2024 07:10 AM    ALTSGPT 19 12/20/2024 07:10 AM    TBILIRUBIN 0.6 12/20/2024 07:10 AM       BNP:  No results found for: \"BNPBTYPENAT\"    PT/INR:  Lab Results   Component Value Date/Time    PROTHROMBTM 17.5 (H) 10/23/2023 09:54 AM    PROTHROMBTM 14.5 04/12/2021 02:55 PM    PROTHROMBTM 13.5 12/10/2019 09:55 AM    INR 1.42 (H) 10/23/2023 09:54 AM    INR 1.09 04/12/2021 02:55 PM    INR 1.01 12/10/2019 09:55 AM             Impression/Plan:  Essential hypertension, benign   - stable    - continue current regimen     S/P ablation of atrial fibrillation   - sinus arrhythmia on EKG   - likely palpitations secondary to COVID   - continue Eliquis              A total of 20 minutes of time was spent on day of encounter reviewing medical record, performing history and examination, counseling, ordering medication/test/consults, collaborating with referring service, and documentation.    Tyree Galvan [facility]-EP  Cardiac Electrophysiology    "

## 2025-02-13 NOTE — ASSESSMENT & PLAN NOTE
- sinus arrhythmia on EKG   - likely palpitations secondary to COVID   - continue Eliquis    Clear bilaterally, pupils equal, round and reactive to light.

## 2025-02-19 ENCOUNTER — TELEPHONE (OUTPATIENT)
Dept: CARDIOLOGY | Facility: MEDICAL CENTER | Age: 70
End: 2025-02-19
Payer: MEDICARE

## 2025-02-20 NOTE — TELEPHONE ENCOUNTER
If this is the lady who works at [employer] there is suspicion for possible worsening mitral disease that may require procedural intervention

## 2025-02-20 NOTE — TELEPHONE ENCOUNTER
To MT: Yes, she works at [employer]. What specifically would you like the [facility] to say so I can fill it out?

## 2025-03-23 ASSESSMENT — ENCOUNTER SYMPTOMS: TIME GOING TO BED: VARIES

## 2025-03-26 ENCOUNTER — OFFICE VISIT (OUTPATIENT)
Dept: MEDICAL GROUP | Facility: MEDICAL CENTER | Age: 70
End: 2025-03-26
Payer: MEDICARE

## 2025-03-26 ENCOUNTER — OFFICE VISIT (OUTPATIENT)
Dept: SLEEP MEDICINE | Facility: MEDICAL CENTER | Age: 70
End: 2025-03-26
Attending: NURSE PRACTITIONER
Payer: MEDICARE

## 2025-03-26 VITALS
HEART RATE: 76 BPM | RESPIRATION RATE: 16 BRPM | SYSTOLIC BLOOD PRESSURE: 130 MMHG | BODY MASS INDEX: 24.75 KG/M2 | WEIGHT: 154 LBS | DIASTOLIC BLOOD PRESSURE: 58 MMHG | OXYGEN SATURATION: 93 % | HEIGHT: 66 IN

## 2025-03-26 VITALS
HEART RATE: 75 BPM | WEIGHT: 154.32 LBS | OXYGEN SATURATION: 98 % | TEMPERATURE: 98.1 F | SYSTOLIC BLOOD PRESSURE: 128 MMHG | BODY MASS INDEX: 24.22 KG/M2 | HEIGHT: 67 IN | DIASTOLIC BLOOD PRESSURE: 54 MMHG

## 2025-03-26 DIAGNOSIS — F51.04 CHRONIC INSOMNIA: ICD-10-CM

## 2025-03-26 DIAGNOSIS — G47.33 OSA (OBSTRUCTIVE SLEEP APNEA): ICD-10-CM

## 2025-03-26 DIAGNOSIS — M54.40 BACK PAIN OF LUMBAR REGION WITH SCIATICA: ICD-10-CM

## 2025-03-26 PROCEDURE — 99214 OFFICE O/P EST MOD 30 MIN: CPT | Performed by: STUDENT IN AN ORGANIZED HEALTH CARE EDUCATION/TRAINING PROGRAM

## 2025-03-26 PROCEDURE — 3074F SYST BP LT 130 MM HG: CPT | Performed by: STUDENT IN AN ORGANIZED HEALTH CARE EDUCATION/TRAINING PROGRAM

## 2025-03-26 PROCEDURE — 99213 OFFICE O/P EST LOW 20 MIN: CPT | Performed by: STUDENT IN AN ORGANIZED HEALTH CARE EDUCATION/TRAINING PROGRAM

## 2025-03-26 PROCEDURE — 3078F DIAST BP <80 MM HG: CPT | Performed by: STUDENT IN AN ORGANIZED HEALTH CARE EDUCATION/TRAINING PROGRAM

## 2025-03-26 RX ORDER — CYCLOBENZAPRINE HCL 10 MG
10 TABLET ORAL 3 TIMES DAILY PRN
Qty: 30 TABLET | Refills: 0 | Status: SHIPPED | OUTPATIENT
Start: 2025-03-26

## 2025-03-26 ASSESSMENT — FIBROSIS 4 INDEX
FIB4 SCORE: 2.68
FIB4 SCORE: 2.68

## 2025-03-26 NOTE — PROGRESS NOTES
"Verbal consent was acquired by the patient to use [employer] ambient listening note generation during this visit     Subjective:     HPI:   History of Present Illness  The patient presents for evaluation of back pain.    She reports experiencing new onset of back pain, which he attributes to a possible twisting motion. The pain was not present immediately after the incident but manifested the following day. She reports no recent falls, trauma, or heavy lifting. She reports no loss of sensation or weakness in his legs, and no loss of bladder or bowel control. She has attempted to manage the pain with cold compresses, which provide temporary relief, and over-the-counter Tylenol, which has been ineffective. He has a lidocaine patch at home, but it tends to dislodge when he lies down. He has also tried Tylenol rub for pain management. She is currently on Eliquis. She underwent a back fusion procedure in 2021 but did not experience similar pain postoperatively.      Objective:     Exam:  /54 (BP Location: Left arm, Patient Position: Sitting, BP Cuff Size: Adult)   Pulse 75   Temp 36.7 °C (98.1 °F) (Temporal)   Ht 1.689 m (5' 6.5\")   Wt 70 kg (154 lb 5.2 oz)   SpO2 98%   BMI 24.54 kg/m²  Body mass index is 24.54 kg/m².    ROS as above    Physical Exam  Constitutional:       Appearance: Normal appearance.   Cardiovascular:      Rate and Rhythm: Normal rate and regular rhythm.      Pulses: Normal pulses.      Heart sounds: Normal heart sounds. No murmur heard.  Pulmonary:      Effort: Pulmonary effort is normal. No respiratory distress.      Breath sounds: Normal breath sounds. No wheezing.   Musculoskeletal:      Comments: SLR positive right leg, strength 5/5, sensation normal bilateral   Neurological:      Mental Status: She is alert and oriented to person, place, and time.               Assessment & Plan:     Assessment & Plan      Problem List Items Addressed This Visit       Back pain of lumbar region with " sciatica    -She is advised to avoid heavy lifting, continue light activities and avoid prolonged rest  -Apply cold compresses. Once the acute pain subsides, she can transition to using warm heat pads. A prescription for Flexeril 10 mg will be sent to pharmacy in [place], to be taken as needed in conjunction with Tylenol. NSAID's should be avoided as she is on Eliquis.  -A referral for physical therapy has been provided.She may also use a lidocaine patch for additional pain relief.   -An x-ray has been ordered for further evaluation if there is no improvement in his condition.           Relevant Medications    cyclobenzaprine (FLEXERIL) 10 mg Tab    Other Relevant Orders    Referral to Physical Therapy    DX-LUMBAR SPINE-2 OR 3 VIEWS           Return if symptoms worsen or fail to improve.    Please note that this dictation was created using voice recognition software. I have made every reasonable attempt to correct obvious errors, but I expect that there are errors of grammar and possibly content that I did not discover before finalizing the note.

## 2025-03-26 NOTE — PROGRESS NOTES
[facility] Consult Note     Date: 3/26/2025 / Time: 12:41 PM      Thank you for requesting a sleep medicine consultation on Chinyere Priest at the sleep center. Presents today with   Chief Complaint   Patient presents with    New Patient     Ref by JASKARAN Chang Dx: Nocturnal Hypoxemia       She is referred by Glenda Chang, DANIELLE  [address]  [address]  [place] [id number] for evaluation and treatment of sleep disorder breathing.     HISTORY OF PRESENT ILLNESS:     Chinyere Priest is a 69 y.o. female with hypertension, hyperlipidemia, atrial fibrillation status post ablation in 2022 on DOAC, CKD, MR and TR, predominantly group 2 pulmonary hypertension who presents to Sleep Clinic for sleep disordered breathing evaluation.     She feels SOB especially with mild exertion. She endorses non-restorative sleep, fragmented sleep and sleep onset insomnia resulting in daytime sleepiness    Fhx: daughter and granddaughter     As per supplemental questionnaire to be scanned or imported into chart:    [place] Sleepiness Score: 3    Sleep Schedule  Bedtime: 10 pm  Wake time: 7 am   Sleep-onset latency: 1 hour  Awakenings from sleep: 2-3x   Difficulty falling back asleep: sometimes  Bedroom partner: no  Naps: Yes - once a day     DAYTIME SYMPTOMS:   Excessive daytime sleepiness: No  Daytime fatigue: Yes  Difficulty concentrating: No  Memory problems: No  Irritability: No  Work/school performance issues: Not  Sleepiness with driving: No   Caffeine/stimulant use: Yes - 1-2x/wk   Alcohol use:No  Hx of tobacco smoking for 36 years about 4 cig, quit 2008     SLEEP RELATED SYMPTOMS  Snoring: snorting herself awake  Witnessed apnea or gasping/choking: No   Dry mouth or mouth breathing: Yes  Sweating: Yes started a couple months ago  Teeth grinding/biting: No   Morning headaches: No   Refreshed Upon Awakening: No      SLEEP RELATED BEHAVIORS:  Parasomnias (walking, talking, eating, violence): No  "  Leg kicking: No   Restless legs - \"urge to move\": No   Nightmares: No  Recurrent: No   Dream enactment: No      NARCOLEPSY: no     MEDICAL HISTORY  Past Medical History:   Diagnosis Date    Aortic calcification (HCC)      Noted incidentally on abdominal CT scan.    Arrhythmia     hx of afibb, ablation x 2     Arthritis 2010    Asthma in adult     patient states it is only occasionally    Breath shortness 2020    Bronchitis 2019    Chickenpox     Chronic anticoagulation     Chronic back pain      CKD (chronic kidney disease) stage 3, GFR 30-59 ml/min 09/14/2020    Congestive heart failure (HCC) 2024    Dental disorder     Full upper/lower dentures.    Diverticulosis      Hearing difficulty     Heart murmur 2019    High cholesterol     patient denies    Hypertension     Inflamed seborrheic keratosis      Intrinsic eczema 01/20/2020    Mumps     Non-rheumatic mitral regurgitation 05/2019    Echocardiogram with normal LV size, LVEF 65%. Moderate MR, mild-moderate TR, unchanged from previous.    Osteoarthritis of both hips      Palpitations     Plantar fascia syndrome      Plantar wart of right foot      Pneumonia 2019    Pruritic dermatitis      Ongoing for several months but worse at night on her lower extremities    Pulmonary hypertension (HCC)     Pure hypercholesterolemia      Ringing in ears     S/P cardiac catheterization 05/26/2017    1.  Normal right heart pressures. 3.  Essentially normal coronary arteries. 2.  Left ventricular ejection fraction 65%.     Shortness of breath     Spider veins of limb      Swelling of lower extremity     Unspecified vitamin D deficiency      Wears glasses         SURGICAL HISTORY  Past Surgical History:   Procedure Laterality Date    LUMBAR FUSION O-ARM N/A 04/29/2021    Procedure: FUSION, SPINE, LUMBAR, WITH O-ARM IMAGING GUIDANCE - STAGE #2 L3-5 UNILATERAL, LEFT-SIDED.laminotomy lumbar 3-5;  Surgeon: Chad Clay M.D.;  Location: SURGERY [facility];  Service: Neurosurgery "    FUSION, SPINE, XLIF Right 2021    Procedure: FUSION, SPINE, XLIF - STAGE #1 L3-5, RIGHT-SIDED APPROACH.;  Surgeon: Sudhir Clay M.D.;  Location: SURGERY [facility];  Service: Neurosurgery    OTHER CARDIAC SURGERY  2019    cardiac ablation x 2 , for Afibb    LUMBAR LAMINECTOMY DISKECTOMY  2009    Performed by SUDHIR CLAY at SURGERY [facility] ORS    HYSTERECTOMY LAPAROSCOPY      GYN SURGERY       x2    LAMINOTOMY      PRIMARY C SECTION          FAMILY HISTORY  Family History   Problem Relation Age of Onset    Heart Disease Mother         1st MI @ 61 yo    Hypertension Mother     Heart Attack Mother 60    Heart Disease Maternal Aunt     Breast Cancer Maternal Aunt     Cancer Daughter 35        breast    Breast Cancer Daughter     Heart Disease Maternal Grandmother     Heart Disease Maternal Grandfather     Heart Disease Maternal Aunt 56    Ovarian Cancer Other        SOCIAL HISTORY  Social History     Socioeconomic History    Marital status:    Tobacco Use    Smoking status: Former     Current packs/day: 0.00     Average packs/day: 0.5 packs/day for 40.0 years (20.0 ttl pk-yrs)     Types: Cigarettes     Start date: 3/1/1970     Quit date: 3/1/2010     Years since quitting: 15.0    Smokeless tobacco: Never   Vaping Use    Vaping status: Never Used   Substance and Sexual Activity    Alcohol use: No    Drug use: Never    Sexual activity: Yes     Partners: Male     Birth control/protection: Post-Menopausal     Comment: , works at [employer](part time)   Other Topics Concern    Exercise No   Social History Narrative    Works at [employer]     Social Drivers of Health     Food Insecurity: No Food Insecurity (2019)    Hunger Vital Sign     Worried About Running Out of Food in the Last Year: Never true     Ran Out of Food in the Last Year: Never true   Transportation Needs: No Transportation Needs (2019)    PRAPARE - Transportation     Lack of Transportation (Medical): No      "Lack of Transportation (Non-Medical): No        CURRENT MEDICATIONS  Current Outpatient Medications   Medication Sig    albuterol 108 (90 Base) MCG/ACT Aero Soln inhalation aerosol Inhale 2 Puffs every 6 hours as needed for Shortness of Breath.    albuterol 108 (90 Base) MCG/ACT Aero Soln inhalation aerosol Inhale 2 Puffs every 6 hours as needed for Shortness of Breath.    losartan (COZAAR) 50 MG Tab TAKE 1 TABLET BY MOUTH EVERY DAY    lidocaine (LIDODERM) 5 % Patch Place 1-3 Patches on the skin every 12 hours as needed (rib pain).    potassium citrate SR (UROCIT-K SR) 10 MEQ (1080 MG) Tab CR Take 1 Tablet by mouth every day.    torsemide (DEMADEX) 10 MG tablet Take 1 Tablet by mouth every day.    cyanocobalamin (VITAMIN B-12) 100 MCG Tab Take 100 mcg by mouth every morning.    ELIQUIS 5 MG Tab TAKE 1 TABLET BY MOUTH TWICE A DAY    atorvastatin (LIPITOR) 10 MG Tab TAKE 1 TABLET BY MOUTH EVERY DAY    Multiple Vitamin (MULTIVITAMIN ADULT PO) Take 1 Tablet by mouth every evening.    clobetasol (TEMOVATE) 0.05 % Ointment APPLY 1 APPLICATION TOPICALLY 2 TIMES A DAY AS NEEDED (SKIN RASH ON LOWER LEGS).    CHOLECALCIFEROL PO Take 2 Capsules by mouth every morning. 4000 Vit D       REVIEW OF SYSTEMS  Constitutional: Denies fevers, Denies weight changes  Ears/Nose/Throat/Mouth: Denies nasal congestion or sore throat   Cardiovascular: Denies chest pain  Respiratory: Denies shortness of breath, Denies cough  Gastrointestinal/Hepatic: Denies nausea, vomiting  Sleep: see HPI    Physical Examination:  Vitals/ General Appearance:   Weight/BMI: Body mass index is 24.86 kg/m².  Vitals:    03/26/25 1241   BP: 130/58   BP Location: Left arm   Patient Position: Sitting   BP Cuff Size: Adult   Pulse: 76   Resp: 16   SpO2: 93%   Weight: 69.9 kg (154 lb)   Height: 1.676 m (5' 6\")       Pt. is alert and oriented to time, place and person. Cooperative and in no apparent distress.     Constitutional: Alert, no distress, well-groomed.  Skin: " No rashes in visible areas.  Eye: Round. Conjunctiva clear, lids normal. No icterus.   ENT EXAM  Hard palate narrow: Yes  Hard palate high: Yes  Soft palate/uvula (Mallampati score): 4  Tongue Scalloping: Yes  Retrognathia: Yes  Micrognathia: No   Maxillary hyoplasia  Cardiovascular:irregular rhythm  Pulmonary:Clear to auscultation  Neurologic:Awake, alert and oriented x 3  Extremities: No clubbing, cyanosis, or edema     Bicarb:   Lab Results   Component Value Date/Time    CO2 27 12/20/2024 0710    CO2 26 10/28/2024 0755    CO2 27 08/30/2024 0710     TSH:   Lab Results   Component Value Date/Time    TSHULTRASEN 2.000 06/07/2024 0740     CREATININE:   Lab Results   Component Value Date/Time    CREATININE 1.20 12/20/2024 0710     VIT D:   Lab Results   Component Value Date/Time    25HYDROXY 54 07/08/2024 0753     H/H:  Lab Results   Component Value Date/Time    HEMOGLOBIN 12.1 12/20/2024 07:10 AM     TTE 7/2/2024:  Normal left ventricular chamber size.  Mild concentric left ventricular hypertrophy.  The ejection fraction is measured to be 64 % by Craig's biplane.  Enlarged right atrium.  Moderately dilated left atrium.  Moderate mitral regurgitation.  Moderate tricuspid regurgitation.  Right ventricular systolic pressure is estimated to be  60 mmHg.  Moderate pulmonic insufficiency.    ASSESSMENT AND PLAN   1. Chinyere Priest  has symptoms of Obstructive Sleep Apnea (AMINAH) including snoring, witnessed apnea, dry mouth, morning headaches, fragmented sleep unrefreshed upon awakening. These sxs interfere with activities of daily living.  Pt has risk factors for AMINAH include crowded oropharynx and strong family history.     The pathophysiology of AMINAH and the increased risk of cardiovascular morbidity from untreated AMINAH is discussed in detail with the patient. She  also has pulmonary hypertension, atrial fibrillation which can be worsened by AMINAH.      We have discussed diagnostic options including in-laboratory,  attended polysomnography and home sleep testing. We have also discussed treatment options including airway pressurization, reconstructive otolaryngologic surgery, dental appliances and weight management.     Subsequently,treatment options will be discussed based on the diagnostic study. Meanwhile, She is urged to avoid supine sleep, weight gain and alcoholic beverages since all of these can worsen AMINAH. She is cautioned against drowsy driving. If She feels sleepy while driving, advised must pull over for a break/nap, rather than persist on the road, in the interest of Pt's own safety and that of others on the road.    Plan  -  She  will be scheduled for an overnight PSG to assess sleep related breathing disorder.  -Patient amenable to initiation of empiric auto CPAP if indicated  - Follow up 1-2 weeks after sleep study to discuss results and treatment options moving forward   -Advised to reach out via MyChart or by phone with any questions or concerns.     2.  Sleep onset and sleep maintenance insomnia -suspect related to untreated AMINAH.  Also discussed stimulus control, decreasing screen time prior to bed, avoiding naps during the day, anchoring consistent wake up time.    3.  Regarding treatment of other past medical problems and general health maintenance,  Pt is urged to follow up with PCP.      Please note portions of this record was created using voice recognition software. I have made every reasonable attempt to correct obvious errors, but I expect that there are errors of grammar and possibly content I did not discover before finalizing the note.

## 2025-03-26 NOTE — ASSESSMENT & PLAN NOTE
-She is advised to avoid heavy lifting, continue light activities and avoid prolonged rest  -Apply cold compresses. Once the acute pain subsides, she can transition to using warm heat pads. A prescription for Flexeril 10 mg will be sent to pharmacy in [place], to be taken as needed in conjunction with Tylenol. NSAID's should be avoided as she is on Eliquis.  -A referral for physical therapy has been provided.She may also use a lidocaine patch for additional pain relief.   -An x-ray has been ordered for further evaluation if there is no improvement in his condition.

## 2025-03-26 NOTE — PROGRESS NOTES
"Verbal consent was acquired by the patient to use [employer] ambient listening note generation during this visit     Subjective:     HPI:   History of Present Illness      Health Maintenance: {COMPLETED:303586}    Objective:     Exam:  /54 (BP Location: Left arm, Patient Position: Sitting, BP Cuff Size: Adult)   Pulse 75   Temp 36.7 °C (98.1 °F) (Temporal)   Ht 1.689 m (5' 6.5\")   Wt 70 kg (154 lb 5.2 oz)   SpO2 98%   BMI 24.54 kg/m²  Body mass index is 24.54 kg/m².    @ROS  Physical Exam    {A chaperone was offered to the patient during today's exam.:92986}    Lab studies    Assessment & Plan:     Assessment & Plan      Problem List Items Addressed This Visit    None  Visit Diagnoses         Back pain of lumbar region with sciatica        Relevant Medications    cyclobenzaprine (FLEXERIL) 10 mg Tab    Other Relevant Orders    Referral to Physical Therapy    DX-LUMBAR SPINE-2 OR 3 VIEWS                No follow-ups on file.    Please note that this dictation was created using voice recognition software. I have made every reasonable attempt to correct obvious errors, but I expect that there are errors of grammar and possibly content that I did not discover before finalizing the note.        "

## 2025-03-28 ENCOUNTER — HOSPITAL ENCOUNTER (OUTPATIENT)
Dept: CARDIOLOGY | Facility: MEDICAL CENTER | Age: 70
End: 2025-03-28
Attending: NURSE PRACTITIONER
Payer: MEDICARE

## 2025-03-28 DIAGNOSIS — I50.33 ACUTE ON CHRONIC DIASTOLIC HEART FAILURE DUE TO VALVULAR DISEASE (HCC): ICD-10-CM

## 2025-03-28 DIAGNOSIS — I34.0 NON-RHEUMATIC MITRAL REGURGITATION: ICD-10-CM

## 2025-03-28 DIAGNOSIS — E78.00 HYPERCHOLESTEROLEMIA: ICD-10-CM

## 2025-03-28 DIAGNOSIS — I70.0 AORTIC CALCIFICATION (HCC): ICD-10-CM

## 2025-03-28 DIAGNOSIS — I38 ACUTE ON CHRONIC DIASTOLIC HEART FAILURE DUE TO VALVULAR DISEASE (HCC): ICD-10-CM

## 2025-03-28 LAB
LV EJECT FRACT  99904: 60
LV EJECT FRACT MOD 2C 99903: 65.57
LV EJECT FRACT MOD 4C 99902: 64.88
LV EJECT FRACT MOD BP 99901: 64.84

## 2025-03-28 PROCEDURE — 93306 TTE W/DOPPLER COMPLETE: CPT

## 2025-03-28 RX ORDER — ATORVASTATIN CALCIUM 10 MG/1
10 TABLET, FILM COATED ORAL
Qty: 90 TABLET | Refills: 1 | Status: SHIPPED | OUTPATIENT
Start: 2025-03-28

## 2025-03-28 NOTE — TELEPHONE ENCOUNTER
Is the patient due for a refill? Yes    Was the patient seen the last 15 months? Yes    Date of last office visit: 2/13/25    Does the patient have an upcoming appointment?  Yes   If yes, When? 4/30/25    Provider to refill:MT    Does the patient have [facility] Plus and need 100-day supply? (This applies to ALL medications) Patient does not have SCP

## 2025-03-31 NOTE — Clinical Note
REFERRAL APPROVAL NOTICE         Sent on March 31, 2025                   Chinyere Priest  [address] [address]                   Dear Ms. Priest,    After a careful review of the medical information and benefit coverage, [facility] has processed your referral. See below for additional details.    If applicable, you must be actively enrolled with your insurance for coverage of the authorized service. If you have any questions regarding your coverage, please contact your insurance directly.    REFERRAL INFORMATION   Referral #:  [id number]  Referred-To Department    Referred-By Provider:  Physical Therapy    Elida Mares M.D.   Phys Therapy [facility]      [address]  [address]  [facility] [id number] [phone] [address]  [address]  [facility] [address] [phone]    Referral Start Date:  03/26/2025  Referral End Date:   03/26/2026             SCHEDULING  If you do not already have an appointment, please call [phone] to make an appointment.     MORE INFORMATION  If you do not already have a [facility] account, sign up at: [email or web address]  You can access your medical information, make appointments, see lab results, billing information, and more.  If you have questions regarding this referral, please contact  the [facility] Referrals department at:             [phone]. Monday - Friday 8:00AM - 5:00PM.     Sincerely,    [facility]

## 2025-04-03 ENCOUNTER — RESULTS FOLLOW-UP (OUTPATIENT)
Dept: CARDIOLOGY | Facility: MEDICAL CENTER | Age: 70
End: 2025-04-03
Payer: MEDICARE

## 2025-04-22 ENCOUNTER — HOSPITAL ENCOUNTER (OUTPATIENT)
Facility: MEDICAL CENTER | Age: 70
End: 2025-04-22
Attending: FAMILY MEDICINE
Payer: MEDICARE

## 2025-04-22 ENCOUNTER — RESULTS FOLLOW-UP (OUTPATIENT)
Dept: MEDICAL GROUP | Facility: MEDICAL CENTER | Age: 70
End: 2025-04-22

## 2025-04-22 DIAGNOSIS — E55.9 VITAMIN D DEFICIENCY: Chronic | ICD-10-CM

## 2025-04-22 DIAGNOSIS — N18.31 STAGE 3A CHRONIC KIDNEY DISEASE: ICD-10-CM

## 2025-04-22 DIAGNOSIS — R73.03 PREDIABETES: ICD-10-CM

## 2025-04-22 DIAGNOSIS — D75.89 MACROCYTOSIS WITHOUT ANEMIA: ICD-10-CM

## 2025-04-22 DIAGNOSIS — E78.00 HYPERCHOLESTEROLEMIA: ICD-10-CM

## 2025-04-22 LAB
25(OH)D3 SERPL-MCNC: 69 NG/ML (ref 30–100)
ALBUMIN SERPL BCP-MCNC: 4.2 G/DL (ref 3.2–4.9)
ALBUMIN/GLOB SERPL: 1.4 G/DL
ALP SERPL-CCNC: 75 U/L (ref 30–99)
ALT SERPL-CCNC: 19 U/L (ref 2–50)
ANION GAP SERPL CALC-SCNC: 10 MMOL/L (ref 7–16)
AST SERPL-CCNC: 32 U/L (ref 12–45)
BASOPHILS # BLD AUTO: 0.7 % (ref 0–1.8)
BASOPHILS # BLD: 0.04 K/UL (ref 0–0.12)
BILIRUB SERPL-MCNC: 0.5 MG/DL (ref 0.1–1.5)
BUN SERPL-MCNC: 21 MG/DL (ref 8–22)
CALCIUM ALBUM COR SERPL-MCNC: 9.4 MG/DL (ref 8.5–10.5)
CALCIUM SERPL-MCNC: 9.6 MG/DL (ref 8.5–10.5)
CHLORIDE SERPL-SCNC: 103 MMOL/L (ref 96–112)
CHOLEST SERPL-MCNC: 160 MG/DL (ref 100–199)
CO2 SERPL-SCNC: 29 MMOL/L (ref 20–33)
CREAT SERPL-MCNC: 1.07 MG/DL (ref 0.5–1.4)
EOSINOPHIL # BLD AUTO: 0.24 K/UL (ref 0–0.51)
EOSINOPHIL NFR BLD: 3.9 % (ref 0–6.9)
ERYTHROCYTE [DISTWIDTH] IN BLOOD BY AUTOMATED COUNT: 49.7 FL (ref 35.9–50)
EST. AVERAGE GLUCOSE BLD GHB EST-MCNC: 126 MG/DL
FASTING STATUS PATIENT QL REPORTED: NORMAL
GFR SERPLBLD CREATININE-BSD FMLA CKD-EPI: 56 ML/MIN/1.73 M 2
GLOBULIN SER CALC-MCNC: 3 G/DL (ref 1.9–3.5)
GLUCOSE SERPL-MCNC: 92 MG/DL (ref 65–99)
HBA1C MFR BLD: 6 % (ref 4–5.6)
HCT VFR BLD AUTO: 37.6 % (ref 37–47)
HDLC SERPL-MCNC: 66 MG/DL
HGB BLD-MCNC: 12.3 G/DL (ref 12–16)
IMM GRANULOCYTES # BLD AUTO: 0.01 K/UL (ref 0–0.11)
IMM GRANULOCYTES NFR BLD AUTO: 0.2 % (ref 0–0.9)
LDLC SERPL CALC-MCNC: 78 MG/DL
LYMPHOCYTES # BLD AUTO: 1.12 K/UL (ref 1–4.8)
LYMPHOCYTES NFR BLD: 18.2 % (ref 22–41)
MCH RBC QN AUTO: 32.2 PG (ref 27–33)
MCHC RBC AUTO-ENTMCNC: 32.7 G/DL (ref 32.2–35.5)
MCV RBC AUTO: 98.4 FL (ref 81.4–97.8)
MONOCYTES # BLD AUTO: 0.61 K/UL (ref 0–0.85)
MONOCYTES NFR BLD AUTO: 9.9 % (ref 0–13.4)
NEUTROPHILS # BLD AUTO: 4.13 K/UL (ref 1.82–7.42)
NEUTROPHILS NFR BLD: 67.1 % (ref 44–72)
NRBC # BLD AUTO: 0 K/UL
NRBC BLD-RTO: 0 /100 WBC (ref 0–0.2)
PLATELET # BLD AUTO: 174 K/UL (ref 164–446)
PMV BLD AUTO: 11.6 FL (ref 9–12.9)
POTASSIUM SERPL-SCNC: 3.9 MMOL/L (ref 3.6–5.5)
PROT SERPL-MCNC: 7.2 G/DL (ref 6–8.2)
RBC # BLD AUTO: 3.82 M/UL (ref 4.2–5.4)
SODIUM SERPL-SCNC: 142 MMOL/L (ref 135–145)
TRIGL SERPL-MCNC: 81 MG/DL (ref 0–149)
WBC # BLD AUTO: 6.2 K/UL (ref 4.8–10.8)

## 2025-04-22 PROCEDURE — 36415 COLL VENOUS BLD VENIPUNCTURE: CPT

## 2025-04-22 PROCEDURE — 80061 LIPID PANEL: CPT

## 2025-04-22 PROCEDURE — 83036 HEMOGLOBIN GLYCOSYLATED A1C: CPT | Mod: GA

## 2025-04-22 PROCEDURE — 85025 COMPLETE CBC W/AUTO DIFF WBC: CPT

## 2025-04-22 PROCEDURE — 80053 COMPREHEN METABOLIC PANEL: CPT

## 2025-04-22 PROCEDURE — 82306 VITAMIN D 25 HYDROXY: CPT

## 2025-04-24 ENCOUNTER — OFFICE VISIT (OUTPATIENT)
Dept: MEDICAL GROUP | Facility: MEDICAL CENTER | Age: 70
End: 2025-04-24
Payer: MEDICARE

## 2025-04-24 VITALS
SYSTOLIC BLOOD PRESSURE: 102 MMHG | WEIGHT: 153.22 LBS | HEIGHT: 67 IN | TEMPERATURE: 97.3 F | OXYGEN SATURATION: 98 % | DIASTOLIC BLOOD PRESSURE: 44 MMHG | BODY MASS INDEX: 24.05 KG/M2 | HEART RATE: 109 BPM

## 2025-04-24 DIAGNOSIS — R73.03 PREDIABETES: ICD-10-CM

## 2025-04-24 DIAGNOSIS — M54.40 BACK PAIN OF LUMBAR REGION WITH SCIATICA: ICD-10-CM

## 2025-04-24 DIAGNOSIS — J45.20 MILD INTERMITTENT ASTHMA IN ADULT WITHOUT COMPLICATION: ICD-10-CM

## 2025-04-24 DIAGNOSIS — Z12.83 SCREENING FOR SKIN CANCER: ICD-10-CM

## 2025-04-24 DIAGNOSIS — I10 ESSENTIAL HYPERTENSION, BENIGN: ICD-10-CM

## 2025-04-24 DIAGNOSIS — Z98.890 S/P CARDIAC CATHETERIZATION: ICD-10-CM

## 2025-04-24 DIAGNOSIS — E55.9 VITAMIN D DEFICIENCY: Chronic | ICD-10-CM

## 2025-04-24 DIAGNOSIS — I34.0 NON-RHEUMATIC MITRAL REGURGITATION: ICD-10-CM

## 2025-04-24 DIAGNOSIS — E78.00 HYPERCHOLESTEROLEMIA: ICD-10-CM

## 2025-04-24 DIAGNOSIS — Z80.3 FAMILY HISTORY OF MALIGNANT NEOPLASM OF BREAST: ICD-10-CM

## 2025-04-24 DIAGNOSIS — N18.31 STAGE 3A CHRONIC KIDNEY DISEASE: ICD-10-CM

## 2025-04-24 DIAGNOSIS — Z00.00 MEDICARE ANNUAL WELLNESS VISIT, SUBSEQUENT: ICD-10-CM

## 2025-04-24 DIAGNOSIS — H91.8X3 OTHER SPECIFIED HEARING LOSS OF BOTH EARS: ICD-10-CM

## 2025-04-24 DIAGNOSIS — M85.852 OSTEOPENIA OF NECK OF LEFT FEMUR: ICD-10-CM

## 2025-04-24 DIAGNOSIS — Z98.890 S/P ABLATION OF ATRIAL FIBRILLATION: ICD-10-CM

## 2025-04-24 DIAGNOSIS — K57.90 DIVERTICULOSIS: ICD-10-CM

## 2025-04-24 DIAGNOSIS — L20.84 INTRINSIC ECZEMA: ICD-10-CM

## 2025-04-24 DIAGNOSIS — Z80.41 FAMILY HISTORY OF MALIGNANT NEOPLASM OF OVARY: ICD-10-CM

## 2025-04-24 DIAGNOSIS — M50.30 DDD (DEGENERATIVE DISC DISEASE), CERVICAL: ICD-10-CM

## 2025-04-24 DIAGNOSIS — I27.20 PULMONARY HYPERTENSION (HCC): ICD-10-CM

## 2025-04-24 DIAGNOSIS — M16.0 PRIMARY OSTEOARTHRITIS OF BOTH HIPS: ICD-10-CM

## 2025-04-24 DIAGNOSIS — I70.0 AORTIC CALCIFICATION (HCC): ICD-10-CM

## 2025-04-24 DIAGNOSIS — D75.89 MACROCYTOSIS WITHOUT ANEMIA: ICD-10-CM

## 2025-04-24 DIAGNOSIS — I36.1 NONRHEUMATIC TRICUSPID VALVE REGURGITATION: ICD-10-CM

## 2025-04-24 DIAGNOSIS — M51.360 DEGENERATION OF INTERVERTEBRAL DISC OF LUMBAR REGION WITH DISCOGENIC BACK PAIN: ICD-10-CM

## 2025-04-24 DIAGNOSIS — Z86.79 S/P ABLATION OF ATRIAL FIBRILLATION: ICD-10-CM

## 2025-04-24 PROBLEM — D64.9 ANEMIA: Status: RESOLVED | Noted: 2021-08-16 | Resolved: 2025-04-24

## 2025-04-24 PROBLEM — D68.69 SECONDARY HYPERCOAGULABLE STATE (HCC): Status: RESOLVED | Noted: 2017-10-30 | Resolved: 2025-04-24

## 2025-04-24 PROBLEM — H91.90 HEARING LOSS: Status: ACTIVE | Noted: 2025-04-24

## 2025-04-24 PROCEDURE — 3078F DIAST BP <80 MM HG: CPT | Performed by: FAMILY MEDICINE

## 2025-04-24 PROCEDURE — 99213 OFFICE O/P EST LOW 20 MIN: CPT | Mod: 25 | Performed by: FAMILY MEDICINE

## 2025-04-24 PROCEDURE — G0439 PPPS, SUBSEQ VISIT: HCPCS | Performed by: FAMILY MEDICINE

## 2025-04-24 PROCEDURE — 3074F SYST BP LT 130 MM HG: CPT | Performed by: FAMILY MEDICINE

## 2025-04-24 RX ORDER — CYCLOBENZAPRINE HCL 10 MG
10 TABLET ORAL 3 TIMES DAILY PRN
Qty: 90 TABLET | Refills: 3 | Status: SHIPPED | OUTPATIENT
Start: 2025-04-24

## 2025-04-24 ASSESSMENT — ENCOUNTER SYMPTOMS
FEVER: 0
CHILLS: 0
PALPITATIONS: 0
GENERAL WELL-BEING: GOOD

## 2025-04-24 ASSESSMENT — PATIENT HEALTH QUESTIONNAIRE - PHQ9: CLINICAL INTERPRETATION OF PHQ2 SCORE: 0

## 2025-04-24 ASSESSMENT — FIBROSIS 4 INDEX: FIB4 SCORE: 2.95

## 2025-04-24 ASSESSMENT — ACTIVITIES OF DAILY LIVING (ADL): BATHING_REQUIRES_ASSISTANCE: 0

## 2025-04-24 NOTE — PROGRESS NOTES
Chief Complaint   Patient presents with    Annual Exam       HPI:  Chinyere Priest is a 69 y.o. here for Medicare Annual Wellness Visit     Patient Active Problem List    Diagnosis Date Noted    Hearing loss 04/24/2025    Back pain of lumbar region with sciatica 03/26/2025    Nonrheumatic tricuspid valve regurgitation 08/10/2024    Macrocytosis without anemia 07/31/2024    Osteopenia of neck of left femur 07/31/2024    Prediabetes 03/18/2021    Stage 3a chronic kidney disease 09/14/2020    Intrinsic eczema 01/20/2020    S/P ablation of atrial fibrillation 06/20/2019    Hypercholesterolemia 06/19/2017    S/P cardiac catheterization 05/26/2017    Non-rheumatic mitral regurgitation 05/25/2017    Essential hypertension, benign 05/25/2017    Degenerative disc disease, lumbar 01/25/2017    Family history of ovarian cancer 11/21/2016    Family history of breast cancer 11/21/2016    Pulmonary hypertension (HCC) 08/28/2015    Aortic calcification (HCC) 05/15/2015    Diverticulosis 05/15/2015    Osteoarthritis of both hips 12/10/2014    Asthma in adult without complication 01/13/2014    DDD (degenerative disc disease), cervical 01/19/2012    Vitamin D deficiency 07/01/2010       Current Outpatient Medications   Medication Sig Dispense Refill    cyclobenzaprine (FLEXERIL) 10 MG Tab Take 1 Tablet by mouth 3 times a day as needed for Mild Pain or Moderate Pain. 90 Tablet 3    atorvastatin (LIPITOR) 10 MG Tab TAKE 1 TABLET BY MOUTH EVERY DAY 90 Tablet 1    albuterol 108 (90 Base) MCG/ACT Aero Soln inhalation aerosol Inhale 2 Puffs every 6 hours as needed for Shortness of Breath.      albuterol 108 (90 Base) MCG/ACT Aero Soln inhalation aerosol Inhale 2 Puffs every 6 hours as needed for Shortness of Breath. 8.5 g 0    losartan (COZAAR) 50 MG Tab TAKE 1 TABLET BY MOUTH EVERY DAY 90 Tablet 3    lidocaine (LIDODERM) 5 % Patch Place 1-3 Patches on the skin every 12 hours as needed (rib pain). 90 Patch 3    potassium citrate  SR (UROCIT-K SR) 10 MEQ (1080 MG) Tab CR Take 1 Tablet by mouth every day. 90 Tablet 3    torsemide (DEMADEX) 10 MG tablet Take 1 Tablet by mouth every day. 100 Tablet 3    cyanocobalamin (VITAMIN B-12) 100 MCG Tab Take 100 mcg by mouth every morning.      ELIQUIS 5 MG Tab TAKE 1 TABLET BY MOUTH TWICE A  Tablet 3    Multiple Vitamin (MULTIVITAMIN ADULT PO) Take 1 Tablet by mouth every evening.      clobetasol (TEMOVATE) 0.05 % Ointment APPLY 1 APPLICATION TOPICALLY 2 TIMES A DAY AS NEEDED (SKIN RASH ON LOWER LEGS). 45 g 2    CHOLECALCIFEROL PO Take 2 Capsules by mouth every morning. 4000 Vit D       No current facility-administered medications for this visit.          Current supplements as per medication list.     Allergies: Erythromycin, Tikosyn [dofetilide], Latex, and Tape    Current social contact/activities: Spends time with family    She  reports that she quit smoking about 15 years ago. Her smoking use included cigarettes. She started smoking about 55 years ago. She has a 20 pack-year smoking history. She has never used smokeless tobacco. She reports that she does not drink alcohol and does not use drugs.  Counseling given: Not Answered      ROS:    Gait: Uses no assistive device  Ostomy: No  Other tubes: No  Amputations: No  Chronic oxygen use: No  Last eye exam: 08/2024  Wears hearing aids: No   : Denies any urinary leakage during the last 6 months    Screening:    Depression Screening  Little interest or pleasure in doing things?  0 - not at all  Feeling down, depressed , or hopeless? 0 - not at all  Patient Health Questionnaire Score: 0     If depressive symptoms identified deferred to follow up visit unless specifically addressed in assessment and plan.    Interpretation of PHQ-9 Total Score   Score Severity   1-4 No Depression   5-9 Mild Depression   10-14 Moderate Depression   15-19 Moderately Severe Depression   20-27 Severe Depression    Screening for Cognitive Impairment  Do you or any of  your friends or family members have any concern about your memory? No  Three Minute Recall (Village, Kitchen, Baby) 3/3    Steve clock face with all 12 numbers and set the hands to show 10 minutes past 11.  Yes    Cognitive concerns identified deferred for follow up unless specifically addressed in assessment and plan.    Fall Risk Assessment  Has the patient had two or more falls in the last year or any fall with injury in the last year?  No    Safety Assessment  Do you always wear your seatbelt?  Yes  Any changes to home needed to function safely? No  Difficulty hearing.  Yes  Patient counseled about all safety risks that were identified.    Functional Assessment ADLs  Are there any barriers preventing you from cooking for yourself or meeting nutritional needs?  No.    Are there any barriers preventing you from driving safely or obtaining transportation?  No.    Are there any barriers preventing you from using a telephone or calling for help?  No    Are there any barriers preventing you from shopping?  No.    Are there any barriers preventing you from taking care of your own finances?  No    Are there any barriers preventing you from managing your medications?  No    Are there any barriers preventing you from showering, bathing or dressing yourself? No    Are there any barriers preventing you from doing housework or laundry? No  Are there any barriers preventing you from using the toilet?No  Are you currently engaging in any exercise or physical activity?  Yes. walking    Self-Assessment of Health  What is your perception of your health? Good    Do you sleep more than six hours a night? Yes    In the past 7 days, how much did pain keep you from doing your normal work? Some    Do you spend quality time with family or friends (virtually or in person)? Yes    Do you usually eat a heart healthy diet that constists of a variety of fruits, vegetables, whole grains and fiber? Yes    Do you eat foods high in fat and/or  Fast Food more than three times per week? No    How concerned are you that your medical conditions are not being well managed? Not at all    Are you worried that in the next 2 months, you may not have stable housing that you own, rent, or stay in as part of a household? No      Advance Care Planning  Do you have an Advance Directive, Living Will, Durable Power of , or POLST? No             POLST form given to review    Health Maintenance Summary            Current Care Gaps       Mammogram (Yearly) Overdue since 11/29/2024 07/31/2024  Order placed for MA-SCREENING MAMMO BILAT W/TOMOSYNTHESIS W/CAD by Eliceo Guzman M.D.    11/29/2023  MA-SCREENING MAMMO BILAT W/TOMOSYNTHESIS W/CAD    09/06/2022  MA-SCREENING MAMMO BILAT W/TOMOSYNTHESIS W/CAD    02/01/2021  MA-SCREENING MAMMO BILAT W/TOMOSYNTHESIS W/CAD    09/11/2018  MA-SCREEN MAMMO W/CAD-BILAT     Only the first 5 history entries have been loaded, but more history exists.                    Needs Review       Hepatitis C Screening  Tentatively Complete      09/17/2019  Hepatitis C Antibody component of HEP C VIRUS ANTIBODY                      Upcoming       COVID-19 Vaccine (5 - 2024-25 season) Next due on 8/17/2025 02/17/2025  Imm Admin: Covid-19 Mrna (Spikevax) Moderna 12+ Years    12/11/2022  Imm Admin: MODERNA BIVALENT BOOSTER SARS-COV-2 VACCINE (6+)    11/13/2021  Imm Admin: MODERNA SARS-COV-2 VACCINE (12+)    04/13/2021  Imm Admin: MODERNA SARS-COV-2 VACCINE (12+)    03/16/2021  Imm Admin: MODERNA SARS-COV-2 VACCINE (12+)      Only the first 5 history entries have been loaded, but more history exists.              Colorectal Cancer Screening (Colonoscopy - Every 3 Years) Next due on 1/24/2026 01/24/2023  AMB EXTERNAL COLONOSCOPY RESULTS    01/17/2023  AMB EXTERNAL COLONOSCOPY RESULTS    12/08/2011  Colonoscopy (Previously completed - [facility], diverticulosis, repeat in 10 years)              Annual Wellness Visit (Yearly) Next due on  4/24/2026 04/24/2025  Level of Service: [place] ANNUAL WELLNESS VISIT-INCLUDES PPPS SUBSEQUE*    04/24/2025  Visit Dx: Medicare annual wellness visit, subsequent    11/14/2023  Level of Service: [place] INITIAL ANNUAL WELLNESS VISIT-INCLUDES PPPS    11/14/2023  Visit Dx: Medicare annual wellness visit, initial              Bone Density Scan (Every 5 Years) Next due on 9/6/2027 07/31/2024  Order placed for DS-BONE DENSITY STUDY (DEXA) by Eliceo Guzman M.D.    09/06/2022  DS-BONE DENSITY STUDY (DEXA)              IMM DTaP/Tdap/Td Vaccine (2 - Td or Tdap) Next due on 1/17/2032 01/17/2022  Imm Admin: Tdap Vaccine                      Completed or No Longer Recommended       Hepatitis B Vaccine (Hep B) (Series Information) Completed      03/02/2023  Imm Admin: Hepatitis B Vaccine (Adol/Adult)    11/28/2022  Imm Admin: Hepatitis B Vaccine (Adol/Adult)    08/17/2022  Imm Admin: Hepatitis B Vaccine (Adol/Adult)              Influenza Vaccine (Series Information) Completed      10/03/2024  Imm Admin: Influenza high-dose trivalent (PF)    09/28/2023  Imm Admin: Influenza Vaccine, Quadrivalent, Adjuvanted (Pf)    10/31/2022  Imm Admin: Influenza Vaccine Adult HD    12/12/2021  Imm Admin: Influenza Vaccine Adult HD    08/31/2020  Imm Admin: Influenza Vaccine Adult HD      Only the first 5 history entries have been loaded, but more history exists.              Zoster (Shingles) Vaccines (Series Information) Completed      08/11/2023  Imm Admin: Zoster Vaccine Recombinant (RZV) (SHINGRIX)    06/02/2023  Imm Admin: Zoster Vaccine Recombinant (RZV) (SHINGRIX)              Pneumococcal Vaccine: 50+ Years (Series Information) Completed      01/17/2022  Imm Admin: Pneumococcal polysaccharide vaccine (PPSV-23)    11/21/2016  Imm Admin: Pneumococcal polysaccharide vaccine (PPSV-23)    10/16/2015  Imm Admin: Pneumococcal Conjugate Vaccine (Prevnar/PCV-13)              Hepatitis A Vaccine (Hep A) (Series Information) Aged Out       No completion history exists for this topic.              HPV Vaccines (Series Information) Aged Out     No completion history exists for this topic.              Polio Vaccine (Inactivated Polio) (Series Information) Aged Out     No completion history exists for this topic.              Meningococcal Immunization (Series Information) Aged Out     No completion history exists for this topic.              Lung Cancer Screening  Discontinued        Frequency changed to Never automatically (Topic No Longer Applies)    11/29/2023  CT-CHEST (THORAX) W/O    07/18/2022  CT-CHEST (THORAX) W/O    01/27/2016  CT-CTA CHEST PULMONARY ARTERY W/ RECONS              Lung Cancer Screening Shared Decision Making  Discontinued        Frequency changed to Never automatically (Topic No Longer Applies)    07/31/2024  [employer]: FINDINGS - TESTS - IMAGING - CT SCAN - LOW DOSE CT LUNG SCREENING - LOW-DOSE CT SHARED DECISION MAKING OUTCOME - PATIENT ELECTS SCREENING                            Patient Care Team:  Eliceo Guzman M.D. as PCP - General (Family Medicine)        Social History     Tobacco Use    Smoking status: Former     Current packs/day: 0.00     Average packs/day: 0.5 packs/day for 40.0 years (20.0 ttl pk-yrs)     Types: Cigarettes     Start date: 3/1/1970     Quit date: 3/1/2010     Years since quitting: 15.1    Smokeless tobacco: Never   Vaping Use    Vaping status: Never Used   Substance Use Topics    Alcohol use: No    Drug use: Never     Family History   Problem Relation Age of Onset    Heart Disease Mother         1st MI @ 61 yo    Hypertension Mother     Heart Attack Mother 60    Heart Disease Maternal Aunt     Breast Cancer Maternal Aunt     Cancer Daughter 35        breast    Breast Cancer Daughter     Heart Disease Maternal Grandmother     Heart Disease Maternal Grandfather     Heart Disease Maternal Aunt 56    Ovarian Cancer Other      She  has a past medical history of Aortic calcification (HCC) ( ),  "Arrhythmia, Arthritis (), Asthma in adult, Breath shortness (), Bronchitis (), Chickenpox, Chronic anticoagulation, Chronic back pain ( ), CKD (chronic kidney disease) stage 3, GFR 30-59 ml/min (2020), Congestive heart failure (HCC) (), Dental disorder, Diverticulosis ( ), Hearing difficulty, Heart murmur (), High cholesterol, Hypertension, Inflamed seborrheic keratosis ( ), Intrinsic eczema (2020), Mumps, Non-rheumatic mitral regurgitation (2019), Osteoarthritis of both hips ( ), Palpitations, Plantar fascia syndrome ( ), Plantar wart of right foot ( ), Pneumonia (), Pruritic dermatitis ( ), Pulmonary hypertension (HCC), Pure hypercholesterolemia ( ), Ringing in ears, S/P cardiac catheterization (2017), Shortness of breath, Spider veins of limb ( ), Swelling of lower extremity, Unspecified vitamin D deficiency ( ), and Wears glasses.    She has no past medical history of Diabetic neuropathy ([facility]).   Past Surgical History:   Procedure Laterality Date    LUMBAR FUSION O-ARM N/A 2021    Procedure: FUSION, SPINE, LUMBAR, WITH O-ARM IMAGING GUIDANCE - STAGE #2 L3-5 UNILATERAL, LEFT-SIDED.laminotomy lumbar 3-5;  Surgeon: Sudhir Clay M.D.;  Location: [facility];  Service: Neurosurgery    FUSION, SPINE, XLIF Right 2021    Procedure: FUSION, SPINE, XLIF - STAGE #1 L3-5, RIGHT-SIDED APPROACH.;  Surgeon: Sudhir Clay M.D.;  Location: [facility];  Service: Neurosurgery    OTHER CARDIAC SURGERY  2019    cardiac ablation x 2 , for Afibb    LUMBAR LAMINECTOMY DISKECTOMY  2009    Performed by SUDHIR CLAY at [facility] ORS    HYSTERECTOMY LAPAROSCOPY      GYN SURGERY       x2    LAMINOTOMY      PRIMARY C SECTION         Exam:   /44 (BP Location: Left arm, Patient Position: Sitting, BP Cuff Size: Adult)   Pulse (!) 109   Temp 36.3 °C (97.3 °F) (Temporal)   Ht 1.689 m (5' 6.5\")   Wt 69.5 kg (153 lb 3.5 oz)   SpO2 " 98%  Body mass index is 24.36 kg/m².    Hearing poor.    Dentition good  Alert, oriented in no acute distress.  Eye contact is good, speech goal directed, affect calm    Assessment and Plan. The following treatment and monitoring plan is recommended:      1. Medicare annual wellness visit, subsequent    2. Stage 3a chronic kidney disease  - Basic Metabolic Panel; Future    3. Prediabetes  - HEMOGLOBIN A1C; Future    4. Back pain of lumbar region with sciatica  - cyclobenzaprine (FLEXERIL) 10 MG Tab; Take 1 Tablet by mouth 3 times a day as needed for Mild Pain or Moderate Pain.  Dispense: 90 Tablet; Refill: 3    5. Mild intermittent asthma in adult without complication    6. Diverticulosis    7. Osteopenia of neck of left femur    8. Other specified hearing loss of both ears  - Referral to Audiology    9. Screening for skin cancer  - Referral to Dermatology    10. Aortic calcification (HCC)    11. DDD (degenerative disc disease), cervical    12. Degeneration of intervertebral disc of lumbar region with discogenic back pain    13. Essential hypertension, benign    14. Family history of breast cancer    15. Family history of ovarian cancer    16. Hypercholesterolemia    17. Intrinsic eczema    18. Macrocytosis without anemia    19. Non-rheumatic mitral regurgitation    20. Nonrheumatic tricuspid valve regurgitation    21. Primary osteoarthritis of both hips    22. Pulmonary hypertension (HCC)    23. S/P ablation of atrial fibrillation    24. S/P cardiac catheterization    25. Vitamin D deficiency      Services suggested: No services needed at this time  Health Care Screening: Age-appropriate preventive services recommended by USPTF and ACIP covered by Medicare were discussed today. Services ordered if indicated and agreed upon by the patient.  Referrals offered: Community-based lifestyle interventions to reduce health risks and promote self-management and wellness, fall prevention, nutrition, physical activity,  tobacco-use cessation, weight loss, and mental health services as per orders if indicated.    Discussion today about general wellness and lifestyle habits:    Prevent falls and reduce trip hazards; Cautioned about securing or removing rugs.  Have a working fire alarm and carbon monoxide detector;   Engage in regular physical activity and social activities   Problem   Hearing Loss   Back Pain of Lumbar Region With Sciatica    On Flexeril as needed, request a refill     Nonrheumatic Tricuspid Valve Regurgitation    Echo 03/2025 showed  Compared to the prior study on 7/2/2024: Similar findings.    Consider LEEANNA for better assessment of MR severity as clinically   warranted  Normal LV size, thickness and systolic function with normal LVEF 60%  Indeterminate LV diastolic function with pseudo relaxation filling   pattern  Normal RV size and systolic function  Moderate TR  At least moderate MR  No pericardial effusion  Dilated IVC  Pulmonary hypertension with estimated RVSP 55-60 mmHg    Following with cardiology.     Macrocytosis Without Anemia    Recent CBC showed improvement in blood counts.  Check labs in 1 year     Osteopenia of Neck of Left Femur    Bone scan 09/22 showed According to the World Health Organization classification, bone mineral density of this patient is osteopenic within the left forearm and left femur.     Bone scan was ordered at previous visit, recommended to do bone scan.     Prediabetes    A1c increased to 6.0.    Lab Results   Component Value Date/Time    HBA1C 6.0 (H) 04/22/2025 10:18 AM         Stage 3a Chronic Kidney Disease    Filtration rate improved to 56%    Lab Results   Component Value Date/Time    CREATININE 1.07 04/22/2025 10:18 AM          Intrinsic Eczema    Reports that she has eczema rash on lower extremities, uses clobetasol ointment.     S/P Ablation of Atrial Fibrillation    Times two last 12/2019.  She was on Eliquis 5 mg twice daily and on amiodarone.  Currently following with  cardiology.  Had ablation done     Hypercholesterolemia    Last lipid panel came back in normal range..  She is currently on Lipitor 10 mg daily.  No side effects with medication.    Lab Results   Component Value Date/Time    CHOLSTRLTOT 160 04/22/2025 1018    TRIGLYCERIDE 81 04/22/2025 1018    HDL 66 04/22/2025 1018    LDL 78 04/22/2025 1018         Non-Rheumatic Mitral Regurgitation    May 2019: Echocardiogram with normal LV size, LVEF 65%. Moderate MR, mild-moderate TR.  May 2017, MR moderately severe, PAH worse but cath showed normal pressures and no significant MR.  January 2016: Echo normal LV size, mild concentric LVH, LVEF 65%. Grade I diastolic dysfunction. Trace MR, trace TR, RVSP 40mmHg.  May 2015: Echo normal LV size, mild concentric LVH, LVEF 60%. Mild RVH, RVSP 55mmHg. Mild MR, trace TR.    Echo 10/23 showed  LEEANNA for afib ablation. Normal biventricular systolic function, LVEF   65%.  Severe TR. Biatrial enlargement. Patient in afib/flutter during   the exam with HR 130s. Stable trivial pericardial effusion. Compared to   prior echo, TR is now severe.    Echo 03/25 showed  Compared to the prior study on 7/2/2024: Similar findings.    Consider LEEANNA for better assessment of MR severity as clinically   warranted  Normal LV size, thickness and systolic function with normal LVEF 60%  Indeterminate LV diastolic function with pseudo relaxation filling   pattern  Normal RV size and systolic function  Moderate TR  At least moderate MR  No pericardial effusion  Dilated IVC  Pulmonary hypertension with estimated RVSP 55-60 mmHg  Currently following with cardiology.     Essential Hypertension, Benign      She is taking losartan 50 mg once daily.  No side effects with medication.  Takes torsemide with potassium as needed.     Degenerative Disc Disease, Lumbar    Has history of chronic low back pain, had surgery before.  On Flexeril as needed.     Family history of ovarian cancer    Has family history of ovarian  cancer.     Family history of breast cancer    Family history of breast cancer in her daughter at age 35, maternal aunt  of breast cancer in her 60s.  One of her maternal aunt was diagnosed with ovarian cancer.\   Denies any personal history of cancer.  Her last mammogram normal, ordered new mammogram.  [facility] [place].        Pulmonary Hypertension (Hcc)    Has history of documented pulmonary hypertension diagnosed in .  Last echo on 2019 showed Limited echo to evaluate for pericardial effusion s/p drain removal.   Prior echo on 19. Compared to the report of the study done - there   has been no significant change.   Normal left ventricular size and systolic function.  Normal pericardium without effusion.  Pleural effusion noted.  She has history of atrial fibrillation, Following with cardiology.     Aortic Calcification (Hcc)    Noted incidentally on abdominal CT scan.  Currently she is on atorvastatin 10 mg once daily.    Lab Results   Component Value Date/Time    CHOLSTRLTOT 160 2025 1018    TRIGLYCERIDE 81 2025 1018    HDL 66 2025 1018    LDL 78 2025 1018            Diverticulosis    Last CT showed diverticulosis.  Avoid constipation.     Osteoarthritis of Both Hips    Doing well without any medications currently.     Asthma in Adult Without Complication    Has a history of asthma, currently on albuterol inhaler as needed.  No symptoms.     Ddd (Degenerative Disc Disease), Cervical    Doing well currently without any medications.     Vitamin D Deficiency    Recent vitamin D level came back at 62..  She is taking vitamin D supplementation daily.             Review of Systems   Constitutional:  Negative for chills and fever.   HENT:  Positive for hearing loss.    Cardiovascular:  Negative for chest pain, palpitations and leg swelling.          Physical Exam  Constitutional:        Appearance: Normal appearance. She is well-developed and well-groomed.   HENT:      Head: Normocephalic and atraumatic.   Eyes:      General:         Right eye: No discharge.         Left eye: No discharge.      Conjunctiva/sclera: Conjunctivae normal.   Cardiovascular:      Rate and Rhythm: Normal rate and regular rhythm.      Heart sounds: Normal heart sounds. No murmur heard.     No friction rub. No gallop.   Pulmonary:      Effort: Pulmonary effort is normal. No respiratory distress.      Breath sounds: Normal breath sounds. No wheezing or rales.   Neurological:      General: No focal deficit present.      Mental Status: She is alert. Mental status is at baseline.      Gait: Gait is intact.   Psychiatric:         Mood and Affect: Mood and affect normal.         Behavior: Behavior normal.              I reviewed with patient recent labs resulted on 4/22/2025        Problem List Items Addressed This Visit               Back pain of lumbar region with sciatica    Chronic condition, stable, continue Flexeril as needed.  Discussed with patient about side effects of this medication.  Do not drive after taking this medication.         Relevant Medications    cyclobenzaprine (FLEXERIL) 10 MG Tab    Hearing loss  New condition, unstable, referral to audiology placed.    Relevant Orders    Referral to Audiology    Prediabetes    Chronic condition, unstable, counseled to avoid sugar and carbs in diet.  Recheck labs in 4 months.         Relevant Orders    HEMOGLOBIN A1C                Stage 3a chronic kidney disease    Chronic condition, improving, recheck labs in 4-month         Relevant Orders    Basic Metabolic Panel       Follow-up in 4 months for lab follow-up.

## 2025-04-24 NOTE — ASSESSMENT & PLAN NOTE
Chronic condition, stable, continue Flexeril as needed.  Discussed with patient about side effects of this medication.  Do not drive after taking this medication.

## 2025-04-24 NOTE — ASSESSMENT & PLAN NOTE
Chronic condition, unstable, counseled to avoid sugar and carbs in diet.  Recheck labs in 4 months.

## 2025-04-30 ENCOUNTER — OFFICE VISIT (OUTPATIENT)
Dept: CARDIOLOGY | Facility: MEDICAL CENTER | Age: 70
End: 2025-04-30
Attending: INTERNAL MEDICINE
Payer: MEDICARE

## 2025-04-30 VITALS
HEART RATE: 74 BPM | DIASTOLIC BLOOD PRESSURE: 64 MMHG | WEIGHT: 156 LBS | OXYGEN SATURATION: 97 % | BODY MASS INDEX: 24.48 KG/M2 | HEIGHT: 67 IN | SYSTOLIC BLOOD PRESSURE: 108 MMHG

## 2025-04-30 DIAGNOSIS — Z98.890 S/P ABLATION OF ATRIAL FIBRILLATION: ICD-10-CM

## 2025-04-30 DIAGNOSIS — Z86.79 S/P ABLATION OF ATRIAL FIBRILLATION: ICD-10-CM

## 2025-04-30 DIAGNOSIS — I27.20 PULMONARY HYPERTENSION (HCC): ICD-10-CM

## 2025-04-30 DIAGNOSIS — I10 ESSENTIAL HYPERTENSION, BENIGN: ICD-10-CM

## 2025-04-30 DIAGNOSIS — I34.0 NONRHEUMATIC MITRAL VALVE REGURGITATION: ICD-10-CM

## 2025-04-30 PROCEDURE — 99214 OFFICE O/P EST MOD 30 MIN: CPT | Performed by: INTERNAL MEDICINE

## 2025-04-30 ASSESSMENT — FIBROSIS 4 INDEX: FIB4 SCORE: 2.95

## 2025-04-30 NOTE — PROGRESS NOTES
Interventional cardiology follow-up consultation Note      Chief Complaint: Dyspnea on exertion, congestive heart failure, mitral regurgitation    Chinyere Priest is a 69 y.o. female  patient presented today consultation regarding valvular heart disease, dyspnea on exertion, congestive heart failure.  She has history of atrial fibrillation ablation, hypertension, mitral regurgitation, she has been struggling with congestive heart failure, dyspnea on exertion, lower extremity edema.    Since last evaluated by me her symptoms have gotten worse, worsening shortness of breath.        Past Medical History:   Diagnosis Date    Aortic calcification (HCC)      Noted incidentally on abdominal CT scan.    Arrhythmia     hx of afibb, ablation x 2     Arthritis 2010    Asthma in adult     patient states it is only occasionally    Breath shortness 2020    Bronchitis 2019    Chickenpox     Chronic anticoagulation     Chronic back pain      CKD (chronic kidney disease) stage 3, GFR 30-59 ml/min 09/14/2020    Congestive heart failure (HCC) 2024    Dental disorder     Full upper/lower dentures.    Diverticulosis      Hearing difficulty     Heart murmur 2019    High cholesterol     patient denies    Hypertension     Inflamed seborrheic keratosis      Intrinsic eczema 01/20/2020    Mumps     Non-rheumatic mitral regurgitation 05/2019    Echocardiogram with normal LV size, LVEF 65%. Moderate MR, mild-moderate TR, unchanged from previous.    Osteoarthritis of both hips      Palpitations     Plantar fascia syndrome      Plantar wart of right foot      Pneumonia 2019    Pruritic dermatitis      Ongoing for several months but worse at night on her lower extremities    Pulmonary hypertension (HCC)     Pure hypercholesterolemia      Ringing in ears     S/P cardiac catheterization 05/26/2017    1.  Normal right heart pressures. 3.  Essentially normal coronary arteries. 2.  Left ventricular ejection fraction 65%.     Shortness of  "breath     Spider veins of limb      Swelling of lower extremity     Unspecified vitamin D deficiency      Wears glasses              Current Outpatient Medications   Medication Sig Dispense Refill    cyclobenzaprine (FLEXERIL) 10 MG Tab Take 1 Tablet by mouth 3 times a day as needed for Mild Pain or Moderate Pain. 90 Tablet 3    atorvastatin (LIPITOR) 10 MG Tab TAKE 1 TABLET BY MOUTH EVERY DAY 90 Tablet 1    albuterol 108 (90 Base) MCG/ACT Aero Soln inhalation aerosol Inhale 2 Puffs every 6 hours as needed for Shortness of Breath.      albuterol 108 (90 Base) MCG/ACT Aero Soln inhalation aerosol Inhale 2 Puffs every 6 hours as needed for Shortness of Breath. 8.5 g 0    losartan (COZAAR) 50 MG Tab TAKE 1 TABLET BY MOUTH EVERY DAY 90 Tablet 3    lidocaine (LIDODERM) 5 % Patch Place 1-3 Patches on the skin every 12 hours as needed (rib pain). 90 Patch 3    potassium citrate SR (UROCIT-K SR) 10 MEQ (1080 MG) Tab CR Take 1 Tablet by mouth every day. 90 Tablet 3    torsemide (DEMADEX) 10 MG tablet Take 1 Tablet by mouth every day. 100 Tablet 3    cyanocobalamin (VITAMIN B-12) 100 MCG Tab Take 100 mcg by mouth every morning.      ELIQUIS 5 MG Tab TAKE 1 TABLET BY MOUTH TWICE A  Tablet 3    Multiple Vitamin (MULTIVITAMIN ADULT PO) Take 1 Tablet by mouth every evening.      clobetasol (TEMOVATE) 0.05 % Ointment APPLY 1 APPLICATION TOPICALLY 2 TIMES A DAY AS NEEDED (SKIN RASH ON LOWER LEGS). 45 g 2    CHOLECALCIFEROL PO Take 2 Capsules by mouth every morning. 4000 Vit D       No current facility-administered medications for this visit.             Physical Exam:  Ambulatory Vitals  /64 (BP Location: Left arm, Patient Position: Sitting, BP Cuff Size: Adult)   Pulse 74   Ht 1.689 m (5' 6.5\")   Wt 70.8 kg (156 lb)   SpO2 97%    Oxygen Therapy:  Pulse Oximetry: 97 %  BP Readings from Last 4 Encounters:   04/30/25 108/64   04/24/25 102/44   03/26/25 128/54   03/26/25 130/58       Weight/BMI: Body mass index is " 24.8 kg/m².  Wt Readings from Last 4 Encounters:   04/30/25 70.8 kg (156 lb)   04/24/25 69.5 kg (153 lb 3.5 oz)   03/26/25 70 kg (154 lb 5.2 oz)   03/26/25 69.9 kg (154 lb)           General: Well appearing and in no apparent distress  Neck: carotid bruits absent  Lungs: respiratory sounds  normal  Heart: Regular rhythm,  No palpable thrills on palpation, murmurs present, no rubs,   Lower extremity edema trace      Echocardiogram 6/24/2024  CONCLUSIONS  Compared to the prior study on 12/12/2019. Noted pulmonary hypertension   now.  Normal left ventricular chamber size.  Mild concentric left ventricular hypertrophy.  The ejection fraction is measured to be 64 % by Craig's biplane.  Enlarged right atrium.  Moderately dilated left atrium.  Moderate mitral regurgitation.  Moderate tricuspid regurgitation.  Right ventricular systolic pressure is estimated to be  60 mmHg.  Moderate pulmonic insufficiency.       Medical Decision Making:  Problem List Items Addressed This Visit       Pulmonary hypertension (HCC)    Essential hypertension, benign    S/P ablation of atrial fibrillation     Other Visit Diagnoses         Nonrheumatic mitral valve regurgitation        Relevant Orders    EC-ECHOCARDIOGRAM REST/STRESS W/O CONT(DOES NOT INCLUDE A FULL RESTING ECHO)    proBrain Natriuretic Peptide, NT          Transesophageal echocardiogram showed overall moderate to severe mitral regurgitation but repeat echocardiogram reviewed, independently interpreted, mitral regurgitation is 3+.  Clinically her symptoms have been progressively worse.  Recommend BMP, exercise treadmill echocardiogram to evaluate mitral regurgitation.  If mitral regurgitation is severe, she will benefit from intervention, it is predominantly functional due to left atrial dilatation, she will likely needs MitraClip.       This note was dictated using Dragon speech recognition software.    Carlos SHARPE  Interventional cardiologist  [facility]  [facility], [address]  [address] [place] [address]  Phone: [phone]  Fax: [phone]

## 2025-05-01 NOTE — Clinical Note
REFERRAL APPROVAL NOTICE         Sent on May 1, 2025                   Chinyere Priest  [address] [facility] [address]                   Dear Ms. Priest,    After a careful review of the medical information and benefit coverage, [facility] has processed your referral. See below for additional details.    If applicable, you must be actively enrolled with your insurance for coverage of the authorized service. If you have any questions regarding your coverage, please contact your insurance directly.    REFERRAL INFORMATION   Referral #:  [id number]  Referred-To Department    Referred-By Provider:  Audiology    Eliceo Guzman M.D.   [facility]      [address]  [address]  [facility] [address] [phone] [address] [id number] [phone]    Referral Start Date:  04/24/2025  Referral End Date:   04/24/2026             SCHEDULING  If you do not already have an appointment, please call [phone] to make an appointment.     MORE INFORMATION  If you do not already have a [employer] account, sign up at: [email or web address]  You can access your medical information, make appointments, see lab results, billing information, and more.  If you have questions regarding this referral, please contact  the [facility] Referrals department at:             [phone]. Monday - Friday 8:00AM - 5:00PM.     Sincerely,    [facility]

## 2025-05-01 NOTE — Clinical Note
REFERRAL APPROVAL NOTICE         Sent on May 1, 2025                   Chinyere Priest  [address] [place] [address]                   Dear Ms. Priest,    After a careful review of the medical information and benefit coverage, [facility] has processed your referral. See below for additional details.    If applicable, you must be actively enrolled with your insurance for coverage of the authorized service. If you have any questions regarding your coverage, please contact your insurance directly.    REFERRAL INFORMATION   Referral #:  [id number]  Referred-To Department    Referred-By Provider:  Dermatology    Eliceo Guzman M.D.   [facility]      [address]  [address]  [place] [address]  [phone] [address]  [employer] [place] [id number] [phone]    Referral Start Date:  04/24/2025  Referral End Date:   04/24/2026             SCHEDULING  If you do not already have an appointment, please call [phone] to make an appointment.     MORE INFORMATION  If you do not already have a [employer] account, sign up at: [email or web address]  You can access your medical information, make appointments, see lab results, billing information, and more.  If you have questions regarding this referral, please contact  the [facility] Referrals department at:             [phone]. Monday - Friday 8:00AM - 5:00PM.     Sincerely,    [facility]

## 2025-05-28 ENCOUNTER — PATIENT MESSAGE (OUTPATIENT)
Dept: SLEEP MEDICINE | Facility: MEDICAL CENTER | Age: 70
End: 2025-05-28
Payer: MEDICARE

## 2025-05-30 ENCOUNTER — SLEEP STUDY (OUTPATIENT)
Dept: SLEEP MEDICINE | Facility: MEDICAL CENTER | Age: 70
End: 2025-05-30
Attending: STUDENT IN AN ORGANIZED HEALTH CARE EDUCATION/TRAINING PROGRAM
Payer: MEDICARE

## 2025-05-30 DIAGNOSIS — F51.04 PSYCHOPHYSIOLOGICAL INSOMNIA: Primary | ICD-10-CM

## 2025-05-30 DIAGNOSIS — Z91.89 AT RISK FOR SLEEP APNEA: ICD-10-CM

## 2025-05-30 DIAGNOSIS — G47.33 OSA (OBSTRUCTIVE SLEEP APNEA): ICD-10-CM

## 2025-05-30 PROCEDURE — 95810 POLYSOM 6/> YRS 4/> PARAM: CPT | Mod: 26 | Performed by: STUDENT IN AN ORGANIZED HEALTH CARE EDUCATION/TRAINING PROGRAM

## 2025-06-02 NOTE — PROCEDURES
Patient: DEANA PATTERSON  ID: [id number] Date: 5/30/2025 Exam No.:   MONTAGE: Standard  STUDY TYPE: Diagnostic  RECORDING TECHNIQUE:   After the scalp was prepared, gold plated electrodes were applied to the scalp according to the International 10-20 System. EEG (electroencephalogram) was continuously monitored from the O1-M2, O2-M1, C3-M2, C4-M1, F3-M2, and F4-M1. EOGs (electrooculograms) were monitored by electrodes placed at the left and right outer canthi. Chin EMG (electromyogram) was monitored by electrodes placed on the mentalis and sub-mentalis muscles. Nasal and oral airflow were monitored using a triple port thermocouple as well as oronasal pressure transducer. Respiratory effort was measured by inductive plethysmography technology employing abdominal and thoracic belts. Blood oxygen saturation and pulse were monitored by pulse oximetry. Heart rhythm was monitored by surface electrocardiogram. Leg EMG was studied using surface electrodes placed on left and right anterior tibialis. A microphone was used to monitor tracheal sounds and snoring. Body position was monitored and documented by technician observation.   SCORING CRITERIA:   A modification of the AASM manual for scoring of sleep and associated events was used. Obstructive apneas were scored by cessation of airflow for at least 10 seconds with continuing respiratory effort. Central apneas were scored by cessation of airflow for at least 10 seconds with no respiratory effort. Hypopneas were scored by a 30% or more reduction in airflow for at least 10 seconds accompanied by arterial oxygen desaturation of 3% or an arousal. For CMS (Medicare) patients, per AASM rule 1B, hypopneas are scored by 30% with mild reduction in airflow for at least 10 seconds accompanied by arterial saturation decreased at 4%.  Study start time was 10:22:10 PM. Diagnostic recording time was 7h 34.0m with a total sleep time of 1h 19.5m resulting in a sleep efficiency of  17.51%%. Sleep latency from the start of the study was 286 minutes and the latency from sleep to REM was 00 minutes. In total,19 arousals were scored for an arousal index of 14.3.  Respiratory:  There were a total of 0 apneas consisting of 0 obstructive apneas, 0 mixed apneas, and 0 central apneas. A total of 0 hypopneas were scored. The apnea index was 0.00 per hour and the hypopnea index was 0.00 per hour resulting in an overall AHI of 0.00. AHI during REM was 0.0 and AHI while supine was 0.00.  Oximetry:  There was a mean oxygen saturation of 95.0%. The minimum oxygen saturation in NREM was 92.0 % and in REM was --%. The patient spent 0.0 minutes of TST with SaO2 <88%.  Cardiac:  The highest heart rate seen while awake was 92 BPM while the highest heart rate during sleep was 70 BPM with an average sleeping heart rate of 56 BPM.  Limb Movements:  There were a total of 0 PLMs during sleep which resulted in a PLMS index of 0.0. Of these, 0 were associated with arousals which resulted in a PLMS arousal index of 0.0.  Assessment:   Did not meet criteria for obstructive Sleep Apnea Hypopnea - AHI 0 without nocturnal desaturation - shakir saturation 92% - saturations <88% below for 0 minutes of TST.  Impression:  Very prolonged sleep onset latency with long wake time after sleep onset resulting in very reduced sleep efficiency (17.5%), and only TST  1h 19.5m  Unfortunately due to limited TST, not enough data to properly assess for sleep disordered breathing  REM was not seen  Supine sleep not seen  Recommendation:   If patient amenable, consider repeat diagnostic PSG, as pt probably had first night effect.   Consider strategies for insomnia including CBT-I  Patient tried on multiple masks and liked small DreamWear mask

## 2025-06-11 ENCOUNTER — APPOINTMENT (OUTPATIENT)
Dept: RADIOLOGY | Facility: MEDICAL CENTER | Age: 70
DRG: 291 | End: 2025-06-11
Attending: EMERGENCY MEDICINE
Payer: MEDICARE

## 2025-06-11 ENCOUNTER — HOSPITAL ENCOUNTER (OUTPATIENT)
Facility: MEDICAL CENTER | Age: 70
End: 2025-06-11
Attending: INTERNAL MEDICINE
Payer: MEDICARE

## 2025-06-11 ENCOUNTER — TELEPHONE (OUTPATIENT)
Dept: CARDIOLOGY | Facility: MEDICAL CENTER | Age: 70
End: 2025-06-11
Payer: MEDICARE

## 2025-06-11 ENCOUNTER — RESULTS FOLLOW-UP (OUTPATIENT)
Dept: CARDIOLOGY | Facility: MEDICAL CENTER | Age: 70
End: 2025-06-11

## 2025-06-11 ENCOUNTER — HOSPITAL ENCOUNTER (INPATIENT)
Facility: MEDICAL CENTER | Age: 70
LOS: 2 days | DRG: 291 | End: 2025-06-13
Attending: EMERGENCY MEDICINE | Admitting: STUDENT IN AN ORGANIZED HEALTH CARE EDUCATION/TRAINING PROGRAM
Payer: MEDICARE

## 2025-06-11 DIAGNOSIS — I34.0 NONRHEUMATIC MITRAL VALVE REGURGITATION: ICD-10-CM

## 2025-06-11 DIAGNOSIS — R42 DIZZINESS: Primary | ICD-10-CM

## 2025-06-11 DIAGNOSIS — I50.33 ACUTE ON CHRONIC DIASTOLIC HEART FAILURE DUE TO VALVULAR DISEASE (HCC): ICD-10-CM

## 2025-06-11 DIAGNOSIS — Z79.899 ON POTASSIUM WASTING DIURETIC THERAPY: ICD-10-CM

## 2025-06-11 DIAGNOSIS — I38 ACUTE ON CHRONIC DIASTOLIC HEART FAILURE DUE TO VALVULAR DISEASE (HCC): ICD-10-CM

## 2025-06-11 PROBLEM — I50.32 CHRONIC HEART FAILURE WITH PRESERVED EJECTION FRACTION (HCC): Status: ACTIVE | Noted: 2025-06-11

## 2025-06-11 PROBLEM — R55 PRE-SYNCOPE: Status: ACTIVE | Noted: 2025-06-11

## 2025-06-11 PROBLEM — E78.5 DYSLIPIDEMIA: Status: ACTIVE | Noted: 2025-06-11

## 2025-06-11 LAB
ALBUMIN SERPL BCP-MCNC: 4.3 G/DL (ref 3.2–4.9)
ALBUMIN/GLOB SERPL: 1.3 G/DL
ALP SERPL-CCNC: 81 U/L (ref 30–99)
ALT SERPL-CCNC: 17 U/L (ref 2–50)
ANION GAP SERPL CALC-SCNC: 13 MMOL/L (ref 7–16)
APPEARANCE UR: CLEAR
AST SERPL-CCNC: 34 U/L (ref 12–45)
BASOPHILS # BLD AUTO: 0.7 % (ref 0–1.8)
BASOPHILS # BLD: 0.05 K/UL (ref 0–0.12)
BILIRUB SERPL-MCNC: 0.6 MG/DL (ref 0.1–1.5)
BILIRUB UR QL STRIP.AUTO: NEGATIVE
BUN SERPL-MCNC: 20 MG/DL (ref 8–22)
CALCIUM ALBUM COR SERPL-MCNC: 9.4 MG/DL (ref 8.5–10.5)
CALCIUM SERPL-MCNC: 9.6 MG/DL (ref 8.5–10.5)
CHLORIDE SERPL-SCNC: 101 MMOL/L (ref 96–112)
CO2 SERPL-SCNC: 25 MMOL/L (ref 20–33)
COLOR UR: YELLOW
CREAT SERPL-MCNC: 1.15 MG/DL (ref 0.5–1.4)
EKG IMPRESSION: NORMAL
EKG IMPRESSION: NORMAL
EOSINOPHIL # BLD AUTO: 0.31 K/UL (ref 0–0.51)
EOSINOPHIL NFR BLD: 4.3 % (ref 0–6.9)
ERYTHROCYTE [DISTWIDTH] IN BLOOD BY AUTOMATED COUNT: 43.2 FL (ref 35.9–50)
GFR SERPLBLD CREATININE-BSD FMLA CKD-EPI: 51 ML/MIN/1.73 M 2
GLOBULIN SER CALC-MCNC: 3.3 G/DL (ref 1.9–3.5)
GLUCOSE SERPL-MCNC: 98 MG/DL (ref 65–99)
GLUCOSE UR STRIP.AUTO-MCNC: NEGATIVE MG/DL
HCT VFR BLD AUTO: 38.1 % (ref 37–47)
HGB BLD-MCNC: 12.5 G/DL (ref 12–16)
IMM GRANULOCYTES # BLD AUTO: 0.03 K/UL (ref 0–0.11)
IMM GRANULOCYTES NFR BLD AUTO: 0.4 % (ref 0–0.9)
KETONES UR STRIP.AUTO-MCNC: NEGATIVE MG/DL
LEUKOCYTE ESTERASE UR QL STRIP.AUTO: NEGATIVE
LYMPHOCYTES # BLD AUTO: 1.62 K/UL (ref 1–4.8)
LYMPHOCYTES NFR BLD: 22.4 % (ref 22–41)
MAGNESIUM SERPL-MCNC: 2.3 MG/DL (ref 1.5–2.5)
MCH RBC QN AUTO: 31.6 PG (ref 27–33)
MCHC RBC AUTO-ENTMCNC: 32.8 G/DL (ref 32.2–35.5)
MCV RBC AUTO: 96.2 FL (ref 81.4–97.8)
MICRO URNS: NORMAL
MONOCYTES # BLD AUTO: 0.74 K/UL (ref 0–0.85)
MONOCYTES NFR BLD AUTO: 10.2 % (ref 0–13.4)
NEUTROPHILS # BLD AUTO: 4.48 K/UL (ref 1.82–7.42)
NEUTROPHILS NFR BLD: 62 % (ref 44–72)
NITRITE UR QL STRIP.AUTO: NEGATIVE
NRBC # BLD AUTO: 0 K/UL
NRBC BLD-RTO: 0 /100 WBC (ref 0–0.2)
NT-PROBNP SERPL IA-MCNC: 1863 PG/ML (ref 0–125)
NT-PROBNP SERPL IA-MCNC: 1887 PG/ML (ref 0–125)
PH UR STRIP.AUTO: 6.5 [PH] (ref 5–8)
PLATELET # BLD AUTO: 201 K/UL (ref 164–446)
PMV BLD AUTO: 10.8 FL (ref 9–12.9)
POTASSIUM SERPL-SCNC: 3.8 MMOL/L (ref 3.6–5.5)
PROT SERPL-MCNC: 7.6 G/DL (ref 6–8.2)
PROT UR QL STRIP: NEGATIVE MG/DL
RBC # BLD AUTO: 3.96 M/UL (ref 4.2–5.4)
RBC UR QL AUTO: NEGATIVE
SODIUM SERPL-SCNC: 139 MMOL/L (ref 135–145)
SP GR UR STRIP.AUTO: 1.04
TROPONIN T SERPL-MCNC: 20 NG/L (ref 6–19)
TROPONIN T SERPL-MCNC: 24 NG/L (ref 6–19)
TROPONIN T SERPL-MCNC: 28 NG/L (ref 6–19)
UROBILINOGEN UR STRIP.AUTO-MCNC: 1 EU/DL
WBC # BLD AUTO: 7.2 K/UL (ref 4.8–10.8)

## 2025-06-11 PROCEDURE — 99285 EMERGENCY DEPT VISIT HI MDM: CPT

## 2025-06-11 PROCEDURE — 93005 ELECTROCARDIOGRAM TRACING: CPT | Mod: TC | Performed by: EMERGENCY MEDICINE

## 2025-06-11 PROCEDURE — 71045 X-RAY EXAM CHEST 1 VIEW: CPT

## 2025-06-11 PROCEDURE — 81003 URINALYSIS AUTO W/O SCOPE: CPT

## 2025-06-11 PROCEDURE — A9270 NON-COVERED ITEM OR SERVICE: HCPCS

## 2025-06-11 PROCEDURE — 84484 ASSAY OF TROPONIN QUANT: CPT

## 2025-06-11 PROCEDURE — 770020 HCHG ROOM/CARE - TELE (206)

## 2025-06-11 PROCEDURE — 83880 ASSAY OF NATRIURETIC PEPTIDE: CPT | Mod: GA,91

## 2025-06-11 PROCEDURE — 700111 HCHG RX REV CODE 636 W/ 250 OVERRIDE (IP): Mod: JZ

## 2025-06-11 PROCEDURE — 83880 ASSAY OF NATRIURETIC PEPTIDE: CPT

## 2025-06-11 PROCEDURE — 700102 HCHG RX REV CODE 250 W/ 637 OVERRIDE(OP)

## 2025-06-11 PROCEDURE — 83735 ASSAY OF MAGNESIUM: CPT

## 2025-06-11 PROCEDURE — 80053 COMPREHEN METABOLIC PANEL: CPT

## 2025-06-11 PROCEDURE — 85025 COMPLETE CBC W/AUTO DIFF WBC: CPT

## 2025-06-11 PROCEDURE — 70498 CT ANGIOGRAPHY NECK: CPT

## 2025-06-11 PROCEDURE — 99223 1ST HOSP IP/OBS HIGH 75: CPT | Mod: AI,GC | Performed by: STUDENT IN AN ORGANIZED HEALTH CARE EDUCATION/TRAINING PROGRAM

## 2025-06-11 PROCEDURE — 36415 COLL VENOUS BLD VENIPUNCTURE: CPT

## 2025-06-11 PROCEDURE — 70496 CT ANGIOGRAPHY HEAD: CPT

## 2025-06-11 PROCEDURE — 93005 ELECTROCARDIOGRAM TRACING: CPT | Mod: TC

## 2025-06-11 PROCEDURE — 700117 HCHG RX CONTRAST REV CODE 255: Performed by: EMERGENCY MEDICINE

## 2025-06-11 RX ORDER — POLYETHYLENE GLYCOL 3350 17 G/17G
1 POWDER, FOR SOLUTION ORAL
Status: DISCONTINUED | OUTPATIENT
Start: 2025-06-11 | End: 2025-06-13 | Stop reason: HOSPADM

## 2025-06-11 RX ORDER — ATORVASTATIN CALCIUM 10 MG/1
10 TABLET, FILM COATED ORAL DAILY
Status: DISCONTINUED | OUTPATIENT
Start: 2025-06-12 | End: 2025-06-11

## 2025-06-11 RX ORDER — CYCLOBENZAPRINE HCL 10 MG
10 TABLET ORAL 3 TIMES DAILY PRN
Status: DISCONTINUED | OUTPATIENT
Start: 2025-06-11 | End: 2025-06-13 | Stop reason: HOSPADM

## 2025-06-11 RX ORDER — ACETAMINOPHEN 325 MG/1
650 TABLET ORAL EVERY 6 HOURS PRN
Status: DISCONTINUED | OUTPATIENT
Start: 2025-06-11 | End: 2025-06-13 | Stop reason: HOSPADM

## 2025-06-11 RX ORDER — AMOXICILLIN 250 MG
2 CAPSULE ORAL EVERY EVENING
Status: DISCONTINUED | OUTPATIENT
Start: 2025-06-11 | End: 2025-06-13 | Stop reason: HOSPADM

## 2025-06-11 RX ORDER — LIDOCAINE 4 G/G
1 PATCH TOPICAL
Status: DISCONTINUED | OUTPATIENT
Start: 2025-06-11 | End: 2025-06-13 | Stop reason: HOSPADM

## 2025-06-11 RX ORDER — ATORVASTATIN CALCIUM 40 MG/1
40 TABLET, FILM COATED ORAL DAILY
Status: DISCONTINUED | OUTPATIENT
Start: 2025-06-12 | End: 2025-06-13 | Stop reason: HOSPADM

## 2025-06-11 RX ORDER — LOSARTAN POTASSIUM 50 MG/1
50 TABLET ORAL DAILY
Status: DISCONTINUED | OUTPATIENT
Start: 2025-06-11 | End: 2025-06-13 | Stop reason: HOSPADM

## 2025-06-11 RX ORDER — UBIDECARENONE 75 MG
100 CAPSULE ORAL DAILY
Status: DISCONTINUED | OUTPATIENT
Start: 2025-06-11 | End: 2025-06-13 | Stop reason: HOSPADM

## 2025-06-11 RX ORDER — ALBUTEROL SULFATE 90 UG/1
2 INHALANT RESPIRATORY (INHALATION)
Status: DISCONTINUED | OUTPATIENT
Start: 2025-06-11 | End: 2025-06-13 | Stop reason: HOSPADM

## 2025-06-11 RX ORDER — FUROSEMIDE 10 MG/ML
40 INJECTION INTRAMUSCULAR; INTRAVENOUS DAILY
Status: DISCONTINUED | OUTPATIENT
Start: 2025-06-11 | End: 2025-06-12

## 2025-06-11 RX ADMIN — IOHEXOL 80 ML: 350 INJECTION, SOLUTION INTRAVENOUS at 13:30

## 2025-06-11 RX ADMIN — DOCUSATE SODIUM 50 MG AND SENNOSIDES 8.6 MG 2 TABLET: 8.6; 5 TABLET, FILM COATED ORAL at 18:11

## 2025-06-11 RX ADMIN — LOSARTAN POTASSIUM 50 MG: 50 TABLET, FILM COATED ORAL at 16:44

## 2025-06-11 RX ADMIN — APIXABAN 5 MG: 5 TABLET, FILM COATED ORAL at 18:10

## 2025-06-11 RX ADMIN — THERA TABS 1 TABLET: TAB at 18:12

## 2025-06-11 ASSESSMENT — PAIN DESCRIPTION - PAIN TYPE: TYPE: ACUTE PAIN

## 2025-06-11 ASSESSMENT — COGNITIVE AND FUNCTIONAL STATUS - GENERAL
MOBILITY SCORE: 24
SUGGESTED CMS G CODE MODIFIER MOBILITY: CH
SUGGESTED CMS G CODE MODIFIER DAILY ACTIVITY: CH
DAILY ACTIVITIY SCORE: 24

## 2025-06-11 ASSESSMENT — ENCOUNTER SYMPTOMS
SHORTNESS OF BREATH: 1
HEARTBURN: 0
EYE PAIN: 0
FALLS: 0
MYALGIAS: 0
DIARRHEA: 0
WEAKNESS: 0
HEADACHES: 0
PALPITATIONS: 0
SORE THROAT: 0
BLURRED VISION: 0
DIZZINESS: 1
COUGH: 0
ORTHOPNEA: 1
PND: 0
FEVER: 0
INSOMNIA: 0
WHEEZING: 0
EYE REDNESS: 0
HALLUCINATIONS: 0
SINUS PAIN: 0
ABDOMINAL PAIN: 0
BRUISES/BLEEDS EASILY: 0
DEPRESSION: 0
WEIGHT LOSS: 0
NERVOUS/ANXIOUS: 0
FOCAL WEAKNESS: 0
CONSTIPATION: 0
SEIZURES: 0
MEMORY LOSS: 0
DOUBLE VISION: 0
CHILLS: 0
TREMORS: 0

## 2025-06-11 ASSESSMENT — FIBROSIS 4 INDEX
FIB4 SCORE: 2.95
FIB4 SCORE: 2.87
FIB4 SCORE: 2.87

## 2025-06-11 ASSESSMENT — LIFESTYLE VARIABLES
ALCOHOL_USE: NO
TOTAL SCORE: 0
TOTAL SCORE: 0
DOES PATIENT WANT TO STOP DRINKING: NO
EVER HAD A DRINK FIRST THING IN THE MORNING TO STEADY YOUR NERVES TO GET RID OF A HANGOVER: NO
AVERAGE NUMBER OF DAYS PER WEEK YOU HAVE A DRINK CONTAINING ALCOHOL: 0
ON A TYPICAL DAY WHEN YOU DRINK ALCOHOL HOW MANY DRINKS DO YOU HAVE: 0
CONSUMPTION TOTAL: NEGATIVE
HAVE PEOPLE ANNOYED YOU BY CRITICIZING YOUR DRINKING: NO
HAVE YOU EVER FELT YOU SHOULD CUT DOWN ON YOUR DRINKING: NO
TOTAL SCORE: 0
EVER FELT BAD OR GUILTY ABOUT YOUR DRINKING: NO
HOW MANY TIMES IN THE PAST YEAR HAVE YOU HAD 5 OR MORE DRINKS IN A DAY: 0

## 2025-06-11 ASSESSMENT — CHA2DS2 SCORE
CHA2DS2 VASC SCORE: 4
SEX: FEMALE
DIABETES: NO
AGE 65 TO 74: YES
PRIOR STROKE OR TIA OR THROMBOEMBOLISM: NO
AGE 75 OR GREATER: NO
HYPERTENSION: YES
VASCULAR DISEASE: NO
CHF OR LEFT VENTRICULAR DYSFUNCTION: YES

## 2025-06-11 ASSESSMENT — SOCIAL DETERMINANTS OF HEALTH (SDOH)

## 2025-06-11 ASSESSMENT — PATIENT HEALTH QUESTIONNAIRE - PHQ9
2. FEELING DOWN, DEPRESSED, IRRITABLE, OR HOPELESS: NOT AT ALL
SUM OF ALL RESPONSES TO PHQ9 QUESTIONS 1 AND 2: 0
1. LITTLE INTEREST OR PLEASURE IN DOING THINGS: NOT AT ALL

## 2025-06-11 NOTE — TELEPHONE ENCOUNTER
Phone Number Called: [phone]      Call outcome: Spoke to patient regarding message below.    Message: Called to inform patient of SC recommendations to go to the ER. Pt verbalized understanding.

## 2025-06-11 NOTE — ASSESSMENT & PLAN NOTE
Moderate pulmonary hypertension as per last echo SBP 55-60 mmHg  Likely in the setting of AMINAH  Patient does not use CPAP at night.   Patient reports she underwent sleep study a few months ago however she was not able to complete it.  Patient will have follow-up with pulmonology in July 2025

## 2025-06-11 NOTE — ED NOTES
Report to Nelsy SIMEON, all questions answered. Pt to be transported to T in stable condition with ED RN

## 2025-06-11 NOTE — ED PROVIDER NOTES
ED Provider Note    CHIEF COMPLAINT  Chief Complaint   Patient presents with    Dizziness     With tension in neck.    Shortness of Breath     Lightheadedness that started 2 weeks ago. Afib started 2 days ago. Hx afib. Patient was referred her for potential valve replacement or a clip per Cardiologist. Patient took blood thinner today.       EXTERNAL RECORDS REVIEWED  Other I reviewed a note from the patient's cardiologist Dr. Hernandez noted the patient has a history of atrial fibrillation status post ablation, hypertension, mitral regurgitation and congestive heart failure.    HPI/ROS  Chinyere Priest is a 69 y.o. female who presents with dizziness.  The patient states over last couple of weeks she has had several spells of dizziness where she feels like she can pass out.  She states she also feels like she is getting develop vertigo but does not develop any vertigo.  He has a known history of severe mitral valve regurgitation and she has been worked up recently for some exertional dyspnea as well.  She states she does have continued shortness of breath.  She contacted her cardiologist office who told her to come to the Emergency Department for evaluation.  She does have a known history of atrial fibrillation and at times she has had a tachycardic rate.  She is on Eliquis for anticoagulation.  She does not have any associated focal weakness with her dizziness.  She does not have any chest pain..    PAST MEDICAL HISTORY   has a past medical history of Aortic calcification (HCC) ( ), Arrhythmia, Arthritis (2010), Asthma in adult, Breath shortness (2020), Bronchitis (2019), Chickenpox, Chronic anticoagulation, Chronic back pain ( ), CKD (chronic kidney disease) stage 3, GFR 30-59 ml/min (09/14/2020), Congestive heart failure (HCC) (2024), Dental disorder, Diverticulosis ( ), Hearing difficulty, Heart murmur (2019), High cholesterol, Hypertension, Inflamed seborrheic keratosis ( ), Intrinsic eczema  (01/20/2020), Mumps, Non-rheumatic mitral regurgitation (05/2019), Osteoarthritis of both hips ( ), Palpitations, Plantar fascia syndrome ( ), Plantar wart of right foot ( ), Pneumonia (2019), Pruritic dermatitis ( ), Pulmonary hypertension (HCC), Pure hypercholesterolemia ( ), Ringing in ears, S/P cardiac catheterization (05/26/2017), Shortness of breath, Spider veins of limb ( ), Swelling of lower extremity, Unspecified vitamin D deficiency ( ), and Wears glasses.    SURGICAL HISTORY   has a past surgical history that includes lumbar laminectomy diskectomy (09/02/2009); gyn surgery; hysterectomy laparoscopy (2004); other cardiac surgery (2019); lumbar fusion o-arm (N/A, 04/29/2021); fusion, spine, xlif (Right, 04/27/2021); primary c section; and laminotomy.    FAMILY HISTORY  Family History   Problem Relation Age of Onset    Heart Disease Mother         1st MI @ 59 yo    Hypertension Mother     Heart Attack Mother 60    Heart Disease Maternal Aunt     Breast Cancer Maternal Aunt     Cancer Daughter 35        breast    Breast Cancer Daughter     Heart Disease Maternal Grandmother     Heart Disease Maternal Grandfather     Heart Disease Maternal Aunt 56    Ovarian Cancer Other        SOCIAL HISTORY  Social History     Tobacco Use    Smoking status: Former     Current packs/day: 0.00     Average packs/day: 0.5 packs/day for 40.0 years (20.0 ttl pk-yrs)     Types: Cigarettes     Start date: 3/1/1970     Quit date: 3/1/2010     Years since quitting: 15.2    Smokeless tobacco: Never   Vaping Use    Vaping status: Never Used   Substance and Sexual Activity    Alcohol use: No    Drug use: Never    Sexual activity: Yes     Partners: Male     Birth control/protection: Post-Menopausal     Comment: , works at [employer](part time)       CURRENT MEDICATIONS  Home Medications       Reviewed by Alla Hood R.N. (Registered Nurse) on 06/11/25 at 1056  Med List Status: Not Addressed     Medication Last Dose Status  "  albuterol 108 (90 Base) MCG/ACT Aero Soln inhalation aerosol  Active   albuterol 108 (90 Base) MCG/ACT Aero Soln inhalation aerosol  Active   atorvastatin (LIPITOR) 10 MG Tab  Active   CHOLECALCIFEROL PO  Active   clobetasol (TEMOVATE) 0.05 % Ointment  Active   cyanocobalamin (VITAMIN B-12) 100 MCG Tab  Active   cyclobenzaprine (FLEXERIL) 10 MG Tab  Active   ELIQUIS 5 MG Tab  Active   lidocaine (LIDODERM) 5 % Patch  Active   losartan (COZAAR) 50 MG Tab  Active   Multiple Vitamin (MULTIVITAMIN ADULT PO)  Active   potassium citrate SR (UROCIT-K SR) 10 MEQ (1080 MG) Tab CR  Active   torsemide (DEMADEX) 10 MG tablet  Active                  Audit from Redirected Encounters    **Home medications have not yet been reviewed for this encounter**         ALLERGIES  Allergies[1]    PHYSICAL EXAM  VITAL SIGNS: BP (!) 167/81   Pulse (!) 101   Temp 36.6 °C (97.9 °F) (Temporal)   Resp 20   Ht 1.676 m (5' 6\")   Wt 68.4 kg (150 lb 12.7 oz)   SpO2 96%   BMI 24.34 kg/m²    In general the patient does not appear toxic    Ears TMs intact and nonerythematous, nares and mouth are moist    Eyes pupils are 2 and reactive bilaterally extract motors intact    Pulmonary the patient's lungs are clear to auscultation bilaterally    Cardiovascular S1-S2 with a tachycardic rate, irregular rhythm, and systolic murmur    Skin no pallor no jaundice    Extremities no distal edema    Neurologic examination cranials 2 through 12 are intact, motor is 5 out of 5 and symmetric throughout, sensations intact.    EKG/LABS  Results for orders placed or performed during the hospital encounter of 25   EKG    Collection Time: 25 10:46 AM   Result Value Ref Range    Report       [facility] Emergency Dept.    Test Date:  2025  Pt Name:    DEANA PATTERSON           Department: ER  MRN:        [id number]                      Room:  Gender:     Female                       Technician: [id number]  :        1955        "            Requested By:ER TRIAGE PROTOCOL  Order #:    [id number]                    Reading MD:    Measurements  Intervals                                Axis  Rate:       114                          P:          0  MS:         0                            QRS:        85  QRSD:       85                           T:          268  QT:         330  QTc:        455    Interpretive Statements  Atrial flutter with predominant 2:1 AV block  Ventricular premature complex  Borderline right axis deviation  Nonspecific repol abnormality, diffuse leads  Compared to ECG 02/13/2025 10:43:22  2:1 AV block now present  Ventricular premature complex(es) now present  Early repolarization now present  Sinus rhythm no longer present  First  degree AV block no longer present  Left-axis deviation no longer present  T-wave abnormality no longer present     CBC with Differential    Collection Time: 06/11/25 11:02 AM   Result Value Ref Range    WBC 7.2 4.8 - 10.8 K/uL    RBC 3.96 (L) 4.20 - 5.40 M/uL    Hemoglobin 12.5 12.0 - 16.0 g/dL    Hematocrit 38.1 37.0 - 47.0 %    MCV 96.2 81.4 - 97.8 fL    MCH 31.6 27.0 - 33.0 pg    MCHC 32.8 32.2 - 35.5 g/dL    RDW 43.2 35.9 - 50.0 fL    Platelet Count 201 164 - 446 K/uL    MPV 10.8 9.0 - 12.9 fL    Neutrophils-Polys 62.00 44.00 - 72.00 %    Lymphocytes 22.40 22.00 - 41.00 %    Monocytes 10.20 0.00 - 13.40 %    Eosinophils 4.30 0.00 - 6.90 %    Basophils 0.70 0.00 - 1.80 %    Immature Granulocytes 0.40 0.00 - 0.90 %    Nucleated RBC 0.00 0.00 - 0.20 /100 WBC    Neutrophils (Absolute) 4.48 1.82 - 7.42 K/uL    Lymphs (Absolute) 1.62 1.00 - 4.80 K/uL    Monos (Absolute) 0.74 0.00 - 0.85 K/uL    Eos (Absolute) 0.31 0.00 - 0.51 K/uL    Baso (Absolute) 0.05 0.00 - 0.12 K/uL    Immature Granulocytes (abs) 0.03 0.00 - 0.11 K/uL    NRBC (Absolute) 0.00 K/uL   Comp Metabolic Panel    Collection Time: 06/11/25 11:02 AM   Result Value Ref Range    Sodium 139 135 - 145 mmol/L    Potassium 3.8 3.6 - 5.5 mmol/L     Chloride 101 96 - 112 mmol/L    Co2 25 20 - 33 mmol/L    Anion Gap 13.0 7.0 - 16.0    Glucose 98 65 - 99 mg/dL    Bun 20 8 - 22 mg/dL    Creatinine 1.15 0.50 - 1.40 mg/dL    Calcium 9.6 8.5 - 10.5 mg/dL    Correct Calcium 9.4 8.5 - 10.5 mg/dL    AST(SGOT) 34 12 - 45 U/L    ALT(SGPT) 17 2 - 50 U/L    Alkaline Phosphatase 81 30 - 99 U/L    Total Bilirubin 0.6 0.1 - 1.5 mg/dL    Albumin 4.3 3.2 - 4.9 g/dL    Total Protein 7.6 6.0 - 8.2 g/dL    Globulin 3.3 1.9 - 3.5 g/dL    A-G Ratio 1.3 g/dL   proBrain Natriuretic Peptide, NT    Collection Time: 25 11:02 AM   Result Value Ref Range    NT-proBNP 1887 (H) 0 - 125 pg/mL   Troponin    Collection Time: 25 11:02 AM   Result Value Ref Range    Troponin T 20 (H) 6 - 19 ng/L   ESTIMATED GFR    Collection Time: 25 11:02 AM   Result Value Ref Range    GFR (CKD-EPI) 51 (A) >60 mL/min/1.73 m 2   TROPONIN    Collection Time: 25  1:19 PM   Result Value Ref Range    Troponin T 24 (H) 6 - 19 ng/L   EKG    Collection Time: 25  2:17 PM   Result Value Ref Range    Report       [facility] Emergency Dept.    Test Date:  2025  Pt Name:    DEANA PATTERSON           Department: ER  MRN:        [id number]                      Room:  Gender:     Female                       Technician: [id number]  :        1955                   Requested By:ER TRIAGE PROTOCOL  Order #:    [id number]                    Reading MD: VANDANA ECHAVARRIA MD    Measurements  Intervals                                Axis  Rate:       114                          P:          0  TN:         0                            QRS:        85  QRSD:       85                           T:          268  QT:         330  QTc:        455    Interpretive Statements  Twelve-lead EKG shows atrial fibrillation with a ventricular rate of 114,  normal QRS, no ST segment elevation nor depression, T waves inverted  anteriorly and laterally  Electronically Signed On 2025  14:17:07 PDT by VANDANA ECHAVARRIA MD         I have independently interpreted this EKG    RADIOLOGY/PROCEDURES       Radiologist interpretation:  CT-CTA NECK WITH & W/O-POST PROCESSING   Final Result      1.  Extensive atherosclerotic vascular calcification in the aortic arch and great vessels.   2.  Extensive calcific atherosclerotic plaque in the common carotid arteries and cervical carotid bifurcations with carotid bifurcation stenosis estimated at just under 50% bilaterally.   3.  The cervical vertebral arteries bilaterally.   4.  Moderate bilateral apical pleural-parenchymal scarring consistent with old granulomatous disease.      CT-CTA HEAD WITH & W/O-POST PROCESS   Final Result      CT angiogram of the [place] within normal limits.      DX-CHEST-PORTABLE (1 VIEW)   Final Result      No acute cardiac or pulmonary abnormalities are identified.          COURSE & MEDICAL DECISION MAKING    This a 69-year-old female who presents with a couple of different complaints.  She presents with feeling short of breath as well as with dizziness.  As for the dizziness she states she feels like she is can have vertigo but she does not describe vertigo.  She states she feels lightheaded and off balance.  CTA does show some extensive atherosclerotic disease please see the radiologist report but no acute occlusion the patient does have a known history of severe mitral valve regurgitation I suspect this is causing the dyspnea.  Her cardiologist recommended a stress echocardiogram.  Therefore admit the patient to the hospital for further cardiac workup as well as for potential MRI as she states she continues to feel lightheaded and dizzy on repeat exam.  The patient is resting comfortably.  I did not give any anticoagulants as the patient is on Eliquis for her atrial fibrillation.  I did serial troponins and she did have an increase from 20-24 which is concerning but do not suspect she is having acute ischemic disease.   She does not have any ongoing chest pain.    FINAL DIAGNOSIS  1.  Dizziness  2.  Dyspnea  3.  Elevated troponin rule out acute coronary syndrome    Disposition  The patient will be admitted in stable condition     Electronically signed by: Bryan Molina M.D., 6/11/2025 11:52 AM           [1]   Allergies  Allergen Reactions    Erythromycin Unspecified     Severe Abdominal pain    Tikosyn [Dofetilide] Diarrhea     diarrhea    Latex Hives     rash    Tape Hives       Paper tape okay

## 2025-06-11 NOTE — ASSESSMENT & PLAN NOTE
Echo from 03/2025 showed EF 60% moderate mitral regurgitation  Likely possible cause of acute on chronic congestive heart failure  [facility]  She has been scheduled for stress echo for July 2025  Discussed case with cardiology who recommends against new echo.  Recommended continue management for CHF exacerbation and discharge when symptoms improve.

## 2025-06-11 NOTE — ASSESSMENT & PLAN NOTE
Prior history of asthma  Patient well-controlled with albuterol inhaler as needed  Showed no current signs of asthma exacerbation  Continue outpatient medication

## 2025-06-11 NOTE — ED NOTES
Pt ambulates to room with a steady gait, placed on monitoring, chart up for ERP.   Pt states she takes eliquis daily for afib. Has had multiple ablations in the past. Scheduled for valve replacement. Reports increase in SOB/dizziness/lightheadedness the last few weeks. Also reports palpitations for the last 4 days.   Family at bedside, call light within reach

## 2025-06-11 NOTE — H&P
[facility] Internal Medicine History & Physical Note    Date of Service  6/11/2025    [facility] Team: [facility] Team   Attending: Tarah Bates M.d.  Senior Resident: Dr. Vaughn  Intern:  Dr. Antunez      Contact Number: [phone]    Primary Care Physician  Eliceo Guzman M.D.    Consultants  None    Specialist Names:-    Code Status  Full Code    Chief Complaint  Chief Complaint   Patient presents with    Dizziness     With tension in neck.    Shortness of Breath     Lightheadedness that started 2 weeks ago. Afib started 2 days ago. Hx afib. Patient was referred her for potential valve replacement or a clip per Cardiologist. Patient took blood thinner today.       History of Presenting Illness (HPI):     Chinyere Priest is a 69 y.o. female who presented 6/11/2025 with A-fib on Eliquis, heart failure with preserved ejection fraction, hypertension, mitral regurgitation, lumbar radiculopathy, AMINAH who presents with worsening dizziness, lightheadedness and exertional shortness of breath.    The patient reports a two-week history of intermittent dizziness and lightheadedness, initially triggered by exertion--particularly while walking or rising from a seated position. However, these symptoms have recently progressed to occur even at rest. She also endorses worsening exertional dyspnea, now noticeable with minimal activity such as walking short distances within her home. Additionally, she notes bilateral lower extremity edema, which she states is currently well-controlled with daily torsemide 10 mg and the use of compression stockings. She also reports orthopnea, requiring at least two pillows to sleep comfortably. The patient is under the care of cardiology for moderate to severe mitral regurgitation and is scheduled for an exercise treadmill echocardiogram in July 2025. She denies chest pain, palpitations, syncope, visual disturbances, or focal neurological deficits.    In the ED BP  167/81  Pulse 101   Temp 36.6 °C (97.9 °F) , SpO2 96%RA, Pro BNP 1887, Trop 20, Cr, BUN 20, 225, K3.8.  CT head and neck showed cervical carotid defecations with catheter occasionally stenosis estimated under 50% bilaterally.      I discussed the plan of care with patient.    Review of Systems  Review of Systems   Constitutional:  Negative for chills, fever and weight loss.   HENT:  Negative for hearing loss, sinus pain, sore throat and tinnitus.    Eyes:  Negative for blurred vision, double vision, pain and redness.   Respiratory:  Positive for shortness of breath. Negative for cough and wheezing.    Cardiovascular:  Positive for orthopnea and leg swelling. Negative for chest pain, palpitations and PND.   Gastrointestinal:  Negative for abdominal pain, constipation, diarrhea and heartburn.   Genitourinary:  Negative for dysuria and hematuria.   Musculoskeletal:  Negative for falls, joint pain and myalgias.   Skin:  Negative for itching and rash.   Neurological:  Positive for dizziness. Negative for tremors, focal weakness, seizures, weakness and headaches.   Endo/Heme/Allergies:  Does not bruise/bleed easily.   Psychiatric/Behavioral:  Negative for depression, hallucinations and memory loss. The patient is not nervous/anxious and does not have insomnia.        Past Medical History   has a past medical history of Aortic calcification (HCC) ( ), Arrhythmia, Arthritis (2010), Asthma in adult, Breath shortness (2020), Bronchitis (2019), Chickenpox, Chronic anticoagulation, Chronic back pain ( ), CKD (chronic kidney disease) stage 3, GFR 30-59 ml/min (09/14/2020), Congestive heart failure (HCC) (2024), Dental disorder, Diverticulosis ( ), Hearing difficulty, Heart murmur (2019), High cholesterol, Hypertension, Inflamed seborrheic keratosis ( ), Intrinsic eczema (01/20/2020), Mumps, Non-rheumatic mitral regurgitation (05/2019), Osteoarthritis of both hips ( ), Palpitations, Plantar fascia syndrome ( ), Plantar wart of right foot ( ),  Pneumonia (2019), Pruritic dermatitis ( ), Pulmonary hypertension (HCC), Pure hypercholesterolemia ( ), Ringing in ears, S/P cardiac catheterization (05/26/2017), Shortness of breath, Spider veins of limb ( ), Swelling of lower extremity, Unspecified vitamin D deficiency ( ), and Wears glasses.    Surgical History   has a past surgical history that includes lumbar laminectomy diskectomy (09/02/2009); gyn surgery; hysterectomy laparoscopy (2004); other cardiac surgery (2019); lumbar fusion o-arm (N/A, 04/29/2021); fusion, spine, xlif (Right, 04/27/2021); primary c section; and laminotomy.     Family History  Family History   Problem Relation Age of Onset    Heart Disease Mother         1st MI @ 61 yo    Hypertension Mother     Heart Attack Mother 60    Heart Disease Maternal Aunt     Breast Cancer Maternal Aunt     Cancer Daughter 35        breast    Breast Cancer Daughter     Heart Disease Maternal Grandmother     Heart Disease Maternal Grandfather     Heart Disease Maternal Aunt 56    Ovarian Cancer Other       Family history reviewed with patient.     Social History  Tobacco: Quit in 2012, used to smoke for cigarettes for 25 years.  Alcohol: Denies  Recreational drugs (illegal or prescription): Denies  Employment: Retired  Living Situation: Lives in her house with her   Recent Travel: No recent travels  Primary Care Provider: Reviewed Eliceo Guzman  Other (stressors, spirituality, exposures): None    Allergies  Allergies[1]    Medications  Prior to Admission Medications   Prescriptions Last Dose Informant Patient Reported? Taking?   ELIQUIS 5 MG Tab 6/11/2025 at  7:00 AM Patient No Yes   Sig: TAKE 1 TABLET BY MOUTH TWICE A DAY   Patient taking differently: Take 5 mg by mouth 2 times a day.   Multiple Vitamin (MULTIVITAMIN ADULT PO) 6/10/2025 at  6:00 PM Patient Yes Yes   Sig: Take 1 Tablet by mouth every evening.   albuterol 108 (90 Base) MCG/ACT Aero Soln inhalation aerosol Unknown Patient No No   Sig:  Inhale 2 Puffs every 6 hours as needed for Shortness of Breath.   atorvastatin (LIPITOR) 10 MG Tab 6/11/2025 at  7:00 AM Patient No Yes   Sig: TAKE 1 TABLET BY MOUTH EVERY DAY   cyanocobalamin (VITAMIN B-12) 100 MCG Tab 6/10/2025 at  6:00 PM Patient Yes Yes   Sig: Take 100 mcg by mouth every day.   cyclobenzaprine (FLEXERIL) 10 MG Tab Unknown Patient No No   Sig: Take 1 Tablet by mouth 3 times a day as needed for Mild Pain or Moderate Pain.   lidocaine (LIDODERM) 5 % Patch Unknown Patient No No   Sig: Place 1-3 Patches on the skin every 12 hours as needed (rib pain).   losartan (COZAAR) 50 MG Tab 6/10/2025 at  7:00 PM Patient No Yes   Sig: TAKE 1 TABLET BY MOUTH EVERY DAY   potassium citrate SR (UROCIT-K SR) 10 MEQ (1080 MG) Tab CR 6/10/2025 Bedtime Patient No Yes   Sig: Take 1 Tablet by mouth every day.   torsemide (DEMADEX) 10 MG tablet 6/11/2025 at  7:00 AM Patient No Yes   Sig: Take 1 Tablet by mouth every day.      Facility-Administered Medications: None       Physical Exam  Temp:  [36.1 °C (97 °F)-36.6 °C (97.9 °F)] 36.1 °C (97 °F)  Pulse:  [] 96  Resp:  [18-20] 18  BP: (136-167)/(63-87) 144/76  SpO2:  [95 %-98 %] 97 %  Blood Pressure : (!) 144/76 (RN notified)   Temperature: 36.1 °C (97 °F)   Pulse: 96   Respiration: 18   Pulse Oximetry: 97 %       Physical Exam  Vitals reviewed.   Constitutional:       General: She is not in acute distress.     Appearance: She is not ill-appearing.   Cardiovascular:      Rate and Rhythm: Rhythm irregular.      Heart sounds: Murmur (apical holosystolic murmur 4/6 that radiates to left armpit) heard.      No friction rub.   Pulmonary:      Effort: No respiratory distress.      Breath sounds: No stridor. No rales.      Comments: Mild crackles in both bases predominantly at right.  Abdominal:      General: There is no distension.      Palpations: Abdomen is soft. There is no mass.      Tenderness: There is no abdominal tenderness.      Hernia: No hernia is present.    Musculoskeletal:         General: No swelling or tenderness. Normal range of motion.      Cervical back: No rigidity or tenderness.      Right lower leg: No edema.      Left lower leg: No edema.   Skin:     General: Skin is warm.      Capillary Refill: Capillary refill takes less than 2 seconds.      Coloration: Skin is not jaundiced or pale.   Neurological:      General: No focal deficit present.      Mental Status: She is alert and oriented to person, place, and time.      Cranial Nerves: No cranial nerve deficit.      Sensory: No sensory deficit.      Motor: No weakness.   Psychiatric:         Behavior: Behavior normal.         Laboratory:  Recent Labs     06/11/25  1102   WBC 7.2   RBC 3.96*   HEMOGLOBIN 12.5   HEMATOCRIT 38.1   MCV 96.2   MCH 31.6   MCHC 32.8   RDW 43.2   PLATELETCT 201   MPV 10.8     Recent Labs     06/11/25  1102   SODIUM 139   POTASSIUM 3.8   CHLORIDE 101   CO2 25   GLUCOSE 98   BUN 20   CREATININE 1.15   CALCIUM 9.6     Recent Labs     06/11/25  1102   ALTSGPT 17   ASTSGOT 34   ALKPHOSPHAT 81   TBILIRUBIN 0.6   GLUCOSE 98         Recent Labs     06/11/25  0928 06/11/25  1102   NTPROBNP 1863* 1887*         Recent Labs     06/11/25  1102 06/11/25  1319   TROPONINT 20* 24*       Imaging:  CT-CTA NECK WITH & W/O-POST PROCESSING   Final Result      1.  Extensive atherosclerotic vascular calcification in the aortic arch and great vessels.   2.  Extensive calcific atherosclerotic plaque in the common carotid arteries and cervical carotid bifurcations with carotid bifurcation stenosis estimated at just under 50% bilaterally.   3.  The cervical vertebral arteries bilaterally.   4.  Moderate bilateral apical pleural-parenchymal scarring consistent with old granulomatous disease.      CT-CTA HEAD WITH & W/O-POST PROCESS   Final Result      CT angiogram of the [place] of [place] within normal limits.      DX-CHEST-PORTABLE (1 VIEW)   Final Result      No acute cardiac or pulmonary abnormalities are  identified.          X-Ray:  I have personally reviewed the images and compared with prior images.  EKG:  I have personally reviewed the images and compared with prior images.    Assessment/Plan:  Problem Representation: Construct a 3-5 sentence summary of the patients findings.  This should be in medical language and include key epidemiology or risk factors, the key presentation, temporal features (acute, chronic etc) key physical findings and important descriptive adjectives throughout (right, left, sharp, dull, etc)  I anticipate this patient is appropriate for observation status at this time because Dizziness    Patient will need a Telemetry bed on MEDICAL service .  The need is secondary to Dizziness.    * Pre-syncope- (present on admission)  Assessment & Plan  Patient with worsening lightheadedness and dizziness in the last 2 weeks.  Likely in the setting of worsening mitral regurgitation.    No focal neurodeficits.  CTA head and neck negative for stroke.  Positive for 50% stenosis of carotid arteries.  Echo from 03/2025 showed EF 60% moderate mitral regurgitation, RVSP 55 to 60 mmHg.   Patient is being close follow-up by cardiologist at [facility].  She has to exercise treadmill echo for MR evaluation on 07/2025  -Telemetry  -Fall precautions  -Orthostatic vitals  -Consider to get new echo  -Will consult cardiology in the a.m. given patient was pending an exercise treadmill echo.      Acute on chronic heart failure with preserved ejection fraction (HCC)- (present on admission)  Assessment & Plan  Likely in the setting of mitral regurgitation  Last echo March 2025 showed EF 60% with moderate mitral regurgitation.  Patient follows up with cardiology now.  No current pitting edema.  On torsemide 10 mg at home.  Mild crackles in both bases.  Chest x-ray shows mild congestion.  proBNP 1800s  -IV Lasix 40 mg daily  - Fluid restriction to 1.8 L  - Daily weights  - Strict ins and outs  - Restrict sodium diet to 2 g  daily  - Will monitor electrolytes with daily CMP's    Nonrheumatic mitral valve regurgitation- (present on admission)  Assessment & Plan  Echo from 03/2025 showed EF 60% moderate mitral regurgitation  Likely possible cause of acute on chronic congestive heart failure  Patient follows up condition in [facility] cardiology  She has been scheduled for stress echo for July 2025  Consider new echo  Consult cardiology for eval and recommendations.      Pulmonary hypertension (HCC)- (present on admission)  Assessment & Plan  Moderate pulmonary hypertension as per last echo SBP 55-60 mmHg  Likely in the setting of AMINAH  Patient does not use CPAP at night.   Patient reports she underwent sleep study a few months ago however she was not able to complete it.  Patient will have follow-up with pulmonology in July 2025    Dyslipidemia  Assessment & Plan  History of dyslipidemia  On atorvastatin 10 mg outpatient  LDL from 04/25: 78, ASCVD score 15.4%, makes the patient amenable for moderate to high intensity statins  - Atorvastatin 40 mg    Atrial fibrillation (HCC)- (present on admission)  Assessment & Plan  Patient with chronic condition, status post ablation  No RVR ongoing.  Patient denies palpitations.  Continue home apixaban 5 mg twice daily      Primary hypertension- (present on admission)  Assessment & Plan  Prior history of hypertension  Controlled with losartan and torsemide outpatient  Currently uncontrolled in the 160s over 90s  Likely in the setting of decompensated congestive heart failure  Will resume losartan  Will hold torsemide  Will start Lasix IV 40 mg  We will continue monitoring    Asthma in adult without complication- (present on admission)  Assessment & Plan  Prior history of asthma  Patient well-controlled with albuterol inhaler as needed  Showed no current signs of asthma exacerbation  Continue outpatient medication        VTE prophylaxis: therapeutic anticoagulation with apixaban         [1]    Allergies  Allergen Reactions    Erythromycin Unspecified     Severe Abdominal pain    Tikosyn [Dofetilide] Diarrhea     diarrhea    Latex Hives     rash    Tape Hives       Paper tape okay

## 2025-06-11 NOTE — TELEPHONE ENCOUNTER
AK    Caller: Chinyere Priest     Topic/issue: Patient states her heart rate sitting is about 120. Her phone states she is in afib. She is feeling lightheaded and dizzy. Please advise    Callback Number: [phone]     Thank You  Gail MARKS

## 2025-06-11 NOTE — ASSESSMENT & PLAN NOTE
Prior history of hypertension  Controlled with losartan and torsemide outpatient  Currently well-controlled with losartan and IV Lasix.  Continue losartan  Holding torsemide  Will increase Lasix IV 40 mg daily to twice daily with parameters  We will continue monitoring

## 2025-06-11 NOTE — TELEPHONE ENCOUNTER
Phone Number Called: [phone]      Call outcome: Spoke to patient regarding message below.    Message: Called to discuss the patients symptoms. The patient reported that she has not been feeling well. Pt reports having lightheadedness and dizziness. Pt reports HR per apple watch is in 120s. Pt reported symptoms happen even at rest. Pt said that this has been happening for two weeks. Pt endorses SOB.  Last /60. ER precautions given. Will route to [place] to advise.

## 2025-06-11 NOTE — ASSESSMENT & PLAN NOTE
Patient with worsening lightheadedness and dizziness in the last 2 weeks.  Likely in the setting of worsening mitral regurgitation.    No focal neurodeficits.  CTA head and neck negative for stroke.  Positive for 50% stenosis of carotid arteries.  Echo from 03/2025 showed EF 60% moderate mitral regurgitation, RVSP 55 to 60 mmHg.   Patient is being close follow-up by cardiologist at [facility].  She has to exercise treadmill echo for MR evaluation on 07/2025  -Telemetry  -Fall precautions  -Orthostatic vitals  - Cardiology was consulted about the possibility of new echo.  They recommended against this imaging given patient's having acute CHF exacerbation and will have new echo within 1 month

## 2025-06-11 NOTE — ASSESSMENT & PLAN NOTE
History of dyslipidemia  On atorvastatin 10 mg outpatient  LDL from 04/25: 78, ASCVD score 15.4%, makes the patient amenable for moderate to high intensity statins  - Atorvastatin 40 mg

## 2025-06-11 NOTE — ASSESSMENT & PLAN NOTE
Likely in the setting of mitral regurgitation  Last echo March 2025 showed EF 60% with moderate mitral regurgitation.  Patient follows up with cardiology now.  No current pitting edema.  On torsemide 10 mg at home.  Mild crackles in both bases.  Chest x-ray shows mild congestion.  proBNP 1800s  Patient still having bibasilar crackles.  Output: +240.  -Increase IV Lasix from 40 mg daily to twice daily  - Fluid restriction to 1.8 L  - Daily weights  - Strict ins and outs  - Restrict sodium diet to 2 g daily  - Will monitor electrolytes with daily CMP's  -Discussed case with cardiology who recommends against new echo.  Recommended continue management for CHF exacerbation and discharge when symptoms improve.

## 2025-06-11 NOTE — ED TRIAGE NOTES
Chief Complaint   Patient presents with    Dizziness     With tension in neck.    Shortness of Breath     Lightheadedness that started 2 weeks ago. Afib started 2 days ago. Hx afib. Patient was referred her for potential valve replacement or a clip per Cardiologist. Patient took blood thinner today.     Patient ambulatory to triage for above complaint.   Patient is alert and oriented, speaking in full sentences, follows commands and responds appropriately to questions. Not in any apparent distress.   Respirations are even and unlabored.  Patient placed in line for blood draw. Patient educated on triage process. Patient encouraged to alert staff for any changes.

## 2025-06-11 NOTE — PROGRESS NOTES
4 Eyes Skin Assessment Completed by CAILIN Whittington and CAILIN Tapia.    Skin assessment is primarily focused on high risk bony prominences. Pay special attention to skin beneath and around medical devices, high risk bony prominences, skin to skin areas and areas where the patient lacks sensation to feel pain and areas where the patient previously had breakdown.     Head (Occipital):  WDL   Ears (Under Medical Devices): WDL   Nose (Under Medical Devices): WDL   Mouth:  WDL   Neck: WDL   Breast/Chest:  WDL   Shoulder Blades:  WDL   Spine:   WDL   (R) Arm/Elbow/Hand: WDL   (L) Arm/Elbow/Hand: WDL   Abdomen: WDL   Pannus/Groin:  WDL   Sacrum/Coccyx:   WDL   (R) Ischial Tuberosity (Sit Bones):  WDL   (L) Ischial Tuberosity (Sit Bones):  WDL   (R) Leg:  WDL   (L) Leg:  WDL   (R) Heel:  WDL   (R) Foot/Toe: WDL   (L) Heel: WDL   (L) Foot/Toe:  WDL       DEVICES IN USE:   Respiratory Devices:  NA, patient on room air  Feeding Devices:  N/A   Lines & BP Monitoring Devices:  Peripheral IV, BP cuff, and Pulse ox    Orthopedic Devices:  N/A  Miscellaneous Devices:  N/A    PROTOCOL INTERVENTIONS:   Standard/Trauma Bed:  Already in place    WOUND PHOTOS:   N/A no wounds identified    WOUND CONSULT:   N/A, no advanced wound care needs identified

## 2025-06-11 NOTE — ED NOTES
Medication history reviewed with patient at bedside .   Med rec is complete  Allergies reviewed.     Patient has not had any outpatient antibiotics in the last 30 days.   Anticoagulants: Eliquis 5mg - Last dose 6/11/25 0700.    Dispense history available in EPIC: Yes    Sara Faulkner

## 2025-06-12 LAB
ALBUMIN SERPL BCP-MCNC: 3.7 G/DL (ref 3.2–4.9)
ALBUMIN/GLOB SERPL: 1.3 G/DL
ALP SERPL-CCNC: 74 U/L (ref 30–99)
ALT SERPL-CCNC: 13 U/L (ref 2–50)
ANION GAP SERPL CALC-SCNC: 11 MMOL/L (ref 7–16)
AST SERPL-CCNC: 29 U/L (ref 12–45)
BASOPHILS # BLD AUTO: 0.6 % (ref 0–1.8)
BASOPHILS # BLD: 0.04 K/UL (ref 0–0.12)
BILIRUB SERPL-MCNC: 0.4 MG/DL (ref 0.1–1.5)
BUN SERPL-MCNC: 22 MG/DL (ref 8–22)
CALCIUM ALBUM COR SERPL-MCNC: 9.4 MG/DL (ref 8.5–10.5)
CALCIUM SERPL-MCNC: 9.2 MG/DL (ref 8.5–10.5)
CHLORIDE SERPL-SCNC: 104 MMOL/L (ref 96–112)
CO2 SERPL-SCNC: 24 MMOL/L (ref 20–33)
CREAT SERPL-MCNC: 1.22 MG/DL (ref 0.5–1.4)
EOSINOPHIL # BLD AUTO: 0.4 K/UL (ref 0–0.51)
EOSINOPHIL NFR BLD: 6 % (ref 0–6.9)
ERYTHROCYTE [DISTWIDTH] IN BLOOD BY AUTOMATED COUNT: 43.6 FL (ref 35.9–50)
GFR SERPLBLD CREATININE-BSD FMLA CKD-EPI: 47 ML/MIN/1.73 M 2
GLOBULIN SER CALC-MCNC: 2.8 G/DL (ref 1.9–3.5)
GLUCOSE SERPL-MCNC: 90 MG/DL (ref 65–99)
HCT VFR BLD AUTO: 36.7 % (ref 37–47)
HGB BLD-MCNC: 12.2 G/DL (ref 12–16)
IMM GRANULOCYTES # BLD AUTO: 0.02 K/UL (ref 0–0.11)
IMM GRANULOCYTES NFR BLD AUTO: 0.3 % (ref 0–0.9)
LYMPHOCYTES # BLD AUTO: 1.53 K/UL (ref 1–4.8)
LYMPHOCYTES NFR BLD: 23 % (ref 22–41)
MCH RBC QN AUTO: 32 PG (ref 27–33)
MCHC RBC AUTO-ENTMCNC: 33.2 G/DL (ref 32.2–35.5)
MCV RBC AUTO: 96.3 FL (ref 81.4–97.8)
MONOCYTES # BLD AUTO: 0.75 K/UL (ref 0–0.85)
MONOCYTES NFR BLD AUTO: 11.3 % (ref 0–13.4)
NEUTROPHILS # BLD AUTO: 3.9 K/UL (ref 1.82–7.42)
NEUTROPHILS NFR BLD: 58.8 % (ref 44–72)
NRBC # BLD AUTO: 0 K/UL
NRBC BLD-RTO: 0 /100 WBC (ref 0–0.2)
PLATELET # BLD AUTO: 189 K/UL (ref 164–446)
PMV BLD AUTO: 11 FL (ref 9–12.9)
POTASSIUM SERPL-SCNC: 3.5 MMOL/L (ref 3.6–5.5)
PROT SERPL-MCNC: 6.5 G/DL (ref 6–8.2)
RBC # BLD AUTO: 3.81 M/UL (ref 4.2–5.4)
SODIUM SERPL-SCNC: 139 MMOL/L (ref 135–145)
WBC # BLD AUTO: 6.6 K/UL (ref 4.8–10.8)

## 2025-06-12 PROCEDURE — 99446 NTRPROF PH1/NTRNET/EHR 5-10: CPT

## 2025-06-12 PROCEDURE — 700111 HCHG RX REV CODE 636 W/ 250 OVERRIDE (IP): Mod: JZ

## 2025-06-12 PROCEDURE — 770020 HCHG ROOM/CARE - TELE (206)

## 2025-06-12 PROCEDURE — 700102 HCHG RX REV CODE 250 W/ 637 OVERRIDE(OP)

## 2025-06-12 PROCEDURE — 80053 COMPREHEN METABOLIC PANEL: CPT

## 2025-06-12 PROCEDURE — 99233 SBSQ HOSP IP/OBS HIGH 50: CPT | Mod: GC | Performed by: STUDENT IN AN ORGANIZED HEALTH CARE EDUCATION/TRAINING PROGRAM

## 2025-06-12 PROCEDURE — A9270 NON-COVERED ITEM OR SERVICE: HCPCS

## 2025-06-12 PROCEDURE — 85025 COMPLETE CBC W/AUTO DIFF WBC: CPT

## 2025-06-12 RX ORDER — FUROSEMIDE 10 MG/ML
40 INJECTION INTRAMUSCULAR; INTRAVENOUS 2 TIMES DAILY
Status: DISCONTINUED | OUTPATIENT
Start: 2025-06-12 | End: 2025-06-13

## 2025-06-12 RX ORDER — POTASSIUM CHLORIDE 1500 MG/1
40 TABLET, EXTENDED RELEASE ORAL ONCE
Status: COMPLETED | OUTPATIENT
Start: 2025-06-12 | End: 2025-06-12

## 2025-06-12 RX ADMIN — POTASSIUM CHLORIDE 40 MEQ: 1500 TABLET, EXTENDED RELEASE ORAL at 08:39

## 2025-06-12 RX ADMIN — FUROSEMIDE 40 MG: 10 INJECTION, SOLUTION INTRAVENOUS at 17:40

## 2025-06-12 RX ADMIN — APIXABAN 5 MG: 5 TABLET, FILM COATED ORAL at 17:40

## 2025-06-12 RX ADMIN — ATORVASTATIN CALCIUM 40 MG: 40 TABLET, FILM COATED ORAL at 05:24

## 2025-06-12 RX ADMIN — THERA TABS 1 TABLET: TAB at 17:40

## 2025-06-12 RX ADMIN — FUROSEMIDE 40 MG: 10 INJECTION, SOLUTION INTRAVENOUS at 05:24

## 2025-06-12 RX ADMIN — LOSARTAN POTASSIUM 50 MG: 50 TABLET, FILM COATED ORAL at 17:40

## 2025-06-12 RX ADMIN — APIXABAN 5 MG: 5 TABLET, FILM COATED ORAL at 05:24

## 2025-06-12 ASSESSMENT — ENCOUNTER SYMPTOMS
SORE THROAT: 0
WHEEZING: 0
MYALGIAS: 0
FOCAL WEAKNESS: 0
HEADACHES: 0
CHILLS: 0
SEIZURES: 0
COUGH: 0
PALPITATIONS: 0
FEVER: 0
FALLS: 0
SINUS PAIN: 0
DIZZINESS: 1
CONSTIPATION: 0
DOUBLE VISION: 0
ABDOMINAL PAIN: 0
TREMORS: 0
INSOMNIA: 0
WEIGHT LOSS: 0
ORTHOPNEA: 0
HALLUCINATIONS: 0
PND: 0
NERVOUS/ANXIOUS: 0
HEARTBURN: 0
EYE PAIN: 0
MEMORY LOSS: 0
SHORTNESS OF BREATH: 1
DIARRHEA: 0
DEPRESSION: 0
BRUISES/BLEEDS EASILY: 0
BLURRED VISION: 0
EYE REDNESS: 0
WEAKNESS: 0

## 2025-06-12 ASSESSMENT — FIBROSIS 4 INDEX: FIB4 SCORE: 2.98

## 2025-06-12 NOTE — CARE PLAN
Problem: Pain - Standard  Goal: Alleviation of pain or a reduction in pain to the patient’s comfort goal  Outcome: Progressing     Problem: Knowledge Deficit - Standard  Goal: Patient and family/care givers will demonstrate understanding of plan of care, disease process/condition, diagnostic tests and medications  Outcome: Progressing     Problem: Care Map:  Day 2 Optimal Outcome for the Heart Failure Patient  Goal: Day 2:  Optimal Care of the heart failure patient  Outcome: Progressing   The patient is Watcher - Medium risk of patient condition declining or worsening    Shift Goals  Clinical Goals: monitor labs, monitor vitls  Patient Goals: rest, go home  Family Goals: support    Progress made toward(s) clinical / shift goals:   is A&O x 4 and able to make needs known. Patient is on furosemide for diuresis and is responding appropriately. Patient denies pain at this time.    Patient is not progressing towards the following goals:

## 2025-06-12 NOTE — PROGRESS NOTES
[facility] [place] Internal Medicine Daily Progress Note    Date of Service  6/12/2025    [facility] [place] Team: R ALTON Dickey Team   Attending: Tarah Bates M.d.  Senior Resident: Dr. Vaughn  Intern:  Dr. Antunez  Contact Number: [phone]    Chief Complaint  Chinyere Priest is a 69 y.o. female admitted 6/11/2025 with dizziness and shortness of breath    Hospital Course  Chinyere Priest is a 69 y.o. female who presented 6/11/2025 with A-fib on Eliquis, heart failure with preserved ejection fraction, hypertension, mitral regurgitation, lumbar radiculopathy, AMINAH who presents with worsening dizziness, lightheadedness and exertional shortness of breath.  Patient was subsequently admitted for acute on chronic systolic heart failure with preserved ejection fraction.  Patient has a known history of moderate to severe mitral regurgitation followed Dr. Flowers at [facility].  Patient is scheduled for echocardiogram stress test for July 2025 for further evaluation of mitral valve.  She was started on IV diuretics and cardiology will be consulted for evaluation and possibility of new echo in the setting of decompensated CHF.    Interval Problem Update  -This a.m. patient reports feeling less short of breath.  She also reports that dizziness and lightheadedness have improved compared to yesterday.  - Patient was started on 40 mg IV Lasix daily however total output was +240.  - At examination patient is still having mild crackles in both bases.  Likely volume overloaded.  Not needing oxygen.  - Will optimize diuresis increasing Lasix 40 mg from daily to twice daily  - Discussed case with cardiology who recommends against new echo.  Recommended continue management for CHF exacerbation and discharge when symptoms improve.    I have discussed this patient's plan of care and discharge plan at IDT rounds today with Case Management, Nursing, Nursing leadership, and other members of the IDT  team.    Consultants/Specialty  cardiology    Code Status  Full Code    Disposition  The patient is not medically cleared for discharge to home or a post-acute facility.      I have placed the appropriate orders for post-discharge needs.    Review of Systems  Review of Systems   Constitutional:  Negative for chills, fever and weight loss.   HENT:  Negative for hearing loss, sinus pain, sore throat and tinnitus.    Eyes:  Negative for blurred vision, double vision, pain and redness.   Respiratory:  Positive for shortness of breath. Negative for cough and wheezing.    Cardiovascular:  Negative for chest pain, palpitations, orthopnea, leg swelling and PND.   Gastrointestinal:  Negative for abdominal pain, constipation, diarrhea and heartburn.   Genitourinary:  Negative for dysuria and hematuria.   Musculoskeletal:  Negative for falls, joint pain and myalgias.   Skin:  Negative for itching and rash.   Neurological:  Positive for dizziness. Negative for tremors, focal weakness, seizures, weakness and headaches.        Lightheadedness   Endo/Heme/Allergies:  Does not bruise/bleed easily.   Psychiatric/Behavioral:  Negative for depression, hallucinations and memory loss. The patient is not nervous/anxious and does not have insomnia.         Physical Exam  Temp:  [36.1 °C (97 °F)-36.3 °C (97.3 °F)] 36.3 °C (97.3 °F)  Pulse:  [] 54  Resp:  [16-20] 16  BP: (107-151)/(58-84) 107/66  SpO2:  [93 %-97 %] 95 %    Physical Exam  Cardiovascular:      Rate and Rhythm: Normal rate. Rhythm irregular.      Heart sounds: Murmur (Murmur (apical holosystolic murmur 4/6 that radiates to left armpit)) heard.      No friction rub.   Pulmonary:      Effort: No respiratory distress.      Breath sounds: No stridor.      Comments: Mild crackles in both bases predominantly at right.  Abdominal:      General: There is no distension.      Palpations: There is no mass.      Tenderness: There is no abdominal tenderness.      Hernia: No hernia is  present.   Musculoskeletal:         General: No swelling or tenderness.   Skin:     Capillary Refill: Capillary refill takes less than 2 seconds.      Coloration: Skin is not jaundiced or pale.   Neurological:      Mental Status: She is alert.      Cranial Nerves: No cranial nerve deficit.   Psychiatric:         Behavior: Behavior normal.         Fluids    Intake/Output Summary (Last 24 hours) at 6/12/2025 1218  Last data filed at 6/12/2025 0800  Gross per 24 hour   Intake 480 ml   Output 100 ml   Net 380 ml       Laboratory  Recent Labs     06/11/25  1102 06/12/25  0046   WBC 7.2 6.6   RBC 3.96* 3.81*   HEMOGLOBIN 12.5 12.2   HEMATOCRIT 38.1 36.7*   MCV 96.2 96.3   MCH 31.6 32.0   MCHC 32.8 33.2   RDW 43.2 43.6   PLATELETCT 201 189   MPV 10.8 11.0     Recent Labs     06/11/25  1102 06/12/25  0046   SODIUM 139 139   POTASSIUM 3.8 3.5*   CHLORIDE 101 104   CO2 25 24   GLUCOSE 98 90   BUN 20 22   CREATININE 1.15 1.22   CALCIUM 9.6 9.2                   Imaging  CT-CTA NECK WITH & W/O-POST PROCESSING   Final Result      1.  Extensive atherosclerotic vascular calcification in the aortic arch and great vessels.   2.  Extensive calcific atherosclerotic plaque in the common carotid arteries and cervical carotid bifurcations with carotid bifurcation stenosis estimated at just under 50% bilaterally.   3.  The cervical vertebral arteries bilaterally.   4.  Moderate bilateral apical pleural-parenchymal scarring consistent with old granulomatous disease.      CT-CTA HEAD WITH & W/O-POST PROCESS   Final Result      CT angiogram of the [place] within normal limits.      DX-CHEST-PORTABLE (1 VIEW)   Final Result      No acute cardiac or pulmonary abnormalities are identified.           Assessment/Plan  Problem Representation:    * Pre-syncope- (present on admission)  Assessment & Plan  Patient with worsening lightheadedness and dizziness in the last 2 weeks.  Likely in the setting of worsening mitral regurgitation.    No  focal neurodeficits.  CTA head and neck negative for stroke.  Positive for 50% stenosis of carotid arteries.  Echo from 03/2025 showed EF 60% moderate mitral regurgitation, RVSP 55 to 60 mmHg.   Patient is being close follow-up by cardiologist at [facility].  She has to exercise treadmill echo for MR evaluation on 07/2025  -Telemetry  -Fall precautions  -Orthostatic vitals  - Cardiology was consulted about the possibility of new echo.  They recommended against this imaging given patient's having acute CHF exacerbation and will have new echo within 1 month      Acute on chronic heart failure with preserved ejection fraction (HCC)- (present on admission)  Assessment & Plan  Likely in the setting of mitral regurgitation  Last echo March 2025 showed EF 60% with moderate mitral regurgitation.  Patient follows up with cardiology now.  No current pitting edema.  On torsemide 10 mg at home.  Mild crackles in both bases.  Chest x-ray shows mild congestion.  proBNP 1800s  Patient still having bibasilar crackles.  Output: +240.  -Increase IV Lasix from 40 mg daily to twice daily  - Fluid restriction to 1.8 L  - Daily weights  - Strict ins and outs  - Restrict sodium diet to 2 g daily  - Will monitor electrolytes with daily CMP's  -Discussed case with cardiology who recommends against new echo.  Recommended continue management for CHF exacerbation and discharge when symptoms improve.    Nonrheumatic mitral valve regurgitation- (present on admission)  Assessment & Plan  Echo from 03/2025 showed EF 60% moderate mitral regurgitation  Likely possible cause of acute on chronic congestive heart failure  Patient follows up condition in [facility] cardiology  She has been scheduled for stress echo for July 2025  Discussed case with cardiology who recommends against new echo.  Recommended continue management for CHF exacerbation and discharge when symptoms improve.      Pulmonary hypertension (HCC)- (present on admission)  Assessment &  Plan  Moderate pulmonary hypertension as per last echo SBP 55-60 mmHg  Likely in the setting of AMINAH  Patient does not use CPAP at night.   Patient reports she underwent sleep study a few months ago however she was not able to complete it.  Patient will have follow-up with pulmonology in July 2025    Dyslipidemia  Assessment & Plan  History of dyslipidemia  On atorvastatin 10 mg outpatient  LDL from 04/25: 78, ASCVD score 15.4%, makes the patient amenable for moderate to high intensity statins  - Atorvastatin 40 mg    Atrial fibrillation (HCC)- (present on admission)  Assessment & Plan  Patient with chronic condition, status post ablation  No RVR ongoing.  Patient denies palpitations.  Continue home apixaban 5 mg twice daily      Primary hypertension- (present on admission)  Assessment & Plan  Prior history of hypertension  Controlled with losartan and torsemide outpatient  Currently well-controlled with losartan and IV Lasix.  Continue losartan  Holding torsemide  Will increase Lasix IV 40 mg daily to twice daily with parameters  We will continue monitoring    Asthma in adult without complication- (present on admission)  Assessment & Plan  Prior history of asthma  Patient well-controlled with albuterol inhaler as needed  Showed no current signs of asthma exacerbation  Continue outpatient medication         VTE prophylaxis: Apixaban    I have performed a physical exam and reviewed and updated ROS and Plan today (6/12/2025). In review of yesterday's note (6/11/2025), there are no changes except as documented above.

## 2025-06-12 NOTE — CARE PLAN
The patient is Stable - Low risk of patient condition declining or worsening    Shift Goals  Clinical Goals: Monitor labs, monitor VS, safety  Patient Goals: rest  Family Goals: support    Progress made toward(s) clinical / shift goals:        Problem: Pain - Standard  Goal: Alleviation of pain or a reduction in pain to the patient’s comfort goal  Description: Target End Date:  Prior to discharge or change in level of care    Document on Vitals flowsheet    1.  Document pain using the appropriate pain scale per order or unit policy  2.  Educate and implement non-pharmacologic comfort measures (i.e. relaxation, distraction, massage, cold/heat therapy, etc.)  3.  Pain management medications as ordered  4.  Reassess pain after pain med administration per policy  5.  If opiods administered assess patient's response to pain medication is appropriate per POSS sedation scale  6.  Follow pain management plan developed in collaboration with patient and interdisciplinary team (including palliative care or pain specialists if applicable)  Outcome: Progressing  Note: No c/o pain this shift      Problem: Knowledge Deficit - Standard  Goal: Patient and family/care givers will demonstrate understanding of plan of care, disease process/condition, diagnostic tests and medications  Description: Target End Date:  1-3 days or as soon as patient condition allows    Document in Patient Education    1.  Patient and family/caregiver oriented to unit, equipment, visitation policy and means for communicating concern  2.  Complete/review Learning Assessment  3.  Assess knowledge level of disease process/condition, treatment plan, diagnostic tests and medications  4.  Explain disease process/condition, treatment plan, diagnostic tests and medications  Outcome: Progressing  Note: Reviewed furosemide with pt. Pt understands needs for dose increase while in patient

## 2025-06-12 NOTE — HOSPITAL COURSE
Chinyere Priest is a 69 y.o. female who presented 6/11/2025 with A-fib on Eliquis, heart failure with preserved ejection fraction, hypertension, mitral regurgitation, lumbar radiculopathy, AMINAH who presents with worsening dizziness, lightheadedness and exertional shortness of breath.  Patient was subsequently admitted for acute on chronic systolic heart failure with preserved ejection fraction.  Patient has a known history of moderate to severe mitral regurgitation followed Dr. Flowers at [facility].  Patient is scheduled for echocardiogram stress test for July 2025 for further evaluation of mitral valve.  She was started on IV diuretics and cardiology will be consulted for evaluation and possibility of new echo in the setting of decompensated CHF.

## 2025-06-12 NOTE — PROGRESS NOTES
Cardiology Note:    I was called to discuss this patients care with Dr. Harmony Doll. We discussed the benefit of repeating TTE in the setting of moderate MR. I discussed that this is likely of little clinical benefit and the patient has a scheduled stress echo to further evaluate MR. Encouraged them to continue to diurese the patient and have them follow up as scheduled in 2 weeks.     At this time it was deemed no formal in person cardiology consultation was necessary, however if this changes due to changes in patient condition or abnormal test results, please re-consult cardiology.     Time spent: 10 minutes  I spent over 50% of the encounter communicating with the consulting provider both directly and through the medical record.     Electronically Signed by:  Carlos Pichardo P.A.-C.  6/12/2025  11:38 AM    CPT Code 07868

## 2025-06-12 NOTE — ASSESSMENT & PLAN NOTE
Patient with chronic condition, status post ablation  No RVR ongoing.  Patient denies palpitations.  Continue home apixaban 5 mg twice daily

## 2025-06-13 VITALS
HEART RATE: 86 BPM | OXYGEN SATURATION: 98 % | HEIGHT: 66 IN | BODY MASS INDEX: 23.35 KG/M2 | SYSTOLIC BLOOD PRESSURE: 119 MMHG | WEIGHT: 145.28 LBS | RESPIRATION RATE: 18 BRPM | DIASTOLIC BLOOD PRESSURE: 62 MMHG | TEMPERATURE: 98 F

## 2025-06-13 LAB
ALBUMIN SERPL BCP-MCNC: 4.1 G/DL (ref 3.2–4.9)
ALBUMIN/GLOB SERPL: 1.2 G/DL
ALP SERPL-CCNC: 77 U/L (ref 30–99)
ALT SERPL-CCNC: 16 U/L (ref 2–50)
ANION GAP SERPL CALC-SCNC: 11 MMOL/L (ref 7–16)
ANION GAP SERPL CALC-SCNC: 13 MMOL/L (ref 7–16)
AST SERPL-CCNC: 34 U/L (ref 12–45)
BILIRUB SERPL-MCNC: 0.5 MG/DL (ref 0.1–1.5)
BUN SERPL-MCNC: 29 MG/DL (ref 8–22)
BUN SERPL-MCNC: 29 MG/DL (ref 8–22)
CALCIUM ALBUM COR SERPL-MCNC: 9.4 MG/DL (ref 8.5–10.5)
CALCIUM SERPL-MCNC: 9.4 MG/DL (ref 8.5–10.5)
CALCIUM SERPL-MCNC: 9.5 MG/DL (ref 8.5–10.5)
CHLORIDE SERPL-SCNC: 100 MMOL/L (ref 96–112)
CHLORIDE SERPL-SCNC: 101 MMOL/L (ref 96–112)
CO2 SERPL-SCNC: 23 MMOL/L (ref 20–33)
CO2 SERPL-SCNC: 25 MMOL/L (ref 20–33)
CREAT SERPL-MCNC: 1.21 MG/DL (ref 0.5–1.4)
CREAT SERPL-MCNC: 1.41 MG/DL (ref 0.5–1.4)
GFR SERPLBLD CREATININE-BSD FMLA CKD-EPI: 40 ML/MIN/1.73 M 2
GFR SERPLBLD CREATININE-BSD FMLA CKD-EPI: 48 ML/MIN/1.73 M 2
GLOBULIN SER CALC-MCNC: 3.3 G/DL (ref 1.9–3.5)
GLUCOSE SERPL-MCNC: 111 MG/DL (ref 65–99)
GLUCOSE SERPL-MCNC: 85 MG/DL (ref 65–99)
MAGNESIUM SERPL-MCNC: 2.1 MG/DL (ref 1.5–2.5)
POTASSIUM SERPL-SCNC: 3.7 MMOL/L (ref 3.6–5.5)
POTASSIUM SERPL-SCNC: 4.4 MMOL/L (ref 3.6–5.5)
PROT SERPL-MCNC: 7.4 G/DL (ref 6–8.2)
SODIUM SERPL-SCNC: 136 MMOL/L (ref 135–145)
SODIUM SERPL-SCNC: 137 MMOL/L (ref 135–145)

## 2025-06-13 PROCEDURE — 80048 BASIC METABOLIC PNL TOTAL CA: CPT

## 2025-06-13 PROCEDURE — 700102 HCHG RX REV CODE 250 W/ 637 OVERRIDE(OP)

## 2025-06-13 PROCEDURE — A9270 NON-COVERED ITEM OR SERVICE: HCPCS

## 2025-06-13 PROCEDURE — 80053 COMPREHEN METABOLIC PANEL: CPT

## 2025-06-13 PROCEDURE — 83735 ASSAY OF MAGNESIUM: CPT

## 2025-06-13 PROCEDURE — 99239 HOSP IP/OBS DSCHRG MGMT >30: CPT | Performed by: STUDENT IN AN ORGANIZED HEALTH CARE EDUCATION/TRAINING PROGRAM

## 2025-06-13 RX ORDER — POTASSIUM CHLORIDE 1500 MG/1
40 TABLET, EXTENDED RELEASE ORAL ONCE
Status: COMPLETED | OUTPATIENT
Start: 2025-06-13 | End: 2025-06-13

## 2025-06-13 RX ORDER — TORSEMIDE 10 MG/1
20 TABLET ORAL DAILY
Qty: 100 TABLET | Refills: 0 | Status: SHIPPED | OUTPATIENT
Start: 2025-06-13

## 2025-06-13 RX ORDER — POTASSIUM CITRATE 1080 MG/1
10 TABLET, EXTENDED RELEASE ORAL DAILY
Qty: 90 TABLET | Refills: 0 | Status: SHIPPED | OUTPATIENT
Start: 2025-06-13

## 2025-06-13 RX ADMIN — ATORVASTATIN CALCIUM 40 MG: 40 TABLET, FILM COATED ORAL at 04:48

## 2025-06-13 RX ADMIN — POTASSIUM CHLORIDE 40 MEQ: 1500 TABLET, EXTENDED RELEASE ORAL at 10:08

## 2025-06-13 RX ADMIN — APIXABAN 5 MG: 5 TABLET, FILM COATED ORAL at 04:47

## 2025-06-13 ASSESSMENT — FIBROSIS 4 INDEX: FIB4 SCORE: 3.15

## 2025-06-13 ASSESSMENT — PAIN DESCRIPTION - PAIN TYPE: TYPE: ACUTE PAIN

## 2025-06-13 NOTE — CARE PLAN
The patient is Stable - Low risk of patient condition declining or worsening    Shift Goals  Clinical Goals: monitor VS, Monitor labs.  Patient Goals: rest  Family Goals: support    Progress made toward(s) clinical / shift goals:      Problem: Pain - Standard  Goal: Alleviation of pain or a reduction in pain to the patient’s comfort goal  Description: Target End Date:  Prior to discharge or change in level of care    Document on Vitals flowsheet    1.  Document pain using the appropriate pain scale per order or unit policy  2.  Educate and implement non-pharmacologic comfort measures (i.e. relaxation, distraction, massage, cold/heat therapy, etc.)  3.  Pain management medications as ordered  4.  Reassess pain after pain med administration per policy  5.  If opiods administered assess patient's response to pain medication is appropriate per POSS sedation scale  6.  Follow pain management plan developed in collaboration with patient and interdisciplinary team (including palliative care or pain specialists if applicable)  Outcome: Progressing  Note: No c/o pain this shift      Problem: Knowledge Deficit - Standard  Goal: Patient and family/care givers will demonstrate understanding of plan of care, disease process/condition, diagnostic tests and medications  Description: Target End Date:  1-3 days or as soon as patient condition allows    Document in Patient Education    1.  Patient and family/caregiver oriented to unit, equipment, visitation policy and means for communicating concern  2.  Complete/review Learning Assessment  3.  Assess knowledge level of disease process/condition, treatment plan, diagnostic tests and medications  4.  Explain disease process/condition, treatment plan, diagnostic tests and medications  Outcome: Progressing  Note: Reported increased urination. This RN explained this is expected due to increased lasix dose. Patient reported being decreased SOB with exertion. No SOB at rest

## 2025-06-13 NOTE — DISCHARGE PLANNING
"1301: Discussed in IDT rounds and potential for discharge today or Saturday, 6/14/2025. 2nd IMM delivered to and signed by patient today, 6/13/2025, at 1301. Patient verbalized understanding of her rights as a Medicare patient, including her right to appeal. She is eager to discharge once \"my doctor says I'm ok\". Reports her Spouse, Kyle: [phone], will drive from [facility] to pick her up and drive her to her Daughter, April's: [phone], house in [facility]. Patient plans to stay with her daughter in [place] for a few days post-discharge as she is able to provide a \"little more support\".     -Pending medical clearance as primary team continues to monitor Renal Function.     -Anticipate no case management needs prior discharge.   "

## 2025-06-13 NOTE — DISCHARGE SUMMARY
[facility] Internal Medicine Discharge Summary    Attending: Tarah Bates M.d.  Senior Resident: Dr. Vaughn  Intern:  Dr. Mendez  Contact Number: [phone]    CHIEF COMPLAINT ON ADMISSION  Chief Complaint   Patient presents with    Dizziness     With tension in neck.    Shortness of Breath     Lightheadedness that started 2 weeks ago. Afib started 2 days ago. Hx afib. Patient was referred her for potential valve replacement or a clip per Cardiologist. Patient took blood thinner today.       Reason for Admission  Dizziness     Admission Date  6/11/2025    CODE STATUS  Full Code    HPI & HOSPITAL COURSE  Chinyere Priest is a 69 y.o. female who presented 6/11/2025 with A-fib on Eliquis, heart failure with preserved ejection fraction, hypertension, mitral regurgitation, lumbar radiculopathy, AMINAH who presents with worsening dizziness, lightheadedness and exertional shortness of breath. Patient was subsequently admitted for acute on chronic systolic heart failure with preserved ejection fraction.  Patient has a known history of moderate to severe mitral regurgitation followed Dr. Flowers at [facility]. Patient is scheduled for echocardiogram stress test for July 2025 for further evaluation of mitral valve.  She was started on IV diuretics and cardiology was consulted. Cardiology did not recommend TTE since likely of little clinical benefit and the patient has a scheduled stress echo to further evaluate MR, they recommended diuresis and follow up as scheduled in 2 weeks. Patient was continued on lasix for one more day and stopped with BMP rechecked for kidney function. On day of discharge patient is clinically and hemodynamically stable. She will need to follow up with cardiology and PCP regarding recent admission. Increasing her torsemide up to 20mg daily. Consider increasing statin to HI. Patient in agreement with plan. Will discharge her home.     Therefore, she is discharged in fair and stable condition to  home with close outpatient follow-up.    The patient met 2-midnight criteria for an inpatient stay at the time of discharge.    Discharge Date  03/13/25    Physical Exam on Day of Discharge  Physical Exam  Constitutional:       General: She is not in acute distress.     Appearance: Normal appearance. She is not ill-appearing.   HENT:      Head: Normocephalic and atraumatic.      Nose: Nose normal.      Mouth/Throat:      Mouth: Mucous membranes are moist.   Eyes:      Extraocular Movements: Extraocular movements intact.      Conjunctiva/sclera: Conjunctivae normal.      Pupils: Pupils are equal, round, and reactive to light.   Cardiovascular:      Rate and Rhythm: Normal rate and regular rhythm.      Pulses: Normal pulses.      Heart sounds: Normal heart sounds.   Pulmonary:      Effort: Pulmonary effort is normal. No respiratory distress.      Breath sounds: Normal breath sounds.   Chest:      Chest wall: No tenderness.   Abdominal:      General: Bowel sounds are normal. There is no distension.      Palpations: Abdomen is soft.      Tenderness: There is no abdominal tenderness. There is no right CVA tenderness or left CVA tenderness.   Musculoskeletal:         General: No swelling or tenderness. Normal range of motion.      Cervical back: Normal range of motion and neck supple.      Right lower leg: No edema.      Left lower leg: No edema.   Skin:     General: Skin is warm.      Capillary Refill: Capillary refill takes less than 2 seconds.      Coloration: Skin is not jaundiced or pale.   Neurological:      General: No focal deficit present.      Mental Status: She is alert and oriented to person, place, and time.      Cranial Nerves: No cranial nerve deficit.      Sensory: No sensory deficit.      Motor: No weakness.   Psychiatric:         Mood and Affect: Mood normal.         FOLLOW UP ITEMS POST DISCHARGE  - Follow-up with PCP regarding recent admission.  - Consider increasing her statin.    DISCHARGE  DIAGNOSES  Principal Problem:    Pre-syncope (POA: Yes)  Active Problems:    Asthma in adult without complication (POA: Yes)      Overview: Has a history of asthma, currently on albuterol inhaler as needed.  No       symptoms.    Pulmonary hypertension (HCC) (POA: Yes)      Overview: Has history of documented pulmonary hypertension diagnosed in 2015.      Last echo on 06/20/2019 showed Limited echo to evaluate for pericardial       effusion s/p drain removal.       Prior echo on 6/18/19. Compared to the report of the study done - there       has been no significant change.       Normal left ventricular size and systolic function.      Normal pericardium without effusion.      Pleural effusion noted.      She has history of atrial fibrillation, Following with cardiology.    Nonrheumatic mitral valve regurgitation (POA: Yes)      Overview: May 2019: Echocardiogram with normal LV size, LVEF 65%. Moderate MR,       mild-moderate TR.      May 2017, MR moderately severe, PAH worse but cath showed normal pressures       and no significant MR.  January 2016: Echo normal LV size, mild concentric       LVH, LVEF 65%. Grade I diastolic dysfunction. Trace MR, trace TR, RVSP       40mmHg.  May 2015: Echo normal LV size, mild concentric LVH, LVEF 60%.       Mild RVH, RVSP 55mmHg. Mild MR, trace TR.            Echo 10/23 showed      LEEANNA for afib ablation. Normal biventricular systolic function, LVEF       65%.  Severe TR. Biatrial enlargement. Patient in afib/flutter during       the exam with HR 130s. Stable trivial pericardial effusion. Compared to       prior echo, TR is now severe.            Echo 03/25 showed      Compared to the prior study on 7/2/2024: Similar findings.        Consider LEEANNA for better assessment of MR severity as clinically       warranted      Normal LV size, thickness and systolic function with normal LVEF 60%      Indeterminate LV diastolic function with pseudo relaxation filling       pattern      Normal  RV size and systolic function      Moderate TR      At least moderate MR      No pericardial effusion      Dilated IVC      Pulmonary hypertension with estimated RVSP 55-60 mmHg      Currently following with cardiology.    Primary hypertension (POA: Yes)      Overview:   She is taking losartan 50 mg once daily.  No side effects with       medication.  Takes torsemide with potassium as needed.    Atrial fibrillation (HCC) (POA: Yes)      Overview: Status post ablation x2 (last 12/2019) by Dr. Steele.    Acute on chronic heart failure with preserved ejection fraction (HCC) (POA: Yes)    Dyslipidemia (POA: Unknown)  Resolved Problems:    * No resolved hospital problems. *      FOLLOW UP  Future Appointments   Date Time Provider Department Center   6/18/2025  9:30 AM MAIN LAB [facility] [place]   6/25/2025  9:00 AM DANIELLE Steinberg [facility]   6/26/2025 12:45 PM DANIELLE Lares [facility] None   7/11/2025  9:45 AM [facility] MG 1 [facility]   7/11/2025 10:05 AM [facility]IST[facility] BD 1 [facility]   7/16/2025 10:40 AM Shellie Brito P.A.-C. [facility] None   7/17/2025 10:15 AM [facility] EXAM 12 ECHO [facility]   7/17/2025 12:40 PM Augusto Steele M.D. [facility] None   7/24/2025  9:00 AM ZEUS [place]. [facility]ED [facility]   8/27/2025 10:20 AM Eliceo Guzman M.D. [facility]     Elcieo Guzman M.D.  [address]  [address]  [facility] [address]  [phone]    Follow up in 1 week(s)        MEDICATIONS ON DISCHARGE     Medication List        CHANGE how you take these medications        Instructions   Eliquis 5 MG Tabs  What changed: how much to take  Generic drug: apixaban   TAKE 1 TABLET BY MOUTH TWICE A DAY     torsemide 10 MG tablet  What changed: how much to take  Commonly known as: Demadex   Take 2 Tablets by mouth every day.  Dose: 20 mg            CONTINUE taking these medications        Instructions   albuterol 108 (90 Base) MCG/ACT Aers inhalation aerosol   Inhale 2 Puffs every 6 hours as  needed for Shortness of Breath.  Dose: 2 Puff     atorvastatin 10 MG Tabs  Commonly known as: Lipitor   TAKE 1 TABLET BY MOUTH EVERY DAY  Dose: 10 mg     cyanocobalamin 100 MCG Tabs  Commonly known as: Vitamin B-12   Take 100 mcg by mouth every day.  Dose: 100 mcg     cyclobenzaprine 10 MG Tabs  Commonly known as: Flexeril   Take 1 Tablet by mouth 3 times a day as needed for Mild Pain or Moderate Pain.  Dose: 10 mg     lidocaine 5 % Ptch  Commonly known as: Lidoderm   Doctor's comments: Not Amneal brand please as patient is allergic to this brand  Place 1-3 Patches on the skin every 12 hours as needed (rib pain).  Dose: 1-3 Patch     losartan 50 MG Tabs  Commonly known as: Cozaar   TAKE 1 TABLET BY MOUTH EVERY DAY  Dose: 50 mg     MULTIVITAMIN ADULT PO   Take 1 Tablet by mouth every evening.  Dose: 1 Tablet     potassium citrate SR 10 MEQ (1080 MG) Tbcr  Commonly known as: Urocit-K SR   Take 1 Tablet by mouth every day.  Dose: 10 mEq              Allergies  Allergies[1]    DIET  Orders Placed This Encounter   Procedures    Diet Order Diet: Cardiac; Fluid modifications: (optional): 1800 ml Fluid Restriction     Standing Status:   Standing     Number of Occurrences:   1     Diet::   Cardiac [6]     Fluid modifications: (optional):   1800 ml Fluid Restriction [10]       ACTIVITY  As tolerated.  Weight bearing as tolerated    CONSULTATIONS  Cardiology    PROCEDURES  None    LABORATORY  Lab Results   Component Value Date    SODIUM 136 06/13/2025    POTASSIUM 4.4 06/13/2025    CHLORIDE 100 06/13/2025    CO2 25 06/13/2025    GLUCOSE 85 06/13/2025    BUN 29 (H) 06/13/2025    CREATININE 1.21 06/13/2025        Lab Results   Component Value Date    WBC 6.6 06/12/2025    HEMOGLOBIN 12.2 06/12/2025    HEMATOCRIT 36.7 (L) 06/12/2025    PLATELETCT 189 06/12/2025        Total time of the discharge process exceeds 55 minutes.       [1]   Allergies  Allergen Reactions    Erythromycin Unspecified     Severe Abdominal pain    Tikosyn  [Dofetilide] Diarrhea     diarrhea    Latex Hives     rash    Tape Hives       Paper tape okay

## 2025-06-13 NOTE — PROGRESS NOTES
Discharge order received. No PIV. Rx sent to preferred pharmacy. printed discharge instructions given to pt. All discharge education complete, specifically need to f/u with PCP and come back through the ED for new or worsening symptoms, all questions and concerns were addressed.

## 2025-06-13 NOTE — PROGRESS NOTES
Pt medically cleared for discharge to home/self care. Orders placed for discharge lounge. Pt is Aox4, updated and in agreement with plan. Tele monitor and PIV removed. Pt dressed and all personal possessions accounted for.  is driving from [place] to come  pt for ride to home.

## 2025-06-13 NOTE — CARE PLAN
The patient is Stable - Low risk of patient condition declining or worsening    Shift Goals  Clinical Goals: Monitor labs/VS, diurese  Patient Goals: Feeling better, would like to go home  Family Goals: NA    Progress made toward(s) clinical / shift goals:      Problem: Knowledge Deficit - Standard  Goal: Patient and family/care givers will demonstrate understanding of plan of care, disease process/condition, diagnostic tests and medications  Description: Target End Date:  1-3 days or as soon as patient condition allows    Document in Patient Education    1.  Patient and family/caregiver oriented to unit, equipment, visitation policy and means for communicating concern  2.  Complete/review Learning Assessment  3.  Assess knowledge level of disease process/condition, treatment plan, diagnostic tests and medications  4.  Explain disease process/condition, treatment plan, diagnostic tests and medications  Outcome: Progressing     Problem: Care Map:  Day 3 Optimal Outcome for the Heart Failure Patient  Goal: Day 3:  Optimal Care of the heart failure patient  Description: Target End Date:  end of day 3  Outcome: Met     Problem: Care Map:  Day Before Discharge Optimal Outcome for the Heart Failure Patient  Goal: Day Before Discharge:  Optimal Care of the heart failure patient  Description: Target End Date:  Prior to discharge or change in level of care  Outcome: Progressing       Patient is not progressing towards the following goals: NA

## 2025-06-16 ENCOUNTER — PATIENT OUTREACH (OUTPATIENT)
Dept: MEDICAL GROUP | Facility: MEDICAL CENTER | Age: 70
End: 2025-06-16
Payer: MEDICARE

## 2025-06-16 NOTE — PROGRESS NOTES
Transitional Care Management  TCM Outreach Date and Time: Filed (6/16/2025 11:04 AM)    Discharge Questions  Actual Discharge Date: 06/13/25  Now that you are home, how are you feeling?: Fair (Feeling better but still has SOB on exertion; she understands this is due to her mitral valve regurg and discussed the plan for Cardiology F^. Is able to do ADLs; eat/drink normally; denies CP or leg swelling.)  Did you receive any new prescriptions?: No  Do you have any questions about your current medications or new medications (Review Med Rec)?: No (Med Rec completed and discussed changes per AVS.  She  understands and has no questions.)  Did you have any durable medical equipment ordered?: No  Do you have a follow up appointment scheduled with your PCP?: Yes  Appointment Date: 06/20/25  Appointment Time: 1500  Any issues or paperwork you wish to discuss with your PCP?: Yes (Please specify) (She would like to discuss if she can get home 02 to help with SOB; discussed use of 02 spirometer.  She plans to get one and monitor P02 and share with PCP.)  Are you (patient) able to get to the appointment?: Yes (daughter)  If Home Health was ordered, have they contacted you (Patient): Not Applicable  Did you have enough support after your last discharge?: Yes (Family)  Does this patient qualify for the CCM program?: No    Transitional Care  Number of attempts made to contact patient: 1  Current or previous attempts completed within two business days of discharge? : Yes  Provided education regarding treatment plan, medications, self-management, ADLs?: Yes (Discussed 02 monitoring; S/S to report to ER)  Has patient completed an Advanced Directive?: No  Has the Care Manager's phone number provided?: No  Is there anything else I can help you with?: No (No further concerns, issues, or questions at this time.)    Discharge Summary  Chief Complaint: Neck tension with dizziness; SOB  Admitting Diagnosis: Dizziness; SOB; Acute on chronic HF;  AFib; HX: AFib; CHF; HTN; Mitral valve regurg; Asthma; Pulmonary HTN  Discharge Diagnosis: Pre-syncope; AFib; Acute on chronic HF

## 2025-06-20 ENCOUNTER — OFFICE VISIT (OUTPATIENT)
Dept: MEDICAL GROUP | Facility: MEDICAL CENTER | Age: 70
End: 2025-06-20
Payer: MEDICARE

## 2025-06-20 VITALS
WEIGHT: 150.57 LBS | BODY MASS INDEX: 24.2 KG/M2 | DIASTOLIC BLOOD PRESSURE: 60 MMHG | OXYGEN SATURATION: 97 % | HEART RATE: 87 BPM | SYSTOLIC BLOOD PRESSURE: 92 MMHG | HEIGHT: 66 IN | TEMPERATURE: 96.9 F

## 2025-06-20 DIAGNOSIS — I65.23 ATHEROSCLEROSIS OF BOTH CAROTID ARTERIES: ICD-10-CM

## 2025-06-20 DIAGNOSIS — N18.31 STAGE 3A CHRONIC KIDNEY DISEASE: ICD-10-CM

## 2025-06-20 DIAGNOSIS — Z09 HOSPITAL DISCHARGE FOLLOW-UP: Primary | ICD-10-CM

## 2025-06-20 DIAGNOSIS — I10 PRIMARY HYPERTENSION: ICD-10-CM

## 2025-06-20 DIAGNOSIS — I50.33 ACUTE ON CHRONIC HEART FAILURE WITH PRESERVED EJECTION FRACTION (HCC): ICD-10-CM

## 2025-06-20 RX ORDER — LOSARTAN POTASSIUM 25 MG/1
25 TABLET ORAL DAILY
Qty: 90 TABLET | Refills: 3 | Status: SHIPPED | OUTPATIENT
Start: 2025-06-20

## 2025-06-20 ASSESSMENT — ENCOUNTER SYMPTOMS
CHILLS: 0
FEVER: 0
PALPITATIONS: 0

## 2025-06-20 ASSESSMENT — FIBROSIS 4 INDEX: FIB4 SCORE: 3.15

## 2025-06-20 NOTE — PROGRESS NOTES
Subjective:     Chinyere Priest is a 69 y.o. female who presents for Hospital Follow-up.    Transitional Care Management  TCM Outreach Date and Time: Filed (6/16/2025 11:04 AM)    Discharge Questions  Actual Discharge Date: 06/13/25  Now that you are home, how are you feeling?: Fair (Feeling better but still has SOB on exertion; she understands this is due to her mitral valve regurg and discussed the plan for Cardiology F^. Is able to do ADLs; eat/drink normally; denies CP or leg swelling.)  Did you receive any new prescriptions?: No  Do you have any questions about your current medications or new medications (Review Med Rec)?: No (Med Rec completed and discussed changes per AVS.  She  understands and has no questions.)  Did you have any durable medical equipment ordered?: No  Do you have a follow up appointment scheduled with your PCP?: Yes  Appointment Date: 06/20/25  Appointment Time: 1500  Any issues or paperwork you wish to discuss with your PCP?: Yes (Please specify) (She would like to discuss if she can get home 02 to help with SOB; discussed use of 02 spirometer.  She plans to get one and monitor P02 and share with PCP.)  Are you (patient) able to get to the appointment?: Yes (daughter)  If Home Health was ordered, have they contacted you (Patient): Not Applicable  Did you have enough support after your last discharge?: Yes (Family)  Does this patient qualify for the CCM program?: No    Transitional Care  Number of attempts made to contact patient: 1  Current or previous attempts completed within two business days of discharge? : Yes  Provided education regarding treatment plan, medications, self-management, ADLs?: Yes (Discussed 02 monitoring; S/S to report to ER)  Has patient completed an Advanced Directive?: No  Has the Care Manager's phone number provided?: No  Is there anything else I can help you with?: No (No further concerns, issues, or questions at this time.)    Discharge Summary  Chief  Complaint: Neck tension with dizziness; SOB  Admitting Diagnosis: Dizziness; SOB; Acute on chronic HF; AFib; HX: AFib; CHF; HTN; Mitral valve regurg; Asthma; Pulmonary HTN  Discharge Diagnosis: Pre-syncope; AFib; Acute on chronic HF        HPI:     History of Present Illness  The patient is a 69-year-old female who presents for a hospital follow-up.    Dizziness and Shortness of Breath  - Recently hospitalized due to dizziness and shortness of breath, attributed to acute heart failure  - Received intravenous diuretics, including Lasix, to manage fluid accumulation in her lungs  - Torsemide dosage was increased by an additional 10 mg  - Reports an improvement in her condition  - Notes that her blood pressure readings have been on the lower side    Cardiology Follow-Up  - Scheduled appointment with cardiology next week  - Awaiting a repeat echocardiogram to evaluate the functioning of her heart valve  - Anticipating a stress test    Plaque Buildup  - Extensive testing during her hospital stay included CT scans of the head and neck  - CT scans revealed significant plaque buildup  - Administered dye and IV fluids to manage her condition    Creatinine Levels  - Concerns about her creatinine levels upon discharge due to the effects of the diuretics on her kidneys    COVID-19 Infections  - Two previous COVID-19 infections  - Resulted in moderate bilateral pulmonary scarring           Current medicines (including reconciliation performed today)  Current Outpatient Medications   Medication Sig Dispense Refill    losartan (COZAAR) 25 MG Tab Take 1 Tablet by mouth every day. 90 Tablet 3    torsemide (DEMADEX) 10 MG tablet Take 2 Tablets by mouth every day. 100 Tablet 0    potassium citrate SR (UROCIT-K SR) 10 MEQ (1080 MG) Tab CR Take 1 Tablet by mouth every day. 90 Tablet 0    cyclobenzaprine (FLEXERIL) 10 MG Tab Take 1 Tablet by mouth 3 times a day as needed for Mild Pain or Moderate Pain. 90 Tablet 3    atorvastatin  "(LIPITOR) 10 MG Tab TAKE 1 TABLET BY MOUTH EVERY DAY 90 Tablet 1    albuterol 108 (90 Base) MCG/ACT Aero Soln inhalation aerosol Inhale 2 Puffs every 6 hours as needed for Shortness of Breath. 8.5 g 0    lidocaine (LIDODERM) 5 % Patch Place 1-3 Patches on the skin every 12 hours as needed (rib pain). 90 Patch 3    cyanocobalamin (VITAMIN B-12) 100 MCG Tab Take 100 mcg by mouth every day.      ELIQUIS 5 MG Tab TAKE 1 TABLET BY MOUTH TWICE A  Tablet 3    Multiple Vitamin (MULTIVITAMIN ADULT PO) Take 1 Tablet by mouth every evening.       No current facility-administered medications for this visit.       Allergies:   Erythromycin, Tikosyn [dofetilide], Latex, and Tape    Social History     Tobacco Use    Smoking status: Former     Current packs/day: 0.00     Average packs/day: 0.5 packs/day for 40.0 years (20.0 ttl pk-yrs)     Types: Cigarettes     Start date: 3/1/1970     Quit date: 3/1/2010     Years since quitting: 15.3    Smokeless tobacco: Never   Vaping Use    Vaping status: Never Used   Substance Use Topics    Alcohol use: No    Drug use: Never         Review of Systems   Constitutional:  Negative for chills and fever.   Cardiovascular:  Negative for chest pain, palpitations and leg swelling.        Objective:     Vitals:    06/20/25 1449 06/20/25 1529   BP: (!) 86/58 92/60   BP Location: Left arm    Patient Position: Sitting    BP Cuff Size: Adult    Pulse: 87    Temp: 36.1 °C (96.9 °F)    TempSrc: Temporal    SpO2: 97%    Weight: 68.3 kg (150 lb 9.2 oz)    Height: 1.67 m (5' 5.75\")      Body mass index is 24.49 kg/m².      Physical Exam  Constitutional:       Appearance: Normal appearance. She is well-developed and well-groomed.   HENT:      Head: Normocephalic and atraumatic.   Eyes:      General:         Right eye: No discharge.         Left eye: No discharge.      Conjunctiva/sclera: Conjunctivae normal.   Cardiovascular:      Rate and Rhythm: Normal rate and regular rhythm.      Heart sounds: Murmur " heard.      No friction rub. No gallop.   Pulmonary:      Effort: Pulmonary effort is normal. No respiratory distress.      Breath sounds: Normal breath sounds. No wheezing or rales.   Neurological:      General: No focal deficit present.      Mental Status: She is alert. Mental status is at baseline.      Gait: Gait is intact.   Psychiatric:         Mood and Affect: Mood and affect normal.         Behavior: Behavior normal.          Assessment and Plan:   1. Hospital discharge follow-up    2. Primary hypertension  - losartan (COZAAR) 25 MG Tab; Take 1 Tablet by mouth every day.  Dispense: 90 Tablet; Refill: 3    3. Acute on chronic heart failure with preserved ejection fraction (HCC)    4. Atherosclerosis of both carotid arteries    5. Stage 3a chronic kidney disease      Assessment & Plan  1. Post-hospitalization follow-up  - Blood pressure readings at home indicate hypotension  - Current regimen of torsemide and losartan may contribute to low blood pressure, causing dizziness and syncope  - Reduce losartan dosage to 25 mg (half of current dose)  - Advise taking half a tablet of losartan at home  - Blood test ordered to assess kidney function and A1c levels before next appointment in 08/2025  - Consultation with structural heart program if valve condition deteriorates    2. Acute on chronic heart failure  Acute onset, improving  - Recently hospitalized for acute heart failure and received IV diuretics, including torsemide (increased by 10 mg)  - Appointment with cardiology next week  - Scheduled for repeat echocardiogram to assess valve function  - Cardiologist to manage torsemide dosage and may reorder blood tests to monitor kidney function due to diuretic therapy    3. Chronic kidney disease stage IIIa  Chronic condition, improving  - Kidney function affected by diuretic therapy during recent hospitalization, necessitating IV fluids to flush kidneys  - Blood test ordered to assess kidney function before next  appointment in 08/2025  - Cardiologist may reorder blood tests sooner to monitor kidney function while on diuretics    4. Old granulomatous disease  - Imaging showed moderate bilateral parenchymal scarring likely due to previous COVID-19 infections  - Increased pain and limited mobility  - No immediate intervention planned    5.  Hypertension  Chronic condition, overcontrolled, reduce losartan to 25 mg daily.  Continue torsemide 20 mg daily    6.  Atherosclerosis of both carotid arteries  Chronic condition, stable, continue Lipitor 10 mg daily.    Lab Results   Component Value Date/Time    CHOLSTRLTOT 160 04/22/2025 1018    TRIGLYCERIDE 81 04/22/2025 1018    HDL 66 04/22/2025 1018    LDL 78 04/22/2025 1018      Follow-up  - Patient to follow up in 08/2025 for lab follow-up     - Chart and discharge summary were reviewed.   - Hospitalization and results reviewed with patient.   - Medications reviewed including instructions regarding high risk medications, dosing and side effects.  - Recommended Services: No services needed at this time  - Advance directive/POLST on file?  No         Face-to-face transitional care management services with MODERATE (today's visit is within 14 days post discharge & LACE+ score of 28-58) medical decision complexity were provided.

## 2025-06-26 ENCOUNTER — OFFICE VISIT (OUTPATIENT)
Dept: CARDIOLOGY | Facility: MEDICAL CENTER | Age: 70
End: 2025-06-26
Payer: MEDICARE

## 2025-06-26 VITALS
HEART RATE: 67 BPM | SYSTOLIC BLOOD PRESSURE: 102 MMHG | OXYGEN SATURATION: 98 % | RESPIRATION RATE: 14 BRPM | DIASTOLIC BLOOD PRESSURE: 60 MMHG | BODY MASS INDEX: 24.99 KG/M2 | WEIGHT: 150 LBS | HEIGHT: 65 IN

## 2025-06-26 DIAGNOSIS — Z98.890 S/P ABLATION OF ATRIAL FIBRILLATION: ICD-10-CM

## 2025-06-26 DIAGNOSIS — E78.5 DYSLIPIDEMIA: ICD-10-CM

## 2025-06-26 DIAGNOSIS — I34.0 NONRHEUMATIC MITRAL VALVE REGURGITATION: ICD-10-CM

## 2025-06-26 DIAGNOSIS — Z86.79 S/P ABLATION OF ATRIAL FIBRILLATION: ICD-10-CM

## 2025-06-26 DIAGNOSIS — I48.0 PAROXYSMAL ATRIAL FIBRILLATION (HCC): ICD-10-CM

## 2025-06-26 DIAGNOSIS — I50.33 ACUTE ON CHRONIC HEART FAILURE WITH PRESERVED EJECTION FRACTION (HCC): ICD-10-CM

## 2025-06-26 DIAGNOSIS — I36.1 NONRHEUMATIC TRICUSPID VALVE REGURGITATION: ICD-10-CM

## 2025-06-26 DIAGNOSIS — E78.00 HYPERCHOLESTEROLEMIA: Primary | ICD-10-CM

## 2025-06-26 DIAGNOSIS — I10 PRIMARY HYPERTENSION: ICD-10-CM

## 2025-06-26 PROCEDURE — 99214 OFFICE O/P EST MOD 30 MIN: CPT

## 2025-06-26 ASSESSMENT — ENCOUNTER SYMPTOMS
ORTHOPNEA: 0
SHORTNESS OF BREATH: 1
PND: 0
GASTROINTESTINAL NEGATIVE: 1
EYES NEGATIVE: 1
DIZZINESS: 1
PALPITATIONS: 0
NERVOUS/ANXIOUS: 0
MUSCULOSKELETAL NEGATIVE: 1
DEPRESSION: 0

## 2025-06-26 ASSESSMENT — FIBROSIS 4 INDEX: FIB4 SCORE: 3.15

## 2025-06-26 NOTE — PROGRESS NOTES
Chief Complaint   Patient presents with    Follow-Up     F/v dx: Nonrheumatic mitral valve regurgitation       Subjective     Chinyere Priest is a 69 y.o. female who presents today for hospital discharge follow-up.  She has a history of HFpEF, mitral regurgitation, tricuspid regurgitation, HTN, pulmonary HTN, A-fib status post ablation, CKD, HLD    She was seen by Dr. Hernandez on 4/30/2025, at that visit she had had a LEEANNA that showed moderate to severe MR, progressively worse symptoms, recommended to complete treadmill echocardiogram to further assess.    She was waiting to have that treadmill echocardiogram when she was admitted to the hospital.  She was admitted from 6/11-6/13 for acute on chronic diastolic heart failure exacerbation underwent diuresis and increase oral torsemide to 20 mg    6/26/2025 presents today with her daughter.  She reports overall that she has been doing better since her hospitalization, still does have shortness of breath and lightheadedness.  No chest pain, palpitations.  Does have chronic lower extremity edema which she is managing with torsemide and compression socks    Past Medical History[1]  Past Surgical History[2]  Family History   Problem Relation Age of Onset    Heart Disease Mother         1st MI @ 61 yo    Hypertension Mother     Heart Attack Mother 60    Heart Disease Maternal Aunt     Breast Cancer Maternal Aunt     Cancer Daughter 35        breast    Breast Cancer Daughter     Heart Disease Maternal Grandmother     Heart Disease Maternal Grandfather     Heart Disease Maternal Aunt 56    Ovarian Cancer Other      Social History     Socioeconomic History    Marital status:      Spouse name: Not on file    Number of children: Not on file    Years of education: Not on file    Highest education level: Not on file   Occupational History    Not on file   Tobacco Use    Smoking status: Former     Current packs/day: 0.00     Average packs/day: 0.5 packs/day for  40.0 years (20.0 ttl pk-yrs)     Types: Cigarettes     Start date: 3/1/1970     Quit date: 3/1/2010     Years since quitting: 15.3    Smokeless tobacco: Never   Vaping Use    Vaping status: Never Used   Substance and Sexual Activity    Alcohol use: No    Drug use: Never    Sexual activity: Yes     Partners: Male     Birth control/protection: Post-Menopausal     Comment: , works at [employer](part time)   Other Topics Concern     Service Not Asked    Blood Transfusions Not Asked    Caffeine Concern Not Asked    Occupational Exposure Not Asked    Hobby Hazards Not Asked    Sleep Concern Not Asked    Stress Concern Not Asked    Weight Concern Not Asked    Special Diet Not Asked    Back Care Not Asked    Exercise No    Bike Helmet Not Asked    Seat Belt Not Asked    Self-Exams Not Asked   Social History Narrative    Works at [employer]     Social Drivers of Health     Financial Resource Strain: Not on file   Food Insecurity: No Food Insecurity (12/11/2019)    Hunger Vital Sign     Worried About Running Out of Food in the Last Year: Never true     Ran Out of Food in the Last Year: Never true   Transportation Needs: No Transportation Needs (12/11/2019)    PRAPARE - Transportation     Lack of Transportation (Medical): No     Lack of Transportation (Non-Medical): No   Physical Activity: Not on file   Stress: Not on file   Social Connections: Not on file   Intimate Partner Violence: Not At Risk (6/11/2025)    Humiliation, Afraid, Rape, and Kick questionnaire     Fear of Current or Ex-Partner: No     Emotionally Abused: No     Physically Abused: No     Sexually Abused: No   Housing Stability: Not on file     Allergies[3]  Encounter Medications[4]  Review of Systems   Constitutional:  Positive for malaise/fatigue.   HENT: Negative.     Eyes: Negative.    Respiratory:  Positive for shortness of breath.    Cardiovascular:  Positive for leg swelling. Negative for chest pain, palpitations, orthopnea and PND.   Gastrointestinal:  "Negative.    Genitourinary: Negative.    Musculoskeletal: Negative.    Skin: Negative.    Neurological:  Positive for dizziness.   Endo/Heme/Allergies: Negative.    Psychiatric/Behavioral:  Negative for depression. The patient is not nervous/anxious.               Objective     /60 (BP Location: Left arm, Patient Position: Sitting, BP Cuff Size: Adult)   Pulse 67   Resp 14   Ht 1.651 m (5' 5\")   Wt 68 kg (150 lb)   SpO2 98%   BMI 24.96 kg/m²     Physical Exam  Constitutional:       Appearance: Normal appearance.   HENT:      Head: Normocephalic and atraumatic.   Neck:      Vascular: No JVD.   Cardiovascular:      Rate and Rhythm: Normal rate. Rhythm irregular.      Pulses: Normal pulses.      Heart sounds: Murmur heard.      Systolic murmur is present with a grade of 3/6.      No friction rub.   Pulmonary:      Effort: Pulmonary effort is normal. No respiratory distress.      Breath sounds: Normal breath sounds.   Abdominal:      General: There is no distension.      Palpations: Abdomen is soft.   Musculoskeletal:      Right lower leg: No edema.      Left lower leg: No edema.   Skin:     General: Skin is warm and dry.   Neurological:      General: No focal deficit present.      Mental Status: She is alert and oriented to person, place, and time.   Psychiatric:         Mood and Affect: Mood normal.         Behavior: Behavior normal.            Lab Results   Component Value Date/Time    WBC 6.6 06/12/2025 12:46 AM    RBC 3.81 (L) 06/12/2025 12:46 AM    HEMOGLOBIN 12.2 06/12/2025 12:46 AM    HEMATOCRIT 36.7 (L) 06/12/2025 12:46 AM    MCV 96.3 06/12/2025 12:46 AM    MCH 32.0 06/12/2025 12:46 AM    MCHC 33.2 06/12/2025 12:46 AM    MPV 11.0 06/12/2025 12:46 AM    NEUTSPOLYS 58.80 06/12/2025 12:46 AM    LYMPHOCYTES 23.00 06/12/2025 12:46 AM    MONOCYTES 11.30 06/12/2025 12:46 AM    EOSINOPHILS 6.00 06/12/2025 12:46 AM    BASOPHILS 0.60 06/12/2025 12:46 AM      Lab Results   Component Value Date/Time    " CHOLSTRLTOT 160 04/22/2025 10:18 AM    LDL 78 04/22/2025 10:18 AM    HDL 66 04/22/2025 10:18 AM    TRIGLYCERIDE 81 04/22/2025 10:18 AM       Lab Results   Component Value Date/Time    SODIUM 136 06/13/2025 01:14 PM    POTASSIUM 4.4 06/13/2025 01:14 PM    CHLORIDE 100 06/13/2025 01:14 PM    CO2 25 06/13/2025 01:14 PM    GLUCOSE 85 06/13/2025 01:14 PM    BUN 29 (H) 06/13/2025 01:14 PM    CREATININE 1.21 06/13/2025 01:14 PM     Lab Results   Component Value Date/Time    ALKPHOSPHAT 77 06/13/2025 01:56 AM    ASTSGOT 34 06/13/2025 01:56 AM    ALTSGPT 16 06/13/2025 01:56 AM    TBILIRUBIN 0.5 06/13/2025 01:56 AM          Assessment & Plan     1. Hypercholesterolemia        2. Nonrheumatic mitral valve regurgitation        3. Nonrheumatic tricuspid valve regurgitation        4. Primary hypertension        5. S/P ablation of atrial fibrillation        6. Acute on chronic heart failure with preserved ejection fraction (HCC)        7. Paroxysmal atrial fibrillation (HCC)        8. Dyslipidemia            Medical Decision Making: Today's Assessment/Status/Plan:        Mitral regurgitation, tricuspid regurgitation  - Has upcoming stress echocardiogram to further evaluate  - Likely would need Wiley procedure if severe  - Patient Dr. Hernandez    Atrial fibrillation history of ablation  -Back in A-fib now  - HR has been running between  on her Apple Watch  - Blood pressure is borderline on the low end today, will consider starting beta-blocker if indicated in the future    HFpEF  - Close to euvolemia on exam  - Weights have been stable at home  - Continue torsemide 20 mg daily  - BNP was greater than 1000, likely due to valvular heart disease, consider Farxiga in the future if indicated    HTN, HLD  Blood pressures and cholesterol well-controlled, continue current regimen    Follow-up after stress echocardiogram    This note was dictated using Dragon speech recognition software.                       [1]   Past Medical  History:  Diagnosis Date    Aortic calcification (HCC)      Noted incidentally on abdominal CT scan.    Arrhythmia     hx of afibb, ablation x 2     Arthritis 2010    Asthma in adult     patient states it is only occasionally    Breath shortness 2020    Bronchitis 2019    Chickenpox     Chronic anticoagulation     Chronic back pain      CKD (chronic kidney disease) stage 3, GFR 30-59 ml/min 09/14/2020    Congestive heart failure (HCC) 2024    Dental disorder     Full upper/lower dentures.    Diverticulosis      Hearing difficulty     Heart murmur 2019    High cholesterol     patient denies    Hypertension     Inflamed seborrheic keratosis      Intrinsic eczema 01/20/2020    Mumps     Non-rheumatic mitral regurgitation 05/2019    Echocardiogram with normal LV size, LVEF 65%. Moderate MR, mild-moderate TR, unchanged from previous.    Osteoarthritis of both hips      Palpitations     Plantar fascia syndrome      Plantar wart of right foot      Pneumonia 2019    Pruritic dermatitis      Ongoing for several months but worse at night on her lower extremities    Pulmonary hypertension (HCC)     Pure hypercholesterolemia      Ringing in ears     S/P cardiac catheterization 05/26/2017    1.  Normal right heart pressures. 3.  Essentially normal coronary arteries. 2.  Left ventricular ejection fraction 65%.     Shortness of breath     Spider veins of limb      Swelling of lower extremity     Unspecified vitamin D deficiency      Wears glasses    [2]   Past Surgical History:  Procedure Laterality Date    LUMBAR FUSION O-ARM N/A 04/29/2021    Procedure: FUSION, SPINE, LUMBAR, WITH O-ARM IMAGING GUIDANCE - STAGE #2 L3-5 UNILATERAL, LEFT-SIDED.laminotomy lumbar 3-5;  Surgeon: Chad Clay M.D.;  Location: SURGERY [facility];  Service: Neurosurgery    FUSION, SPINE, XLIF Right 04/27/2021    Procedure: FUSION, SPINE, XLIF - STAGE #1 L3-5, RIGHT-SIDED APPROACH.;  Surgeon: Chad Clay M.D.;  Location: [facility];   Service: Neurosurgery    OTHER CARDIAC SURGERY  2019    cardiac ablation x 2 , for Afibb    LUMBAR LAMINECTOMY DISKECTOMY  2009    Performed by SUDHIR VERMA at SURGERY [facility] ORS    HYSTERECTOMY LAPAROSCOPY      GYN SURGERY       x2    LAMINOTOMY      PRIMARY C SECTION     [3]   Allergies  Allergen Reactions    Erythromycin Unspecified     Severe Abdominal pain    Tikosyn [Dofetilide] Diarrhea     diarrhea    Latex Hives     rash    Tape Hives       Paper tape okay   [4]   Outpatient Encounter Medications as of 2025   Medication Sig Dispense Refill    losartan (COZAAR) 25 MG Tab Take 1 Tablet by mouth every day. 90 Tablet 3    torsemide (DEMADEX) 10 MG tablet Take 2 Tablets by mouth every day. 100 Tablet 0    potassium citrate SR (UROCIT-K SR) 10 MEQ (1080 MG) Tab CR Take 1 Tablet by mouth every day. 90 Tablet 0    cyclobenzaprine (FLEXERIL) 10 MG Tab Take 1 Tablet by mouth 3 times a day as needed for Mild Pain or Moderate Pain. 90 Tablet 3    atorvastatin (LIPITOR) 10 MG Tab TAKE 1 TABLET BY MOUTH EVERY DAY 90 Tablet 1    albuterol 108 (90 Base) MCG/ACT Aero Soln inhalation aerosol Inhale 2 Puffs every 6 hours as needed for Shortness of Breath. 8.5 g 0    lidocaine (LIDODERM) 5 % Patch Place 1-3 Patches on the skin every 12 hours as needed (rib pain). 90 Patch 3    cyanocobalamin (VITAMIN B-12) 100 MCG Tab Take 100 mcg by mouth every day.      ELIQUIS 5 MG Tab TAKE 1 TABLET BY MOUTH TWICE A  Tablet 3    Multiple Vitamin (MULTIVITAMIN ADULT PO) Take 1 Tablet by mouth every evening.       No facility-administered encounter medications on file as of 2025.

## 2025-07-03 ENCOUNTER — HOSPITAL ENCOUNTER (OUTPATIENT)
Dept: CARDIOLOGY | Facility: MEDICAL CENTER | Age: 70
End: 2025-07-03
Attending: INTERNAL MEDICINE
Payer: MEDICARE

## 2025-07-03 DIAGNOSIS — I34.0 NONRHEUMATIC MITRAL VALVE REGURGITATION: ICD-10-CM

## 2025-07-03 PROCEDURE — 93017 CV STRESS TEST TRACING ONLY: CPT | Mod: TC

## 2025-07-08 LAB — LV EJECT FRACT  99904: 60

## 2025-07-08 PROCEDURE — 93018 CV STRESS TEST I&R ONLY: CPT | Performed by: INTERNAL MEDICINE

## 2025-07-08 PROCEDURE — 93350 STRESS TTE ONLY: CPT | Mod: 26 | Performed by: INTERNAL MEDICINE

## 2025-07-09 ENCOUNTER — TELEPHONE (OUTPATIENT)
Dept: CARDIOLOGY | Facility: MEDICAL CENTER | Age: 70
End: 2025-07-09

## 2025-07-09 ENCOUNTER — HOSPITAL ENCOUNTER (OUTPATIENT)
Dept: RADIOLOGY | Facility: MEDICAL CENTER | Age: 70
End: 2025-07-09
Attending: FAMILY MEDICINE
Payer: MEDICARE

## 2025-07-09 DIAGNOSIS — M85.852 OSTEOPENIA OF NECK OF LEFT FEMUR: ICD-10-CM

## 2025-07-09 DIAGNOSIS — I34.0 NONRHEUMATIC MITRAL VALVE REGURGITATION: Primary | ICD-10-CM

## 2025-07-09 DIAGNOSIS — I34.0 SEVERE MITRAL REGURGITATION: ICD-10-CM

## 2025-07-09 DIAGNOSIS — Z12.31 ENCOUNTER FOR SCREENING MAMMOGRAM FOR MALIGNANT NEOPLASM OF BREAST: ICD-10-CM

## 2025-07-09 DIAGNOSIS — Z00.6 EXAMINATION OF PARTICIPANT IN CLINICAL TRIAL: ICD-10-CM

## 2025-07-09 PROCEDURE — 77063 BREAST TOMOSYNTHESIS BI: CPT | Mod: 50

## 2025-07-09 PROCEDURE — 77080 DXA BONE DENSITY AXIAL: CPT

## 2025-07-09 NOTE — TELEPHONE ENCOUNTER
Discussed patient's stress echo with Dr. Hernandez. He recommends Mitral SAHARA pathway if patient is agreeable.    Called to discuss with patient. She is agreeable to proceed with Mitral SAHARA 7/15/2025. Scheduled consult with Dr. Hernandez 7/11/2025. Advised patient I would send in scheduling orders today and preadmit may reach out to her. Patient verbalizes understanding. Provided direct phone number should she have any questions or concerns prior to her visit.

## 2025-07-10 ENCOUNTER — APPOINTMENT (OUTPATIENT)
Dept: ADMISSIONS | Facility: MEDICAL CENTER | Age: 70
DRG: 266 | End: 2025-07-10
Attending: INTERNAL MEDICINE
Payer: MEDICARE

## 2025-07-10 ENCOUNTER — TELEPHONE (OUTPATIENT)
Dept: CARDIOLOGY | Facility: MEDICAL CENTER | Age: 70
End: 2025-07-10
Payer: MEDICARE

## 2025-07-10 NOTE — TELEPHONE ENCOUNTER
Procedure Type: Isolated MVr   Perioperative Outcome Estimate %   Operative Mortality 3.39%   Morbidity & Mortality 12%   Stroke 1.39%   Renal Failure 3.91%   Reoperation 4.37%   Prolonged Ventilation 8.58%   Deep Sternal Wound Infection 0.106%   Long Hospital Stay (>14 days) 7.66%   Short Hospital Stay (<6 days)* 21.8%     *higher values reflect a better outcome

## 2025-07-11 ENCOUNTER — DOCUMENTATION (OUTPATIENT)
Dept: CARDIOLOGY | Facility: MEDICAL CENTER | Age: 70
End: 2025-07-11
Payer: MEDICARE

## 2025-07-11 ENCOUNTER — APPOINTMENT (OUTPATIENT)
Dept: RADIOLOGY | Facility: MEDICAL CENTER | Age: 70
End: 2025-07-11
Attending: FAMILY MEDICINE
Payer: MEDICARE

## 2025-07-11 ENCOUNTER — PRE-ADMISSION TESTING (OUTPATIENT)
Dept: ADMISSIONS | Facility: MEDICAL CENTER | Age: 70
DRG: 266 | End: 2025-07-11
Attending: INTERNAL MEDICINE
Payer: MEDICARE

## 2025-07-11 ENCOUNTER — OFFICE VISIT (OUTPATIENT)
Dept: CARDIOLOGY | Facility: MEDICAL CENTER | Age: 70
End: 2025-07-11
Attending: INTERNAL MEDICINE
Payer: MEDICARE

## 2025-07-11 VITALS
DIASTOLIC BLOOD PRESSURE: 60 MMHG | RESPIRATION RATE: 14 BRPM | OXYGEN SATURATION: 95 % | HEIGHT: 65 IN | BODY MASS INDEX: 25.16 KG/M2 | SYSTOLIC BLOOD PRESSURE: 102 MMHG | HEART RATE: 109 BPM | WEIGHT: 151 LBS

## 2025-07-11 DIAGNOSIS — I34.0 NONRHEUMATIC MITRAL VALVE REGURGITATION: Primary | ICD-10-CM

## 2025-07-11 DIAGNOSIS — I34.0 SEVERE MITRAL REGURGITATION: Primary | ICD-10-CM

## 2025-07-11 DIAGNOSIS — Z01.812 PRE-OPERATIVE LABORATORY EXAMINATION: Primary | ICD-10-CM

## 2025-07-11 LAB
ABO GROUP BLD: NORMAL
ALBUMIN SERPL BCP-MCNC: 4.3 G/DL (ref 3.2–4.9)
ALBUMIN/GLOB SERPL: 1.4 G/DL
ALP SERPL-CCNC: 76 U/L (ref 30–99)
ALT SERPL-CCNC: 14 U/L (ref 2–50)
ANION GAP SERPL CALC-SCNC: 12 MMOL/L (ref 7–16)
APTT PPP: 29.7 SEC (ref 24.7–36)
AST SERPL-CCNC: 27 U/L (ref 12–45)
BASOPHILS # BLD AUTO: 0.6 % (ref 0–1.8)
BASOPHILS # BLD: 0.04 K/UL (ref 0–0.12)
BILIRUB SERPL-MCNC: 0.6 MG/DL (ref 0.1–1.5)
BLD GP AB SCN SERPL QL: NORMAL
BUN SERPL-MCNC: 17 MG/DL (ref 8–22)
CALCIUM ALBUM COR SERPL-MCNC: 9.2 MG/DL (ref 8.5–10.5)
CALCIUM SERPL-MCNC: 9.4 MG/DL (ref 8.5–10.5)
CHLORIDE SERPL-SCNC: 101 MMOL/L (ref 96–112)
CO2 SERPL-SCNC: 28 MMOL/L (ref 20–33)
CREAT SERPL-MCNC: 1.16 MG/DL (ref 0.5–1.4)
EKG IMPRESSION: NORMAL
EOSINOPHIL # BLD AUTO: 0.2 K/UL (ref 0–0.51)
EOSINOPHIL NFR BLD: 3 % (ref 0–6.9)
ERYTHROCYTE [DISTWIDTH] IN BLOOD BY AUTOMATED COUNT: 46.5 FL (ref 35.9–50)
GFR SERPLBLD CREATININE-BSD FMLA CKD-EPI: 50 ML/MIN/1.73 M 2
GLOBULIN SER CALC-MCNC: 3 G/DL (ref 1.9–3.5)
GLUCOSE SERPL-MCNC: 84 MG/DL (ref 65–99)
HCT VFR BLD AUTO: 36.8 % (ref 37–47)
HGB BLD-MCNC: 12.2 G/DL (ref 12–16)
IMM GRANULOCYTES # BLD AUTO: 0.02 K/UL (ref 0–0.11)
IMM GRANULOCYTES NFR BLD AUTO: 0.3 % (ref 0–0.9)
INR PPP: 1.35 (ref 0.87–1.13)
LYMPHOCYTES # BLD AUTO: 1.26 K/UL (ref 1–4.8)
LYMPHOCYTES NFR BLD: 18.7 % (ref 22–41)
MCH RBC QN AUTO: 32 PG (ref 27–33)
MCHC RBC AUTO-ENTMCNC: 33.2 G/DL (ref 32.2–35.5)
MCV RBC AUTO: 96.6 FL (ref 81.4–97.8)
MONOCYTES # BLD AUTO: 0.65 K/UL (ref 0–0.85)
MONOCYTES NFR BLD AUTO: 9.6 % (ref 0–13.4)
NEUTROPHILS # BLD AUTO: 4.57 K/UL (ref 1.82–7.42)
NEUTROPHILS NFR BLD: 67.8 % (ref 44–72)
NRBC # BLD AUTO: 0 K/UL
NRBC BLD-RTO: 0 /100 WBC (ref 0–0.2)
NT-PROBNP SERPL IA-MCNC: 2343 PG/ML (ref 0–125)
PLATELET # BLD AUTO: 201 K/UL (ref 164–446)
PMV BLD AUTO: 11.4 FL (ref 9–12.9)
POTASSIUM SERPL-SCNC: 3.6 MMOL/L (ref 3.6–5.5)
PROT SERPL-MCNC: 7.3 G/DL (ref 6–8.2)
PROTHROMBIN TIME: 16.7 SEC (ref 12–14.6)
RBC # BLD AUTO: 3.81 M/UL (ref 4.2–5.4)
RH BLD: NORMAL
SODIUM SERPL-SCNC: 141 MMOL/L (ref 135–145)
WBC # BLD AUTO: 6.7 K/UL (ref 4.8–10.8)

## 2025-07-11 PROCEDURE — 93005 ELECTROCARDIOGRAM TRACING: CPT | Mod: TC | Performed by: INTERNAL MEDICINE

## 2025-07-11 PROCEDURE — 86900 BLOOD TYPING SEROLOGIC ABO: CPT

## 2025-07-11 PROCEDURE — 80053 COMPREHEN METABOLIC PANEL: CPT

## 2025-07-11 PROCEDURE — 99213 OFFICE O/P EST LOW 20 MIN: CPT | Performed by: INTERNAL MEDICINE

## 2025-07-11 PROCEDURE — 99214 OFFICE O/P EST MOD 30 MIN: CPT | Performed by: INTERNAL MEDICINE

## 2025-07-11 PROCEDURE — 3074F SYST BP LT 130 MM HG: CPT | Performed by: INTERNAL MEDICINE

## 2025-07-11 PROCEDURE — 36415 COLL VENOUS BLD VENIPUNCTURE: CPT

## 2025-07-11 PROCEDURE — 99215 OFFICE O/P EST HI 40 MIN: CPT | Performed by: INTERNAL MEDICINE

## 2025-07-11 PROCEDURE — 3078F DIAST BP <80 MM HG: CPT | Performed by: INTERNAL MEDICINE

## 2025-07-11 PROCEDURE — 85610 PROTHROMBIN TIME: CPT

## 2025-07-11 PROCEDURE — 86850 RBC ANTIBODY SCREEN: CPT

## 2025-07-11 PROCEDURE — 83880 ASSAY OF NATRIURETIC PEPTIDE: CPT

## 2025-07-11 PROCEDURE — 93010 ELECTROCARDIOGRAM REPORT: CPT | Performed by: INTERNAL MEDICINE

## 2025-07-11 PROCEDURE — 85025 COMPLETE CBC W/AUTO DIFF WBC: CPT

## 2025-07-11 PROCEDURE — 86901 BLOOD TYPING SEROLOGIC RH(D): CPT

## 2025-07-11 PROCEDURE — 85730 THROMBOPLASTIN TIME PARTIAL: CPT

## 2025-07-11 RX ORDER — AMIODARONE HYDROCHLORIDE 200 MG/1
200 TABLET ORAL
Qty: 60 TABLET | Refills: 11 | Status: ON HOLD | OUTPATIENT
Start: 2025-07-11 | End: 2025-07-16

## 2025-07-11 ASSESSMENT — PATIENT HEALTH QUESTIONNAIRE - PHQ9
5. POOR APPETITE OR OVEREATING: 2 - MORE THAN HALF THE DAYS
CLINICAL INTERPRETATION OF PHQ2 SCORE: 1
SUM OF ALL RESPONSES TO PHQ QUESTIONS 1-9: 8

## 2025-07-11 ASSESSMENT — FIBROSIS 4 INDEX: FIB4 SCORE: 3.15

## 2025-07-11 NOTE — PROGRESS NOTES
Thank you for the opportunity to participate in your patient's care.     Your patient is scheduled for mitral valve transcatheter [id number] repair (Mitral SAHARA) on 7/15/2025    Sincerely,    [facility] Structural Heart Team    ONOFRE New, RN, Structural Heart Program Nurse Coordinator ([phone])  ONOFRE Anne, RN, Structural Heart Program Nurse Coordinator ([phone])

## 2025-07-11 NOTE — PROGRESS NOTES
Valve Program Functional Assessment:     KCCQ12   1a) Showering/bathin  1b) Walking 1 block on ground: 2  1c) Hurrying or joggin  2) Swelling: 3   3) Fatigue: 2  4) Shortness of breath: 2  5) Sleep sitting up: 3  6) Limited enjoyment of life: 1  7) Spend the rest of your life with HF: 1  8a) Hobbies, recreational activities:1  8b) Working or doing household chores:1  8c) Visiting family or friends: 2     Strength   1) _20_____ kg  2) _16_____ kg  3) _14_____ kg  AVG:__16.6____    GUTIERREZ ADLs  Patient independently preforms...   - Bathing: Yes   - Dressing: Yes   - Toileting: Yes   - Transferring: Yes   - Continence: Yes   - Feeding: Yes   Total Score: _6_/6    Living Situation  Patient lives:with adult child (daughter)    Mobility Aids   Patient uses:  none      FRAILTY SCORE: _1_/ 3

## 2025-07-11 NOTE — OR NURSING
Tele pre admit appointment completed with patient for surgery scheduled on 07/15/25.    Patient stated she has received instructions regarding preparation for the procedure from the cardiology office.  Patient also states she has an appointment with Dr. Hernandez today.  Patient was instructed to follow all instructions provided to her by cardiology.     This RN notified surgery scheduling of patient's Latex allergy.

## 2025-07-11 NOTE — Clinical Note
REFERRAL APPROVAL NOTICE         Sent on July 10, 2025                   Chinyere Priest  [address] [address]                   Dear Ms. Priest,    After a careful review of the medical information and benefit coverage, [facility] has processed your referral. See below for additional details.    If applicable, you must be actively enrolled with your insurance for coverage of the authorized service. If you have any questions regarding your coverage, please contact your insurance directly.    REFERRAL INFORMATION   Referral #:  [id number]  Referred-To Department    Referred-By Provider:  Cardiothoracic Surgery    Carlos Hernandez M.D.    Surgery - Cardiothoracic      [address]  [address]  [facility] [address]  [phone] [address] [facility] [address]  [phone]    Referral Start Date:  07/15/2025  Referral End Date:   07/15/2026             SCHEDULING  If you do not already have an appointment, please call [phone] to make an appointment.     MORE INFORMATION  If you do not already have a [employer] account, sign up at: [email or web address]  You can access your medical information, make appointments, see lab results, billing information, and more.  If you have questions regarding this referral, please contact  the [facility] Referrals department at:             [phone]. Monday - Friday 8:00AM - 5:00PM.     Sincerely,    [facility]

## 2025-07-11 NOTE — PROGRESS NOTES
Valve Program Consultation: 7/11/2025 for Mitral SAHARA 7/15/2025    Mental Status Assessment:  Appearance: normal  Behavior: cooperative  Mood/Affect: pleasant  Thought process/content: normal  Cognition: normal  Functional ability: limited d/t SOB/fatigue  Dental Concerns: patient has full upper and lower dentures, patient denies s/s of active oral infection  Further mental assessment needed: no    Post-op Plan of Care:  Support systems: daughter April present during consultation and participating in discussion.  Kyle  Patient understands discharge plan: yes  DME: none  Home health warranted prior to procedure: no  Social concerns for discharge: none  PCP alerted of social concerns: n/a  POLST: none  Advance directive: none, provided patient with blank AD packet to review  Post-op goal: improve SOB/fatigue (worse since 6mo after her last AF ablation), stay out of AF/AFL  Medication instructions: Hold vitamins/supplements x7 days, hold Eliquis x2 days, hold losartan x1 day    Concerns prior to procedure: AF/AFL (consider DCCV intra-op), CKD    Met with patient during New Mitral SAHARA consult.     All patient's questions and concerns were addressed during this visit. They understood pre-operative and post-operative plan of care.    Reviewed patient Mitral SAHARA education packet explaining that information is provided regarding preparation for Mitral SAHARA the night prior, what to expect during the hospital stay, average LOS, and what to look out for post Mitral SAHARA discharge. Explained that patient will require SBE prophylactic antibiotic prior to any dental treatment post Mitral SAHARA. Advised patient not to receive any new vaccinations within 1 week pre- and 1 week post-procedure.     Explained that patient should not eat or drink anything past midnight the day of the procedure. Encouraged patient to wear something clean and comfortable, easy to get on/off to check in Tuesday morning, time TBD. Patient may  have friends and family in the pre-operational area until patient is taken to the operating room, at which time family and friends will be asked to wait on floor 1 [facility] surgical waiting area. On completion of Mitral SAHARA, heart team will update family. Once update is given, there is some time before family/friends may visit patient in their assigned room. Length of stay on average is one night, however patient may stay longer depending on specific needs at that time. Patient given printed instructions sheet with all above stated instructions. Patient states understanding of all material and education presented today and has no further questions at this time. Encouraged patient again, to contact me with any questions or concerns during this work-up process. Patient states understanding.

## 2025-07-11 NOTE — PROGRESS NOTES
Mitral SAHARA 7/15/2025     Mitral SAHARA Candidate: yes  General Anesthesia or MAC: GA with LEEANNA  Unit: telemetry  Clearance needed prior to procedure: no    Check in time of 0730 7/15/2025.     Pre-op appointment completed: yes    NPO after midnight. Hold vitamins/supplements x7 days, hold Eliquis x2 days, hold losartan x1 day.     Met with patient in person after consult with Dr. Hernandez. notified of procedure date/time and check in process.     All questions were answered. Patient has direct extension for any further questions/concerns prior to the procedure.

## 2025-07-12 NOTE — PROGRESS NOTES
Interventional cardiology follow-up consultation Note      Chief Complaint: Dyspnea on exertion, congestive heart failure, mitral regurgitation    Chinyere Priest is a 69 y.o. female  patient presented today consultation regarding valvular heart disease, dyspnea on exertion, congestive heart failure.  Since last evaluated by me she underwent exercise stress test which showed decreased exercise capacity, severe mitral regurgitation with stress.      Past Medical History:   Diagnosis Date    Aortic calcification (HCC)      Noted incidentally on abdominal CT scan.    Arrhythmia     hx of afibb, ablation x 2     Arthritis 2010    Asthma in adult     patient states it is only occasionally    Breath shortness 2020    Bronchitis 2019    Chickenpox     Chronic anticoagulation     Chronic back pain      CKD (chronic kidney disease) stage 3, GFR 30-59 ml/min 09/14/2020    Congestive heart failure (HCC) 2024    Dental disorder     Full upper/lower dentures.    Diverticulosis      Hearing difficulty     Heart murmur 2019    High cholesterol     patient denies    Hypertension     Inflamed seborrheic keratosis      Intrinsic eczema 01/20/2020    Mumps     Non-rheumatic mitral regurgitation 05/2019    Echocardiogram with normal LV size, LVEF 65%. Moderate MR, mild-moderate TR, unchanged from previous.    Osteoarthritis of both hips      Palpitations     Plantar fascia syndrome      Plantar wart of right foot      Pneumonia 2019    Pruritic dermatitis      Ongoing for several months but worse at night on her lower extremities    Pulmonary hypertension (HCC)     Pure hypercholesterolemia      Ringing in ears     S/P cardiac catheterization 05/26/2017    1.  Normal right heart pressures. 3.  Essentially normal coronary arteries. 2.  Left ventricular ejection fraction 65%.     Shortness of breath     Spider veins of limb      Swelling of lower extremity     Unspecified vitamin D deficiency      Wears glasses   "            Current Outpatient Medications   Medication Sig Dispense Refill    amiodarone (CORDARONE) 200 MG Tab Take 1 Tablet by mouth 2 (two) times a day. 60 Tablet 11    losartan (COZAAR) 25 MG Tab Take 1 Tablet by mouth every day. 90 Tablet 3    torsemide (DEMADEX) 10 MG tablet Take 2 Tablets by mouth every day. 100 Tablet 0    potassium citrate SR (UROCIT-K SR) 10 MEQ (1080 MG) Tab CR Take 1 Tablet by mouth every day. 90 Tablet 0    cyclobenzaprine (FLEXERIL) 10 MG Tab Take 1 Tablet by mouth 3 times a day as needed for Mild Pain or Moderate Pain. 90 Tablet 3    atorvastatin (LIPITOR) 10 MG Tab TAKE 1 TABLET BY MOUTH EVERY DAY 90 Tablet 1    albuterol 108 (90 Base) MCG/ACT Aero Soln inhalation aerosol Inhale 2 Puffs every 6 hours as needed for Shortness of Breath. 8.5 g 0    lidocaine (LIDODERM) 5 % Patch Place 1-3 Patches on the skin every 12 hours as needed (rib pain). 90 Patch 3    cyanocobalamin (VITAMIN B-12) 100 MCG Tab Take 100 mcg by mouth every day.      ELIQUIS 5 MG Tab TAKE 1 TABLET BY MOUTH TWICE A DAY (Patient taking differently: 2 times a day.) 180 Tablet 3    Multiple Vitamin (MULTIVITAMIN ADULT PO) Take 1 Tablet by mouth every evening. (Patient not taking: Reported on 7/11/2025)       No current facility-administered medications for this visit.             Physical Exam:  Ambulatory Vitals  /60 (BP Location: Left arm, Patient Position: Sitting, BP Cuff Size: Adult)   Pulse (!) 109   Resp 14   Ht 1.651 m (5' 5\")   Wt 68.5 kg (151 lb)   SpO2 95%    Oxygen Therapy:  Pulse Oximetry: 95 %  BP Readings from Last 4 Encounters:   07/11/25 102/60   06/26/25 102/60   06/20/25 92/60   06/13/25 119/62       Weight/BMI: Body mass index is 25.13 kg/m².  Wt Readings from Last 4 Encounters:   07/11/25 68.5 kg (151 lb)   06/26/25 68 kg (150 lb)   06/20/25 68.3 kg (150 lb 9.2 oz)   06/13/25 65.9 kg (145 lb 4.5 oz)           General: Well appearing and in no apparent distress  Neck: carotid bruits " absent  Lungs: respiratory sounds  normal  Heart: Regular rhythm,  No palpable thrills on palpation, murmurs present, no rubs,   Lower extremity edema trace      Echocardiogram 6/24/2024  CONCLUSIONS  Compared to the prior study on 12/12/2019. Noted pulmonary hypertension   now.  Normal left ventricular chamber size.  Mild concentric left ventricular hypertrophy.  The ejection fraction is measured to be 64 % by Craig's biplane.  Enlarged right atrium.  Moderately dilated left atrium.  Moderate mitral regurgitation.  Moderate tricuspid regurgitation.  Right ventricular systolic pressure is estimated to be  60 mmHg.  Moderate pulmonic insufficiency.       Medical Decision Making:  Problem List Items Addressed This Visit       Nonrheumatic mitral valve regurgitation - Primary    Relevant Medications    amiodarone (CORDARONE) 200 MG Tab    Other Relevant Orders    EKG (Completed)     She has severe mitral regurgitation with stress, continues to have NYHA class III stage C symptoms from her severe mitral regurgitation.  Her mitral regurgitation is primarily secondary due to left atrial dilatation.  She is better suited for percutaneous mitral valve repair using MitraClip.  Risk benefits of this procedure discussed in detail.  She has recurrence of atrial fibrillation, we will restart her amiodarone, we will perform cardioversion during MitraClip insertion.      This note was dictated using Dragon speech recognition software.    Carlos SHARPE  Interventional cardiologist  [facility], [address]  [address] [address]  [place] [address]  Phone: [phone]  Fax: [phone]

## 2025-07-15 ENCOUNTER — APPOINTMENT (OUTPATIENT)
Dept: RADIOLOGY | Facility: MEDICAL CENTER | Age: 70
DRG: 266 | End: 2025-07-15
Attending: NURSE PRACTITIONER
Payer: MEDICARE

## 2025-07-15 ENCOUNTER — ANESTHESIA (OUTPATIENT)
Dept: SURGERY | Facility: MEDICAL CENTER | Age: 70
DRG: 266 | End: 2025-07-15
Payer: MEDICARE

## 2025-07-15 ENCOUNTER — APPOINTMENT (OUTPATIENT)
Dept: CARDIOLOGY | Facility: MEDICAL CENTER | Age: 70
DRG: 266 | End: 2025-07-15
Attending: ANESTHESIOLOGY
Payer: MEDICARE

## 2025-07-15 ENCOUNTER — HOSPITAL ENCOUNTER (INPATIENT)
Facility: MEDICAL CENTER | Age: 70
LOS: 1 days | DRG: 266 | End: 2025-07-16
Attending: INTERNAL MEDICINE | Admitting: INTERNAL MEDICINE
Payer: MEDICARE

## 2025-07-15 ENCOUNTER — ANESTHESIA EVENT (OUTPATIENT)
Dept: SURGERY | Facility: MEDICAL CENTER | Age: 70
DRG: 266 | End: 2025-07-15
Payer: MEDICARE

## 2025-07-15 DIAGNOSIS — I34.0 NONRHEUMATIC MITRAL VALVE REGURGITATION: ICD-10-CM

## 2025-07-15 DIAGNOSIS — Z95.818 S/P MITRAL VALVE CLIP IMPLANTATION: Primary | ICD-10-CM

## 2025-07-15 DIAGNOSIS — Z98.890 S/P MITRAL VALVE CLIP IMPLANTATION: Primary | ICD-10-CM

## 2025-07-15 DIAGNOSIS — I48.0 PAROXYSMAL ATRIAL FIBRILLATION (HCC): ICD-10-CM

## 2025-07-15 PROBLEM — R55 PRE-SYNCOPE: Status: RESOLVED | Noted: 2025-06-11 | Resolved: 2025-07-15

## 2025-07-15 LAB
ABO + RH BLD: NORMAL
ACT BLD: 256 S (ref 74–137)
ACT BLD: 268 S (ref 74–137)
ACT BLD: 308 S (ref 74–137)
ALBUMIN SERPL BCP-MCNC: 3.7 G/DL (ref 3.2–4.9)
ALBUMIN/GLOB SERPL: 1.5 G/DL
ALP SERPL-CCNC: 65 U/L (ref 30–99)
ALT SERPL-CCNC: 12 U/L (ref 2–50)
ANION GAP SERPL CALC-SCNC: 12 MMOL/L (ref 7–16)
AST SERPL-CCNC: 26 U/L (ref 12–45)
BILIRUB SERPL-MCNC: 0.5 MG/DL (ref 0.1–1.5)
BUN SERPL-MCNC: 18 MG/DL (ref 8–22)
CALCIUM ALBUM COR SERPL-MCNC: 9.2 MG/DL (ref 8.5–10.5)
CALCIUM SERPL-MCNC: 9 MG/DL (ref 8.5–10.5)
CHLORIDE SERPL-SCNC: 106 MMOL/L (ref 96–112)
CO2 SERPL-SCNC: 23 MMOL/L (ref 20–33)
CREAT SERPL-MCNC: 1.09 MG/DL (ref 0.5–1.4)
EKG IMPRESSION: NORMAL
ERYTHROCYTE [DISTWIDTH] IN BLOOD BY AUTOMATED COUNT: 45.2 FL (ref 35.9–50)
GFR SERPLBLD CREATININE-BSD FMLA CKD-EPI: 54 ML/MIN/1.73 M 2
GLOBULIN SER CALC-MCNC: 2.4 G/DL (ref 1.9–3.5)
GLUCOSE SERPL-MCNC: 121 MG/DL (ref 65–99)
HCT VFR BLD AUTO: 33.6 % (ref 37–47)
HGB BLD-MCNC: 11.5 G/DL (ref 12–16)
LV EJECT FRACT  99904: 55
MCH RBC QN AUTO: 32.5 PG (ref 27–33)
MCHC RBC AUTO-ENTMCNC: 34.2 G/DL (ref 32.2–35.5)
MCV RBC AUTO: 94.9 FL (ref 81.4–97.8)
NT-PROBNP SERPL IA-MCNC: 1327 PG/ML (ref 0–125)
PLATELET # BLD AUTO: 184 K/UL (ref 164–446)
PMV BLD AUTO: 11.1 FL (ref 9–12.9)
POTASSIUM SERPL-SCNC: 4 MMOL/L (ref 3.6–5.5)
PROT SERPL-MCNC: 6.1 G/DL (ref 6–8.2)
RBC # BLD AUTO: 3.54 M/UL (ref 4.2–5.4)
SODIUM SERPL-SCNC: 141 MMOL/L (ref 135–145)
WBC # BLD AUTO: 6.8 K/UL (ref 4.8–10.8)

## 2025-07-15 PROCEDURE — 85347 COAGULATION TIME ACTIVATED: CPT | Performed by: INTERNAL MEDICINE

## 2025-07-15 PROCEDURE — 33419 REPAIR TCAT MITRAL VALVE: CPT | Mod: 62,Q0 | Performed by: INTERNAL MEDICINE

## 2025-07-15 PROCEDURE — 85027 COMPLETE CBC AUTOMATED: CPT

## 2025-07-15 PROCEDURE — 700102 HCHG RX REV CODE 250 W/ 637 OVERRIDE(OP): Performed by: NURSE PRACTITIONER

## 2025-07-15 PROCEDURE — 93005 ELECTROCARDIOGRAM TRACING: CPT | Mod: TC | Performed by: NURSE PRACTITIONER

## 2025-07-15 PROCEDURE — 80053 COMPREHEN METABOLIC PANEL: CPT

## 2025-07-15 PROCEDURE — 5A2204Z RESTORATION OF CARDIAC RHYTHM, SINGLE: ICD-10-PCS | Performed by: INTERNAL MEDICINE

## 2025-07-15 PROCEDURE — 160015 HCHG STAT PREOP MINUTES: Performed by: INTERNAL MEDICINE

## 2025-07-15 PROCEDURE — 71045 X-RAY EXAM CHEST 1 VIEW: CPT

## 2025-07-15 PROCEDURE — 160002 HCHG RECOVERY MINUTES (STAT): Performed by: INTERNAL MEDICINE

## 2025-07-15 PROCEDURE — 51798 US URINE CAPACITY MEASURE: CPT

## 2025-07-15 PROCEDURE — 02UG3JZ SUPPLEMENT MITRAL VALVE WITH SYNTHETIC SUBSTITUTE, PERCUTANEOUS APPROACH: ICD-10-PCS | Performed by: INTERNAL MEDICINE

## 2025-07-15 PROCEDURE — 160042 HCHG SURGERY MINUTES - EA ADDL 1 MIN LEVEL 5: Performed by: INTERNAL MEDICINE

## 2025-07-15 PROCEDURE — 700105 HCHG RX REV CODE 258: Performed by: NURSE PRACTITIONER

## 2025-07-15 PROCEDURE — 93355 ECHO TRANSESOPHAGEAL (TEE): CPT

## 2025-07-15 PROCEDURE — 160031 HCHG SURGERY MINUTES - 1ST 30 MINS LEVEL 5: Performed by: INTERNAL MEDICINE

## 2025-07-15 PROCEDURE — 83880 ASSAY OF NATRIURETIC PEPTIDE: CPT

## 2025-07-15 PROCEDURE — 93010 ELECTROCARDIOGRAM REPORT: CPT | Performed by: STUDENT IN AN ORGANIZED HEALTH CARE EDUCATION/TRAINING PROGRAM

## 2025-07-15 PROCEDURE — 33418 REPAIR TCAT MITRAL VALVE: CPT | Mod: 62,Q0 | Performed by: INTERNAL MEDICINE

## 2025-07-15 PROCEDURE — C1894 INTRO/SHEATH, NON-LASER: HCPCS | Performed by: INTERNAL MEDICINE

## 2025-07-15 PROCEDURE — A9270 NON-COVERED ITEM OR SERVICE: HCPCS | Performed by: NURSE PRACTITIONER

## 2025-07-15 PROCEDURE — 86901 BLOOD TYPING SEROLOGIC RH(D): CPT

## 2025-07-15 PROCEDURE — 700111 HCHG RX REV CODE 636 W/ 250 OVERRIDE (IP): Performed by: ANESTHESIOLOGY

## 2025-07-15 PROCEDURE — 160193 HCHG PACU STANDARD - 1ST 60 MINS: Performed by: INTERNAL MEDICINE

## 2025-07-15 PROCEDURE — 700111 HCHG RX REV CODE 636 W/ 250 OVERRIDE (IP): Mod: JZ | Performed by: NURSE PRACTITIONER

## 2025-07-15 PROCEDURE — C1751 CATH, INF, PER/CENT/MIDLINE: HCPCS | Performed by: INTERNAL MEDICINE

## 2025-07-15 PROCEDURE — 86900 BLOOD TYPING SEROLOGIC ABO: CPT

## 2025-07-15 PROCEDURE — 700101 HCHG RX REV CODE 250: Performed by: ANESTHESIOLOGY

## 2025-07-15 PROCEDURE — 700111 HCHG RX REV CODE 636 W/ 250 OVERRIDE (IP): Performed by: INTERNAL MEDICINE

## 2025-07-15 PROCEDURE — 160048 HCHG OR STATISTICAL LEVEL 1-5: Performed by: INTERNAL MEDICINE

## 2025-07-15 PROCEDURE — C1893 INTRO/SHEATH, FIXED,NON-PEEL: HCPCS | Performed by: INTERNAL MEDICINE

## 2025-07-15 PROCEDURE — 160192 HCHG ANESTHESIA COMPLEX: Performed by: INTERNAL MEDICINE

## 2025-07-15 PROCEDURE — C1760 CLOSURE DEV, VASC: HCPCS | Performed by: INTERNAL MEDICINE

## 2025-07-15 PROCEDURE — C1887 CATHETER, GUIDING: HCPCS | Performed by: INTERNAL MEDICINE

## 2025-07-15 PROCEDURE — 700105 HCHG RX REV CODE 258: Performed by: INTERNAL MEDICINE

## 2025-07-15 PROCEDURE — 770020 HCHG ROOM/CARE - TELE (206)

## 2025-07-15 DEVICE — KIT MITRACLIP G4 SYSTEM CATHETER SGC0701 (1/EA): Type: IMPLANTABLE DEVICE | Status: FUNCTIONAL

## 2025-07-15 RX ORDER — AMOXICILLIN 250 MG
1 CAPSULE ORAL NIGHTLY
Status: DISCONTINUED | OUTPATIENT
Start: 2025-07-15 | End: 2025-07-16 | Stop reason: HOSPADM

## 2025-07-15 RX ORDER — POLYETHYLENE GLYCOL 3350 17 G/17G
1 POWDER, FOR SOLUTION ORAL 2 TIMES DAILY PRN
Status: DISCONTINUED | OUTPATIENT
Start: 2025-07-15 | End: 2025-07-16 | Stop reason: HOSPADM

## 2025-07-15 RX ORDER — ALBUTEROL SULFATE 90 UG/1
2 INHALANT RESPIRATORY (INHALATION) EVERY 6 HOURS PRN
Status: DISCONTINUED | OUTPATIENT
Start: 2025-07-15 | End: 2025-07-16 | Stop reason: HOSPADM

## 2025-07-15 RX ORDER — DEXAMETHASONE SODIUM PHOSPHATE 4 MG/ML
INJECTION, SOLUTION INTRA-ARTICULAR; INTRALESIONAL; INTRAMUSCULAR; INTRAVENOUS; SOFT TISSUE PRN
Status: DISCONTINUED | OUTPATIENT
Start: 2025-07-15 | End: 2025-07-15 | Stop reason: SURG

## 2025-07-15 RX ORDER — METOPROLOL TARTRATE 1 MG/ML
INJECTION, SOLUTION INTRAVENOUS PRN
Status: DISCONTINUED | OUTPATIENT
Start: 2025-07-15 | End: 2025-07-15 | Stop reason: SURG

## 2025-07-15 RX ORDER — HYDROMORPHONE HYDROCHLORIDE 1 MG/ML
0.4 INJECTION, SOLUTION INTRAMUSCULAR; INTRAVENOUS; SUBCUTANEOUS
Status: DISCONTINUED | OUTPATIENT
Start: 2025-07-15 | End: 2025-07-15 | Stop reason: HOSPADM

## 2025-07-15 RX ORDER — DIPHENHYDRAMINE HYDROCHLORIDE 50 MG/ML
25 INJECTION, SOLUTION INTRAMUSCULAR; INTRAVENOUS EVERY 6 HOURS PRN
Status: DISCONTINUED | OUTPATIENT
Start: 2025-07-15 | End: 2025-07-16 | Stop reason: HOSPADM

## 2025-07-15 RX ORDER — ONDANSETRON 2 MG/ML
4 INJECTION INTRAMUSCULAR; INTRAVENOUS
Status: DISCONTINUED | OUTPATIENT
Start: 2025-07-15 | End: 2025-07-15 | Stop reason: HOSPADM

## 2025-07-15 RX ORDER — BISACODYL 10 MG
10 SUPPOSITORY, RECTAL RECTAL
Status: DISCONTINUED | OUTPATIENT
Start: 2025-07-15 | End: 2025-07-16 | Stop reason: HOSPADM

## 2025-07-15 RX ORDER — DIPHENHYDRAMINE HCL 25 MG
25 TABLET ORAL NIGHTLY PRN
Status: DISCONTINUED | OUTPATIENT
Start: 2025-07-15 | End: 2025-07-16 | Stop reason: HOSPADM

## 2025-07-15 RX ORDER — POTASSIUM CHLORIDE 1500 MG/1
20 TABLET, EXTENDED RELEASE ORAL DAILY
Status: DISCONTINUED | OUTPATIENT
Start: 2025-07-15 | End: 2025-07-16 | Stop reason: HOSPADM

## 2025-07-15 RX ORDER — OXYCODONE HCL 5 MG/5 ML
5 SOLUTION, ORAL ORAL
Status: DISCONTINUED | OUTPATIENT
Start: 2025-07-15 | End: 2025-07-15 | Stop reason: HOSPADM

## 2025-07-15 RX ORDER — DIPHENHYDRAMINE HYDROCHLORIDE 50 MG/ML
12.5 INJECTION, SOLUTION INTRAMUSCULAR; INTRAVENOUS
Status: DISCONTINUED | OUTPATIENT
Start: 2025-07-15 | End: 2025-07-15 | Stop reason: HOSPADM

## 2025-07-15 RX ORDER — SODIUM CHLORIDE, SODIUM LACTATE, POTASSIUM CHLORIDE, CALCIUM CHLORIDE 600; 310; 30; 20 MG/100ML; MG/100ML; MG/100ML; MG/100ML
INJECTION, SOLUTION INTRAVENOUS CONTINUOUS
Status: ACTIVE | OUTPATIENT
Start: 2025-07-15 | End: 2025-07-15

## 2025-07-15 RX ORDER — CEFAZOLIN SODIUM 1 G/3ML
INJECTION, POWDER, FOR SOLUTION INTRAMUSCULAR; INTRAVENOUS PRN
Status: DISCONTINUED | OUTPATIENT
Start: 2025-07-15 | End: 2025-07-15 | Stop reason: SURG

## 2025-07-15 RX ORDER — HEPARIN SODIUM,PORCINE 1000/ML
VIAL (ML) INJECTION PRN
Status: DISCONTINUED | OUTPATIENT
Start: 2025-07-15 | End: 2025-07-15 | Stop reason: SURG

## 2025-07-15 RX ORDER — AMOXICILLIN 250 MG
1 CAPSULE ORAL
Status: DISCONTINUED | OUTPATIENT
Start: 2025-07-15 | End: 2025-07-16 | Stop reason: HOSPADM

## 2025-07-15 RX ORDER — OXYCODONE HCL 5 MG/5 ML
10 SOLUTION, ORAL ORAL
Status: DISCONTINUED | OUTPATIENT
Start: 2025-07-15 | End: 2025-07-15 | Stop reason: HOSPADM

## 2025-07-15 RX ORDER — SODIUM CHLORIDE, SODIUM LACTATE, POTASSIUM CHLORIDE, CALCIUM CHLORIDE 600; 310; 30; 20 MG/100ML; MG/100ML; MG/100ML; MG/100ML
INJECTION, SOLUTION INTRAVENOUS CONTINUOUS
Status: DISCONTINUED | OUTPATIENT
Start: 2025-07-15 | End: 2025-07-15 | Stop reason: HOSPADM

## 2025-07-15 RX ORDER — DOCUSATE SODIUM 100 MG/1
100 CAPSULE, LIQUID FILLED ORAL 2 TIMES DAILY
Status: DISCONTINUED | OUTPATIENT
Start: 2025-07-15 | End: 2025-07-16 | Stop reason: HOSPADM

## 2025-07-15 RX ORDER — HYDROMORPHONE HYDROCHLORIDE 1 MG/ML
0.1 INJECTION, SOLUTION INTRAMUSCULAR; INTRAVENOUS; SUBCUTANEOUS
Status: DISCONTINUED | OUTPATIENT
Start: 2025-07-15 | End: 2025-07-15 | Stop reason: HOSPADM

## 2025-07-15 RX ORDER — AMIODARONE HYDROCHLORIDE 200 MG/1
200 TABLET ORAL
Status: DISCONTINUED | OUTPATIENT
Start: 2025-07-16 | End: 2025-07-16 | Stop reason: HOSPADM

## 2025-07-15 RX ORDER — HYDROMORPHONE HYDROCHLORIDE 1 MG/ML
0.2 INJECTION, SOLUTION INTRAMUSCULAR; INTRAVENOUS; SUBCUTANEOUS
Status: DISCONTINUED | OUTPATIENT
Start: 2025-07-15 | End: 2025-07-15 | Stop reason: HOSPADM

## 2025-07-15 RX ORDER — HALOPERIDOL 5 MG/ML
1 INJECTION INTRAMUSCULAR
Status: DISCONTINUED | OUTPATIENT
Start: 2025-07-15 | End: 2025-07-15 | Stop reason: HOSPADM

## 2025-07-15 RX ORDER — SODIUM PHOSPHATE,MONO-DIBASIC 19G-7G/118
1 ENEMA (ML) RECTAL
Status: DISCONTINUED | OUTPATIENT
Start: 2025-07-15 | End: 2025-07-16 | Stop reason: HOSPADM

## 2025-07-15 RX ORDER — ONDANSETRON 2 MG/ML
4 INJECTION INTRAMUSCULAR; INTRAVENOUS EVERY 4 HOURS PRN
Status: DISCONTINUED | OUTPATIENT
Start: 2025-07-15 | End: 2025-07-16 | Stop reason: HOSPADM

## 2025-07-15 RX ORDER — SODIUM CHLORIDE 9 MG/ML
INJECTION, SOLUTION INTRAVENOUS CONTINUOUS
Status: ACTIVE | OUTPATIENT
Start: 2025-07-15 | End: 2025-07-15

## 2025-07-15 RX ORDER — ACETAMINOPHEN 325 MG/1
650 TABLET ORAL EVERY 6 HOURS PRN
Status: DISCONTINUED | OUTPATIENT
Start: 2025-07-15 | End: 2025-07-16 | Stop reason: HOSPADM

## 2025-07-15 RX ORDER — ATORVASTATIN CALCIUM 10 MG/1
10 TABLET, FILM COATED ORAL EVERY EVENING
Status: DISCONTINUED | OUTPATIENT
Start: 2025-07-16 | End: 2025-07-16 | Stop reason: HOSPADM

## 2025-07-15 RX ORDER — ONDANSETRON 2 MG/ML
INJECTION INTRAMUSCULAR; INTRAVENOUS PRN
Status: DISCONTINUED | OUTPATIENT
Start: 2025-07-15 | End: 2025-07-15 | Stop reason: SURG

## 2025-07-15 RX ORDER — CLOBETASOL PROPIONATE 0.5 MG/G
1 CREAM TOPICAL 2 TIMES DAILY
COMMUNITY

## 2025-07-15 RX ORDER — LOSARTAN POTASSIUM 25 MG/1
25 TABLET ORAL DAILY
Status: DISCONTINUED | OUTPATIENT
Start: 2025-07-15 | End: 2025-07-16 | Stop reason: HOSPADM

## 2025-07-15 RX ORDER — FUROSEMIDE 10 MG/ML
40 INJECTION INTRAMUSCULAR; INTRAVENOUS DAILY
Status: DISCONTINUED | OUTPATIENT
Start: 2025-07-15 | End: 2025-07-16 | Stop reason: HOSPADM

## 2025-07-15 RX ORDER — ALBUTEROL SULFATE 5 MG/ML
2.5 SOLUTION RESPIRATORY (INHALATION)
Status: DISCONTINUED | OUTPATIENT
Start: 2025-07-15 | End: 2025-07-15 | Stop reason: HOSPADM

## 2025-07-15 RX ORDER — HYDRALAZINE HYDROCHLORIDE 20 MG/ML
10 INJECTION INTRAMUSCULAR; INTRAVENOUS
Status: DISCONTINUED | OUTPATIENT
Start: 2025-07-15 | End: 2025-07-16 | Stop reason: HOSPADM

## 2025-07-15 RX ADMIN — SUGAMMADEX 200 MG: 100 INJECTION, SOLUTION INTRAVENOUS at 12:22

## 2025-07-15 RX ADMIN — PROPOFOL 100 MG: 10 INJECTION, EMULSION INTRAVENOUS at 10:21

## 2025-07-15 RX ADMIN — ROCURONIUM BROMIDE 10 MG: 10 INJECTION, SOLUTION INTRAVENOUS at 11:38

## 2025-07-15 RX ADMIN — LOSARTAN POTASSIUM 25 MG: 25 TABLET, FILM COATED ORAL at 16:21

## 2025-07-15 RX ADMIN — POTASSIUM CHLORIDE 20 MEQ: 1500 TABLET, EXTENDED RELEASE ORAL at 16:20

## 2025-07-15 RX ADMIN — SODIUM CHLORIDE, POTASSIUM CHLORIDE, SODIUM LACTATE AND CALCIUM CHLORIDE: 600; 310; 30; 20 INJECTION, SOLUTION INTRAVENOUS at 08:35

## 2025-07-15 RX ADMIN — GLYCOPYRROLATE 0.4 MG: 0.2 INJECTION INTRAMUSCULAR; INTRAVENOUS at 10:53

## 2025-07-15 RX ADMIN — HEPARIN SODIUM 2000 UNITS: 1000 INJECTION, SOLUTION INTRAVENOUS; SUBCUTANEOUS at 11:38

## 2025-07-15 RX ADMIN — HEPARIN SODIUM 10000 UNITS: 1000 INJECTION, SOLUTION INTRAVENOUS; SUBCUTANEOUS at 10:45

## 2025-07-15 RX ADMIN — CEFAZOLIN 2 G: 1 INJECTION, POWDER, FOR SOLUTION INTRAMUSCULAR; INTRAVENOUS at 10:30

## 2025-07-15 RX ADMIN — DEXAMETHASONE SODIUM PHOSPHATE 4 MG: 4 INJECTION INTRA-ARTICULAR; INTRALESIONAL; INTRAMUSCULAR; INTRAVENOUS; SOFT TISSUE at 10:33

## 2025-07-15 RX ADMIN — ONDANSETRON 4 MG: 2 INJECTION INTRAMUSCULAR; INTRAVENOUS at 10:33

## 2025-07-15 RX ADMIN — SODIUM CHLORIDE: 9 INJECTION, SOLUTION INTRAVENOUS at 16:27

## 2025-07-15 RX ADMIN — FUROSEMIDE 40 MG: 10 INJECTION INTRAMUSCULAR; INTRAVENOUS at 16:21

## 2025-07-15 RX ADMIN — ROCURONIUM BROMIDE 50 MG: 10 INJECTION, SOLUTION INTRAVENOUS at 10:21

## 2025-07-15 RX ADMIN — METOPROLOL TARTRATE 5 MG: 5 INJECTION INTRAVENOUS at 10:35

## 2025-07-15 ASSESSMENT — CHA2DS2 SCORE
VASCULAR DISEASE: YES
CHA2DS2 VASC SCORE: 4
AGE 65 TO 74: YES
SEX: FEMALE
PRIOR STROKE OR TIA OR THROMBOEMBOLISM: NO
DIABETES: NO
CHF OR LEFT VENTRICULAR DYSFUNCTION: NO
HYPERTENSION: YES
AGE 75 OR GREATER: NO

## 2025-07-15 ASSESSMENT — PATIENT HEALTH QUESTIONNAIRE - PHQ9
2. FEELING DOWN, DEPRESSED, IRRITABLE, OR HOPELESS: NOT AT ALL
1. LITTLE INTEREST OR PLEASURE IN DOING THINGS: NOT AT ALL
SUM OF ALL RESPONSES TO PHQ9 QUESTIONS 1 AND 2: 0
2. FEELING DOWN, DEPRESSED, IRRITABLE, OR HOPELESS: NOT AT ALL
1. LITTLE INTEREST OR PLEASURE IN DOING THINGS: NOT AT ALL
SUM OF ALL RESPONSES TO PHQ9 QUESTIONS 1 AND 2: 0

## 2025-07-15 ASSESSMENT — FIBROSIS 4 INDEX
FIB4 SCORE: 2.51
FIB4 SCORE: 2.51

## 2025-07-15 ASSESSMENT — PAIN DESCRIPTION - PAIN TYPE: TYPE: ACUTE PAIN;SURGICAL PAIN

## 2025-07-15 NOTE — OR NURSING
1228: pt arrived to PACU in stable condition. VSS. Dressing to right groin is CDI. Soft to touch and no s/s of hematoma.     1245: pt denies pain and nausea at this time.     1312: pt daughter called and updated on pt status and room assignment.     1315: report called to Corinne RN. All pertinent pt information has been discussed.     Pt taken to room in stable condition.

## 2025-07-15 NOTE — PROCEDURES
DATE OF PROCEDURE: 7/15/25    REFERRING PHYSICIAN:     PROCEDURES:  1. Transcatheter mitral valve edge to edge repair (SAHARA or Mitraclip)    PRE PROCEDURAL DIAGNOSIS:  Severe symptomatic functional mitral regurgitation NYHA III C  and high surgical risk.  Atrial fibrillation    POST PROCEDURAL DIAGNOSIS:  Successful transcatheter mitral valve edge to edge repair using two NT clips, reduction of mitral regurgitation from 3+ to 1+  Successful cardioversion    Surgeons: Dr. Hernandez (interventional cardiologist)       DESCRIPTION OF PROCEDURE:  After informed consent was signed by the   patient, the patient was brought into the OR 19.  Bilateral groin was prepped and draped in   usual sterile manner.  The initial timeout was performed.  After anesthesia, cardioversion was performed with 200 J with successful conversion into sinus bradycardia.    A 6-Kittitian venous sheath was placed in the right femoral vein by Modified Seldinger   technique under ultrasound guidance. A PerClose devices was delivered after dilatation of skin track.  An 8.5-Kittitian TorFlex venous sheath was advanced. Half dose IV heparin was given. A Bergholz   needle was inserted into the catheter and both the needle and the sheath were placed   in the right atrium. Under transesophageal echocardiogram guidance, the Bergholz needle   was used to puncture the septum and the catheter was advanced into the left atrium.   Rest of the half dose Heparin was given. A Protrack pigtail wire was positioned into the left   atrium. The sheath was removed and the femoral access site was dilated with   multiple dialators. The 22/24-Kittitian steerable guide catheter handle was inserted into  the left atrium under transesophageal echocardiogram guidance and the dilator was   removed.  Her mitral regurgitation at baseline under anesthesia is not bad but with slight increase  In blood pressure her mitral regurgitation became very severe with 2 distinct jets medial,  lateral.  We were hoping to place 1 NT W clip right in the middle to approximate the valve to reduce mitral regurgitation.  An NT W clip was prepped, advanced into the left atrium, leaflets were grasped right in the middle A2 P2, but this resulted in  No significant change in mitral regurgitation with increasing gradient.  Then this was removed from the body, we went with 2 NT clip strategy.  NT clip was prepped, advanced to left atrium, deployed at lateral A2  P1, this resulted in near complete resolution of lateral jet, so the clip was released.  She still had 3+ mitral regurgitation from medial orifice, mean gradient was 3 mmHg across mitral valve not significantly changed compared to baseline.  Then another NT clip was prepped, advanced to left atrium, advanced across the valve, placed at medial commissure, grabbing A3/P3 leaflets, this resulted in significant reduction of medial orifice mitral regurgitation, + mitral regurgitation reduced to 1+, mean gradient measured between 5 to 7 mmHg based on her heart rate.    The patient tolerated the procedure well. At the end of procedure hemodynamics were   again obtained. The steerable guide catheter was removed and the right femoral venous   access site was closed via PerClose closure.  The patient was then extubated and transferred to PACU in stable condition.      RECOMMENDATION: Guideline directed medical therapy.

## 2025-07-15 NOTE — ANESTHESIA PREPROCEDURE EVALUATION
" Case: [id number] Date/Time: 07/15/25 0855    Procedures:       TRANSCATHETER MITRAL VALVE PROCEDURE (Right: Groin)      ECHOCARDIOGRAM, TRANSESOPHAGEAL, INTRAOPERATIVE (Throat)    Anesthesia type: General    Pre-op diagnosis: SEVERE MITRAL REGURGITATION    Location: [facility] OR 19 / SURGERY [facility]    Surgeons: Carlos Hernandez M.D.            Relevant Problems   PULMONARY   (positive) Asthma in adult without complication      CARDIAC   (positive) Acute on chronic heart failure with preserved ejection fraction (HCC)   (positive) Aortic calcification (HCC)   (positive) Atherosclerosis of both carotid arteries   (positive) Atrial fibrillation (HCC)   (positive) Nonrheumatic mitral valve regurgitation   (positive) Primary hypertension   (positive) Pulmonary hypertension (HCC)         (positive) Stage 3a chronic kidney disease      Other   (positive) Osteoarthritis of both hips     /61   Pulse 98   Temp 36.4 °C (97.5 °F) (Temporal)   Resp 16   Ht 1.676 m (5' 6\")   Wt 67.7 kg (149 lb 4 oz)   SpO2 97%   BMI 24.09 kg/m²     Physical Exam    Airway   Mallampati: II  TM distance: >3 FB  Neck ROM: full       Cardiovascular - normal exam  Rhythm: regular  Rate: normal    (-) murmur     Dental - normal exam           Pulmonary - normal examBreath sounds clear to auscultation     Abdominal    Neurological - normal exam                   Anesthesia Plan    ASA 4       Plan - general       Airway plan will be ETT  LEEANNA Planned        Induction: intravenous    Postoperative Plan: Postoperative administration of opioids is intended.    Pertinent diagnostic labs and testing reviewed    Informed Consent:    Anesthetic plan and risks discussed with patient.    Use of blood products discussed with: patient whom consented to blood products.           "

## 2025-07-15 NOTE — ANESTHESIA PROCEDURE NOTES
Arterial Line    Performed by: Neville Méndez M.D.  Authorized by: Neville Méndez M.D.    Start Time:  7/15/2025 10:26 AM  End Time:  7/15/2025 10:35 AM  Localization: surface landmarks    Patient Location:  OR  Indication: continuous blood pressure monitoring        Catheter Size:  20 G  Seldinger Technique?: Yes    Laterality:  Right  Site:  Radial artery  Line Secured:  Antimicrobial disc, tape and transparent dressing  Events: patient tolerated procedure well with no complications

## 2025-07-15 NOTE — ANESTHESIA TIME REPORT
Anesthesia Start and Stop Event Times       Date Time Event    7/15/2025 1003 Ready for Procedure     1016 Anesthesia Start     1229 Anesthesia Stop          Responsible Staff  07/15/25      Name Role Begin End    Neville Méndez M.D. Anesth 1016 1229          Overtime Reason:  no overtime (within assigned shift)    Comments:

## 2025-07-15 NOTE — PROGRESS NOTES
Received report from PACU RN. Pt transferred to T736. Pt A&Ox4, placed on tele monitoring & VSS. R groin site clean, dry & soft. Bed in lowest & locked position & educated on use of call light. Post-op vitals continued.

## 2025-07-15 NOTE — ANESTHESIA PROCEDURE NOTES
Airway    Date/Time: 7/15/2025 10:22 AM    Performed by: Neville Méndez M.D.  Authorized by: Neville Méndez M.D.    Location:  OR  Urgency:  Elective  Difficult Airway: No    Indications for Airway Management:  Anesthesia      Spontaneous Ventilation: absent    Sedation Level:  Deep  Preoxygenated: Yes    Patient Position:  Sniffing  Mask Difficulty Assessment:  0 - not attempted  Final Airway Type:  Endotracheal airway  Final Endotracheal Airway:  ETT  Cuffed: Yes    Technique Used for Successful ETT Placement:  Direct laryngoscopy    Insertion Site:  Oral  Blade Type:  Rishabh  Laryngoscope Blade/Videolaryngoscope Blade Size:  3  ETT Size (mm):  7.0  Measured from:  Teeth  ETT to Teeth (cm):  21  Placement Verified by: auscultation and capnometry    Cormack-Lehane Classification:  Grade I - full view of glottis  Number of Attempts at Approach:  1

## 2025-07-15 NOTE — PROGRESS NOTES
Pharmacy Medication Reconciliation      ~Medication reconciliation updated and complete per patient   ~Allergies have been verified and updated   ~No outpatient Antimicrobials in the last 30 days  ~Is dispense history available in EPIC: yes  ~Patient home pharmacy :  [facility] [phone]      ~Anticoagulants (rivaroxaban, apixaban, edoxaban, dabigatran, warfarin, enoxaparin) taken in the last 14 days? YES  ~Anticoagulant: Eliquis 5mg, Last dose: 7/12/25 PM

## 2025-07-15 NOTE — ANESTHESIA PROCEDURE NOTES
LEEANNA    Date/Time: 7/15/2025 10:31 AM    Performed by: Neville Méndez M.D.  Authorized by: Neville Méndez M.D.    Start Time:7/15/2025 10:31 AM  Preanesthetic Checklist: patient identified, IV checked, site marked, risks and benefits discussed, surgical consent, monitors and equipment checked, pre-op evaluation and timeout performed    Indication for LEEANNA: diagnostic   Patient Location: OR  Intubated: Yes  Bite Block: Yes  Heart Visualized: Yes  Insertion: atraumatic    **See FULL LEEANNA report in patient's chart via CV Synapse**

## 2025-07-15 NOTE — ANESTHESIA POSTPROCEDURE EVALUATION
Patient: Chinyere Priest    Procedure Summary       Date: 07/15/25 Room / Location: [address] [id number] / SURGERY [facility]    Anesthesia Start: 1016 Anesthesia Stop: 1229    Procedures:       TRANSCATHETER MITRAL VALVE PROCEDURE (Right: Groin)      ECHOCARDIOGRAM, TRANSESOPHAGEAL, INTRAOPERATIVE (Throat) Diagnosis: (SEVERE MITRAL REGURGITATION)    Surgeons: Carlos Hernandez M.D. Responsible Provider: Neville Méndez M.D.    Anesthesia Type: general ASA Status: 4            Final Anesthesia Type: general  Last vitals  BP   Blood Pressure : 119/58    Temp   36.2 °C (97.2 °F)    Pulse   (!) 56   Resp   15    SpO2   100 %      Anesthesia Post Evaluation    Patient location during evaluation: PACU  Patient participation: complete - patient participated  Level of consciousness: awake and alert    Airway patency: patent  Anesthetic complications: no  Cardiovascular status: hemodynamically stable  Respiratory status: face mask    PONV: none          There were no known notable events for this encounter.     Nurse Pain Score: 0 (NPRS)

## 2025-07-16 ENCOUNTER — APPOINTMENT (OUTPATIENT)
Dept: RADIOLOGY | Facility: MEDICAL CENTER | Age: 70
DRG: 266 | End: 2025-07-16
Attending: NURSE PRACTITIONER
Payer: MEDICARE

## 2025-07-16 VITALS
OXYGEN SATURATION: 95 % | SYSTOLIC BLOOD PRESSURE: 113 MMHG | HEIGHT: 66 IN | TEMPERATURE: 97.5 F | WEIGHT: 149.69 LBS | HEART RATE: 60 BPM | RESPIRATION RATE: 17 BRPM | BODY MASS INDEX: 24.06 KG/M2 | DIASTOLIC BLOOD PRESSURE: 54 MMHG

## 2025-07-16 LAB
ALBUMIN SERPL BCP-MCNC: 3.9 G/DL (ref 3.2–4.9)
ALBUMIN/GLOB SERPL: 1.5 G/DL
ALP SERPL-CCNC: 67 U/L (ref 30–99)
ALT SERPL-CCNC: 11 U/L (ref 2–50)
ANION GAP SERPL CALC-SCNC: 15 MMOL/L (ref 7–16)
AST SERPL-CCNC: 30 U/L (ref 12–45)
BILIRUB SERPL-MCNC: 0.5 MG/DL (ref 0.1–1.5)
BUN SERPL-MCNC: 20 MG/DL (ref 8–22)
CALCIUM ALBUM COR SERPL-MCNC: 9.4 MG/DL (ref 8.5–10.5)
CALCIUM SERPL-MCNC: 9.3 MG/DL (ref 8.5–10.5)
CHLORIDE SERPL-SCNC: 103 MMOL/L (ref 96–112)
CO2 SERPL-SCNC: 22 MMOL/L (ref 20–33)
CREAT SERPL-MCNC: 1.18 MG/DL (ref 0.5–1.4)
EKG IMPRESSION: NORMAL
ERYTHROCYTE [DISTWIDTH] IN BLOOD BY AUTOMATED COUNT: 47 FL (ref 35.9–50)
GFR SERPLBLD CREATININE-BSD FMLA CKD-EPI: 49 ML/MIN/1.73 M 2
GLOBULIN SER CALC-MCNC: 2.6 G/DL (ref 1.9–3.5)
GLUCOSE SERPL-MCNC: 174 MG/DL (ref 65–99)
HCT VFR BLD AUTO: 34.7 % (ref 37–47)
HGB BLD-MCNC: 11.5 G/DL (ref 12–16)
MCH RBC QN AUTO: 32 PG (ref 27–33)
MCHC RBC AUTO-ENTMCNC: 33.1 G/DL (ref 32.2–35.5)
MCV RBC AUTO: 96.7 FL (ref 81.4–97.8)
NT-PROBNP SERPL IA-MCNC: 1713 PG/ML (ref 0–125)
PLATELET # BLD AUTO: 196 K/UL (ref 164–446)
PMV BLD AUTO: 11 FL (ref 9–12.9)
POTASSIUM SERPL-SCNC: 4.3 MMOL/L (ref 3.6–5.5)
PROT SERPL-MCNC: 6.5 G/DL (ref 6–8.2)
RBC # BLD AUTO: 3.59 M/UL (ref 4.2–5.4)
SODIUM SERPL-SCNC: 140 MMOL/L (ref 135–145)
WBC # BLD AUTO: 6.9 K/UL (ref 4.8–10.8)

## 2025-07-16 PROCEDURE — 51798 US URINE CAPACITY MEASURE: CPT

## 2025-07-16 PROCEDURE — 700111 HCHG RX REV CODE 636 W/ 250 OVERRIDE (IP): Mod: JZ | Performed by: NURSE PRACTITIONER

## 2025-07-16 PROCEDURE — 700102 HCHG RX REV CODE 250 W/ 637 OVERRIDE(OP): Performed by: NURSE PRACTITIONER

## 2025-07-16 PROCEDURE — A9270 NON-COVERED ITEM OR SERVICE: HCPCS | Performed by: NURSE PRACTITIONER

## 2025-07-16 PROCEDURE — 83880 ASSAY OF NATRIURETIC PEPTIDE: CPT

## 2025-07-16 PROCEDURE — 71045 X-RAY EXAM CHEST 1 VIEW: CPT

## 2025-07-16 PROCEDURE — 93010 ELECTROCARDIOGRAM REPORT: CPT | Performed by: INTERNAL MEDICINE

## 2025-07-16 PROCEDURE — 93005 ELECTROCARDIOGRAM TRACING: CPT | Mod: TC | Performed by: NURSE PRACTITIONER

## 2025-07-16 PROCEDURE — 80053 COMPREHEN METABOLIC PANEL: CPT

## 2025-07-16 PROCEDURE — 85027 COMPLETE CBC AUTOMATED: CPT

## 2025-07-16 RX ORDER — AMIODARONE HYDROCHLORIDE 200 MG/1
200 TABLET ORAL DAILY
Qty: 100 TABLET | Refills: 3 | Status: SHIPPED | OUTPATIENT
Start: 2025-07-17

## 2025-07-16 RX ADMIN — AMIODARONE HYDROCHLORIDE 200 MG: 200 TABLET ORAL at 06:06

## 2025-07-16 RX ADMIN — FUROSEMIDE 40 MG: 10 INJECTION INTRAMUSCULAR; INTRAVENOUS at 06:07

## 2025-07-16 RX ADMIN — POTASSIUM CHLORIDE 20 MEQ: 1500 TABLET, EXTENDED RELEASE ORAL at 06:00

## 2025-07-16 RX ADMIN — LOSARTAN POTASSIUM 25 MG: 25 TABLET, FILM COATED ORAL at 06:07

## 2025-07-16 RX ADMIN — APIXABAN 5 MG: 5 TABLET, FILM COATED ORAL at 06:07

## 2025-07-16 SDOH — ECONOMIC STABILITY: TRANSPORTATION INSECURITY
IN THE PAST 12 MONTHS, HAS LACK OF RELIABLE TRANSPORTATION KEPT YOU FROM MEDICAL APPOINTMENTS, MEETINGS, WORK OR FROM GETTING THINGS NEEDED FOR DAILY LIVING?: NO

## 2025-07-16 ASSESSMENT — COGNITIVE AND FUNCTIONAL STATUS - GENERAL
DAILY ACTIVITIY SCORE: 23
SUGGESTED CMS G CODE MODIFIER DAILY ACTIVITY: CI
SUGGESTED CMS G CODE MODIFIER MOBILITY: CH
DRESSING REGULAR LOWER BODY CLOTHING: A LITTLE
MOBILITY SCORE: 24

## 2025-07-16 ASSESSMENT — SOCIAL DETERMINANTS OF HEALTH (SDOH)
WITHIN THE LAST YEAR, HAVE YOU BEEN HUMILIATED OR EMOTIONALLY ABUSED IN OTHER WAYS BY YOUR PARTNER OR EX-PARTNER?: NO
WITHIN THE LAST YEAR, HAVE TO BEEN RAPED OR FORCED TO HAVE ANY KIND OF SEXUAL ACTIVITY BY YOUR PARTNER OR EX-PARTNER?: NO
WITHIN THE PAST 12 MONTHS, YOU WORRIED THAT YOUR FOOD WOULD RUN OUT BEFORE YOU GOT THE MONEY TO BUY MORE: NEVER TRUE
WITHIN THE LAST YEAR, HAVE YOU BEEN KICKED, HIT, SLAPPED, OR OTHERWISE PHYSICALLY HURT BY YOUR PARTNER OR EX-PARTNER?: NO
IN THE PAST 12 MONTHS, HAS THE ELECTRIC, GAS, OIL, OR WATER COMPANY THREATENED TO SHUT OFF SERVICE IN YOUR HOME?: NO
WITHIN THE PAST 12 MONTHS, THE FOOD YOU BOUGHT JUST DIDN'T LAST AND YOU DIDN'T HAVE MONEY TO GET MORE: NEVER TRUE
WITHIN THE LAST YEAR, HAVE YOU BEEN AFRAID OF YOUR PARTNER OR EX-PARTNER?: NO

## 2025-07-16 ASSESSMENT — LIFESTYLE VARIABLES
DOES PATIENT WANT TO STOP DRINKING: NO
ALCOHOL_USE: NO
CONSUMPTION TOTAL: NEGATIVE
EVER HAD A DRINK FIRST THING IN THE MORNING TO STEADY YOUR NERVES TO GET RID OF A HANGOVER: NO
HAVE YOU EVER FELT YOU SHOULD CUT DOWN ON YOUR DRINKING: NO
ON A TYPICAL DAY WHEN YOU DRINK ALCOHOL HOW MANY DRINKS DO YOU HAVE: 0
HOW MANY TIMES IN THE PAST YEAR HAVE YOU HAD 5 OR MORE DRINKS IN A DAY: 0
TOTAL SCORE: 0
HAVE PEOPLE ANNOYED YOU BY CRITICIZING YOUR DRINKING: NO
EVER FELT BAD OR GUILTY ABOUT YOUR DRINKING: NO
AVERAGE NUMBER OF DAYS PER WEEK YOU HAVE A DRINK CONTAINING ALCOHOL: 0
TOTAL SCORE: 0
TOTAL SCORE: 0

## 2025-07-16 ASSESSMENT — FIBROSIS 4 INDEX: FIB4 SCORE: 3.23

## 2025-07-16 NOTE — PROGRESS NOTES
Monitor Summary  Rhythm: SB/SR  Rate: 56-72  Ectopy: (R) PVC   Measurements: .22/.10/.40  ---12 hr Chart Review---

## 2025-07-16 NOTE — PROGRESS NOTES
Bedside report received and patient care assumed. Pt is resting in bed, A&O 4 , with no complaints of pain, and is on room air. Tele box on. All fall precautions are in place, belongings at bedside table.  Pt was updated on POC, no questions or concerns. Pt educated on use of call light for assistance.

## 2025-07-16 NOTE — PROGRESS NOTES
1630- Bleeding noted from femoral site. Manual pressure held for 15 minutes. Gauze & tegaderm applied & sand bag applied to site for additional pressure. RN notified CHANTEL Chang of continued bleeding. Per APRN- OK to place fem stop for continued bleeding for 2 hrs & to hold evening eliquis.     1715- noticed gauze saturated in blood & increase in bleeding @ fem site. Fem stop applied. Site remains soft to touch in surrounding areas. Bed rest continued. Pt educated.     1830- Pt complaining of increased numbness in leg. R pedal pulse faint. Pressure released & fem stop removed. Slight oozing @ site but slowed from previous. Gauze & pressure dressing applied. Site remains soft to touch & pt's vitals stable. Pedal pulse increased w/ removal of fem stop & numbness subsided. Bed rest continued & eduction provided.     Report given to night shift RN.

## 2025-07-16 NOTE — CARE PLAN
The patient is Stable - Low risk of patient condition declining or worsening    Shift Goals  Clinical Goals: Safety, monitor right FA site, hemodynamic stability, bladder scan, monitor I&O  Patient Goals: Sleep  Family Goals: GEORGE    Progress made toward(s) clinical / shift goals:        Problem: Knowledge Deficit - Standard  Goal: Patient and family/care givers will demonstrate understanding of plan of care, disease process/condition, diagnostic tests and medications  Outcome: Progressing     Problem: Pain - Standard  Goal: Alleviation of pain or a reduction in pain to the patient’s comfort goal  Outcome: Progressing       Patient is not progressing towards the following goals:

## 2025-07-16 NOTE — DISCHARGE SUMMARY
PRIMARY DISCHARGE DIAGNOSIS: Status post MitraClip X 2 NT clips    PROCEDURES:    1. Successful SAHARA with MitraClip X2 NT clips , transfemoral approach under general anesthesia on 7/15/25  2. Intraoperative transesophageal echocardiogram showing:  Intraoperative LEEANNA during mitraclip procedure.  Baseline images show   normal biventricular function with EF = 55%. Two distinct mitral   regurgitation jets at the A3/P3 coaptation and A1/P1 coaptation.    Severe mitral regurgitation.   Two mitraclips placed successfully, one at A3/P3 coaptation and A1/P1   coaptation with reduction in MR to mild. Mean mitral gradient = 5-6 mm   Hg.  Findings communicated at the time of exam.  3. CXR on 7/16/25 showing cardiomegaly, no acute process  4. EKG on 7/16/25 showing SB rate of 56 bpm with first degree block  5. Labs on 7/16/25 showing   Recent Labs     07/15/25  1243 07/16/25  0103   WBC 6.8 6.9   RBC 3.54* 3.59*   HEMOGLOBIN 11.5* 11.5*   HEMATOCRIT 33.6* 34.7*   MCV 94.9 96.7   MCH 32.5 32.0   RDW 45.2 47.0   PLATELETCT 184 196   MPV 11.1 11.0     Recent Labs     07/15/25  1243 07/16/25  0103   SODIUM 141 140   POTASSIUM 4.0 4.3   CHLORIDE 106 103   CO2 23 22   GLUCOSE 121* 174*   BUN 18 20     INR   Date Value Ref Range Status   07/11/2025 1.35 (H) 0.87 - 1.13 Final     Comment:     INR - Non-therapeutic Reference Range: 0.87-1.13  INR - Therapeutic Reference Range: 2.0-4.0       HOSPITAL COURSE: The patient is a pleasant 69 year old female with severe symptomatic mitral regurgitation, HLD with non-obstructive CAD, HTN, paroxysmal afib with successful intra-op DCCV with chronic anticoagulation, pre-diabetes, carotid disease, HFpEF, CKD stage 3a, asthma, anemia, and degenerative disc disease with chronic back pain. Due to the patient's symptoms, the patient underwent successful MitraClip described as above. Post-procedure, the patient did well. They did require IV diuresis during their stay and will continue on oral diuretics  post-operatively. They were able to ambulate without difficulty. No further events were noted during their stay. They are now off oxygen and are to be discharged to home with her daughter.    DISCHARGE MEDICATIONS:      Medication List        CHANGE how you take these medications        Instructions   amiodarone 200 MG Tabs  Start taking on: July 17, 2025  What changed: when to take this  Commonly known as: Cordarone   Take 1 Tablet by mouth every day.  Dose: 200 mg            CONTINUE taking these medications        Instructions   albuterol 108 (90 Base) MCG/ACT Aers inhalation aerosol   Inhale 2 Puffs every 6 hours as needed for Shortness of Breath.  Dose: 2 Puff     atorvastatin 10 MG Tabs  Commonly known as: Lipitor   TAKE 1 TABLET BY MOUTH EVERY DAY  Dose: 10 mg     clobetasol 0.05 % Crea  Commonly known as: Temovate   Apply 1 Application topically 2 times a day.  Dose: 1 Application     cyanocobalamin 100 MCG Tabs  Commonly known as: Vitamin B-12   Take 100 mcg by mouth every day.  Dose: 100 mcg     Eliquis 5 MG Tabs  Generic drug: apixaban   TAKE 1 TABLET BY MOUTH TWICE A DAY     losartan 25 MG Tabs  Commonly known as: Cozaar   Take 1 Tablet by mouth every day.  Dose: 25 mg     potassium citrate SR 10 MEQ (1080 MG) Tbcr  Commonly known as: Urocit-K SR   Take 1 Tablet by mouth every day.  Dose: 10 mEq     torsemide 10 MG tablet  Commonly known as: Demadex   Take 2 Tablets by mouth every day.  Dose: 20 mg            STOP taking these medications      cyclobenzaprine 10 MG Tabs  Commonly known as: Flexeril     lidocaine 5 % Ptch  Commonly known as: Lidoderm            DISCHARGE INSTRUCTIONS: They are given discharge instructions on potential post-operative complications and symptoms to watch out for. Their groin sites were checked and were clean, dry, and intact. Patient or family to notify us for any complications noted on the discharge instructions. They will follow up with myself, Glenda Chang DNP, APRN,  on Tuesday in our cardiology office. They will not get labs before their follow up appointment. They will then follow up with a repeat echocardiogram in one month for post MitraClip assessment.     Future Appointments   Date Time Provider Department Center   7/17/2025 12:40 PM Augusto Steele M.D. [facility] None   7/22/2025  3:15 PM DANIELLE Xavier None   7/24/2025  9:00 AM [place] [facility]   8/13/2025 10:00 AM Nora Del Cid M.D. [facility]   8/14/2025 10:30 AM [facility] None   8/21/2025  1:45 PM DANIELLE Lares None   8/27/2025 10:20 AM Eliceo Guzman M.D. [facility]   6/11/2026  9:15 AM [facility] ECHO 1 [facility] [place]     Discharge time spent with patient was 25 minutes.

## 2025-07-16 NOTE — PROGRESS NOTES
Received bedside report from CAILIN Michael, pt care assumed. VS WDL, pt assessment complete. Pt A&Ox4, no c/o pain at this time. POC discussed with pt and verbalizes no questions. Pt denies any additional needs at this time. Bed locked and in lowest position, bed alarm off as client is up to self. Pt educated on fall risk and verbalized understanding, call light within reach, hourly rounding initiated.     Tissue Cultured Epidermal Autograft Text: The defect edges were debeveled with a #15 scalpel blade.  Given the location of the defect, shape of the defect and the proximity to free margins a tissue cultured epidermal autograft was deemed most appropriate.  The graft was then trimmed to fit the size of the defect.  The graft was then placed in the primary defect and oriented appropriately.

## 2025-07-17 ENCOUNTER — APPOINTMENT (OUTPATIENT)
Dept: CARDIOLOGY | Facility: MEDICAL CENTER | Age: 70
End: 2025-07-17
Attending: INTERNAL MEDICINE
Payer: MEDICARE

## 2025-07-18 DIAGNOSIS — I48.19 PERSISTENT ATRIAL FIBRILLATION (HCC): ICD-10-CM

## 2025-07-18 RX ORDER — APIXABAN 5 MG/1
5 TABLET, FILM COATED ORAL 2 TIMES DAILY
Qty: 60 TABLET | Refills: 5 | Status: SHIPPED | OUTPATIENT
Start: 2025-07-18

## 2025-07-21 NOTE — Clinical Note
REFERRAL APPROVAL NOTICE         Sent on July 21, 2025                   Chinyere Priest  [address] [address]                   Dear MsAfua Cem,    After a careful review of the medical information and benefit coverage, [facility] has processed your referral. See below for additional details.    If applicable, you must be actively enrolled with your insurance for coverage of the authorized service. If you have any questions regarding your coverage, please contact your insurance directly.    REFERRAL INFORMATION   Referral #:  [id number]  Referred-To Department    Referred-By Provider:  Cardiac Intensive Care    DANIELLE Xavier   Intensive Card Rehab      [address]  [address]  [facility] [address]  [phone] [address]  [address]  [facility] [address] [phone]    Referral Start Date:  07/15/2025  Referral End Date:   07/15/2026             SCHEDULING  If you do not already have an appointment, please call [phone] to make an appointment.     MORE INFORMATION  If you do not already have a [employer] account, sign up at: [email or web address]  You can access your medical information, make appointments, see lab results, billing information, and more.  If you have questions regarding this referral, please contact  the [facility] Referrals department at:             [phone]. Monday - Friday 8:00AM - 5:00PM.     Sincerely,    [facility]

## 2025-07-22 ENCOUNTER — OFFICE VISIT (OUTPATIENT)
Dept: CARDIOLOGY | Facility: MEDICAL CENTER | Age: 70
End: 2025-07-22
Attending: INTERNAL MEDICINE
Payer: MEDICARE

## 2025-07-22 VITALS
BODY MASS INDEX: 23.78 KG/M2 | DIASTOLIC BLOOD PRESSURE: 70 MMHG | HEART RATE: 102 BPM | OXYGEN SATURATION: 100 % | SYSTOLIC BLOOD PRESSURE: 106 MMHG | RESPIRATION RATE: 16 BRPM | HEIGHT: 66 IN | WEIGHT: 148 LBS

## 2025-07-22 DIAGNOSIS — I36.1 NONRHEUMATIC TRICUSPID VALVE REGURGITATION: ICD-10-CM

## 2025-07-22 DIAGNOSIS — Z98.890 S/P MITRAL VALVE CLIP IMPLANTATION: ICD-10-CM

## 2025-07-22 DIAGNOSIS — I10 PRIMARY HYPERTENSION: Primary | ICD-10-CM

## 2025-07-22 DIAGNOSIS — E78.5 DYSLIPIDEMIA: ICD-10-CM

## 2025-07-22 DIAGNOSIS — I50.33 ACUTE ON CHRONIC HEART FAILURE WITH PRESERVED EJECTION FRACTION (HCC): ICD-10-CM

## 2025-07-22 DIAGNOSIS — I65.23 ATHEROSCLEROSIS OF BOTH CAROTID ARTERIES: ICD-10-CM

## 2025-07-22 DIAGNOSIS — E78.00 HYPERCHOLESTEROLEMIA: ICD-10-CM

## 2025-07-22 DIAGNOSIS — I48.19 PERSISTENT ATRIAL FIBRILLATION (HCC): ICD-10-CM

## 2025-07-22 DIAGNOSIS — I70.0 AORTIC CALCIFICATION (HCC): ICD-10-CM

## 2025-07-22 DIAGNOSIS — I27.20 PULMONARY HYPERTENSION (HCC): ICD-10-CM

## 2025-07-22 DIAGNOSIS — Z98.890 S/P CARDIAC CATHETERIZATION: ICD-10-CM

## 2025-07-22 DIAGNOSIS — Z95.818 S/P MITRAL VALVE CLIP IMPLANTATION: ICD-10-CM

## 2025-07-22 DIAGNOSIS — I48.0 PAROXYSMAL ATRIAL FIBRILLATION (HCC): ICD-10-CM

## 2025-07-22 DIAGNOSIS — N18.31 STAGE 3A CHRONIC KIDNEY DISEASE: ICD-10-CM

## 2025-07-22 LAB — EKG IMPRESSION: NORMAL

## 2025-07-22 PROCEDURE — 99213 OFFICE O/P EST LOW 20 MIN: CPT | Performed by: NURSE PRACTITIONER

## 2025-07-22 PROCEDURE — 93005 ELECTROCARDIOGRAM TRACING: CPT | Mod: TC | Performed by: NURSE PRACTITIONER

## 2025-07-22 PROCEDURE — 93010 ELECTROCARDIOGRAM REPORT: CPT | Performed by: INTERNAL MEDICINE

## 2025-07-22 ASSESSMENT — ENCOUNTER SYMPTOMS
PALPITATIONS: 1
MYALGIAS: 0
DIZZINESS: 1
CLAUDICATION: 0
SHORTNESS OF BREATH: 0
COUGH: 0
PND: 0
ORTHOPNEA: 0
FEVER: 0
ABDOMINAL PAIN: 0

## 2025-07-22 ASSESSMENT — FIBROSIS 4 INDEX: FIB4 SCORE: 3.23

## 2025-07-22 NOTE — PATIENT INSTRUCTIONS
INCREASE amiodarone to 200 mg twice daily for 2 weeks then go back to once daily.    Watch afib with heart monitor for 2 weeks.    Otherwise, continue all other medications the same.

## 2025-07-22 NOTE — PROGRESS NOTES
Chief Complaint   Patient presents with    Hypertension    Mitral Stenosis/Insufficiency     Nonrheumatic mitral valve regurgitation     Subjective     Chinyere Priest is a 69 y.o. female who presents today for 1 week S/P SAHARA, nimesh clip.    She is a patient of Dr. Hernandez in our office. Hx of severe symptomatic mitral regurgitation, HLD with non-obstructive CAD, HTN, paroxysmal afib with successful intra-op DCCV with chronic anticoagulation, pre-diabetes, carotid disease, HFpEF, CKD stage 3a, asthma, anemia, and degenerative disc disease with chronic back pain.    She presents today with her daughter. She has had some intermittent afib events with associated dizziness.    She has no chest pain, shortness of breath, or edema.    Past Medical History:   Diagnosis Date    Aortic calcification (HCC)      Noted incidentally on abdominal CT scan.    Arrhythmia     hx of afibb, ablation x 2     Arthritis 2010    Asthma in adult     patient states it is only occasionally    Breath shortness 2020    Bronchitis 2019    Chickenpox     Chronic anticoagulation     Chronic back pain      CKD (chronic kidney disease) stage 3, GFR 30-59 ml/min 09/14/2020    Congestive heart failure (HCC) 2024    Dental disorder     Full upper/lower dentures.    Diverticulosis      Hearing difficulty     Heart murmur 2019    High cholesterol     patient denies    Hypertension     Inflamed seborrheic keratosis      Intrinsic eczema 01/20/2020    Mumps     Non-rheumatic mitral regurgitation 05/2019    Echocardiogram with normal LV size, LVEF 65%. Moderate MR, mild-moderate TR, unchanged from previous.    Osteoarthritis of both hips      Palpitations     Plantar fascia syndrome      Plantar wart of right foot      Pneumonia 2019    Pruritic dermatitis      Ongoing for several months but worse at night on her lower extremities    Pulmonary hypertension (HCC)     Pure hypercholesterolemia      Ringing in ears     S/P cardiac catheterization  2017    1.  Normal right heart pressures. 3.  Essentially normal coronary arteries. 2.  Left ventricular ejection fraction 65%.     Shortness of breath     Spider veins of limb      Swelling of lower extremity     Unspecified vitamin D deficiency      Wears glasses      Past Surgical History:   Procedure Laterality Date    TRANSCATHETER MITRAL VALVE REPAIR Right 7/15/2025    Procedure: TRANSCATHETER MITRAL VALVE PROCEDURE;  Surgeon: Carlos Hernandez M.D.;  Location: SURGERY [facility];  Service: Cardiac    ECHOCARDIOGRAM, TRANSESOPHAGEAL, INTRAOPERATIVE N/A 7/15/2025    Procedure: ECHOCARDIOGRAM, TRANSESOPHAGEAL, INTRAOPERATIVE;  Surgeon: Carlos Hernandez M.D.;  Location: SURGERY [facility];  Service: Cardiac    LUMBAR FUSION O-ARM N/A 2021    Procedure: FUSION, SPINE, LUMBAR, WITH O-ARM IMAGING GUIDANCE - STAGE #2 L3-5 UNILATERAL, LEFT-SIDED.laminotomy lumbar 3-5;  Surgeon: Sudhir Clay M.D.;  Location: SURGERY [facility];  Service: Neurosurgery    FUSION, SPINE, XLIF Right 2021    Procedure: FUSION, SPINE, XLIF - STAGE #1 L3-5, RIGHT-SIDED APPROACH.;  Surgeon: Sudhir Clay M.D.;  Location: SURGERY [facility];  Service: Neurosurgery    OTHER CARDIAC SURGERY  2019    cardiac ablation x 2 , for Afibb    LUMBAR LAMINECTOMY DISKECTOMY  2009    Performed by SUDHIR CLAY at [facility] ORS    HYSTERECTOMY LAPAROSCOPY      GYN SURGERY       x2    LAMINOTOMY      PRIMARY C SECTION       Family History   Problem Relation Age of Onset    Heart Disease Mother         1st MI @ 61 yo    Hypertension Mother     Heart Attack Mother 60    Heart Disease Maternal Aunt     Breast Cancer Maternal Aunt     Cancer Daughter 35        breast    Breast Cancer Daughter     Heart Disease Maternal Grandmother     Heart Disease Maternal Grandfather     Heart Disease Maternal Aunt 56    Ovarian Cancer Other      Social History     Socioeconomic History    Marital status:       Spouse name: Not on file    Number of children: Not on file    Years of education: Not on file    Highest education level: Not on file   Occupational History    Not on file   Tobacco Use    Smoking status: Former     Current packs/day: 0.00     Average packs/day: 0.5 packs/day for 39.0 years (19.5 ttl pk-yrs)     Types: Cigarettes     Start date: 3/1/1970     Quit date: 3/1/2009     Years since quittin.4    Smokeless tobacco: Never   Vaping Use    Vaping status: Never Used   Substance and Sexual Activity    Alcohol use: No    Drug use: Never    Sexual activity: Yes     Partners: Male     Birth control/protection: Post-Menopausal     Comment: , works at [employer](part time)   Other Topics Concern     Service Not Asked    Blood Transfusions Not Asked    Caffeine Concern Not Asked    Occupational Exposure Not Asked    Hobby Hazards Not Asked    Sleep Concern Not Asked    Stress Concern Not Asked    Weight Concern Not Asked    Special Diet Not Asked    Back Care Not Asked    Exercise No    Bike Helmet Not Asked    Seat Belt Not Asked    Self-Exams Not Asked   Social History Narrative    Works at [employer]     Social Drivers of Health     Financial Resource Strain: Not on file   Food Insecurity: No Food Insecurity (2025)    Hunger Vital Sign     Worried About Running Out of Food in the Last Year: Never true     Ran Out of Food in the Last Year: Never true   Transportation Needs: No Transportation Needs (2025)    PRAPARE - Transportation     Lack of Transportation (Medical): No     Lack of Transportation (Non-Medical): No   Physical Activity: Not on file   Stress: Not on file   Social Connections: Not on file   Intimate Partner Violence: Not At Risk (2025)    Humiliation, Afraid, Rape, and Kick questionnaire     Fear of Current or Ex-Partner: No     Emotionally Abused: No     Physically Abused: No     Sexually Abused: No   Housing Stability: Low Risk  (2025)    Housing Stability Vital Sign      "Unable to Pay for Housing in the Last Year: No     Number of Times Moved in the Last Year: 1     Homeless in the Last Year: No     Allergies   Allergen Reactions    Erythromycin Unspecified     Severe Abdominal pain    Latex Hives     rash    Tape Hives       Paper tape okay    Tikosyn [Dofetilide] Diarrhea     diarrhea     Outpatient Encounter Medications as of 7/22/2025   Medication Sig Dispense Refill    apixaban (ELIQUIS) 5 MG Tab TAKE 1 TABLET BY MOUTH TWICE A DAY 60 Tablet 5    amiodarone (CORDARONE) 200 MG Tab Take 1 Tablet by mouth every day. 100 Tablet 3    clobetasol (TEMOVATE) 0.05 % Cream Apply 1 Application topically 2 times a day.      losartan (COZAAR) 25 MG Tab Take 1 Tablet by mouth every day. 90 Tablet 3    torsemide (DEMADEX) 10 MG tablet Take 2 Tablets by mouth every day. 100 Tablet 0    potassium citrate SR (UROCIT-K SR) 10 MEQ (1080 MG) Tab CR Take 1 Tablet by mouth every day. 90 Tablet 0    atorvastatin (LIPITOR) 10 MG Tab TAKE 1 TABLET BY MOUTH EVERY DAY 90 Tablet 1    albuterol 108 (90 Base) MCG/ACT Aero Soln inhalation aerosol Inhale 2 Puffs every 6 hours as needed for Shortness of Breath. 8.5 g 0    cyanocobalamin (VITAMIN B-12) 100 MCG Tab Take 100 mcg by mouth every day.       No facility-administered encounter medications on file as of 7/22/2025.     Review of Systems   Constitutional:  Negative for fever and malaise/fatigue.   Respiratory:  Negative for cough and shortness of breath.    Cardiovascular:  Positive for palpitations. Negative for chest pain, orthopnea, claudication, leg swelling and PND.   Gastrointestinal:  Negative for abdominal pain.   Musculoskeletal:  Negative for myalgias.   Neurological:  Positive for dizziness.              Objective     /70 (BP Location: Left arm, Patient Position: Sitting, BP Cuff Size: Adult)   Pulse (!) 102   Resp 16   Ht 1.676 m (5' 6\")   Wt 67.1 kg (148 lb)   SpO2 100%   BMI 23.89 kg/m²     Physical Exam  Vitals and nursing note " reviewed.   Constitutional:       Appearance: Normal appearance. She is well-developed and normal weight.   HENT:      Head: Normocephalic and atraumatic.   Neck:      Vascular: No JVD.   Cardiovascular:      Rate and Rhythm: Normal rate and regular rhythm.      Pulses: Normal pulses.      Heart sounds: No murmur heard.  Pulmonary:      Effort: Pulmonary effort is normal.      Breath sounds: Normal breath sounds.   Musculoskeletal:         General: Normal range of motion.      Right lower leg: No edema.      Left lower leg: No edema.   Skin:     General: Skin is warm and dry.      Capillary Refill: Capillary refill takes less than 2 seconds.   Neurological:      General: No focal deficit present.      Mental Status: She is alert and oriented to person, place, and time. Mental status is at baseline.   Psychiatric:         Mood and Affect: Mood normal.         Behavior: Behavior normal.         Thought Content: Thought content normal.         Judgment: Judgment normal.                Assessment & Plan     1. Primary hypertension  EKG      2. Nonrheumatic tricuspid valve regurgitation        3. Hypercholesterolemia        4. Pulmonary hypertension (HCC)        5. Aortic calcification (HCC)        6. S/P cardiac catheterization        7. Stage 3a chronic kidney disease        8. Acute on chronic heart failure with preserved ejection fraction (HCC)        9. S/P mitral valve clip implantation  Holter Monitor Study      10. Atherosclerosis of both carotid arteries        11. Dyslipidemia        12. Paroxysmal atrial fibrillation (HCC)  Holter Monitor Study        Medical Decision Making: Today's Assessment/Status/Plan:      1. S/P SAHARA, NYHA II  -doing well post-op  -cont eliquis in lieu of aspirin  -groins CDI with mild bruising and small nodule   -SBE prophylaxis understands lifelong  -echo 1 month with structural heart follow up  -reviewed hospital imaging, labs, and EKG  -cardiac rehab referral placed  -EKG shows  afib/flutter rate of 97 bpm, see below for changes    2. PAF S/P afib ablation on chronic anticoagulation  -rate controlled, symptomatic now since intra-op DCCV, now back in afib since hospital discharge, intermittent, with palpitations and lightheadedness  -cont eliquis for OAC, tolerating well  -INCREASE amiodarone to 200 mg twice daily for 2 weeks then back to once daily  -order 14 day heart monitor for afib burden, if afib rates higher, we will send back to EP for review  -follow     3. HLD  -cont statin, LDL goal <100  -follow labs annually     4. HTN  -good control, follow at home  -BP goal <130/80  -cont torsemide and losartan     Patient is to follow up with Glenda GOLDEN in 1 month with echo and heart monitor for 14 days for afib burden.

## 2025-07-23 ENCOUNTER — DOCUMENTATION (OUTPATIENT)
Dept: CARDIOLOGY | Facility: MEDICAL CENTER | Age: 70
End: 2025-07-23
Payer: MEDICARE

## 2025-07-23 NOTE — PROGRESS NOTES
On behalf of [facility]'s Structural Heart Program, we would like to thank you for allowing us to participate in the care of your patient.     She underwent a successful mitral valve transcatheter enoe-ns-fnta repair (Mitral SAHARA) on 7/15/2025.     Your patient is scheduled to follow up with our Structural Heart Program at one month and one year post procedure.     Again, thank you for allowing us to participate in the care of your patient. If you have any questions, please do not hesitate to contact our Structural Heart team.     Sincerely,    [facility] Structural Heart Team    ONOFRE New, RN, Structural Heart Program Nurse Coordinator ([phone])  ONOFRE Anne, RN, Structural Heart Program Nurse Coordinator ([phone])

## 2025-07-24 ENCOUNTER — OFFICE VISIT (OUTPATIENT)
Dept: AUDIOLOGY | Facility: OTHER | Age: 70
End: 2025-07-24
Payer: MEDICARE

## 2025-07-24 DIAGNOSIS — H90.3 SENSORINEURAL HEARING LOSS (SNHL) OF BOTH EARS: Primary | ICD-10-CM

## 2025-07-24 PROCEDURE — 92557 COMPREHENSIVE HEARING TEST: CPT | Performed by: AUDIOLOGIST

## 2025-07-24 PROCEDURE — 92567 TYMPANOMETRY: CPT | Performed by: AUDIOLOGIST

## 2025-07-24 NOTE — OP THERAPY EVALUATION
AUDIOLOGY OFFICE VISIT    Case History  Chinyere Priest is a 69 y.o. female who presents with constant tinnitus which has significantly worsened over the past 6 months.  She denies a history of noise exposure.  Medical history is significant for congestive heart failure and prediabetes.  Other otologic history was unremarkable.    Evaluation  Otoscopy revealed a clear external ear canal and visible tympanic membrane in the left ear and partial cerumen occlusion in the right ear; as such, the tympanic membrane could not be visualized in that ear.    Tympanometry revealed normal, Type A, tympanograms in both ears.  This finding is consistent with normal middle ear function.    Behavioral testing revealed a mild sloping to moderately severe sensorineural hearing loss in both ears. Word recognition scores were 72% in the left ear and 68% in the right ear.      Recommendations  Cerumen management of the right ear.    Trial with binaural amplification.  Annual hearing evaluation to monitor hearing status.

## 2025-07-25 ENCOUNTER — APPOINTMENT (OUTPATIENT)
Dept: DERMATOLOGY | Facility: IMAGING CENTER | Age: 70
End: 2025-07-25
Payer: MEDICARE

## 2025-07-30 ENCOUNTER — HOSPITAL ENCOUNTER (OUTPATIENT)
Dept: CARDIOLOGY | Facility: MEDICAL CENTER | Age: 70
End: 2025-07-30
Attending: INTERNAL MEDICINE
Payer: MEDICARE

## 2025-07-30 DIAGNOSIS — I34.0 SEVERE MITRAL REGURGITATION: ICD-10-CM

## 2025-07-30 PROCEDURE — 93306 TTE W/DOPPLER COMPLETE: CPT

## 2025-07-31 LAB
LV EJECT FRACT  99904: 55
LV EJECT FRACT MOD 2C 99903: 60.69
LV EJECT FRACT MOD 4C 99902: 55.19
LV EJECT FRACT MOD BP 99901: 57.5

## 2025-08-06 ENCOUNTER — TELEPHONE (OUTPATIENT)
Dept: CARDIOLOGY | Facility: MEDICAL CENTER | Age: 70
End: 2025-08-06

## 2025-08-06 ENCOUNTER — NON-PROVIDER VISIT (OUTPATIENT)
Dept: CARDIOLOGY | Facility: MEDICAL CENTER | Age: 70
End: 2025-08-06
Attending: NURSE PRACTITIONER
Payer: MEDICARE

## 2025-08-06 DIAGNOSIS — I48.19 PERSISTENT ATRIAL FIBRILLATION (HCC): ICD-10-CM

## 2025-08-06 DIAGNOSIS — Z95.818 S/P MITRAL VALVE CLIP IMPLANTATION: ICD-10-CM

## 2025-08-06 DIAGNOSIS — Z98.890 S/P MITRAL VALVE CLIP IMPLANTATION: ICD-10-CM

## 2025-08-06 DIAGNOSIS — I48.0 PAROXYSMAL ATRIAL FIBRILLATION (HCC): ICD-10-CM

## 2025-08-06 PROCEDURE — 93270 REMOTE 30 DAY ECG REV/REPORT: CPT

## 2025-08-07 RX ORDER — APIXABAN 5 MG/1
5 TABLET, FILM COATED ORAL 2 TIMES DAILY
Qty: 60 TABLET | Refills: 5 | OUTPATIENT
Start: 2025-08-07

## 2025-08-12 DIAGNOSIS — I50.33 ACUTE ON CHRONIC DIASTOLIC HEART FAILURE DUE TO VALVULAR DISEASE (HCC): ICD-10-CM

## 2025-08-12 DIAGNOSIS — I38 ACUTE ON CHRONIC DIASTOLIC HEART FAILURE DUE TO VALVULAR DISEASE (HCC): ICD-10-CM

## 2025-08-12 DIAGNOSIS — Z79.899 ON POTASSIUM WASTING DIURETIC THERAPY: ICD-10-CM

## 2025-08-12 RX ORDER — TORSEMIDE 10 MG/1
20 TABLET ORAL DAILY
Qty: 180 TABLET | Refills: 3 | Status: SHIPPED | OUTPATIENT
Start: 2025-08-12 | End: 2025-08-20

## 2025-08-12 RX ORDER — POTASSIUM CITRATE 1080 MG/1
10 TABLET, EXTENDED RELEASE ORAL DAILY
Qty: 90 TABLET | Refills: 3 | Status: SHIPPED | OUTPATIENT
Start: 2025-08-12 | End: 2025-08-20

## 2025-08-13 ENCOUNTER — APPOINTMENT (OUTPATIENT)
Dept: DERMATOLOGY | Facility: IMAGING CENTER | Age: 70
End: 2025-08-13
Payer: MEDICARE

## 2025-08-20 ENCOUNTER — HOSPITAL ENCOUNTER (OUTPATIENT)
Facility: MEDICAL CENTER | Age: 70
End: 2025-08-20
Attending: FAMILY MEDICINE
Payer: MEDICARE

## 2025-08-20 ENCOUNTER — DOCUMENTATION (OUTPATIENT)
Dept: CARDIOLOGY | Facility: MEDICAL CENTER | Age: 70
End: 2025-08-20
Payer: MEDICARE

## 2025-08-20 ENCOUNTER — OFFICE VISIT (OUTPATIENT)
Dept: CARDIOLOGY | Facility: MEDICAL CENTER | Age: 70
End: 2025-08-20
Attending: NURSE PRACTITIONER
Payer: MEDICARE

## 2025-08-20 ENCOUNTER — TELEPHONE (OUTPATIENT)
Dept: CARDIOLOGY | Facility: MEDICAL CENTER | Age: 70
End: 2025-08-20
Payer: MEDICARE

## 2025-08-20 VITALS
SYSTOLIC BLOOD PRESSURE: 110 MMHG | WEIGHT: 152.7 LBS | OXYGEN SATURATION: 98 % | BODY MASS INDEX: 24.54 KG/M2 | HEART RATE: 87 BPM | HEIGHT: 66 IN | DIASTOLIC BLOOD PRESSURE: 60 MMHG | RESPIRATION RATE: 16 BRPM

## 2025-08-20 DIAGNOSIS — Z98.890 S/P CARDIAC CATHETERIZATION: ICD-10-CM

## 2025-08-20 DIAGNOSIS — N18.31 STAGE 3A CHRONIC KIDNEY DISEASE: ICD-10-CM

## 2025-08-20 DIAGNOSIS — I50.33 ACUTE ON CHRONIC HEART FAILURE WITH PRESERVED EJECTION FRACTION (HCC): ICD-10-CM

## 2025-08-20 DIAGNOSIS — I50.33 ACUTE ON CHRONIC DIASTOLIC HEART FAILURE DUE TO VALVULAR DISEASE (HCC): ICD-10-CM

## 2025-08-20 DIAGNOSIS — E78.5 DYSLIPIDEMIA: ICD-10-CM

## 2025-08-20 DIAGNOSIS — I48.0 PAROXYSMAL ATRIAL FIBRILLATION (HCC): ICD-10-CM

## 2025-08-20 DIAGNOSIS — I65.23 ATHEROSCLEROSIS OF BOTH CAROTID ARTERIES: ICD-10-CM

## 2025-08-20 DIAGNOSIS — I10 PRIMARY HYPERTENSION: ICD-10-CM

## 2025-08-20 DIAGNOSIS — I38 ACUTE ON CHRONIC DIASTOLIC HEART FAILURE DUE TO VALVULAR DISEASE (HCC): ICD-10-CM

## 2025-08-20 DIAGNOSIS — I70.0 AORTIC CALCIFICATION (HCC): ICD-10-CM

## 2025-08-20 DIAGNOSIS — R73.03 PREDIABETES: ICD-10-CM

## 2025-08-20 DIAGNOSIS — I27.20 PULMONARY HYPERTENSION (HCC): ICD-10-CM

## 2025-08-20 DIAGNOSIS — Z98.890 S/P MITRAL VALVE CLIP IMPLANTATION: ICD-10-CM

## 2025-08-20 DIAGNOSIS — I36.1 NONRHEUMATIC TRICUSPID VALVE REGURGITATION: ICD-10-CM

## 2025-08-20 DIAGNOSIS — E78.00 HYPERCHOLESTEROLEMIA: Primary | ICD-10-CM

## 2025-08-20 DIAGNOSIS — Z95.818 S/P MITRAL VALVE CLIP IMPLANTATION: ICD-10-CM

## 2025-08-20 LAB
ANION GAP SERPL CALC-SCNC: 11 MMOL/L (ref 7–16)
BUN SERPL-MCNC: 24 MG/DL (ref 8–22)
CALCIUM SERPL-MCNC: 9.4 MG/DL (ref 8.5–10.5)
CHLORIDE SERPL-SCNC: 102 MMOL/L (ref 96–112)
CO2 SERPL-SCNC: 26 MMOL/L (ref 20–33)
CREAT SERPL-MCNC: 1.46 MG/DL (ref 0.5–1.4)
EKG IMPRESSION: NORMAL
EST. AVERAGE GLUCOSE BLD GHB EST-MCNC: 123 MG/DL
GFR SERPLBLD CREATININE-BSD FMLA CKD-EPI: 38 ML/MIN/1.73 M 2
GLUCOSE SERPL-MCNC: 88 MG/DL (ref 65–99)
HBA1C MFR BLD: 5.9 % (ref 4–5.6)
POTASSIUM SERPL-SCNC: 4 MMOL/L (ref 3.6–5.5)
SODIUM SERPL-SCNC: 139 MMOL/L (ref 135–145)

## 2025-08-20 PROCEDURE — 93010 ELECTROCARDIOGRAM REPORT: CPT | Performed by: INTERNAL MEDICINE

## 2025-08-20 PROCEDURE — 99213 OFFICE O/P EST LOW 20 MIN: CPT | Performed by: NURSE PRACTITIONER

## 2025-08-20 PROCEDURE — 36415 COLL VENOUS BLD VENIPUNCTURE: CPT

## 2025-08-20 PROCEDURE — 93005 ELECTROCARDIOGRAM TRACING: CPT | Mod: TC | Performed by: NURSE PRACTITIONER

## 2025-08-20 PROCEDURE — 83036 HEMOGLOBIN GLYCOSYLATED A1C: CPT | Mod: GA

## 2025-08-20 PROCEDURE — 80048 BASIC METABOLIC PNL TOTAL CA: CPT

## 2025-08-20 RX ORDER — POTASSIUM CHLORIDE 750 MG/1
10 TABLET, EXTENDED RELEASE ORAL DAILY
Qty: 90 TABLET | Refills: 3 | Status: SHIPPED | OUTPATIENT
Start: 2025-08-20

## 2025-08-20 RX ORDER — TORSEMIDE 10 MG/1
10 TABLET ORAL DAILY
Qty: 90 TABLET | Refills: 3 | Status: SHIPPED | OUTPATIENT
Start: 2025-08-20

## 2025-08-20 ASSESSMENT — ENCOUNTER SYMPTOMS
CLAUDICATION: 0
PND: 0
ABDOMINAL PAIN: 0
MYALGIAS: 0
COUGH: 0
ORTHOPNEA: 0
PALPITATIONS: 1
SHORTNESS OF BREATH: 0
FEVER: 0

## 2025-08-20 ASSESSMENT — FIBROSIS 4 INDEX: FIB4 SCORE: 3.28

## 2025-08-22 ENCOUNTER — APPOINTMENT (OUTPATIENT)
Dept: ADMISSIONS | Facility: MEDICAL CENTER | Age: 70
End: 2025-08-22
Attending: INTERNAL MEDICINE
Payer: MEDICARE

## 2025-08-25 ENCOUNTER — PRE-ADMISSION TESTING (OUTPATIENT)
Dept: ADMISSIONS | Facility: MEDICAL CENTER | Age: 70
End: 2025-08-25
Attending: INTERNAL MEDICINE
Payer: MEDICARE

## 2025-08-27 ENCOUNTER — TELEPHONE (OUTPATIENT)
Dept: CARDIOLOGY | Facility: MEDICAL CENTER | Age: 70
End: 2025-08-27

## 2025-08-27 ENCOUNTER — OFFICE VISIT (OUTPATIENT)
Dept: MEDICAL GROUP | Facility: MEDICAL CENTER | Age: 70
End: 2025-08-27
Payer: MEDICARE

## 2025-08-27 VITALS
OXYGEN SATURATION: 96 % | HEART RATE: 100 BPM | DIASTOLIC BLOOD PRESSURE: 60 MMHG | TEMPERATURE: 97 F | HEIGHT: 66 IN | SYSTOLIC BLOOD PRESSURE: 134 MMHG | WEIGHT: 151.68 LBS | BODY MASS INDEX: 24.38 KG/M2

## 2025-08-27 DIAGNOSIS — E55.9 VITAMIN D DEFICIENCY: Chronic | ICD-10-CM

## 2025-08-27 DIAGNOSIS — H61.21 IMPACTED CERUMEN OF RIGHT EAR: ICD-10-CM

## 2025-08-27 DIAGNOSIS — R73.03 PREDIABETES: ICD-10-CM

## 2025-08-27 DIAGNOSIS — M85.89 OSTEOPENIA OF MULTIPLE SITES: ICD-10-CM

## 2025-08-27 DIAGNOSIS — N18.32 STAGE 3B CHRONIC KIDNEY DISEASE: Primary | ICD-10-CM

## 2025-08-27 PROCEDURE — 99214 OFFICE O/P EST MOD 30 MIN: CPT | Performed by: FAMILY MEDICINE

## 2025-08-27 PROCEDURE — 3078F DIAST BP <80 MM HG: CPT | Performed by: FAMILY MEDICINE

## 2025-08-27 PROCEDURE — 3075F SYST BP GE 130 - 139MM HG: CPT | Performed by: FAMILY MEDICINE

## 2025-08-27 ASSESSMENT — ENCOUNTER SYMPTOMS
FEVER: 0
CHILLS: 0

## 2025-08-27 ASSESSMENT — FIBROSIS 4 INDEX: FIB4 SCORE: 3.28

## (undated) DEVICE — LIGHT SOURCE MIS 12FT

## (undated) DEVICE — TOOL DISSECT MATCH HEAD

## (undated) DEVICE — SUTURE 1 VICRYL PLUS CT-1 - 18 INCH (12/BX)

## (undated) DEVICE — PACK JACKSON TABLE KIT W/OUT - HR (6EA/CA)

## (undated) DEVICE — DRAPE C ARMOR (12EA/CA)

## (undated) DEVICE — KIT INTROPERCUTANEOUS SHEATH - 8.5 FR W/MAX BARRIER AND BIOPATCH (5/CA)

## (undated) DEVICE — K-WIRE

## (undated) DEVICE — ELECTRODE 850 FOAM ADHESIVE - HYDROGEL RADIOTRNSPRNT (50/PK)

## (undated) DEVICE — CHLORAPREP 26 ML APPLICATOR - ORANGE TINT(25/CA)

## (undated) DEVICE — SHIM

## (undated) DEVICE — Device

## (undated) DEVICE — KIT RADIAL ARTERY 20GA W/MAX BARRIER AND BIOPATCH (5EA/CA) #10740 IS FOR THE SET RADIAL ARTERIAL

## (undated) DEVICE — TUBING PRSS MNTR 72IN M/ M LL - (25/BX) MONIT. LINE W/MALE L/L

## (undated) DEVICE — STIM CLIP

## (undated) DEVICE — SYRINGE DISP. 50CC LS - (40/BX)

## (undated) DEVICE — SENSOR SPO2 NEO LNCS ADHESIVE (20/BX) SEE USER NOTES

## (undated) DEVICE — MIDAS LUBRICATOR DIFFUSER PACK (4EA/CA)

## (undated) DEVICE — GLOVESZ 8.5 BIOGEL PI MICRO - PF LF (50PR/BX)

## (undated) DEVICE — SET EXTENSION WITH 2 PORTS (48EA/CA) ***PART #2C8610 IS A SUBSTITUTE*****

## (undated) DEVICE — CANISTER SUCTION 3000ML MECHANICAL FILTER AUTO SHUTOFF MEDI-VAC NONSTERILE LF DISP  (40EA/CA)

## (undated) DEVICE — TUBING C&T SET FLYING LEADS DRAIN TUBING (10EA/BX)

## (undated) DEVICE — SUCTION INSTRUMENT YANKAUER BULBOUS TIP W/O VENT (50EA/CA)

## (undated) DEVICE — DRAPE STRLE REG TOWEL 18X24 - (10/BX 4BX/CA)"

## (undated) DEVICE — GLOVE BIOGEL PI INDICATOR SZ 8.0 SURGICAL PF LF -(50/BX 4BX/CA)

## (undated) DEVICE — INTRAOP NEURO IN OR 1:1 PER 15 MIN

## (undated) DEVICE — PEN SKIN MARKER W/RULER - (50EA/BX)

## (undated) DEVICE — MICRODRIP PRIMARY VENTED 60 (48EA/CA) THIS WAS PART #2C8428 WHICH WAS DISCONTINUED

## (undated) DEVICE — TRAY CATHETER FOLEY URINE METER W/STATLOCK 350ML (10EA/CA)

## (undated) DEVICE — PACK NEURO - (2EA/CA)

## (undated) DEVICE — LIGHTSOURCE LONG ROUND PHOTONSABER 12FR

## (undated) DEVICE — BOVIE  BLADE 6 EXTENDED - (50/PK)

## (undated) DEVICE — DRAPE LARGE 3 QUARTER - (20/CA)

## (undated) DEVICE — DRAPE CLEAR W/ELASTIC BAND RAD CARM 40 X40" (20EA/CA)"

## (undated) DEVICE — COVER MAYO STAND X-LG - (22EA/CA)

## (undated) DEVICE — MASK ANESTHESIA ADULT  - (100/CA)

## (undated) DEVICE — SET EPIDURAL BURRON NON-SAFETY (12EA/CA)

## (undated) DEVICE — SENSOR OXIMETER ADULT SPO2 RD SET (20EA/BX)

## (undated) DEVICE — DRAPE SRG LG BCK TBL DISP - 10/CA

## (undated) DEVICE — RESERVOIR SUCTION 100 CC - SILICONE (20EA/CA)

## (undated) DEVICE — NEPTUNE 4 PORT MANIFOLD - (20/PK)

## (undated) DEVICE — DRAPE 36X28IN RAD CARM BND BG - (25/CA) O

## (undated) DEVICE — COVER LIGHT HANDLE ALC PLUS DISP (18EA/BX)

## (undated) DEVICE — TOWELS CLOTH SURGICAL - (4/PK 20PK/CA)

## (undated) DEVICE — STOPCOCK MALE 4-WAY - (50/CA)

## (undated) DEVICE — ELECTRODE DUAL RETURN W/ CORD - (50/PK)

## (undated) DEVICE — TUBING CLEARLINK DUO-VENT - C-FLO (48EA/CA)

## (undated) DEVICE — INTRODUCER CATHETER DILATOR PROTRUDING 8FR 2.5CM (10EA/BX)

## (undated) DEVICE — BAG RESUSCITATION DISPOSABLE - WITH MASK (10 EA/CA)

## (undated) DEVICE — DRAPE MAYO STAND - (30/CA)

## (undated) DEVICE — DILATOR KIT

## (undated) DEVICE — TRANSDUCER BIFURCATED MONITORING KIT (10EA/CA)

## (undated) DEVICE — SLEEVE, VASO, THIGH, MED

## (undated) DEVICE — GLOVE SZ 6.5 BIOGEL PI MICRO - PF LF (50PR/BX)

## (undated) DEVICE — FIBRILLAR SURGICEL 4X4 - 10/CA

## (undated) DEVICE — SET BIFURCATED BLOOD - (48EA/CS)

## (undated) DEVICE — STIM PROBE

## (undated) DEVICE — KIT EVACUATER 3 SPRING PVC LF 1/8 DRAIN SIZE (10EA/CA)"

## (undated) DEVICE — GLOVE SZ 7 BIOGEL PI MICRO - PF LF (50PR/BX 4BX/CA)

## (undated) DEVICE — SUTURE DEVICE CLOSURE REPAIR SYSTEM PERCLOSE PROSTYLE (10EA/PK)

## (undated) DEVICE — ARMREST CRADLE FOAM - (2PR/PK 12PR/CA)

## (undated) DEVICE — DRAIN JACKSON PRATT 10FR - (10/CS)

## (undated) DEVICE — LACTATED RINGERS INJ. 500 ML - (24EA/CA)

## (undated) DEVICE — SODIUM CHL IRRIGATION 0.9% 1000ML (12EA/CA)

## (undated) DEVICE — TUBE CONNECT SUCTION CLEAR 120 X 1/4" (50EA/CA)"

## (undated) DEVICE — SYRINGE NON SAFETY 10 CC 20 GA X 1-1/2 IN (100/BX 4BX/CA)

## (undated) DEVICE — COVER CAMERA LENS LIGHT HANDLE STUBBY DISPOSABLE (20EA/BX)

## (undated) DEVICE — SYRINGE NON SAFETY 10 CC 21 GA X 1-1/2 IN (100/BX 4BX/CA)

## (undated) DEVICE — SPONGE GAUZESTER 4 X 4 4PLY - (128PK/CA)

## (undated) DEVICE — D-5-W INJ. 500 ML - (24EA/CA)

## (undated) DEVICE — PACK TAVR (3EA/CA)

## (undated) DEVICE — SOD. CHL. INJ. 0.9% 1000 ML - (14EA/CA 60CA/PF)

## (undated) DEVICE — DRAPE PATIENT STERILE FOR USE WITH O OR C ARMS (10EA/BX)

## (undated) DEVICE — SYRINGE 20 ML LL (50EA/BX 4BX/CA)

## (undated) DEVICE — KIT VASCULAR DILATOR

## (undated) DEVICE — GOWN WARMING STANDARD FLEX - (30/CA)

## (undated) DEVICE — HEADREST PRONEVIEW LARGE - (10/CA)

## (undated) DEVICE — DRAPE MICROSCOPE ARMATEC 120IN X 46IN (10EA/CA)

## (undated) DEVICE — PACK SINGLE BASIN - (6/CA)

## (undated) DEVICE — SODIUM CHL. INJ. 0.9% 500ML (24EA/CA 50CA/PF)

## (undated) DEVICE — GLOVE PROTEXIS W/NEU-THERA SZ 8.5 (50PR/BX)

## (undated) DEVICE — PROTECTOR ULNA NERVE - (36PR/CA)

## (undated) DEVICE — DRAPE SURG STERI-DRAPE 7X11OD - (40EA/CA)

## (undated) DEVICE — CATHETER THERMALDILUTION SWAN - (5EA/CA)

## (undated) DEVICE — SET LEADWIRE 5 LEAD BEDSIDE DISPOSABLE ECG (1SET OF 5/EA)

## (undated) DEVICE — SUTURE 2-0 VICRYL PLUS CT-1 - 8 X 18 INCH(12/BX)

## (undated) DEVICE — GLOVE BIOGEL PI INDICATOR SZ 7.5 SURGICAL PF LF -(50/BX 4BX/CA)

## (undated) DEVICE — LACTATED RINGERS INJ 1000 ML - (14EA/CA 60CA/PF)

## (undated) DEVICE — CANISTER SUCTION 3000ML MECHANICAL FILTER AUTO SHUTOFF MEDI-VAC NONSTERILE LF DISP (40EA/CA)

## (undated) DEVICE — DRESSING TRANSPARENT FILM TEGADERM 4 X 4.75" (50EA/BX)"

## (undated) DEVICE — GLOVE BIOGEL PI INDICATOR SZ 7.0 SURGICAL PF LF - (50/BX 4BX/CA)

## (undated) DEVICE — SYS DLV COST CLS RM TEMP - INJECTATE (CO-SET II) (10EA/CA)

## (undated) DEVICE — TOWEL STOP TIMEOUT SAFETY FLAG (40EA/CA)

## (undated) DEVICE — DRAPE LAPAROTOMY T SHEET - (12EA/CA)

## (undated) DEVICE — KIT ANESTHESIA W/CIRCUIT & 3/LT BAG W/FILTER (20EA/CA)

## (undated) DEVICE — ELECTRODE RADIOLUCNT SOLID GEL DEFIB PADS (12EA/CA)

## (undated) DEVICE — KIT SURGIFLO W/OUT THROMBIN - (6EA/CA)

## (undated) DEVICE — SET FLUID WARMING STANDARD FLOW - (10/CA)

## (undated) DEVICE — PIN INTRODUCER 8G X 15CM DIAMOND

## (undated) DEVICE — SHEATH GUIDING PIGTAIL RF WIRE ACCESS SOLUTION VERSACROSS L230 CM 45 D (1EA)

## (undated) DEVICE — CLOSURE SKIN STRIP 1/2 X 4 IN - (STERI STRIP) (50/BX 4BX/CA)

## (undated) DEVICE — STOPCOCK IV 400 PSI 3W ROT (50EA/BX)

## (undated) DEVICE — SUTURE GENERAL